# Patient Record
Sex: MALE | Race: WHITE | Employment: OTHER | URBAN - METROPOLITAN AREA
[De-identification: names, ages, dates, MRNs, and addresses within clinical notes are randomized per-mention and may not be internally consistent; named-entity substitution may affect disease eponyms.]

---

## 2017-11-14 ENCOUNTER — OFFICE VISIT (OUTPATIENT)
Dept: NEUROLOGY | Age: 62
End: 2017-11-14

## 2017-11-14 VITALS
BODY MASS INDEX: 28.89 KG/M2 | HEART RATE: 78 BPM | SYSTOLIC BLOOD PRESSURE: 122 MMHG | HEIGHT: 73 IN | RESPIRATION RATE: 16 BRPM | DIASTOLIC BLOOD PRESSURE: 66 MMHG | WEIGHT: 218 LBS | OXYGEN SATURATION: 95 %

## 2017-11-14 DIAGNOSIS — I63.239 CAROTID ARTERY STENOSIS WITH CEREBRAL INFARCTION (HCC): ICD-10-CM

## 2017-11-14 DIAGNOSIS — G40.209 COMPLEX PARTIAL SEIZURE EVOLVING TO GENERALIZED SEIZURE (HCC): ICD-10-CM

## 2017-11-14 DIAGNOSIS — I63.312 CEREBRAL INFARCTION DUE TO THROMBOSIS OF LEFT MIDDLE CEREBRAL ARTERY (HCC): Primary | ICD-10-CM

## 2017-11-14 RX ORDER — TRAMADOL HYDROCHLORIDE 50 MG/1
50 TABLET ORAL 2 TIMES DAILY
COMMUNITY
Start: 2017-10-11

## 2017-11-14 NOTE — MR AVS SNAPSHOT
Visit Information Date & Time Provider Department Dept. Phone Encounter #  
 11/14/2017  3:20 PM Zoila Jurado MD [facility] [phone] [id number] Follow-up Instructions Return in about 1 year (around 11/14/2018). Upcoming Health Maintenance Date Due Pneumococcal 19-64 Medium Risk (1 of 1 - PPSV23) 3/21/1975 DTaP/Tdap/Td series (1 - Tdap) 3/21/1977 FOBT Q 1 YEAR AGE 50-75 3/21/2006 ZOSTER VACCINE AGE 60> 1/21/2016 Influenza Age 5 to Adult 8/1/2017 Allergies as of 11/14/2017  Review Complete On: 11/14/2017 By: Zoila Jurado MD  
  
 Severity Noted Reaction Type Reactions Carbatrol [Carbamazepine]  07/23/2010    Rash Current Immunizations  Never Reviewed No immunizations on file. Not reviewed this visit You Were Diagnosed With   
  
 Codes Comments Cerebral infarction due to thrombosis of left middle cerebral artery (HCC)    -  Primary ICD-10-CM: S33.594 ICD-9-CM: 434.01 Complex partial seizure evolving to generalized seizure ([facility] [address]     ICD-10-CM: W71.589 ICD-9-CM: 345.40 Carotid artery stenosis with cerebral infarction [facility])     ICD-10-CM: K55.715 ICD-9-CM: 433.11 Vitals BP Pulse Resp Height(growth percentile) Weight(growth percentile) SpO2  
 122/66 78 16 6' 1\" (1.854 m) 218 lb (98.9 kg) 95% BMI Smoking Status 28.76 kg/m2 Current Every Day Smoker Vitals History BMI and BSA Data Body Mass Index Body Surface Area 28.76 kg/m 2 2.26 m 2 Preferred Pharmacy Pharmacy Name Phone [facility] [address] [place] [address] [phone] Your Updated Medication List  
  
   
This list is accurate as of: 11/14/17  3:38 PM.  Always use your most recent med list. amLODIPine 10 mg tablet Commonly known as:  [place] TAKE ONE TABLET BY MOUTH EVERY DAY  
  
 aspirin delayed-release 325 mg tablet Take 325 mg by mouth daily. atorvastatin 40 mg tablet Commonly known as:  LIPITOR Take 40 mg by mouth daily. citalopram 20 mg tablet Commonly known as:  CELEXA  
TAKE ONE TABLET BY MOUTH ONCE DAILY doxazosin 4 mg tablet Commonly known as:  CARDURA TAKE ONE TABLET BY MOUTH ONCE DAILY AT BEDTIME  
  
 levothyroxine 50 mcg tablet Commonly known as:  SYNTHROID  
TAKE ONE TABLET BY MOUTH EVERY DAY  
  
 NEURONTIN 600 mg tablet Generic drug:  gabapentin Take  by mouth four (4) times daily. traMADol 50 mg tablet Commonly known as:  ULTRAM  
  
  
  
  
Follow-up Instructions Return in about 1 year (around 11/14/2018). To-Do List   
 11/16/2017 Imaging:  DUPLEX CAROTID BILATERAL AMB NEURO Patient Instructions A Healthy Lifestyle: Care Instructions Your Care Instructions A healthy lifestyle can help you feel good, stay at a healthy weight, and have plenty of energy for both work and play. A healthy lifestyle is something you can share with your whole family. A healthy lifestyle also can lower your risk for serious health problems, such as high blood pressure, heart disease, and diabetes. You can follow a few steps listed below to improve your health and the health of your family. Follow-up care is a key part of your treatment and safety. Be sure to make and go to all appointments, and call your doctor if you are having problems. It's also a good idea to know your test results and keep a list of the medicines you take. How can you care for yourself at home? · Do not eat too much sugar, fat, or fast foods. You can still have dessert and treats now and then. The goal is moderation. · Start small to improve your eating habits. Pay attention to portion sizes, drink less juice and soda pop, and eat more fruits and vegetables. ¨ Eat a healthy amount of food.  A 3-ounce serving of meat, for example, is about the size of a deck of cards. Fill the rest of your plate with vegetables and whole grains. ¨ Limit the amount of soda and sports drinks you have every day. Drink more water when you are thirsty. ¨ Eat at least 5 servings of fruits and vegetables every day. It may seem like a lot, but it is not hard to reach this goal. A serving or helping is 1 piece of fruit, 1 cup of vegetables, or 2 cups of leafy, raw vegetables. Have an apple or some carrot sticks as an afternoon snack instead of a candy bar. Try to have fruits and/or vegetables at every meal. 
· Make exercise part of your daily routine. You may want to start with simple activities, such as walking, bicycling, or slow swimming. Try to be active 30 to 60 minutes every day. You do not need to do all 30 to 60 minutes all at once. For example, you can exercise 3 times a day for 10 or 20 minutes. Moderate exercise is safe for most people, but it is always a good idea to talk to your doctor before starting an exercise program. 
· Keep moving. Sonal Balderas the lawn, work in the garden, or [employer]. Take the stairs instead of the elevator at work. · If you smoke, quit. People who smoke have an increased risk for heart attack, stroke, cancer, and other lung illnesses. Quitting is hard, but there are ways to boost your chance of quitting tobacco for good. ¨ Use nicotine gum, patches, or lozenges. ¨ Ask your doctor about stop-smoking programs and medicines. ¨ Keep trying. In addition to reducing your risk of diseases in the future, you will notice some benefits soon after you stop using tobacco. If you have shortness of breath or asthma symptoms, they will likely get better within a few weeks after you quit. · Limit how much alcohol you drink. Moderate amounts of alcohol (up to 2 drinks a day for men, 1 drink a day for women) are okay. But drinking too much can lead to liver problems, high blood pressure, and other health problems. Family health If you have a family, there are many things you can do together to improve your health. · Eat meals together as a family as often as possible. · Eat healthy foods. This includes fruits, vegetables, lean meats and dairy, and whole grains. · Include your family in your fitness plan. Most people think of activities such as jogging or tennis as the way to fitness, but there are many ways you and your family can be more active. Anything that makes you breathe hard and gets your heart pumping is exercise. Here are some tips: 
¨ Walk to do errands or to take your child to school or the bus. ¨ Go for a family bike ride after dinner instead of watching TV. Where can you learn more? Go to http://austin-paul.info/. Enter Q585 in the search box to learn more about \"A Healthy Lifestyle: Care Instructions. \" Current as of: May 12, 2017 Content Version: 11.4 © 3750-4135 [employer]. Care instructions adapted under license by [employer] (which disclaims liability or warranty for this information). If you have questions about a medical condition or this instruction, always ask your healthcare professional. [place] [address] any warranty or liability for your use of this information. Introducing \[facility]\"" & HEALTH SERVICES! [facility] introduces [employer] patient portal. Now you can access parts of your medical record, email your doctor's office, and request medication refills online. 1. In your internet browser, go to [email or web address] 2. Click on the First Time User? Click Here link in the Sign In box. You will see the New Member Sign Up page. 3. Enter your [employer] Access Code exactly as it appears below. You will not need to use this code after youve completed the sign-up process. If you do not sign up before the expiration date, you must request a new code. · [employer] Access Code: [email or web address] Expires: 2/12/2018  3:37 PM 
 
 4. Enter the last four digits of your Social Security Number (xxxx) and Date of Birth (mm/dd/yyyy) as indicated and click Submit. You will be taken to the next sign-up page. 5. Create a [employer] ID. This will be your [employer] login ID and cannot be changed, so think of one that is secure and easy to remember. 6. Create a [employer] password. You can change your password at any time. 7. Enter your Password Reset Question and Answer. This can be used at a later time if you forget your password. 8. Enter your e-mail address. You will receive e-mail notification when new information is available in [address] 9. Click Sign Up. You can now view and download portions of your medical record. 10. Click the Download Summary menu link to download a portable copy of your medical information. If you have questions, please visit the Frequently Asked Questions section of the [employer] website. Remember, [employer] is NOT to be used for urgent needs. For medical emergencies, dial 911. Now available from your iPhone and Android! Please provide this summary of care documentation to your next provider. If you have any questions after today's visit, please call [phone].

## 2017-11-14 NOTE — PATIENT INSTRUCTIONS

## 2017-11-15 NOTE — PROGRESS NOTES
Consult    Subjective:     Blanca Joshi is a 65 y.o. Right-handed  male seen for evaluation of seizures and stroke on referral from Dr Cody Kumari. Patient has done well without any recurrent seizures in the last year, and no recurrent strokelike symptoms. His last carotid Doppler study done 2 years ago showed less than 15% disease bilaterally in the carotid arteries. We will repeat that next week when he can return for his 2 year follow-up. He has not had any seizures, and the only medication he is taking is Neurontin 600 mg 4 times a day for pain and procedures. He is off his Vimpat now. He denies missing any medication or any side effect on the medication and seems to be doing well without recurring seizures in at least 5 years. His last gabapentin level was in the normal range. He has not had a recent EEG. Patient has developed no new medical problems or complications other than his chronic hip dysfunction for which he is seeing orthopedic surgery. Patient had a stroke in the past that has left him with some speech impairment and difficulty finding his words, and a seizure disorder for which he takes gabapentin 600 mg 4 times a day with good seizure control and no recurrent seizures recently. Imaging of the brain shows a left temporal area of encephalomalacia and previous stroke. He has not had any recurrent seizures or complications on his current medications. He will need another hip surgery in the near future. He takes gabapentin for his pain and seizures. He denies any new focal weakness, sensory loss, seizures, fever, headache, cranial nerve problems, or side effects on his medications or recurrent strokelike symptoms  Patient on aspirin therapy as stroke prophylaxis at dose of 325 mg. Patient advised to try to control his risk factors as far as smoking, high blood pressure, hyperlipidemia, and controlling his blood sugars, exercising regular, remain mentally and physically active.     Past Medical History:   Diagnosis Date    Arthritis     Hips, Knees    CAD (coronary artery disease)     MI around 1990    Hepatitis A     Hepatitis C     High cholesterol     Hypertension     Other ill-defined conditions(799.89)     Light sensitivity, causes seizures    Seizures ([facility] [address]     Last seizure 12/2008/work -up in 2012 -possible seizure    Stroke [facility]) 2005    Thyroid disease     Hypothyroid      Past Surgical History:   Procedure Laterality Date    HX HEART CATHETERIZATION  20 years ago    no stents    HX HERNIA REPAIR  10/8/14    left inguinal hernia- Sarita Mitchell MD    HX ORTHOPAEDIC      Left Knee Scope    HX ORTHOPAEDIC  2/2010    [facility]. Total Hip Replacement/Dr. Warren    HX ORTHOPAEDIC  6/9/14    R hip replacement     [place] OPEN REPAIR CLAVICLE FRACTURE      right      Family History   Problem Relation Age of Onset    Hypertension Mother     Stroke Mother     Diabetes Mother     Heart Disease Father     Cancer Father      lung      Social History   Substance Use Topics    Smoking status: Current Every Day Smoker     Packs/day: 0.25     Years: 25.00     Types: Cigarettes    Smokeless tobacco: Never Used    Alcohol use No      Comment: stopped 10 years ago---beer on the weekends in the past         Current Outpatient Prescriptions:     traMADol (ULTRAM) 50 mg tablet, , Disp: , Rfl:     gabapentin (NEURONTIN) 600 mg tablet, Take  by mouth four (4) times daily. , Disp: , Rfl:     aspirin delayed-release 325 mg tablet, Take 325 mg by mouth daily. , Disp: , Rfl:     doxazosin (CARDURA) 4 mg tablet, TAKE ONE TABLET BY MOUTH ONCE DAILY AT BEDTIME, Disp: 90 Tab, Rfl: 0    citalopram (CELEXA) 20 mg tablet, TAKE ONE TABLET BY MOUTH ONCE DAILY, Disp: 90 Tab, Rfl: 0    atorvastatin (LIPITOR) 40 mg tablet, Take 40 mg by mouth daily. , Disp: , Rfl:     levothyroxine (SYNTHROID) 50 mcg tablet, TAKE ONE TABLET BY MOUTH EVERY DAY, Disp: 90 Tab, Rfl: 0    amLODIPine (NORVASC) 10 mg tablet, TAKE ONE TABLET BY MOUTH EVERY DAY, Disp: 90 Tab, Rfl: 0        Allergies   Allergen Reactions    Carbatrol [Carbamazepine] Rash        Review of Systems:  A comprehensive review of systems was negative except for: Musculoskeletal: positive for myalgias, arthralgias, stiff joints and bone pain   Vitals:    11/14/17 1519   BP: 122/66   Pulse: 78   Resp: 16   SpO2: 95%   Weight: 218 lb (98.9 kg)   Height: 6' 1\" (1.854 m)     Objective:     I      NEUROLOGICAL EXAM:    Appearance: The patient is well developed, well nourished, provides a coherent history and is in no acute distress. Mental Status: Oriented to time, place and person, and the president, cognitive function is normal and speech is fluent and mld aphasia no dysarthria. Mood and affect appropriate. Cranial Nerves:   Intact visual fields. Fundi are benign. HTIEN, EOM's full, no nystagmus, no ptosis. Facial sensation is normal. Corneal reflexes are not tested. Facial movement is symmetric. Hearing is normal bilaterally. Palate is midline with normal sternocleidomastoid and trapezius muscles are normal. Tongue is midline. Neck without meningismus or bruits   Motor:  5/5 strength in upper and lower proximal and distal muscles. Normal bulk and tone. No fasciculations. Reflexes:   Deep tendon reflexes 2+/4 and symmetrical.  No babinski or clonus present   Sensory:   Normal to touch, pinprick and vibration. DSS is intact   Gait:  Abnormal gait because of arthritis in his hips. Tremor:   No tremor noted. Cerebellar:  No cerebellar signs present. Neurovascular:  Normal heart sounds and regular rhythm, peripheral pulses decreased, and no carotid bruits. Assessment:       ICD-10-CM ICD-9-CM    1. Cerebral infarction due to thrombosis of left middle cerebral artery (HCC) I63.312 434.01 traMADol (ULTRAM) 50 mg tablet      DUPLEX CAROTID BILATERAL AMB NEURO   2.  Complex partial seizure evolving to generalized seizure ([facility] [address] G40.209 345.40 traMADol (ULTRAM) 50 mg tablet      DUPLEX CAROTID BILATERAL AMB NEURO   3. Carotid artery stenosis with cerebral infarction (HCC) I63.239 433.11 traMADol (ULTRAM) 50 mg tablet      DUPLEX CAROTID BILATERAL AMB NEURO         Plan:     Patient will obtain a carotid Doppler study next week that has been over 2 years since his last one, and continue gabapentin 600 mg 4 times a day for seizures and for pain which seems to be controlling his seizures  He is to continue his aspirin therapy as stroke prevention and repeat a carotid Doppler at next week  He does not need any refills on his medications at this time  He is advised of the risk factors of stroke including control of blood pressure, cholesterol which his PCP is doing, and do not smoke, and watch his diet and weight  He is encouraged to remain mentally and physically active, take his vitamins and vitamin D on a regular basis, and call us if he has any problem in the interim  Follow-up in one years time or earlier as needed    Signed By: Tin Choudhury MD     November 14, 2017       This note will not be viewable in [address]

## 2017-11-16 ENCOUNTER — OFFICE VISIT (OUTPATIENT)
Dept: NEUROLOGY | Age: 62
End: 2017-11-16

## 2017-11-16 DIAGNOSIS — I63.312 CEREBRAL INFARCTION DUE TO THROMBOSIS OF LEFT MIDDLE CEREBRAL ARTERY (HCC): Primary | ICD-10-CM

## 2017-11-16 DIAGNOSIS — G40.209 COMPLEX PARTIAL SEIZURE EVOLVING TO GENERALIZED SEIZURE (HCC): ICD-10-CM

## 2017-11-16 DIAGNOSIS — I63.239 CAROTID ARTERY STENOSIS WITH CEREBRAL INFARCTION (HCC): ICD-10-CM

## 2017-11-17 NOTE — PROCEDURES
This study consisted of pulsed wave Doppler examination, Color-flow imaging, and Duplex imaging of both the right and left carotid systems, and both vertebral arteries.        Imaging of both right and left carotid systems showed mild mixed plaquing at the bifurcations and proximal and distal internal and external carotid arteries bilaterally, with stenosis in the range of 16-49% only and with no flow abnormalities identified.        Both vertebral arteries showed normal antegrade flow.         Clinical correlation recommended

## 2018-11-14 ENCOUNTER — OFFICE VISIT (OUTPATIENT)
Dept: NEUROLOGY | Age: 63
End: 2018-11-14

## 2018-11-14 VITALS
SYSTOLIC BLOOD PRESSURE: 112 MMHG | HEART RATE: 78 BPM | HEIGHT: 73 IN | DIASTOLIC BLOOD PRESSURE: 58 MMHG | BODY MASS INDEX: 28.63 KG/M2 | WEIGHT: 216 LBS | OXYGEN SATURATION: 94 %

## 2018-11-14 DIAGNOSIS — I69.30 LATE EFFECT OF STROKE: ICD-10-CM

## 2018-11-14 DIAGNOSIS — I63.312 CEREBRAL INFARCTION DUE TO THROMBOSIS OF LEFT MIDDLE CEREBRAL ARTERY (HCC): ICD-10-CM

## 2018-11-14 DIAGNOSIS — I63.239 CAROTID ARTERY STENOSIS WITH CEREBRAL INFARCTION (HCC): Primary | ICD-10-CM

## 2018-11-14 DIAGNOSIS — G40.209 COMPLEX PARTIAL SEIZURE EVOLVING TO GENERALIZED SEIZURE (HCC): ICD-10-CM

## 2018-11-14 DIAGNOSIS — G40.209 LOCALIZATION-RELATED PARTIAL EPILEPSY WITH COMPLEX PARTIAL SEIZURES (HCC): ICD-10-CM

## 2018-11-14 NOTE — PROGRESS NOTES
Consult Subjective:  
 
Angi Luciano is a 59 y.o. Right-handed  male seen for evaluation of seizures and stroke on referral from Dr Amelie Chen. Patient has done well without any recurrent seizures in the last year, and no recurrent strokelike symptoms. His last carotid Doppler study done 1 years ago showed less than 15% disease bilaterally in the carotid arteries. We will repeat that next year when he can return for his 1 year follow-up. He has not had any seizures, and the only medication he is taking is Neurontin 600 mg 4 times a day for pain and procedures. He is off his Vimpat now. He denies missing any medication or any side effect on the medication and seems to be doing well without recurring seizures in at least 6 years. His last gabapentin level was in the normal range. He has not had a recent EEG. Patient has developed no new medical problems or complications other than his chronic hip dysfunction for which he is seeing orthopedic surgery. Patient had a stroke in the past that has left him with some speech impairment and difficulty finding his words, and a seizure disorder for which he takes gabapentin 600 mg 4 times a day with good seizure control and no recurrent seizures recently. Imaging of the brain shows a left temporal area of encephalomalacia and previous stroke. He has not had any recurrent seizures or complications on his current medications. He will need another hip surgery in the near future. He takes gabapentin for his pain and seizures. He denies any new focal weakness, sensory loss, seizures, fever, headache, cranial nerve problems, or side effects on his medications or recurrent strokelike symptoms A DMV form was filled out in detail for the patient today Patient on aspirin therapy as stroke prophylaxis at dose of 325 mg.   Patient advised to try to control his risk factors as far as smoking, high blood pressure, hyperlipidemia, and controlling his blood sugars, exercising regular, remain mentally and physically active. Past Medical History:  
Diagnosis Date  Arthritis Hips, Knees  CAD (coronary artery disease) MI around 1990  
 Hepatitis A   
 Hepatitis C   
 High cholesterol  Hypertension  Other ill-defined conditions(799.89) Light sensitivity, causes seizures  Seizures ([facility] [address] Last seizure 12/2008/work -up in 2012 -possible seizure  Stroke [facility]) 2005  Thyroid disease Hypothyroid Past Surgical History:  
Procedure Laterality Date  HX HEART CATHETERIZATION  20 years ago  
 no stents  HX HERNIA REPAIR  10/8/14  
 left inguinal hernia- Jeffery Mitchell MD  
 HX ORTHOPAEDIC Left Knee Scope  HX ORTHOPAEDIC  2/2010 [facility]. Total Hip Replacement/Dr. Aristides Novak  HX ORTHOPAEDIC  6/9/14 R hip replacement  OR OPEN REPAIR CLAVICLE FRACTURE    
 right Family History Problem Relation Age of Onset  Hypertension Mother  Stroke Mother  Diabetes Mother  Heart Disease Father  Cancer Father   
     lung Social History Tobacco Use  Smoking status: Current Every Day Smoker Packs/day: 0.25 Years: 25.00 Pack years: 6.25 Types: Cigarettes  Smokeless tobacco: Never Used Substance Use Topics  Alcohol use: No  
  Comment: stopped 10 years ago---beer on the weekends in the past  
   
 
Current Outpatient Medications:  
  traMADol (ULTRAM) 50 mg tablet, , Disp: , Rfl:  
  gabapentin (NEURONTIN) 600 mg tablet, Take  by mouth four (4) times daily. , Disp: , Rfl:  
  aspirin delayed-release 325 mg tablet, Take 325 mg by mouth daily. , Disp: , Rfl:  
  doxazosin (CARDURA) 4 mg tablet, TAKE ONE TABLET BY MOUTH ONCE DAILY AT BEDTIME, Disp: 90 Tab, Rfl: 0 
  citalopram (CELEXA) 20 mg tablet, TAKE ONE TABLET BY MOUTH ONCE DAILY, Disp: 90 Tab, Rfl: 0 
  atorvastatin (LIPITOR) 40 mg tablet, Take 40 mg by mouth daily. , Disp: , Rfl:  
  levothyroxine (SYNTHROID) 50 mcg tablet, TAKE ONE TABLET BY MOUTH EVERY DAY, Disp: 90 Tab, Rfl: 0 
  amLODIPine (NORVASC) 10 mg tablet, TAKE ONE TABLET BY MOUTH EVERY DAY, Disp: 90 Tab, Rfl: 0 Allergies Allergen Reactions  Carbatrol [Carbamazepine] Rash Review of Systems: A comprehensive review of systems was negative except for: Musculoskeletal: positive for myalgias, arthralgias, stiff joints and bone pain Vitals:  
 11/14/18 1516 BP: 112/58 Pulse: 78 SpO2: 94% Weight: 216 lb (98 kg) Height: 6' 1\" (1.854 m) Objective: I 
 
 
NEUROLOGICAL EXAM: 
 
Appearance: The patient is well developed, well nourished, provides a coherent history and is in no acute distress. Mental Status: Oriented to time, place and person, and the president, cognitive function is normal and speech is fluent and mld aphasia no dysarthria. Mood and affect appropriate. Cranial Nerves:   Intact visual fields. Fundi are benign, discs are flat and normal size and color, no vascular changes seen. THIEN, EOM's full, no nystagmus, no ptosis. Facial sensation is normal. Corneal reflexes are not tested. Facial movement is symmetric. Hearing is normal bilaterally. Palate is midline with normal sternocleidomastoid and trapezius muscles are normal. Tongue is midline. Neck without meningismus or bruits Temporal arteries are not tender or enlarged Motor:  5/5 strength in upper and lower proximal and distal muscles. Normal bulk and tone. No fasciculations. Rapid alternating movement in all extremities is normal and coordination is normal  
Reflexes:   Deep tendon reflexes 2+/4 and symmetrical. 
No babinski or clonus present Sensory:   Normal to touch, pinprick and vibration. DSS is intact Gait:  Abnormal gait because of arthritis in his hips. Tremor:    No tremor noted.   
Cerebellar:  No cerebellar signs present on Romberg or tandem or finger-nose-finger exam.  
Neurovascular:  Normal heart sounds and regular rhythm, peripheral pulses decreased, and no carotid bruits. Assessment: ICD-10-CM ICD-9-CM 1. Carotid artery stenosis with cerebral infarction (HCC) I63.239 433.11   
2. Complex partial seizure evolving to generalized seizure ([facility] [address] G40.209 345.40 GABAPENTIN 3. Cerebral infarction due to thrombosis of left middle cerebral artery (HCC) I63.312 434.01   
4. 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 Late effect of stroke I69.30 438.9 Plan:  
 
Patient will obtain a carotid Doppler study next year when it has been over 2 years since his last one, and continue gabapentin 600 mg 4 times a day for seizures and for pain which seems to be controlling his seizures He is to continue his aspirin therapy as stroke prevention and repeat a carotid Doppler at next year He does not need any refills on his medications at this time He is advised of the risk factors of stroke including control of blood pressure, cholesterol which his PCP is doing, and do not smoke, and watch his diet and weight He is encouraged to remain mentally and physically active, take his vitamins and vitamin D on a regular basis, and call us if he has any problem in the interim Follow-up in one years time or earlier as needed Signed By: Erika Dorsey MD   
 November 14, 2018 This note will not be viewable in [address]

## 2018-11-14 NOTE — PATIENT INSTRUCTIONS
Office Policieso Phone calls/patient messages: Please allow up to 24 hours for someone in the office to contact you about your call or message. Be mindful your provider may be out of the office or your message may require further review. We encourage you to use [employer] for your messages as this is a faster, more efficient way to communicate with our office 
o Medication Refills: 
Prescription medications require up to 48 business hours to process. We encourage you to use [employer] for your refills. For controlled medications: Please allow up to 72 business hours to process. Certain medications may require you to  a written prescription at our office. NO narcotic/controlled medications will be prescribed after 4pm Monday through Friday or on weekends 
o Form/Paperwork Completion: 
Please note there is a $25 fee for all paperwork completed by our providers. We ask that you allow 7-14 business days. Pre-payment is due prior to picking up/faxing the completed form. You may also download your forms to [employer] to have your doctor print off. A Healthy Lifestyle: Care Instructions Your Care Instructions A healthy lifestyle can help you feel good, stay at a healthy weight, and have plenty of energy for both work and play. A healthy lifestyle is something you can share with your whole family. A healthy lifestyle also can lower your risk for serious health problems, such as high blood pressure, heart disease, and diabetes. You can follow a few steps listed below to improve your health and the health of your family. Follow-up care is a key part of your treatment and safety. Be sure to make and go to all appointments, and call your doctor if you are having problems. It's also a good idea to know your test results and keep a list of the medicines you take. How can you care for yourself at home? · Do not eat too much sugar, fat, or fast foods.  You can still have dessert and treats now and then. The goal is moderation. · Start small to improve your eating habits. Pay attention to portion sizes, drink less juice and soda pop, and eat more fruits and vegetables. ? Eat a healthy amount of food. A 3-ounce serving of meat, for example, is about the size of a deck of cards. Fill the rest of your plate with vegetables and whole grains. ? Limit the amount of soda and sports drinks you have every day. Drink more water when you are thirsty. ? Eat at least 5 servings of fruits and vegetables every day. It may seem like a lot, but it is not hard to reach this goal. A serving or helping is 1 piece of fruit, 1 cup of vegetables, or 2 cups of leafy, raw vegetables. Have an apple or some carrot sticks as an afternoon snack instead of a candy bar. Try to have fruits and/or vegetables at every meal. 
· Make exercise part of your daily routine. You may want to start with simple activities, such as walking, bicycling, or slow swimming. Try to be active 30 to 60 minutes every day. You do not need to do all 30 to 60 minutes all at once. For example, you can exercise 3 times a day for 10 or 20 minutes. Moderate exercise is safe for most people, but it is always a good idea to talk to your doctor before starting an exercise program. 
· Keep moving. Yuniel Memos the lawn, work in the garden, or [employer]. Take the stairs instead of the elevator at work. · If you smoke, quit. People who smoke have an increased risk for heart attack, stroke, cancer, and other lung illnesses. Quitting is hard, but there are ways to boost your chance of quitting tobacco for good. ? Use nicotine gum, patches, or lozenges. ? Ask your doctor about stop-smoking programs and medicines. ? Keep trying.  
In addition to reducing your risk of diseases in the future, you will notice some benefits soon after you stop using tobacco. If you have shortness of breath or asthma symptoms, they will likely get better within a few weeks after you quit. · Limit how much alcohol you drink. Moderate amounts of alcohol (up to 2 drinks a day for men, 1 drink a day for women) are okay. But drinking too much can lead to liver problems, high blood pressure, and other health problems. Family health If you have a family, there are many things you can do together to improve your health. · Eat meals together as a family as often as possible. · Eat healthy foods. This includes fruits, vegetables, lean meats and dairy, and whole grains. · Include your family in your fitness plan. Most people think of activities such as jogging or tennis as the way to fitness, but there are many ways you and your family can be more active. Anything that makes you breathe hard and gets your heart pumping is exercise. Here are some tips: 
? Walk to do errands or to take your child to school or the bus. 
? Go for a family bike ride after dinner instead of watching TV. Where can you learn more? Go to http://austin-paul.info/. Enter L476 in the search box to learn more about \"A Healthy Lifestyle: Care Instructions. \" Current as of: December 7, 2017 Content Version: 11.8 © 1698-8440 [employer]. Care instructions adapted under license by [employer] (which disclaims liability or warranty for this information). If you have questions about a medical condition or this instruction, always ask your healthcare professional. [place] [address] any warranty or liability for your use of this information.

## 2018-11-19 ENCOUNTER — DOCUMENTATION ONLY (OUTPATIENT)
Dept: NEUROLOGY | Age: 63
End: 2018-11-19

## 2018-11-20 LAB — GABAPENTIN SERPLBLD-MCNC: 7.7 UG/ML (ref 4–16)

## 2020-01-27 ENCOUNTER — OFFICE VISIT (OUTPATIENT)
Dept: NEUROLOGY | Age: 65
End: 2020-01-27

## 2020-01-27 VITALS
HEART RATE: 70 BPM | OXYGEN SATURATION: 94 % | BODY MASS INDEX: 26.37 KG/M2 | DIASTOLIC BLOOD PRESSURE: 66 MMHG | HEIGHT: 73 IN | WEIGHT: 199 LBS | SYSTOLIC BLOOD PRESSURE: 116 MMHG

## 2020-01-27 DIAGNOSIS — I63.312 CEREBRAL INFARCTION DUE TO THROMBOSIS OF LEFT MIDDLE CEREBRAL ARTERY (HCC): ICD-10-CM

## 2020-01-27 DIAGNOSIS — G40.209 COMPLEX PARTIAL SEIZURE EVOLVING TO GENERALIZED SEIZURE (HCC): Primary | ICD-10-CM

## 2020-01-27 RX ORDER — ERGOCALCIFEROL 1.25 MG/1
50000 CAPSULE ORAL
COMMUNITY
Start: 2019-12-30

## 2020-01-27 RX ORDER — GABAPENTIN 300 MG/1
CAPSULE ORAL
Qty: 720 CAP | Refills: 1 | Status: SHIPPED | OUTPATIENT
Start: 2020-01-27 | End: 2020-10-28 | Stop reason: SDUPTHER

## 2020-01-27 RX ORDER — GABAPENTIN 300 MG/1
CAPSULE ORAL
COMMUNITY
Start: 2019-10-25 | End: 2020-01-27 | Stop reason: SDUPTHER

## 2020-01-27 RX ORDER — HYDROXYZINE 25 MG/1
25 TABLET, FILM COATED ORAL DAILY
COMMUNITY
Start: 2019-12-30

## 2020-01-27 RX ORDER — MONTELUKAST SODIUM 10 MG/1
10 TABLET ORAL DAILY
COMMUNITY
Start: 2019-11-27

## 2020-01-27 RX ORDER — KETOCONAZOLE 20 MG/ML
SHAMPOO TOPICAL
COMMUNITY
Start: 2020-01-02

## 2020-01-27 NOTE — PATIENT INSTRUCTIONS
Office Policies 
 
o Phone calls/patient messages: Please allow up to 24 hours for someone in the office to contact you about your call or message. Be mindful your provider may be out of the office or your message may require further review. We encourage you to use [employer] for your messages as this is a faster, more efficient way to communicate with our office 
 
o Medication Refills: 
Prescription medications require up to 48 business hours to process. We encourage you to use [employer] for your refills. For controlled medications: Please allow up to 72 business hours to process. Certain medications may require you to  a written prescription at our office. NO narcotic/controlled medications will be prescribed after 4pm Monday through Friday or on weekends 
 
o Form/Paperwork Completion: We ask that you allow 7-14 business days. You may also download your forms to [employer] to have your doctor print off.

## 2020-01-28 NOTE — PROGRESS NOTES
Scott Aguiar is a 62 y.o. male presents today for   Chief Complaint   Patient presents with    Follow-up    Stroke    Seizure       HPI  Historical Data  Pt is know to the practice and is followed for complex partial seizures and stroke  He has residual expressive aphasia but can make needs know and communicate effectively more complex thoughts      Interim Data:   Seizures: last one >10 years ago  Remains adherent to meds  Denies SE  Will need DMV paperwork in Nov 2020    Stroke: no new sx or worsening old sx  Remains adherent to meds and continues to manage comorbid states and states these are stable and well controlled      Flowsheets    3 most recent PHQ Screens 1/27/2020   Little interest or pleasure in doing things Not at all   Feeling down, depressed, irritable, or hopeless Not at all   Total Score PHQ 2 0       No flowsheet data found. No flowsheet data found.        Past Medical History:   Diagnosis Date    Arthritis     Hips, Knees    CAD (coronary artery disease)     MI around 1990    Hepatitis A     Hepatitis C     High cholesterol     Hypertension     Other ill-defined conditions(799.89)     Light sensitivity, causes seizures    Seizures ([facility] [address]     Last seizure 12/2008/work -up in 2012 -possible seizure    Stroke [facility]) 2005    Thyroid disease     Hypothyroid       Past Surgical History:   Procedure Laterality Date    HX HEART CATHETERIZATION  20 years ago    no stents    HX HERNIA REPAIR  10/8/14    left inguinal herniaDelroy Jose Alejandro Mitchell MD     ORTHOPAEDIC      Left Knee Scope    HX ORTHOPAEDIC  2/2010    [facility]. Total Hip Replacement/Dr. Lizzy Licea    HX ORTHOPAEDIC  6/9/14    R hip replacement     [place] OPEN REPAIR CLAVICLE FRACTURE      right        Social History     Socioeconomic History    Marital status:      Spouse name: Not on file    Number of children: Not on file    Years of education: Not on file    Highest education level: Not on file   Tobacco Use    Smoking status: Current Every Day Smoker     Packs/day: 0.25     Years: 25.00     Pack years: 6.25     Types: Cigarettes    Smokeless tobacco: Never Used   Substance and Sexual Activity    Alcohol use: No     Comment: stopped 10 years ago---beer on the weekends in the past    Drug use: No       Patient does not qualify to have social determinant information on file (likely too young). Family History   Problem Relation Age of Onset    Hypertension Mother     Stroke Mother     Diabetes Mother     Heart Disease Father     Cancer Father         lung       Current Outpatient Medications   Medication Sig    montelukast (SINGULAIR) 10 mg tablet     ketoconazole (NIZORAL) 2 % shampoo     hydroxyzine HCL (ATARAX) 25 mg tablet     ergocalciferol (ERGOCALCIFEROL) 1,250 mcg (50,000 unit) capsule     gabapentin (NEURONTIN) 300 mg capsule 2 caps 4x/day    traMADol (ULTRAM) 50 mg tablet     aspirin delayed-release 325 mg tablet Take 325 mg by mouth daily.  doxazosin (CARDURA) 4 mg tablet TAKE ONE TABLET BY MOUTH ONCE DAILY AT BEDTIME    citalopram (CELEXA) 20 mg tablet TAKE ONE TABLET BY MOUTH ONCE DAILY    atorvastatin (LIPITOR) 40 mg tablet Take 40 mg by mouth daily.  levothyroxine (SYNTHROID) 50 mcg tablet TAKE ONE TABLET BY MOUTH EVERY DAY    amLODIPine (NORVASC) 10 mg tablet TAKE ONE TABLET BY MOUTH EVERY DAY     No current facility-administered medications for this visit.         No visits with results within 3 Month(s) from this visit. Latest known visit with results is:   Office Visit on 11/14/2018   Component Date Value    Gabapentin 11/16/2018 7.7        XR Results (maximum last 3): Results from [place] encounter on 11/09/15   XR CHEST PA LAT   Results from [place] encounter on 10/27/15   XR INJ ASP LARGE JOINT / BURSA   Results from Hospital Encounter encounter on 10/08/15   XR HIP RT AP/LAT MIN 2 V       CT Results (maximum last 3): Results from [place] encounter on 06/28/13   CT HEAD WO CONT   CT SPINE CERV WO CONT   Results from [place] encounter on 04/21/12   CT HEAD WO CONT       MRI Results (maximum last 3): Results from [place] encounter on 12/08/16   MRI LUMB SPINE WO CONT    Impression Impression:  1. Mild to moderate left and mild right foraminal narrowing L4-L5 unchanged  2. Mild bilateral foraminal narrowing L5-S1 unchanged  3. Mild bilateral foraminal narrowing L3-L4 unchanged        Results from [place] encounter on 10/01/14   MRI LUMB SPINE WO CONT   Results from [place] encounter on 01/19/09   MRI BRAIN W AND W/O CONT       Nuclear Medicine Results (maximum last 3): No results found for this or any previous visit. IR Results (maximum last 3): No results found for this or any previous visit. Review of Systems   Constitutional: Negative. HENT: Negative. Eyes: Negative. Respiratory: Negative. Cardiovascular: Negative. Gastrointestinal: Negative. Genitourinary: Negative. Musculoskeletal: Negative. Skin: Negative. Negative for itching and rash. Neurological: Negative. Psychiatric/Behavioral: Negative.            EXAMINATION  Visit Vitals  /66 (BP 1 Location: Left arm, BP Patient Position: Sitting)   Pulse 70   Ht 6' 1\" (1.854 m)   Wt 199 lb (90.3 kg)   SpO2 94%   BMI 26.25 kg/m²        General:   General appearance: Patient is well-developed and well-nourished in no apparent distress. Well groomed    Affect: Appropriate and in good spirits    Neurological Examination:   Mental Status:    Formal testing was not completed however there is word finding difficulties but can make needs known and convey complex thought processes wo difficulty otherwise there was nothing concerning on general observation and discussion. Could follow normal conversation with normal speed and processing. Was able to answer questions, voiced concerns, discuss current events without difficulty. Cranial Nerves:  PERRLA, Extraocular movements are full. Gaze is conjugate. Facial sensation intact V1- V3. Facial movement intact, symmetric. Hearing intact to conversation. Voice is strong without evidence of secretion pooling, palate elevates symmetrically. Shoulder shrug symmetric. Tongue midline. Motor: Strength is 5/5 X4. Hand grasp 5/5 bilaterally. Good finger opposition. Plantar and dorsiflexion 5/5 bilaterally  Normal tone and bulk  No tremor appreciated      Sensation: Light touch - Normal    Coordination/Cerebellar: Intact to finger-nose-finger bilaterally; Romberg negative    Gait: Ambulates independently. Casual gait unremarkable    Reflexes: unremarkable        Assessment and Plan  Seizures: stable on current meds and tolerating well  Will need DMV paperwork in Nov 2020.   Not sure why this is needed since it has been over 10 years since last know seizure event    Stroke: stable on current treatment w residual aphasia but does not interfer w communication or functioning  Reviewed w pt need to continue to keep other comorbid conditions under good control, s/sx of stroke [pt declined written info]       Problem List Items Addressed This Visit        Nervous    Complex partial seizure evolving to generalized seizure ([facility] [address] - Primary    Relevant Medications    gabapentin (NEURONTIN) 300 mg capsule    Cerebral infarction due to thrombosis of left middle cerebral artery ([address] Relevant Medications    325 ASA and statin          1. Complex partial seizure evolving to generalized seizure ([address]    - gabapentin (NEURONTIN) 300 mg capsule; 2 caps 4x/day  Dispense: 720 Cap; Refill: 1    2. Cerebral infarction due to thrombosis of left middle cerebral artery (HCC)   and statin      Follow-up and Dispositions    · Return in Nov 2020: needs DMV paperwork filled out.            Jyothi Cochran MS, ANP-BC, MSCN, CNRN

## 2020-05-04 ENCOUNTER — HOSPITAL ENCOUNTER (OUTPATIENT)
Dept: GENERAL RADIOLOGY | Age: 65
Discharge: HOME | End: 2020-05-04
Payer: MEDICARE

## 2020-05-04 DIAGNOSIS — J44.9 OBSTRUCTIVE CHRONIC BRONCHITIS WITHOUT EXACERBATION (HCC): ICD-10-CM

## 2020-05-04 PROCEDURE — 71046 X-RAY EXAM CHEST 2 VIEWS: CPT

## 2020-10-28 ENCOUNTER — OFFICE VISIT (OUTPATIENT)
Dept: NEUROLOGY | Age: 65
End: 2020-10-28
Payer: MEDICARE

## 2020-10-28 VITALS
TEMPERATURE: 98.1 F | OXYGEN SATURATION: 96 % | WEIGHT: 201 LBS | SYSTOLIC BLOOD PRESSURE: 120 MMHG | HEIGHT: 73 IN | BODY MASS INDEX: 26.64 KG/M2 | HEART RATE: 63 BPM | DIASTOLIC BLOOD PRESSURE: 62 MMHG

## 2020-10-28 DIAGNOSIS — G40.209 COMPLEX PARTIAL SEIZURE EVOLVING TO GENERALIZED SEIZURE (HCC): Primary | ICD-10-CM

## 2020-10-28 DIAGNOSIS — I69.30 LATE EFFECT OF STROKE: ICD-10-CM

## 2020-10-28 DIAGNOSIS — G40.209 COMPLEX PARTIAL SEIZURE EVOLVING TO GENERALIZED SEIZURE (HCC): ICD-10-CM

## 2020-10-28 DIAGNOSIS — G40.209 LOCALIZATION-RELATED PARTIAL EPILEPSY WITH COMPLEX PARTIAL SEIZURES (HCC): ICD-10-CM

## 2020-10-28 DIAGNOSIS — I63.239 CAROTID ARTERY STENOSIS WITH CEREBRAL INFARCTION (HCC): ICD-10-CM

## 2020-10-28 DIAGNOSIS — I63.312 CEREBRAL INFARCTION DUE TO THROMBOSIS OF LEFT MIDDLE CEREBRAL ARTERY (HCC): ICD-10-CM

## 2020-10-28 PROCEDURE — G8427 DOCREV CUR MEDS BY ELIG CLIN: HCPCS | Performed by: PSYCHIATRY & NEUROLOGY

## 2020-10-28 PROCEDURE — G8419 CALC BMI OUT NRM PARAM NOF/U: HCPCS | Performed by: PSYCHIATRY & NEUROLOGY

## 2020-10-28 PROCEDURE — G8432 DEP SCR NOT DOC, RNG: HCPCS | Performed by: PSYCHIATRY & NEUROLOGY

## 2020-10-28 PROCEDURE — 3017F COLORECTAL CA SCREEN DOC REV: CPT | Performed by: PSYCHIATRY & NEUROLOGY

## 2020-10-28 PROCEDURE — 99214 OFFICE O/P EST MOD 30 MIN: CPT | Performed by: PSYCHIATRY & NEUROLOGY

## 2020-10-28 PROCEDURE — G8752 SYS BP LESS 140: HCPCS | Performed by: PSYCHIATRY & NEUROLOGY

## 2020-10-28 PROCEDURE — G8754 DIAS BP LESS 90: HCPCS | Performed by: PSYCHIATRY & NEUROLOGY

## 2020-10-28 RX ORDER — GABAPENTIN 300 MG/1
CAPSULE ORAL
Qty: 720 CAP | Refills: 1 | Status: SHIPPED | OUTPATIENT
Start: 2020-10-28 | End: 2021-06-22 | Stop reason: SDUPTHER

## 2020-10-28 NOTE — PROGRESS NOTES
Sonya Rosario is a 60 y.o. male who presents today for the following:  Chief Complaint   Patient presents with    Seizure     dmv form to be filled out       This is an in office visit    HPI  Historical Data    Patient is known to the practice    Neurologic diagnosis  Partial complex seizures  Stroke  Residual aphasia secondary to stroke      Interim Data:     Seizure disorder  Last known seizure greater than 10 years ago    Patient is here for follow-up for epilepsy. He remains well controlled on current medications. 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  Patient currently drives: yes   There is been no reported MVAs since last office visit  DMV paperwork filled out: yes      Status post stroke  Remains 325mg ASA every day  No SE  No new or worsening S/Sx of CVI Anterior/Posterior circulation  Greater than 10 years since stroke        Allergies   Allergen Reactions    Carbatrol [Carbamazepine] Rash       Current Outpatient Medications   Medication Sig    gabapentin (NEURONTIN) 300 mg capsule 2 caps 4x/day    montelukast (SINGULAIR) 10 mg tablet     ketoconazole (NIZORAL) 2 % shampoo     hydroxyzine HCL (ATARAX) 25 mg tablet     ergocalciferol (ERGOCALCIFEROL) 1,250 mcg (50,000 unit) capsule     traMADol (ULTRAM) 50 mg tablet     aspirin delayed-release 325 mg tablet Take 325 mg by mouth daily.  doxazosin (CARDURA) 4 mg tablet TAKE ONE TABLET BY MOUTH ONCE DAILY AT BEDTIME    citalopram (CELEXA) 20 mg tablet TAKE ONE TABLET BY MOUTH ONCE DAILY    atorvastatin (LIPITOR) 40 mg tablet Take 40 mg by mouth daily.     levothyroxine (SYNTHROID) 50 mcg tablet TAKE ONE TABLET BY MOUTH EVERY DAY    amLODIPine (NORVASC) 10 mg tablet TAKE ONE TABLET BY MOUTH EVERY DAY     No current facility-administered medications for this visit. Past Medical History:   Diagnosis Date    Arthritis     Hips, Knees    CAD (coronary artery disease)     MI around 1990    Hepatitis A     Hepatitis C     High cholesterol     Hypertension     Other ill-defined conditions(799.89)     Light sensitivity, causes seizures    Seizures ([address]     Last seizure 12/2008/work -up in 2012 -possible seizure    Stroke [facility]) 2005    Thyroid disease     Hypothyroid       Past Surgical History:   Procedure Laterality Date    HX HEART CATHETERIZATION  20 years ago    no stents    HX HERNIA REPAIR  10/8/14    left inguinal hernia- Namon Klinefelter Dougherty,MD    HX ORTHOPAEDIC      Left Knee Scope    HX ORTHOPAEDIC  2/2010    [facility].  Total Hip Replacement/Dr. Miki Jeffrey    HX ORTHOPAEDIC  6/9/14    R hip replacement     [place] OPEN REPAIR CLAVICLE FRACTURE      right        Family History   Problem Relation Age of Onset    Hypertension Mother     Stroke Mother     Diabetes Mother     Heart Disease Father     Cancer Father         lung       Social History     Socioeconomic History    Marital status:      Spouse name: Not on file    Number of children: Not on file    Years of education: Not on file    Highest education level: Not on file   Tobacco Use    Smoking status: Current Every Day Smoker     Packs/day: 0.25     Years: 25.00     Pack years: 6.25     Types: Cigarettes    Smokeless tobacco: Never Used   Substance and Sexual Activity    Alcohol use: No     Comment: stopped 10 years ago---beer on the weekends in the past    Drug use: No         ROS    A ten system review of constitutional, cardiovascular, respiratory, musculoskeletal, endocrine, skin, SHEENT, genitourinary, psychiatric and neurologic systems was obtained and is unremarkable except as mentioned under HPI      EXAMINATION  Visit Vitals  /62   Pulse 63   Temp 98.1 °F (36.7 °C)   Ht 6' 1\" (1.854 m)   Wt 201 lb (91.2 kg)   SpO2 96%   BMI 26.52 kg/m²          General appearance: Patient is well-developed and well-nourished in no apparent distress and well groomed. Psych/mental health:  Affect: Appropriate    PHQ  3 most recent PHQ Screens 10/28/2020   Little interest or pleasure in doing things Not at all   Feeling down, depressed, irritable, or hopeless Not at all   Total Score PHQ 2 0       HEENT: Normocephalic, without evidence of trauma  Full range of motion, no tenderness to palpation in the head or neck region     Cardiovascular: Extremities warm to touch, no swelling appreciated    Respiratory: No dyspnea on exertion noted, normal effort on casual observation    Musculoskeletal: No evidence of significant bony deformities or spinal curvature    Integumentary:  No obvious bruising, lacerations or discoloaration on casual observation. Neurological Examination:   Mental Status:        MMSE  No flowsheet data found. Formal testing was not completed    there was nothing concerning on general observation and discussion.    Alert oriented and appropriate to general conversation  Normal processing on general observation  Followed conversation and responded seemingly appropriate throughout the office visit  No word finding difficulties noted on casual observation  Able to follow directions without difficulty     Cranial Nerves:    PERRLA,   EOMs intact gaze is conjugate  No nystagmus is appreciated  No KELLY  Facial motor and sensory intact bilaterally  Hearing intact to conversation  Voice with normal projection, no evidence of secretion pooling  Palate elevates symmetrically  Shoulder shrug intact bilaterally  No tongue deviation appreciated     Motor:   Normal tone and bulk  Fine dexterity intact bilaterally  No tremor appreciated on today's exam  No abnormal movements appreciated on today's exam  5/5x4  Hand grasp intact bilaterally    Sensation: Intact to light touch bilaterally    Coordination/Cerebellar:   Finger-to-nose: Intact bilaterally; Romberg negative    Gait: Ambulates independently    Fall risk assessment  No flowsheet data found. Reflexes: Brisk but symmetrical    ASSESSMENT AND PLAN  Status post CVA with mild residual deficit of expressive aphasia  Able to communicate his needs without any difficulty whatsoever  Only some mild word finding difficulty  Remains on 325 aspirin daily      Seizures  Remains on gabapentin 600 mg 4 times a day  Is been greater than 10 years since breakthrough seizure  DMV per her work was filled out at today's office visit   I recommend to [facility] that we do not need to do this paperwork since it is been greater than 10 years since his neurologic events and he has been stable ever since however what they do will be up to [facility]        ICD-10-CM ICD-9-CM    1. Complex partial seizure evolving to generalized seizure ([facility] [address]  G40.209 345.40    2. Cerebral infarction due to thrombosis of left middle cerebral artery (HCC)  I63.312 434.01    3. Carotid artery stenosis with cerebral infarction ([facility])  I63.239 433.11    4. Late effect of stroke  I69.30 438.9    5. Localization-related partial epilepsy with complex partial seizures ([facility] [address]  G40.209 345.40            Additional pertinent medical data reviewed at today's office visit:      Labs    No visits with results within 3 Month(s) from this visit. Latest known visit with results is:   Office Visit on 11/14/2018   Component Date Value    Gabapentin 11/16/2018 7.7          XR Results (maximum last 1): Results from [place] encounter on 05/04/20   XR CHEST PA LAT    Impression IMPRESSION:  No acute process. Emphysema with no interval change. CT Results (maximum last 1): Results from [place] encounter on 06/28/13   CT HEAD WO CONT       MRI Results (maximum last 1):   Results from [place] encounter on 12/08/16   MRI LUMB SPINE WO CONT    Impression Impression:  1. Mild to moderate left and mild right foraminal narrowing L4-L5 unchanged  2. Mild bilateral foraminal narrowing L5-S1 unchanged  3. Mild bilateral foraminal narrowing L3-L4 unchanged              Follow-up and Dispositions    · Return in about 1 year (around 10/28/2021) for In office appointment.            Rachel Araya MS, ANP-BC, [facility]

## 2020-10-28 NOTE — PATIENT INSTRUCTIONS
Overall continue on all your medications unchanged you are doing well We did fill out your DMV papers I did recommend that we no longer need to fill these out since you have been stable for greater than 10 years but the [facility] will do with the DMV able to help We will see you back once a year for neurologic check and And of course were available to you sooner if needed so he is back in

## 2021-03-16 ENCOUNTER — TRANSCRIBE ORDER (OUTPATIENT)
Dept: SCHEDULING | Age: 66
End: 2021-03-16

## 2021-03-16 DIAGNOSIS — J44.9 CHRONIC OBSTRUCTIVE PULMONARY DISEASE, UNSPECIFIED COPD TYPE (HCC): Primary | ICD-10-CM

## 2021-04-19 ENCOUNTER — HOSPITAL ENCOUNTER (OUTPATIENT)
Dept: CT IMAGING | Age: 66
Discharge: HOME | End: 2021-04-19
Attending: FAMILY MEDICINE
Payer: MEDICARE

## 2021-04-19 DIAGNOSIS — J44.9 CHRONIC OBSTRUCTIVE PULMONARY DISEASE, UNSPECIFIED COPD TYPE (HCC): ICD-10-CM

## 2021-04-19 PROCEDURE — 71271 CT THORAX LUNG CANCER SCR C-: CPT

## 2021-06-22 DIAGNOSIS — G40.209 COMPLEX PARTIAL SEIZURE EVOLVING TO GENERALIZED SEIZURE (HCC): ICD-10-CM

## 2021-06-22 RX ORDER — GABAPENTIN 300 MG/1
CAPSULE ORAL
Qty: 720 CAPSULE | Refills: 1 | Status: SHIPPED | OUTPATIENT
Start: 2021-06-22 | End: 2021-11-03 | Stop reason: SDUPTHER

## 2021-11-03 ENCOUNTER — OFFICE VISIT (OUTPATIENT)
Dept: NEUROLOGY | Age: 66
End: 2021-11-03
Payer: MEDICARE

## 2021-11-03 VITALS
TEMPERATURE: 97.9 F | BODY MASS INDEX: 25.18 KG/M2 | RESPIRATION RATE: 12 BRPM | SYSTOLIC BLOOD PRESSURE: 127 MMHG | OXYGEN SATURATION: 95 % | WEIGHT: 190 LBS | HEART RATE: 72 BPM | DIASTOLIC BLOOD PRESSURE: 72 MMHG | HEIGHT: 73 IN

## 2021-11-03 DIAGNOSIS — G40.209 COMPLEX PARTIAL SEIZURE EVOLVING TO GENERALIZED SEIZURE (HCC): ICD-10-CM

## 2021-11-03 DIAGNOSIS — I63.239 CAROTID ARTERY STENOSIS WITH CEREBRAL INFARCTION (HCC): ICD-10-CM

## 2021-11-03 DIAGNOSIS — I63.312 CEREBRAL INFARCTION DUE TO THROMBOSIS OF LEFT MIDDLE CEREBRAL ARTERY (HCC): Primary | ICD-10-CM

## 2021-11-03 DIAGNOSIS — G40.209 LOCALIZATION-RELATED PARTIAL EPILEPSY WITH COMPLEX PARTIAL SEIZURES (HCC): ICD-10-CM

## 2021-11-03 DIAGNOSIS — G93.40 ENCEPHALOPATHY, UNSPECIFIED: ICD-10-CM

## 2021-11-03 PROCEDURE — 3017F COLORECTAL CA SCREEN DOC REV: CPT | Performed by: PSYCHIATRY & NEUROLOGY

## 2021-11-03 PROCEDURE — G8432 DEP SCR NOT DOC, RNG: HCPCS | Performed by: PSYCHIATRY & NEUROLOGY

## 2021-11-03 PROCEDURE — G8752 SYS BP LESS 140: HCPCS | Performed by: PSYCHIATRY & NEUROLOGY

## 2021-11-03 PROCEDURE — 99214 OFFICE O/P EST MOD 30 MIN: CPT | Performed by: PSYCHIATRY & NEUROLOGY

## 2021-11-03 PROCEDURE — G8754 DIAS BP LESS 90: HCPCS | Performed by: PSYCHIATRY & NEUROLOGY

## 2021-11-03 PROCEDURE — G8427 DOCREV CUR MEDS BY ELIG CLIN: HCPCS | Performed by: PSYCHIATRY & NEUROLOGY

## 2021-11-03 PROCEDURE — 1101F PT FALLS ASSESS-DOCD LE1/YR: CPT | Performed by: PSYCHIATRY & NEUROLOGY

## 2021-11-03 PROCEDURE — G8536 NO DOC ELDER MAL SCRN: HCPCS | Performed by: PSYCHIATRY & NEUROLOGY

## 2021-11-03 PROCEDURE — G8419 CALC BMI OUT NRM PARAM NOF/U: HCPCS | Performed by: PSYCHIATRY & NEUROLOGY

## 2021-11-03 RX ORDER — GABAPENTIN 300 MG/1
CAPSULE ORAL
Qty: 720 CAPSULE | Refills: 1 | Status: SHIPPED | OUTPATIENT
Start: 2021-11-03 | End: 2022-06-14

## 2021-11-03 RX ORDER — AZELASTINE 1 MG/ML
SPRAY, METERED NASAL
COMMUNITY
Start: 2021-10-14

## 2021-11-03 NOTE — LETTER
11/3/2021 Patient: Azeem Mcleod YOB: 1955 Date of Visit: 11/3/2021 Deanna Wang NP 
[address] Via Fax: [phone] Dear Deanna Wang NP, Thank you for referring Mr. Lei Solis to [address] [place] [address] for evaluation. My notes for this consultation are attached. Consult Subjective:  
 
Azeem Mcleod is a 73 y.o. Right-handed  male seen for evaluation of seizures and stroke on referral from Dr Davidson Lacey, and I have not seen the patient in almost 3 years, but he continues to do well on gabapentin taking 300 mg pills 2 pills 4 times a day and has not had any recent seizures. He apparently does not even need a DMV form filled out anymore that has been so long since his last seizure. We will do a carotid Doppler study today, that has been many years since his last study may be even 6. He has had no side effect on the medication and is tolerating it well. We offered to give him 600 mg pills but he prefers just to keep taking what he is doing now. He has not had any recurrent strokelike symptoms. He is on aspirin therapy and high-dose statin of Lipitor 40 mg a day. He is off his Vimpat now. He denies missing any medication or any side effect on the medication and seems to be doing well without recurring seizures in at least 6 years. His last gabapentin level was in the normal range. He has not had a recent EEG. Patient has developed no new medical problems or complications other than his chronic hip dysfunction for which he is seeing orthopedic surgery. Patient had a stroke in the past that has left him with some speech impairment and difficulty finding his words, and a seizure disorder for which he takes gabapentin 600 mg 4 times a day with good seizure control and no recurrent seizures recently. Imaging of the brain shows a left temporal area of encephalomalacia and previous stroke.  He has not had any recurrent seizures or complications on his current medications. He will need another hip surgery in the near future. He takes gabapentin for his pain and seizures. He denies any new focal weakness, sensory loss, seizures, fever, headache, cranial nerve problems, or side effects on his medications or recurrent strokelike symptoms We advised the patient that the main risk factors for stroke are smoking, and he quit smoking a year ago we congratulated him on that, he needs to continue to control his blood pressure, his cholesterol, and his diet make sure his blood sugars under good control. He was advised of the warning signs of stroke as far as be fast, as, balance, eyes, face, arms, sensory and time. Patient advised to try to control his risk factors as far as smoking, high blood pressure, hyperlipidemia, and controlling his blood sugars, exercising regular, remain mentally and physically active. Past Medical History:  
Diagnosis Date  Arthritis Hips, Knees  CAD (coronary artery disease) MI around 1990  
 Hepatitis A   
 Hepatitis C   
 High cholesterol  Hypertension  Other ill-defined conditions(799.89) Light sensitivity, causes seizures  Seizures ([facility] [address] Last seizure 12/2008/work -up in 2012 -possible seizure  Stroke [facility]) 2005  Thyroid disease Hypothyroid Past Surgical History:  
Procedure Laterality Date  HX HEART CATHETERIZATION  20 years ago  
 no stents  HX HERNIA REPAIR  10/8/14  
 left inguinal hernia- Ghislaine Mitchell MD  
 HX ORTHOPAEDIC Left Knee Scope  HX ORTHOPAEDIC  2/2010 [facility] Total Hip Replacement/Dr. Nadeen Ceja  HX ORTHOPAEDIC  6/9/14 R hip replacement  [place] OPEN REPAIR CLAVICLE FRACTURE    
 right Family History Problem Relation Age of Onset  Hypertension Mother  Stroke Mother  Diabetes Mother  Heart Disease Father  Cancer Father   
     lung Social History Tobacco Use  Smoking status: Former Smoker Packs/day: 0.25 Years: 25.00 Pack years: 6.25 Types: Cigarettes  Smokeless tobacco: Never Used Substance Use Topics  Alcohol use: No  
  Comment: stopped 10 years ago---beer on the weekends in the past  
   
 
Current Outpatient Medications:  
  azelastine (ASTELIN) 137 mcg (0.1 %) nasal spray, USE 2 SPRAYS IN EACH NOSTRIL TWICE DAILY, Disp: , Rfl:  
  gabapentin (NEURONTIN) 300 mg capsule, 2 caps 4x/day, Disp: 720 Capsule, Rfl: 1 
  montelukast (SINGULAIR) 10 mg tablet, Take 10 mg by mouth daily. , Disp: , Rfl:  
  ketoconazole (NIZORAL) 2 % shampoo, , Disp: , Rfl:  
  hydroxyzine HCL (ATARAX) 25 mg tablet, Take 25 mg by mouth daily. , Disp: , Rfl:  
  ergocalciferol (ERGOCALCIFEROL) 1,250 mcg (50,000 unit) capsule, Take 50,000 Units by mouth every seven (7) days. , Disp: , Rfl:  
  traMADol (ULTRAM) 50 mg tablet, Take 50 mg by mouth two (2) times a day., Disp: , Rfl:  
  aspirin delayed-release 325 mg tablet, Take 325 mg by mouth daily. , Disp: , Rfl:  
  doxazosin (CARDURA) 4 mg tablet, TAKE ONE TABLET BY MOUTH ONCE DAILY AT BEDTIME, Disp: 90 Tab, Rfl: 0 
  citalopram (CELEXA) 20 mg tablet, TAKE ONE TABLET BY MOUTH ONCE DAILY, Disp: 90 Tab, Rfl: 0 
  atorvastatin (LIPITOR) 40 mg tablet, Take 40 mg by mouth daily. , Disp: , Rfl:  
  levothyroxine (SYNTHROID) 50 mcg tablet, TAKE ONE TABLET BY MOUTH EVERY DAY, Disp: 90 Tab, Rfl: 0 
  amLODIPine (NORVASC) 10 mg tablet, TAKE ONE TABLET BY MOUTH EVERY DAY, Disp: 90 Tab, Rfl: 0 Allergies Allergen Reactions  Carbatrol [Carbamazepine] Rash Review of Systems: A comprehensive review of systems was negative except for: Musculoskeletal: positive for myalgias, arthralgias, stiff joints and bone pain Vitals:  
 11/03/21 5438 BP: 127/72 Pulse: 72 Resp: 12 Temp: 97.9 °F (36.6 °C) TempSrc: Temporal  
SpO2: 95% Weight: 190 lb (86.2 kg) Height: 6' 1\" (1.854 m) Objective: I NEUROLOGICAL EXAM: 
 
Appearance: The patient is well developed, well nourished, provides a coherent history and is in no acute distress. Mental Status: Oriented to time, place and person, and the president, cognitive function is normal and speech is fluent and mld aphasia no dysarthria. Mood and affect appropriate. Cranial Nerves:   Intact visual fields. Fundi are benign, discs are flat and normal size and color, no vascular changes seen. THIEN, EOM's full, no nystagmus, no ptosis. Facial sensation is normal. Corneal reflexes are not tested. Facial movement is symmetric. Hearing is normal bilaterally. Palate is midline with normal sternocleidomastoid and trapezius muscles are normal. Tongue is midline. Neck without meningismus or bruits Temporal arteries are not tender or enlarged Motor:  5/5 strength in upper and lower proximal and distal muscles. Normal bulk and tone. No fasciculations. Rapid alternating movement in all extremities is normal and coordination is normal  
Reflexes:   Deep tendon reflexes 2+/4 and symmetrical. 
No babinski or clonus present Sensory:   Normal to touch, pinprick and vibration. DSS is intact Gait:  Abnormal gait because of arthritis in his hips. Tremor:    No tremor noted. Cerebellar:  No cerebellar signs present on Romberg or tandem or finger-nose-finger exam.  
Neurovascular:  Normal heart sounds and regular rhythm, peripheral pulses decreased, and no carotid bruits. Assessment: ICD-10-CM ICD-9-CM 1. Cerebral infarction due to thrombosis of left middle cerebral artery (HCC)  I63.312 434.01 gabapentin (NEURONTIN) 300 mg capsule DUPLEX CAROTID BILATERAL 2. Encephalopathy, unspecified  G93.40 348.30 gabapentin (NEURONTIN) 300 mg capsule DUPLEX CAROTID BILATERAL 3. Carotid artery stenosis with cerebral infarction [facility])  I63.239 433.11 gabapentin (NEURONTIN) 300 mg capsule DUPLEX CAROTID BILATERAL 4.  Complex partial seizure evolving to generalized seizure ([facility] [address]  G40.209 345.40 gabapentin (NEURONTIN) 300 mg capsule DUPLEX CAROTID BILATERAL 5. Localization-related partial epilepsy with complex partial seizures (HCC)  G40.209 345.40 gabapentin (NEURONTIN) 300 mg capsule DUPLEX CAROTID BILATERAL Plan:  
 
Patient will obtain a carotid Doppler study today because been about 6 years since his last Doppler, that came out good, he has mild disease bilaterally only. He is to continue his aspirin therapy and high-dose statin as stroke prevention Warning signs of the stroke orders all explained to the patient as above, and risk factors also, given to the patient and he is to try to keep good control of his blood pressure, stroke, and his blood sugars, and he quit smoking which is important. He has had no seizures, and wants to continue his current medication for seizures, so we renewed his gabapentin for him today we will continue taking that as he is tolerating it well, not had any seizures and does not need a DMV form filled out today, but if he does he will bring it in later. He is advised of the risk factors of stroke including control of blood pressure, cholesterol which his PCP is doing, and do not smoke, and watch his diet and weight He is encouraged to remain mentally and physically active, take his vitamins and vitamin D on a regular basis, and call us if he has any problem in the interim Follow-up in one years time or earlier as needed Signed By: Tin Collins MD   
 November 3, 2021 This note will not be viewable in [address] If you have questions, please do not hesitate to call me. I look forward to following your patient along with you. Sincerely, Tin Collins MD

## 2021-11-03 NOTE — PROGRESS NOTES
Consult    Subjective:     Carolynn Solano is a 73 y.o. Right-handed  male seen for evaluation of seizures and stroke on referral from Dr Ashly Johnson, and I have not seen the patient in almost 3 years, but he continues to do well on gabapentin taking 300 mg pills 2 pills 4 times a day and has not had any recent seizures. He apparently does not even need a DMV form filled out anymore that has been so long since his last seizure. We will do a carotid Doppler study today, that has been many years since his last study may be even 6. He has had no side effect on the medication and is tolerating it well. We offered to give him 600 mg pills but he prefers just to keep taking what he is doing now. He has not had any recurrent strokelike symptoms. He is on aspirin therapy and high-dose statin of Lipitor 40 mg a day. He is off his Vimpat now. He denies missing any medication or any side effect on the medication and seems to be doing well without recurring seizures in at least 6 years. His last gabapentin level was in the normal range. He has not had a recent EEG. Patient has developed no new medical problems or complications other than his chronic hip dysfunction for which he is seeing orthopedic surgery. Patient had a stroke in the past that has left him with some speech impairment and difficulty finding his words, and a seizure disorder for which he takes gabapentin 600 mg 4 times a day with good seizure control and no recurrent seizures recently. Imaging of the brain shows a left temporal area of encephalomalacia and previous stroke. He has not had any recurrent seizures or complications on his current medications. He will need another hip surgery in the near future. He takes gabapentin for his pain and seizures.  He denies any new focal weakness, sensory loss, seizures, fever, headache, cranial nerve problems, or side effects on his medications or recurrent strokelike symptoms  We advised the patient that the main risk factors for stroke are smoking, and he quit smoking a year ago we congratulated him on that, he needs to continue to control his blood pressure, his cholesterol, and his diet make sure his blood sugars under good control. He was advised of the warning signs of stroke as far as be fast, as, balance, eyes, face, arms, sensory and time. Patient advised to try to control his risk factors as far as smoking, high blood pressure, hyperlipidemia, and controlling his blood sugars, exercising regular, remain mentally and physically active. Past Medical History:   Diagnosis Date    Arthritis     Hips, Knees    CAD (coronary artery disease)     MI around 1990    Hepatitis A     Hepatitis C     High cholesterol     Hypertension     Other ill-defined conditions(799.89)     Light sensitivity, causes seizures    Seizures ([facility] [address]     Last seizure 12/2008/work -up in 2012 -possible seizure    Stroke [facility]) 2005    Thyroid disease     Hypothyroid      Past Surgical History:   Procedure Laterality Date    HX HEART CATHETERIZATION  20 years ago    no stents    HX HERNIA REPAIR  10/8/14    left inguinal hernia- Fitz Mitchell MD    HX ORTHOPAEDIC      Left Knee Scope    HX ORTHOPAEDIC  2/2010    [facility]  Total Hip Replacement/Dr. Warren    HX ORTHOPAEDIC  6/9/14    R hip replacement     [place] OPEN REPAIR CLAVICLE FRACTURE      right      Family History   Problem Relation Age of Onset    Hypertension Mother     Stroke Mother     Diabetes Mother     Heart Disease Father     Cancer Father         lung      Social History     Tobacco Use    Smoking status: Former Smoker     Packs/day: 0.25     Years: 25.00     Pack years: 6.25     Types: Cigarettes    Smokeless tobacco: Never Used   Substance Use Topics    Alcohol use: No     Comment: stopped 10 years ago---beer on the weekends in the past         Current Outpatient Medications:     azelastine (ASTELIN) 137 mcg (0.1 %) nasal spray, USE 2 SPRAYS IN EACH NOSTRIL TWICE DAILY, Disp: , Rfl:     gabapentin (NEURONTIN) 300 mg capsule, 2 caps 4x/day, Disp: 720 Capsule, Rfl: 1    montelukast (SINGULAIR) 10 mg tablet, Take 10 mg by mouth daily. , Disp: , Rfl:     ketoconazole (NIZORAL) 2 % shampoo, , Disp: , Rfl:     hydroxyzine HCL (ATARAX) 25 mg tablet, Take 25 mg by mouth daily. , Disp: , Rfl:     ergocalciferol (ERGOCALCIFEROL) 1,250 mcg (50,000 unit) capsule, Take 50,000 Units by mouth every seven (7) days. , Disp: , Rfl:     traMADol (ULTRAM) 50 mg tablet, Take 50 mg by mouth two (2) times a day., Disp: , Rfl:     aspirin delayed-release 325 mg tablet, Take 325 mg by mouth daily. , Disp: , Rfl:     doxazosin (CARDURA) 4 mg tablet, TAKE ONE TABLET BY MOUTH ONCE DAILY AT BEDTIME, Disp: 90 Tab, Rfl: 0    citalopram (CELEXA) 20 mg tablet, TAKE ONE TABLET BY MOUTH ONCE DAILY, Disp: 90 Tab, Rfl: 0    atorvastatin (LIPITOR) 40 mg tablet, Take 40 mg by mouth daily. , Disp: , Rfl:     levothyroxine (SYNTHROID) 50 mcg tablet, TAKE ONE TABLET BY MOUTH EVERY DAY, Disp: 90 Tab, Rfl: 0    amLODIPine (NORVASC) 10 mg tablet, TAKE ONE TABLET BY MOUTH EVERY DAY, Disp: 90 Tab, Rfl: 0        Allergies   Allergen Reactions    Carbatrol [Carbamazepine] Rash        Review of Systems:  A comprehensive review of systems was negative except for: Musculoskeletal: positive for myalgias, arthralgias, stiff joints and bone pain   Vitals:    11/03/21 0949   BP: 127/72   Pulse: 72   Resp: 12   Temp: 97.9 °F (36.6 °C)   TempSrc: Temporal   SpO2: 95%   Weight: 190 lb (86.2 kg)   Height: 6' 1\" (1.854 m)     Objective:     I      NEUROLOGICAL EXAM:    Appearance: The patient is well developed, well nourished, provides a coherent history and is in no acute distress. Mental Status: Oriented to time, place and person, and the president, cognitive function is normal and speech is fluent and mld aphasia no dysarthria. Mood and affect appropriate. Cranial Nerves:   Intact visual fields.  Fundi are benign, discs are flat and normal size and color, no vascular changes seen. THIEN, EOM's full, no nystagmus, no ptosis. Facial sensation is normal. Corneal reflexes are not tested. Facial movement is symmetric. Hearing is normal bilaterally. Palate is midline with normal sternocleidomastoid and trapezius muscles are normal. Tongue is midline. Neck without meningismus or bruits  Temporal arteries are not tender or enlarged   Motor:  5/5 strength in upper and lower proximal and distal muscles. Normal bulk and tone. No fasciculations. Rapid alternating movement in all extremities is normal and coordination is normal   Reflexes:   Deep tendon reflexes 2+/4 and symmetrical.  No babinski or clonus present   Sensory:   Normal to touch, pinprick and vibration. DSS is intact   Gait:  Abnormal gait because of arthritis in his hips. Tremor:    No tremor noted. Cerebellar:  No cerebellar signs present on Romberg or tandem or finger-nose-finger exam.   Neurovascular:  Normal heart sounds and regular rhythm, peripheral pulses decreased, and no carotid bruits. Assessment:       ICD-10-CM ICD-9-CM    1. Cerebral infarction due to thrombosis of left middle cerebral artery (HCC)  I63.312 434.01 gabapentin (NEURONTIN) 300 mg capsule      DUPLEX CAROTID BILATERAL   2. Encephalopathy, unspecified  G93.40 348.30 gabapentin (NEURONTIN) 300 mg capsule      DUPLEX CAROTID BILATERAL   3. Carotid artery stenosis with cerebral infarction ([facility])  I63.239 433.11 gabapentin (NEURONTIN) 300 mg capsule      DUPLEX CAROTID BILATERAL   4. Complex partial seizure evolving to generalized seizure ([address]  G40.209 345.40 gabapentin (NEURONTIN) 300 mg capsule      DUPLEX CAROTID BILATERAL   5.  Localization-related partial epilepsy with complex partial seizures (HCC)  G40.209 345.40 gabapentin (NEURONTIN) 300 mg capsule      DUPLEX CAROTID BILATERAL         Plan:     Patient will obtain a carotid Doppler study today because been about 6 years since his last Doppler, that came out good, he has mild disease bilaterally only. He is to continue his aspirin therapy and high-dose statin as stroke prevention    Warning signs of the stroke orders all explained to the patient as above, and risk factors also, given to the patient and he is to try to keep good control of his blood pressure, stroke, and his blood sugars, and he quit smoking which is important. He has had no seizures, and wants to continue his current medication for seizures, so we renewed his gabapentin for him today we will continue taking that as he is tolerating it well, not had any seizures and does not need a DMV form filled out today, but if he does he will bring it in later. He is advised of the risk factors of stroke including control of blood pressure, cholesterol which his PCP is doing, and do not smoke, and watch his diet and weight  He is encouraged to remain mentally and physically active, take his vitamins and vitamin D on a regular basis, and call us if he has any problem in the interim  Follow-up in one years time or earlier as needed    Signed By: Silvia Chacon MD     November 3, 2021       This note will not be viewable in [address]

## 2022-04-28 ENCOUNTER — TRANSCRIBE ORDER (OUTPATIENT)
Dept: SCHEDULING | Age: 67
End: 2022-04-28

## 2022-04-28 DIAGNOSIS — J44.9 COPD (CHRONIC OBSTRUCTIVE PULMONARY DISEASE) (HCC): Primary | ICD-10-CM

## 2022-04-29 ENCOUNTER — TRANSCRIBE ORDER (OUTPATIENT)
Dept: SCHEDULING | Age: 67
End: 2022-04-29

## 2022-04-29 DIAGNOSIS — J44.9 OBSTRUCTIVE CHRONIC BRONCHITIS WITHOUT EXACERBATION (HCC): Primary | ICD-10-CM

## 2022-05-05 ENCOUNTER — HOSPITAL ENCOUNTER (OUTPATIENT)
Dept: CT IMAGING | Age: 67
Discharge: HOME | End: 2022-05-05
Attending: FAMILY MEDICINE
Payer: MEDICARE

## 2022-05-05 DIAGNOSIS — J44.9 OBSTRUCTIVE CHRONIC BRONCHITIS WITHOUT EXACERBATION (HCC): ICD-10-CM

## 2022-05-05 PROCEDURE — 71271 CT THORAX LUNG CANCER SCR C-: CPT

## 2022-06-14 DIAGNOSIS — G40.209 COMPLEX PARTIAL SEIZURE EVOLVING TO GENERALIZED SEIZURE (HCC): ICD-10-CM

## 2022-06-14 DIAGNOSIS — I63.239 CAROTID ARTERY STENOSIS WITH CEREBRAL INFARCTION (HCC): ICD-10-CM

## 2022-06-14 DIAGNOSIS — G40.209 LOCALIZATION-RELATED PARTIAL EPILEPSY WITH COMPLEX PARTIAL SEIZURES (HCC): ICD-10-CM

## 2022-06-14 DIAGNOSIS — G93.40 ENCEPHALOPATHY, UNSPECIFIED: ICD-10-CM

## 2022-06-14 DIAGNOSIS — I63.312 CEREBRAL INFARCTION DUE TO THROMBOSIS OF LEFT MIDDLE CEREBRAL ARTERY (HCC): ICD-10-CM

## 2022-06-14 RX ORDER — GABAPENTIN 300 MG/1
CAPSULE ORAL
Qty: 720 CAPSULE | Refills: 1 | Status: SHIPPED | OUTPATIENT
Start: 2022-06-14

## 2023-02-01 ENCOUNTER — TELEPHONE (OUTPATIENT)
Dept: NEUROLOGY | Age: 68
End: 2023-02-01

## 2023-02-01 ENCOUNTER — OFFICE VISIT (OUTPATIENT)
Dept: NEUROLOGY | Age: 68
End: 2023-02-01
Payer: MEDICARE

## 2023-02-01 VITALS
WEIGHT: 200.8 LBS | SYSTOLIC BLOOD PRESSURE: 118 MMHG | HEART RATE: 76 BPM | HEIGHT: 73 IN | TEMPERATURE: 97.5 F | DIASTOLIC BLOOD PRESSURE: 58 MMHG | RESPIRATION RATE: 18 BRPM | OXYGEN SATURATION: 95 % | BODY MASS INDEX: 26.61 KG/M2

## 2023-02-01 DIAGNOSIS — I63.312 CEREBRAL INFARCTION DUE TO THROMBOSIS OF LEFT MIDDLE CEREBRAL ARTERY (HCC): ICD-10-CM

## 2023-02-01 DIAGNOSIS — G40.209 LOCALIZATION-RELATED PARTIAL EPILEPSY WITH COMPLEX PARTIAL SEIZURES (HCC): ICD-10-CM

## 2023-02-01 DIAGNOSIS — G40.209 COMPLEX PARTIAL SEIZURE EVOLVING TO GENERALIZED SEIZURE (HCC): ICD-10-CM

## 2023-02-01 DIAGNOSIS — I63.239 CAROTID ARTERY STENOSIS WITH CEREBRAL INFARCTION (HCC): Primary | ICD-10-CM

## 2023-02-01 DIAGNOSIS — G93.40 ENCEPHALOPATHY, UNSPECIFIED: ICD-10-CM

## 2023-02-01 PROCEDURE — G8417 CALC BMI ABV UP PARAM F/U: HCPCS | Performed by: PSYCHIATRY & NEUROLOGY

## 2023-02-01 PROCEDURE — 1101F PT FALLS ASSESS-DOCD LE1/YR: CPT | Performed by: PSYCHIATRY & NEUROLOGY

## 2023-02-01 PROCEDURE — 99213 OFFICE O/P EST LOW 20 MIN: CPT | Performed by: PSYCHIATRY & NEUROLOGY

## 2023-02-01 PROCEDURE — G8427 DOCREV CUR MEDS BY ELIG CLIN: HCPCS | Performed by: PSYCHIATRY & NEUROLOGY

## 2023-02-01 PROCEDURE — 3078F DIAST BP <80 MM HG: CPT | Performed by: PSYCHIATRY & NEUROLOGY

## 2023-02-01 PROCEDURE — 3017F COLORECTAL CA SCREEN DOC REV: CPT | Performed by: PSYCHIATRY & NEUROLOGY

## 2023-02-01 PROCEDURE — 3074F SYST BP LT 130 MM HG: CPT | Performed by: PSYCHIATRY & NEUROLOGY

## 2023-02-01 PROCEDURE — G8536 NO DOC ELDER MAL SCRN: HCPCS | Performed by: PSYCHIATRY & NEUROLOGY

## 2023-02-01 PROCEDURE — G8510 SCR DEP NEG, NO PLAN REQD: HCPCS | Performed by: PSYCHIATRY & NEUROLOGY

## 2023-02-01 PROCEDURE — 1123F ACP DISCUSS/DSCN MKR DOCD: CPT | Performed by: PSYCHIATRY & NEUROLOGY

## 2023-02-01 RX ORDER — GABAPENTIN 300 MG/1
CAPSULE ORAL
Qty: 720 CAPSULE | Refills: 1 | Status: SHIPPED | OUTPATIENT
Start: 2023-02-01

## 2023-02-01 RX ORDER — IPRATROPIUM BROMIDE AND ALBUTEROL SULFATE 2.5; .5 MG/3ML; MG/3ML
SOLUTION RESPIRATORY (INHALATION)
COMMUNITY
Start: 2022-04-27

## 2023-02-01 RX ORDER — ALBUTEROL SULFATE 0.83 MG/ML
SOLUTION RESPIRATORY (INHALATION)
COMMUNITY

## 2023-02-01 NOTE — PROGRESS NOTES
Accompanied by parent    Jim is a 9 year old male who presents to walk in care with complains of upper respiratory symptoms.  Symptoms have been present for for 4 days.  Main symptoms include cough, and some congestion and drainage.  Denies high fever, significant headache, no visual complaints, no hemoptysis or wheezing.  Patient denies any chest pains or shortness of breath.  Not responding to OTC cough and cold medications.  Normal development  Vaccinations UTD    Review of systems otherwise negative    PMH: There is no problem list on file for this patient.    Current Outpatient Medications   Medication Sig Dispense Refill   • albuterol 108 (90 Base) MCG/ACT inhaler Inhale 2 puffs into the lungs.     • azithromycin (ZITHROMAX) 200 MG/5ML suspension Take 7.5 ml on day 1 and 3.75 ml on days 2-5 1 Bottle 0     No current facility-administered medications for this visit.          Objective  Visit Vitals  Pulse 70   Temp 98.4 °F (36.9 °C)   Resp 20   SpO2 100%     No acute distress, nontoxic in appearance   CN 2-12 intact  TM's clear Conjunctiva-clear  Nasal mucosa- erythematous, turbinates swollen, mostly clear rhinorrhea  Oropharynx- red, MMM, airways patent, mild tonsillar swelling  Small anterior cervical lymphadenopathy, neck supple.  No meningismus.  No nasal flaring or grunting no use of accessory muscles of respiration  Lungs coarse rhonchi, no wheeze  CVS- S1,S2 present RRR, no rubs gallops or murmurs    Skin - good tissue turgor and capillary refill, no rash      Assesement and plan  bronchitis    [unfilled]  Additional probiotic supplementation as discussed  Fever control and OTC symptomatic treatment    Fever:................................ Tylenol for fever (10-15mg/kg q4-6 hours), Ibuprofen for fever (5-10mg/kg) q 6-8hrs, Call if develops stiff neck, difficulty breathing, lethargy, etc. and Call if fever lasts longer then 3 days.    Visit Vitals  Pulse 70   Temp 98.4 °F (36.9 °C)   Resp 20   SpO2  Consult    Subjective:     Luis Armando Vickers is a 78 y.o. Right-handed  male seen for evaluation of seizures and stroke on referral from Dr Mendel Homer, and patient last seen about 15 months ago, and has not had any seizures since that time, and his last seizure appears to be about 8 years ago. He is tolerating his medication of gabapentin 600 mg 4 times a day, taking 2 of the 300 mg pills each time. He does not want to increase to the 600 mg. Again he has had no side effects on the medication and is doing well. He also has had a previous stroke many years ago, probably about 22 but is on aspirin therapy and high-dose statin with Lipitor 40 mg a day and had no recurrent strokes, his last Doppler done 2 years ago showed less than 50% disease bilaterally. We will repeat that again this time. He will get that next week. He is to continue to stay mentally active physically active and eat a Mediterranean type diet and try to exercise some every day. He has had no other new neurologic problems since last seen and no other new medical problems either he says. We will check his levels. He has not had any recent EEGs. Patient has developed no new medical problems or complications other than his chronic hip dysfunction for which he is seeing orthopedic surgery. Patient had a stroke in the past that has left him with some speech impairment and difficulty finding his words, and a seizure disorder for which he takes gabapentin 600 mg 4 times a day with good seizure control and no recurrent seizures recently. Imaging of the brain shows a left temporal area of encephalomalacia and previous stroke. He has not had any recurrent seizures or complications on his current medications. He will need another hip surgery in the near future. He takes gabapentin for his pain and seizures.  He denies any new focal weakness, sensory loss, seizures, fever, headache, cranial nerve problems, or side effects on his medications or recurrent 100%        strokelike symptoms  We advised the patient that the main risk factors for stroke are smoking, and he quit smoking a year ago we congratulated him on that, he needs to continue to control his blood pressure, his cholesterol, and his diet make sure his blood sugars under good control. He was advised of the warning signs of stroke as far as be fast, as, balance, eyes, face, arms, sensory and time. Patient advised to try to control his risk factors as far as smoking, high blood pressure, hyperlipidemia, and controlling his blood sugars, exercising regular, remain mentally and physically active. Past Medical History:   Diagnosis Date    Arthritis     Hips, Knees    CAD (coronary artery disease)     MI around     Hepatitis A     Hepatitis C     High cholesterol     Hypertension     Other ill-defined conditions(799.89)     Light sensitivity, causes seizures    Seizures ([facility] [address]     Last seizure 2008/work -up in  -possible seizure    Stroke [facility])     Thyroid disease     Hypothyroid      Past Surgical History:   Procedure Laterality Date    HX HEART CATHETERIZATION  20 years ago    no stents    HX HERNIA REPAIR  10/8/14    left inguinal hernia- Isma Mitchell MD    HX ORTHOPAEDIC      Left Knee Scope    HX ORTHOPAEDIC  2010    [facility].  Total Hip Replacement/Dr. Rita Herzog ORTHOPAEDIC  14    R hip replacement     CA OPEN REPAIR CLAVICLE FRACTURE      right      Family History   Problem Relation Age of Onset    Hypertension Mother     Stroke Mother     Diabetes Mother     Heart Disease Father     Cancer Father         lung      Social History     Tobacco Use    Smoking status: Former     Packs/day: 0.25     Years: 25.00     Pack years: 6.25     Types: Cigarettes     Quit date: 2015     Years since quittin.0    Smokeless tobacco: Never   Substance Use Topics    Alcohol use: No     Comment: stopped 10 years ago---beer on the weekends in the past         Current Outpatient Medications:     albuterol (PROVENTIL VENTOLIN) 2.5 mg /3 mL (0.083 %) nebu, INHALE THE CONTENTS OF 1 VIAL (3ML) VIA NEBULIZER EVERY 4 HOURS AS NEEDED FOR WHEEZING (MAX FOUR TIMES A DAY), Disp: , Rfl:     albuterol-ipratropium (DUO-NEB) 2.5 mg-0.5 mg/3 ml nebu, 3 ml as needed, Disp: , Rfl:     gabapentin (NEURONTIN) 300 mg capsule, TAKE 2 CAPSULES FOUR TIMES DAILY, Disp: 720 Capsule, Rfl: 1    azelastine (ASTELIN) 137 mcg (0.1 %) nasal spray, USE 2 SPRAYS IN EACH NOSTRIL TWICE DAILY, Disp: , Rfl:     montelukast (SINGULAIR) 10 mg tablet, Take 10 mg by mouth daily. , Disp: , Rfl:     ketoconazole (NIZORAL) 2 % shampoo, , Disp: , Rfl:     hydroxyzine HCL (ATARAX) 25 mg tablet, Take 25 mg by mouth daily. , Disp: , Rfl:     ergocalciferol (ERGOCALCIFEROL) 1,250 mcg (50,000 unit) capsule, Take 50,000 Units by mouth every seven (7) days. , Disp: , Rfl:     traMADol (ULTRAM) 50 mg tablet, Take 50 mg by mouth two (2) times a day., Disp: , Rfl:     aspirin delayed-release 325 mg tablet, Take 325 mg by mouth daily. , Disp: , Rfl:     doxazosin (CARDURA) 4 mg tablet, TAKE ONE TABLET BY MOUTH ONCE DAILY AT BEDTIME, Disp: 90 Tab, Rfl: 0    citalopram (CELEXA) 20 mg tablet, TAKE ONE TABLET BY MOUTH ONCE DAILY, Disp: 90 Tab, Rfl: 0    atorvastatin (LIPITOR) 40 mg tablet, Take 40 mg by mouth daily. , Disp: , Rfl:     levothyroxine (SYNTHROID) 50 mcg tablet, TAKE ONE TABLET BY MOUTH EVERY DAY, Disp: 90 Tab, Rfl: 0    amLODIPine (NORVASC) 10 mg tablet, TAKE ONE TABLET BY MOUTH EVERY DAY, Disp: 90 Tab, Rfl: 0        Allergies   Allergen Reactions    Carbatrol [Carbamazepine] Rash        Review of Systems:  A comprehensive review of systems was negative except for: Musculoskeletal: positive for myalgias, arthralgias, stiff joints and bone pain   Vitals:    02/01/23 0737   BP: (!) 118/58   Pulse: 76   Resp: 18   Temp: 97.5 °F (36.4 °C)   SpO2: 95%   Weight: 200 lb 12.8 oz (91.1 kg)   Height: 6' 1\" (1.854 m)     Objective:     I      NEUROLOGICAL EXAM:    Appearance: The patient is well developed, well nourished, provides a coherent history and is in no acute distress. Mental Status: Oriented to time, place and person, and the president, cognitive function is normal and speech is fluent and mld aphasia no dysarthria. Mood and affect appropriate. Cranial Nerves:   Intact visual fields. Fundi are benign, discs are flat and normal size and color, no vascular changes seen. THIEN, EOM's full, no nystagmus, no ptosis. Facial sensation is normal. Corneal reflexes are not tested. Facial movement is symmetric. Hearing is normal bilaterally. Palate is midline with normal sternocleidomastoid and trapezius muscles are normal. Tongue is midline. Neck without meningismus or bruits  Temporal arteries are not tender or enlarged   Motor:  5/5 strength in upper and lower proximal and distal muscles. Normal bulk and tone. No fasciculations. Rapid alternating movement in all extremities is normal and coordination is normal   Reflexes:   Deep tendon reflexes 2+/4 and symmetrical.  No babinski or clonus present   Sensory:   Normal to touch, pinprick and vibration. DSS is intact   Gait:  Abnormal gait because of arthritis in his hips. Tremor:    No tremor noted. Cerebellar:  No cerebellar signs present on Romberg or tandem or finger-nose-finger exam.   Neurovascular:  Normal heart sounds and regular rhythm, peripheral pulses decreased, and no carotid bruits. Assessment:       ICD-10-CM ICD-9-CM    1. Carotid artery stenosis with cerebral infarction ([facility])  I63.239 433.11 DUPLEX CAROTID BILATERAL      GABAPENTIN      gabapentin (NEURONTIN) 300 mg capsule      2. Cerebral infarction due to thrombosis of left middle cerebral artery ([facility])  I63.312 434.01 DUPLEX CAROTID BILATERAL      GABAPENTIN      gabapentin (NEURONTIN) 300 mg capsule      3.  Localization-related partial epilepsy with complex partial seizures ([facility])  G40.209 345.40 DUPLEX CAROTID BILATERAL      GABAPENTIN gabapentin (NEURONTIN) 300 mg capsule      4. Complex partial seizure evolving to generalized seizure (HCC)  G40.209 345.40 DUPLEX CAROTID BILATERAL      GABAPENTIN      gabapentin (NEURONTIN) 300 mg capsule      5. Encephalopathy, unspecified  G93.40 348.30 gabapentin (NEURONTIN) 300 mg capsule            Plan:     Patient will obtain a carotid Doppler study next week because it has been about 2 years since his last Doppler, that came out good, he has mild disease bilaterally only. He is to continue his aspirin therapy and high-dose statin as stroke prevention    Warning signs of the stroke orders all explained to the patient as above, and risk factors also, given to the patient and he is to try to keep good control of his blood pressure, stroke, and his blood sugars, and he quit smoking which is important. He has had no seizures, and wants to continue his current medication for seizures, so we renewed his gabapentin for him today we will continue taking that as he is tolerating it well, not had any seizures and does not need a DMV form filled out today, but if he does he will bring it in later.   He is advised of the risk factors of stroke including control of blood pressure, cholesterol which his PCP is doing, and do not smoke, and watch his diet and weight  He is encouraged to remain mentally and physically active, take his vitamins and vitamin D on a regular basis, and call us if he has any problem in the interim  Follow-up in one years time or earlier as needed    Signed By: Sb Suazo MD     February 1, 2023

## 2023-02-01 NOTE — TELEPHONE ENCOUNTER
Tried to reach phone, cellular service not available. Pt needs to have his labs done Mon 2-6-2023 when he is here for his doppler. Will try again later.

## 2023-02-01 NOTE — LETTER
2/1/2023    Patient: Raleigh Warner   YOB: 1955   Date of Visit: 2/1/2023     Teri Rosario NP  [address] [address]  Via Fax: [phone]    Dear Teri Rosario NP,      Thank you for referring Mr. Silvia Daniel to [address] [place] [address] for evaluation. My notes for this consultation are attached. Consult    Subjective:     Raleigh Warner is a 78 y.o. Right-handed  male seen for evaluation of seizures and stroke on referral from Dr Fabiola Lacey, and patient last seen about 15 months ago, and has not had any seizures since that time, and his last seizure appears to be about 8 years ago. He is tolerating his medication of gabapentin 600 mg 4 times a day, taking 2 of the 300 mg pills each time. He does not want to increase to the 600 mg. Again he has had no side effects on the medication and is doing well. He also has had a previous stroke many years ago, probably about 22 but is on aspirin therapy and high-dose statin with Lipitor 40 mg a day and had no recurrent strokes, his last Doppler done 2 years ago showed less than 50% disease bilaterally. We will repeat that again this time. He will get that next week. He is to continue to stay mentally active physically active and eat a Mediterranean type diet and try to exercise some every day. He has had no other new neurologic problems since last seen and no other new medical problems either he says. We will check his levels. He has not had any recent EEGs. Patient has developed no new medical problems or complications other than his chronic hip dysfunction for which he is seeing orthopedic surgery. Patient had a stroke in the past that has left him with some speech impairment and difficulty finding his words, and a seizure disorder for which he takes gabapentin 600 mg 4 times a day with good seizure control and no recurrent seizures recently.  Imaging of the brain shows a left temporal area of encephalomalacia and previous stroke. He has not had any recurrent seizures or complications on his current medications. He will need another hip surgery in the near future. He takes gabapentin for his pain and seizures. He denies any new focal weakness, sensory loss, seizures, fever, headache, cranial nerve problems, or side effects on his medications or recurrent strokelike symptoms  We advised the patient that the main risk factors for stroke are smoking, and he quit smoking a year ago we congratulated him on that, he needs to continue to control his blood pressure, his cholesterol, and his diet make sure his blood sugars under good control. He was advised of the warning signs of stroke as far as be fast, as, balance, eyes, face, arms, sensory and time. Patient advised to try to control his risk factors as far as smoking, high blood pressure, hyperlipidemia, and controlling his blood sugars, exercising regular, remain mentally and physically active. Past Medical History:   Diagnosis Date    Arthritis     Hips, Knees    CAD (coronary artery disease)     MI around 1990    Hepatitis A     Hepatitis C     High cholesterol     Hypertension     Other ill-defined conditions(799.89)     Light sensitivity, causes seizures    Seizures ([facility] [address]     Last seizure 12/2008/work -up in 2012 -possible seizure    Stroke [facility]) 2005    Thyroid disease     Hypothyroid      Past Surgical History:   Procedure Laterality Date    HX HEART CATHETERIZATION  20 years ago    no stents    HX HERNIA REPAIR  10/8/14    left inguinal hernia- Nishant Mitchell MD    HX ORTHOPAEDIC      Left Knee Scope    HX ORTHOPAEDIC  2/2010    [facility].  Total Hip Replacement/Dr. Marylen Plough    HX ORTHOPAEDIC  6/9/14    R hip replacement     CA OPEN REPAIR CLAVICLE FRACTURE      right      Family History   Problem Relation Age of Onset    Hypertension Mother     Stroke Mother     Diabetes Mother     Heart Disease Father     Cancer Father lung      Social History     Tobacco Use    Smoking status: Former     Packs/day: 0.25     Years: 25.00     Pack years: 6.25     Types: Cigarettes     Quit date: 2015     Years since quittin.0    Smokeless tobacco: Never   Substance Use Topics    Alcohol use: No     Comment: stopped 10 years ago---beer on the weekends in the past         Current Outpatient Medications:     albuterol (PROVENTIL VENTOLIN) 2.5 mg /3 mL (0.083 %) nebu, INHALE THE CONTENTS OF 1 VIAL (3ML) VIA NEBULIZER EVERY 4 HOURS AS NEEDED FOR WHEEZING (MAX FOUR TIMES A DAY), Disp: , Rfl:     albuterol-ipratropium (DUO-NEB) 2.5 mg-0.5 mg/3 ml nebu, 3 ml as needed, Disp: , Rfl:     gabapentin (NEURONTIN) 300 mg capsule, TAKE 2 CAPSULES FOUR TIMES DAILY, Disp: 720 Capsule, Rfl: 1    azelastine (ASTELIN) 137 mcg (0.1 %) nasal spray, USE 2 SPRAYS IN EACH NOSTRIL TWICE DAILY, Disp: , Rfl:     montelukast (SINGULAIR) 10 mg tablet, Take 10 mg by mouth daily. , Disp: , Rfl:     ketoconazole (NIZORAL) 2 % shampoo, , Disp: , Rfl:     hydroxyzine HCL (ATARAX) 25 mg tablet, Take 25 mg by mouth daily. , Disp: , Rfl:     ergocalciferol (ERGOCALCIFEROL) 1,250 mcg (50,000 unit) capsule, Take 50,000 Units by mouth every seven (7) days. , Disp: , Rfl:     traMADol (ULTRAM) 50 mg tablet, Take 50 mg by mouth two (2) times a day., Disp: , Rfl:     aspirin delayed-release 325 mg tablet, Take 325 mg by mouth daily. , Disp: , Rfl:     doxazosin (CARDURA) 4 mg tablet, TAKE ONE TABLET BY MOUTH ONCE DAILY AT BEDTIME, Disp: 90 Tab, Rfl: 0    citalopram (CELEXA) 20 mg tablet, TAKE ONE TABLET BY MOUTH ONCE DAILY, Disp: 90 Tab, Rfl: 0    atorvastatin (LIPITOR) 40 mg tablet, Take 40 mg by mouth daily. , Disp: , Rfl:     levothyroxine (SYNTHROID) 50 mcg tablet, TAKE ONE TABLET BY MOUTH EVERY DAY, Disp: 90 Tab, Rfl: 0    amLODIPine (NORVASC) 10 mg tablet, TAKE ONE TABLET BY MOUTH EVERY DAY, Disp: 90 Tab, Rfl: 0        Allergies   Allergen Reactions    Carbatrol [Carbamazepine] Rash        Review of Systems:  A comprehensive review of systems was negative except for: Musculoskeletal: positive for myalgias, arthralgias, stiff joints and bone pain   Vitals:    02/01/23 0737   BP: (!) 118/58   Pulse: 76   Resp: 18   Temp: 97.5 °F (36.4 °C)   SpO2: 95%   Weight: 200 lb 12.8 oz (91.1 kg)   Height: 6' 1\" (1.854 m)     Objective:     I      NEUROLOGICAL EXAM:    Appearance: The patient is well developed, well nourished, provides a coherent history and is in no acute distress. Mental Status: Oriented to time, place and person, and the president, cognitive function is normal and speech is fluent and mld aphasia no dysarthria. Mood and affect appropriate. Cranial Nerves:   Intact visual fields. Fundi are benign, discs are flat and normal size and color, no vascular changes seen. THIEN, EOM's full, no nystagmus, no ptosis. Facial sensation is normal. Corneal reflexes are not tested. Facial movement is symmetric. Hearing is normal bilaterally. Palate is midline with normal sternocleidomastoid and trapezius muscles are normal. Tongue is midline. Neck without meningismus or bruits  Temporal arteries are not tender or enlarged   Motor:  5/5 strength in upper and lower proximal and distal muscles. Normal bulk and tone. No fasciculations. Rapid alternating movement in all extremities is normal and coordination is normal   Reflexes:   Deep tendon reflexes 2+/4 and symmetrical.  No babinski or clonus present   Sensory:   Normal to touch, pinprick and vibration. DSS is intact   Gait:  Abnormal gait because of arthritis in his hips. Tremor:    No tremor noted. Cerebellar:  No cerebellar signs present on Romberg or tandem or finger-nose-finger exam.   Neurovascular:  Normal heart sounds and regular rhythm, peripheral pulses decreased, and no carotid bruits. Assessment:       ICD-10-CM ICD-9-CM    1.  Carotid artery stenosis with cerebral infarction [facility])  I63.239 433.11 DUPLEX CAROTID BILATERAL      GABAPENTIN      gabapentin (NEURONTIN) 300 mg capsule      2. Cerebral infarction due to thrombosis of left middle cerebral artery (HCC)  I63.312 434.01 DUPLEX CAROTID BILATERAL      GABAPENTIN      gabapentin (NEURONTIN) 300 mg capsule      3. Localization-related partial epilepsy with complex partial seizures (HCC)  G40.209 345.40 DUPLEX CAROTID BILATERAL      GABAPENTIN      gabapentin (NEURONTIN) 300 mg capsule      4. Complex partial seizure evolving to generalized seizure (HCC)  G40.209 345.40 DUPLEX CAROTID BILATERAL      GABAPENTIN      gabapentin (NEURONTIN) 300 mg capsule      5. Encephalopathy, unspecified  G93.40 348.30 gabapentin (NEURONTIN) 300 mg capsule            Plan:     Patient will obtain a carotid Doppler study next week because it has been about 2 years since his last Doppler, that came out good, he has mild disease bilaterally only. He is to continue his aspirin therapy and high-dose statin as stroke prevention    Warning signs of the stroke orders all explained to the patient as above, and risk factors also, given to the patient and he is to try to keep good control of his blood pressure, stroke, and his blood sugars, and he quit smoking which is important. He has had no seizures, and wants to continue his current medication for seizures, so we renewed his gabapentin for him today we will continue taking that as he is tolerating it well, not had any seizures and does not need a DMV form filled out today, but if he does he will bring it in later.   He is advised of the risk factors of stroke including control of blood pressure, cholesterol which his PCP is doing, and do not smoke, and watch his diet and weight  He is encouraged to remain mentally and physically active, take his vitamins and vitamin D on a regular basis, and call us if he has any problem in the interim  Follow-up in one years time or earlier as needed    Signed By: Kristin Maddox MD     February 1, 2023             If you have questions, please do not hesitate to call me. I look forward to following your patient along with you.       Sincerely,    Major William MD

## 2023-02-02 NOTE — TELEPHONE ENCOUNTER
Spoke with patient. Verified patient with two patient identifiers. Advised lab slip is at . Can have 2-6-2023 once he is done with his carotid doppler test.  Patient verbalized understanding.

## 2023-03-07 ENCOUNTER — HOSPITAL ENCOUNTER (INPATIENT)
Age: 68
LOS: 5 days | Discharge: HOME HEALTH | DRG: 101 | End: 2023-03-13
Attending: STUDENT IN AN ORGANIZED HEALTH CARE EDUCATION/TRAINING PROGRAM | Admitting: STUDENT IN AN ORGANIZED HEALTH CARE EDUCATION/TRAINING PROGRAM
Payer: MEDICARE

## 2023-03-07 ENCOUNTER — APPOINTMENT (OUTPATIENT)
Dept: CT IMAGING | Age: 68
DRG: 101 | End: 2023-03-07
Attending: STUDENT IN AN ORGANIZED HEALTH CARE EDUCATION/TRAINING PROGRAM
Payer: MEDICARE

## 2023-03-07 ENCOUNTER — APPOINTMENT (OUTPATIENT)
Dept: GENERAL RADIOLOGY | Age: 68
DRG: 101 | End: 2023-03-07
Attending: STUDENT IN AN ORGANIZED HEALTH CARE EDUCATION/TRAINING PROGRAM
Payer: MEDICARE

## 2023-03-07 DIAGNOSIS — I63.9 CEREBROVASCULAR ACCIDENT (CVA), UNSPECIFIED MECHANISM (HCC): ICD-10-CM

## 2023-03-07 DIAGNOSIS — R56.9 SEIZURE (HCC): Primary | ICD-10-CM

## 2023-03-07 LAB
ALBUMIN SERPL-MCNC: 3.9 G/DL (ref 3.5–5)
ALBUMIN/GLOB SERPL: 1.1 (ref 1.1–2.2)
ALP SERPL-CCNC: 125 U/L (ref 45–117)
ALT SERPL-CCNC: 20 U/L (ref 12–78)
AMPHET UR QL SCN: NEGATIVE
ANION GAP SERPL CALC-SCNC: 6 MMOL/L (ref 5–15)
AST SERPL-CCNC: 22 U/L (ref 15–37)
BARBITURATES UR QL SCN: NEGATIVE
BASOPHILS # BLD: 0.1 K/UL (ref 0–0.1)
BASOPHILS NFR BLD: 1 % (ref 0–1)
BENZODIAZ UR QL: NEGATIVE
BILIRUB SERPL-MCNC: 0.5 MG/DL (ref 0.2–1)
BUN SERPL-MCNC: 22 MG/DL (ref 6–20)
BUN/CREAT SERPL: 18 (ref 12–20)
CALCIUM SERPL-MCNC: 9 MG/DL (ref 8.5–10.1)
CANNABINOIDS UR QL SCN: NEGATIVE
CHLORIDE SERPL-SCNC: 104 MMOL/L (ref 97–108)
CO2 SERPL-SCNC: 25 MMOL/L (ref 21–32)
COCAINE UR QL SCN: NEGATIVE
CREAT SERPL-MCNC: 1.24 MG/DL (ref 0.7–1.3)
DIFFERENTIAL METHOD BLD: ABNORMAL
DRUG SCRN COMMENT,DRGCM: NORMAL
EOSINOPHIL # BLD: 0 K/UL (ref 0–0.4)
EOSINOPHIL NFR BLD: 0 % (ref 0–7)
ERYTHROCYTE [DISTWIDTH] IN BLOOD BY AUTOMATED COUNT: 14.2 % (ref 11.5–14.5)
ETHANOL SERPL-MCNC: <10 MG/DL
GLOBULIN SER CALC-MCNC: 3.4 G/DL (ref 2–4)
GLUCOSE BLD STRIP.AUTO-MCNC: 98 MG/DL (ref 65–117)
GLUCOSE SERPL-MCNC: 101 MG/DL (ref 65–100)
HCT VFR BLD AUTO: 41.9 % (ref 36.6–50.3)
HGB BLD-MCNC: 13.7 G/DL (ref 12.1–17)
IMM GRANULOCYTES # BLD AUTO: 0 K/UL (ref 0–0.04)
IMM GRANULOCYTES NFR BLD AUTO: 0 % (ref 0–0.5)
LACTATE BLD-SCNC: 0.86 MMOL/L (ref 0.4–2)
LYMPHOCYTES # BLD: 0.7 K/UL (ref 0.8–3.5)
LYMPHOCYTES NFR BLD: 6 % (ref 12–49)
MAGNESIUM SERPL-MCNC: 2.4 MG/DL (ref 1.6–2.4)
MCH RBC QN AUTO: 29.3 PG (ref 26–34)
MCHC RBC AUTO-ENTMCNC: 32.7 G/DL (ref 30–36.5)
MCV RBC AUTO: 89.5 FL (ref 80–99)
METHADONE UR QL: NEGATIVE
MONOCYTES # BLD: 0.7 K/UL (ref 0–1)
MONOCYTES NFR BLD: 6 % (ref 5–13)
NEUTS SEG # BLD: 9.4 K/UL (ref 1.8–8)
NEUTS SEG NFR BLD: 87 % (ref 32–75)
NRBC # BLD: 0 K/UL (ref 0–0.01)
NRBC BLD-RTO: 0 PER 100 WBC
OPIATES UR QL: NEGATIVE
PCP UR QL: NEGATIVE
PLATELET # BLD AUTO: 292 K/UL (ref 150–400)
POTASSIUM SERPL-SCNC: 4.7 MMOL/L (ref 3.5–5.1)
PROT SERPL-MCNC: 7.3 G/DL (ref 6.4–8.2)
RBC # BLD AUTO: 4.68 M/UL (ref 4.1–5.7)
RBC MORPH BLD: ABNORMAL
SERVICE CMNT-IMP: NORMAL
SODIUM SERPL-SCNC: 135 MMOL/L (ref 136–145)
TROPONIN I SERPL HS-MCNC: 10 NG/L (ref 0–76)
WBC # BLD AUTO: 10.9 K/UL (ref 4.1–11.1)

## 2023-03-07 PROCEDURE — 70450 CT HEAD/BRAIN W/O DYE: CPT

## 2023-03-07 PROCEDURE — 83605 ASSAY OF LACTIC ACID: CPT

## 2023-03-07 PROCEDURE — 80053 COMPREHEN METABOLIC PANEL: CPT

## 2023-03-07 PROCEDURE — 74011000636 HC RX REV CODE- 636: Performed by: STUDENT IN AN ORGANIZED HEALTH CARE EDUCATION/TRAINING PROGRAM

## 2023-03-07 PROCEDURE — 82077 ASSAY SPEC XCP UR&BREATH IA: CPT

## 2023-03-07 PROCEDURE — 93005 ELECTROCARDIOGRAM TRACING: CPT

## 2023-03-07 PROCEDURE — 80307 DRUG TEST PRSMV CHEM ANLYZR: CPT

## 2023-03-07 PROCEDURE — 83735 ASSAY OF MAGNESIUM: CPT

## 2023-03-07 PROCEDURE — 74011250636 HC RX REV CODE- 250/636: Performed by: STUDENT IN AN ORGANIZED HEALTH CARE EDUCATION/TRAINING PROGRAM

## 2023-03-07 PROCEDURE — 82962 GLUCOSE BLOOD TEST: CPT

## 2023-03-07 PROCEDURE — 36415 COLL VENOUS BLD VENIPUNCTURE: CPT

## 2023-03-07 PROCEDURE — 71045 X-RAY EXAM CHEST 1 VIEW: CPT

## 2023-03-07 PROCEDURE — 84484 ASSAY OF TROPONIN QUANT: CPT

## 2023-03-07 PROCEDURE — 96374 THER/PROPH/DIAG INJ IV PUSH: CPT

## 2023-03-07 PROCEDURE — 0042T CT CODE NEURO PERF W CBF: CPT

## 2023-03-07 PROCEDURE — 70496 CT ANGIOGRAPHY HEAD: CPT

## 2023-03-07 PROCEDURE — 4A03X5D MEASUREMENT OF ARTERIAL FLOW, INTRACRANIAL, EXTERNAL APPROACH: ICD-10-PCS | Performed by: STUDENT IN AN ORGANIZED HEALTH CARE EDUCATION/TRAINING PROGRAM

## 2023-03-07 PROCEDURE — 85025 COMPLETE CBC W/AUTO DIFF WBC: CPT

## 2023-03-07 PROCEDURE — 99285 EMERGENCY DEPT VISIT HI MDM: CPT

## 2023-03-07 RX ORDER — LEVETIRACETAM 500 MG/5ML
1000 INJECTION, SOLUTION, CONCENTRATE INTRAVENOUS
Status: COMPLETED | OUTPATIENT
Start: 2023-03-07 | End: 2023-03-07

## 2023-03-07 RX ADMIN — LEVETIRACETAM 1000 MG: 100 INJECTION INTRAVENOUS at 21:51

## 2023-03-07 RX ADMIN — SODIUM CHLORIDE 1000 ML: 9 INJECTION, SOLUTION INTRAVENOUS at 21:55

## 2023-03-07 RX ADMIN — IOPAMIDOL 100 ML: 755 INJECTION, SOLUTION INTRAVENOUS at 18:52

## 2023-03-07 NOTE — Clinical Note
Status[de-identified] INPATIENT [101]   Type of Bed: Neuro/Stroke [9]   Cardiac Monitoring Required?: Yes   Inpatient Hospitalization Certified Necessary for the Following Reasons: 9.  Other (further clarification in H&P documentation)   Admitting Diagnosis: CVA (cerebral vascular accident) [facility]) [[id number]]   Admitting Physician: Yesenia Trimble [[id number]]   Attending Physician: Dilip Jha   Estimated Length of Stay: 2 Midnights   Discharge Plan[de-identified] Home with Office Follow-up

## 2023-03-07 NOTE — ED PROVIDER NOTES
[facility] [address] [facility]       Pt Name: Tiki Boogie  MRN: [id number]  Max 1955  Date of evaluation: 3/7/2023  Provider: Billie Lara MD   PCP: Jose Chatman NP  Note Started: 6:58 PM 3/7/23     CHIEF COMPLAINT       Chief Complaint   Patient presents with    Altered mental status     Pt arrives via EMS [facility]) level 2 stroke pre-alert. Pt last known well at 11am per sister who saw pt at that time, pt's sister returned to see patient at 5pm and he had facial droop with R sided weakness, limited verbal response. Per EMS, pt independent at baseline per sister. Pt  per EMS, /77, upon EMS arrival to house, pt initally hypoxic at 72% on RA, placed on non rebreather, O2 improved to 98% on transport, pt currently on RA        HISTORY OF PRESENT ILLNESS: 1 or more elements      History From: ems, History limited by: Janet Huff is a 78 y.o. male presenting with right weakness and ams, speech difficulties. Per his daughter he was last known well around 11:00 when she was talking on the phone with him and he was acting normally. She arrived home today at approximately 5:00 and notes patient with right-sided weakness and minimally responsive with little verbal.  EMS arrived and he was hypoxic to 70% and placed on nonrebreather. History notably limited as patient mostly nonverbal at this time. Please See MDM for Additional Details of the HPI/PMH  Nursing Notes were all reviewed and agreed with or any disagreements were addressed in the HPI. REVIEW OF SYSTEMS        Positives and Pertinent negatives as per HPI.     PAST HISTORY     Past Medical History:  Past Medical History:   Diagnosis Date    Arthritis     Hips, Knees    CAD (coronary artery disease)     MI around 1990    Hepatitis A     Hepatitis C     High cholesterol     Hypertension     Other ill-defined conditions(059.89)     Light sensitivity, causes seizures Seizures ([facility] [address]     Last seizure 2008/work -up in 2012 -possible seizure    Stroke [facility]) 2005    Thyroid disease     Hypothyroid       Past Surgical History:  Past Surgical History:   Procedure Laterality Date    HX HEART CATHETERIZATION  20 years ago    no stents    HX HERNIA REPAIR  10/8/14    left inguinal hernia- Isma Mitchell MD    HX ORTHOPAEDIC      Left Knee Scope    HX ORTHOPAEDIC  2010    Gavin. Total Hip Replacement/Dr. Senior Dus ORTHOPAEDIC  14    R hip replacement     NC OPEN REPAIR CLAVICLE FRACTURE      right        Family History:  Family History   Problem Relation Age of Onset    Hypertension Mother     Stroke Mother     Diabetes Mother     Heart Disease Father     Cancer Father         lung       Social History:  Social History     Tobacco Use    Smoking status: Former     Packs/day: 0.25     Years: 25.00     Pack years: 6.25     Types: Cigarettes     Quit date: 2015     Years since quittin.1    Smokeless tobacco: Never   Vaping Use    Vaping Use: Never used   Substance Use Topics    Alcohol use: No     Comment: stopped 10 years ago---beer on the weekends in the past    Drug use: No       Allergies: Allergies   Allergen Reactions    Carbatrol [Carbamazepine] Rash       CURRENT MEDICATIONS      Current Discharge Medication List        CONTINUE these medications which have NOT CHANGED    Details   albuterol (PROVENTIL VENTOLIN) 2.5 mg /3 mL (0.083 %) nebu INHALE THE CONTENTS OF 1 VIAL (3ML) VIA NEBULIZER EVERY 4 HOURS AS NEEDED FOR WHEEZING (MAX FOUR TIMES A DAY)      albuterol-ipratropium (DUO-NEB) 2.5 mg-0.5 mg/3 ml nebu 3 ml as needed      gabapentin (NEURONTIN) 300 mg capsule TAKE 2 CAPSULES FOUR TIMES DAILY  Qty: 720 Capsule, Refills: 1    Associated Diagnoses: Carotid artery stenosis with cerebral infarction ([facility] [address] Cerebral infarction due to thrombosis of left middle cerebral artery ([address] Localization-related partial epilepsy with complex partial seizures ([facility] [address]  Complex partial seizure evolving to generalized seizure ([facility] [address] Encephalopathy, unspecified      azelastine (ASTELIN) 137 mcg (0.1 %) nasal spray USE 2 SPRAYS IN EACH NOSTRIL TWICE DAILY      montelukast (SINGULAIR) 10 mg tablet Take 10 mg by mouth daily. ketoconazole (NIZORAL) 2 % shampoo       hydroxyzine HCL (ATARAX) 25 mg tablet Take 25 mg by mouth daily. ergocalciferol (ERGOCALCIFEROL) 1,250 mcg (50,000 unit) capsule Take 50,000 Units by mouth every seven (7) days. traMADol (ULTRAM) 50 mg tablet Take 50 mg by mouth two (2) times a day. Associated Diagnoses: Cerebral infarction due to thrombosis of left middle cerebral artery ([facility] [address] Complex partial seizure evolving to generalized seizure ([facility] [address] Carotid artery stenosis with cerebral infarction ([facility])      aspirin delayed-release 325 mg tablet Take 325 mg by mouth daily. doxazosin (CARDURA) 4 mg tablet TAKE ONE TABLET BY MOUTH ONCE DAILY AT BEDTIME  Qty: 90 Tab, Refills: 0    Comments: Last RF call       citalopram (CELEXA) 20 mg tablet TAKE ONE TABLET BY MOUTH ONCE DAILY  Qty: 90 Tab, Refills: 0      atorvastatin (LIPITOR) 40 mg tablet Take 40 mg by mouth daily. levothyroxine (SYNTHROID) 50 mcg tablet TAKE ONE TABLET BY MOUTH EVERY DAY  Qty: 90 Tab, Refills: 0    Comments: Have Pt call  for appt with me      amLODIPine (NORVASC) 10 mg tablet TAKE ONE TABLET BY MOUTH EVERY DAY  Qty: 90 Tab, Refills: 0             SCREENINGS               No data recorded         PHYSICAL EXAM      ED Triage Vitals [03/07/23 5434]   ED Encounter Vitals Group      BP (!) 144/72      Pulse (Heart Rate) 88      Resp Rate 18      Temp 97.8 °F (36.6 °C)      Temp src       O2 Sat (%) (!) 82 %      Weight 204 lb 5.9 oz      Height         Physical Exam  Vitals and nursing note reviewed. Constitutional:       Appearance: He is ill-appearing and diaphoretic. HENT:      Head: Normocephalic and atraumatic.    Eyes:      Extraocular Movements: Right eye: No nystagmus. Left eye: No nystagmus. Cardiovascular:      Rate and Rhythm: Normal rate and regular rhythm. Pulmonary:      Effort: Pulmonary effort is normal.      Breath sounds: Normal breath sounds. Abdominal:      Palpations: Abdomen is soft. Musculoskeletal:         General: Normal range of motion. Cervical back: Normal range of motion and neck supple. Skin:     General: Skin is warm. Neurological:      Comments: None the responding \"yes\" to any questioning. To 5 strength in the right upper and lower extremity. Right facial droop. Unable to follow most commands limiting neuro exam.   Psychiatric:      Comments: Altered        DIAGNOSTIC RESULTS   LABS:     No results found for this or any previous visit (from the past 12 hour(s)). EKG: If performed, independent interpretation documented below in the MDM section     RADIOLOGY:  Non-plain film images such as CT, Ultrasound and MRI are read by the radiologist. Plain radiographic images are visualized and preliminarily interpreted by the ED Provider with the findings documented in the MDM section. Interpretation per the Radiologist below, if available at the time of this note:     XR CHEST PORT    Result Date: 3/7/2023  EXAM:  XR CHEST PORT INDICATION: CVA COMPARISON: 5/4/2020 TECHNIQUE: portable chest AP view obtained portably at 1930 hours FINDINGS: The cardiac silhouette is within normal limits. The pulmonary vasculature is within normal limits. The lungs and pleural spaces are clear. Lytic lesions involving the left anterior second and seventh ribs. No acute cardiopulmonary process Interval development of lytic osseous lesions.  Recommend whole body bone survey     CTA CODE NEURO HEAD AND NECK W CONT    Result Date: 3/7/2023  CLINICAL HISTORY: Code stroke EXAMINATION:  CT ANGIOGRAPHY HEAD AND NECK, CT PERFUSION COMPARISON: None TECHNIQUE:  Following the uneventful administration of iodinated contrast material, axial CT angiography of the head and neck was performed. Delayed axial images through the head were also obtained. Coronal and sagittal reconstructions were obtained. Manual postprocessing of images was performed. 3-D  Sagittal maximal intensity projection images were obtained. 3-D Coronal maximal intensity projections were obtained. CT brain perfusion was performed with generation of hemodynamic maps of multiple parameters, including cerebral blood flow, cerebral blood volume, mean transit time, and TMAX. CT dose reduction was achieved through use of a standardized protocol tailored for this examination and automatic exposure control for dose modulation. This study was analyzed by the [address] ai algorithm. FINDINGS: DELAYED ENHANCEMENT HEAD CT Left occipital/posterior temporal lobe encephalomalacia. No intra or extra-axial mass or collection. Ventricles are normal in size and configuration. The basal cisterns are patent. Dural venous sinuses are patent. No abnormal parenchymal or meningeal enhancement. Mastoid air cells and paranasal sinuses are clear. CTA NECK: Great vessels: Normal arch anatomy with the origins patent. Right subclavian artery: Patent Left subclavian artery: Patent Right common carotid artery: Patent Left common carotid artery: Patent Cervical right internal carotid artery: Patent with no significant stenosis by NASCET criteria. Cervical left internal carotid artery: Patent with no significant stenosis by NASCET criteria. Right vertebral artery: Ostial stenosis. Otherwise patent. Left vertebral artery: Patent The lung apices are clear. The thyroid is homogeneous. No cervical lymphadenopathy. Measurements utilize NASCET criteria. CTA HEAD: Right cavernous internal carotid artery: Mild atherosclerotic disease without hemodynamically significant stenosis. Left cavernous internal carotid artery: Mild atherosclerotic disease without hemodynamically significant stenosis.  Anterior cerebral arteries: Patent Anterior communicating artery: Patent Right middle cerebral artery: Patent Left middle cerebral artery: Patent Posterior communicating arteries: Diminutive bilaterally. Posterior cerebral arteries: Patent Basilar artery: Patent Distal vertebral arteries: Patent No evidence for intracranial aneurysm or hemodynamically significant stenosis. CT Perfusion: Small focus of perfusion mismatch correlates with the left posterior temporal lobe encephalomalacia. No acute perfusion abnormalities. Tmax > 6s: 10 cc rCBF < 30%: 3 cc Mismatch volume: 7 cc Mismatch ratio: 3.3     1. No acute vascular abnormality, no large vessel occlusion. 2. 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 3. Small focus of perfusion mismatch correlates with left posterior temporal lobe chronic infarct. No acute perfusion abnormality. CT CODE NEURO HEAD WO CONTRAST    Result Date: 3/7/2023  EXAM: CT CODE NEURO HEAD WO CONTRAST INDICATION: Code Stroke COMPARISON: 2013. CONTRAST: None. TECHNIQUE: Unenhanced CT of the head was performed using 5 mm images. Brain and bone windows were generated. Coronal and sagittal reformats. CT dose reduction was achieved through use of a standardized protocol tailored for this examination and automatic exposure control for dose modulation. FINDINGS: The ventricles and sulci are stable in size, shape and configuration. There is a stable asymmetric prominence of the left lateral ventricle. There is also a stable asymmetric encephalomalacia involving the medial aspect of the left occipital lobe. There is no intracranial hemorrhage, extra-axial collection, or mass effect. The basilar cisterns are open. No CT evidence of acute infarct. The bone windows demonstrate no abnormalities. The visualized portions of the paranasal sinuses and mastoid air cells are clear with the exception of a small amount of fluid in the left mastoid air cells. .     No acute infarct Old left occipital infarct    CT CODE NEURO PERF W CBF    Result Date: 3/7/2023  CLINICAL HISTORY: Code stroke EXAMINATION:  CT ANGIOGRAPHY HEAD AND NECK, CT PERFUSION COMPARISON: None TECHNIQUE:  Following the uneventful administration of iodinated contrast material, axial CT angiography of the head and neck was performed. Delayed axial images through the head were also obtained. Coronal and sagittal reconstructions were obtained. Manual postprocessing of images was performed. 3-D  Sagittal maximal intensity projection images were obtained. 3-D Coronal maximal intensity projections were obtained. CT brain perfusion was performed with generation of hemodynamic maps of multiple parameters, including cerebral blood flow, cerebral blood volume, mean transit time, and TMAX. CT dose reduction was achieved through use of a standardized protocol tailored for this examination and automatic exposure control for dose modulation. This study was analyzed by the [address] ai algorithm. FINDINGS: DELAYED ENHANCEMENT HEAD CT Left occipital/posterior temporal lobe encephalomalacia. No intra or extra-axial mass or collection. Ventricles are normal in size and configuration. The basal cisterns are patent. Dural venous sinuses are patent. No abnormal parenchymal or meningeal enhancement. Mastoid air cells and paranasal sinuses are clear. CTA NECK: Great vessels: Normal arch anatomy with the origins patent. Right subclavian artery: Patent Left subclavian artery: Patent Right common carotid artery: Patent Left common carotid artery: Patent Cervical right internal carotid artery: Patent with no significant stenosis by NASCET criteria. Cervical left internal carotid artery: Patent with no significant stenosis by NASCET criteria. Right vertebral artery: Ostial stenosis. Otherwise patent. Left vertebral artery: Patent The lung apices are clear. The thyroid is homogeneous. No cervical lymphadenopathy. Measurements utilize NASCET criteria.  CTA HEAD: Right cavernous internal carotid artery: Mild atherosclerotic disease without hemodynamically significant stenosis. Left cavernous internal carotid artery: Mild atherosclerotic disease without hemodynamically significant stenosis. Anterior cerebral arteries: Patent Anterior communicating artery: Patent Right middle cerebral artery: Patent Left middle cerebral artery: Patent Posterior communicating arteries: Diminutive bilaterally. Posterior cerebral arteries: Patent Basilar artery: Patent Distal vertebral arteries: Patent No evidence for intracranial aneurysm or hemodynamically significant stenosis. CT Perfusion: Small focus of perfusion mismatch correlates with the left posterior temporal lobe encephalomalacia. No acute perfusion abnormalities. Tmax > 6s: 10 cc rCBF < 30%: 3 cc Mismatch volume: 7 cc Mismatch ratio: 3.3     1. No acute vascular abnormality, no large vessel occlusion. 2. 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 3. Small focus of perfusion mismatch correlates with left posterior temporal lobe chronic infarct. No acute perfusion abnormality. PROCEDURES   Unless otherwise noted below, none  Procedures     CRITICAL CARE TIME   I have spent 35 minutes of critical care time in evaluating and treating this patient. This includes time spent at bedside, time with family and decision makers, documentation, review of labs and imaging, and/or consultation with specialists. It does not include time spent on separately billed procedures. This patient presents with a critical illness or injury that acutely impairs one or more vital organ systems such that there is a high probability of imminent or life threatening deterioration in the patient's condition. This case involved decision making of high complexity to assess, manipulate, and support vital organ system failure and/or to prevent further life threatening deterioration of the patient's condition.  Failure to initiate these interventions on an urgent basis would likely result in sudden, clinically significant or life threatening deterioration in the patient's condition.     Abnormal findings supporting critical care: cva, possible seizure, ams, weakness  Interventions to support critical care: stroke alert, consultation, admission, supervision of care, frequent reassessment  Failure to intervene may result in: worsening mentatl status, recurrent seizures, coma, ich, worsening cva      EMERGENCY DEPARTMENT COURSE and DIFFERENTIAL DIAGNOSIS/MDM   Vitals:    Vitals:    03/08/23 1142 03/08/23 1326 03/08/23 1333 03/08/23 1454   BP: 139/74 135/86  (!) 148/65   Pulse: 71 69 70 75   Resp: 18 16     Temp: 97.4 °F (36.3 °C) 97.9 °F (36.6 °C)  97.3 °F (36.3 °C)   SpO2: 91% 94%  93%   Weight:  88.7 kg (195 lb 9.6 oz)          Patient was given the following medications:  Medications   acetaminophen (TYLENOL) tablet 650 mg (has no administration in time range)     Or   acetaminophen (TYLENOL) solution 650 mg (has no administration in time range)     Or   acetaminophen (TYLENOL) suppository 650 mg (has no administration in time range)   albuterol-ipratropium (DUO-NEB) 2.5 MG-0.5 MG/3 ML (has no administration in time range)   amLODIPine (NORVASC) tablet 10 mg ( Oral Automatically Held 3/23/23 0900)   atorvastatin (LIPITOR) tablet 40 mg ( Oral Automatically Held 3/23/23 0900)   citalopram (CELEXA) tablet 20 mg ( Oral Automatically Held 3/23/23 0900)   doxazosin (CARDURA) tablet 4 mg ( Oral Automatically Held 3/22/23 2200)   gabapentin (NEURONTIN) capsule 300 mg ( Oral Automatically Held 3/22/23 2200)   levothyroxine (SYNTHROID) tablet 50 mcg ( Oral Automatically Held 3/23/23 0900)   aspirin (ASA) suppository 300 mg (has no administration in time range)   enoxaparin (LOVENOX) injection 40 mg (has no administration in time range)   pantoprazole (PROTONIX) 40 mg in 0.9% sodium chloride 10 mL injection (has no administration in time range)   labetaloL (NORMODYNE;TRANDATE) injection 10 mg (has no administration in time range)   iopamidoL (ISOVUE-370) 370 mg iodine /mL (76 %) injection 100 mL (100 mL IntraVENous Given 3/7/23 1852)   sodium chloride 0.9 % bolus infusion 1,000 mL (0 mL IntraVENous Transfusion Completed 3/7/23 2300)   levETIRAcetam (KEPPRA) injection 1,000 mg (1,000 mg IntraVENous Given 3/7/23 2151)       Medical Decision Making  79-year-old male with history of hyperlipidemia, CAD, pretension, stroke, seizures presenting with altered mental status and right-sided weakness. Vital signs are stable on arrival.  He has normal blood glucose upon arrival my interpretation. Exam notable for profound right-sided weakness in the upper and lower extremity as well as right facial droop. Exam somewhat limited secondary to patient altered mental status and not completely following commands. He is protecting his airway. Limited verbal responses and only responding \"yes\" to any questioning. Level 2 stroke alert was called as his last 1 was 6 and thus not a TNKase candidate. Given patient's limited history unsure about his medical history. However upon appropriate registration medical history was reviewed. He does have a history of a large left-sided stroke consistent with these deficits he is displaying now. Also has a history of seizures. DDx includes new stroke, intracranial hemorrhage, seizures, infection, EtOH abuse, drug abuse, electrode abnormalities, anemia. I was able to examine him in 2 distinct periods of time upon arrival and after he got back from CT scan and he does appear to be having some more coherent words and is a bit more strong on the right side. As such more likely to be a seizure episode given his hypoxia on scene. We will get a chest x-ray value any signs of hypoxia or aspiration. We will get a lactate as well. Teleneurology has been consulted. Amount and/or Complexity of Data Reviewed  Labs: ordered. Radiology: ordered and independent interpretation performed.   ECG/medicine tests: ordered and independent interpretation performed. Risk  Prescription drug management. Decision regarding hospitalization. ED Course as of 03/08/23 1537   Tue Mar 07, 2023   2055 Patient reassessed. Moving all extremities. Still not completely verbally able. Keeps saying over dollar. Not able to follow commands. Protecting airway. As such we will need for seizure/stroke work-up. Giving IV Keppra for possible seizure load. [JS]      ED Course User Index  [JS] iPneda Kenyon MD         FINAL IMPRESSION     1. Seizure ([facility] [address]    2. Cerebrovascular accident (CVA), unspecified mechanism ([facility] [address]          DISPOSITION/PLAN   Tien Freedman's  results have been reviewed with him. He has been counseled regarding his diagnosis, treatment, and plan. He verbally conveys understanding and agreement of the signs, symptoms, diagnosis, treatment and prognosis and additionally agrees to follow up as discussed. He also agrees with the care-plan and conveys that all of his questions have been answered. CLINICAL IMPRESSION    Admit Note: Pt is being admitted by Dr. Casandra Pulliam. The results of their tests and reason(s) for their admission have been discussed with pt and/or available family. They convey agreement and understanding for the need to be admitted and for the admission diagnosis. PATIENT REFERRED TO:  Follow-up Information    None           DISCHARGE MEDICATIONS:  Current Discharge Medication List            DISCONTINUED MEDICATIONS:  Current Discharge Medication List          I am the Primary Clinician of Record. Omayra Wood MD (electronically signed)    (Please note that parts of this dictation were completed with voice recognition software. Quite often unanticipated grammatical, syntax, homophones, and other interpretive errors are inadvertently transcribed by the computer software. Please disregards these errors.  Please excuse any errors that have escaped final proofreading.) final proofreading.)

## 2023-03-07 NOTE — Clinical Note
Status[de-identified] INPATIENT [101]   Type of Bed: Neuro/Stroke [9]   Cardiac Monitoring Required?: Yes   Inpatient Hospitalization Certified Necessary for the Following Reasons: 9.  Other (further clarification in H&P documentation)   Admitting Diagnosis: CVA (cerebral vascular accident) [facility]) [[id number]]   Admitting Physician: Dg Devine [[id number]]   Attending Physician: Anne-Marie Villalobos   Estimated Length of Stay: 2 Midnights   Discharge Plan[de-identified] Home with Office Follow-up

## 2023-03-07 NOTE — ED PROVIDER NOTES
[facility] [address] [facility]       Pt Name: Danni Sims  MRN: [id number]  Myrongfmikaela 1955  Date of evaluation: 3/7/2023  Provider: Fidelia Singh MD   PCP: Ansley Luong NP  Note Started: 6:58 PM 3/7/23     CHIEF COMPLAINT       Chief Complaint   Patient presents with    Altered mental status     Pt arrives via EMS [facility]) level 2 stroke pre-alert. Pt last known well at 11am per sister who saw pt at that time, pt's sister returned to see patient at 5pm and he had facial droop with R sided weakness, limited verbal response. Per EMS, pt independent at baseline per sister. Pt  per EMS, /77, upon EMS arrival to house, pt initally hypoxic at 72% on RA, placed on non rebreather, O2 improved to 98% on transport, pt currently on RA        HISTORY OF PRESENT ILLNESS: 1 or more elements      History From: ems, History limited by: Gus Harley is a 78 y.o. male presenting with right weakness and ams, speech difficulties. Per his daughter he was last known well around 11:00 when she was talking on the phone with him and he was acting normally. She arrived home today at approximately 5:00 and notes patient with right-sided weakness and minimally responsive with little verbal.  EMS arrived and he was hypoxic to 70% and placed on nonrebreather. History notably limited as patient mostly nonverbal at this time. Please See MDM for Additional Details of the HPI/PMH  Nursing Notes were all reviewed and agreed with or any disagreements were addressed in the HPI. REVIEW OF SYSTEMS        Positives and Pertinent negatives as per HPI.     PAST HISTORY     Past Medical History:  Past Medical History:   Diagnosis Date    Arthritis     Hips, Knees    CAD (coronary artery disease)     MI around 1990    Hepatitis A     Hepatitis C     High cholesterol     Hypertension     Other ill-defined conditions(819.07)     Light sensitivity, causes seizures    Seizures ([facility]     Last seizure 2008/work -up in 2012 -possible seizure    Stroke [facility]) 2005    Thyroid disease     Hypothyroid       Past Surgical History:  Past Surgical History:   Procedure Laterality Date    HX HEART CATHETERIZATION  20 years ago    no stents    HX HERNIA REPAIR  10/8/14    left inguinal hernia- Miguel Sudarshan Mitchell MD    HX ORTHOPAEDIC      Left Knee Scope    HX ORTHOPAEDIC  2010    Gavin. Total Hip Replacement/Dr. Shila Verdugo    HX ORTHOPAEDIC  14    R hip replacement     NV OPEN REPAIR CLAVICLE FRACTURE      right        Family History:  Family History   Problem Relation Age of Onset    Hypertension Mother     Stroke Mother     Diabetes Mother     Heart Disease Father     Cancer Father         lung       Social History:  Social History     Tobacco Use    Smoking status: Former     Packs/day: 0.25     Years: 25.00     Pack years: 6.25     Types: Cigarettes     Quit date: 2015     Years since quittin.1    Smokeless tobacco: Never   Vaping Use    Vaping Use: Never used   Substance Use Topics    Alcohol use: No     Comment: stopped 10 years ago---beer on the weekends in the past    Drug use: No       Allergies: Allergies   Allergen Reactions    Carbatrol [Carbamazepine] Rash       CURRENT MEDICATIONS      Current Discharge Medication List        CONTINUE these medications which have NOT CHANGED    Details   albuterol (PROVENTIL VENTOLIN) 2.5 mg /3 mL (0.083 %) nebu INHALE THE CONTENTS OF 1 VIAL (3ML) VIA NEBULIZER EVERY 4 HOURS AS NEEDED FOR WHEEZING (MAX FOUR TIMES A DAY)      albuterol-ipratropium (DUO-NEB) 2.5 mg-0.5 mg/3 ml nebu 3 ml as needed      gabapentin (NEURONTIN) 300 mg capsule TAKE 2 CAPSULES FOUR TIMES DAILY  Qty: 720 Capsule, Refills: 1    Associated Diagnoses: Carotid artery stenosis with cerebral infarction ([address] Cerebral infarction due to thrombosis of left middle cerebral artery ([facility] [address]  Localization-related partial epilepsy with complex partial seizures ([facility] [address] Complex partial seizure evolving to generalized seizure ([facility] [address] Encephalopathy, unspecified      azelastine (ASTELIN) 137 mcg (0.1 %) nasal spray USE 2 SPRAYS IN EACH NOSTRIL TWICE DAILY      montelukast (SINGULAIR) 10 mg tablet Take 10 mg by mouth daily. ketoconazole (NIZORAL) 2 % shampoo       hydroxyzine HCL (ATARAX) 25 mg tablet Take 25 mg by mouth daily. ergocalciferol (ERGOCALCIFEROL) 1,250 mcg (50,000 unit) capsule Take 50,000 Units by mouth every seven (7) days. traMADol (ULTRAM) 50 mg tablet Take 50 mg by mouth two (2) times a day. Associated Diagnoses: Cerebral infarction due to thrombosis of left middle cerebral artery ([facility] [address] Complex partial seizure evolving to generalized seizure ([facility] [address] Carotid artery stenosis with cerebral infarction (HCC)      aspirin delayed-release 325 mg tablet Take 325 mg by mouth daily. doxazosin (CARDURA) 4 mg tablet TAKE ONE TABLET BY MOUTH ONCE DAILY AT BEDTIME  Qty: 90 Tab, Refills: 0    Comments: Last RF call       citalopram (CELEXA) 20 mg tablet TAKE ONE TABLET BY MOUTH ONCE DAILY  Qty: 90 Tab, Refills: 0      atorvastatin (LIPITOR) 40 mg tablet Take 40 mg by mouth daily. levothyroxine (SYNTHROID) 50 mcg tablet TAKE ONE TABLET BY MOUTH EVERY DAY  Qty: 90 Tab, Refills: 0    Comments: Have Pt call  for appt with me      amLODIPine (NORVASC) 10 mg tablet TAKE ONE TABLET BY MOUTH EVERY DAY  Qty: 90 Tab, Refills: 0             SCREENINGS               No data recorded         PHYSICAL EXAM      ED Triage Vitals [03/07/23 0474]   ED Encounter Vitals Group      BP (!) 144/72      Pulse (Heart Rate) 88      Resp Rate 18      Temp 97.8 °F (36.6 °C)      Temp src       O2 Sat (%) (!) 82 %      Weight 204 lb 5.9 oz      Height         Physical Exam  Vitals and nursing note reviewed. Constitutional:       Appearance: He is ill-appearing and diaphoretic. HENT:      Head: Normocephalic and atraumatic.    Eyes: Extraocular Movements:      Right eye: No nystagmus. Left eye: No nystagmus. Cardiovascular:      Rate and Rhythm: Normal rate and regular rhythm. Pulmonary:      Effort: Pulmonary effort is normal.      Breath sounds: Normal breath sounds. Abdominal:      Palpations: Abdomen is soft. Musculoskeletal:         General: Normal range of motion. Cervical back: Normal range of motion and neck supple. Skin:     General: Skin is warm. Neurological:      Comments: None the responding \"yes\" to any questioning. To 5 strength in the right upper and lower extremity. Right facial droop. Unable to follow most commands limiting neuro exam.   Psychiatric:      Comments: Altered        DIAGNOSTIC RESULTS   LABS:     No results found for this or any previous visit (from the past 12 hour(s)). EKG: If performed, independent interpretation documented below in the MDM section     RADIOLOGY:  Non-plain film images such as CT, Ultrasound and MRI are read by the radiologist. Plain radiographic images are visualized and preliminarily interpreted by the ED Provider with the findings documented in the MDM section. Interpretation per the Radiologist below, if available at the time of this note:     XR CHEST PORT    Result Date: 3/7/2023  EXAM:  XR CHEST PORT INDICATION: CVA COMPARISON: 5/4/2020 TECHNIQUE: portable chest AP view obtained portably at 1930 hours FINDINGS: The cardiac silhouette is within normal limits. The pulmonary vasculature is within normal limits. The lungs and pleural spaces are clear. Lytic lesions involving the left anterior second and seventh ribs. No acute cardiopulmonary process Interval development of lytic osseous lesions.  Recommend whole body bone survey     CTA CODE NEURO HEAD AND NECK W CONT    Result Date: 3/7/2023  CLINICAL HISTORY: Code stroke EXAMINATION:  CT ANGIOGRAPHY HEAD AND NECK, CT PERFUSION COMPARISON: None TECHNIQUE:  Following the uneventful administration of iodinated contrast material, axial CT angiography of the head and neck was performed. Delayed axial images through the head were also obtained. Coronal and sagittal reconstructions were obtained. Manual postprocessing of images was performed. 3-D  Sagittal maximal intensity projection images were obtained. 3-D Coronal maximal intensity projections were obtained. CT brain perfusion was performed with generation of hemodynamic maps of multiple parameters, including cerebral blood flow, cerebral blood volume, mean transit time, and TMAX. CT dose reduction was achieved through use of a standardized protocol tailored for this examination and automatic exposure control for dose modulation. This study was analyzed by the [address] ai algorithm. FINDINGS: DELAYED ENHANCEMENT HEAD CT Left occipital/posterior temporal lobe encephalomalacia. No intra or extra-axial mass or collection. Ventricles are normal in size and configuration. The basal cisterns are patent. Dural venous sinuses are patent. No abnormal parenchymal or meningeal enhancement. Mastoid air cells and paranasal sinuses are clear. CTA NECK: Great vessels: Normal arch anatomy with the origins patent. Right subclavian artery: Patent Left subclavian artery: Patent Right common carotid artery: Patent Left common carotid artery: Patent Cervical right internal carotid artery: Patent with no significant stenosis by NASCET criteria. Cervical left internal carotid artery: Patent with no significant stenosis by NASCET criteria. Right vertebral artery: Ostial stenosis. Otherwise patent. Left vertebral artery: Patent The lung apices are clear. The thyroid is homogeneous. No cervical lymphadenopathy. Measurements utilize NASCET criteria. CTA HEAD: Right cavernous internal carotid artery: Mild atherosclerotic disease without hemodynamically significant stenosis. Left cavernous internal carotid artery: Mild atherosclerotic disease without hemodynamically significant stenosis. Anterior cerebral arteries: Patent Anterior communicating artery: Patent Right middle cerebral artery: Patent Left middle cerebral artery: Patent Posterior communicating arteries: Diminutive bilaterally. Posterior cerebral arteries: Patent Basilar artery: Patent Distal vertebral arteries: Patent No evidence for intracranial aneurysm or hemodynamically significant stenosis. CT Perfusion: Small focus of perfusion mismatch correlates with the left posterior temporal lobe encephalomalacia. No acute perfusion abnormalities. Tmax > 6s: 10 cc rCBF < 30%: 3 cc Mismatch volume: 7 cc Mismatch ratio: 3.3     1. No acute vascular abnormality, no large vessel occlusion. 2. 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 3. Small focus of perfusion mismatch correlates with left posterior temporal lobe chronic infarct. No acute perfusion abnormality. CT CODE NEURO HEAD WO CONTRAST    Result Date: 3/7/2023  EXAM: CT CODE NEURO HEAD WO CONTRAST INDICATION: Code Stroke COMPARISON: 2013. CONTRAST: None. TECHNIQUE: Unenhanced CT of the head was performed using 5 mm images. Brain and bone windows were generated. Coronal and sagittal reformats. CT dose reduction was achieved through use of a standardized protocol tailored for this examination and automatic exposure control for dose modulation. FINDINGS: The ventricles and sulci are stable in size, shape and configuration. There is a stable asymmetric prominence of the left lateral ventricle. There is also a stable asymmetric encephalomalacia involving the medial aspect of the left occipital lobe. There is no intracranial hemorrhage, extra-axial collection, or mass effect. The basilar cisterns are open. No CT evidence of acute infarct. The bone windows demonstrate no abnormalities. The visualized portions of the paranasal sinuses and mastoid air cells are clear with the exception of a small amount of fluid in the left mastoid air cells. .     No acute infarct Old left occipital infarct    CT CODE NEURO PERF W CBF    Result Date: 3/7/2023  CLINICAL HISTORY: Code stroke EXAMINATION:  CT ANGIOGRAPHY HEAD AND NECK, CT PERFUSION COMPARISON: None TECHNIQUE:  Following the uneventful administration of iodinated contrast material, axial CT angiography of the head and neck was performed. Delayed axial images through the head were also obtained. Coronal and sagittal reconstructions were obtained. Manual postprocessing of images was performed. 3-D  Sagittal maximal intensity projection images were obtained. 3-D Coronal maximal intensity projections were obtained. CT brain perfusion was performed with generation of hemodynamic maps of multiple parameters, including cerebral blood flow, cerebral blood volume, mean transit time, and TMAX. CT dose reduction was achieved through use of a standardized protocol tailored for this examination and automatic exposure control for dose modulation. This study was analyzed by the [address] ai algorithm. FINDINGS: DELAYED ENHANCEMENT HEAD CT Left occipital/posterior temporal lobe encephalomalacia. No intra or extra-axial mass or collection. Ventricles are normal in size and configuration. The basal cisterns are patent. Dural venous sinuses are patent. No abnormal parenchymal or meningeal enhancement. Mastoid air cells and paranasal sinuses are clear. CTA NECK: Great vessels: Normal arch anatomy with the origins patent. Right subclavian artery: Patent Left subclavian artery: Patent Right common carotid artery: Patent Left common carotid artery: Patent Cervical right internal carotid artery: Patent with no significant stenosis by NASCET criteria. Cervical left internal carotid artery: Patent with no significant stenosis by NASCET criteria. Right vertebral artery: Ostial stenosis. Otherwise patent. Left vertebral artery: Patent The lung apices are clear. The thyroid is homogeneous. No cervical lymphadenopathy. Measurements utilize NASCET criteria.  CTA HEAD: Right cavernous internal carotid artery: Mild atherosclerotic disease without hemodynamically significant stenosis. Left cavernous internal carotid artery: Mild atherosclerotic disease without hemodynamically significant stenosis. Anterior cerebral arteries: Patent Anterior communicating artery: Patent Right middle cerebral artery: Patent Left middle cerebral artery: Patent Posterior communicating arteries: Diminutive bilaterally. Posterior cerebral arteries: Patent Basilar artery: Patent Distal vertebral arteries: Patent No evidence for intracranial aneurysm or hemodynamically significant stenosis. CT Perfusion: Small focus of perfusion mismatch correlates with the left posterior temporal lobe encephalomalacia. No acute perfusion abnormalities. Tmax > 6s: 10 cc rCBF < 30%: 3 cc Mismatch volume: 7 cc Mismatch ratio: 3.3     1. No acute vascular abnormality, no large vessel occlusion. 2. 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 3. Small focus of perfusion mismatch correlates with left posterior temporal lobe chronic infarct. No acute perfusion abnormality. PROCEDURES   Unless otherwise noted below, none  Procedures     CRITICAL CARE TIME   I have spent 35 minutes of critical care time in evaluating and treating this patient. This includes time spent at bedside, time with family and decision makers, documentation, review of labs and imaging, and/or consultation with specialists. It does not include time spent on separately billed procedures. This patient presents with a critical illness or injury that acutely impairs one or more vital organ systems such that there is a high probability of imminent or life threatening deterioration in the patient's condition. This case involved decision making of high complexity to assess, manipulate, and support vital organ system failure and/or to prevent further life threatening deterioration of the patient's condition.  Failure to initiate these interventions on an urgent basis would likely result in sudden, clinically significant or life threatening deterioration in the patient's condition.     Abnormal findings supporting critical care: cva, possible seizure, ams, weakness  Interventions to support critical care: stroke alert, consultation, admission, supervision of care, frequent reassessment  Failure to intervene may result in: worsening mentatl status, recurrent seizures, coma, ich, worsening cva      EMERGENCY DEPARTMENT COURSE and DIFFERENTIAL DIAGNOSIS/MDM   Vitals:    Vitals:    03/08/23 1142 03/08/23 1326 03/08/23 1333 03/08/23 1454   BP: 139/74 135/86  (!) 148/65   Pulse: 71 69 70 75   Resp: 18 16     Temp: 97.4 °F (36.3 °C) 97.9 °F (36.6 °C)  97.3 °F (36.3 °C)   SpO2: 91% 94%  93%   Weight:  88.7 kg (195 lb 9.6 oz)          Patient was given the following medications:  Medications   acetaminophen (TYLENOL) tablet 650 mg (has no administration in time range)     Or   acetaminophen (TYLENOL) solution 650 mg (has no administration in time range)     Or   acetaminophen (TYLENOL) suppository 650 mg (has no administration in time range)   albuterol-ipratropium (DUO-NEB) 2.5 MG-0.5 MG/3 ML (has no administration in time range)   amLODIPine (NORVASC) tablet 10 mg ( Oral Automatically Held 3/23/23 0900)   atorvastatin (LIPITOR) tablet 40 mg ( Oral Automatically Held 3/23/23 0900)   citalopram (CELEXA) tablet 20 mg ( Oral Automatically Held 3/23/23 0900)   doxazosin (CARDURA) tablet 4 mg ( Oral Automatically Held 3/22/23 2200)   gabapentin (NEURONTIN) capsule 300 mg ( Oral Automatically Held 3/22/23 2200)   levothyroxine (SYNTHROID) tablet 50 mcg ( Oral Automatically Held 3/23/23 0900)   aspirin (ASA) suppository 300 mg (has no administration in time range)   enoxaparin (LOVENOX) injection 40 mg (has no administration in time range)   pantoprazole (PROTONIX) 40 mg in 0.9% sodium chloride 10 mL injection (has no administration in time range)   labetaloL (NORMODYNE;TRANDATE) injection 10 mg (has no administration in time range)   iopamidoL (ISOVUE-370) 370 mg iodine /mL (76 %) injection 100 mL (100 mL IntraVENous Given 3/7/23 1852)   sodium chloride 0.9 % bolus infusion 1,000 mL (0 mL IntraVENous Transfusion Completed 3/7/23 2300)   levETIRAcetam (KEPPRA) injection 1,000 mg (1,000 mg IntraVENous Given 3/7/23 2151)       Medical Decision Making  62-year-old male with history of hyperlipidemia, CAD, pretension, stroke, seizures presenting with altered mental status and right-sided weakness. Vital signs are stable on arrival.  He has normal blood glucose upon arrival my interpretation. Exam notable for profound right-sided weakness in the upper and lower extremity as well as right facial droop. Exam somewhat limited secondary to patient altered mental status and not completely following commands. He is protecting his airway. Limited verbal responses and only responding \"yes\" to any questioning. Level 2 stroke alert was called as his last 1 was 6 and thus not a TNKase candidate. Given patient's limited history unsure about his medical history. However upon appropriate registration medical history was reviewed. He does have a history of a large left-sided stroke consistent with these deficits he is displaying now. Also has a history of seizures. DDx includes new stroke, intracranial hemorrhage, seizures, infection, EtOH abuse, drug abuse, electrode abnormalities, anemia. I was able to examine him in 2 distinct periods of time upon arrival and after he got back from CT scan and he does appear to be having some more coherent words and is a bit more strong on the right side. As such more likely to be a seizure episode given his hypoxia on scene. We will get a chest x-ray value any signs of hypoxia or aspiration. We will get a lactate as well. Teleneurology has been consulted. Amount and/or Complexity of Data Reviewed  Labs: ordered.   Radiology: ordered and independent interpretation performed. ECG/medicine tests: ordered and independent interpretation performed. Risk  Prescription drug management. Decision regarding hospitalization. ED Course as of 03/08/23 1537   Tue Mar 07, 2023   2055 Patient reassessed. Moving all extremities. Still not completely verbally able. Keeps saying over dollar. Not able to follow commands. Protecting airway. As such we will need for seizure/stroke work-up. Giving IV Keppra for possible seizure load. [JS]      ED Course User Index  [JS] Eloina Christopher MD         FINAL IMPRESSION     1. Seizure ([address]    2. Cerebrovascular accident (CVA), unspecified mechanism ([address]          DISPOSITION/PLAN   Michel Freedman's  results have been reviewed with him. He has been counseled regarding his diagnosis, treatment, and plan. He verbally conveys understanding and agreement of the signs, symptoms, diagnosis, treatment and prognosis and additionally agrees to follow up as discussed. He also agrees with the care-plan and conveys that all of his questions have been answered. CLINICAL IMPRESSION    Admit Note: Pt is being admitted by Dr. Daisy Blakely. The results of their tests and reason(s) for their admission have been discussed with pt and/or available family. They convey agreement and understanding for the need to be admitted and for the admission diagnosis. PATIENT REFERRED TO:  Follow-up Information    None           DISCHARGE MEDICATIONS:  Current Discharge Medication List            DISCONTINUED MEDICATIONS:  Current Discharge Medication List          I am the Primary Clinician of Record. Dora Jimenez MD (electronically signed)    (Please note that parts of this dictation were completed with voice recognition software. Quite often unanticipated grammatical, syntax, homophones, and other interpretive errors are inadvertently transcribed by the computer software. Please disregards these errors.  Please excuse any errors that have escaped final proofreading.)

## 2023-03-08 ENCOUNTER — APPOINTMENT (OUTPATIENT)
Dept: MRI IMAGING | Age: 68
DRG: 101 | End: 2023-03-08
Attending: STUDENT IN AN ORGANIZED HEALTH CARE EDUCATION/TRAINING PROGRAM
Payer: MEDICARE

## 2023-03-08 PROBLEM — I63.9 CVA (CEREBRAL VASCULAR ACCIDENT) (HCC): Status: ACTIVE | Noted: 2023-03-08

## 2023-03-08 LAB
ATRIAL RATE: 89 BPM
CALCULATED P AXIS, ECG09: 92 DEGREES
CALCULATED R AXIS, ECG10: 84 DEGREES
CALCULATED T AXIS, ECG11: 112 DEGREES
DIAGNOSIS, 93000: NORMAL
GLUCOSE BLD STRIP.AUTO-MCNC: 76 MG/DL (ref 65–117)
GLUCOSE BLD STRIP.AUTO-MCNC: 83 MG/DL (ref 65–117)
GLUCOSE BLD STRIP.AUTO-MCNC: 86 MG/DL (ref 65–117)
P-R INTERVAL, ECG05: 170 MS
Q-T INTERVAL, ECG07: 342 MS
QRS DURATION, ECG06: 80 MS
QTC CALCULATION (BEZET), ECG08: 416 MS
SERVICE CMNT-IMP: NORMAL
VENTRICULAR RATE, ECG03: 89 BPM

## 2023-03-08 PROCEDURE — 92523 SPEECH SOUND LANG COMPREHEN: CPT | Performed by: SPEECH-LANGUAGE PATHOLOGIST

## 2023-03-08 PROCEDURE — 99222 1ST HOSP IP/OBS MODERATE 55: CPT | Performed by: INTERNAL MEDICINE

## 2023-03-08 PROCEDURE — 70551 MRI BRAIN STEM W/O DYE: CPT

## 2023-03-08 PROCEDURE — 74011250636 HC RX REV CODE- 250/636: Performed by: NURSE PRACTITIONER

## 2023-03-08 PROCEDURE — 92610 EVALUATE SWALLOWING FUNCTION: CPT | Performed by: SPEECH-LANGUAGE PATHOLOGIST

## 2023-03-08 PROCEDURE — 97167 OT EVAL HIGH COMPLEX 60 MIN: CPT | Performed by: OCCUPATIONAL THERAPIST

## 2023-03-08 PROCEDURE — 97116 GAIT TRAINING THERAPY: CPT

## 2023-03-08 PROCEDURE — 97535 SELF CARE MNGMENT TRAINING: CPT | Performed by: OCCUPATIONAL THERAPIST

## 2023-03-08 PROCEDURE — 74011250637 HC RX REV CODE- 250/637: Performed by: STUDENT IN AN ORGANIZED HEALTH CARE EDUCATION/TRAINING PROGRAM

## 2023-03-08 PROCEDURE — 95816 EEG AWAKE AND DROWSY: CPT | Performed by: INTERNAL MEDICINE

## 2023-03-08 PROCEDURE — 97162 PT EVAL MOD COMPLEX 30 MIN: CPT

## 2023-03-08 PROCEDURE — 65270000046 HC RM TELEMETRY

## 2023-03-08 PROCEDURE — 77010033678 HC OXYGEN DAILY

## 2023-03-08 PROCEDURE — 94760 N-INVAS EAR/PLS OXIMETRY 1: CPT

## 2023-03-08 PROCEDURE — 82962 GLUCOSE BLOOD TEST: CPT

## 2023-03-08 RX ORDER — LABETALOL HYDROCHLORIDE 5 MG/ML
10 INJECTION, SOLUTION INTRAVENOUS
Status: DISCONTINUED | OUTPATIENT
Start: 2023-03-08 | End: 2023-03-13 | Stop reason: HOSPADM

## 2023-03-08 RX ORDER — ACETAMINOPHEN 650 MG/1
650 SUPPOSITORY RECTAL
Status: DISCONTINUED | OUTPATIENT
Start: 2023-03-08 | End: 2023-03-13 | Stop reason: HOSPADM

## 2023-03-08 RX ORDER — ATORVASTATIN CALCIUM 40 MG/1
40 TABLET, FILM COATED ORAL DAILY
Status: DISCONTINUED | OUTPATIENT
Start: 2023-03-09 | End: 2023-03-13 | Stop reason: HOSPADM

## 2023-03-08 RX ORDER — ENOXAPARIN SODIUM 100 MG/ML
40 INJECTION SUBCUTANEOUS EVERY 24 HOURS
Status: DISCONTINUED | OUTPATIENT
Start: 2023-03-09 | End: 2023-03-13 | Stop reason: HOSPADM

## 2023-03-08 RX ORDER — PANTOPRAZOLE SODIUM 40 MG/1
40 TABLET, DELAYED RELEASE ORAL DAILY
Status: DISCONTINUED | OUTPATIENT
Start: 2023-03-08 | End: 2023-03-08

## 2023-03-08 RX ORDER — IPRATROPIUM BROMIDE AND ALBUTEROL SULFATE 2.5; .5 MG/3ML; MG/3ML
3 SOLUTION RESPIRATORY (INHALATION)
Status: DISCONTINUED | OUTPATIENT
Start: 2023-03-08 | End: 2023-03-13 | Stop reason: HOSPADM

## 2023-03-08 RX ORDER — LEVOTHYROXINE SODIUM 50 UG/1
50 TABLET ORAL DAILY
Status: DISCONTINUED | OUTPATIENT
Start: 2023-03-09 | End: 2023-03-13 | Stop reason: HOSPADM

## 2023-03-08 RX ORDER — GABAPENTIN 300 MG/1
300 CAPSULE ORAL 4 TIMES DAILY
Status: DISCONTINUED | OUTPATIENT
Start: 2023-03-08 | End: 2023-03-10

## 2023-03-08 RX ORDER — ASPIRIN 300 MG/1
300 SUPPOSITORY RECTAL DAILY
Status: DISCONTINUED | OUTPATIENT
Start: 2023-03-09 | End: 2023-03-10

## 2023-03-08 RX ORDER — CITALOPRAM 20 MG/1
20 TABLET, FILM COATED ORAL DAILY
Status: DISCONTINUED | OUTPATIENT
Start: 2023-03-09 | End: 2023-03-13 | Stop reason: HOSPADM

## 2023-03-08 RX ORDER — DOXAZOSIN 2 MG/1
4 TABLET ORAL
Status: DISCONTINUED | OUTPATIENT
Start: 2023-03-08 | End: 2023-03-13 | Stop reason: HOSPADM

## 2023-03-08 RX ORDER — AMLODIPINE BESYLATE 5 MG/1
10 TABLET ORAL DAILY
Status: DISCONTINUED | OUTPATIENT
Start: 2023-03-09 | End: 2023-03-13 | Stop reason: HOSPADM

## 2023-03-08 RX ORDER — LORAZEPAM 2 MG/ML
2 INJECTION INTRAMUSCULAR ONCE
Status: COMPLETED | OUTPATIENT
Start: 2023-03-08 | End: 2023-03-08

## 2023-03-08 RX ORDER — ACETAMINOPHEN 325 MG/1
650 TABLET ORAL
Status: DISCONTINUED | OUTPATIENT
Start: 2023-03-08 | End: 2023-03-13 | Stop reason: HOSPADM

## 2023-03-08 RX ADMIN — PANTOPRAZOLE SODIUM 40 MG: 40 TABLET, DELAYED RELEASE ORAL at 09:37

## 2023-03-08 RX ADMIN — LORAZEPAM 2 MG: 2 INJECTION INTRAMUSCULAR; INTRAVENOUS at 21:40

## 2023-03-08 NOTE — H&P
Hospitalist Admission Note    NAME: Armida Ramos   :  1955   MRN:  [id number]     Date/Time:  3/8/2023 11:17 AM    Patient PCP: Belinda Childs NP  ______________________________________________________________________  Given the patient's current clinical presentation, I have a high level of concern for decompensation if discharged from the emergency department. Complex decision making was performed, which includes reviewing the patient's available past medical records, laboratory results, and x-ray films. My assessment of this patient's clinical condition and my plan of care is as follows. Assessment / Plan: Altered mental status  Aphasia  Rule out CVA:    CTH showed no acute pathology/ has chronic left occipital infarct  CTA H and neck:  1. No acute vascular abnormality, no large vessel occlusion. 2. 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 3. Small focus of perfusion mismatch correlates with left posterior temporal  lobe chronic infarct. No acute perfusion abnormality. Stroke work up, MRI brain  Fu LDL, hba1c  PT/OT, SLP evaluation. Recommended strict NPO for now by SLP  Rectal aspirin for now. Hold all oral meds until cleared by SLP  History of seizure, last was 10 years back. Not on any meds currently. EEG ordered. HTN:  Permissive HTN for 24-48 hours  Resume home meds after this, if cleared by SLP    Hypothyroidism:  Resume synthroid once cleared by SLP    COPD:  88% on room air, on 2 liters saturating 92%  On examination no wheezing, doesn't have copd exacerbation  Continue with duonebs PRN  Check for home oxygen need prior to discharge  CXR showed no acute pathology, interval development of lytic osseous lesions. Should be discussed about whole body scan once acute issue resolves.     Other meds:  Resume Celexa and gabapentin, once able to tolerate PO    Code Status: Full  Surrogate Decision Maker: His sister    DVT Prophylaxis: Lovenox  GI Prophylaxis: not indicated    Baseline: Independent frome home      Subjective:   CHIEF COMPLAINT: Mental status, aphasia    HISTORY OF PRESENT ILLNESS:     Suresh Cavazos is a 78 y.o. male with past medical history of CAD, arthritis, hypertension, hypercholesterolemia, seizures, stroke, hypothyroidism who was brought in for change in mental status and difficulty with his speech. He is awake but unable to give any history. History was provided by his sister present at the bedside. As per the sister he was in his usual state of health until 11 AM yesterday. She went back to see him around 5 PM and found him very confused and was unable to speak. No reported focal deficits, sensory loss, change in bladder or bowel habits, lower extremity edema or tenderness, no pain or chest pain. We were asked to admit for work up and evaluation of the above problems. Past Medical History:   Diagnosis Date    Arthritis     Hips, Knees    CAD (coronary artery disease)     MI around     Hepatitis A     Hepatitis C     High cholesterol     Hypertension     Other ill-defined conditions(799.89)     Light sensitivity, causes seizures    Seizures ([facility] [address]     Last seizure 2008/work -up in  -possible seizure    Stroke [facility])     Thyroid disease     Hypothyroid        Past Surgical History:   Procedure Laterality Date    HX HEART CATHETERIZATION  20 years ago    no stents    HX HERNIA REPAIR  10/8/14    left inguinal hernia- Isma Mitchell MD    HX ORTHOPAEDIC      Left Knee Scope    HX ORTHOPAEDIC  2010    Gavin.  Total Hip Replacement/Dr. Ronald Torres ORTHOPAEDIC  14    R hip replacement     [place] OPEN REPAIR CLAVICLE FRACTURE      right        Social History     Tobacco Use    Smoking status: Former     Packs/day: 0.25     Years: 25.00     Pack years: 6.25     Types: Cigarettes     Quit date: 2015     Years since quittin.1    Smokeless tobacco: Never   Substance Use Topics    Alcohol use: No     Comment: stopped 10 years ago---beer on the weekends in the past        Family History   Problem Relation Age of Onset    Hypertension Mother     Stroke Mother     Diabetes Mother     Heart Disease Father     Cancer Father         lung     Allergies   Allergen Reactions    Carbatrol [Carbamazepine] Rash        Prior to Admission medications    Medication Sig Start Date End Date Taking? Authorizing Provider   albuterol (PROVENTIL VENTOLIN) 2.5 mg /3 mL (0.083 %) nebu INHALE THE CONTENTS OF 1 VIAL (3ML) VIA NEBULIZER EVERY 4 HOURS AS NEEDED FOR WHEEZING (MAX FOUR TIMES A DAY)    Provider, Historical   albuterol-ipratropium (DUO-NEB) 2.5 mg-0.5 mg/3 ml nebu 3 ml as needed 4/27/22   Provider, Historical   gabapentin (NEURONTIN) 300 mg capsule TAKE 2 CAPSULES FOUR TIMES DAILY 2/1/23   Shanti Sanchez MD   azelastine (ASTELIN) 137 mcg (0.1 %) nasal spray USE 2 SPRAYS IN EACH NOSTRIL TWICE DAILY 10/14/21   Provider, Historical   montelukast (SINGULAIR) 10 mg tablet Take 10 mg by mouth daily. 11/27/19   Provider, Historical   ketoconazole (NIZORAL) 2 % shampoo  1/2/20   Provider, Historical   hydroxyzine HCL (ATARAX) 25 mg tablet Take 25 mg by mouth daily. 12/30/19   Provider, Historical   ergocalciferol (ERGOCALCIFEROL) 1,250 mcg (50,000 unit) capsule Take 50,000 Units by mouth every seven (7) days. 12/30/19   Provider, Historical   traMADol (ULTRAM) 50 mg tablet Take 50 mg by mouth two (2) times a day. 10/11/17   Provider, Historical   aspirin delayed-release 325 mg tablet Take 325 mg by mouth daily. Provider, Historical   doxazosin (CARDURA) 4 mg tablet TAKE ONE TABLET BY MOUTH ONCE DAILY AT BEDTIME 6/20/14   Catalina Dai MD   citalopram (CELEXA) 20 mg tablet TAKE ONE TABLET BY MOUTH ONCE DAILY 6/20/14   Catalina Dai MD   atorvastatin (LIPITOR) 40 mg tablet Take 40 mg by mouth daily.     Provider, Historical   levothyroxine (SYNTHROID) 50 mcg tablet TAKE ONE TABLET BY MOUTH EVERY DAY 4/14/14   Catalina Dai MD   amLODIPine (NORVASC) 10 mg tablet TAKE ONE TABLET BY MOUTH EVERY DAY 2/28/14   De Escobar MD       REVIEW OF SYSTEMS:     I am not able to complete the review of systems because: The patient is intubated and sedated   x The patient has altered mental status due to his acute medical problems    The patient has baseline aphasia from prior stroke(s)    The patient has baseline dementia and is not reliable historian    The patient is in acute medical distress and unable to provide information           Total of 12 systems reviewed as follows:       POSITIVE= underlined text  Negative = text not underlined  General:  fever, chills, sweats, generalized weakness, weight loss/gain,      loss of appetite   Eyes:    blurred vision, eye pain, loss of vision, double vision  ENT:    rhinorrhea, pharyngitis   Respiratory:   cough, sputum production, SOB, MUÑOZ, wheezing, pleuritic pain   Cardiology:   chest pain, palpitations, orthopnea, PND, edema, syncope   Gastrointestinal:  abdominal pain , N/V, diarrhea, dysphagia, constipation, bleeding   Genitourinary:  frequency, urgency, dysuria, hematuria, incontinence   Muskuloskeletal :  arthralgia, myalgia, back pain  Hematology:  easy bruising, nose or gum bleeding, lymphadenopathy   Dermatological: rash, ulceration, pruritis, color change / jaundice  Endocrine:   hot flashes or polydipsia   Neurological:  headache, dizziness, confusion, focal weakness, paresthesia,     Speech difficulties, memory loss, gait difficulty  Psychological: Feelings of anxiety, depression, agitation    Objective:   VITALS:    Visit Vitals  /72 (BP Patient Position: Standing)   Pulse 70   Temp 98.2 °F (36.8 °C)   Resp 19   Wt 92.7 kg (204 lb 5.9 oz)   SpO2 90%   BMI 26.96 kg/m²       PHYSICAL EXAM:    General:    Alert, cooperative, no distress, appears stated age.      HEENT: Atraumatic, anicteric sclerae, pink conjunctivae     No oral ulcers, mucosa moist, throat clear, dentition fair  Neck:  Supple, symmetrical, thyroid: non tender  Lungs:   Clear to auscultation bilaterally. No Wheezing or Rhonchi. No rales. Chest wall:  No tenderness  No Accessory muscle use. Heart:   Regular  rhythm,  No  murmur   No edema  Abdomen:   Soft, non-tender. Not distended. Bowel sounds normal  Extremities: No cyanosis. No clubbing,      Skin turgor normal, Capillary refill normal, Radial dial pulse 2+  Skin:     Not pale. Not Jaundiced  No rashes   Psych:  Good insight. Not depressed. Not anxious or agitated. Neurologic: Awake, speaks a few words, positive expressive aphasia, power equal on both sides, able to follow simple commands    _______________________________________________________________________  Care Plan discussed with:    Comments   Patient y    Family  y Sister at bedside   RN y    Care Manager                    Consultant:      _______________________________________________________________________  Expected  Disposition:   Home with Family    HH/PT/OT/RN y   SNF/LTC y   [de-identified]    ________________________________________________________________________  TOTAL TIME: 68 minutes    Critical Care Provided     Minutes non procedure based      Comments    x Reviewed previous records   >50% of visit spent in counseling and coordination of care x Discussion with patient and/or family and questions answered       ________________________________________________________________________  Signed: Latisha Mittal MD    Procedures: see electronic medical records for all procedures/Xrays and details which were not copied into this note but were reviewed prior to creation of Plan.     LAB DATA REVIEWED:    Recent Results (from the past 24 hour(s))   EKG, 12 LEAD, INITIAL    Collection Time: 03/07/23  7:19 PM   Result Value Ref Range    Ventricular Rate 89 BPM    Atrial Rate 89 BPM    P-R Interval 170 ms    QRS Duration 80 ms    Q-T Interval 342 ms    QTC Calculation (Bezet) 416 ms    Calculated P Axis 92 degrees    Calculated R Axis 84 degrees    Calculated T Axis 112 degrees    Diagnosis       Normal sinus rhythm  Low voltage QRS  Nonspecific T wave abnormality  When compared with ECG of 09-NOV-2015 15:15,  Nonspecific T wave abnormality now evident in Inferior leads  Nonspecific T wave abnormality now evident in Lateral leads  Confirmed by Saud Broussard ([id number]) on 3/8/2023 8:11:20 AM     GLUCOSE, POC    Collection Time: 03/07/23  7:34 PM   Result Value Ref Range    Glucose (POC) 98 65 - 117 mg/dL    Performed by Cortes Pimentel RN    CBC WITH AUTOMATED DIFF    Collection Time: 03/07/23  7:35 PM   Result Value Ref Range    WBC 10.9 4.1 - 11.1 K/uL    RBC 4.68 4.10 - 5.70 M/uL    HGB 13.7 12.1 - 17.0 g/dL    HCT 41.9 36.6 - 50.3 %    MCV 89.5 80.0 - 99.0 FL    MCH 29.3 26.0 - 34.0 PG    MCHC 32.7 30.0 - 36.5 g/dL    RDW 14.2 11.5 - 14.5 %    PLATELET 829 163 - 167 K/uL    NRBC 0.0 0  WBC    ABSOLUTE NRBC 0.00 0.00 - 0.01 K/uL    NEUTROPHILS 87 (H) 32 - 75 %    LYMPHOCYTES 6 (L) 12 - 49 %    MONOCYTES 6 5 - 13 %    EOSINOPHILS 0 0 - 7 %    BASOPHILS 1 0 - 1 %    IMMATURE GRANULOCYTES 0 0.0 - 0.5 %    ABS. NEUTROPHILS 9.4 (H) 1.8 - 8.0 K/UL    ABS. LYMPHOCYTES 0.7 (L) 0.8 - 3.5 K/UL    ABS. MONOCYTES 0.7 0.0 - 1.0 K/UL    ABS. EOSINOPHILS 0.0 0.0 - 0.4 K/UL    ABS. BASOPHILS 0.1 0.0 - 0.1 K/UL    ABS. IMM.  GRANS. 0.0 0.00 - 0.04 K/UL    DF AUTOMATED      RBC COMMENTS NORMOCYTIC, NORMOCHROMIC     METABOLIC PANEL, COMPREHENSIVE    Collection Time: 03/07/23  7:35 PM   Result Value Ref Range    Sodium 135 (L) 136 - 145 mmol/L    Potassium 4.7 3.5 - 5.1 mmol/L    Chloride 104 97 - 108 mmol/L    CO2 25 21 - 32 mmol/L    Anion gap 6 5 - 15 mmol/L    Glucose 101 (H) 65 - 100 mg/dL    BUN 22 (H) 6 - 20 MG/DL    Creatinine 1.24 0.70 - 1.30 MG/DL    BUN/Creatinine ratio 18 12 - 20      eGFR >60 >60 ml/min/1.73m2    Calcium 9.0 8.5 - 10.1 MG/DL    Bilirubin, total 0.5 0.2 - 1.0 MG/DL    ALT (SGPT) 20 12 - 78 U/L    AST (SGOT) 22 15 - 37 U/L Alk. phosphatase 125 (H) 45 - 117 U/L    Protein, total 7.3 6.4 - 8.2 g/dL    Albumin 3.9 3.5 - 5.0 g/dL    Globulin 3.4 2.0 - 4.0 g/dL    A-G Ratio 1.1 1.1 - 2.2     TROPONIN-HIGH SENSITIVITY    Collection Time: 03/07/23  7:35 PM   Result Value Ref Range    Troponin-High Sensitivity 10 0 - 76 ng/L   ETHYL ALCOHOL    Collection Time: 03/07/23  7:35 PM   Result Value Ref Range    ALCOHOL(ETHYL),SERUM <10 <10 MG/DL   MAGNESIUM    Collection Time: 03/07/23  7:35 PM   Result Value Ref Range    Magnesium 2.4 1.6 - 2.4 mg/dL   DRUG SCREEN, URINE    Collection Time: 03/07/23  7:48 PM   Result Value Ref Range    AMPHETAMINES Negative NEG      BARBITURATES Negative NEG      BENZODIAZEPINES Negative NEG      COCAINE Negative NEG      METHADONE Negative NEG      OPIATES Negative NEG      PCP(PHENCYCLIDINE) Negative NEG      THC (TH-CANNABINOL) Negative NEG      Drug screen comment (NOTE)    POC LACTIC ACID    Collection Time: 03/07/23  8:01 PM   Result Value Ref Range    Lactic Acid (POC) 0.86 0.40 - 2.00 mmol/L

## 2023-03-08 NOTE — PROGRESS NOTES
MRI PENDING      MRI SCREENING SHEET NEEDS TO BE COMPLETED AND SIGNED    CALL [phone] WHEN THIS IS DONE      FAX  [phone]

## 2023-03-08 NOTE — PROGRESS NOTES
Problem: Self Care Deficits Care Plan (Adult)  Goal: *Acute Goals and Plan of Care (Insert Text)  Description: FUNCTIONAL STATUS PRIOR TO ADMISSION: PLOF obtained from pts sisters:  ambulated without assist devices, limited by hip and knee pain with longer distance mobility, performed ADLS on his own, drives and performs light IADLs    HOME SUPPORT PRIOR TO ADMISSION: The patient lived alone with sisters to provide assistance. On sister lives on the same property. Other sister lives down the street and pt also has a brother that lives locally    Occupational Therapy Goals:  Initiated 3/8/2023  1. Patient will perform grooming with supervision within 7 days. 2. Patient will perform toileting with supervision within 7 days. 3. Patient will perform upper body dressing and lower body dressing with supervision within 7 days. 4. Patient will transfer from toilet with supervision using the least restrictive device and appropriate durable medical equipment within 7 days. 5. Assess fugl darling as appropriate and set goal within 7 days. Outcome: Not Met   OCCUPATIONAL THERAPY EVALUATION  Patient: Latricia Vazquez (89 y.o. male)  Date: 3/8/2023  Primary Diagnosis: CVA (cerebral vascular accident) [facility]) [I63.9]       Precautions: fall, bed alarm, swallow       ASSESSMENT  Based on the objective data described below, the patient presents with pleasant confusion and was globally aphasic this session. He was unable to follow any commands and all responses were yes with only one response of no. His yes and no responses aren't reliable. Pt responds better to multimodal visual and tactile cues. Manual cues needed for pt to terminate gross motor ROM due to perseveration on task. Pt was on 2L NC and O2 sat at rest was 90% and 88% with activity. Pt needed 2L NC to maintain O2 sats above 90%. Mild cough noted at random and pt had audible rattlin/congestion with breathing.   BUE appear functional but possible right decreased coordination in [place]. Formal assessment for coordination and sensation is unable to be performed due to pts language deficits. Min assist overall for mobility and ADLS this session. Spoke with SLP in regards to findings. Recommend rehab at discharge. Current Level of Function Impacting Discharge (ADLs/self-care): min assist overall for mobility and ADLS    Functional Outcome Measure: The patient scored 40/100 on the barthel indicating a moderate decline in mobility and ADLS. Other factors to consider for discharge: current work up for CVA, MRI is pending     Patient will benefit from skilled therapy intervention to address the above noted impairments. PLAN :  Recommendations and Planned Interventions: self care training, functional mobility training, therapeutic exercise, balance training, visual/perceptual training, therapeutic activities, cognitive retraining, endurance activities, neuromuscular re-education, patient education, home safety training, and family training/education    Frequency/Duration: Patient will be followed by occupational therapy 4 times a week to address goals. Recommendation for discharge: (in order for the patient to meet his/her long term goals)  Therapy 3 hours per day 5-7 days per week    This discharge recommendation:  Has not yet been discussed the attending provider and/or case management    IF patient discharges home will need the following DME: TBD       SUBJECTIVE:   Patient stated Yes, yes, yes, no.  Pt mostly responds to all questions with yes response. Answers to questions aren't reliable.     OBJECTIVE DATA SUMMARY:   HISTORY:   Past Medical History:   Diagnosis Date    Arthritis     Hips, Knees    CAD (coronary artery disease)     MI around 1990    Hepatitis A     Hepatitis C     High cholesterol     Hypertension     Other ill-defined conditions(799.89)     Light sensitivity, causes seizures    Seizures ([facility] [address]     Last seizure 12/2008/work -up in 2012 -possible seizure    Stroke [facility]) 2005    Thyroid disease     Hypothyroid     Past Surgical History:   Procedure Laterality Date    HX HEART CATHETERIZATION  20 years ago    no stents    HX HERNIA REPAIR  10/8/14    left inguinal hernia- Isma Mitchell MD    HX ORTHOPAEDIC      Left Knee Scope    HX ORTHOPAEDIC  2/2010    Gavin. Total Hip Replacement/Dr. Angelique Zazueta ORTHOPAEDIC  6/9/14    R hip replacement     SC OPEN REPAIR CLAVICLE FRACTURE      right      Expanded or extensive additional review of patient history:     Home Situation  Home Environment: Private residence  # Steps to Enter: 4  Rails to Enter: Yes  Hand Rails : Bilateral (wide)  One/Two Story Residence: One story  Living Alone: Yes (sister lives on the same property and has a brother and sister that live close by)  Support Systems: Other Family Member(s) (brother and sisters)  Current DME Used/Available at Home: Shower chair, Walker, rolling, Grab bars, Cane, straight  Tub or Shower Type: Shower    Hand dominance: Right (per sister report)    EXAMINATION OF PERFORMANCE DEFICITS:  Cognitive/Behavioral Status:  Neurologic State: Alert  Orientation Level: Disoriented X4;Unable to verbalize  Cognition: Decreased attention/concentration;Decreased command following; Impulsive  Perception: Cues to maintain midline in sitting;Cues to maintain midline in standing; Tactile;Visual  Perseveration: Perseverates during mobility; Tactile cues provided;Visual cues provided  Safety/Judgement: Fall prevention    Hearing: Auditory  Auditory Impairment: None    Vision/Perceptual:                           Acuity:  (grossly intact, unable to formally assess due to aphasia)         Range of Motion:    AROM: Generally decreased, functional  PROM: Within functional limits                      Strength:    Strength: Generally decreased, functional                Coordination:  Coordination: Generally decreased, functional  Fine Motor Skills-Upper: Left Intact; Right Intact Gross Motor Skills-Upper: Left Impaired;Right Impaired (BUE, but functional)    Tone & Sensation:    Tone: Normal  Sensation:  (unable to test due to aphasia)                      Balance:  Sitting: Impaired  Sitting - Static: Good (unsupported)  Sitting - Dynamic: Fair (occasional)  Standing: Impaired  Standing - Static: Fair  Standing - Dynamic : Fair    Functional Mobility and Transfers for ADLs:  Bed Mobility:  Rolling: Contact guard assistance  Supine to Sit: Contact guard assistance; Additional time (visual and tactile cues to initate and for safety)  Sit to Supine: Minimum assistance (bilatera feet back onto bed)  Scooting: Contact guard assistance (visual and tactile cues for safety)    Transfers:  Sit to Stand: Minimum assistance  Stand to Sit: Minimum assistance  Bed to Chair: Minimum assistance (stand pivot)  Bathroom Mobility:  (unable)  Toilet Transfer : Minimum assistance (to bedside commode)    ADL Assessment:  Feeding:  (NPO, not forrmally assessed, appears to be at a set up level)    Oral Facial Hygiene/Grooming: Minimum assistance    Bathing: Minimum assistance    Upper Body Dressing: Minimum assistance    Lower Body Dressing: Minimum assistance    Toileting: Minimum assistance                ADL Intervention and task modifications:  Donned socks seated propping foot up on chair without arms seated edge of stretcher with min assist.   Transferred to bedside chair without arms with posterior loss of balance and min assist.    Cognitive Retraining  Safety/Judgement: Fall prevention         Functional Measure:    Barthel Index:  Bathin  Bladder: 5  Bowels: 10  Groomin  Dressin  Feedin  Mobility: 0  Stairs: 0  Toilet Use: 5  Transfer (Bed to Chair and Back): 10  Total: 40/100      The Barthel ADL Index: Guidelines  1. The index should be used as a record of what a patient does, not as a record of what a patient could do.   2. The main aim is to establish degree of independence from any help, physical or verbal, however minor and for whatever reason. 3. The need for supervision renders the patient not independent. 4. A patient's performance should be established using the best available evidence. Asking the patient, friends/relatives and nurses are the usual sources, but direct observation and common sense are also important. However direct testing is not needed. 5. Usually the patient's performance over the preceding 24-48 hours is important, but occasionally longer periods will be relevant. 6. Middle categories imply that the patient supplies over 50 per cent of the effort. 7. Use of aids to be independent is allowed. Score Interpretation (from [address] [address])    Independent   60-79 Minimally independent   40-59 Partially dependent   20-39 Very dependent   <20 Totally dependent     -Marine Maria., Barthel, DFabianW. (1965). Functional evaluation: the Barthel Index. [facility] ([address] (1997). The Barthel activities of daily living index: self-reporting versus actual performance in the old (> or = 75 years). Journal [address] 45(7), 14 [facility], ..M.F, Mina Hand., Dennis Maurice (1999). Measuring the change in disability after inpatient rehabilitation; comparison of the responsiveness of the Barthel Index and Functional [place] Measure. Journal of Neurology, Neurosurgery, and Psychiatry, 66(4), 736-492. Kevin Vergara NISABELLE, PATEL Matos, & Wilver Bermeo, M.A. (2004) Assessment of post-stroke quality of life in cost-effectiveness studies: The usefulness of the Barthel Index and the EuroQoL-5D.  Quality of Life Research, 15, 868-53         Occupational Therapy Evaluation Charge Determination   History Examination Decision-Making   HIGH Complexity : Extensive review of history including physical, cognitive and psychosocial history  HIGH Complexity : 5 or more performance deficits relating to physical, cognitive , or psychosocial skils that result in activity limitations and / or participation restrictions HIGH Complexity : Patient presents with comorbidities that affect occupational performance. Signifigant modification of tasks or assistance (eg, physical or verbal) with assessment (s) is necessary to enable patient to complete evaluation       Based on the above components, the patient evaluation is determined to be of the following complexity level: HIGH   Pain Rating:  Pt unable to indicate or verbalize, didn't appear to be in pain during session    Activity Tolerance:   Fair, desaturates with exertion and requires oxygen, requires rest breaks, and observed SOB with activity    After treatment patient left in no apparent distress:    Supine in bed, Call bell within reach, Bed / chair alarm activated, and bilateral rails up on stretcher    COMMUNICATION/EDUCATION:   The patients plan of care was discussed with: Physical therapist, Speech therapist, Registered nurse, and pts sisters . Patient was not educated regarding his deficit(s) due to expressive and receptive aphasia. Patient was not verbally educated on the BE FAST acronym for signs/symptoms of CVA and TIA due to receptive and expressive aphasia. Patient is unable to participate in goal setting and plan of care. This patients plan of care is appropriate for delegation to [facility].     Thank you for this referral.  Tricia Banks, OTR/L  Time Calculation: 26 mins

## 2023-03-08 NOTE — PROGRESS NOTES
Problem: Mobility Impaired (Adult and Pediatric)  Goal: *Acute Goals and Plan of Care (Insert Text)  Description:   FUNCTIONAL STATUS PRIOR TO ADMISSION: Patient was independent and active without use of DME.    HOME SUPPORT PRIOR TO ADMISSION: The patient lived alone, 1-story home with 4 steps to enter. He has a sister that lives a few hundred feet away on the same property. Physical Therapy Goals  Initiated 3/8/2023  1. Patient will move from supine to sit and sit to supine  in bed with supervision assist/set-up within 7 day(s). 2.  Patient will transfer from bed to chair and chair to bed with supervision/set-up using the least restrictive device within 7 day(s). 3.  Patient will perform sit to stand with supervision/set-up within 7 day(s). 4.  Patient will ambulate with supervision/set-up for 150 feet with the least restrictive device within 7 day(s). 5.  Patient will ascend/descend 4 stairs with 1 handrail(s) with supervision/set-up within 7 day(s). 6.  Patient will improve Kulkarni Balance score by 7 points within 7 days. Outcome: Not Progressing Towards Goal     PHYSICAL THERAPY EVALUATION- NEURO POPULATION  Patient: Zeinab Garza (73 y.o. male)  Date: 3/8/2023  Primary Diagnosis: CVA (cerebral vascular accident) [facility]) [I63.9]       Precautions: Fall, aphasia         ASSESSMENT  Based on the objective data described below, the patient presents with global aphasia, difficulty following commands, impaired standing balance, and difficulty walking due to possible CVA. Patient's sister went to check on him when she discovered he was having R sided facial droop, RUE weakness, and difficulty speaking. Patient cleared by nursing. He was supine in bed, agreeable to get up when therapist gestured to sit up on the edge of the bed. He was able to come to sit with contact guard assist and HOB slightly elevated. Static sitting balance is good.  He was able to follow visual commands for BLE AROM, but unable to assess strength due to patient having difficulty following verbal commands. When asked if he was dizzy he did say, \"No\". BP dropped only slightly from supine to sit. He required minimum assistance for sit<>stand transfer and to ambulate 35' using RW. He did appear winded after ambulation, O2 sats on room air were 92%. Patient was able to follow visual commands for pursed lip breathing technique. Patient was able to perform finger-to-nose with LUE after repetitive visual demonstration but was only able to touch his nose with RUE. Unable to test sensation, Kulkarni Balance test, or vision secondary to aphasia. Patient was left supine in bed, call bell within reach, no complaints. Recommend IPR upon discharge secondary to patient is significantly below his prior level of independence. BP supine 147/67  BP sit 134/70    Current Level of Function Impacting Discharge (mobility/balance): overall Lucien for mobility    Functional Outcome Measure: unable to perform Kulkarni balance test secondary to aphasia. Patient scored 19/24 on AMPAC test.    Other factors to consider for discharge: lives alone, was completely I with ADLs and ambulating prior to hospitalization     Patient will benefit from skilled therapy intervention to address the above noted impairments. PLAN :  Recommendations and Planned Interventions: bed mobility training, transfer training, gait training, therapeutic exercises, neuromuscular re-education, patient and family training/education, and therapeutic activities      Frequency/Duration: Patient will be followed by physical therapy:  5 times a week to address goals.     Recommendation for discharge: (in order for the patient to meet his/her long term goals)  Therapy 3 hours per day 5-7 days per week    This discharge recommendation:  Has not yet been discussed the attending provider and/or case management    IF patient discharges home will need the following DME: to be determined (TBD)         SUBJECTIVE: Patient agreed to participate in therapy. OBJECTIVE DATA SUMMARY:   HISTORY:    Past Medical History:   Diagnosis Date    Arthritis     Hips, Knees    CAD (coronary artery disease)     MI around 1990    Hepatitis A     Hepatitis C     High cholesterol     Hypertension     Other ill-defined conditions(799.89)     Light sensitivity, causes seizures    Seizures ([facility] [address]     Last seizure 12/2008/work -up in 2012 -possible seizure    Stroke [facility]) 2005    Thyroid disease     Hypothyroid     Past Surgical History:   Procedure Laterality Date    HX HEART CATHETERIZATION  20 years ago    no stents    HX HERNIA REPAIR  10/8/14    left inguinal hernia- Tosin Mitchell MD    HX ORTHOPAEDIC      Left Knee Scope    HX ORTHOPAEDIC  2/2010    [place]. Total Hip Replacement/Dr. Kalpesh Herman    HX ORTHOPAEDIC  6/9/14    R hip replacement     CT OPEN REPAIR CLAVICLE FRACTURE      right        Personal factors and/or comorbidities impacting plan of care: global aphasia, difficulty following verbal commands    Home Situation  Home Environment: Private residence  # Steps to Enter: 4  Rails to Enter: Yes  Hand Rails : Bilateral (wide)  One/Two Story Residence: One story  Living Alone: Yes  Support Systems: Other Family Member(s)  Patient Expects to be Discharged to[de-identified] Home with home health  Current DME Used/Available at Home: None  Tub or Shower Type: Shower    EXAMINATION/PRESENTATION/DECISION MAKING:   Critical Behavior:  Neurologic State: Alert  Orientation Level: Disoriented X4, Unable to verbalize  Cognition: Decreased attention/concentration, Decreased command following, Impulsive  Safety/Judgement: Fall prevention  Hearing:   Auditory  Auditory Impairment: None    Range Of Motion:  AROM: Within functional limits  PROM: Within functional limits    Strength:    Strength: Generally decreased, functional    Tone & Sensation:   Tone: Normal  Sensation:  (unable to test due to aphasia)    Coordination:  Coordination: Generally decreased, functional  Vision:   Acuity:  (grossly intact, unable to formally assess due to aphasia)  Functional Mobility:  Bed Mobility:  Rolling: Contact guard assistance  Supine to Sit: Contact guard assistance; Additional time (visual and tactile cues to initate and for safety)  Sit to Supine: Minimum assistance (bilatera feet back onto bed)  Scooting: Contact guard assistance (visual and tactile cues for safety)  Transfers:  Sit to Stand: Minimum assistance  Stand to Sit: Minimum assistance      Bed to Chair: Minimum assistance (stand pivot)    Balance:   Sitting: Impaired  Sitting - Static: Good (unsupported)  Sitting - Dynamic: Fair (occasional)  Standing: Impaired  Standing - Static: Fair  Standing - Dynamic : Fair  Ambulation/Gait Training:  Distance (ft): 35 Feet (ft)  Assistive Device: Gait belt;Walker, rolling  Ambulation - Level of Assistance: Minimal assistance  Base of Support: Widened   Speed/Yumiko: Pace decreased (<100 feet/min)  Step Length: Right shortened;Left shortened      Therapeutic Exercises:   none    Functional Measure  Kulkarni Balance Test:  Unable to perform due to difficulty following verbal commands from aphasia    [address] (6-Clicks) Version 2  How much HELP from another person do you currently need. .. (If the patient hasn't done an activity recently, how much help from another person do you think they would need if they tried?) Total A Lot A Little None   1. Turning from your back to your side while in a flat bed without using bedrails? []  1 []  2 []  3  [x]  4   2. Moving from lying on your back to sitting on the side of a flat bed without using bedrails? []  1 []  2 [x]  3  []  4   3. Moving to and from a bed to a chair (including a wheelchair)? []  1 []  2 [x]  3  []  4   4. Standing up from a chair using your arms (e.g. wheelchair or bedside chair)? []  1 []  2 [x]  3  []  4   5. Walking in hospital room?  []  1 []  2 [x]  3  []  4   6. Climbing 3-5 steps with a railing? []  1 []  2 [x]  3  []  4     Raw Score: 19/24                            Cutoff score =171,2,3 had higher odds of discharging home with home health or need of SNF/IPR. [address], Asia Hartman, Chalino Celeste Husbands Jaemichele Masters. Validity of the AM-PAC 6-Clicks Inpatient Daily Activity and Basic Mobility Short Forms. Physical Therapy Mar 2014, 94 (9) 379-391; DOI: 10.2522/ptj.[id number]  2. Leonie Horn. Association of AM-PAC \"6-Clicks\" Basic Mobility and Daily Activity Scores With Discharge Destination. Phys Ther. 2021 Apr 4;101(4):zcll458. doi: 10.1093/ptj/bpgo876. PMID: 17837459. V Carlos Grande, Marion D, Iban Gruber, Coleen K, Jazzmine S. Activity Measure for Post-Acute Care \"6-Clicks\" Basic Mobility Scores Predict Discharge Destination After Acute Care Hospitalization in Select Patient Groups: A Retrospective, Observational Study. Arch Rehabil Res Clin Transl. 2022 Jul 16;4(3):726704. doi: 10.1016/j.arrct. 7509.605522. PMID: 13581410; PMCID: [id number]. 4. Arlene Stark Ni P. AM-PAC Short Forms Manual 4.0. Revised 2/2020.       Physical Therapy Evaluation Charge Determination   History Examination Presentation Decision-Making   MEDIUM  Complexity : 1-2 comorbidities / personal factors will impact the outcome/ POC  LOW Complexity : 1-2 Standardized tests and measures addressing body structure, function, activity limitation and / or participation in recreation  MEDIUM Complexity : Evolving with changing characteristics  Other outcome measures    MEDIUM      Based on the above components, the patient evaluation is determined to be of the following complexity level: MEDIUM    Pain Rating:  none    Activity Tolerance:   Fair      After treatment patient left in no apparent distress:   Supine in bed, Call bell within reach, and Side rails x 3    COMMUNICATION/EDUCATION: The patients plan of care was discussed with: Registered nurse. Unable to educate patient on BEFAST symptoms due to aphasia. Fall prevention education was provided and the patient/caregiver indicated understanding., Patient/family have participated as able in goal setting and plan of care. , and Patient/family agree to work toward stated goals and plan of care.     Thank you for this referral.  Ed Castellon, PT   Time Calculation: 20 mins

## 2023-03-08 NOTE — ED NOTES
Attempted to call [employer] at [facility] [facility]. No answer.  Asked Brandin Padilla to assist.

## 2023-03-08 NOTE — PROGRESS NOTES
Transition of Care Plan:    RUR:  11% low   Disposition: to be determined   If SNF or IPR: Date FOC offered:  Date FOC received:  Date authorization started with reference number:  Date authorization received and expires:  Accepting facility:  Follow up appointments: to be made   DME needed: to be determined   Transportation at Discharge: tbd   Keys or means to access home:    yes    IM Medicare Letter: 2nd letter   Is patient a [place] and connected with the [facility]? To be determined ( see below )   If yes, was [employer] transfer form completed and [facility] notified? Caregiver Contact:  Discharge Caregiver contacted prior to discharge? Care Conference needed?:           Will need to speak with sister. Pt is aphasic. I met with him in room but he was not able to express his answers. Per nursing he was independent prior to this event. Nurse spoke with sister. He was living alone and able to perform all ADLS. He is currently on [employer]. I will follow.      Cortes Goff RN  403 pm  Care management

## 2023-03-08 NOTE — PROGRESS NOTES
Problem: Pressure Injury - Risk of  Goal: *Prevention of pressure injury  Description: Document Ruel Scale and appropriate interventions in the flowsheet.   3/8/2023 1543 by Anamika Knapp RN  Outcome: Progressing Towards Goal  Note: Pressure Injury Interventions:  Sensory Interventions: Assess changes in LOC, Discuss PT/OT consult with provider    Moisture Interventions: Absorbent underpads, Apply protective barrier, creams and emollients    Activity Interventions: PT/OT evaluation, Assess need for specialty bed    Mobility Interventions: PT/OT evaluation, HOB 30 degrees or less    Nutrition Interventions: Document food/fluid/supplement intake                  3/8/2023 1543 by Anamika Knapp RN  Note: Pressure Injury Interventions:  Sensory Interventions: Assess changes in LOC, Discuss PT/OT consult with provider    Moisture Interventions: Absorbent underpads, Apply protective barrier, creams and emollients    Activity Interventions: PT/OT evaluation, Assess need for specialty bed    Mobility Interventions: PT/OT evaluation, HOB 30 degrees or less    Nutrition Interventions: Document food/fluid/supplement intake                     Problem: Aspiration - Risk of  Goal: *Absence of aspiration  3/8/2023 1543 by Anamika Knapp RN  Outcome: Progressing Towards Goal  3/8/2023 1543 by Anamika Knapp RN  Outcome: Progressing Towards Goal     Problem: TIA/CVA Stroke: 0-24 hours  Goal: Off Pathway (Use only if patient is Off Pathway)  Outcome: Progressing Towards Goal     Problem: TIA/CVA Stroke: Day 2 Until Discharge  Goal: Off Pathway (Use only if patient is Off Pathway)  Outcome: Progressing Towards Goal  Goal: Activity/Safety  Outcome: Progressing Towards Goal

## 2023-03-08 NOTE — PROGRESS NOTES
End of Shift Note    Bedside shift change report given to Erik Urbano RN  (oncoming nurse) by David Diaz RN (offgoing nurse). Report included the following information Kardex, ED Summary, MAR, and Recent Results    Shift worked:  days   Shift summary and any significant changes:    New stroke admission  EEG completed  Echo, MRI, PT/OT/SLP pending     Concerns for physician to address:  none   Zone phone for oncoming shift:   [phone]     Patient Iftikhar Freedman  78 y.o.  3/7/2023  6:21 PM by Dannie England DO.  Jonny Murphy was admitted from Home    Problem List  Patient Active Problem List    Diagnosis Date Noted    CVA (cerebral vascular accident) ([address] 03/08/2023    Complex partial seizure evolving to generalized seizure ([address] 11/22/2016    Cerebral infarction due to thrombosis of left middle cerebral artery ([address] 11/22/2016    Infected prosthetic hip ([address] 11/10/2015    Carotid artery stenosis with cerebral infarction ([address] 11/02/2015    Failed total hip arthroplasty ([address] 06/09/2014    Late effect of stroke 05/23/2013    Encephalopathy, unspecified 04/21/2012    CVA (cerebral infarction) 09/27/2010    Muscle strain 09/27/2010    Localization-related partial epilepsy with complex partial seizures ([address] 09/27/2010    HTN (hypertension) 09/27/2010    Hypercholesterolemia 09/27/2010    Total hip replacements Bilateral 09/27/2010    CAD (coronary artery disease) 09/27/2010    Hepatitis A 09/27/2010    Hepatitis C 09/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 History:   Diagnosis Date    Arthritis     Hips, Knees    CAD (coronary artery disease)     MI around 1990    Hepatitis A     Hepatitis C     High cholesterol     Hypertension     Other ill-defined conditions(799.89)     Light sensitivity, causes seizures    Seizures ([address]     Last seizure 12/2008/work -up in 2012 -possible seizure    Stroke [facility]) 2005    Thyroid disease     Hypothyroid       Core Measures:  CVA: Yes Yes  CHF:No No  PNA:No No    Activity:  Activity Level: Up with Assistance  Number times ambulated in hallways past shift: 0  Number of times OOB to chair past shift: 1    Cardiac:   Cardiac Monitoring: Yes      Cardiac Rhythm: Sinus Rhythm    Access:   Current line(s): PIV   Central Line? No Placement date  Reason Medically Necessary   PICC LINE? No Placement date Reason Medically Necessary     Genitourinary:   Urinary status: voiding  Urinary Catheter? No Placement Date  Reason Medically Necessary     Respiratory:   O2 Device: Nasal cannula  Chronic home O2 use?: NO  Incentive spirometer at bedside: NO       GI:     Current diet:  DIET NPO  Passing flatus: YES  Tolerating current diet: YES       Pain Management:   Patient states pain is manageable on current regimen: YES    Skin:  Ruel Score: 17  Interventions: float heels and PT/OT consult    Patient Safety:  Fall Score:  Total Score: 2  Interventions: bed/chair alarm, gripper socks, pt to call before getting OOB, and tele sitter      @Rollbelt  @dexterity to release roll belt  Yes/No ( must document dexterity  here by stating Yes or No here, otherwise this is a restraint and must follow restraint documentation policy.)    DVT prophylaxis:  DVT prophylaxis Med- Yes  DVT prophylaxis SCD or KEN- Yes     Wounds: (If Applicable)  Wounds- No  Location     Active Consults:  IP CONSULT TO NEUROLOGY    Length of Stay:  Expected LOS: - - -  Actual LOS: 0  Discharge Plan: No awaiting testing      Kishan Eric RN

## 2023-03-08 NOTE — H&P
Hospitalist Admission Note    NAME: Obdulia Bhatia   :  1955   MRN:  [id number]     Date/Time:  3/8/2023 11:17 AM    Patient PCP: Raysa Nevarez, NP  ______________________________________________________________________  Given the patient's current clinical presentation, I have a high level of concern for decompensation if discharged from the emergency department. Complex decision making was performed, which includes reviewing the patient's available past medical records, laboratory results, and x-ray films. My assessment of this patient's clinical condition and my plan of care is as follows. Assessment / Plan: Altered mental status  Aphasia  Rule out CVA:    CTH showed no acute pathology/ has chronic left occipital infarct  CTA H and neck:  1. No acute vascular abnormality, no large vessel occlusion. 2. 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 3. Small focus of perfusion mismatch correlates with left posterior temporal  lobe chronic infarct. No acute perfusion abnormality. Stroke work up, MRI brain  Fu LDL, hba1c  PT/OT, SLP evaluation. Recommended strict NPO for now by SLP  Rectal aspirin for now. Hold all oral meds until cleared by SLP  History of seizure, last was 10 years back. Not on any meds currently. EEG ordered. HTN:  Permissive HTN for 24-48 hours  Resume home meds after this, if cleared by SLP    Hypothyroidism:  Resume synthroid once cleared by SLP    COPD:  88% on room air, on 2 liters saturating 92%  On examination no wheezing, doesn't have copd exacerbation  Continue with duonebs PRN  Check for home oxygen need prior to discharge  CXR showed no acute pathology, interval development of lytic osseous lesions. Should be discussed about whole body scan once acute issue resolves.     Other meds:  Resume Celexa and gabapentin, once able to tolerate PO    Code Status: Full  Surrogate Decision Maker: His sister    DVT Prophylaxis: Lovenox  GI Prophylaxis: not indicated    Baseline: Independent frome home      Subjective:   CHIEF COMPLAINT: Mental status, aphasia    HISTORY OF PRESENT ILLNESS:     Lindsay Stark is a 78 y.o. male with past medical history of CAD, arthritis, hypertension, hypercholesterolemia, seizures, stroke, hypothyroidism who was brought in for change in mental status and difficulty with his speech. He is awake but unable to give any history. History was provided by his sister present at the bedside. As per the sister he was in his usual state of health until 11 AM yesterday. She went back to see him around 5 PM and found him very confused and was unable to speak. No reported focal deficits, sensory loss, change in bladder or bowel habits, lower extremity edema or tenderness, no pain or chest pain. We were asked to admit for work up and evaluation of the above problems. Past Medical History:   Diagnosis Date    Arthritis     Hips, Knees    CAD (coronary artery disease)     MI around     Hepatitis A     Hepatitis C     High cholesterol     Hypertension     Other ill-defined conditions(799.89)     Light sensitivity, causes seizures    [facility] [address]     Last seizure 2008/work -up in  -possible seizure    Stroke [facility])     Thyroid disease     Hypothyroid        Past Surgical History:   Procedure Laterality Date    HX HEART CATHETERIZATION  20 years ago    no stents    HX HERNIA REPAIR  10/8/14    left inguinal hernia- Isma Mitchell MD    HX ORTHOPAEDIC      Left Knee Scope    HX ORTHOPAEDIC  2010    [facility].  Total Hip Replacement/Dr. Anh López ORTHOPAEDIC  14    R hip replacement     [place] OPEN REPAIR CLAVICLE FRACTURE      right        Social History     Tobacco Use    Smoking status: Former     Packs/day: 0.25     Years: 25.00     Pack years: 6.25     Types: Cigarettes     Quit date: 2015     Years since quittin.1    Smokeless tobacco: Never   Substance Use Topics    Alcohol use: No     Comment: stopped 10 years ago---beer on the weekends in the past        Family History   Problem Relation Age of Onset    Hypertension Mother     Stroke Mother     Diabetes Mother     Heart Disease Father     Cancer Father         lung     Allergies   Allergen Reactions    Carbatrol [Carbamazepine] Rash        Prior to Admission medications    Medication Sig Start Date End Date Taking? Authorizing Provider   albuterol (PROVENTIL VENTOLIN) 2.5 mg /3 mL (0.083 %) nebu INHALE THE CONTENTS OF 1 VIAL (3ML) VIA NEBULIZER EVERY 4 HOURS AS NEEDED FOR WHEEZING (MAX FOUR TIMES A DAY)    Provider, Historical   albuterol-ipratropium (DUO-NEB) 2.5 mg-0.5 mg/3 ml nebu 3 ml as needed 4/27/22   Provider, Historical   gabapentin (NEURONTIN) 300 mg capsule TAKE 2 CAPSULES FOUR TIMES DAILY 2/1/23   Chen Grossman MD   azelastine (ASTELIN) 137 mcg (0.1 %) nasal spray USE 2 SPRAYS IN EACH NOSTRIL TWICE DAILY 10/14/21   Provider, Historical   montelukast (SINGULAIR) 10 mg tablet Take 10 mg by mouth daily. 11/27/19   Provider, Historical   ketoconazole (NIZORAL) 2 % shampoo  1/2/20   Provider, Historical   hydroxyzine HCL (ATARAX) 25 mg tablet Take 25 mg by mouth daily. 12/30/19   Provider, Historical   ergocalciferol (ERGOCALCIFEROL) 1,250 mcg (50,000 unit) capsule Take 50,000 Units by mouth every seven (7) days. 12/30/19   Provider, Historical   traMADol (ULTRAM) 50 mg tablet Take 50 mg by mouth two (2) times a day. 10/11/17   Provider, Historical   aspirin delayed-release 325 mg tablet Take 325 mg by mouth daily. Provider, Historical   doxazosin (CARDURA) 4 mg tablet TAKE ONE TABLET BY MOUTH ONCE DAILY AT BEDTIME 6/20/14   Dustin Jiménez MD   citalopram (CELEXA) 20 mg tablet TAKE ONE TABLET BY MOUTH ONCE DAILY 6/20/14   Dustin Jiménez MD   atorvastatin (LIPITOR) 40 mg tablet Take 40 mg by mouth daily.     Provider, Historical   levothyroxine (SYNTHROID) 50 mcg tablet TAKE ONE TABLET BY MOUTH EVERY DAY 4/14/14   Dustin Jiménez MD   amLODIPine (NORVASC) 10 mg tablet TAKE ONE TABLET BY MOUTH EVERY DAY 2/28/14   Ad Flores MD       REVIEW OF SYSTEMS:     I am not able to complete the review of systems because: The patient is intubated and sedated   x The patient has altered mental status due to his acute medical problems    The patient has baseline aphasia from prior stroke(s)    The patient has baseline dementia and is not reliable historian    The patient is in acute medical distress and unable to provide information           Total of 12 systems reviewed as follows:       POSITIVE= underlined text  Negative = text not underlined  General:  fever, chills, sweats, generalized weakness, weight loss/gain,      loss of appetite   Eyes:    blurred vision, eye pain, loss of vision, double vision  ENT:    rhinorrhea, pharyngitis   Respiratory:   cough, sputum production, SOB, MUÑOZ, wheezing, pleuritic pain   Cardiology:   chest pain, palpitations, orthopnea, PND, edema, syncope   Gastrointestinal:  abdominal pain , N/V, diarrhea, dysphagia, constipation, bleeding   Genitourinary:  frequency, urgency, dysuria, hematuria, incontinence   Muskuloskeletal :  arthralgia, myalgia, back pain  Hematology:  easy bruising, nose or gum bleeding, lymphadenopathy   Dermatological: rash, ulceration, pruritis, color change / jaundice  Endocrine:   hot flashes or polydipsia   Neurological:  headache, dizziness, confusion, focal weakness, paresthesia,     Speech difficulties, memory loss, gait difficulty  Psychological: Feelings of anxiety, depression, agitation    Objective:   VITALS:    Visit Vitals  /72 (BP Patient Position: Standing)   Pulse 70   Temp 98.2 °F (36.8 °C)   Resp 19   Wt 92.7 kg (204 lb 5.9 oz)   SpO2 90%   BMI 26.96 kg/m²       PHYSICAL EXAM:    General:    Alert, cooperative, no distress, appears stated age.      HEENT: Atraumatic, anicteric sclerae, pink conjunctivae     No oral ulcers, mucosa moist, throat clear, dentition fair  Neck:  Supple, symmetrical, thyroid: non tender  Lungs:   Clear to auscultation bilaterally. No Wheezing or Rhonchi. No rales. Chest wall:  No tenderness  No Accessory muscle use. Heart:   Regular  rhythm,  No  murmur   No edema  Abdomen:   Soft, non-tender. Not distended. Bowel sounds normal  Extremities: No cyanosis. No clubbing,      Skin turgor normal, Capillary refill normal, Radial dial pulse 2+  Skin:     Not pale. Not Jaundiced  No rashes   Psych:  Good insight. Not depressed. Not anxious or agitated. Neurologic: Awake, speaks a few words, positive expressive aphasia, power equal on both sides, able to follow simple commands    _______________________________________________________________________  Care Plan discussed with:    Comments   Patient y    Family  y Sister at bedside   RN y    Care Manager                    Consultant:      _______________________________________________________________________  Expected  Disposition:   Home with Family    HH/PT/OT/RN y   SNF/LTC y   [de-identified]    ________________________________________________________________________  TOTAL TIME: 68 minutes    Critical Care Provided     Minutes non procedure based      Comments    x Reviewed previous records   >50% of visit spent in counseling and coordination of care x Discussion with patient and/or family and questions answered       ________________________________________________________________________  Signed: David Sharp MD    Procedures: see electronic medical records for all procedures/Xrays and details which were not copied into this note but were reviewed prior to creation of Plan.     LAB DATA REVIEWED:    Recent Results (from the past 24 hour(s))   EKG, 12 LEAD, INITIAL    Collection Time: 03/07/23  7:19 PM   Result Value Ref Range    Ventricular Rate 89 BPM    Atrial Rate 89 BPM    P-R Interval 170 ms    QRS Duration 80 ms    Q-T Interval 342 ms    QTC Calculation (Bezet) 416 ms    Calculated P Axis 92 degrees    Calculated R Axis 84 degrees    Calculated T Axis 112 degrees    Diagnosis       Normal sinus rhythm  Low voltage QRS  Nonspecific T wave abnormality  When compared with ECG of 09-NOV-2015 15:15,  Nonspecific T wave abnormality now evident in Inferior leads  Nonspecific T wave abnormality now evident in Lateral leads  Confirmed by Oli Dorsey ([id number]) on 3/8/2023 8:11:20 AM     GLUCOSE, POC    Collection Time: 03/07/23  7:34 PM   Result Value Ref Range    Glucose (POC) 98 65 - 117 mg/dL    Performed by Divya Rodríguez RN    CBC WITH AUTOMATED DIFF    Collection Time: 03/07/23  7:35 PM   Result Value Ref Range    WBC 10.9 4.1 - 11.1 K/uL    RBC 4.68 4.10 - 5.70 M/uL    HGB 13.7 12.1 - 17.0 g/dL    HCT 41.9 36.6 - 50.3 %    MCV 89.5 80.0 - 99.0 FL    MCH 29.3 26.0 - 34.0 PG    MCHC 32.7 30.0 - 36.5 g/dL    RDW 14.2 11.5 - 14.5 %    PLATELET 736 848 - 084 K/uL    NRBC 0.0 0  WBC    ABSOLUTE NRBC 0.00 0.00 - 0.01 K/uL    NEUTROPHILS 87 (H) 32 - 75 %    LYMPHOCYTES 6 (L) 12 - 49 %    MONOCYTES 6 5 - 13 %    EOSINOPHILS 0 0 - 7 %    BASOPHILS 1 0 - 1 %    IMMATURE GRANULOCYTES 0 0.0 - 0.5 %    ABS. NEUTROPHILS 9.4 (H) 1.8 - 8.0 K/UL    ABS. LYMPHOCYTES 0.7 (L) 0.8 - 3.5 K/UL    ABS. MONOCYTES 0.7 0.0 - 1.0 K/UL    ABS. EOSINOPHILS 0.0 0.0 - 0.4 K/UL    ABS. BASOPHILS 0.1 0.0 - 0.1 K/UL    ABS. IMM.  GRANS. 0.0 0.00 - 0.04 K/UL    DF AUTOMATED      RBC COMMENTS NORMOCYTIC, NORMOCHROMIC     METABOLIC PANEL, COMPREHENSIVE    Collection Time: 03/07/23  7:35 PM   Result Value Ref Range    Sodium 135 (L) 136 - 145 mmol/L    Potassium 4.7 3.5 - 5.1 mmol/L    Chloride 104 97 - 108 mmol/L    CO2 25 21 - 32 mmol/L    Anion gap 6 5 - 15 mmol/L    Glucose 101 (H) 65 - 100 mg/dL    BUN 22 (H) 6 - 20 MG/DL    Creatinine 1.24 0.70 - 1.30 MG/DL    BUN/Creatinine ratio 18 12 - 20      eGFR >60 >60 ml/min/1.73m2    Calcium 9.0 8.5 - 10.1 MG/DL    Bilirubin, total 0.5 0.2 - 1.0 MG/DL    ALT (SGPT) 20 12 - 78 U/L    AST (SGOT) 22 15 - 37 U/L Alk. phosphatase 125 (H) 45 - 117 U/L    Protein, total 7.3 6.4 - 8.2 g/dL    Albumin 3.9 3.5 - 5.0 g/dL    Globulin 3.4 2.0 - 4.0 g/dL    A-G Ratio 1.1 1.1 - 2.2     TROPONIN-HIGH SENSITIVITY    Collection Time: 03/07/23  7:35 PM   Result Value Ref Range    Troponin-High Sensitivity 10 0 - 76 ng/L   ETHYL ALCOHOL    Collection Time: 03/07/23  7:35 PM   Result Value Ref Range    ALCOHOL(ETHYL),SERUM <10 <10 MG/DL   MAGNESIUM    Collection Time: 03/07/23  7:35 PM   Result Value Ref Range    Magnesium 2.4 1.6 - 2.4 mg/dL   DRUG SCREEN, URINE    Collection Time: 03/07/23  7:48 PM   Result Value Ref Range    AMPHETAMINES Negative NEG      BARBITURATES Negative NEG      BENZODIAZEPINES Negative NEG      COCAINE Negative NEG      METHADONE Negative NEG      OPIATES Negative NEG      PCP(PHENCYCLIDINE) Negative NEG      THC (TH-CANNABINOL) Negative NEG      Drug screen comment (NOTE)    POC LACTIC ACID    Collection Time: 03/07/23  8:01 PM   Result Value Ref Range    Lactic Acid (POC) 0.86 0.40 - 2.00 mmol/L

## 2023-03-08 NOTE — PROGRESS NOTES
Problem: Pressure Injury - Risk of  Goal: *Prevention of pressure injury  Description: Document Ruel Scale and appropriate interventions in the flowsheet.   3/8/2023 1543 by Chalo England RN  Outcome: Progressing Towards Goal  Note: Pressure Injury Interventions:  Sensory Interventions: Assess changes in LOC, Discuss PT/OT consult with provider    Moisture Interventions: Absorbent underpads, Apply protective barrier, creams and emollients    Activity Interventions: PT/OT evaluation, Assess need for specialty bed    Mobility Interventions: PT/OT evaluation, HOB 30 degrees or less    Nutrition Interventions: Document food/fluid/supplement intake                  3/8/2023 1543 by Chalo England RN  Note: Pressure Injury Interventions:  Sensory Interventions: Assess changes in LOC, Discuss PT/OT consult with provider    Moisture Interventions: Absorbent underpads, Apply protective barrier, creams and emollients    Activity Interventions: PT/OT evaluation, Assess need for specialty bed    Mobility Interventions: PT/OT evaluation, HOB 30 degrees or less    Nutrition Interventions: Document food/fluid/supplement intake                     Problem: Aspiration - Risk of  Goal: *Absence of aspiration  3/8/2023 1543 by Chalo England RN  Outcome: Progressing Towards Goal  3/8/2023 1543 by Chalo England RN  Outcome: Progressing Towards Goal     Problem: TIA/CVA Stroke: 0-24 hours  Goal: Off Pathway (Use only if patient is Off Pathway)  Outcome: Progressing Towards Goal     Problem: TIA/CVA Stroke: Day 2 Until Discharge  Goal: Off Pathway (Use only if patient is Off Pathway)  Outcome: Progressing Towards Goal  Goal: Activity/Safety  Outcome: Progressing Towards Goal

## 2023-03-08 NOTE — ED NOTES
Patient's family member contacts are as follows;   Walt Davila (sister)- [phone]  Naman Lowry (niece) [phone]  Casie Khan- brother

## 2023-03-08 NOTE — PROGRESS NOTES
Transition of Care Plan:    RUR:  11% low   Disposition: to be determined   If SNF or IPR: Date FOC offered:  Date FOC received:  Date authorization started with reference number:  Date authorization received and expires:  Accepting facility:  Follow up appointments: to be made   DME needed: to be determined   Transportation at Discharge: tbd   Keys or means to access home:    yes    IM Medicare Letter: 2nd letter   Is patient a [place] and connected with the [facility]? To be determined ( see below )   If yes, was [employer] transfer form completed and [facility] notified? Caregiver Contact:  Discharge Caregiver contacted prior to discharge? Care Conference needed?:           Will need to speak with sister. Pt is aphasic. I met with him in room but he was not able to express his answers. Per nursing he was independent prior to this event. Nurse spoke with sister. He was living alone and able to perform all ADLS. He is currently on [employer]. I will follow.      Jamel Martinez RN  403 pm  Care management

## 2023-03-08 NOTE — PROGRESS NOTES
End of Shift Note    Bedside shift change report given to Mira Damian RN  (oncoming nurse) by Aliya Rodrigues RN (offgoing nurse). Report included the following information Kardex, ED Summary, MAR, and Recent Results    Shift worked:  days   Shift summary and any significant changes:    New stroke admission  EEG completed  Echo, MRI, PT/OT/SLP pending     Concerns for physician to address:  none   Zone phone for oncoming shift:   [phone]     Patient Cady Freedman  78 y.o.  3/7/2023  6:21 PM by America Magaña DO.  Justin Olguin was admitted from Home    Problem List  Patient Active Problem List    Diagnosis Date Noted    CVA (cerebral vascular accident) ([address] 03/08/2023    Complex partial seizure evolving to generalized seizure ([address] 11/22/2016    Cerebral infarction due to thrombosis of left middle cerebral artery ([address] 11/22/2016    Infected prosthetic hip ([address] 11/10/2015    Carotid artery stenosis with cerebral infarction ([address] 11/02/2015    Failed total hip arthroplasty ([address] 06/09/2014    Late effect of stroke 05/23/2013    Encephalopathy, unspecified 04/21/2012    CVA (cerebral infarction) 09/27/2010    Muscle strain 09/27/2010    Localization-related partial epilepsy with complex partial seizures ([address] 09/27/2010    HTN (hypertension) 09/27/2010    Hypercholesterolemia 09/27/2010    Total hip replacements Bilateral 09/27/2010    CAD (coronary artery disease) 09/27/2010    Hepatitis A 09/27/2010    Hepatitis C 09/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 History:   Diagnosis Date    Arthritis     Hips, Knees    CAD (coronary artery disease)     MI around 1990    Hepatitis A     Hepatitis C     High cholesterol     Hypertension     Other ill-defined conditions(799.89)     Light sensitivity, causes seizures    Seizures ([address]     Last seizure 12/2008/work -up in 2012 -possible seizure    Stroke [facility]) 2005    Thyroid disease     Hypothyroid       Core Measures:  CVA: Yes Yes  CHF:No No  PNA:No No    Activity:  Activity Level: Up with Assistance  Number times ambulated in hallways past shift: 0  Number of times OOB to chair past shift: 1    Cardiac:   Cardiac Monitoring: Yes      Cardiac Rhythm: Sinus Rhythm    Access:   Current line(s): PIV   Central Line? No Placement date  Reason Medically Necessary   PICC LINE? No Placement date Reason Medically Necessary     Genitourinary:   Urinary status: voiding  Urinary Catheter? No Placement Date  Reason Medically Necessary     Respiratory:   O2 Device: Nasal cannula  Chronic home O2 use?: NO  Incentive spirometer at bedside: NO       GI:     Current diet:  DIET NPO  Passing flatus: YES  Tolerating current diet: YES       Pain Management:   Patient states pain is manageable on current regimen: YES    Skin:  Ruel Score: 17  Interventions: float heels and PT/OT consult    Patient Safety:  Fall Score:  Total Score: 2  Interventions: bed/chair alarm, gripper socks, pt to call before getting OOB, and tele sitter      @Rollbelt  @dexterity to release roll belt  Yes/No ( must document dexterity  here by stating Yes or No here, otherwise this is a restraint and must follow restraint documentation policy.)    DVT prophylaxis:  DVT prophylaxis Med- Yes  DVT prophylaxis SCD or KEN- Yes     Wounds: (If Applicable)  Wounds- No  Location     Active Consults:  IP CONSULT TO NEUROLOGY    Length of Stay:  Expected LOS: - - -  Actual LOS: 0  Discharge Plan: No awaiting testing      Rusty Chaparro RN

## 2023-03-08 NOTE — PROGRESS NOTES
Problem: Self Care Deficits Care Plan (Adult)  Goal: *Acute Goals and Plan of Care (Insert Text)  Description: FUNCTIONAL STATUS PRIOR TO ADMISSION: PLOF obtained from pts sisters:  ambulated without assist devices, limited by hip and knee pain with longer distance mobility, performed ADLS on his own, drives and performs light IADLs    HOME SUPPORT PRIOR TO ADMISSION: The patient lived alone with sisters to provide assistance. On sister lives on the same property. Other sister lives down the street and pt also has a brother that lives locally    Occupational Therapy Goals:  Initiated 3/8/2023  1. Patient will perform grooming with supervision within 7 days. 2. Patient will perform toileting with supervision within 7 days. 3. Patient will perform upper body dressing and lower body dressing with supervision within 7 days. 4. Patient will transfer from toilet with supervision using the least restrictive device and appropriate durable medical equipment within 7 days. 5. Assess fugl darling as appropriate and set goal within 7 days. Outcome: Not Met   OCCUPATIONAL THERAPY EVALUATION  Patient: Kit Mcmahon (17 y.o. male)  Date: 3/8/2023  Primary Diagnosis: CVA (cerebral vascular accident) [facility]) [I63.9]       Precautions: fall, bed alarm, swallow       ASSESSMENT  Based on the objective data described below, the patient presents with pleasant confusion and was globally aphasic this session. He was unable to follow any commands and all responses were yes with only one response of no. His yes and no responses aren't reliable. Pt responds better to multimodal visual and tactile cues. Manual cues needed for pt to terminate gross motor ROM due to perseveration on task. Pt was on 2L NC and O2 sat at rest was 90% and 88% with activity. Pt needed 2L NC to maintain O2 sats above 90%. Mild cough noted at random and pt had audible rattlin/congestion with breathing.   BUE appear functional but possible right decreased coordination in [place] [address] [place]. Formal assessment for coordination and sensation is unable to be performed due to pts language deficits. Min assist overall for mobility and ADLS this session. Spoke with SLP in regards to findings. Recommend rehab at discharge. Current Level of Function Impacting Discharge (ADLs/self-care): min assist overall for mobility and ADLS    Functional Outcome Measure: The patient scored 40/100 on the barthel indicating a moderate decline in mobility and ADLS. Other factors to consider for discharge: current work up for CVA, MRI is pending     Patient will benefit from skilled therapy intervention to address the above noted impairments. PLAN :  Recommendations and Planned Interventions: self care training, functional mobility training, therapeutic exercise, balance training, visual/perceptual training, therapeutic activities, cognitive retraining, endurance activities, neuromuscular re-education, patient education, home safety training, and family training/education    Frequency/Duration: Patient will be followed by occupational therapy 4 times a week to address goals. Recommendation for discharge: (in order for the patient to meet his/her long term goals)  Therapy 3 hours per day 5-7 days per week    This discharge recommendation:  Has not yet been discussed the attending provider and/or case management    IF patient discharges home will need the following DME: TBD       SUBJECTIVE:   Patient stated Yes, yes, yes, no.  Pt mostly responds to all questions with yes response. Answers to questions aren't reliable.     OBJECTIVE DATA SUMMARY:   HISTORY:   Past Medical History:   Diagnosis Date    Arthritis     Hips, Knees    CAD (coronary artery disease)     MI around 1990    Hepatitis A     Hepatitis C     High cholesterol     Hypertension     Other ill-defined conditions(799.89)     Light sensitivity, causes seizures    Seizures ([address]     Last seizure 12/2008/work -up in 2012 -possible seizure    Stroke [facility]) 2005    Thyroid disease     Hypothyroid     Past Surgical History:   Procedure Laterality Date    HX HEART CATHETERIZATION  20 years ago    no stents    HX HERNIA REPAIR  10/8/14    left inguinal hernia- Manus Yung Mitchell MD    HX ORTHOPAEDIC      Left Knee Scope    HX ORTHOPAEDIC  2/2010    [place]. Total Hip Replacement/Dr. Wojciech Quinn    HX ORTHOPAEDIC  6/9/14    R hip replacement     HI OPEN REPAIR CLAVICLE FRACTURE      right      Expanded or extensive additional review of patient history:     Home Situation  Home Environment: Private residence  # Steps to Enter: 4  Rails to Enter: Yes  Hand Rails : Bilateral (wide)  One/Two Story Residence: One story  Living Alone: Yes (sister lives on the same property and has a brother and sister that live close by)  Support Systems: Other Family Member(s) (brother and sisters)  Current DME Used/Available at Home: Shower chair, Walker, rolling, Grab bars, Cane, straight  Tub or Shower Type: Shower    Hand dominance: Right (per sister report)    EXAMINATION OF PERFORMANCE DEFICITS:  Cognitive/Behavioral Status:  Neurologic State: Alert  Orientation Level: Disoriented X4;Unable to verbalize  Cognition: Decreased attention/concentration;Decreased command following; Impulsive  Perception: Cues to maintain midline in sitting;Cues to maintain midline in standing; Tactile;Visual  Perseveration: Perseverates during mobility; Tactile cues provided;Visual cues provided  Safety/Judgement: Fall prevention    Hearing:   Auditory  Auditory Impairment: None    Vision/Perceptual:                           Acuity:  (grossly intact, unable to formally assess due to aphasia)         Range of Motion:    AROM: Generally decreased, functional  PROM: Within functional limits                      Strength:    Strength: Generally decreased, functional                Coordination:  Coordination: Generally decreased, functional  Fine Motor Skills-Upper: Left Intact; Right Intact    Gross Motor Skills-Upper: Left Impaired;Right Impaired (BUE, but functional)    Tone & Sensation:    Tone: Normal  Sensation:  (unable to test due to aphasia)                      Balance:  Sitting: Impaired  Sitting - Static: Good (unsupported)  Sitting - Dynamic: Fair (occasional)  Standing: Impaired  Standing - Static: Fair  Standing - Dynamic : Fair    Functional Mobility and Transfers for ADLs:  Bed Mobility:  Rolling: Contact guard assistance  Supine to Sit: Contact guard assistance; Additional time (visual and tactile cues to initate and for safety)  Sit to Supine: Minimum assistance (bilatera feet back onto bed)  Scooting: Contact guard assistance (visual and tactile cues for safety)    Transfers:  Sit to Stand: Minimum assistance  Stand to Sit: Minimum assistance  Bed to Chair: Minimum assistance (stand pivot)  Bathroom Mobility:  (unable)  Toilet Transfer : Minimum assistance (to bedside commode)    ADL Assessment:  Feeding:  (NPO, not forrmally assessed, appears to be at a set up level)    Oral Facial Hygiene/Grooming: Minimum assistance    Bathing: Minimum assistance    Upper Body Dressing: Minimum assistance    Lower Body Dressing: Minimum assistance    Toileting: Minimum assistance                ADL Intervention and task modifications:  Donned socks seated propping foot up on chair without arms seated edge of stretcher with min assist.   Transferred to bedside chair without arms with posterior loss of balance and min assist.    Cognitive Retraining  Safety/Judgement: Fall prevention         Functional Measure:    Barthel Index:  Bathin  Bladder: 5  Bowels: 10  Groomin  Dressin  Feedin  Mobility: 0  Stairs: 0  Toilet Use: 5  Transfer (Bed to Chair and Back): 10  Total: 40/100      The Barthel ADL Index: Guidelines  1. The index should be used as a record of what a patient does, not as a record of what a patient could do.   2. The main aim is to establish degree of independence from any help, physical or verbal, however minor and for whatever reason. 3. The need for supervision renders the patient not independent. 4. A patient's performance should be established using the best available evidence. Asking the patient, friends/relatives and nurses are the usual sources, but direct observation and common sense are also important. However direct testing is not needed. 5. Usually the patient's performance over the preceding 24-48 hours is important, but occasionally longer periods will be relevant. 6. Middle categories imply that the patient supplies over 50 per cent of the effort. 7. Use of aids to be independent is allowed. Score Interpretation (from [facility])    Independent   60-79 Minimally independent   40-59 Partially dependent   20-39 Very dependent   <20 Totally dependent     -Marine Maria., Barthel, DFabianW. (1965). Functional evaluation: the Barthel Index. [address] [facility] ([address] (1997). The Barthel activities of daily living index: self-reporting versus actual performance in the old (> or = 75 years). Journal [address] 45(7), [facility], J.RAVINDRA.ROSITA, Kenton Zhang., Al Chan. (1999). Measuring the change in disability after inpatient rehabilitation; comparison of the responsiveness of the Barthel Index and Functional Gregory Measure. Journal of Neurology, Neurosurgery, and Psychiatry, 66(4), 302-678. BILL Holley, PATEL Matos, & Lm Pacheco M.A. (2004) Assessment of post-stroke quality of life in cost-effectiveness studies: The usefulness of the Barthel Index and the EuroQoL-5D.  Quality of Life Research, 15, 574-06         Occupational Therapy Evaluation Charge Determination   History Examination Decision-Making   HIGH Complexity : Extensive review of history including physical, cognitive and psychosocial history  HIGH Complexity : 5 or more performance deficits relating to physical, cognitive , or psychosocial skils that result in activity limitations and / or participation restrictions HIGH Complexity : Patient presents with comorbidities that affect occupational performance. Signifigant modification of tasks or assistance (eg, physical or verbal) with assessment (s) is necessary to enable patient to complete evaluation       Based on the above components, the patient evaluation is determined to be of the following complexity level: HIGH   Pain Rating:  Pt unable to indicate or verbalize, didn't appear to be in pain during session    Activity Tolerance:   Fair, desaturates with exertion and requires oxygen, requires rest breaks, and observed SOB with activity    After treatment patient left in no apparent distress:    Supine in bed, Call bell within reach, Bed / chair alarm activated, and bilateral rails up on stretcher    COMMUNICATION/EDUCATION:   The patients plan of care was discussed with: Physical therapist, Speech therapist, Registered nurse, and pts sisters . Patient was not educated regarding his deficit(s) due to expressive and receptive aphasia. Patient was not verbally educated on the BE FAST acronym for signs/symptoms of CVA and TIA due to receptive and expressive aphasia. Patient is unable to participate in goal setting and plan of care. This patients plan of care is appropriate for delegation to [facility].     Thank you for this referral.  Tresa Stark OTR/L  Time Calculation: 26 mins

## 2023-03-08 NOTE — PROGRESS NOTES
eTRANSFER SBAR NOTE    IP UNIT CALLED NOTE IS READY: Yes Spoke to Jacqualin Councilman    IF there are questions Call transferring nurse (your name) Audie Ley at phone # [phone]    SITUATION/BACKGROUND:    Patient is being transferred to [facility] Emergency Dept, Room# 35    Patient's Chief Complaint on arrival to ED was Altered Mental Status and is admitted for Stroke workup. CODE STATUS: Full Code    VITAL SIGNS (MUST BE WITHIN 1 HOUR OF TRANSFER TO IP UNIT:    TEMP: 97.4  PULSE: 70  RESP: 16  BP: 132/66  PAIN SCORE: 0  MEWS: 1     ISOLATION/PRECAUTIONS: No   ISOLATION TYPE: standard    Called outstanding consults: Yes     Are there sign and held orders that need to be released? No   Are all STAT orders completed: No     STAT labs collected: No   REPEAT LACTIC ACID DUE? No  TIME DUE: n/a    Are there any titrating drips? No   If so what? N/a    The following personal items will be sent with the patient during transfer to the floor:   All valuables: None  ITEM:    ITEM: Visual Aid: None  ITEM:           ASSESSMENT:    CIWA Assessment: No  Last Score: N/a    NEURO: A/O x self; expressive aphasia  NIH SCORE:   Total: 11 (03/08/23 0723)    LEONID SCREENING:   Swallow Screening  Is the Patient Unable to Remain Alert for Testing?: No (03/08/23 0805)  Is the Patient on a Modified Diet (Thickened Liquids) Due to Pre-existing Dysphagia?: No (03/08/23 0805)  Is There Presence of Existing Enteral Tube Feeding via the Stomach or Nose?: No (03/08/23 0805)  Is There Presence of Head-of-Bed Restrictions (Less than 30 Degrees)?: No (03/08/23 0805)  Is There Presence of Tracheotomy Tube?: No (03/08/23 0805)  Is the Patient Ordered Nothing-by-Mouth Status?: No (03/08/23 0805)  3 oz Water Swallow Screen: Pass (03/08/23 0805)      NEURO ASSESSMENT:   Neuro  Neurologic State: Alert (03/08/23 1100)  Orientation Level: Disoriented X4, Unable to verbalize (03/08/23 1100)  Cognition: Decreased attention/concentration, Decreased command following (03/08/23 1100)  Speech: Aphasic (comment) (expressive) (03/08/23 0930)  Assessment L Pupil: Brisk, Round (03/08/23 0930)  Size L Pupil (mm): 3 (03/08/23 0930)  Assessment R Pupil: Brisk, Round (03/08/23 0930)  Size R Pupil (mm): 3 (03/08/23 0930)  LUE Motor Response:  unequal L greater than R (03/08/23 0930)  LLE Motor Response: Purposeful, Spontaneous , Weak (03/08/23 0930)  RUE Motor Response:  unequal L greater than R (03/08/23 0930)  RLE Motor Response: Purposeful, Spontaneous , Weak (03/08/23 0930)    Is patient impulsive? Yes  Is patient oriented? Oriented to self, can follow commands, expressive aphasia    Do they follow commands? Yes  Is the patient ambulatory? No  Device need N/a    FALL RISK? Yes   Is the [place] [address] Assessment completed? No   INTERVENTIONS: N/a    INTEGUMENTARY:   IS THE PATIENT UNDRESSED? Yes  WOUNDS PRESENT? No  On admit to ED No   ARE THE WOUNDS DOCUMENTED? No     RESPIRATORY:   Is patient on Oxygen? Yes   OXYGEN: Oxygen Therapy  O2 Device: Nasal cannula (03/08/23 1142)  Skin Assessment: Other (see comment/note) (Patient has redness to sacrum) (03/07/23 2000)  O2 Flow Rate (L/min): 2 l/min (03/08/23 1142)    CARDIAC:   Is cardiac monitoring ordered? Yes Last Rhythm: NSR  Patient to transfer with tele box on? Yes  Is patient using a LIFE VEST?  No     LINE ACCESS:   Peripheral IV 03/07/23 Left Antecubital (Active)   Site Assessment Clean, dry, & intact 03/08/23 0910   Phlebitis Assessment 0 03/08/23 0910   Infiltration Assessment 0 03/08/23 0910   Dressing Status Clean, dry, & intact 03/08/23 0910   Dressing Type Transparent 03/08/23 0910   Hub Color/Line Status Green;Flushed 03/08/23 0910   Alcohol Cap Used Yes 03/08/23 0910       Peripheral IV 03/07/23 Right Antecubital (Active)   Site Assessment Clean, dry, & intact 03/08/23 0910   Phlebitis Assessment 0 03/08/23 0910   Infiltration Assessment 0 03/08/23 0910   Dressing Status Clean, dry, & intact 03/08/23 0910   Dressing Type Transparent 23   Hub Color/Line Status Green;Flushed 23   Alcohol Cap Used Yes 23        /GI:   CONTINENT BOWEL/BLADDER? Yes  URINARY OUTPUT: voiding   Written Order for Sánchez Cath? No   CHRONIC OR ACUTE? N/a   If CHRONIC, is it 1days old, was it changed prior to specimen collection? N/a  WAS UA WITH REFLEX SENT TO LAB? No  IF NO,  COLLECT AND SEND PRIOR TO TRANSPORT TO INPATIENT AREA    RESTRAINTS IN USE: No      IS DOCUMENTATION COMPLETE: Yes  Is there a current Order? N/a  When does it ? N/a    Additional details as Needed:    MRI ordered - screening is complete. Sister Bernabe Portillo is [facility], is updated with plan of care.

## 2023-03-08 NOTE — PROGRESS NOTES
Problem: Dysphagia (Adult)  Goal: *Acute Goals and Plan of Care (Insert Text)  Description: Speech Therapy Goals  Initiated 3/8/2023  1. Patient will participate in re-evaluation of swallow function within 7 days. Outcome: Not Met     Problem: Communication Impaired (Adult)  Goal: *Acute Goals and Plan of Care (Insert Text)  Description: Speech Therapy Goals  Initiated 3/8/2023  1. Patient will answer biographical yes/no questions with 60% accuracy within 7 days. 2.  Patient follow 1 step commands with 60% given minimal cues within 7 days. 3.  Patient will complete automatic speech (counting etc, automatic sentence completion) with 60% accuracy within 7 days. 4.  Patient will state 1 biographical fact using multi modalities within 7 days. Outcome: Not Met     SPEECH LANGUAGE PATHOLOGY BEDSIDE SWALLOW AND SPEECH EVALUATIONS  Patient: Oscar Patel (75 y.o. male)  Date: 3/8/2023  Primary Diagnosis: CVA (cerebral vascular accident) [facility]) [I63.9]       Precautions: Aspiration       ASSESSMENT :  Based on the objective data described below, the patient presents with severe oropharyngeal dysphagia characterized by inconsistent ability to accept PO, mildly delayed bolus formation and oral transit. Patient demonstrated inconsistent throat clearing after ice chip trials, coughing 2 of 3 trials of thin liquids and delayed coughing after all pureed trials. Given admitted for neuro work-up and respiratory status risk of aspiration is high. Feel NPO including medication is safest course. Patient presents with severe receptive and expressive language deficits. Patient presents with inability to follow simple commands however good ability to mimic. Also note poor accuracy when answering biographical yes/no questions. Patient demonstrates inability to complete automatic speech tasks, repeat at word level or name ADL objects.   Communication skills are non-functional and patient is unable to communicate basic wants and needs    Patient will benefit from skilled intervention to address the above impairments. Patients rehabilitation potential is considered to be Fair     PLAN :  Recommendations and Planned Interventions:  NPO including non-oral route for medication  SLP to follow for re-assess swallow function as neuro-work-up continues  Frequency/Duration: Patient will be followed by speech-language pathology 3 times a week to address goals. Discharge Recommendations: To Be Determined     SUBJECTIVE:   Patient stated Ahhh.  2 L O2 via NC. OBJECTIVE:     Past Medical History:   Diagnosis Date    Arthritis     Hips, Knees    CAD (coronary artery disease)     MI around 1990    Hepatitis A     Hepatitis C     High cholesterol     Hypertension     Other ill-defined conditions(799.89)     Light sensitivity, causes seizures    Seizures ([facility] [address]     Last seizure 12/2008/work -up in 2012 -possible seizure    Stroke [facility]) 2005    Thyroid disease     Hypothyroid     Past Surgical History:   Procedure Laterality Date    HX HEART CATHETERIZATION  20 years ago    no stents    HX HERNIA REPAIR  10/8/14    left inguinal hernia- Isma Mitchell MD    HX ORTHOPAEDIC      Left Knee Scope    HX ORTHOPAEDIC  2/2010    [facility].  Total Hip Replacement/Dr. Zulma Pretty ORTHOPAEDIC  6/9/14    R hip replacement     [place] OPEN REPAIR CLAVICLE FRACTURE      right      Prior Level of Function/Home Situation:   Home Situation  Home Environment: Private residence  # Steps to Enter: 4  Rails to Enter: Yes  Hand Rails : Bilateral (wide)  One/Two Story Residence: One story  Living Alone: Yes (sister lives on the same property and has a brother and sister that live close by)  Support Systems: Other Family Member(s) (brother and sisters)  Current DME Used/Available at Home: Shower chair, Walker, rolling, Grab bars, [place]dulia beach, straight  Tub or Shower Type: Shower  Diet prior to admission: Unknown  Current Diet:  Unknown   Cognitive and Communication Status:  Neurologic State: Alert  Orientation Level: Disoriented X4, Unable to verbalize  Cognition: Decreased attention/concentration, Decreased command following           Swallowing Evaluation:   Oral Assessment:  Oral Assessment  Labial: No impairment (Limited ability to assess given poor command following)  Dentition: Intact  Oral Hygiene: Mildly dry oral mucosa  Lingual: No impairment  Velum: Unable to visualize  Mandible: No impairment  P.O. Trials:  Patient Position: Upright in bed  Vocal quality prior to P.O.: No impairment  Consistency Presented: Ice chips; Thin liquid;Puree  How Presented: SLP-fed/presented;Spoon;Straw;Successive swallows     Bolus Acceptance: No impairment  Bolus Formation/Control: No impairment     Propulsion: Delayed (# of seconds)  Oral Residue: None  Initiation of Swallow:  (Suspect delayed)     Aspiration Signs/Symptoms: Clear throat;Delayed cough/throat clear;Strong cough                        NOMS:   The NOMS functional outcome measure was used to quantify this patient's level of swallowing impairment. Based on the NOMS, the patient was determined to be at level 1 for swallow function       NOMS Swallowing Levels:  Level 1 (CN): NPO  Level 2 (CM): NPO but takes consistency in therapy  Level 3 (CL): Takes less than 50% of nutrition p.o. and continues with nonoral feedings; and/or safe with mod cues; and/or max diet restriction  Level 4 (CK): Safe swallow but needs mod cues; and/or mod diet restriction; and/or still requires some nonoral feeding/supplements  Level 5 (CJ): Safe swallow with min diet restriction; and/or needs min cues  Level 6 (CI): Independent with p.o.; rare cues; usually self cues; may need to avoid some foods or needs extra time  Level 7 ([address] [place] [address]): Independent for all p.o.  GENARO. (2003). National Outcomes Measurement System (NOMS): Adult Speech-Language Pathology User's Guide.      Speech/Language Evaluation  Motor Speech:  Oral-Motor Structure/Motor Speech  Labial: No impairment (Limited ability to assess given poor command following)  Dentition: Intact  Oral Hygiene: Mildly dry oral mucosa  Lingual: No impairment  Velum: Unable to visualize  Mandible: No impairment  Language Comprehension and Expression:  Auditory Comprehension  Auditory Impairment: Yes  Response to Basic Yes/No Questions (%): 25 %  One-Step Basic Commands (%): 0 % (Able to follow with model)  Verbal Expression  Primary Mode of Expression: Verbal  Initiation: No impairment  Automatic Speech Task: Impaired (comment)  Repetition: Impaired  Word Repetition (%): 0 %  Naming: Impaired  Confrontation (%): 0 %  Sentence Completion: Impaired  Word level (%): 0 %  Speech Characteristics: Perseveration;Paraphasias  Overall Impairment: Severe          Voice:                 Vocal Quality: No impairment                                 NOMS: The NOMS functional outcome measure was used to quantify this patient's level of expressive language impairment. Based on the NOMS, the patient was determined to be at level 1 for spoken language expression. NOMS Spoken Language Expression:  Level 1 (CN): Verbalizations not meaningful to anyone. Level 2 (CM): Few attempts accurate/appropriate. Max cues to elicit automatic/imitative words/phrases. Level 3 (CL): Communication partner responsible for communication; Mod cues for words/phrases meaningful in context  Level 4 (CK): Initiate during simple, routine activities w/familiar partner. Mod cues to produce simple sentences  Level 5 (Wilfredo Solar): Initiates communication with familiar and unfamiliar partners. Min cues for complex sentences. Level 6 (CI): Communicates for most activities. Some limitations still present for vocational/social activities. Rarely cued for complex info  Level 7 [facility]): Independent communication. SAYRA (2003). National Outcomes Measurement System (NOMS): Adult Speech-Language Pathology User's Guide.           After treatment:   Patient left in no apparent distress in bed, Call bell within reach, and Nursing notified    COMMUNICATION/EDUCATION:   Patient was educated regarding role of SLP and POC. Patient stated 'yeah' however suspect poor comprehension. The patient's plan of care including recommendations, planned interventions, and recommended diet changes were discussed with: Registered nurse. Patient/family have participated as able in goal setting and plan of care. Patient/family agree to work toward stated goals and plan of care.     Thank you for this referral.  SITA Nicholas  Time Calculation: 17 mins

## 2023-03-08 NOTE — ED NOTES
Patient's family member contacts are as follows;   Rick Tan (sister)- [phone]  Odilia Herrera (niece) [phone]  Westonrenée Amador- brother

## 2023-03-08 NOTE — PROGRESS NOTES
Problem: Dysphagia (Adult)  Goal: *Acute Goals and Plan of Care (Insert Text)  Description: Speech Therapy Goals  Initiated 3/8/2023  1. Patient will participate in re-evaluation of swallow function within 7 days. Outcome: Not Met     Problem: Communication Impaired (Adult)  Goal: *Acute Goals and Plan of Care (Insert Text)  Description: Speech Therapy Goals  Initiated 3/8/2023  1. Patient will answer biographical yes/no questions with 60% accuracy within 7 days. 2.  Patient follow 1 step commands with 60% given minimal cues within 7 days. 3.  Patient will complete automatic speech (counting etc, automatic sentence completion) with 60% accuracy within 7 days. 4.  Patient will state 1 biographical fact using multi modalities within 7 days. Outcome: Not Met     SPEECH LANGUAGE PATHOLOGY BEDSIDE SWALLOW AND SPEECH EVALUATIONS  Patient: Dulce Maria Fung (64 y.o. male)  Date: 3/8/2023  Primary Diagnosis: CVA (cerebral vascular accident) [facility]) [I63.9]       Precautions: Aspiration       ASSESSMENT :  Based on the objective data described below, the patient presents with severe oropharyngeal dysphagia characterized by inconsistent ability to accept PO, mildly delayed bolus formation and oral transit. Patient demonstrated inconsistent throat clearing after ice chip trials, coughing 2 of 3 trials of thin liquids and delayed coughing after all pureed trials. Given admitted for neuro work-up and respiratory status risk of aspiration is high. Feel NPO including medication is safest course. Patient presents with severe receptive and expressive language deficits. Patient presents with inability to follow simple commands however good ability to mimic. Also note poor accuracy when answering biographical yes/no questions. Patient demonstrates inability to complete automatic speech tasks, repeat at word level or name ADL objects.   Communication skills are non-functional and patient is unable to communicate basic wants and needs    Patient will benefit from skilled intervention to address the above impairments. Patients rehabilitation potential is considered to be Fair     PLAN :  Recommendations and Planned Interventions:  NPO including non-oral route for medication  SLP to follow for re-assess swallow function as neuro-work-up continues  Frequency/Duration: Patient will be followed by speech-language pathology 3 times a week to address goals. Discharge Recommendations: To Be Determined     SUBJECTIVE:   Patient stated Ahhh.  2 L O2 via NC. OBJECTIVE:     Past Medical History:   Diagnosis Date    Arthritis     Hips, Knees    CAD (coronary artery disease)     MI around 1990    Hepatitis A     Hepatitis C     High cholesterol     Hypertension     Other ill-defined conditions(799.89)     Light sensitivity, causes seizures    Seizures ([facility] [address]     Last seizure 12/2008/work -up in 2012 -possible seizure    Stroke [facility]) 2005    Thyroid disease     Hypothyroid     Past Surgical History:   Procedure Laterality Date    HX HEART CATHETERIZATION  20 years ago    no stents    HX HERNIA REPAIR  10/8/14    left inguinal hernia- Isma Mitchell MD    HX ORTHOPAEDIC      Left Knee Scope    HX ORTHOPAEDIC  2/2010    [place].  Total Hip Replacement/Dr. Yesi Marcelino ORTHOPAEDIC  6/9/14    R hip replacement     [place] OPEN REPAIR CLAVICLE FRACTURE      right      Prior Level of Function/Home Situation:   Home Situation  Home Environment: Private residence  # Steps to Enter: 4  Rails to Enter: Yes  Hand Rails : Bilateral (wide)  One/Two Story Residence: One story  Living Alone: Yes (sister lives on the same property and has a brother and sister that live close by)  Support Systems: Other Family Member(s) (brother and sisters)  Current DME Used/Available at Home: Shower chair, Walker, rolling, Grab bars, [place] beach, straight  Tub or Shower Type: Shower  Diet prior to admission: Unknown  Current Diet:  Unknown   Cognitive and Communication Status:  Neurologic State: Alert  Orientation Level: Disoriented X4, Unable to verbalize  Cognition: Decreased attention/concentration, Decreased command following           Swallowing Evaluation:   Oral Assessment:  Oral Assessment  Labial: No impairment (Limited ability to assess given poor command following)  Dentition: Intact  Oral Hygiene: Mildly dry oral mucosa  Lingual: No impairment  Velum: Unable to visualize  Mandible: No impairment  P.O. Trials:  Patient Position: Upright in bed  Vocal quality prior to P.O.: No impairment  Consistency Presented: Ice chips; Thin liquid;Puree  How Presented: SLP-fed/presented;Spoon;Straw;Successive swallows     Bolus Acceptance: No impairment  Bolus Formation/Control: No impairment     Propulsion: Delayed (# of seconds)  Oral Residue: None  Initiation of Swallow:  (Suspect delayed)     Aspiration Signs/Symptoms: Clear throat;Delayed cough/throat clear;Strong cough                        NOMS:   The NOMS functional outcome measure was used to quantify this patient's level of swallowing impairment. Based on the NOMS, the patient was determined to be at level 1 for swallow function       NOMS Swallowing Levels:  Level 1 (CN): NPO  Level 2 (CM): NPO but takes consistency in therapy  Level 3 (CL): Takes less than 50% of nutrition p.o. and continues with nonoral feedings; and/or safe with mod cues; and/or max diet restriction  Level 4 (CK): Safe swallow but needs mod cues; and/or mod diet restriction; and/or still requires some nonoral feeding/supplements  Level 5 (CJ): Safe swallow with min diet restriction; and/or needs min cues  Level 6 (CI): Independent with p.o.; rare cues; usually self cues; may need to avoid some foods or needs extra time  Level 7 ([address] [place] [address]): Independent for all p.o.  GENARO. (2003). National Outcomes Measurement System (NOMS): Adult Speech-Language Pathology User's Guide.      Speech/Language Evaluation  Motor Speech:  Oral-Motor Structure/Motor Speech  Labial: No impairment (Limited ability to assess given poor command following)  Dentition: Intact  Oral Hygiene: Mildly dry oral mucosa  Lingual: No impairment  Velum: Unable to visualize  Mandible: No impairment  Language Comprehension and Expression:  Auditory Comprehension  Auditory Impairment: Yes  Response to Basic Yes/No Questions (%): 25 %  One-Step Basic Commands (%): 0 % (Able to follow with model)  Verbal Expression  Primary Mode of Expression: Verbal  Initiation: No impairment  Automatic Speech Task: Impaired (comment)  Repetition: Impaired  Word Repetition (%): 0 %  Naming: Impaired  Confrontation (%): 0 %  Sentence Completion: Impaired  Word level (%): 0 %  Speech Characteristics: Perseveration;Paraphasias  Overall Impairment: Severe          Voice:                 Vocal Quality: No impairment                                 NOMS: The NOMS functional outcome measure was used to quantify this patient's level of expressive language impairment. Based on the NOMS, the patient was determined to be at level 1 for spoken language expression. NOMS Spoken Language Expression:  Level 1 (CN): Verbalizations not meaningful to anyone. Level 2 (CM): Few attempts accurate/appropriate. Max cues to elicit automatic/imitative words/phrases. Level 3 (CL): Communication partner responsible for communication; Mod cues for words/phrases meaningful in context  Level 4 (CK): Initiate during simple, routine activities w/familiar partner. Mod cues to produce simple sentences  Level 5 (Tamika Wilkerson): Initiates communication with familiar and unfamiliar partners. Min cues for complex sentences. Level 6 (CI): Communicates for most activities. Some limitations still present for vocational/social activities. Rarely cued for complex info  Level 7 [facility]): Independent communication. SAYRA (2003). National Outcomes Measurement System (NOMS): Adult Speech-Language Pathology User's Guide.           After treatment:   Patient left in no apparent distress in bed, Call bell within reach, and Nursing notified    COMMUNICATION/EDUCATION:   Patient was educated regarding role of SLP and POC. Patient stated 'yeah' however suspect poor comprehension. The patient's plan of care including recommendations, planned interventions, and recommended diet changes were discussed with: Registered nurse. Patient/family have participated as able in goal setting and plan of care. Patient/family agree to work toward stated goals and plan of care.     Thank you for this referral.  SITA Pradhan  Time Calculation: 17 mins

## 2023-03-08 NOTE — CONSULTS
Date of Consultation:  March 8, 2023    Referring Physician: Frank Taylor MD    Reason for Consultation:  acute onset word-finding difficulties. Chief Complaint   Patient presents with    Altered mental status     Pt arrives via EMS [facility]) level 2 stroke pre-alert. Pt last known well at 11am per sister who saw pt at that time, pt's sister returned to see patient at 5pm and he had facial droop with R sided weakness, limited verbal response. Per EMS, pt independent at baseline per sister. Pt  per EMS, /77, upon EMS arrival to house, pt initally hypoxic at 72% on RA, placed on non rebreather, O2 improved to 98% on transport, pt currently on RA       History of Present Illness:   Hali Ontiveros is a 78 y.o. male with history of chronic left posterior temporal lobe encephalomalacia in the setting of stroke on aspirin, history of seizures well-controlled not on any AED therapy, CAD, hyperlipidemia, hypertension and hypothyroidism who presents with acute onset of right-sided weakness, right facial droop and difficulty with finding words and comprehension concerning for possible stroke. The patient is unable to provide any history given his current medical status. The patient's sisters are in the room who helps to provide the history. The patient's sister states that she saw him doing fine yesterday morning. When she came back to see him around 5 PM last night at his home where he lives alone, she saw that he was in his bed and had urinary incontinence with right-sided weakness and right facial droop and was unable to comprehend her or speak with her properly. Patient lives on his own and is capable of all activities of daily living. He is followed by my colleague Dr. Lesli Becerra in the neurology clinic who sees him for his seizures which are very well controlled and he has been seizure-free since 2008. He also is following him for history of stroke for which she is on aspirin.   He has not had any recent EEGs. Last MRI brain was in  which showed his left temporal posterior lobe encephalomalacia. Based on Dr. Silvia Baker notes it does appear that his stroke from the past had left him with some speech impairment and difficulty with finding his words as well as developing a seizure disorder given the location of the stroke. It does appear that he is taking his gabapentin for seizures. He takes 600 mg 4 times daily. His gabapentin level on  was within normal range. Labs are largely unremarkable including ABC, CMP, UA, lactic acid. Past Medical History:   Diagnosis Date    Arthritis     Hips, Knees    CAD (coronary artery disease)     MI around     Hepatitis A     Hepatitis C     High cholesterol     Hypertension     Other ill-defined conditions(799.89)     Light sensitivity, causes seizures    Seizures ([facility] [address]     Last seizure 2008/work -up in  -possible seizure    Stroke [facility])     Thyroid disease     Hypothyroid        Past Surgical History:   Procedure Laterality Date    HX HEART CATHETERIZATION  20 years ago    no stents    HX HERNIA REPAIR  10/8/14    left inguinal hernia- Isma Mitchell MD    HX ORTHOPAEDIC      Left Knee Scope    HX ORTHOPAEDIC  2010    [facility].  Total Hip Replacement/Dr. Ross [facility] ORTHOPAEDIC  14    R hip replacement     [place] [facility] REPAIR [place] FRACTURE      right         Family History   Problem Relation Age of Onset    Hypertension Mother     Stroke Mother     Diabetes Mother     Heart Disease Father     Cancer Father         lung        Social History     Tobacco Use    Smoking status: Former     Packs/day: 0.25     Years: 25.00     Pack years: 6.25     Types: Cigarettes     Quit date: 2015     Years since quittin.1    Smokeless tobacco: Never   Substance Use Topics    Alcohol use: No     Comment: stopped 10 years ago---beer on the weekends in the past        Allergies   Allergen Reactions    Carbatrol [Carbamazepine] Rash        Prior to Admission medications    Medication Sig Start Date End Date Taking? Authorizing Provider   albuterol (PROVENTIL VENTOLIN) 2.5 mg /3 mL (0.083 %) nebu INHALE THE CONTENTS OF 1 VIAL (3ML) VIA NEBULIZER EVERY 4 HOURS AS NEEDED FOR WHEEZING (MAX FOUR TIMES A DAY)    Provider, Historical   albuterol-ipratropium (DUO-NEB) 2.5 mg-0.5 mg/3 ml nebu 3 ml as needed 4/27/22   Provider, Historical   gabapentin (NEURONTIN) 300 mg capsule TAKE 2 CAPSULES FOUR TIMES DAILY 2/1/23   Sy Clayton MD   azelastine (ASTELIN) 137 mcg (0.1 %) nasal spray USE 2 SPRAYS IN EACH NOSTRIL TWICE DAILY 10/14/21   Provider, Historical   montelukast (SINGULAIR) 10 mg tablet Take 10 mg by mouth daily. 11/27/19   Provider, Historical   ketoconazole (NIZORAL) 2 % shampoo  1/2/20   Provider, Historical   hydroxyzine HCL (ATARAX) 25 mg tablet Take 25 mg by mouth daily. 12/30/19   Provider, Historical   ergocalciferol (ERGOCALCIFEROL) 1,250 mcg (50,000 unit) capsule Take 50,000 Units by mouth every seven (7) days. 12/30/19   Provider, Historical   traMADol (ULTRAM) 50 mg tablet Take 50 mg by mouth two (2) times a day. 10/11/17   Provider, Historical   aspirin delayed-release 325 mg tablet Take 325 mg by mouth daily. Provider, Historical   doxazosin (CARDURA) 4 mg tablet TAKE ONE TABLET BY MOUTH ONCE DAILY AT BEDTIME 6/20/14   Cinthya Cadet MD   citalopram (CELEXA) 20 mg tablet TAKE ONE TABLET BY MOUTH ONCE DAILY 6/20/14   Cinthya Cadet MD   atorvastatin (LIPITOR) 40 mg tablet Take 40 mg by mouth daily.     Provider, Historical   levothyroxine (SYNTHROID) 50 mcg tablet TAKE ONE TABLET BY MOUTH EVERY DAY 4/14/14   Cinthya Cadet MD   amLODIPine (NORVASC) 10 mg tablet TAKE ONE TABLET BY MOUTH EVERY DAY 2/28/14   Cinthya Cadet MD       Review of Systems:    General, constitutional: negative  Eyes, vision: negative  Ears, nose, throat: negative  Cardiovascular, heart: negative  Respiratory: negative  Gastrointestinal: negative  Genitourinary: negative  Musculoskeletal: negative  Skin and integumentary: negative  Psychiatric: negative  Endocrine: negative  Neurological: negative, except for HPI  Hematologic/lymphatic: negative  Allergy/immunology: negative    []Unable to obtain  ROS due to  []mental status change  []sedated   []intubated      PHYSICAL EXAMINATION:   Visit Vitals  /74   Pulse 71   Temp 97.4 °F (36.3 °C)   Resp 19   Wt 204 lb 5.9 oz (92.7 kg)   SpO2 91%   BMI 26.96 kg/m²       Physical Exam:  General:  no acute distress  Neck: supple no mass   Lungs: Comfortable on room air  Heart: Well-perfused  Lower extremity: no edema    Neurological exam:  Mental Status: Awake, alert, unable to answer any questions. Globally aphasic. Does not follow any commands. When asked to stick out his tongue he opens his mouth. When asked to show thumbs up he lifts his arms up in the air. Mildly dysarthric. Cranial nerves: II-XII:  Pupils equal and reactive, visual fields intact by confrontation   Extraocular movements intact, no evidence of nystagmus or ptosis   Facial sensation intact to light touch  Right facial droop. Hearing intact to soft rub bilaterally   Shoulder shrug symmetric and strong   Tongue protrusion full and midline without fasciculation or atrophy    Motor:   Normal tone and Bulk     Strength testing:  Patient is unable to perform confrontational strength testing given aphasia. He is antigravity in all 4 extremities possible right arm drift. Right lower extremity appears to also have some mild drift compared to the left side. Sensory: Unable to determine given patient's aphasia. Reflexes:   Biceps Triceps  Brachiorad Patellar Achilles Plantar Hoffmans   Right  2 2 2 2 1 Down NT   Left  2 2 2 2 1 Down NT     Cerebellar testing: Unable to perform given patient's aphasia.     Data:     Lab Results   Component Value Date/Time    Hemoglobin A1c 5.8 11/09/2015 03:25 PM    Sodium 135 (L) 03/07/2023 07:35 PM    Potassium 4.7 03/07/2023 07:35 PM    Chloride 104 03/07/2023 07:35 PM    Glucose 101 (H) 03/07/2023 07:35 PM    BUN 22 (H) 03/07/2023 07:35 PM    Creatinine 1.24 03/07/2023 07:35 PM    Calcium 9.0 03/07/2023 07:35 PM    WBC 10.9 03/07/2023 07:35 PM    HCT 41.9 03/07/2023 07:35 PM    HGB 13.7 03/07/2023 07:35 PM    PLATELET 805 48/25/2543 07:35 PM       Imaging:    Results from Hospital Encounter encounter on 12/08/16    MRI LUMB SPINE WO CONT    Narrative  Indication: Back and right lower extremity pain for greater than one year. No  recent injury    Exam: MRI of the lumbar spine. Sequences include sagittal and axial T1 and  T2-weighted images. Sagittal STIR. Comparisons: October 1, 2014    Contrast: None. Findings: Alignment is normal. There is a chronic superior endplate Schmorl's  node L4. Mild reactive changes are centered at L4-L5 facets with edema extending  into the right L5 pedicle, degenerative in nature. Cord terminus is within  normal limits. Paraspinous soft tissues are within normal limits. T12-L1: No stenosis    L1-L2: No stenosis    L2-L3: There is borderline facet hypertrophy. No stenosis    L3-L4: There is mild facet degenerative change. There is desiccation of this  disc. There is mild narrowing of the foramen. No change    L4-L5: There are bilateral facet degenerative changes without significant disc  bulge. There is mild to moderate left and mild right foraminal narrowing. No  interval change    L5-S1: There are bilateral facet degenerative changes with mild narrowing of the  foramen unchanged    Impression  Impression:  1. Mild to moderate left and mild right foraminal narrowing L4-L5 unchanged  2. Mild bilateral foraminal narrowing L5-S1 unchanged  3.  Mild bilateral foraminal narrowing L3-L4 unchanged      Results from Hospital Encounter encounter on 03/07/23    CT CODE NEURO PERF W CBF    Narrative  CLINICAL HISTORY: Code stroke    EXAMINATION:  CT ANGIOGRAPHY HEAD AND NECK, CT PERFUSION    COMPARISON: None    TECHNIQUE:  Following the uneventful administration of iodinated contrast  material, axial CT angiography of the head and neck was performed. Delayed axial  images through the head were also obtained. Coronal and sagittal reconstructions  were obtained. Manual postprocessing of images was performed. 3-D  Sagittal  maximal intensity projection images were obtained. 3-D Coronal maximal  intensity projections were obtained. CT brain perfusion was performed with  generation of hemodynamic maps of multiple parameters, including cerebral blood  flow, cerebral blood volume, mean transit time, and TMAX. CT dose reduction was  achieved through use of a standardized protocol tailored for this examination  and automatic exposure control for dose modulation. This study was analyzed by the [address] ai algorithm. FINDINGS:    DELAYED ENHANCEMENT HEAD CT  Left occipital/posterior temporal lobe encephalomalacia. No intra or extra-axial  mass or collection. Ventricles are normal in size and configuration. The basal  cisterns are patent. Dural venous sinuses are patent. No abnormal parenchymal or meningeal  enhancement. Mastoid air cells and paranasal sinuses are clear. CTA NECK:    Great vessels: Normal arch anatomy with the origins patent. Right subclavian artery: Patent    Left subclavian artery: Patent    Right common carotid artery: Patent    Left common carotid artery: Patent    Cervical right internal carotid artery: Patent with no significant stenosis by  NASCET criteria. Cervical left internal carotid artery: Patent with no significant stenosis by  NASCET criteria. Right vertebral artery: Ostial stenosis. Otherwise patent. Left vertebral artery: Patent    The lung apices are clear. The thyroid is homogeneous. No cervical  lymphadenopathy. Measurements utilize NASCET criteria.     CTA HEAD:    Right cavernous internal carotid artery: Mild atherosclerotic disease without  hemodynamically significant stenosis. Left cavernous internal carotid artery: Mild atherosclerotic disease without  hemodynamically significant stenosis. Anterior cerebral arteries: Patent    Anterior communicating artery: Patent    Right middle cerebral artery: Patent    Left middle cerebral artery: Patent    Posterior communicating arteries: Diminutive bilaterally. Posterior cerebral arteries: Patent    Basilar artery: Patent    Distal vertebral arteries: Patent    No evidence for intracranial aneurysm or hemodynamically significant stenosis. CT Perfusion:  Small focus of perfusion mismatch correlates with the left posterior temporal  lobe encephalomalacia. No acute perfusion abnormalities. Tmax > 6s: 10 cc  rCBF < 30%: 3 cc  Mismatch volume: 7 cc  Mismatch ratio: 3.3    Impression  1. No acute vascular abnormality, no large vessel occlusion. 2. 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 3. Small focus of perfusion mismatch correlates with left posterior temporal  lobe chronic infarct. No acute perfusion abnormality. IMPRESSION/RECOMMENDATIONS:  Orlando Gibson is a 78 y.o. male who presents with history of epilepsy well-controlled not on any medications other than gabapentin, history of left temporal encephalomalacia from stroke on aspirin presenting with acute onset of word finding difficulty and difficulty with comprehension and language and some mild right-sided weakness with right facial droop. Differential diagnosis includes stroke, seizure, recrudescence of old stroke in the left posterior temporal lobe. Initial head CT, CTP, CTA head and neck remarkable for small focus of perfusion mismatch which correlates with left posterior temporal lobe chronic infarct.     Recommendations:  - Recommend brain MRI  - EEG routine  -Continue gabapentin 600 mg 4 times daily, check gabapentin level  - Recommend maintaining SBP <220/120 for 24 hrs from symptom onset and then BP goal is less than 140/90 (this is the long term goal)   - would recommend to continue aspirin 81mg daily for secondary stroke prevention   - Risk factor modification: would recommend obtaining lipid panel and A1c    - if LDL > 70, would start atorvastatin 40mg daily    - please check hepatic panel prior to initiation of statin  - please obtain TTE to rule out intracardiac thrombus   - would monitor on telemetry to rule out arrhythmias    - please obtain PT/OT and speech consultations     Thank you very much for this consultation. Neurology will continue to follow. I spent 60 minutes providing care to this acutely ill inpatient with > 50% of the time counseling as well as reviewing the patient's chart, notes, labs, medications and preparing documentation along with assisting in the coordination of care of the patient on the patient's hospital floor/unit.        Soraya Barahona, DO

## 2023-03-08 NOTE — PROGRESS NOTES
Problem: Mobility Impaired (Adult and Pediatric)  Goal: *Acute Goals and Plan of Care (Insert Text)  Description:   FUNCTIONAL STATUS PRIOR TO ADMISSION: Patient was independent and active without use of DME.    HOME SUPPORT PRIOR TO ADMISSION: The patient lived alone, 1-story home with 4 steps to enter. He has a sister that lives a few hundred feet away on the same property. Physical Therapy Goals  Initiated 3/8/2023  1. Patient will move from supine to sit and sit to supine  in bed with supervision assist/set-up within 7 day(s). 2.  Patient will transfer from bed to chair and chair to bed with supervision/set-up using the least restrictive device within 7 day(s). 3.  Patient will perform sit to stand with supervision/set-up within 7 day(s). 4.  Patient will ambulate with supervision/set-up for 150 feet with the least restrictive device within 7 day(s). 5.  Patient will ascend/descend 4 stairs with 1 handrail(s) with supervision/set-up within 7 day(s). 6.  Patient will improve Kulkarni Balance score by 7 points within 7 days. Outcome: Not Progressing Towards Goal     PHYSICAL THERAPY EVALUATION- NEURO POPULATION  Patient: Grace Simpson (46 y.o. male)  Date: 3/8/2023  Primary Diagnosis: CVA (cerebral vascular accident) [facility]) [I63.9]       Precautions: Fall, aphasia         ASSESSMENT  Based on the objective data described below, the patient presents with global aphasia, difficulty following commands, impaired standing balance, and difficulty walking due to possible CVA. Patient's sister went to check on him when she discovered he was having R sided facial droop, RUE weakness, and difficulty speaking. Patient cleared by nursing. He was supine in bed, agreeable to get up when therapist gestured to sit up on the edge of the bed. He was able to come to sit with contact guard assist and HOB slightly elevated. Static sitting balance is good.  He was able to follow visual commands for BLE AROM, but unable to assess strength due to patient having difficulty following verbal commands. When asked if he was dizzy he did say, \"No\". BP dropped only slightly from supine to sit. He required minimum assistance for sit<>stand transfer and to ambulate 35' using RW. He did appear winded after ambulation, O2 sats on room air were 92%. Patient was able to follow visual commands for pursed lip breathing technique. Patient was able to perform finger-to-nose with LUE after repetitive visual demonstration but was only able to touch his nose with RUE. Unable to test sensation, Kulkarni Balance test, or vision secondary to aphasia. Patient was left supine in bed, call bell within reach, no complaints. Recommend IPR upon discharge secondary to patient is significantly below his prior level of independence. BP supine 147/67  BP sit 134/70    Current Level of Function Impacting Discharge (mobility/balance): overall Lucien for mobility    Functional Outcome Measure: unable to perform Kulkarni balance test secondary to aphasia. Patient scored 19/24 on AMPAC test.    Other factors to consider for discharge: lives alone, was completely I with ADLs and ambulating prior to hospitalization     Patient will benefit from skilled therapy intervention to address the above noted impairments. PLAN :  Recommendations and Planned Interventions: bed mobility training, transfer training, gait training, therapeutic exercises, neuromuscular re-education, patient and family training/education, and therapeutic activities      Frequency/Duration: Patient will be followed by physical therapy:  5 times a week to address goals.     Recommendation for discharge: (in order for the patient to meet his/her long term goals)  Therapy 3 hours per day 5-7 days per week    This discharge recommendation:  Has not yet been discussed the attending provider and/or case management    IF patient discharges home will need the following DME: to be determined (TBD)         SUBJECTIVE: Patient agreed to participate in therapy. OBJECTIVE DATA SUMMARY:   HISTORY:    Past Medical History:   Diagnosis Date    Arthritis     Hips, Knees    CAD (coronary artery disease)     MI around 1990    Hepatitis A     Hepatitis C     High cholesterol     Hypertension     Other ill-defined conditions(799.89)     Light sensitivity, causes seizures    Seizures ([facility] [address]     Last seizure 12/2008/work -up in 2012 -possible seizure    Stroke [facility]) 2005    Thyroid disease     Hypothyroid     Past Surgical History:   Procedure Laterality Date    HX HEART CATHETERIZATION  20 years ago    no stents    HX HERNIA REPAIR  10/8/14    left inguinal hernia- Isma Mitchell MD    HX ORTHOPAEDIC      Left Knee Scope    HX ORTHOPAEDIC  2/2010    Gavin. Total Hip Replacement/Dr. Norva Angelucci ORTHOPAEDIC  6/9/14    R hip replacement     RI OPEN REPAIR CLAVICLE FRACTURE      right        Personal factors and/or comorbidities impacting plan of care: global aphasia, difficulty following verbal commands    Home Situation  Home Environment: Private residence  # Steps to Enter: 4  Rails to Enter: Yes  Hand Rails : Bilateral (wide)  One/Two Story Residence: One story  Living Alone: Yes  Support Systems: Other Family Member(s)  Patient Expects to be Discharged to[de-identified] Home with home health  Current DME Used/Available at Home: None  Tub or Shower Type: Shower    EXAMINATION/PRESENTATION/DECISION MAKING:   Critical Behavior:  Neurologic State: Alert  Orientation Level: Disoriented X4, Unable to verbalize  Cognition: Decreased attention/concentration, Decreased command following, Impulsive  Safety/Judgement: Fall prevention  Hearing:   Auditory  Auditory Impairment: None    Range Of Motion:  AROM: Within functional limits  PROM: Within functional limits    Strength:    Strength: Generally decreased, functional    Tone & Sensation:   Tone: Normal  Sensation:  (unable to test due to aphasia)    Coordination:  Coordination: Generally decreased, functional  Vision:   Acuity:  (grossly intact, unable to formally assess due to aphasia)  Functional Mobility:  Bed Mobility:  Rolling: Contact guard assistance  Supine to Sit: Contact guard assistance; Additional time (visual and tactile cues to initate and for safety)  Sit to Supine: Minimum assistance (bilatera feet back onto bed)  Scooting: Contact guard assistance (visual and tactile cues for safety)  Transfers:  Sit to Stand: Minimum assistance  Stand to Sit: Minimum assistance      Bed to Chair: Minimum assistance (stand pivot)    Balance:   Sitting: Impaired  Sitting - Static: Good (unsupported)  Sitting - Dynamic: Fair (occasional)  Standing: Impaired  Standing - Static: Fair  Standing - Dynamic : Fair  Ambulation/Gait Training:  Distance (ft): 35 Feet (ft)  Assistive Device: Gait belt;Walker, rolling  Ambulation - Level of Assistance: Minimal assistance  Base of Support: Widened   Speed/Yumiko: Pace decreased (<100 feet/min)  Step Length: Right shortened;Left shortened      Therapeutic Exercises:   none    Functional Measure  Kulkarni Balance Test:  Unable to perform due to difficulty following verbal commands from aphasia    1894 Intrinsic-ID (6-Clicks) Version 2  How much HELP from another person do you currently need. .. (If the patient hasn't done an activity recently, how much help from another person do you think they would need if they tried?) Total A Lot A Little None   1. Turning from your back to your side while in a flat bed without using bedrails? []  1 []  2 []  3  [x]  4   2. Moving from lying on your back to sitting on the side of a flat bed without using bedrails? []  1 []  2 [x]  3  []  4   3. Moving to and from a bed to a chair (including a wheelchair)? []  1 []  2 [x]  3  []  4   4. Standing up from a chair using your arms (e.g. wheelchair or bedside chair)? []  1 []  2 [x]  3  []  4   5. Walking in hospital room?  []  1 []  2 [x]  3  []  4 6.  Climbing 3-5 steps with a railing? []  1 []  2 [x]  3  []  4     Raw Score: 19/24                            Cutoff score ?171,2,3 had higher odds of discharging home with home health or need of SNF/IPR. [address], [place]isha [place]. Validity of the AM-PAC 6-Clicks Inpatient Daily Activity and Basic Mobility Short Forms. Physical Therapy Mar 2014, 94 3 379-391; DOI: 10.2522/ptj.[id number]  2. Angel Abbott. Association of AM-PAC \"6-Clicks\" Basic Mobility and Daily Activity Scores With Discharge Destination. Phys Ther. 2021 Apr 4;101(4):adfp914. doi: 10.1093/ptj/vcaf072. PMID: 59193479. V Carlos Grande, Marion KELLEY, Luis A Masters, Coleen K, Jazzmine S. Activity Measure for Post-Acute Care \"6-Clicks\" Basic Mobility Scores Predict Discharge Destination After Acute Care Hospitalization in Select Patient Groups: A Retrospective, Observational Study. Arch Rehabil Res Clin Transl. 2022 Jul 16;4(3):044392. doi: 10.1016/j.arrct. [id number]. PMID: 14118004; PMCID: [id number]. 4. Arlene Larson Ni P. AM-PAC Short Forms Manual 4.0. Revised 2/2020.       Physical Therapy Evaluation Charge Determination   History Examination Presentation Decision-Making   MEDIUM  Complexity : 1-2 comorbidities / personal factors will impact the outcome/ POC  LOW Complexity : 1-2 Standardized tests and measures addressing body structure, function, activity limitation and / or participation in recreation  MEDIUM Complexity : Evolving with changing characteristics  Other outcome measures    MEDIUM      Based on the above components, the patient evaluation is determined to be of the following complexity level: MEDIUM    Pain Rating:  none    Activity Tolerance:   Fair      After treatment patient left in no apparent distress:   Supine in bed, Call bell within reach, and Side rails x 3    COMMUNICATION/EDUCATION:   The patients plan of care was discussed with: Registered nurse. Unable to educate patient on BEFAST symptoms due to aphasia. Fall prevention education was provided and the patient/caregiver indicated understanding., Patient/family have participated as able in goal setting and plan of care. , and Patient/family agree to work toward stated goals and plan of care.     Thank you for this referral.  Carmela Vazquez, PT   Time Calculation: 20 mins

## 2023-03-08 NOTE — PROGRESS NOTES
eTRANSFER SBAR NOTE    IP UNIT CALLED NOTE IS READY: Yes Spoke to Kim De La Paz    IF there are questions Call transferring nurse (your name) Pooja Brantley at phone # [phone]    SITUATION/BACKGROUND:    Patient is being transferred to [facility] Emergency Dept, Room# 35    Patient's Chief Complaint on arrival to ED was Altered Mental Status and is admitted for Stroke workup. CODE STATUS: Full Code    VITAL SIGNS (MUST BE WITHIN 1 HOUR OF TRANSFER TO IP UNIT:    TEMP: 97.4  PULSE: 70  RESP: 16  BP: 132/66  PAIN SCORE: 0  MEWS: 1     ISOLATION/PRECAUTIONS: No   ISOLATION TYPE: standard    Called outstanding consults: Yes     Are there sign and held orders that need to be released? No   Are all STAT orders completed: No     STAT labs collected: No   REPEAT LACTIC ACID DUE? No  TIME DUE: n/a    Are there any titrating drips? No   If so what? N/a    The following personal items will be sent with the patient during transfer to the floor:   All valuables: None  ITEM:    ITEM: Visual Aid: None  ITEM:           ASSESSMENT:    CIWA Assessment: No  Last Score: N/a    NEURO: A/O x self; expressive aphasia  NIH SCORE:   Total: 11 (03/08/23 0723)    LEONID SCREENING:   Swallow Screening  Is the Patient Unable to Remain Alert for Testing?: No (03/08/23 0805)  Is the Patient on a Modified Diet (Thickened Liquids) Due to Pre-existing Dysphagia?: No (03/08/23 0805)  Is There Presence of Existing Enteral Tube Feeding via the Stomach or Nose?: No (03/08/23 0805)  Is There Presence of Head-of-Bed Restrictions (Less than 30 Degrees)?: No (03/08/23 0805)  Is There Presence of Tracheotomy Tube?: No (03/08/23 0805)  Is the Patient Ordered Nothing-by-Mouth Status?: No (03/08/23 0805)  3 oz Water Swallow Screen: Pass (03/08/23 0805)      NEURO ASSESSMENT:   Neuro  Neurologic State: Alert (03/08/23 1100)  Orientation Level: Disoriented X4, Unable to verbalize (03/08/23 1100)  Cognition: Decreased attention/concentration, Decreased command following (03/08/23 1100)  Speech: Aphasic (comment) (expressive) (03/08/23 0930)  Assessment L Pupil: Brisk, Round (03/08/23 0930)  Size L Pupil (mm): 3 (03/08/23 0930)  Assessment R Pupil: Brisk, Round (03/08/23 0930)  Size R Pupil (mm): 3 (03/08/23 0930)  LUE Motor Response:  unequal L greater than R (03/08/23 0930)  LLE Motor Response: Purposeful, Spontaneous , Weak (03/08/23 0930)  RUE Motor Response:  unequal L greater than R (03/08/23 0930)  RLE Motor Response: Purposeful, Spontaneous , Weak (03/08/23 0930)    Is patient impulsive? Yes  Is patient oriented? Oriented to self, can follow commands, expressive aphasia    Do they follow commands? Yes  Is the patient ambulatory? No  Device need N/a    FALL RISK? Yes   Is the [place] 1 Assessment completed? No   INTERVENTIONS: N/a    INTEGUMENTARY:   IS THE PATIENT UNDRESSED? Yes  WOUNDS PRESENT? No  On admit to ED No   ARE THE WOUNDS DOCUMENTED? No     RESPIRATORY:   Is patient on Oxygen? Yes   OXYGEN: Oxygen Therapy  O2 Device: Nasal cannula (03/08/23 1142)  Skin Assessment: Other (see comment/note) (Patient has redness to sacrum) (03/07/23 2000)  O2 Flow Rate (L/min): 2 l/min (03/08/23 1142)    CARDIAC:   Is cardiac monitoring ordered? Yes Last Rhythm: NSR  Patient to transfer with tele box on? Yes  Is patient using a LIFE VEST?  No     LINE ACCESS:   Peripheral IV 03/07/23 Left Antecubital (Active)   Site Assessment Clean, dry, & intact 03/08/23 0910   Phlebitis Assessment 0 03/08/23 0910   Infiltration Assessment 0 03/08/23 0910   Dressing Status Clean, dry, & intact 03/08/23 0910   Dressing Type Transparent 03/08/23 0910   Hub Color/Line Status Green;Flushed 03/08/23 0910   Alcohol Cap Used Yes 03/08/23 0910       Peripheral IV 03/07/23 Right Antecubital (Active)   Site Assessment Clean, dry, & intact 03/08/23 0910   Phlebitis Assessment 0 03/08/23 0910   Infiltration Assessment 0 03/08/23 0910   Dressing Status Clean, dry, & intact 03/08/23 0910   Dressing Type Transparent 23   Hub Color/Line Status Green;Flushed 23   Alcohol Cap Used Yes 23        /GI:   CONTINENT BOWEL/BLADDER? Yes  URINARY OUTPUT: voiding   Written Order for Sánchez Cath? No   CHRONIC OR ACUTE? N/a   If CHRONIC, is it 1days old, was it changed prior to specimen collection? N/a  WAS UA WITH REFLEX SENT TO LAB? No  IF NO,  COLLECT AND SEND PRIOR TO TRANSPORT TO INPATIENT AREA    RESTRAINTS IN USE: No      IS DOCUMENTATION COMPLETE: Yes  Is there a current Order? N/a  When does it ? N/a    Additional details as Needed:    MRI ordered - screening is complete. Sister Augusta Feldman is [facility], is updated with plan of care.

## 2023-03-08 NOTE — CONSULTS
Date of Consultation:  March 8, 2023    Referring Physician: Kyung Rey MD    Reason for Consultation:  acute onset word-finding difficulties. Chief Complaint   Patient presents with    Altered mental status     Pt arrives via EMS [facility]) level 2 stroke pre-alert. Pt last known well at 11am per sister who saw pt at that time, pt's sister returned to see patient at 5pm and he had facial droop with R sided weakness, limited verbal response. Per EMS, pt independent at baseline per sister. Pt  per EMS, /77, upon EMS arrival to house, pt initally hypoxic at 72% on RA, placed on non rebreather, O2 improved to 98% on transport, pt currently on RA       History of Present Illness:   Toney Galicia is a 78 y.o. male with history of chronic left posterior temporal lobe encephalomalacia in the setting of stroke on aspirin, history of seizures well-controlled not on any AED therapy, CAD, hyperlipidemia, hypertension and hypothyroidism who presents with acute onset of right-sided weakness, right facial droop and difficulty with finding words and comprehension concerning for possible stroke. The patient is unable to provide any history given his current medical status. The patient's sisters are in the room who helps to provide the history. The patient's sister states that she saw him doing fine yesterday morning. When she came back to see him around 5 PM last night at his home where he lives alone, she saw that he was in his bed and had urinary incontinence with right-sided weakness and right facial droop and was unable to comprehend her or speak with her properly. Patient lives on his own and is capable of all activities of daily living. He is followed by my colleague Dr. Augustine Mathew in the neurology clinic who sees him for his seizures which are very well controlled and he has been seizure-free since 2008. He also is following him for history of stroke for which she is on aspirin.   He has not had any recent EEGs. Last MRI brain was in  which showed his left temporal posterior lobe encephalomalacia. Based on Dr. Perry Morales notes it does appear that his stroke from the past had left him with some speech impairment and difficulty with finding his words as well as developing a seizure disorder given the location of the stroke. It does appear that he is taking his gabapentin for seizures. He takes 600 mg 4 times daily. His gabapentin level on  was within normal range. Labs are largely unremarkable including ABC, CMP, UA, lactic acid. Past Medical History:   Diagnosis Date    Arthritis     Hips, Knees    CAD (coronary artery disease)     MI around     Hepatitis A     Hepatitis C     High cholesterol     Hypertension     Other ill-defined conditions(799.89)     Light sensitivity, causes seizures    Seizures ([facility] [address]     Last seizure 2008/work -up in  -possible seizure    Stroke [facility])     Thyroid disease     Hypothyroid        Past Surgical History:   Procedure Laterality Date    HX HEART CATHETERIZATION  20 years ago    no stents    HX HERNIA REPAIR  10/8/14    left inguinal hernia- Isma Mitchell MD    HX ORTHOPAEDIC      Left Knee Scope    HX ORTHOPAEDIC  2010    Gavin.  Total Hip Replacement/Dr. William Ruggiero ORTHOPAEDIC  14    R hip replacement     ME OPEN REPAIR CLAVICLE FRACTURE      right         Family History   Problem Relation Age of Onset    Hypertension Mother     Stroke Mother     Diabetes Mother     Heart Disease Father     Cancer Father         lung        Social History     Tobacco Use    Smoking status: Former     Packs/day: 0.25     Years: 25.00     Pack years: 6.25     Types: Cigarettes     Quit date: 2015     Years since quittin.1    Smokeless tobacco: Never   Substance Use Topics    Alcohol use: No     Comment: stopped 10 years ago---beer on the weekends in the past        Allergies   Allergen Reactions    Carbatrol [Carbamazepine] Rash        Prior to Admission medications    Medication Sig Start Date End Date Taking? Authorizing Provider   albuterol (PROVENTIL VENTOLIN) 2.5 mg /3 mL (0.083 %) nebu INHALE THE CONTENTS OF 1 VIAL (3ML) VIA NEBULIZER EVERY 4 HOURS AS NEEDED FOR WHEEZING (MAX FOUR TIMES A DAY)    Provider, Historical   albuterol-ipratropium (DUO-NEB) 2.5 mg-0.5 mg/3 ml nebu 3 ml as needed 4/27/22   Provider, Historical   gabapentin (NEURONTIN) 300 mg capsule TAKE 2 CAPSULES FOUR TIMES DAILY 2/1/23   Almita Rajan MD   azelastine (ASTELIN) 137 mcg (0.1 %) nasal spray USE 2 SPRAYS IN EACH NOSTRIL TWICE DAILY 10/14/21   Provider, Historical   montelukast (SINGULAIR) 10 mg tablet Take 10 mg by mouth daily. 11/27/19   Provider, Historical   ketoconazole (NIZORAL) 2 % shampoo  1/2/20   Provider, Historical   hydroxyzine HCL (ATARAX) 25 mg tablet Take 25 mg by mouth daily. 12/30/19   Provider, Historical   ergocalciferol (ERGOCALCIFEROL) 1,250 mcg (50,000 unit) capsule Take 50,000 Units by mouth every seven (7) days. 12/30/19   Provider, Historical   traMADol (ULTRAM) 50 mg tablet Take 50 mg by mouth two (2) times a day. 10/11/17   Provider, Historical   aspirin delayed-release 325 mg tablet Take 325 mg by mouth daily. Provider, Historical   doxazosin (CARDURA) 4 mg tablet TAKE ONE TABLET BY MOUTH ONCE DAILY AT BEDTIME 6/20/14   Fransisca Chaudhry MD   citalopram (CELEXA) 20 mg tablet TAKE ONE TABLET BY MOUTH ONCE DAILY 6/20/14   Fransisca Chaudhry MD   atorvastatin (LIPITOR) 40 mg tablet Take 40 mg by mouth daily.     Provider, Historical   levothyroxine (SYNTHROID) 50 mcg tablet TAKE ONE TABLET BY MOUTH EVERY DAY 4/14/14   Fransisca Chaudhry MD   amLODIPine (NORVASC) 10 mg tablet TAKE ONE TABLET BY MOUTH EVERY DAY 2/28/14   Fransisca Chaudhry MD       Review of Systems:    General, constitutional: negative  Eyes, vision: negative  Ears, nose, throat: negative  Cardiovascular, heart: negative  Respiratory: negative  Gastrointestinal: negative  Genitourinary: negative  Musculoskeletal: negative  Skin and integumentary: negative  Psychiatric: negative  Endocrine: negative  Neurological: negative, except for HPI  Hematologic/lymphatic: negative  Allergy/immunology: negative    []Unable to obtain  ROS due to  []mental status change  []sedated   []intubated      PHYSICAL EXAMINATION:   Visit Vitals  /74   Pulse 71   Temp 97.4 °F (36.3 °C)   Resp 19   Wt 204 lb 5.9 oz (92.7 kg)   SpO2 91%   BMI 26.96 kg/m²       Physical Exam:  General:  no acute distress  Neck: supple no mass   Lungs: Comfortable on room air  Heart: Well-perfused  Lower extremity: no edema    Neurological exam:  Mental Status: Awake, alert, unable to answer any questions. Globally aphasic. Does not follow any commands. When asked to stick out his tongue he opens his mouth. When asked to show thumbs up he lifts his arms up in the air. Mildly dysarthric. Cranial nerves: II-XII:  Pupils equal and reactive, visual fields intact by confrontation   Extraocular movements intact, no evidence of nystagmus or ptosis   Facial sensation intact to light touch  Right facial droop. Hearing intact to soft rub bilaterally   Shoulder shrug symmetric and strong   Tongue protrusion full and midline without fasciculation or atrophy    Motor:   Normal tone and Bulk     Strength testing:  Patient is unable to perform confrontational strength testing given aphasia. He is antigravity in all 4 extremities possible right arm drift. Right lower extremity appears to also have some mild drift compared to the left side. Sensory: Unable to determine given patient's aphasia. Reflexes:   Biceps Triceps  Brachiorad Patellar Achilles Plantar Hoffmans   Right  2 2 2 2 1 Down NT   Left  2 2 2 2 1 Down NT     Cerebellar testing: Unable to perform given patient's aphasia.     Data:     Lab Results   Component Value Date/Time    Hemoglobin A1c 5.8 11/09/2015 03:25 PM    Sodium 135 (L) 03/07/2023 07:35 PM    Potassium 4.7 03/07/2023 07:35 PM    Chloride 104 03/07/2023 07:35 PM    Glucose 101 (H) 03/07/2023 07:35 PM    BUN 22 (H) 03/07/2023 07:35 PM    Creatinine 1.24 03/07/2023 07:35 PM    Calcium 9.0 03/07/2023 07:35 PM    WBC 10.9 03/07/2023 07:35 PM    HCT 41.9 03/07/2023 07:35 PM    HGB 13.7 03/07/2023 07:35 PM    PLATELET 136 70/37/0729 07:35 PM       Imaging:    Results from Hospital Encounter encounter on 12/08/16    MRI LUMB SPINE WO CONT    Narrative  Indication: Back and right lower extremity pain for greater than one year. No  recent injury    Exam: MRI of the lumbar spine. Sequences include sagittal and axial T1 and  T2-weighted images. Sagittal STIR. Comparisons: October 1, 2014    Contrast: None. Findings: Alignment is normal. There is a chronic superior endplate Schmorl's  node L4. Mild reactive changes are centered at L4-L5 facets with edema extending  into the right L5 pedicle, degenerative in nature. Cord terminus is within  normal limits. Paraspinous soft tissues are within normal limits. T12-L1: No stenosis    L1-L2: No stenosis    L2-L3: There is borderline facet hypertrophy. No stenosis    L3-L4: There is mild facet degenerative change. There is desiccation of this  disc. There is mild narrowing of the foramen. No change    L4-L5: There are bilateral facet degenerative changes without significant disc  bulge. There is mild to moderate left and mild right foraminal narrowing. No  interval change    L5-S1: There are bilateral facet degenerative changes with mild narrowing of the  foramen unchanged    Impression  Impression:  1. Mild to moderate left and mild right foraminal narrowing L4-L5 unchanged  2. Mild bilateral foraminal narrowing L5-S1 unchanged  3.  Mild bilateral foraminal narrowing L3-L4 unchanged      Results from Hospital Encounter encounter on 03/07/23    CT CODE NEURO PERF W CBF    Narrative  CLINICAL HISTORY: Code stroke    EXAMINATION:  CT ANGIOGRAPHY HEAD AND NECK, CT PERFUSION    COMPARISON: None    TECHNIQUE:  Following the uneventful administration of iodinated contrast  material, axial CT angiography of the head and neck was performed. Delayed axial  images through the head were also obtained. Coronal and sagittal reconstructions  were obtained. Manual postprocessing of images was performed. 3-D  Sagittal  maximal intensity projection images were obtained. 3-D Coronal maximal  intensity projections were obtained. CT brain perfusion was performed with  generation of hemodynamic maps of multiple parameters, including cerebral blood  flow, cerebral blood volume, mean transit time, and TMAX. CT dose reduction was  achieved through use of a standardized protocol tailored for this examination  and automatic exposure control for dose modulation. This study was analyzed by the [address] ai algorithm. FINDINGS:    DELAYED ENHANCEMENT HEAD CT  Left occipital/posterior temporal lobe encephalomalacia. No intra or extra-axial  mass or collection. Ventricles are normal in size and configuration. The basal  cisterns are patent. Dural venous sinuses are patent. No abnormal parenchymal or meningeal  enhancement. Mastoid air cells and paranasal sinuses are clear. CTA NECK:    Great vessels: Normal arch anatomy with the origins patent. Right subclavian artery: Patent    Left subclavian artery: Patent    Right common carotid artery: Patent    Left common carotid artery: Patent    Cervical right internal carotid artery: Patent with no significant stenosis by  NASCET criteria. Cervical left internal carotid artery: Patent with no significant stenosis by  NASCET criteria. Right vertebral artery: Ostial stenosis. Otherwise patent. Left vertebral artery: Patent    The lung apices are clear. The thyroid is homogeneous. No cervical  lymphadenopathy. Measurements utilize NASCET criteria.     CTA HEAD:    Right cavernous internal carotid artery: Mild atherosclerotic disease without  hemodynamically significant stenosis. Left cavernous internal carotid artery: Mild atherosclerotic disease without  hemodynamically significant stenosis. Anterior cerebral arteries: Patent    Anterior communicating artery: Patent    Right middle cerebral artery: Patent    Left middle cerebral artery: Patent    Posterior communicating arteries: Diminutive bilaterally. Posterior cerebral arteries: Patent    Basilar artery: Patent    Distal vertebral arteries: Patent    No evidence for intracranial aneurysm or hemodynamically significant stenosis. CT Perfusion:  Small focus of perfusion mismatch correlates with the left posterior temporal  lobe encephalomalacia. No acute perfusion abnormalities. Tmax > 6s: 10 cc  rCBF < 30%: 3 cc  Mismatch volume: 7 cc  Mismatch ratio: 3.3    Impression  1. No acute vascular abnormality, no large vessel occlusion. 2. 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 3. Small focus of perfusion mismatch correlates with left posterior temporal  lobe chronic infarct. No acute perfusion abnormality. IMPRESSION/RECOMMENDATIONS:  Ernestina Oconnell is a 78 y.o. male who presents with history of epilepsy well-controlled not on any medications other than gabapentin, history of left temporal encephalomalacia from stroke on aspirin presenting with acute onset of word finding difficulty and difficulty with comprehension and language and some mild right-sided weakness with right facial droop. Differential diagnosis includes stroke, seizure, recrudescence of old stroke in the left posterior temporal lobe. Initial head CT, CTP, CTA head and neck remarkable for small focus of perfusion mismatch which correlates with left posterior temporal lobe chronic infarct.     Recommendations:  - Recommend brain MRI  - EEG routine  -Continue gabapentin 600 mg 4 times daily, check gabapentin level  - Recommend maintaining SBP <220/120 for 24 hrs from symptom onset and then BP goal is less than 140/90 (this is the long term goal)   - would recommend to continue aspirin 81mg daily for secondary stroke prevention   - Risk factor modification: would recommend obtaining lipid panel and A1c    - if LDL > 70, would start atorvastatin 40mg daily    - please check hepatic panel prior to initiation of statin  - please obtain TTE to rule out intracardiac thrombus   - would monitor on telemetry to rule out arrhythmias    - please obtain PT/OT and speech consultations     Thank you very much for this consultation. Neurology will continue to follow. I spent 60 minutes providing care to this acutely ill inpatient with > 50% of the time counseling as well as reviewing the patient's chart, notes, labs, medications and preparing documentation along with assisting in the coordination of care of the patient on the patient's hospital floor/unit.        Geovanni Hamlin, DO

## 2023-03-08 NOTE — PROCEDURES
EEG REPORT    Patient Name: Naomi Pearce  : 1955  Age: 78 y.o. Ordering physician: No ref. provider found  Date of EEG start time: 3/8/23 at 4:44 Pm   Date of EEG end time: 3/8/23 at 5:19 PM   EEG procedure number: [id number]  Diagnosis: confusion, word-finding difficulties   Interpreting physician: Shaan Denise. Jacey Loya Procedure: EEG    CLINICAL INDICATION: The patient is a 78 y.o. male who is being evaluated for baseline electro cerebral activities and to rule out seizure focus. Current Facility-Administered Medications   Medication Dose Route Frequency    acetaminophen (TYLENOL) tablet 650 mg  650 mg Oral Q4H PRN    Or    acetaminophen (TYLENOL) solution 650 mg  650 mg Per NG tube Q4H PRN    Or    acetaminophen (TYLENOL) suppository 650 mg  650 mg Rectal Q4H PRN    albuterol-ipratropium (DUO-NEB) 2.5 MG-0.5 MG/3 ML  3 mL Nebulization Q6H PRN    [Held by provider] amLODIPine (NORVASC) tablet 10 mg  10 mg Oral DAILY    [Held by provider] atorvastatin (LIPITOR) tablet 40 mg  40 mg Oral DAILY    [Held by provider] citalopram (CELEXA) tablet 20 mg  20 mg Oral DAILY    [Held by provider] doxazosin (CARDURA) tablet 4 mg  4 mg Oral QHS    [Held by provider] gabapentin (NEURONTIN) capsule 300 mg  300 mg Oral QID    [Held by provider] levothyroxine (SYNTHROID) tablet 50 mcg  50 mcg Oral DAILY    [START ON 3/9/2023] aspirin (ASA) suppository 300 mg  300 mg Rectal DAILY    [START ON 3/9/2023] enoxaparin (LOVENOX) injection 40 mg  40 mg SubCUTAneous Q24H    [START ON 3/9/2023] pantoprazole (PROTONIX) 40 mg in 0.9% sodium chloride 10 mL injection  40 mg IntraVENous Q24H    labetaloL (NORMODYNE;TRANDATE) injection 10 mg  10 mg IntraVENous Q6H PRN           DESCRIPTION OF PROCEDURE:   This is a digitally recorded electroencephalogram.  Electrodes were applied in accordance with the international 10-20 system of electrode placement. 18 channels of scalp EEG are recorded.   A channel was used for EoG.  Another channel was used for ECG. The data is stored digitally and reviewed in reformatted montages for optimal display. EEG was reviewed in both bipolar and referential montages. Description of Activity: The background of this recording contains mixed frequencies in the alpha, theta, delta and beta range. The posterior dominant rhythm in the right hemisphere reaches 8 hz and is not well-formed in the left hemisphere. The left hemisphere shows persistent focal asymmetry with admixed delta and theta frequencies. Amplitudes are asymmetric with normal voltages in the right hemisphere and 50% decreased voltages in the left hemisphere. The anterior to posterior gradient in the right hemisphere is well-organized. There is variability, continuity, and reactivity present. Throughout the recording, there were no clear areas of focal slowing nor spike or spike-and-wave discharges seen. Hyperventilation was not performed. Photic stimulation produced no response in the posterior head regions. During drowsiness, there is attenuation of the posterior dominant rhythm, roving horizontal eye movements and slower frequencies in the theta range. During the recording, the patient did not achieve stage II sleep. There are no epileptiform discharges, electrographic seizures or clinical events of concern. Single channel EKG shows regular rate and rhythm. Clinical Interpretation: This EEG, performed during wakefulness and drowsiness/sleep, is abnormal due to focal asymmetry and slowing with decreased voltages noted in the left hemisphere which is consistent with patient's known structural deficit. There were no epileptiform discharges or electrographic seizures or clinical events of concern. If further suspicion persists, would recommend obtaining a continuous EEG study. Clinical correlation recommended. Isabella Ramirez.

## 2023-03-08 NOTE — PROGRESS NOTES
MRI PENDING      MRI SCREENING SHEET NEEDS TO BE COMPLETED AND SIGNED    CALL [phone] WHEN THIS IS DONE      FAX  [phone]

## 2023-03-09 ENCOUNTER — APPOINTMENT (OUTPATIENT)
Dept: NUCLEAR MEDICINE | Age: 68
DRG: 101 | End: 2023-03-09
Attending: INTERNAL MEDICINE
Payer: MEDICARE

## 2023-03-09 ENCOUNTER — APPOINTMENT (OUTPATIENT)
Dept: GENERAL RADIOLOGY | Age: 68
DRG: 101 | End: 2023-03-09
Attending: INTERNAL MEDICINE
Payer: MEDICARE

## 2023-03-09 PROBLEM — I63.9 CVA (CEREBRAL VASCULAR ACCIDENT) (HCC): Status: RESOLVED | Noted: 2023-03-08 | Resolved: 2023-03-09

## 2023-03-09 LAB
CHOLEST SERPL-MCNC: 128 MG/DL
EST. AVERAGE GLUCOSE BLD GHB EST-MCNC: 111 MG/DL
GLUCOSE BLD STRIP.AUTO-MCNC: 73 MG/DL (ref 65–117)
GLUCOSE BLD STRIP.AUTO-MCNC: 77 MG/DL (ref 65–117)
GLUCOSE BLD STRIP.AUTO-MCNC: 91 MG/DL (ref 65–117)
GLUCOSE BLD STRIP.AUTO-MCNC: 96 MG/DL (ref 65–117)
HBA1C MFR BLD: 5.5 % (ref 4–5.6)
HDLC SERPL-MCNC: 59 MG/DL
HDLC SERPL: 2.2 (ref 0–5)
LDLC SERPL CALC-MCNC: 51 MG/DL (ref 0–100)
PSA SERPL-MCNC: 0.2 NG/ML (ref 0.01–4)
SERVICE CMNT-IMP: NORMAL
TRIGL SERPL-MCNC: 90 MG/DL (ref ?–150)
VLDLC SERPL CALC-MCNC: 18 MG/DL

## 2023-03-09 PROCEDURE — 84153 ASSAY OF PSA TOTAL: CPT

## 2023-03-09 PROCEDURE — 92507 TX SP LANG VOICE COMM INDIV: CPT

## 2023-03-09 PROCEDURE — 80171 DRUG SCREEN QUANT GABAPENTIN: CPT

## 2023-03-09 PROCEDURE — 74011000250 HC RX REV CODE- 250: Performed by: STUDENT IN AN ORGANIZED HEALTH CARE EDUCATION/TRAINING PROGRAM

## 2023-03-09 PROCEDURE — 74011000258 HC RX REV CODE- 258: Performed by: INTERNAL MEDICINE

## 2023-03-09 PROCEDURE — 92526 ORAL FUNCTION THERAPY: CPT

## 2023-03-09 PROCEDURE — 83036 HEMOGLOBIN GLYCOSYLATED A1C: CPT

## 2023-03-09 PROCEDURE — C9113 INJ PANTOPRAZOLE SODIUM, VIA: HCPCS | Performed by: STUDENT IN AN ORGANIZED HEALTH CARE EDUCATION/TRAINING PROGRAM

## 2023-03-09 PROCEDURE — 80061 LIPID PANEL: CPT

## 2023-03-09 PROCEDURE — 36415 COLL VENOUS BLD VENIPUNCTURE: CPT

## 2023-03-09 PROCEDURE — 94760 N-INVAS EAR/PLS OXIMETRY 1: CPT

## 2023-03-09 PROCEDURE — 74011250637 HC RX REV CODE- 250/637: Performed by: STUDENT IN AN ORGANIZED HEALTH CARE EDUCATION/TRAINING PROGRAM

## 2023-03-09 PROCEDURE — 74011250636 HC RX REV CODE- 250/636: Performed by: STUDENT IN AN ORGANIZED HEALTH CARE EDUCATION/TRAINING PROGRAM

## 2023-03-09 PROCEDURE — 65270000046 HC RM TELEMETRY

## 2023-03-09 PROCEDURE — 82784 ASSAY IGA/IGD/IGG/IGM EACH: CPT

## 2023-03-09 PROCEDURE — 99233 SBSQ HOSP IP/OBS HIGH 50: CPT | Performed by: INTERNAL MEDICINE

## 2023-03-09 PROCEDURE — 74011250637 HC RX REV CODE- 250/637: Performed by: INTERNAL MEDICINE

## 2023-03-09 PROCEDURE — 82962 GLUCOSE BLOOD TEST: CPT

## 2023-03-09 PROCEDURE — 77010033678 HC OXYGEN DAILY

## 2023-03-09 PROCEDURE — 74011250636 HC RX REV CODE- 250/636: Performed by: INTERNAL MEDICINE

## 2023-03-09 PROCEDURE — 77075 RADEX OSSEOUS SURVEY COMPL: CPT

## 2023-03-09 PROCEDURE — 74011250637 HC RX REV CODE- 250/637: Performed by: NURSE PRACTITIONER

## 2023-03-09 RX ORDER — DEXTROSE, SODIUM CHLORIDE, AND POTASSIUM CHLORIDE 5; .9; .15 G/100ML; G/100ML; G/100ML
75 INJECTION INTRAVENOUS CONTINUOUS
Status: DISCONTINUED | OUTPATIENT
Start: 2023-03-09 | End: 2023-03-09

## 2023-03-09 RX ORDER — HALOPERIDOL 5 MG/ML
2 INJECTION INTRAMUSCULAR
Status: DISCONTINUED | OUTPATIENT
Start: 2023-03-09 | End: 2023-03-13 | Stop reason: HOSPADM

## 2023-03-09 RX ORDER — HYDROXYZINE 25 MG/1
25 TABLET, FILM COATED ORAL ONCE
Status: COMPLETED | OUTPATIENT
Start: 2023-03-09 | End: 2023-03-09

## 2023-03-09 RX ORDER — BUDESONIDE, GLYCOPYRROLATE, AND FORMOTEROL FUMARATE 160; 9; 4.8 UG/1; UG/1; UG/1
2 AEROSOL, METERED RESPIRATORY (INHALATION) 2 TIMES DAILY
COMMUNITY

## 2023-03-09 RX ORDER — BALSAM PERU/CASTOR OIL
OINTMENT (GRAM) TOPICAL 2 TIMES DAILY
Status: DISCONTINUED | OUTPATIENT
Start: 2023-03-09 | End: 2023-03-13 | Stop reason: HOSPADM

## 2023-03-09 RX ORDER — LEVETIRACETAM 500 MG/5ML
500 INJECTION, SOLUTION, CONCENTRATE INTRAVENOUS EVERY 12 HOURS
Status: DISCONTINUED | OUTPATIENT
Start: 2023-03-09 | End: 2023-03-13 | Stop reason: HOSPADM

## 2023-03-09 RX ORDER — DEXTROSE MONOHYDRATE AND SODIUM CHLORIDE 5; .9 G/100ML; G/100ML
75 INJECTION, SOLUTION INTRAVENOUS CONTINUOUS
Status: DISCONTINUED | OUTPATIENT
Start: 2023-03-09 | End: 2023-03-10

## 2023-03-09 RX ADMIN — LEVETIRACETAM 500 MG: 100 INJECTION, SOLUTION INTRAVENOUS at 23:27

## 2023-03-09 RX ADMIN — SODIUM CHLORIDE 40 MG: 9 INJECTION, SOLUTION INTRAMUSCULAR; INTRAVENOUS; SUBCUTANEOUS at 16:22

## 2023-03-09 RX ADMIN — DEXTROSE AND SODIUM CHLORIDE 75 ML/HR: 5; 900 INJECTION, SOLUTION INTRAVENOUS at 15:01

## 2023-03-09 RX ADMIN — ASPIRIN 300 MG: 300 SUPPOSITORY RECTAL at 09:27

## 2023-03-09 RX ADMIN — CASTOR OIL AND BALSAM, PERU: 788; 87 OINTMENT TOPICAL at 22:09

## 2023-03-09 RX ADMIN — ENOXAPARIN SODIUM 40 MG: 100 INJECTION SUBCUTANEOUS at 09:26

## 2023-03-09 RX ADMIN — LEVETIRACETAM 500 MG: 100 INJECTION, SOLUTION INTRAVENOUS at 16:25

## 2023-03-09 RX ADMIN — HYDROXYZINE HYDROCHLORIDE 25 MG: 25 TABLET, FILM COATED ORAL at 23:26

## 2023-03-09 NOTE — PROGRESS NOTES
End of Shift Note     Bedside shift change report given to Cuevas RN  (oncoming nurse) by Laura Khalil RN (offgoing nurse). Report included the following information Kardex, ED Summary, MAR, and Recent Results     Shift worked: 7a-7p   Shift summary and any significant changes:     Patient to have bone scan in am. Initially refused it and labs but became cooperative after talking with his sister. Echo also pending. Concerns for physician to address:  none   Zone phone for oncoming shift:   [phone]      Patient Shiva Freedman  78 y.o.  3/7/2023  6:21 PM by Grisel Finney DO.  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[address] 06/09/2014    Late effect of stroke 05/23/2013    Encephalopathy, unspecified 04/21/2012    CVA (cerebral infarction) 09/27/2010    Muscle strain 09/27/2010    Localization-related partial epilepsy with complex partial seizures ([address] 09/27/2010    HTN (hypertension) 09/27/2010    Hypercholesterolemia 09/27/2010    Total hip replacements Bilateral 09/27/2010    CAD (coronary artery disease) 09/27/2010    Hepatitis A 09/27/2010    Hepatitis C 09/27/2010    Osteoarthrosis, hip 08/02/2010           Past Medical History:   Diagnosis Date    Arthritis       Hips, Knees    CAD (coronary artery disease)       MI around 1990    Hepatitis A      Hepatitis C      High cholesterol      Hypertension      Other ill-defined conditions(799.89)       Light sensitivity, causes seizures    Seizures ([address]       Last seizure 12/2008/work -up in 2012 -possible seizure Stroke [facility]) 2005    Thyroid disease       Hypothyroid         Core Measures:  CVA: No Yes  CHF:No No  PNA:No No     Activity:  Activity Level: Up with Assistance  Number times ambulated in hallways past shift: 0  Number of times OOB to chair past shift: 0     Cardiac:   Cardiac Monitoring: Yes      Cardiac Rhythm: Sinus Rhythm     Access:   Current line(s): PIV   Central Line? No Placement date  Reason Medically Necessary   PICC LINE? No Placement date Reason Medically Necessary      Genitourinary:   Urinary status: voiding  Urinary Catheter? No Placement Date  Reason Medically Necessary      Respiratory:   O2 Device: Nasal cannula  Chronic home O2 use?: NO  Incentive spirometer at bedside: NO     GI:  Current diet:  Soft and moist, thin liquids  Passing flatus: YES  Tolerating current diet: YES     Pain Management:   Patient states pain is manageable on current regimen: YES     Skin:  Ruel Score: 17  Interventions: float heels and PT/OT consult    Patient Safety:  Fall Score:  Total Score: 2  Interventions: bed/chair alarm, gripper socks, pt to call before getting OOB, and tele sitter      DVT prophylaxis:  DVT prophylaxis Med- Yes  DVT prophylaxis SCD or KEN- Yes      Wounds: (If Applicable)  Wounds- No  Location      Active Consults:  IP CONSULT TO NEUROLOGY   Consult hematology  Length of Stay:  Expected LOS: - - -  Actual LOS: 1  Discharge Plan: Possibly Monday 3/13/

## 2023-03-09 NOTE — PROGRESS NOTES
Upon walking into the room, pt was attempting to get put of bed. He was becoming increasingly agitated. RN attempted to place roll belt on pt. He then began to stand up on the bed, pulled at the oxygen on the wall, and swinging at nurses. Code myra called. MD contacted.  Ativan 2 mg given

## 2023-03-09 NOTE — PROGRESS NOTES
Transition of Care Plan:    RUR:  low 10%  Disposition: acute ( IPR ) VS SNF   If SNF or IPR: Date FOC offered: 3-9-23  Date FOC received: 3-9-23  Date authorization started with reference number:  Date authorization received and expires:  Accepting facility: Referrals sent    Follow up appointments: to be made  DME needed: tbd   Transportation at Discharge: medical   [place] or means to access home:      n/a   IM Medicare Letter: 2 nd letter   Is patient a  and connected with the [facility]?              no  If yes, was [employer] transfer form completed and [facility] notified? Caregiver Contact:  Discharge Caregiver contacted prior to discharge? Care Conference needed?:       NO              Reason for Admission:   CVA                      RUR Score:    10%                  Plan for utilizing home health:      N/A     PCP: First and Last name:  LALITO [address] one month ago     Name of Practice:    Are you a current patient: Yes/No:    Approximate date of last visit:  1 month ago   Can you participate in a virtual visit with your PCP:  not sure                     Current Advanced Directive/Advance Care Plan: Full Code      Healthcare Decision Maker:   Click here to complete [address] including selection of the Healthcare Decision Maker Relationship (ie \"Primary\")                           Transition of Care Plan:           I spoke with sister this am-  patient was living alone. Was independent in all ADLS. He drives. He has had a CVA before per sister. He has a walker from a previous hip incident. He is not a [place]. He saw Dr Leticia Nobles about a month ago per sister. He has no psych history. He is currently on [employer]as[employer]. Recs are for IPR. Sister requesting [facility] as first choice. I did leave both IPR and SNF list in room this am as she will be visiting today.     Greta Warner, Rn  946 am  Care manager

## 2023-03-09 NOTE — PROGRESS NOTES
End of Shift Note    Bedside shift change report given to Tony Perkins RN  (oncoming nurse) by Chacha Montes RN (offgoing nurse). Report included the following information Kardex, ED Summary, MAR, and Recent Results    Shift worked:  Nights    Shift summary and any significant changes:    Pt became agitated with staff in the beginning of the night. Code atlas called, medication given, pt calm and in bed the rest pf the night. Labs drawn     Concerns for physician to address:  none   Zone phone for oncoming shift:   [phone]     Patient Anahy Freedman  78 y.o.  3/7/2023  6:21 PM by Myra Kennedy DO.  Teofilo Sanches was admitted from Home    Problem List  Patient Active Problem List    Diagnosis Date Noted    CVA (cerebral vascular accident) ([address] 03/08/2023    Complex partial seizure evolving to generalized seizure ([address] 11/22/2016    Cerebral infarction due to thrombosis of left middle cerebral artery ([address] 11/22/2016    Infected prosthetic hip ([address] 11/10/2015    Carotid artery stenosis with cerebral infarction ([address] 11/02/2015    Failed total hip arthroplasty ([address] 06/09/2014    Late effect of stroke 05/23/2013    Encephalopathy, unspecified 04/21/2012    CVA (cerebral infarction) 09/27/2010    Muscle strain 09/27/2010    Localization-related partial epilepsy with complex partial seizures ([address] 09/27/2010    HTN (hypertension) 09/27/2010    Hypercholesterolemia 09/27/2010    Total hip replacements Bilateral 09/27/2010    CAD (coronary artery disease) 09/27/2010    Hepatitis A 09/27/2010    Hepatitis C 09/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 History:   Diagnosis Date    Arthritis     Hips, Knees    CAD (coronary artery disease)     MI around 1990    Hepatitis A     Hepatitis C     High cholesterol     Hypertension     Other ill-defined conditions(799.89)     Light sensitivity, causes seizures    Seizures ([address]     Last seizure 12/2008/work -up in 2012 -possible seizure Stroke [facility]) 2005    Thyroid disease     Hypothyroid       Core Measures:  CVA: Yes Yes  CHF:No No  PNA:No No    Activity:  Activity Level: Up with Assistance  Number times ambulated in hallways past shift: 0  Number of times OOB to chair past shift: 1    Cardiac:   Cardiac Monitoring: Yes      Cardiac Rhythm: Sinus Rhythm    Access:   Current line(s): PIV   Central Line? No Placement date  Reason Medically Necessary   PICC LINE? No Placement date Reason Medically Necessary     Genitourinary:   Urinary status: voiding  Urinary Catheter? No Placement Date  Reason Medically Necessary     Respiratory:   O2 Device: Nasal cannula  Chronic home O2 use?: NO  Incentive spirometer at bedside: NO       GI:     Current diet:  DIET NPO  Passing flatus: YES  Tolerating current diet: YES       Pain Management:   Patient states pain is manageable on current regimen: YES    Skin:  Ruel Score: 17  Interventions: float heels and PT/OT consult    Patient Safety:  Fall Score:  Total Score: 2  Interventions: bed/chair alarm, gripper socks, pt to call before getting OOB, and tele sitter      @Rollbelt  @dexterity to release roll belt  Yes/No ( must document dexterity  here by stating Yes or No here, otherwise this is a restraint and must follow restraint documentation policy.)    DVT prophylaxis:  DVT prophylaxis Med- Yes  DVT prophylaxis SCD or KEN- Yes     Wounds: (If Applicable)  Wounds- No  Location     Active Consults:  IP CONSULT TO NEUROLOGY    Length of Stay:  Expected LOS: - - -  Actual LOS: 1  Discharge Plan: No awaiting testing      Vinay Shanks RN

## 2023-03-09 NOTE — PROGRESS NOTES
Problem: Pressure Injury - Risk of  Goal: *Prevention of pressure injury  Description: Document Ruel Scale and appropriate interventions in the flowsheet.   Outcome: Progressing Towards Goal  Note: Pressure Injury Interventions:  Sensory Interventions: Assess changes in LOC, Check visual cues for pain, Keep linens dry and wrinkle-free    Moisture Interventions: Absorbent underpads, Apply protective barrier, creams and emollients    Activity Interventions: Chair cushion, Increase time out of bed    Mobility Interventions: PT/OT evaluation, HOB 30 degrees or less    Nutrition Interventions: Document food/fluid/supplement intake, Discuss nutritional consult with provider                     Problem: Aspiration - Risk of  Goal: *Absence of aspiration  Outcome: Progressing Towards Goal     Problem: TIA/CVA Stroke: Day 2 Until Discharge  Goal: Activity/Safety  Outcome: Progressing Towards Goal  Goal: Diagnostic Test/Procedures  Outcome: Progressing Towards Goal  Goal: Nutrition/Diet  Outcome: Progressing Towards Goal  Goal: Discharge Planning  Outcome: Progressing Towards Goal  Goal: Medications  Outcome: Progressing Towards Goal  Goal: Respiratory  Outcome: Progressing Towards Goal  Goal: Treatments/Interventions/Procedures  Outcome: Progressing Towards Goal  Goal: Psychosocial  Outcome: Progressing Towards Goal  Goal: *Verbalizes anxiety and depression are reduced or absent  Outcome: Progressing Towards Goal  Goal: *Absence of aspiration  Outcome: Progressing Towards Goal  Goal: *Absence of deep venous thrombosis signs and symptoms(Stroke Metric)  Outcome: Progressing Towards Goal  Goal: *Optimal pain control at patient's stated goal  Outcome: Progressing Towards Goal  Goal: *Tolerating diet  Outcome: Progressing Towards Goal  Goal: *Ability to perform ADLs and demonstrates progressive mobility and function  Outcome: Progressing Towards Goal  Goal: *Stroke education continued(Stroke Metric)  Outcome: Progressing Towards Goal     Problem: Falls - Risk of  Goal: *Absence of Falls  Description: Document Alma Pelaez Fall Risk and appropriate interventions in the flowsheet.   Outcome: Progressing Towards Goal  Note: Fall Risk Interventions:       Mentation Interventions: Adequate sleep, hydration, pain control, Door open when patient unattended         Elimination Interventions: Call light in reach

## 2023-03-09 NOTE — PROGRESS NOTES
Hospitalist Progress Note    NAME: Melina Ortiz   :  1955   MRN:  [id number]       Assessment / Plan:  Acute encephalopathy due to possible seizure, CVA is ruled out  Hstory of left temporal encephalomalacia from CVA  CT head CTP, CTA head and neck remarkable for small focus of perfusion mismatch which correlates with left posterior temporal lobe chronic infarct. MRI brain has been unremarkable for any acute infarct. EEG is reviewed, neg for seizure  A1C 5.5,  LDL 51  TTE pending  SLP evlauation is ongoing. Npo for now  Will start D5 NS  Stsart ASA suppository until pt can take po intake. Cont' keppra, neurology is following, discussed with Dr Virgilio Montes, will start keppra 500mg. Cont' gabapentin. Check level    Attempted to call pt's sister however unable to reach her by phone. Seizure precautions  PT/OT    Lytic lesions concerning for MM  Xr bone survey is ordered  Send spep and upep  Consult oncology        Estimated discharge date: 3/11 pending clinical improvement    Code status: full   Prophylaxis:   Recommended Disposition:      Subjective:     Chief Complaint / Reason for Physician Visit  Pt is confused. Discussed with RN events overnight. Review of Systems:  Symptom Y/N Comments  Symptom Y/N Comments   Fever/Chills    Chest Pain     Poor Appetite    Edema     Cough    Abdominal Pain     Sputum    Joint Pain     SOB/MUÑOZ    Pruritis/Rash     Nausea/vomit    Tolerating PT/OT     Diarrhea    Tolerating Diet     Constipation    Other       Could NOT obtain due to:      Objective:     VITALS:   Last 24hrs VS reviewed since prior progress note.  Most recent are:  Patient Vitals for the past 24 hrs:   Temp Pulse Resp BP SpO2   23 0925 97.9 °F (36.6 °C) 70 17 132/72 92 %   23 0217 98.4 °F (36.9 °C) 75 18 (!) 148/86 92 %   23 2107 98.1 °F (36.7 °C) 78 18 139/70 90 %   23 1548 -- 71 -- -- --   23 1454 97.3 °F (36.3 °C) 75 -- (!) 148/65 93 %   23 1333 -- 70 -- -- --   03/08/23 1326 97.9 °F (36.6 °C) 69 16 135/86 94 %   03/08/23 1142 97.4 °F (36.3 °C) 71 18 139/74 91 %     No intake or output data in the 24 hours ending 03/09/23 1016     I had a face to face encounter and independently examined this patient on 3/9/2023, as outlined below:  PHYSICAL EXAM:  General: WD, WN. Alert, cooperative  EENT:  EOMI. Anicteric sclerae. MMM  Resp:  CTA bilaterally, no wheezing or rales. No accessory muscle use  CV:  Regular  rhythm,  No edema  GI:  Soft, Non distended, Non tender. +Bowel sounds  Neurologic:  Confused. Psych:   Not anxious nor agitated  Skin:  No rashes. No jaundice    Reviewed most current lab test results and cultures  YES  Reviewed most current radiology test results   YES  Review and summation of old records today    NO  Reviewed patient's current orders and MAR    YES  PMH/ reviewed - no change compared to H&P  ________________________________________________________________________  Care Plan discussed with:    Comments   Patient x    Family      RN x    Care Manager     Consultant                        Multidiciplinary team rounds were held today with , nursing, pharmacist and clinical coordinator. Patient's plan of care was discussed; medications were reviewed and discharge planning was addressed. ________________________________________________________________________  Total NON critical care TIME:  35   Minutes    Total CRITICAL CARE TIME Spent:   Minutes non procedure based      Comments   >50% of visit spent in counseling and coordination of care     ________________________________________________________________________  Adan Reyes MD     Procedures: see electronic medical records for all procedures/Xrays and details which were not copied into this note but were reviewed prior to creation of Plan. LABS:  I reviewed today's most current labs and imaging studies.   Pertinent labs include:  Recent Labs     03/07/23  1935   WBC 10. 9   HGB 13.7   HCT 41.9        Recent Labs     03/07/23 1935   *   K 4.7      CO2 25   *   BUN 22*   CREA 1.24   CA 9.0   MG 2.4   ALB 3.9   TBILI 0.5   ALT 20       Signed: Danish Shah MD

## 2023-03-09 NOTE — PROGRESS NOTES
Problem: Dysphagia (Adult)  Goal: *Acute Goals and Plan of Care (Insert Text)  Description: Speech Therapy Goals  Initiated 3/8/2023  1. Patient will participate in re-evaluation of swallow function within 7 days. MET 3/9/2023  2. Patient will tolerate least restrictive diet without signs of aspiration or decline in respiratory status within 7 days. Outcome: Progressing Towards Goal     Problem: Communication Impaired (Adult)  Goal: *Acute Goals and Plan of Care (Insert Text)  Description: Speech Therapy Goals  Initiated 3/8/2023  1. Patient will answer biographical yes/no questions with 60% accuracy within 7 days. 2.  Patient follow 1 step commands with 60% given minimal cues within 7 days. 3.  Patient will complete automatic speech (counting etc, automatic sentence completion) with 60% accuracy within 7 days. 4.  Patient will state 1 biographical fact using multi modalities within 7 days. Outcome: Progressing Towards Goal     SPEECH LANGUAGE PATHOLOGY DYSPHAGIA AND SPEECH TREATMENT  Patient: Eugenio Sethi (25 y.o. male)  Date: 3/9/2023  Diagnosis: CVA (cerebral vascular accident) ([facility] [I63.9] <principal problem not specified>      Precautions:       ASSESSMENT:  Patient seen in follow-up on this date. MRI revealed \"No acute intracranial abnormality. Large chronic left PCA territory infarct. Mild chronic microvascular ischemic disease. Small chronic infarcts in the bilateral cerebellum. \" Patient presented with improved bolus acceptance and oral manipulation/mastication on this date. Patient demonstrated grossly functional mastication with solid trials and achieved efficient oral clearance. No signs of aspiration observed following single and sequential sips of thin liquid via straw. Patient observed with significantly delayed cough after PO trials, suspect unrelated to swallow function. Patient continues with expressive and receptive language deficits in the absence of acute infarct per MRI.  No family present at bedside to determine baseline communication function, however Neurology notes that [facility] JULIO CESARIN on Dr. Ludwin Braden notes it does appear that his stroke from the past had left him with some speech impairment and difficulty with finding his words. \" Patient continues to demonstrate difficulty with basic 1-step command following, repetition, auditory comprehension in response to simple yes/no questions. Patient observed with instances of intelligible, functional communication (i.e. \"I don't want anymore\") vs speech characterized by perseveration and paraphasias. Patient with history of left temporal encephalomalacia from history of stroke, however unclear at this time how far patient is from baseline expressive/receptive communication. Given patient's acute enceophalopathy and history of stroke and seizure, recommend Soft&Bite-Sized/Thin Liquid diet with strict aspiration precautions on this date. RN educated re: SLP recommendations. PLAN:  Recommendations and Planned Interventions:  -- Soft&Bite-Sized/Thin Liquid diet  -- Medication as tolerated  -- Strict oral care  -- Upright for all PO  -- Set-up for mealtime, as needed  -- Small bites/sips    Patient continues to benefit from skilled intervention to address the above impairments. Continue treatment per established plan of care. Discharge Recommendations: To Be Determined     SUBJECTIVE:   Patient stated sour when he tasted the applesauce. OBJECTIVE:   Cognitive and Communication Status:  Neurologic State: Eyes open to stimulus, Alert  Orientation Level:  Other (Comment), Disoriented X4 (Assessed via yes/no questions)  Cognition: Decreased attention/concentration, Decreased command following  Perception: Cues to maintain midline in sitting, Cues to maintain midline in standing, Tactile, Visual  Perseveration: Perseverates during conversation  Safety/Judgement: Decreased awareness of environment    Dysphagia Treatment and Interventions:    P.O. Trials:  Patient Position: Upright in bed  Vocal quality prior to P.O.: No impairment  Consistency Presented: Ice chips; Thin liquid;Puree; Solid  How Presented: Self-fed/presented;Straw;Successive swallows;SLP-fed/presented;Spoon     Bolus Acceptance: No impairment  Bolus Formation/Control: No impairment        Oral Residue: None  Initiation of Swallow: No impairment  Laryngeal Elevation: Functional  Aspiration Signs/Symptoms: Delayed cough/throat clear (Delayed cough observed significantly after PO intake, suspect unrelated)  Pharyngeal Phase Characteristics: No impairment, issues, or problems                    Speech Treatment and Interventions: Motor Speech:                       Speech Characteristics: Perseveration;Paraphasias             Language Comprehension and Expression:  Auditory Comprehension   Response to Basic Yes/No Questions (%): 25 %  One-Step Basic Commands (%): 25 %  Verbal Expression  Verbal Expression  Primary Mode of Expression: Verbal  Initiation: No impairment  Automatic Speech Task: Impaired (comment)  Repetition: Impaired  Speech Characteristics: Perseveration;Paraphasias    Voice:   WFL       Response & Tolerance to Activities:         Pain:             After treatment:   Patient left in no apparent distress in bed, Call bell within reach, and Nursing notified    COMMUNICATION/EDUCATION:   The patient's plan of care including recommendations, planned interventions, and recommended diet changes were discussed with: Registered nurse.        SITA Rico  Time Calculation: 20 mins

## 2023-03-09 NOTE — PROGRESS NOTES
End of Shift Note     Bedside shift change report given to Cuevas RN  (oncoming nurse) by Delilah Padilla RN (offgoing nurse). Report included the following information Kardex, ED Summary, MAR, and Recent Results     Shift worked: 7a-7p   Shift summary and any significant changes:     Patient to have bone scan in am. Initially refused it and labs but became cooperative after talking with his sister. Echo also pending. Concerns for physician to address:  none   Zone phone for oncoming shift:   [phone]      Patient Montana Freedman  78 y.o.  3/7/2023  6:21 PM by Latonya Smith DO.  Maryjo Aceves was admitted from Home     Problem List       Patient Active Problem List     Diagnosis Date Noted    CVA (cerebral vascular accident) ([address] 03/08/2023    Complex partial seizure evolving to generalized seizure ([address] 11/22/2016    Cerebral infarction due to thrombosis of left middle cerebral artery ([address] 11/22/2016    Infected prosthetic hip ([address] 11/10/2015    Carotid artery stenosis with cerebral infarction ([address] 11/02/2015    Failed total hip arthroplasty ([address] 06/09/2014    Late effect of stroke 05/23/2013    Encephalopathy, unspecified 04/21/2012    CVA (cerebral infarction) 09/27/2010    Muscle strain 09/27/2010    Localization-related partial epilepsy with complex partial seizures ([address] 09/27/2010    HTN (hypertension) 09/27/2010    Hypercholesterolemia 09/27/2010    Total hip replacements Bilateral 09/27/2010    CAD (coronary artery disease) 09/27/2010    Hepatitis A 09/27/2010    Hepatitis C 09/27/2010    Osteoarthrosis, hip 08/02/2010           Past Medical History:   Diagnosis Date    Arthritis       Hips, Knees    CAD (coronary artery disease)       MI around 1990    Hepatitis A      Hepatitis C      High cholesterol      Hypertension      Other ill-defined conditions(799.89)       Light sensitivity, causes seizures    Seizures ([address]       Last seizure 12/2008/work -up in 2012 -possible seizure Stroke [facility]) 2005    Thyroid disease       Hypothyroid         Core Measures:  CVA: No Yes  CHF:No No  PNA:No No     Activity:  Activity Level: Up with Assistance  Number times ambulated in hallways past shift: 0  Number of times OOB to chair past shift: 0     Cardiac:   Cardiac Monitoring: Yes      Cardiac Rhythm: Sinus Rhythm     Access:   Current line(s): PIV   Central Line? No Placement date  Reason Medically Necessary   PICC LINE? No Placement date Reason Medically Necessary      Genitourinary:   Urinary status: voiding  Urinary Catheter? No Placement Date  Reason Medically Necessary      Respiratory:   O2 Device: Nasal cannula  Chronic home O2 use?: NO  Incentive spirometer at bedside: NO     GI:  Current diet:  Soft and moist, thin liquids  Passing flatus: YES  Tolerating current diet: YES     Pain Management:   Patient states pain is manageable on current regimen: YES     Skin:  Ruel Score: 17  Interventions: float heels and PT/OT consult    Patient Safety:  Fall Score:  Total Score: 2  Interventions: bed/chair alarm, gripper socks, pt to call before getting OOB, and tele sitter      DVT prophylaxis:  DVT prophylaxis Med- Yes  DVT prophylaxis SCD or KEN- Yes      Wounds: (If Applicable)  Wounds- No  Location      Active Consults:  IP CONSULT TO NEUROLOGY   Consult hematology  Length of Stay:  Expected LOS: - - -  Actual LOS: 1  Discharge Plan: Possibly Monday 3/13/

## 2023-03-09 NOTE — PROGRESS NOTES
End of Shift Note    Bedside shift change report given to Audelia Velarde, RN  (oncoming nurse) by Barbie Hall RN (offgoing nurse). Report included the following information Kardex, ED Summary, MAR, and Recent Results    Shift worked:  Nights    Shift summary and any significant changes:    Pt became agitated with staff in the beginning of the night. Code atlas called, medication given, pt calm and in bed the rest pf the night. Labs drawn     Concerns for physician to address:  none   Zone phone for oncoming shift:   [phone]     Patient Lo Freedman  78 y.o.  3/7/2023  6:21 PM by Gisella Hernandez DO.  Grace Simpson was admitted from Home    Problem List  Patient Active Problem List    Diagnosis Date Noted    CVA (cerebral vascular accident) ([address] 03/08/2023    Complex partial seizure evolving to generalized seizure ([address] 11/22/2016    Cerebral infarction due to thrombosis of left middle cerebral artery ([address] 11/22/2016    Infected prosthetic hip ([address] 11/10/2015    Carotid artery stenosis with cerebral infarction ([address] 11/02/2015    Failed total hip arthroplasty ([address] 06/09/2014    Late effect of stroke 05/23/2013    Encephalopathy, unspecified 04/21/2012    CVA (cerebral infarction) 09/27/2010    Muscle strain 09/27/2010    Localization-related partial epilepsy with complex partial seizures ([address] 09/27/2010    HTN (hypertension) 09/27/2010    Hypercholesterolemia 09/27/2010    Total hip replacements Bilateral 09/27/2010    CAD (coronary artery disease) 09/27/2010    Hepatitis A 09/27/2010    Hepatitis C 09/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 History:   Diagnosis Date    Arthritis     Hips, Knees    CAD (coronary artery disease)     MI around 1990    Hepatitis A     Hepatitis C     High cholesterol     Hypertension     Other ill-defined conditions(799.89)     Light sensitivity, causes seizures    Seizures ([address]     Last seizure 12/2008/work -up in 2012 -possible seizure Stroke [facility]) 2005    Thyroid disease     Hypothyroid       Core Measures:  CVA: Yes Yes  CHF:No No  PNA:No No    Activity:  Activity Level: Up with Assistance  Number times ambulated in hallways past shift: 0  Number of times OOB to chair past shift: 1    Cardiac:   Cardiac Monitoring: Yes      Cardiac Rhythm: Sinus Rhythm    Access:   Current line(s): PIV   Central Line? No Placement date  Reason Medically Necessary   PICC LINE? No Placement date Reason Medically Necessary     Genitourinary:   Urinary status: voiding  Urinary Catheter? No Placement Date  Reason Medically Necessary     Respiratory:   O2 Device: Nasal cannula  Chronic home O2 use?: NO  Incentive spirometer at bedside: NO       GI:     Current diet:  DIET NPO  Passing flatus: YES  Tolerating current diet: YES       Pain Management:   Patient states pain is manageable on current regimen: YES    Skin:  Ruel Score: 17  Interventions: float heels and PT/OT consult    Patient Safety:  Fall Score:  Total Score: 2  Interventions: bed/chair alarm, gripper socks, pt to call before getting OOB, and tele sitter      @Rollbelt  @dexterity to release roll belt  Yes/No ( must document dexterity  here by stating Yes or No here, otherwise this is a restraint and must follow restraint documentation policy.)    DVT prophylaxis:  DVT prophylaxis Med- Yes  DVT prophylaxis SCD or KEN- Yes     Wounds: (If Applicable)  Wounds- No  Location     Active Consults:  IP CONSULT TO NEUROLOGY    Length of Stay:  Expected LOS: - - -  Actual LOS: 1  Discharge Plan: No awaiting testing      Michael Birmingham RN

## 2023-03-09 NOTE — PROGRESS NOTES
Problem: Dysphagia (Adult)  Goal: *Acute Goals and Plan of Care (Insert Text)  Description: Speech Therapy Goals  Initiated 3/8/2023  1. Patient will participate in re-evaluation of swallow function within 7 days. MET 3/9/2023  2. Patient will tolerate least restrictive diet without signs of aspiration or decline in respiratory status within 7 days. Outcome: Progressing Towards Goal     Problem: Communication Impaired (Adult)  Goal: *Acute Goals and Plan of Care (Insert Text)  Description: Speech Therapy Goals  Initiated 3/8/2023  1. Patient will answer biographical yes/no questions with 60% accuracy within 7 days. 2.  Patient follow 1 step commands with 60% given minimal cues within 7 days. 3.  Patient will complete automatic speech (counting etc, automatic sentence completion) with 60% accuracy within 7 days. 4.  Patient will state 1 biographical fact using multi modalities within 7 days. Outcome: Progressing Towards Goal     SPEECH LANGUAGE PATHOLOGY DYSPHAGIA AND SPEECH TREATMENT  Patient: Armida Ramos (89 y.o. male)  Date: 3/9/2023  Diagnosis: CVA (cerebral vascular accident) ([facility] [I63.9] <principal problem not specified>      Precautions:       ASSESSMENT:  Patient seen in follow-up on this date. MRI revealed \"No acute intracranial abnormality. Large chronic left PCA territory infarct. Mild chronic microvascular ischemic disease. Small chronic infarcts in the bilateral cerebellum. \" Patient presented with improved bolus acceptance and oral manipulation/mastication on this date. Patient demonstrated grossly functional mastication with solid trials and achieved efficient oral clearance. No signs of aspiration observed following single and sequential sips of thin liquid via straw. Patient observed with significantly delayed cough after PO trials, suspect unrelated to swallow function. Patient continues with expressive and receptive language deficits in the absence of acute infarct per MRI.  No family present at bedside to determine baseline communication function, however Neurology notes that [facility] on Dr. Brynn Moraes notes it does appear that his stroke from the past had left him with some speech impairment and difficulty with finding his words. \" Patient continues to demonstrate difficulty with basic 1-step command following, repetition, auditory comprehension in response to simple yes/no questions. Patient observed with instances of intelligible, functional communication (i.e. \"I don't want anymore\") vs speech characterized by perseveration and paraphasias. Patient with history of left temporal encephalomalacia from history of stroke, however unclear at this time how far patient is from baseline expressive/receptive communication. Given patient's acute enceophalopathy and history of stroke and seizure, recommend Soft&Bite-Sized/Thin Liquid diet with strict aspiration precautions on this date. RN educated re: SLP recommendations. PLAN:  Recommendations and Planned Interventions:  -- Soft&Bite-Sized/Thin Liquid diet  -- Medication as tolerated  -- Strict oral care  -- Upright for all PO  -- Set-up for mealtime, as needed  -- Small bites/sips    Patient continues to benefit from skilled intervention to address the above impairments. Continue treatment per established plan of care. Discharge Recommendations: To Be Determined     SUBJECTIVE:   Patient stated sour when he tasted the applesauce. OBJECTIVE:   Cognitive and Communication Status:  Neurologic State: Eyes open to stimulus, Alert  Orientation Level:  Other (Comment), Disoriented X4 (Assessed via yes/no questions)  Cognition: Decreased attention/concentration, Decreased command following  Perception: Cues to maintain midline in sitting, Cues to maintain midline in standing, Tactile, Visual  Perseveration: Perseverates during conversation  Safety/Judgement: Decreased awareness of environment    Dysphagia Treatment and Interventions:    P.O. Trials:  Patient Position: Upright in bed  Vocal quality prior to P.O.: No impairment  Consistency Presented: Ice chips; Thin liquid;Puree; Solid  How Presented: Self-fed/presented;Straw;Successive swallows;SLP-fed/presented;Spoon     Bolus Acceptance: No impairment  Bolus Formation/Control: No impairment        Oral Residue: None  Initiation of Swallow: No impairment  Laryngeal Elevation: Functional  Aspiration Signs/Symptoms: Delayed cough/throat clear (Delayed cough observed significantly after PO intake, suspect unrelated)  Pharyngeal Phase Characteristics: No impairment, issues, or problems                    Speech Treatment and Interventions: Motor Speech:                       Speech Characteristics: Perseveration;Paraphasias             Language Comprehension and Expression:  Auditory Comprehension   Response to Basic Yes/No Questions (%): 25 %  One-Step Basic Commands (%): 25 %  Verbal Expression  Verbal Expression  Primary Mode of Expression: Verbal  Initiation: No impairment  Automatic Speech Task: Impaired (comment)  Repetition: Impaired  Speech Characteristics: Perseveration;Paraphasias    Voice:   WFL       Response & Tolerance to Activities:         Pain:             After treatment:   Patient left in no apparent distress in bed, Call bell within reach, and Nursing notified    COMMUNICATION/EDUCATION:   The patient's plan of care including recommendations, planned interventions, and recommended diet changes were discussed with: Registered nurse.        SITA Arciniega  Time Calculation: 20 mins

## 2023-03-09 NOTE — PROGRESS NOTES
Date of Consultation:  March 9, 2023    Referring Physician: Song Mattson MD     Reason for Consultation:  aphasia     Chief Complaint   Patient presents with    Altered mental status     Pt arrives via EMS [facility]) level 2 stroke pre-alert. Pt last known well at 11am per sister who saw pt at that time, pt's sister returned to see patient at 5pm and he had facial droop with R sided weakness, limited verbal response. Per EMS, pt independent at baseline per sister. Pt  per EMS, /77, upon EMS arrival to house, pt initally hypoxic at 72% on RA, placed on non rebreather, O2 improved to 98% on transport, pt currently on RA       History of Present Illness:   Brady Gates is a 78 y.o. male with history of chronic left posterior temporal lobe encephalomalacia in the setting of stroke on aspirin, history of seizures well-controlled not on any AED therapy, CAD, hyperlipidemia, hypertension and hypothyroidism who presents with acute onset of right-sided weakness, right facial droop and difficulty with finding words and comprehension concerning for possible stroke. Interval events:  Routine EEG was performed which showed left temporal lobe slowing. MRI brain without contrast was performed which was unremarkable other than large chronic left PCA territory infarct and small chronic infarcts in the bilateral cerebellum. The patient continues to be globally aphasic this morning. He is unable to provide any history.     Past Medical History:   Diagnosis Date    Arthritis     Hips, Knees    CAD (coronary artery disease)     MI around 1990    Hepatitis A     Hepatitis C     High cholesterol     Hypertension     Other ill-defined conditions(799.89)     Light sensitivity, causes seizures    Seizures ([facility]     Last seizure 12/2008/work -up in 2012 -possible seizure    Stroke [facility]) 2005    Thyroid disease     Hypothyroid        Past Surgical History:   Procedure Laterality Date    HX HEART CATHETERIZATION  20 years ago    no stents    HX HERNIA REPAIR  10/8/14    left inguinal hernia- Isma Mitchell MD    HX ORTHOPAEDIC      Left Knee Scope    HX ORTHOPAEDIC  2010    Gavin. Total Hip Replacement/Dr. Hernandez Pimentel ORTHOPAEDIC  14    R hip replacement     MT OPEN REPAIR CLAVICLE FRACTURE      right         Family History   Problem Relation Age of Onset    Hypertension Mother     Stroke Mother     Diabetes Mother     Heart Disease Father     Cancer Father         lung        Social History     Tobacco Use    Smoking status: Former     Packs/day: 0.25     Years: 25.00     Pack years: 6.25     Types: Cigarettes     Quit date: 2015     Years since quittin.1    Smokeless tobacco: Never   Substance Use Topics    Alcohol use: No     Comment: stopped 10 years ago---beer on the weekends in the past        Allergies   Allergen Reactions    Carbatrol [Carbamazepine] Rash        Prior to Admission medications    Medication Sig Start Date End Date Taking? Authorizing Provider   albuterol (PROVENTIL VENTOLIN) 2.5 mg /3 mL (0.083 %) nebu INHALE THE CONTENTS OF 1 VIAL (3ML) VIA NEBULIZER EVERY 4 HOURS AS NEEDED FOR WHEEZING (MAX FOUR TIMES A DAY)    Provider, Historical   albuterol-ipratropium (DUO-NEB) 2.5 mg-0.5 mg/3 ml nebu 3 ml as needed 22   Provider, Historical   gabapentin (NEURONTIN) 300 mg capsule TAKE 2 CAPSULES FOUR TIMES DAILY 23   Marielena Maxwell MD   azelastine (ASTELIN) 137 mcg (0.1 %) nasal spray USE 2 SPRAYS IN EACH NOSTRIL TWICE DAILY 10/14/21   Provider, Historical   montelukast (SINGULAIR) 10 mg tablet Take 10 mg by mouth daily. 19   Provider, Historical   ketoconazole (NIZORAL) 2 % shampoo  20   Provider, Historical   hydroxyzine HCL (ATARAX) 25 mg tablet Take 25 mg by mouth daily. Patient not taking: Reported on 3/9/2023 12/30/19   Provider, Historical   ergocalciferol (ERGOCALCIFEROL) 1,250 mcg (50,000 unit) capsule Take 50,000 Units by mouth every seven (7) days.  19   Provider, Historical   traMADol (ULTRAM) 50 mg tablet Take 50 mg by mouth two (2) times a day. Patient not taking: Reported on 3/9/2023 10/11/17   Provider, Historical   aspirin delayed-release 325 mg tablet Take 325 mg by mouth daily. Provider, Historical   doxazosin (CARDURA) 4 mg tablet TAKE ONE TABLET BY MOUTH ONCE DAILY AT BEDTIME 6/20/14   Angie Vega MD   citalopram (CELEXA) 20 mg tablet TAKE ONE TABLET BY MOUTH ONCE DAILY 6/20/14   Angie Vega MD   atorvastatin (LIPITOR) 40 mg tablet Take 40 mg by mouth daily. Provider, Historical   levothyroxine (SYNTHROID) 50 mcg tablet TAKE ONE TABLET BY MOUTH EVERY DAY 4/14/14   Angie Vega MD   amLODIPine (NORVASC) 10 mg tablet TAKE ONE TABLET BY MOUTH EVERY DAY 2/28/14   Angie Vega MD       Review of Systems:    General, constitutional: negative  Eyes, vision: negative  Ears, nose, throat: negative  Cardiovascular, heart: negative  Respiratory: negative  Gastrointestinal: negative  Genitourinary: negative  Musculoskeletal: negative  Skin and integumentary: negative  Psychiatric: negative  Endocrine: negative  Neurological: negative, except for HPI  Hematologic/lymphatic: negative  Allergy/immunology: negative    []Unable to obtain  ROS due to  []mental status change  []sedated   []intubated      PHYSICAL EXAMINATION:   Visit Vitals  BP (!) 124/59   Pulse 74   Temp 97.9 °F (36.6 °C)   Resp 18   Wt 195 lb 9.6 oz (88.7 kg)   SpO2 93%   BMI 25.81 kg/m²       Physical Exam:  General:  no acute distress  Neck: supple no mass   Lungs: Comfortable on room air  Heart: Well-perfused  Lower extremity: no edema    Neurological exam:  Mental Status: Awake, alert but globally aphasic. Unable to answer any questions appropriately or follow any commands. Mildly dysarthric.       Cranial nerves: II-XII:  Pupils equal and reactive, visual fields intact by confrontation   Extraocular movements intact, no evidence of nystagmus or ptosis   Facial movements symmetric; no facial droop  Shoulder shrug symmetric and strong   Tongue protrusion full and midline without fasciculation or atrophy    Motor:   Normal tone and Bulk     Unable to perform confrontational strength testing due to global aphasia. Antigravity in all 4 limbs. Sensory:  Intact to light touch throughout     Reflexes:   Biceps Triceps  Brachiorad Patellar Achilles Plantar Hoffmans   Right  2 2 2 2 1 Down NT   Left  2 2 2 2 1 Down NT     Cerebellar testing: Unable to participate due to global aphasia however there is no evidence of any tremor      Data:     Lab Results   Component Value Date/Time    Hemoglobin A1c 5.5 03/09/2023 12:49 AM    Sodium 135 (L) 03/07/2023 07:35 PM    Potassium 4.7 03/07/2023 07:35 PM    Chloride 104 03/07/2023 07:35 PM    Glucose 101 (H) 03/07/2023 07:35 PM    BUN 22 (H) 03/07/2023 07:35 PM    Creatinine 1.24 03/07/2023 07:35 PM    Calcium 9.0 03/07/2023 07:35 PM    WBC 10.9 03/07/2023 07:35 PM    HCT 41.9 03/07/2023 07:35 PM    HGB 13.7 03/07/2023 07:35 PM    PLATELET 720 61/65/3687 07:35 PM       Imaging:    Results from Hospital Encounter encounter on 03/07/23    MRI BRAIN WO CONT    Narrative  EXAM: MRI BRAIN WO CONT    INDICATION: aphasia, rule out stroke    COMPARISON: CTA head neck 3/7/2023, MRI brain 1/19/2009. CONTRAST: None. TECHNIQUE:  Multiplanar multisequence acquisition without contrast of the brain. FINDINGS:  The ventricles are normal in size and position. Large chronic left PCA territory  infarct involving the left temporal and occipital lobes. Few scattered  periventricular and deep white matter T2/FLAIR hyperintensities, consistent with  mild chronic microvascular ischemic disease. Small chronic infarcts in the  bilateral cerebellum. There is no acute infarct, hemorrhage, extra-axial fluid  collection, or mass effect. There is no cerebellar tonsillar herniation. Expected arterial flow-voids are present. The paranasal sinuses are clear.  Small left mastoid effusion. The orbital  contents are within normal limits. No significant osseous or scalp lesions are  identified. Impression  1. No acute intracranial abnormality. 2. Large chronic left PCA territory infarct. 3. Mild chronic microvascular ischemic disease. Small chronic infarcts in the  bilateral cerebellum. Results from Hospital Encounter encounter on 03/07/23    CT CODE NEURO PERF W CBF    Narrative  CLINICAL HISTORY: Code stroke    EXAMINATION:  CT ANGIOGRAPHY HEAD AND NECK, CT PERFUSION    COMPARISON: None    TECHNIQUE:  Following the uneventful administration of iodinated contrast  material, axial CT angiography of the head and neck was performed. Delayed axial  images through the head were also obtained. Coronal and sagittal reconstructions  were obtained. Manual postprocessing of images was performed. 3-D  Sagittal  maximal intensity projection images were obtained. 3-D Coronal maximal  intensity projections were obtained. CT brain perfusion was performed with  generation of hemodynamic maps of multiple parameters, including cerebral blood  flow, cerebral blood volume, mean transit time, and TMAX. CT dose reduction was  achieved through use of a standardized protocol tailored for this examination  and automatic exposure control for dose modulation. This study was analyzed by the [address] ai algorithm. FINDINGS:    DELAYED ENHANCEMENT HEAD CT  Left occipital/posterior temporal lobe encephalomalacia. No intra or extra-axial  mass or collection. Ventricles are normal in size and configuration. The basal  cisterns are patent. Dural venous sinuses are patent. No abnormal parenchymal or meningeal  enhancement. Mastoid air cells and paranasal sinuses are clear. CTA NECK:    Great vessels: Normal arch anatomy with the origins patent.     Right subclavian artery: Patent    Left subclavian artery: Patent    Right common carotid artery: Patent    Left common carotid artery: Patent    Cervical right internal carotid artery: Patent with no significant stenosis by  NASCET criteria. Cervical left internal carotid artery: Patent with no significant stenosis by  NASCET criteria. Right vertebral artery: Ostial stenosis. Otherwise patent. Left vertebral artery: Patent    The lung apices are clear. The thyroid is homogeneous. No cervical  lymphadenopathy. Measurements utilize NASCET criteria. CTA HEAD:    Right cavernous internal carotid artery: Mild atherosclerotic disease without  hemodynamically significant stenosis. Left cavernous internal carotid artery: Mild atherosclerotic disease without  hemodynamically significant stenosis. Anterior cerebral arteries: Patent    Anterior communicating artery: Patent    Right middle cerebral artery: Patent    Left middle cerebral artery: Patent    Posterior communicating arteries: Diminutive bilaterally. Posterior cerebral arteries: Patent    Basilar artery: Patent    Distal vertebral arteries: Patent    No evidence for intracranial aneurysm or hemodynamically significant stenosis. CT Perfusion:  Small focus of perfusion mismatch correlates with the left posterior temporal  lobe encephalomalacia. No acute perfusion abnormalities. Tmax > 6s: 10 cc  rCBF < 30%: 3 cc  Mismatch volume: 7 cc  Mismatch ratio: 3.3    Impression  1. No acute vascular abnormality, no large vessel occlusion. 2. 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 3. Small focus of perfusion mismatch correlates with left posterior temporal  lobe chronic infarct. No acute perfusion abnormality.         IMPRESSION/RECOMMENDATIONS:  Justin Olguin is a 78 y.o. male who presents with history of epilepsy well-controlled not on any medications other than gabapentin, history of left temporal encephalomalacia from stroke on aspirin presenting with acute onset of word finding difficulty and difficulty with comprehension and language and some mild right-sided weakness with right facial droop. Differential diagnosis includes stroke, seizure, recrudescence of old stroke in the left posterior temporal lobe. Initial head CT, CTP, CTA head and neck remarkable for small focus of perfusion mismatch which correlates with left posterior temporal lobe chronic infarct. MRI brain has been unremarkable for any acute infarct. Routine EEG does show left temporal lobe slowing. No seizures. He continues to be aphasic globally however I do not detect any notable weakness or facial droop any longer on the right side. It is difficult to examine him given his difficulty with comprehension. I do not think that this is his baseline. Dr. Jessika Champion progress note from 2/1 says mild aphasia however speech is fluent which is very different from his current exam.  I am questioning whether or not he has had a seizure which is showing some postictal aphasia given that his seizures tend to be in the left temporal lobe or if this is recrudescence of his old left temporal stroke. It is a rather prolonged state for recrudescence. He has not had a new stroke however therefore I do expect that his presentation should improve. I would recommend that we start him on an antiepileptic medication in case he did have a seizure and this is postictal aphasia. He is also being evaluated by hematology for possible multiple myeloma.       Recommendations:  -Start Keppra 500 mg twice daily  -Continue gabapentin 600 mg 4 times daily, check gabapentin level  -Normotension goal  - would recommend to continue aspirin 81mg daily for secondary stroke prevention   - Risk factor modification: Hemoglobin A1c 5.5%, LDL 51.              - if LDL > 70, would start atorvastatin 40mg daily               - please check hepatic panel prior to initiation of statin  - please obtain TTE to rule out intracardiac thrombus   - would monitor on telemetry to rule out arrhythmias    - please obtain PT/OT and speech consultations   -Please monitor and treat any underlying infectious or metabolic derangements     Thank you very much for this consultation. Neurology will continue to follow. I spent 60 minutes providing care to this acutely ill inpatient with > 50% of the time counseling as well as reviewing the patient's chart, notes, labs, medications and preparing documentation along with assisting in the coordination of care of the patient on the patient's hospital floor/unit.        Edmund Monge, DO

## 2023-03-09 NOTE — PROGRESS NOTES
Nuclear Medicine -  pt. Refused bone scan today @ 10:00am  Waiting for sister to talk to him.  Study delay until tomorrow 3/09/23 Friday

## 2023-03-09 NOTE — PROGRESS NOTES
Nuclear Medicine - Please re- order Bone scan if needed- Pt.  Refused  test 3/08/23   We will follow up in the am Friday

## 2023-03-09 NOTE — CONSULTS
[facility]    Name:  Pankaj Soler  MR#:  [id number]  :  1955  ACCOUNT #:  [de-identified]  DATE OF SERVICE:  2023    REFERRING PHYSICIAN:  Dr. Demetrius Pacheco. REASON FOR CONSULT:  Lytic lesions seen on chest x-ray. HISTORY OF PRESENT ILLNESS:  The patient is a 78-year-old gentleman who was admitted with some altered mental status. He has some chronic infarcts on MRI. We were asked to see him as he had a chest x-ray done that showed development of some lytic osseous lesions. The patient while talking to me he makes some sense, but is definitely slightly altered and we are asked to see him for further evaluation. PAST MEDICAL HISTORY:  Significant for CVA, coronary artery disease, high cholesterol, hypertension, stroke in the past, hypothyroidism. ALLERGIES:  CARBATROL, CARBAMAZEPINE. SOCIAL HISTORY:  He is a former smoker. He does not drink. FAMILY HISTORY:  Father had lung cancer. REVIEW OF SYSTEMS:  Negative as far as I can tell. PHYSICAL EXAMINATION:  GENERAL:  Lying in bed in no acute stress. VITAL SIGNS:  Temperature 97.9, pulse 70, blood pressure 132/72, respirations 17, saturating 92% on 2 liters nasal cannula. HEENT:  Normocephalic, atraumatic. NECK:  No cervical, supraclavicular or axillary lymphadenopathy appreciated. RESPIRATORY:  No distress. ABDOMEN:  Soft, nontender. LABORATORY VALUES:  White blood cell count 10.9, hemoglobin 13.7, platelets 924. Chemistry, sodium 135, potassium 4.7. BUN 22, creatinine 1.24. Total bili 0.5, ALT 20, AST 22, alk phos 125. IMPRESSION AND PLAN:  The patient is a 78-year-old gentleman who we are asked to see for some lytic osseous lesions seen on his chest x-ray. I would like to get a bone survey. We will send off a gammopathy panel. We will see what that shows. We will continue to follow along while he is here.   Once he is able to be discharged and if his labs are not back, he can follow up with us in the office and we will continue to follow along with you.         Hansa Jiménez MD      [id number]  D:  03/09/2023 10:04  T:  03/09/2023 12:34  JOB #:  [id number]

## 2023-03-09 NOTE — PROGRESS NOTES
Received notification from bedside RN about patient with regards to: code atlas called, patient agitated, physically aggressive and swinging at staff  VS: /70, HR 78, RR 18, O2 sat 90%     Intervention given:   - Ativan 2 mg IV x 1 dose

## 2023-03-09 NOTE — PROGRESS NOTES
Date of Consultation:  March 9, 2023    Referring Physician: Mike Muir MD     Reason for Consultation:  aphasia     Chief Complaint   Patient presents with    Altered mental status     Pt arrives via EMS [facility]) level 2 stroke pre-alert. Pt last known well at 11am per sister who saw pt at that time, pt's sister returned to see patient at 5pm and he had facial droop with R sided weakness, limited verbal response. Per EMS, pt independent at baseline per sister. Pt  per EMS, /77, upon EMS arrival to house, pt initally hypoxic at 72% on RA, placed on non rebreather, O2 improved to 98% on transport, pt currently on RA       History of Present Illness:   Orlando Gibson is a 78 y.o. male with history of chronic left posterior temporal lobe encephalomalacia in the setting of stroke on aspirin, history of seizures well-controlled not on any AED therapy, CAD, hyperlipidemia, hypertension and hypothyroidism who presents with acute onset of right-sided weakness, right facial droop and difficulty with finding words and comprehension concerning for possible stroke. Interval events:  Routine EEG was performed which showed left temporal lobe slowing. MRI brain without contrast was performed which was unremarkable other than large chronic left PCA territory infarct and small chronic infarcts in the bilateral cerebellum. The patient continues to be globally aphasic this morning. He is unable to provide any history.     Past Medical History:   Diagnosis Date    Arthritis     Hips, Knees    CAD (coronary artery disease)     MI around 1990    Hepatitis A     Hepatitis C     High cholesterol     Hypertension     Other ill-defined conditions(799.89)     Light sensitivity, causes seizures    Seizures ([facility], [place] [address]     Last seizure 12/2008/work -up in 2012 -possible seizure    Stroke [facility]) 2005    Thyroid disease     Hypothyroid        Past Surgical History:   Procedure Laterality Date    HX HEART CATHETERIZATION  20 years ago    no stents    HX HERNIA REPAIR  10/8/14    left inguinal hernia- Isma Mitchell MD    HX ORTHOPAEDIC      Left Knee Scope    HX ORTHOPAEDIC  2010    Gavin. Total Hip Replacement/Dr. Lavinia Herrera ORTHOPAEDIC  14    R hip replacement     OK OPEN REPAIR CLAVICLE FRACTURE      right         Family History   Problem Relation Age of Onset    Hypertension Mother     Stroke Mother     Diabetes Mother     Heart Disease Father     Cancer Father         lung        Social History     Tobacco Use    Smoking status: Former     Packs/day: 0.25     Years: 25.00     Pack years: 6.25     Types: Cigarettes     Quit date: 2015     Years since quittin.1    Smokeless tobacco: Never   Substance Use Topics    Alcohol use: No     Comment: stopped 10 years ago---beer on the weekends in the past        Allergies   Allergen Reactions    Carbatrol [Carbamazepine] Rash        Prior to Admission medications    Medication Sig Start Date End Date Taking? Authorizing Provider   albuterol (PROVENTIL VENTOLIN) 2.5 mg /3 mL (0.083 %) nebu INHALE THE CONTENTS OF 1 VIAL (3ML) VIA NEBULIZER EVERY 4 HOURS AS NEEDED FOR WHEEZING (MAX FOUR TIMES A DAY)    Provider, Historical   albuterol-ipratropium (DUO-NEB) 2.5 mg-0.5 mg/3 ml nebu 3 ml as needed 22   Provider, Historical   gabapentin (NEURONTIN) 300 mg capsule TAKE 2 CAPSULES FOUR TIMES DAILY 23   Thomas Kim MD   azelastine (ASTELIN) 137 mcg (0.1 %) nasal spray USE 2 SPRAYS IN EACH NOSTRIL TWICE DAILY 10/14/21   Provider, Historical   montelukast (SINGULAIR) 10 mg tablet Take 10 mg by mouth daily. 19   Provider, Historical   ketoconazole (NIZORAL) 2 % shampoo  20   Provider, Historical   hydroxyzine HCL (ATARAX) 25 mg tablet Take 25 mg by mouth daily. Patient not taking: Reported on 3/9/2023 12/30/19   Provider, Historical   ergocalciferol (ERGOCALCIFEROL) 1,250 mcg (50,000 unit) capsule Take 50,000 Units by mouth every seven (7) days.  19   Provider, Historical   traMADol (ULTRAM) 50 mg tablet Take 50 mg by mouth two (2) times a day. Patient not taking: Reported on 3/9/2023 10/11/17   Provider, Historical   aspirin delayed-release 325 mg tablet Take 325 mg by mouth daily. Provider, Historical   doxazosin (CARDURA) 4 mg tablet TAKE ONE TABLET BY MOUTH ONCE DAILY AT BEDTIME 6/20/14   Raisa Paiz MD   citalopram (CELEXA) 20 mg tablet TAKE ONE TABLET BY MOUTH ONCE DAILY 6/20/14   Raisa Paiz MD   atorvastatin (LIPITOR) 40 mg tablet Take 40 mg by mouth daily. Provider, Historical   levothyroxine (SYNTHROID) 50 mcg tablet TAKE ONE TABLET BY MOUTH EVERY DAY 4/14/14   Raisa Paiz MD   amLODIPine (NORVASC) 10 mg tablet TAKE ONE TABLET BY MOUTH EVERY DAY 2/28/14   Raisa Paiz MD       Review of Systems:    General, constitutional: negative  Eyes, vision: negative  Ears, nose, throat: negative  Cardiovascular, heart: negative  Respiratory: negative  Gastrointestinal: negative  Genitourinary: negative  Musculoskeletal: negative  Skin and integumentary: negative  Psychiatric: negative  Endocrine: negative  Neurological: negative, except for HPI  Hematologic/lymphatic: negative  Allergy/immunology: negative    []Unable to obtain  ROS due to  []mental status change  []sedated   []intubated      PHYSICAL EXAMINATION:   Visit Vitals  BP (!) 124/59   Pulse 74   Temp 97.9 °F (36.6 °C)   Resp 18   Wt 195 lb 9.6 oz (88.7 kg)   SpO2 93%   BMI 25.81 kg/m²       Physical Exam:  General:  no acute distress  Neck: supple no mass   Lungs: Comfortable on room air  Heart: Well-perfused  Lower extremity: no edema    Neurological exam:  Mental Status: Awake, alert but globally aphasic. Unable to answer any questions appropriately or follow any commands. Mildly dysarthric.       Cranial nerves: II-XII:  Pupils equal and reactive, visual fields intact by confrontation   Extraocular movements intact, no evidence of nystagmus or ptosis   Facial movements symmetric; no facial droop  Shoulder shrug symmetric and strong   Tongue protrusion full and midline without fasciculation or atrophy    Motor:   Normal tone and Bulk     Unable to perform confrontational strength testing due to global aphasia. Antigravity in all 4 limbs. Sensory:  Intact to light touch throughout     Reflexes:   Biceps Triceps  Brachiorad Patellar Achilles Plantar Hoffmans   Right  2 2 2 2 1 Down NT   Left  2 2 2 2 1 Down NT     Cerebellar testing: Unable to participate due to global aphasia however there is no evidence of any tremor      Data:     Lab Results   Component Value Date/Time    Hemoglobin A1c 5.5 03/09/2023 12:49 AM    Sodium 135 (L) 03/07/2023 07:35 PM    Potassium 4.7 03/07/2023 07:35 PM    Chloride 104 03/07/2023 07:35 PM    Glucose 101 (H) 03/07/2023 07:35 PM    BUN 22 (H) 03/07/2023 07:35 PM    Creatinine 1.24 03/07/2023 07:35 PM    Calcium 9.0 03/07/2023 07:35 PM    WBC 10.9 03/07/2023 07:35 PM    HCT 41.9 03/07/2023 07:35 PM    HGB 13.7 03/07/2023 07:35 PM    PLATELET 586 04/53/1491 07:35 PM       Imaging:    Results from Hospital Encounter encounter on 03/07/23    MRI BRAIN WO CONT    Narrative  EXAM: MRI BRAIN WO CONT    INDICATION: aphasia, rule out stroke    COMPARISON: CTA head neck 3/7/2023, MRI brain 1/19/2009. CONTRAST: None. TECHNIQUE:  Multiplanar multisequence acquisition without contrast of the brain. FINDINGS:  The ventricles are normal in size and position. Large chronic left PCA territory  infarct involving the left temporal and occipital lobes. Few scattered  periventricular and deep white matter T2/FLAIR hyperintensities, consistent with  mild chronic microvascular ischemic disease. Small chronic infarcts in the  bilateral cerebellum. There is no acute infarct, hemorrhage, extra-axial fluid  collection, or mass effect. There is no cerebellar tonsillar herniation. Expected arterial flow-voids are present. The paranasal sinuses are clear.  Small left mastoid effusion. The orbital  contents are within normal limits. No significant osseous or scalp lesions are  identified. Impression  1. No acute intracranial abnormality. 2. Large chronic left PCA territory infarct. 3. Mild chronic microvascular ischemic disease. Small chronic infarcts in the  bilateral cerebellum. Results from Hospital Encounter encounter on 03/07/23    CT CODE NEURO PERF W CBF    Narrative  CLINICAL HISTORY: Code stroke    EXAMINATION:  CT ANGIOGRAPHY HEAD AND NECK, CT PERFUSION    COMPARISON: None    TECHNIQUE:  Following the uneventful administration of iodinated contrast  material, axial CT angiography of the head and neck was performed. Delayed axial  images through the head were also obtained. Coronal and sagittal reconstructions  were obtained. Manual postprocessing of images was performed. 3-D  Sagittal  maximal intensity projection images were obtained. 3-D Coronal maximal  intensity projections were obtained. CT brain perfusion was performed with  generation of hemodynamic maps of multiple parameters, including cerebral blood  flow, cerebral blood volume, mean transit time, and TMAX. CT dose reduction was  achieved through use of a standardized protocol tailored for this examination  and automatic exposure control for dose modulation. This study was analyzed by the [address] ai algorithm. FINDINGS:    DELAYED ENHANCEMENT HEAD CT  Left occipital/posterior temporal lobe encephalomalacia. No intra or extra-axial  mass or collection. Ventricles are normal in size and configuration. The basal  cisterns are patent. Dural venous sinuses are patent. No abnormal parenchymal or meningeal  enhancement. Mastoid air cells and paranasal sinuses are clear. CTA NECK:    Great vessels: Normal arch anatomy with the origins patent.     Right subclavian artery: Patent    Left subclavian artery: Patent    Right common carotid artery: Patent    Left common carotid artery: Patent    Cervical right internal carotid artery: Patent with no significant stenosis by  NASCET criteria. Cervical left internal carotid artery: Patent with no significant stenosis by  NASCET criteria. Right vertebral artery: Ostial stenosis. Otherwise patent. Left vertebral artery: Patent    The lung apices are clear. The thyroid is homogeneous. No cervical  lymphadenopathy. Measurements utilize NASCET criteria. CTA HEAD:    Right cavernous internal carotid artery: Mild atherosclerotic disease without  hemodynamically significant stenosis. Left cavernous internal carotid artery: Mild atherosclerotic disease without  hemodynamically significant stenosis. Anterior cerebral arteries: Patent    Anterior communicating artery: Patent    Right middle cerebral artery: Patent    Left middle cerebral artery: Patent    Posterior communicating arteries: Diminutive bilaterally. Posterior cerebral arteries: Patent    Basilar artery: Patent    Distal vertebral arteries: Patent    No evidence for intracranial aneurysm or hemodynamically significant stenosis. CT Perfusion:  Small focus of perfusion mismatch correlates with the left posterior temporal  lobe encephalomalacia. No acute perfusion abnormalities. Tmax > 6s: 10 cc  rCBF < 30%: 3 cc  Mismatch volume: 7 cc  Mismatch ratio: 3.3    Impression  1. No acute vascular abnormality, no large vessel occlusion. 2. 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 3. Small focus of perfusion mismatch correlates with left posterior temporal  lobe chronic infarct. No acute perfusion abnormality.         IMPRESSION/RECOMMENDATIONS:  Lesli Pozo is a 78 y.o. male who presents with history of epilepsy well-controlled not on any medications other than gabapentin, history of left temporal encephalomalacia from stroke on aspirin presenting with acute onset of word finding difficulty and difficulty with comprehension and language and some mild right-sided weakness with right facial droop. Differential diagnosis includes stroke, seizure, recrudescence of old stroke in the left posterior temporal lobe. Initial head CT, CTP, CTA head and neck remarkable for small focus of perfusion mismatch which correlates with left posterior temporal lobe chronic infarct. MRI brain has been unremarkable for any acute infarct. Routine EEG does show left temporal lobe slowing. No seizures. He continues to be aphasic globally however I do not detect any notable weakness or facial droop any longer on the right side. It is difficult to examine him given his difficulty with comprehension. I do not think that this is his baseline. Dr. Brennen Doll progress note from 2/1 says mild aphasia however speech is fluent which is very different from his current exam.  I am questioning whether or not he has had a seizure which is showing some postictal aphasia given that his seizures tend to be in the left temporal lobe or if this is recrudescence of his old left temporal stroke. It is a rather prolonged state for recrudescence. He has not had a new stroke however therefore I do expect that his presentation should improve. I would recommend that we start him on an antiepileptic medication in case he did have a seizure and this is postictal aphasia. He is also being evaluated by hematology for possible multiple myeloma.       Recommendations:  -Start Keppra 500 mg twice daily  -Continue gabapentin 600 mg 4 times daily, check gabapentin level  -Normotension goal  - would recommend to continue aspirin 81mg daily for secondary stroke prevention   - Risk factor modification: Hemoglobin A1c 5.5%, LDL 51.              - if LDL > 70, would start atorvastatin 40mg daily               - please check hepatic panel prior to initiation of statin  - please obtain TTE to rule out intracardiac thrombus   - would monitor on telemetry to rule out arrhythmias    - please obtain PT/OT and speech consultations   -Please monitor and treat any underlying infectious or metabolic derangements     Thank you very much for this consultation. Neurology will continue to follow. I spent 60 minutes providing care to this acutely ill inpatient with > 50% of the time counseling as well as reviewing the patient's chart, notes, labs, medications and preparing documentation along with assisting in the coordination of care of the patient on the patient's hospital floor/unit.        Kim Mesa, DO

## 2023-03-09 NOTE — PROGRESS NOTES
Hospitalist Progress Note    NAME: Taylor Pruitt   :  1955   MRN:  [id number]       Assessment / Plan:  Acute encephalopathy due to possible seizure, CVA is ruled out  Hstory of left temporal encephalomalacia from CVA  CT head CTP, CTA head and neck remarkable for small focus of perfusion mismatch which correlates with left posterior temporal lobe chronic infarct. MRI brain has been unremarkable for any acute infarct. EEG is reviewed, neg for seizure  A1C 5.5,  LDL 51  TTE pending  SLP evlauation is ongoing. Npo for now  Will start D5 NS  Stsart ASA suppository until pt can take po intake. Cont' keppra, neurology is following, discussed with Dr Ángel Morin, will start keppra 500mg. Cont' gabapentin. Check level    Attempted to call pt's sister however unable to reach her by phone. Seizure precautions  PT/OT    Lytic lesions concerning for MM  Xr bone survey is ordered  Send spep and upep  Consult oncology        Estimated discharge date: 3/11 pending clinical improvement    Code status: full   Prophylaxis:   Recommended Disposition:      Subjective:     Chief Complaint / Reason for Physician Visit  Pt is confused. Discussed with RN events overnight. Review of Systems:  Symptom Y/N Comments  Symptom Y/N Comments   Fever/Chills    Chest Pain     Poor Appetite    Edema     Cough    Abdominal Pain     Sputum    Joint Pain     SOB/MUÑOZ    Pruritis/Rash     Nausea/vomit    Tolerating PT/OT     Diarrhea    Tolerating Diet     Constipation    Other       Could NOT obtain due to:      Objective:     VITALS:   Last 24hrs VS reviewed since prior progress note.  Most recent are:  Patient Vitals for the past 24 hrs:   Temp Pulse Resp BP SpO2   23 0925 97.9 °F (36.6 °C) 70 17 132/72 92 %   23 0217 98.4 °F (36.9 °C) 75 18 (!) 148/86 92 %   23 2107 98.1 °F (36.7 °C) 78 18 139/70 90 %   23 1548 -- 71 -- -- --   23 1454 97.3 °F (36.3 °C) 75 -- (!) 148/65 93 %   23 1333 -- 70 -- -- --   03/08/23 1326 97.9 °F (36.6 °C) 69 16 135/86 94 %   03/08/23 1142 97.4 °F (36.3 °C) 71 18 139/74 91 %     No intake or output data in the 24 hours ending 03/09/23 1016     I had a face to face encounter and independently examined this patient on 3/9/2023, as outlined below:  PHYSICAL EXAM:  General: WD, WN. Alert, cooperative  EENT:  EOMI. Anicteric sclerae. MMM  Resp:  CTA bilaterally, no wheezing or rales. No accessory muscle use  CV:  Regular  rhythm,  No edema  GI:  Soft, Non distended, Non tender. +Bowel sounds  Neurologic:  Confused. Psych:   Not anxious nor agitated  Skin:  No rashes. No jaundice    Reviewed most current lab test results and cultures  YES  Reviewed most current radiology test results   YES  Review and summation of old records today    NO  Reviewed patient's current orders and MAR    YES  PMH/ reviewed - no change compared to H&P  ________________________________________________________________________  Care Plan discussed with:    Comments   Patient x    Family      RN x    Care Manager     Consultant                        Multidiciplinary team rounds were held today with , nursing, pharmacist and clinical coordinator. Patient's plan of care was discussed; medications were reviewed and discharge planning was addressed. ________________________________________________________________________  Total NON critical care TIME:  35   Minutes    Total CRITICAL CARE TIME Spent:   Minutes non procedure based      Comments   >50% of visit spent in counseling and coordination of care     ________________________________________________________________________  Ida Lemus MD     Procedures: see electronic medical records for all procedures/Xrays and details which were not copied into this note but were reviewed prior to creation of Plan. LABS:  I reviewed today's most current labs and imaging studies.   Pertinent labs include:  Recent Labs     03/07/23  1935   WBC 10. 9   HGB 13.7   HCT 41.9        Recent Labs     03/07/23 1935   *   K 4.7      CO2 25   *   BUN 22*   CREA 1.24   CA 9.0   MG 2.4   ALB 3.9   TBILI 0.5   ALT 20       Signed: Yocasta Zaidi MD

## 2023-03-09 NOTE — CONSULTS
[address] [facility]    Name:  Ravinder Cagle  MR#:  [id number]  :  1955  ACCOUNT #:  [de-identified]  DATE OF SERVICE:  2023    REFERRING PHYSICIAN:  Dr. Ana Esteban. REASON FOR CONSULT:  Lytic lesions seen on chest x-ray. HISTORY OF PRESENT ILLNESS:  The patient is a 62-year-old gentleman who was admitted with some altered mental status. He has some chronic infarcts on MRI. We were asked to see him as he had a chest x-ray done that showed development of some lytic osseous lesions. The patient while talking to me he makes some sense, but is definitely slightly altered and we are asked to see him for further evaluation. PAST MEDICAL HISTORY:  Significant for CVA, coronary artery disease, high cholesterol, hypertension, stroke in the past, hypothyroidism. ALLERGIES:  CARBATROL, CARBAMAZEPINE. SOCIAL HISTORY:  He is a former smoker. He does not drink. FAMILY HISTORY:  Father had lung cancer. REVIEW OF SYSTEMS:  Negative as far as I can tell. PHYSICAL EXAMINATION:  GENERAL:  Lying in bed in no acute stress. VITAL SIGNS:  Temperature 97.9, pulse 70, blood pressure 132/72, respirations 17, saturating 92% on 2 liters nasal cannula. HEENT:  Normocephalic, atraumatic. NECK:  No cervical, supraclavicular or axillary lymphadenopathy appreciated. RESPIRATORY:  No distress. ABDOMEN:  Soft, nontender. LABORATORY VALUES:  White blood cell count 10.9, hemoglobin 13.7, platelets 601. Chemistry, sodium 135, potassium 4.7. BUN 22, creatinine 1.24. Total bili 0.5, ALT 20, AST 22, alk phos 125. IMPRESSION AND PLAN:  The patient is a 62-year-old gentleman who we are asked to see for some lytic osseous lesions seen on his chest x-ray. I would like to get a bone survey. We will send off a gammopathy panel. We will see what that shows. We will continue to follow along while he is here.   Once he is able to be discharged and if his labs are not back, he can follow up with us in the office and we will continue to follow along with you.         Valentín Evangelista MD      [id number]  D:  03/09/2023 10:04  T:  03/09/2023 12:34  JOB #:  [id number]

## 2023-03-09 NOTE — PROGRESS NOTES
Problem: Pressure Injury - Risk of  Goal: *Prevention of pressure injury  Description: Document Ruel Scale and appropriate interventions in the flowsheet.   Outcome: Progressing Towards Goal  Note: Pressure Injury Interventions:  Sensory Interventions: Assess changes in LOC, Check visual cues for pain, Keep linens dry and wrinkle-free    Moisture Interventions: Absorbent underpads, Apply protective barrier, creams and emollients    Activity Interventions: Chair cushion, Increase time out of bed    Mobility Interventions: PT/OT evaluation, HOB 30 degrees or less    Nutrition Interventions: Document food/fluid/supplement intake, Discuss nutritional consult with provider                     Problem: Aspiration - Risk of  Goal: *Absence of aspiration  Outcome: Progressing Towards Goal     Problem: TIA/CVA Stroke: Day 2 Until Discharge  Goal: Activity/Safety  Outcome: Progressing Towards Goal  Goal: Diagnostic Test/Procedures  Outcome: Progressing Towards Goal  Goal: Nutrition/Diet  Outcome: Progressing Towards Goal  Goal: Discharge Planning  Outcome: Progressing Towards Goal  Goal: Medications  Outcome: Progressing Towards Goal  Goal: Respiratory  Outcome: Progressing Towards Goal  Goal: Treatments/Interventions/Procedures  Outcome: Progressing Towards Goal  Goal: Psychosocial  Outcome: Progressing Towards Goal  Goal: *Verbalizes anxiety and depression are reduced or absent  Outcome: Progressing Towards Goal  Goal: *Absence of aspiration  Outcome: Progressing Towards Goal  Goal: *Absence of deep venous thrombosis signs and symptoms(Stroke Metric)  Outcome: Progressing Towards Goal  Goal: *Optimal pain control at patient's stated goal  Outcome: Progressing Towards Goal  Goal: *Tolerating diet  Outcome: Progressing Towards Goal  Goal: *Ability to perform ADLs and demonstrates progressive mobility and function  Outcome: Progressing Towards Goal  Goal: *Stroke education continued(Stroke Metric)  Outcome: Progressing Towards Goal     Problem: Falls - Risk of  Goal: *Absence of Falls  Description: Document Domenico Carreon Fall Risk and appropriate interventions in the flowsheet.   Outcome: Progressing Towards Goal  Note: Fall Risk Interventions:       Mentation Interventions: Adequate sleep, hydration, pain control, Door open when patient unattended         Elimination Interventions: Call light in reach

## 2023-03-09 NOTE — PROGRESS NOTES
Transition of Care Plan:    RUR:  low 10%  Disposition: acute ( IPR ) VS SNF   If SNF or IPR: Date FOC offered: 3-9-23  Date FOC received: 3-9-23  Date authorization started with reference number:  Date authorization received and expires:  Accepting facility: Referrals sent    Follow up appointments: to be made  DME needed: tbd   Transportation at Discharge: medical   [place] or means to access home:      n/a   IM Medicare Letter: 2 nd letter   Is patient a  and connected with the [facility]?              no  If yes, was [employer] transfer form completed and [facility] notified? Caregiver Contact:  Discharge Caregiver contacted prior to discharge? Care Conference needed?:       NO              Reason for Admission:   CVA                      RUR Score:    10%                  Plan for utilizing home health:      N/A     PCP: First and Last name:  Jadyn Hartmann, LALITO Arredondo one month ago     Name of Practice:    Are you a current patient: Yes/No:    Approximate date of last visit:  1 month ago   Can you participate in a virtual visit with your PCP:  not sure                     Current Advanced Directive/Advance Care Plan: Full Code      Healthcare Decision Maker:   Click here to complete [id number] including selection of the Healthcare Decision Maker Relationship (ie \"Primary\")                           Transition of Care Plan:           I spoke with sister this am-  patient was living alone. Was independent in all ADLS. He drives. He has had a CVA before per sister. He has a walker from a previous hip incident. He is not a . He saw Dr Flor Arredondo about a month ago per sister. He has no psych history. He is currently on [employer]. Recs are for IPR. Sister requesting [facility] as first choice. I did leave both IPR and SNF list in room this am as she will be visiting today.     Rony Lua, Rn  946 am  Care manager

## 2023-03-10 LAB
GABAPENTIN SERPLBLD-MCNC: <1 UG/ML (ref 4–16)
GLUCOSE BLD STRIP.AUTO-MCNC: 103 MG/DL (ref 65–117)
GLUCOSE BLD STRIP.AUTO-MCNC: 108 MG/DL (ref 65–117)
GLUCOSE BLD STRIP.AUTO-MCNC: 114 MG/DL (ref 65–117)
GLUCOSE BLD STRIP.AUTO-MCNC: 89 MG/DL (ref 65–117)
SERVICE CMNT-IMP: NORMAL

## 2023-03-10 PROCEDURE — 92507 TX SP LANG VOICE COMM INDIV: CPT

## 2023-03-10 PROCEDURE — 74011000250 HC RX REV CODE- 250: Performed by: STUDENT IN AN ORGANIZED HEALTH CARE EDUCATION/TRAINING PROGRAM

## 2023-03-10 PROCEDURE — 74011250637 HC RX REV CODE- 250/637: Performed by: STUDENT IN AN ORGANIZED HEALTH CARE EDUCATION/TRAINING PROGRAM

## 2023-03-10 PROCEDURE — 74011000258 HC RX REV CODE- 258: Performed by: INTERNAL MEDICINE

## 2023-03-10 PROCEDURE — 74011250636 HC RX REV CODE- 250/636: Performed by: INTERNAL MEDICINE

## 2023-03-10 PROCEDURE — 74011250637 HC RX REV CODE- 250/637: Performed by: NURSE PRACTITIONER

## 2023-03-10 PROCEDURE — 92526 ORAL FUNCTION THERAPY: CPT

## 2023-03-10 PROCEDURE — U0005 INFEC AGEN DETEC AMPLI PROBE: HCPCS

## 2023-03-10 PROCEDURE — 97116 GAIT TRAINING THERAPY: CPT

## 2023-03-10 PROCEDURE — 94640 AIRWAY INHALATION TREATMENT: CPT

## 2023-03-10 PROCEDURE — 99233 SBSQ HOSP IP/OBS HIGH 50: CPT | Performed by: INTERNAL MEDICINE

## 2023-03-10 PROCEDURE — 74011250637 HC RX REV CODE- 250/637: Performed by: INTERNAL MEDICINE

## 2023-03-10 PROCEDURE — 77010033678 HC OXYGEN DAILY

## 2023-03-10 PROCEDURE — 97535 SELF CARE MNGMENT TRAINING: CPT | Performed by: OCCUPATIONAL THERAPIST

## 2023-03-10 PROCEDURE — 65270000046 HC RM TELEMETRY

## 2023-03-10 PROCEDURE — 74011250636 HC RX REV CODE- 250/636: Performed by: STUDENT IN AN ORGANIZED HEALTH CARE EDUCATION/TRAINING PROGRAM

## 2023-03-10 PROCEDURE — 94760 N-INVAS EAR/PLS OXIMETRY 1: CPT

## 2023-03-10 PROCEDURE — 82962 GLUCOSE BLOOD TEST: CPT

## 2023-03-10 RX ORDER — ASPIRIN 81 MG/1
81 TABLET ORAL DAILY
Status: DISCONTINUED | OUTPATIENT
Start: 2023-03-10 | End: 2023-03-13 | Stop reason: HOSPADM

## 2023-03-10 RX ORDER — GUAIFENESIN 100 MG/5ML
200 SOLUTION ORAL
Status: DISCONTINUED | OUTPATIENT
Start: 2023-03-10 | End: 2023-03-13 | Stop reason: HOSPADM

## 2023-03-10 RX ORDER — PANTOPRAZOLE SODIUM 40 MG/1
40 TABLET, DELAYED RELEASE ORAL
Status: DISCONTINUED | OUTPATIENT
Start: 2023-03-10 | End: 2023-03-13 | Stop reason: HOSPADM

## 2023-03-10 RX ORDER — GABAPENTIN 300 MG/1
600 CAPSULE ORAL 4 TIMES DAILY
Status: DISCONTINUED | OUTPATIENT
Start: 2023-03-10 | End: 2023-03-13 | Stop reason: HOSPADM

## 2023-03-10 RX ADMIN — IPRATROPIUM BROMIDE AND ALBUTEROL SULFATE 3 ML: 2.5; .5 SOLUTION RESPIRATORY (INHALATION) at 08:54

## 2023-03-10 RX ADMIN — LEVOTHYROXINE SODIUM 50 MCG: 0.05 TABLET ORAL at 10:50

## 2023-03-10 RX ADMIN — GABAPENTIN 600 MG: 300 CAPSULE ORAL at 17:15

## 2023-03-10 RX ADMIN — PANTOPRAZOLE SODIUM 40 MG: 40 TABLET, DELAYED RELEASE ORAL at 09:10

## 2023-03-10 RX ADMIN — LEVETIRACETAM 500 MG: 100 INJECTION, SOLUTION INTRAVENOUS at 23:21

## 2023-03-10 RX ADMIN — LEVETIRACETAM 500 MG: 100 INJECTION, SOLUTION INTRAVENOUS at 12:35

## 2023-03-10 RX ADMIN — ASPIRIN 81 MG: 81 TABLET, COATED ORAL at 09:09

## 2023-03-10 RX ADMIN — DOXAZOSIN 4 MG: 2 TABLET ORAL at 23:21

## 2023-03-10 RX ADMIN — ENOXAPARIN SODIUM 40 MG: 100 INJECTION SUBCUTANEOUS at 09:10

## 2023-03-10 RX ADMIN — CITALOPRAM HYDROBROMIDE 20 MG: 20 TABLET ORAL at 09:09

## 2023-03-10 RX ADMIN — GABAPENTIN 600 MG: 300 CAPSULE ORAL at 12:35

## 2023-03-10 RX ADMIN — DEXTROSE AND SODIUM CHLORIDE 75 ML/HR: 5; 900 INJECTION, SOLUTION INTRAVENOUS at 03:44

## 2023-03-10 RX ADMIN — GABAPENTIN 600 MG: 300 CAPSULE ORAL at 23:21

## 2023-03-10 RX ADMIN — ATORVASTATIN CALCIUM 40 MG: 40 TABLET, FILM COATED ORAL at 09:10

## 2023-03-10 RX ADMIN — GABAPENTIN 300 MG: 300 CAPSULE ORAL at 09:10

## 2023-03-10 RX ADMIN — GUAIFENESIN 200 MG: 200 SOLUTION ORAL at 03:00

## 2023-03-10 NOTE — PROGRESS NOTES
Problem: Mobility Impaired (Adult and Pediatric)  Goal: *Acute Goals and Plan of Care (Insert Text)  Description:   FUNCTIONAL STATUS PRIOR TO ADMISSION: Patient was independent and active without use of DME.    HOME SUPPORT PRIOR TO ADMISSION: The patient lived alone, 1-story home with 4 steps to enter. He has a sister that lives a few hundred feet away on the same property. Physical Therapy Goals  Initiated 3/8/2023  1. Patient will move from supine to sit and sit to supine  in bed with supervision assist/set-up within 7 day(s). 2.  Patient will transfer from bed to chair and chair to bed with supervision/set-up using the least restrictive device within 7 day(s). 3.  Patient will perform sit to stand with supervision/set-up within 7 day(s). 4.  Patient will ambulate with supervision/set-up for 150 feet with the least restrictive device within 7 day(s). 5.  Patient will ascend/descend 4 stairs with 1 handrail(s) with supervision/set-up within 7 day(s). 6.  Patient will improve Kulkarni Balance score by 7 points within 7 days. Outcome: Progressing Towards Goal     PHYSICAL THERAPY TREATMENT  Patient: Will Gillespie (63 y.o. male)  Date: 3/10/2023  Diagnosis: CVA (cerebral vascular accident) [facility]) [I63.9] <principal problem not specified>      Precautions:  Fall ; Bed/Chair Alarm  Chart, physical therapy assessment, plan of care and goals were reviewed. ASSESSMENT  Patient continues with skilled PT services and is progressing towards goals. He remains with expressive aphasia but able to appropriately identify numerical markings (I.e. room number) today, unable to cite \"exit\" during gait and scanning environment task. He is able to follow commands for functional tasks in room today, picking up objects, performing ADL tasks, and putting away objects all with min. Cuing and RW use. He still has c/o RLE hip/knee pain with mobility. Continue to follow.     Current Level of Function Impacting Discharge (mobility/balance): bed mob. CGA ; transfers CGA ; gait min. X 2 with RW    Other factors to consider for discharge: pt requires 24/7 care at this time ; remains with expressive aphasia         PLAN :  Patient continues to benefit from skilled intervention to address the above impairments. Continue treatment per established plan of care. to address goals. Recommendation for discharge: (in order for the patient to meet his/her long term goals)  Therapy 3 hours per day 5-7 days per week    This discharge recommendation:  Has been made in collaboration with the attending provider and/or case management    IF patient discharges home will need the following DME: to be determined (TBD)       SUBJECTIVE:   Patient stated Hips, knee still sore.     OBJECTIVE DATA SUMMARY:   Critical Behavior:  Neurologic State: Alert  Orientation Level: Other (Comment)  Cognition: Follows commands  Safety/Judgement: Fall prevention  Functional Mobility Training:  Bed Mobility:  Rolling: Supervision  Supine to Sit: Contact guard assistance     Scooting: Contact guard assistance        Transfers:  Sit to Stand: Contact guard assistance  Stand to Sit: Contact guard assistance        Bed to Chair: Contact guard assistance                    Balance:  Sitting - Static: Good (unsupported)  Sitting - Dynamic: Fair (occasional) (seated edge of bed donning underwear)  Standing: Impaired  Standing - Static: Fair  Standing - Dynamic : Fair    Ambulation/Gait Training:  Distance (ft): 50 Feet (ft)  Assistive Device: Gait belt;Walker, rolling  Ambulation - Level of Assistance: Minimal assistance;Assist x2        Gait Abnormalities: Decreased step clearance; Antalgic        Base of Support: Widened     Speed/Yumiko: Pace decreased (<100 feet/min)  Step Length: Left shortened;Right shortened        Interventions: Safety awareness training; Tactile cues; Verbal cues; Visual/Demos       Pain Rating:  C/o hips and R knee pain with mobility ; pt unable to rate     Activity Tolerance:   Good    After treatment patient left in no apparent distress:   Sitting in chair, Call bell within reach, Bed / chair alarm activated, and nurse notified ; telesitter     COMMUNICATION/COLLABORATION:   The patients plan of care was discussed with: Occupational therapist and Registered nurse.      Sara Yu, PT, DPT   Time Calculation: 19 mins

## 2023-03-10 NOTE — PROGRESS NOTES
Problem: Pressure Injury - Risk of  Goal: *Prevention of pressure injury  Description: Document Reul Scale and appropriate interventions in the flowsheet. Outcome: Progressing Towards Goal  Note: Pressure Injury Interventions:  Sensory Interventions: Assess changes in LOC    Moisture Interventions: Absorbent underpads    Activity Interventions: Increase time out of bed    Mobility Interventions: HOB 30 degrees or less    Nutrition Interventions: Document food/fluid/supplement intake    Friction and Shear Interventions: Apply protective barrier, creams and emollients, HOB 30 degrees or less, Lift sheet, Minimize layers                Problem: Aspiration - Risk of  Goal: *Absence of aspiration  Outcome: Progressing Towards Goal     Problem: TIA/CVA Stroke: Day 2 Until Discharge  Goal: Activity/Safety  Outcome: Progressing Towards Goal  Goal: Diagnostic Test/Procedures  Outcome: Progressing Towards Goal  Goal: Nutrition/Diet  Outcome: Progressing Towards Goal  Goal: Discharge Planning  Outcome: Progressing Towards Goal  Goal: Respiratory  Outcome: Progressing Towards Goal  Goal: Psychosocial  Outcome: Progressing Towards Goal  Goal: *Verbalizes anxiety and depression are reduced or absent  Outcome: Progressing Towards Goal  Goal: *Absence of aspiration  Outcome: Progressing Towards Goal  Goal: *Absence of deep venous thrombosis signs and symptoms(Stroke Metric)  Outcome: Progressing Towards Goal  Goal: *Optimal pain control at patient's stated goal  Outcome: Progressing Towards Goal  Goal: *Tolerating diet  Outcome: Progressing Towards Goal  Goal: *Ability to perform ADLs and demonstrates progressive mobility and function  Outcome: Progressing Towards Goal     Problem: Falls - Risk of  Goal: *Absence of Falls  Description: Document Dory Gil Fall Risk and appropriate interventions in the flowsheet.   Outcome: Progressing Towards Goal  Note: Fall Risk Interventions:       Mentation Interventions: Adequate sleep, hydration, pain control, Door open when patient unattended         Elimination Interventions: Call light in reach

## 2023-03-10 NOTE — PROGRESS NOTES
Problem: Communication Impaired (Adult)  Goal: *Acute Goals and Plan of Care (Insert Text)  Description: Speech Therapy Goals  Initiated 3/8/2023  1. Patient will answer biographical yes/no questions with 60% accuracy within 7 days. Goal met for simple and complex questions. 2.  Patient follow 1 step commands with 60% given minimal cues within 7 days. Met for one and two step commands with goal for 3 step at 80% acc. 3.  Patient will complete automatic speech (counting etc, automatic sentence completion) with 60% accuracy within 7 days. Counted to 10 and stated TD with one cue with 100% acc. 4.  Patient will state 1 biographical fact using multi modalities within 7 days. 5. Patient will produce phrase level utterances with 80% acc. With min cues. Outcome: Progressing Towards Goal     Problem: Dysphagia (Adult)  Goal: *Acute Goals and Plan of Care (Insert Text)  Description: Speech Therapy Goals  Initiated 3/8/2023  1. Patient will participate in re-evaluation of swallow function within 7 days. MET 3/9/2023  2. Patient will tolerate least restrictive diet without signs of aspiration or decline in respiratory status within 7 days. Patient tolerating regular diet with no overt s/s of aspiration 3/10/23. Outcome: Resolved/Met   SPEECH LANGUAGE PATHOLOGY DYSPHAGIA AND SPEECH TREATMENT  Patient: Lynn Crowley (17 y.o. male)  Date: 3/10/2023  Diagnosis: [facility] [I63.9] <principal problem not specified>      Precautions:       ASSESSMENT:  The patient's comprehension is improved to mild deficits. Needs repetition occasionally but is following 2 step commands well and comprehending complex questions with mild processing delays. Expression is still largely at single word level to some phrases. Naming is impaired but phonemic cueing and semantic cueing is helpful. Not formulating sentences well. Unable to stated address or phone number.    His swallowing is improved for regular textures. He was tolerating peanut butter and crackers well which is judged to be regular texture given sticky texture. No overt s/s of aspiration. Tolerating thins well. Discussed with patient and his sister that we would advance his diet to regular. PLAN:  Recommendations and Planned Interventions:  Recommend continued speech therapy to focus on language skills. Patient continues to benefit from skilled intervention to address the above impairments. Continue treatment per established plan of care. Discharge Recommendations: To Be Determined     SUBJECTIVE:   Patient was cooperative. OBJECTIVE:   Cognitive and Communication Status:  Neurologic State: Alert  Orientation Level: Other (Comment)  Cognition: Follows commands  Perception: Appears intact  Perseveration: Perseverates during conversation  Safety/Judgement: Fall prevention    Dysphagia Treatment and Interventions:  Oral Assessment:     P.O. Trials:  Patient Position: upright in bed  Vocal quality prior to P.O.: No impairment  Consistency Presented: Thin liquid; Solid  How Presented: Self-fed/presented;Straw     Bolus Acceptance: No impairment  Bolus Formation/Control: No impairment     Propulsion: No impairment  Oral Residue: None  Initiation of Swallow: No impairment  Laryngeal Elevation: Functional  Aspiration Signs/Symptoms: None  Pharyngeal Phase Characteristics: No impairment, issues, or problems   Effective Modifications: None  Cues for Modifications: None       Oral Phase Severity: No impairment  Pharyngeal Phase Severity : No impairment                 Speech Treatment and Interventions: Motor Speech:                       Speech Characteristics: Perseveration; Word retrieval           Language Comprehension and Expression:  Auditory Comprehension   Response to Complex Yes/No Questions (%) : 83 %  One-Step Basic Commands (%): 100 %  Two-Step Basic Commands (%): 100 %  Three-Step Basic Commands (%): 50 %  Verbal Expression  Verbal Expression  Initiation: No impairment  Automatic Speech Task: Impaired (comment) (counted to 10 and stated TD with min cues. months of the year 25% with no cues.)  Naming: Impaired  Confrontation (%): 40 %  Conversation: Non-fluent  Speech Characteristics: Perseveration; Word retrieval  Overall Impairment: Moderate; Moderate-severe     Voice:   Wnl        Response & Tolerance to Activities:     good    Pain:  Pain Scale 1: Numeric (0 - 10)  Pain Intensity 1: 0       After treatment:   Patient left in no apparent distress in bed    COMMUNICATION/EDUCATION:   Patient was educated regarding his deficit(s) of language  . He demonstrated Good understanding   The patient's plan of care including recommendations, planned interventions, and recommended diet changes were discussed with: Registered nurse.        Ed Sanchez, SITA  Time Calculation: 25 mins

## 2023-03-10 NOTE — PROGRESS NOTES
Transition of Care Plan:     RUR:  low 10%  Disposition: acute ( IPR ) rehab  If SNF or IPR: Date FOC offered: 3-9-23  Date FOC received: 3-9-23  Date authorization started with reference number:  Date authorization received and expires:  Accepting facility: Referrals sent    Follow up appointments: to be made  DME needed: tbd   Transportation at Discharge: medical   [place] or means to access home:      n/a   IM Medicare Letter: 2 nd letter   Is patient a  and connected with the [place]?              no  If yes, was [employer] transfer form completed and [facility] notified? Caregiver Contact:  Discharge Caregiver contacted prior to discharge? Care Conference needed?:       NO               Reason for Admission:   CVA     147 pm Sister picks [facility] as her choice-  spoke with Shaun Gonzales. They will seek auth. They want a covid pcr as well. Will perfect serve MD.     ---------------------------------------------------------------------    [facility] can offer a bed,  per sister Janessa Albert would be first choice. I left a message for both IPR. Left lists for IPR and SNF in room again as per sister request.  Tiffanie Gupta just removed. Care management to follow. Will need auth.     Yogi Nicholas, RN  1251 pm   3-10-23

## 2023-03-10 NOTE — PROGRESS NOTES
Date of Consultation:  March 10, 2023    Referring Physician: Jasmine Lin MD     Reason for Consultation:  aphasia, ?seizure     Chief Complaint   Patient presents with    Altered mental status     Pt arrives via EMS [facility]) level 2 stroke pre-alert. Pt last known well at 11am per sister who saw pt at that time, pt's sister returned to see patient at 5pm and he had facial droop with R sided weakness, limited verbal response. Per EMS, pt independent at baseline per sister. Pt  per EMS, /77, upon EMS arrival to house, pt initally hypoxic at 72% on RA, placed on non rebreather, O2 improved to 98% on transport, pt currently on RA       History of Present Illness:   Jonny Murphy is a 78 y.o. male with history of chronic left posterior temporal lobe encephalomalacia in the setting of stroke on aspirin, history of seizures well-controlled not on any AED therapy, CAD, hyperlipidemia, hypertension and hypothyroidism who presents with acute onset of right-sided weakness, right facial droop and difficulty with finding words and comprehension concerning for possible stroke. Routine EEG was performed which showed left temporal lobe slowing. MRI brain without contrast was performed which was unremarkable other than large chronic left PCA territory infarct and small chronic infarcts in the bilateral cerebellum. Started patient on Keppra 500 mg BID. Interval events: The patient has slight improvement in his aphasia this morning. He is able to comprehend more consistently however continues to have an expressive aphasia. He is able to follow commands.       Past Medical History:   Diagnosis Date    Arthritis     Hips, Knees    CAD (coronary artery disease)     MI around 1990    Hepatitis A     Hepatitis C     High cholesterol     Hypertension     Other ill-defined conditions(799.89)     Light sensitivity, causes seizures    Seizures ([facility] [address]     Last seizure 12/2008/work -up in 2012 -possible seizure    Stroke [facility])     Thyroid disease     Hypothyroid        Past Surgical History:   Procedure Laterality Date    HX HEART CATHETERIZATION  20 years ago    no stents    HX HERNIA REPAIR  10/8/14    left inguinal hernia- Isma Mitchell MD    HX ORTHOPAEDIC      Left Knee Scope    HX ORTHOPAEDIC  2010    Gavin. Total Hip Replacement/Dr. Trina Mccallum ORTHOPAEDIC  14    R hip replacement     DC OPEN REPAIR CLAVICLE FRACTURE      right         Family History   Problem Relation Age of Onset    Hypertension Mother     Stroke Mother     Diabetes Mother     Heart Disease Father     Cancer Father         lung        Social History     Tobacco Use    Smoking status: Former     Packs/day: 0.25     Years: 25.00     Pack years: 6.25     Types: Cigarettes     Quit date: 2015     Years since quittin.1    Smokeless tobacco: Never   Substance Use Topics    Alcohol use: No     Comment: stopped 10 years ago---beer on the weekends in the past        Allergies   Allergen Reactions    Carbatrol [Carbamazepine] Rash        Prior to Admission medications    Medication Sig Start Date End Date Taking? Authorizing Provider   budesonide-glycopyr-formoterol (Marni Lety) 160-9-4.8 mcg/actuation HFAA Take 2 Puffs by inhalation two (2) times a day. Provider, Historical   albuterol (PROVENTIL VENTOLIN) 2.5 mg /3 mL (0.083 %) nebu INHALE THE CONTENTS OF 1 VIAL (3ML) VIA NEBULIZER EVERY 4 HOURS AS NEEDED FOR WHEEZING (MAX FOUR TIMES A DAY)    Provider, Historical   albuterol-ipratropium (DUO-NEB) 2.5 mg-0.5 mg/3 ml nebu 3 ml as needed 22   Provider, Historical   gabapentin (NEURONTIN) 300 mg capsule TAKE 2 CAPSULES FOUR TIMES DAILY 23   Salina Habermann, MD   azelastine (ASTELIN) 137 mcg (0.1 %) nasal spray USE 2 SPRAYS IN EACH NOSTRIL TWICE DAILY 10/14/21   Provider, Historical   montelukast (SINGULAIR) 10 mg tablet Take 10 mg by mouth daily.  19   Provider, Historical   ketoconazole (NIZORAL) 2 % shampoo  20 Provider, Historical   hydroxyzine HCL (ATARAX) 25 mg tablet Take 25 mg by mouth daily. Patient not taking: Reported on 3/9/2023 12/30/19   Provider, Historical   ergocalciferol (ERGOCALCIFEROL) 1,250 mcg (50,000 unit) capsule Take 50,000 Units by mouth every seven (7) days. 12/30/19   Provider, Historical   traMADol (ULTRAM) 50 mg tablet Take 50 mg by mouth two (2) times a day. Patient not taking: Reported on 3/9/2023 10/11/17   Provider, Historical   aspirin delayed-release 325 mg tablet Take 325 mg by mouth daily. Provider, Historical   doxazosin (CARDURA) 4 mg tablet TAKE ONE TABLET BY MOUTH ONCE DAILY AT BEDTIME 6/20/14   Marge Gomez MD   citalopram (CELEXA) 20 mg tablet TAKE ONE TABLET BY MOUTH ONCE DAILY 6/20/14   Marge Gomez MD   atorvastatin (LIPITOR) 40 mg tablet Take 40 mg by mouth daily.     Provider, Historical   levothyroxine (SYNTHROID) 50 mcg tablet TAKE ONE TABLET BY MOUTH EVERY DAY 4/14/14   Marge Gomez MD   amLODIPine (NORVASC) 10 mg tablet TAKE ONE TABLET BY MOUTH EVERY DAY 2/28/14   Marge Gomez MD       Review of Systems:    General, constitutional: negative  Eyes, vision: negative  Ears, nose, throat: negative  Cardiovascular, heart: negative  Respiratory: negative  Gastrointestinal: negative  Genitourinary: negative  Musculoskeletal: negative  Skin and integumentary: negative  Psychiatric: negative  Endocrine: negative  Neurological: negative, except for HPI  Hematologic/lymphatic: negative  Allergy/immunology: negative    []Unable to obtain  ROS due to  []mental status change  []sedated   []intubated      PHYSICAL EXAMINATION:   Visit Vitals  /62   Pulse (!) 57   Temp 97.9 °F (36.6 °C)   Resp 17   Wt 195 lb 9.6 oz (88.7 kg)   SpO2 93%   BMI 25.81 kg/m²      Physical Exam:  General:  no acute distress  Neck: supple no mass   Lungs: Comfortable on room air  Heart: Well-perfused  Lower extremity: no edema     Neurological exam:  Mental Status: Awake, alert to examiner, eating breakfast. Able to name most of the objects on his tray. Has some perseveration. Able to state, \"I am doing better. \" Follows commands this morning with more accuracy. No dysarthria. Does have expressive aphasia, improving. Cranial nerves: II-XII:  Pupils equal and reactive, visual fields intact by confrontation   Extraocular movements intact, no evidence of nystagmus or ptosis   Facial movements symmetric; no facial droop  Shoulder shrug symmetric and strong   Tongue protrusion full and midline without fasciculation or atrophy     Motor:   Normal tone and Bulk      Unable to perform confrontational strength testing due to global aphasia. Antigravity in all 4 limbs. Sensory:  Intact to light touch throughout      Reflexes:    Biceps Triceps  Brachiorad Patellar Achilles Plantar Hoffmans   Right  2 2 2 2 1 Down NT   Left  2 2 2 2 1 Down NT      Cerebellar testing: no tremor. Data:     Lab Results   Component Value Date/Time    Hemoglobin A1c 5.5 03/09/2023 12:49 AM    Sodium 135 (L) 03/07/2023 07:35 PM    Potassium 4.7 03/07/2023 07:35 PM    Chloride 104 03/07/2023 07:35 PM    Glucose 101 (H) 03/07/2023 07:35 PM    BUN 22 (H) 03/07/2023 07:35 PM    Creatinine 1.24 03/07/2023 07:35 PM    Calcium 9.0 03/07/2023 07:35 PM    WBC 10.9 03/07/2023 07:35 PM    HCT 41.9 03/07/2023 07:35 PM    HGB 13.7 03/07/2023 07:35 PM    PLATELET 779 90/23/5618 07:35 PM       Imaging:    Results from Hospital Encounter encounter on 03/07/23    MRI BRAIN WO CONT    Narrative  EXAM: MRI BRAIN WO CONT    INDICATION: aphasia, rule out stroke    COMPARISON: CTA head neck 3/7/2023, MRI brain 1/19/2009. CONTRAST: None. TECHNIQUE:  Multiplanar multisequence acquisition without contrast of the brain. FINDINGS:  The ventricles are normal in size and position. Large chronic left PCA territory  infarct involving the left temporal and occipital lobes.  Few scattered  periventricular and deep white matter T2/FLAIR hyperintensities, consistent with  mild chronic microvascular ischemic disease. Small chronic infarcts in the  bilateral cerebellum. There is no acute infarct, hemorrhage, extra-axial fluid  collection, or mass effect. There is no cerebellar tonsillar herniation. Expected arterial flow-voids are present. The paranasal sinuses are clear. Small left mastoid effusion. The orbital  contents are within normal limits. No significant osseous or scalp lesions are  identified. Impression  1. No acute intracranial abnormality. 2. Large chronic left PCA territory infarct. 3. Mild chronic microvascular ischemic disease. Small chronic infarcts in the  bilateral cerebellum. Results from Hospital Encounter encounter on 03/07/23    CT CODE NEURO PERF W CBF    Narrative  CLINICAL HISTORY: Code stroke    EXAMINATION:  CT ANGIOGRAPHY HEAD AND NECK, CT PERFUSION    COMPARISON: None    TECHNIQUE:  Following the uneventful administration of iodinated contrast  material, axial CT angiography of the head and neck was performed. Delayed axial  images through the head were also obtained. Coronal and sagittal reconstructions  were obtained. Manual postprocessing of images was performed. 3-D  Sagittal  maximal intensity projection images were obtained. 3-D Coronal maximal  intensity projections were obtained. CT brain perfusion was performed with  generation of hemodynamic maps of multiple parameters, including cerebral blood  flow, cerebral blood volume, mean transit time, and TMAX. CT dose reduction was  achieved through use of a standardized protocol tailored for this examination  and automatic exposure control for dose modulation. This study was analyzed by the [address] ai algorithm. FINDINGS:    DELAYED ENHANCEMENT HEAD CT  Left occipital/posterior temporal lobe encephalomalacia. No intra or extra-axial  mass or collection. Ventricles are normal in size and configuration. The basal  cisterns are patent.     Dural venous sinuses are patent. No abnormal parenchymal or meningeal  enhancement. Mastoid air cells and paranasal sinuses are clear. CTA NECK:    Great vessels: Normal arch anatomy with the origins patent. Right subclavian artery: Patent    Left subclavian artery: Patent    Right common carotid artery: Patent    Left common carotid artery: Patent    Cervical right internal carotid artery: Patent with no significant stenosis by  NASCET criteria. Cervical left internal carotid artery: Patent with no significant stenosis by  NASCET criteria. Right vertebral artery: Ostial stenosis. Otherwise patent. Left vertebral artery: Patent    The lung apices are clear. The thyroid is homogeneous. No cervical  lymphadenopathy. Measurements utilize NASCET criteria. CTA HEAD:    Right cavernous internal carotid artery: Mild atherosclerotic disease without  hemodynamically significant stenosis. Left cavernous internal carotid artery: Mild atherosclerotic disease without  hemodynamically significant stenosis. Anterior cerebral arteries: Patent    Anterior communicating artery: Patent    Right middle cerebral artery: Patent    Left middle cerebral artery: Patent    Posterior communicating arteries: Diminutive bilaterally. Posterior cerebral arteries: Patent    Basilar artery: Patent    Distal vertebral arteries: Patent    No evidence for intracranial aneurysm or hemodynamically significant stenosis. CT Perfusion:  Small focus of perfusion mismatch correlates with the left posterior temporal  lobe encephalomalacia. No acute perfusion abnormalities. Tmax > 6s: 10 cc  rCBF < 30%: 3 cc  Mismatch volume: 7 cc  Mismatch ratio: 3.3    Impression  1. No acute vascular abnormality, no large vessel occlusion. 2. 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 3. Small focus of perfusion mismatch correlates with left posterior temporal  lobe chronic infarct.  No acute perfusion abnormality. IMPRESSION/RECOMMENDATIONS:  Tiki Boogie is a 78 y.o. male who presents with history of epilepsy well-controlled not on any medications other than gabapentin, history of left temporal encephalomalacia from stroke on aspirin presenting with acute onset of word finding difficulty and difficulty with comprehension and language and some mild right-sided weakness with right facial droop. Differential diagnosis includes stroke, seizure, recrudescence of old stroke in the left posterior temporal lobe. Initial head CT, CTP, CTA head and neck remarkable for small focus of perfusion mismatch which correlates with left posterior temporal lobe chronic infarct. MRI brain has been unremarkable for any acute infarct. Routine EEG does show left temporal lobe slowing. No seizures. He is no longer globally aphasic, but now seems more improved since the first day he was admitted. He does have an expressive aphasia which based on notes from Dr. Huma Branham, he does seem to have mild aphasia, so this may be his baseline. 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 He is on gabapentin, however would like to continue him on Keppra for better seizure ppx. He is also undergoing eval by heme/onc for possible MGUS or MM.      Recommendations:  -continue Keppra 500 mg twice daily  -Continue gabapentin 600 mg 4 times daily, gabapentin level pending  -Normotension goal  - would recommend to continue aspirin 81mg daily for secondary stroke prevention   - Risk factor modification: Hemoglobin A1c 5.5%, LDL 51.              - if LDL > 70, would start atorvastatin 40mg daily               - please check hepatic panel prior to initiation of statin  - does not need TTE, no new stroke on MRI   - would monitor on telemetry to rule out arrhythmias    - please obtain PT/OT and speech consultations   -Please monitor and treat any underlying infectious or metabolic derangements  --follow up with neurologist Dr. Darian Alexandra in 6-8 weeks. Thank you very much for this consultation. Neurology will sign off. I spent 60 minutes providing care to this acutely ill inpatient with > 50% of the time counseling as well as reviewing the patient's chart, notes, labs, medications and preparing documentation along with assisting in the coordination of care of the patient on the patient's hospital floor/unit.        Cornelius Mac, DO

## 2023-03-10 NOTE — PROGRESS NOTES
Date of Consultation:  March 10, 2023    Referring Physician: Calin Ford MD     Reason for Consultation:  aphasia, ?seizure     Chief Complaint   Patient presents with    Altered mental status     Pt arrives via EMS [facility]) level 2 stroke pre-alert. Pt last known well at 11am per sister who saw pt at that time, pt's sister returned to see patient at 5pm and he had facial droop with R sided weakness, limited verbal response. Per EMS, pt independent at baseline per sister. Pt  per EMS, /77, upon EMS arrival to house, pt initally hypoxic at 72% on RA, placed on non rebreather, O2 improved to 98% on transport, pt currently on RA       History of Present Illness:   Kayli Joaquin is a 78 y.o. male with history of chronic left posterior temporal lobe encephalomalacia in the setting of stroke on aspirin, history of seizures well-controlled not on any AED therapy, CAD, hyperlipidemia, hypertension and hypothyroidism who presents with acute onset of right-sided weakness, right facial droop and difficulty with finding words and comprehension concerning for possible stroke. Routine EEG was performed which showed left temporal lobe slowing. MRI brain without contrast was performed which was unremarkable other than large chronic left PCA territory infarct and small chronic infarcts in the bilateral cerebellum. Started patient on Keppra 500 mg BID. Interval events: The patient has slight improvement in his aphasia this morning. He is able to comprehend more consistently however continues to have an expressive aphasia. He is able to follow commands.       Past Medical History:   Diagnosis Date    Arthritis     Hips, Knees    CAD (coronary artery disease)     MI around 1990    Hepatitis A     Hepatitis C     High cholesterol     Hypertension     Other ill-defined conditions(799.89)     Light sensitivity, causes seizures    Seizures ([facility] [address]     Last seizure 12/2008/work -up in 2012 -possible seizure    Stroke [facility])     Thyroid disease     Hypothyroid        Past Surgical History:   Procedure Laterality Date    HX HEART CATHETERIZATION  20 years ago    no stents    HX HERNIA REPAIR  10/8/14    left inguinal hernia- Isma Mitchell MD    HX ORTHOPAEDIC      Left Knee Scope    HX ORTHOPAEDIC  2010    Gavin. Total Hip Replacement/Dr. Bahman Boyd ORTHOPAEDIC  14    R hip replacement     DC OPEN REPAIR CLAVICLE FRACTURE      right         Family History   Problem Relation Age of Onset    Hypertension Mother     Stroke Mother     Diabetes Mother     Heart Disease Father     Cancer Father         lung        Social History     Tobacco Use    Smoking status: Former     Packs/day: 0.25     Years: 25.00     Pack years: 6.25     Types: Cigarettes     Quit date: 2015     Years since quittin.1    Smokeless tobacco: Never   Substance Use Topics    Alcohol use: No     Comment: stopped 10 years ago---beer on the weekends in the past        Allergies   Allergen Reactions    Carbatrol [Carbamazepine] Rash        Prior to Admission medications    Medication Sig Start Date End Date Taking? Authorizing Provider   budesonide-glycopyr-formoterol (Bina Alvarado) 160-9-4.8 mcg/actuation HFAA Take 2 Puffs by inhalation two (2) times a day. Provider, Historical   albuterol (PROVENTIL VENTOLIN) 2.5 mg /3 mL (0.083 %) nebu INHALE THE CONTENTS OF 1 VIAL (3ML) VIA NEBULIZER EVERY 4 HOURS AS NEEDED FOR WHEEZING (MAX FOUR TIMES A DAY)    Provider, Historical   albuterol-ipratropium (DUO-NEB) 2.5 mg-0.5 mg/3 ml nebu 3 ml as needed 22   Provider, Historical   gabapentin (NEURONTIN) 300 mg capsule TAKE 2 CAPSULES FOUR TIMES DAILY 23   Emani Rothman MD   azelastine (ASTELIN) 137 mcg (0.1 %) nasal spray USE 2 SPRAYS IN EACH NOSTRIL TWICE DAILY 10/14/21   Provider, Historical   montelukast (SINGULAIR) 10 mg tablet Take 10 mg by mouth daily.  19   Provider, Historical   ketoconazole (NIZORAL) 2 % shampoo  20 Provider, Historical   hydroxyzine HCL (ATARAX) 25 mg tablet Take 25 mg by mouth daily. Patient not taking: Reported on 3/9/2023 12/30/19   Provider, Historical   ergocalciferol (ERGOCALCIFEROL) 1,250 mcg (50,000 unit) capsule Take 50,000 Units by mouth every seven (7) days. 12/30/19   Provider, Historical   traMADol (ULTRAM) 50 mg tablet Take 50 mg by mouth two (2) times a day. Patient not taking: Reported on 3/9/2023 10/11/17   Provider, Historical   aspirin delayed-release 325 mg tablet Take 325 mg by mouth daily. Provider, Historical   doxazosin (CARDURA) 4 mg tablet TAKE ONE TABLET BY MOUTH ONCE DAILY AT BEDTIME 6/20/14   David Nickerson MD   citalopram (CELEXA) 20 mg tablet TAKE ONE TABLET BY MOUTH ONCE DAILY 6/20/14   David Nickerson MD   atorvastatin (LIPITOR) 40 mg tablet Take 40 mg by mouth daily.     Provider, Historical   levothyroxine (SYNTHROID) 50 mcg tablet TAKE ONE TABLET BY MOUTH EVERY DAY 4/14/14   David Nickerson MD   amLODIPine (NORVASC) 10 mg tablet TAKE ONE TABLET BY MOUTH EVERY DAY 2/28/14   David Nickerson MD       Review of Systems:    General, constitutional: negative  Eyes, vision: negative  Ears, nose, throat: negative  Cardiovascular, heart: negative  Respiratory: negative  Gastrointestinal: negative  Genitourinary: negative  Musculoskeletal: negative  Skin and integumentary: negative  Psychiatric: negative  Endocrine: negative  Neurological: negative, except for HPI  Hematologic/lymphatic: negative  Allergy/immunology: negative    []Unable to obtain  ROS due to  []mental status change  []sedated   []intubated      PHYSICAL EXAMINATION:   Visit Vitals  /62   Pulse (!) 57   Temp 97.9 °F (36.6 °C)   Resp 17   Wt 195 lb 9.6 oz (88.7 kg)   SpO2 93%   BMI 25.81 kg/m²      Physical Exam:  General:  no acute distress  Neck: supple no mass   Lungs: Comfortable on room air  Heart: Well-perfused  Lower extremity: no edema     Neurological exam:  Mental Status: Awake, alert to examiner, eating breakfast. Able to name most of the objects on his tray. Has some perseveration. Able to state, \"I am doing better. \" Follows commands this morning with more accuracy. No dysarthria. Does have expressive aphasia, improving. Cranial nerves: II-XII:  Pupils equal and reactive, visual fields intact by confrontation   Extraocular movements intact, no evidence of nystagmus or ptosis   Facial movements symmetric; no facial droop  Shoulder shrug symmetric and strong   Tongue protrusion full and midline without fasciculation or atrophy     Motor:   Normal tone and Bulk      Unable to perform confrontational strength testing due to global aphasia. Antigravity in all 4 limbs. Sensory:  Intact to light touch throughout      Reflexes:    Biceps Triceps  Brachiorad Patellar Achilles Plantar Hoffmans   Right  2 2 2 2 1 Down NT   Left  2 2 2 2 1 Down NT      Cerebellar testing: no tremor. Data:     Lab Results   Component Value Date/Time    Hemoglobin A1c 5.5 03/09/2023 12:49 AM    Sodium 135 (L) 03/07/2023 07:35 PM    Potassium 4.7 03/07/2023 07:35 PM    Chloride 104 03/07/2023 07:35 PM    Glucose 101 (H) 03/07/2023 07:35 PM    BUN 22 (H) 03/07/2023 07:35 PM    Creatinine 1.24 03/07/2023 07:35 PM    Calcium 9.0 03/07/2023 07:35 PM    WBC 10.9 03/07/2023 07:35 PM    HCT 41.9 03/07/2023 07:35 PM    HGB 13.7 03/07/2023 07:35 PM    PLATELET 198 60/00/2267 07:35 PM       Imaging:    Results from Hospital Encounter encounter on 03/07/23    MRI BRAIN WO CONT    Narrative  EXAM: MRI BRAIN WO CONT    INDICATION: aphasia, rule out stroke    COMPARISON: CTA head neck 3/7/2023, MRI brain 1/19/2009. CONTRAST: None. TECHNIQUE:  Multiplanar multisequence acquisition without contrast of the brain. FINDINGS:  The ventricles are normal in size and position. Large chronic left PCA territory  infarct involving the left temporal and occipital lobes.  Few scattered  periventricular and deep white matter T2/FLAIR hyperintensities, consistent with  mild chronic microvascular ischemic disease. Small chronic infarcts in the  bilateral cerebellum. There is no acute infarct, hemorrhage, extra-axial fluid  collection, or mass effect. There is no cerebellar tonsillar herniation. Expected arterial flow-voids are present. The paranasal sinuses are clear. Small left mastoid effusion. The orbital  contents are within normal limits. No significant osseous or scalp lesions are  identified. Impression  1. No acute intracranial abnormality. 2. Large chronic left PCA territory infarct. 3. Mild chronic microvascular ischemic disease. Small chronic infarcts in the  bilateral cerebellum. Results from Hospital Encounter encounter on 03/07/23    CT CODE NEURO PERF W CBF    Narrative  CLINICAL HISTORY: Code stroke    EXAMINATION:  CT ANGIOGRAPHY HEAD AND NECK, CT PERFUSION    COMPARISON: None    TECHNIQUE:  Following the uneventful administration of iodinated contrast  material, axial CT angiography of the head and neck was performed. Delayed axial  images through the head were also obtained. Coronal and sagittal reconstructions  were obtained. Manual postprocessing of images was performed. 3-D  Sagittal  maximal intensity projection images were obtained. 3-D Coronal maximal  intensity projections were obtained. CT brain perfusion was performed with  generation of hemodynamic maps of multiple parameters, including cerebral blood  flow, cerebral blood volume, mean transit time, and TMAX. CT dose reduction was  achieved through use of a standardized protocol tailored for this examination  and automatic exposure control for dose modulation. This study was analyzed by the [address] ai algorithm. FINDINGS:    DELAYED ENHANCEMENT HEAD CT  Left occipital/posterior temporal lobe encephalomalacia. No intra or extra-axial  mass or collection. Ventricles are normal in size and configuration. The basal  cisterns are patent.     Dural venous sinuses are patent. No abnormal parenchymal or meningeal  enhancement. Mastoid air cells and paranasal sinuses are clear. CTA NECK:    Great vessels: Normal arch anatomy with the origins patent. Right subclavian artery: Patent    Left subclavian artery: Patent    Right common carotid artery: Patent    Left common carotid artery: Patent    Cervical right internal carotid artery: Patent with no significant stenosis by  NASCET criteria. Cervical left internal carotid artery: Patent with no significant stenosis by  NASCET criteria. Right vertebral artery: Ostial stenosis. Otherwise patent. Left vertebral artery: Patent    The lung apices are clear. The thyroid is homogeneous. No cervical  lymphadenopathy. Measurements utilize NASCET criteria. CTA HEAD:    Right cavernous internal carotid artery: Mild atherosclerotic disease without  hemodynamically significant stenosis. Left cavernous internal carotid artery: Mild atherosclerotic disease without  hemodynamically significant stenosis. Anterior cerebral arteries: Patent    Anterior communicating artery: Patent    Right middle cerebral artery: Patent    Left middle cerebral artery: Patent    Posterior communicating arteries: Diminutive bilaterally. Posterior cerebral arteries: Patent    Basilar artery: Patent    Distal vertebral arteries: Patent    No evidence for intracranial aneurysm or hemodynamically significant stenosis. CT Perfusion:  Small focus of perfusion mismatch correlates with the left posterior temporal  lobe encephalomalacia. No acute perfusion abnormalities. Tmax > 6s: 10 cc  rCBF < 30%: 3 cc  Mismatch volume: 7 cc  Mismatch ratio: 3.3    Impression  1. No acute vascular abnormality, no large vessel occlusion. 2. 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 3. Small focus of perfusion mismatch correlates with left posterior temporal  lobe chronic infarct.  No acute perfusion abnormality. IMPRESSION/RECOMMENDATIONS:  Rere Fritz is a 78 y.o. male who presents with history of epilepsy well-controlled not on any medications other than gabapentin, history of left temporal encephalomalacia from stroke on aspirin presenting with acute onset of word finding difficulty and difficulty with comprehension and language and some mild right-sided weakness with right facial droop. Differential diagnosis includes stroke, seizure, recrudescence of old stroke in the left posterior temporal lobe. Initial head CT, CTP, CTA head and neck remarkable for small focus of perfusion mismatch which correlates with left posterior temporal lobe chronic infarct. MRI brain has been unremarkable for any acute infarct. Routine EEG does show left temporal lobe slowing. No seizures. He is no longer globally aphasic, but now seems more improved since the first day he was admitted. He does have an expressive aphasia which based on notes from Dr. Malorie Desir, he does seem to have mild aphasia, so this may be his baseline. 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 He is on gabapentin, however would like to continue him on Keppra for better seizure ppx. He is also undergoing eval by heme/onc for possible MGUS or MM.      Recommendations:  -continue Keppra 500 mg twice daily  -Continue gabapentin 600 mg 4 times daily, gabapentin level pending  -Normotension goal  - would recommend to continue aspirin 81mg daily for secondary stroke prevention   - Risk factor modification: Hemoglobin A1c 5.5%, LDL 51.              - if LDL > 70, would start atorvastatin 40mg daily               - please check hepatic panel prior to initiation of statin  - does not need TTE, no new stroke on MRI   - would monitor on telemetry to rule out arrhythmias    - please obtain PT/OT and speech consultations   -Please monitor and treat any underlying infectious or metabolic derangements  --follow up with neurologist Dr. Melo Ramos in 6-8 weeks. Thank you very much for this consultation. Neurology will sign off. I spent 60 minutes providing care to this acutely ill inpatient with > 50% of the time counseling as well as reviewing the patient's chart, notes, labs, medications and preparing documentation along with assisting in the coordination of care of the patient on the patient's hospital floor/unit.        Di Dan DO

## 2023-03-10 NOTE — ROUTINE PROCESS
End of Shift Note    Bedside shift change report given to Wilber Camarillo RN  (oncoming nurse) by Kennedi Kunz RN (offgoing nurse). Report included the following information Kardex, ED Summary, MAR, and Recent Results    Shift worked:  7p - 7a   Shift summary and any significant changes:    Patient confused but pleasant. Expressive Aphasia continues     Concerns for physician to address:  Noted above   Zone phone for oncoming shift:  [phone]     Patient Ely Freedman  78 y.o.  3/7/2023  6:21 PM by Sheba Martinez DO. Taylor Pruitt was admitted from Home    Problem List  Patient Active Problem List    Diagnosis Date Noted    Complex partial seizure evolving to generalized seizure ([address] 11/22/2016    Cerebral infarction due to thrombosis of left middle cerebral artery ([address] 11/22/2016    Infected prosthetic hip ([address] 11/10/2015    Carotid artery stenosis with cerebral infarction ([address] 11/02/2015    Failed total hip arthroplasty ([address] 06/09/2014    Late effect of stroke 05/23/2013    Encephalopathy, unspecified 04/21/2012    CVA (cerebral infarction) 09/27/2010    Muscle strain 09/27/2010    Localization-related partial epilepsy with complex partial seizures ([address] 09/27/2010    HTN (hypertension) 09/27/2010    Hypercholesterolemia 09/27/2010    Total hip replacements Bilateral 09/27/2010    CAD (coronary artery disease) 09/27/2010    Hepatitis A 09/27/2010    Hepatitis C 09/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 History:   Diagnosis Date    Arthritis     Hips, Knees    CAD (coronary artery disease)     MI around 1990    Hepatitis A     Hepatitis C     High cholesterol     Hypertension     Other ill-defined conditions(799.89)     Light sensitivity, causes seizures    Seizures ([address]     Last seizure 12/2008/work -up in 2012 -possible seizure    Stroke ([address] 2005    Thyroid disease     Hypothyroid       Core Measures:  CVA: Yes Yes  CHF:No No  PNA:No No    Activity:  Activity Level:  Up with Assistance  Number times ambulated in hallways past shift: 0  Number of times OOB to chair past shift: 1    Cardiac:   Cardiac Monitoring: Yes      Cardiac Rhythm: Sinus Rhythm    Access:   Current line(s): PIV   Central Line? No Placement date  Reason Medically Necessary   PICC LINE? No Placement date Reason Medically Necessary     Genitourinary:   Urinary status: voiding  Urinary Catheter? No Placement Date  Reason Medically Necessary     Respiratory:   O2 Device: Nasal cannula  Chronic home O2 use?: NO  Incentive spirometer at bedside: NO       GI:     Current diet:  ADULT DIET Dysphagia - Soft & Bite Sized  DIET ONE TIME MESSAGE  Passing flatus: YES  Tolerating current diet: YES       Pain Management:   Patient states pain is manageable on current regimen: YES    Skin:  Ruel Score: 17  Interventions: float heels and PT/OT consult    Patient Safety:  Fall Score:  Total Score: 2  Interventions: bed/chair alarm, gripper socks, pt to call before getting OOB, and tele sitter      @Rollbelt  @dexterity to release roll belt  Yes/No ( must document dexterity  here by stating Yes or No here, otherwise this is a restraint and must follow restraint documentation policy.)    DVT prophylaxis:  DVT prophylaxis Med- Yes  DVT prophylaxis SCD or KEN- Yes     Wounds: (If Applicable)  Wounds- No  Location     Active Consults:  IP CONSULT TO NEUROLOGY  IP CONSULT TO HEMATOLOGY    Length of Stay:  Expected LOS: 2d 14h  Actual LOS: 2  Discharge Plan: Yes, juan j Kebede RN

## 2023-03-10 NOTE — PROGRESS NOTES
End of Shift Note     Bedside shift change report given to Machelle Collins RN (oncoming nurse) by Raquel Jefferson RN (offgoing nurse). Report included the following information Kardex, ED Summary, MAR, and Recent Results     Shift worked: 7a-7p   Shift summary and any significant changes:     No significant changes. Patient awaiting placement. COVID Pcr sent. Concerns for physician to address:  none   Zone phone for oncoming shift:   [phone]      Patient Adwoa Freedman  78 y.o.  3/7/2023  6:21 PM by Mandeep Ro DO.  Terrell Novak was admitted from Home     Problem List          Patient Active Problem List     Diagnosis Date Noted    CVA (cerebral vascular accident) ([address] 03/08/2023    Complex partial seizure evolving to generalized seizure ([address] 11/22/2016    Cerebral infarction due to thrombosis of left middle cerebral artery ([address] 11/22/2016    Infected prosthetic hip ([address] 11/10/2015    Carotid artery stenosis with cerebral infarction ([address] 11/02/2015    Failed total hip arthroplasty ([address] 06/09/2014    Late effect of stroke 05/23/2013    Encephalopathy, unspecified 04/21/2012    CVA (cerebral infarction) 09/27/2010    Muscle strain 09/27/2010    Localization-related partial epilepsy with complex partial seizures ([address] 09/27/2010    HTN (hypertension) 09/27/2010    Hypercholesterolemia 09/27/2010    Total hip replacements Bilateral 09/27/2010    CAD (coronary artery disease) 09/27/2010    Hepatitis A 09/27/2010    Hepatitis C 09/27/2010    Osteoarthrosis, hip 08/02/2010              Past Medical History:   Diagnosis Date    Arthritis       Hips, Knees    CAD (coronary artery disease)       MI around 1990    Hepatitis A      Hepatitis C      High cholesterol      Hypertension      Other ill-defined conditions(799.89)       Light sensitivity, causes seizures    Seizures ([address]       Last seizure 12/2008/work -up in 2012 -possible seizure    Stroke [facility]) 2005    Thyroid disease       Hypothyroid         Core Measures:  CVA: No Yes  CHF:No No  PNA:No No     Activity:  Activity Level: Up with Assistance  Number times ambulated in hallways past shift: 0  Number of times OOB to chair past shift: 0     Cardiac:   Cardiac Monitoring: Yes      Cardiac Rhythm: Sinus Rhythm     Access:   Current line(s): PIV   Central Line? No Placement date  Reason Medically Necessary   PICC LINE? No Placement date Reason Medically Necessary      Genitourinary:   Urinary status: voiding  Urinary Catheter? No Placement Date  Reason Medically Necessary      Respiratory:   O2 Device: Nasal cannula  Chronic home O2 use?: NO  Incentive spirometer at bedside: NO     GI:  Current diet:  Soft and moist, thin liquids  Passing flatus: YES  Tolerating current diet: YES     Pain Management:   Patient states pain is manageable on current regimen: YES     Skin:  Ruel Score: 17  Interventions: float heels and PT/OT consult    Patient Safety:  Fall Score:  Total Score:   Interventions: bed/chair alarm, gripper socks, pt to call before getting OOB, and tele sitter      DVT prophylaxis:  DVT prophylaxis Med- Yes  DVT prophylaxis SCD or KEN- Yes      Wounds: (If Applicable)  Wounds- No  Location      Active Consults:  IP CONSULT TO NEUROLOGY   Consult hematology  Length of Stay:  Expected LOS: - - -  Actual LOS: 2  Discharge Plan: Possibly Monday 3/13/23

## 2023-03-10 NOTE — PROGRESS NOTES
Transition of Care Plan:     RUR:  low 10%  Disposition: acute ( IPR ) rehab  If SNF or IPR: Date FOC offered: 3-9-23  Date FOC received: 3-9-23  Date authorization started with reference number:  Date authorization received and expires:  Accepting facility: Referrals sent    Follow up appointments: to be made  DME needed: tbd   Transportation at Discharge: medical   [place] or means to access home:      n/a    Medicare Letter: 2 nd letter   Is patient a  and connected with the [facility]?              no  If yes, was [employer] transfer form completed and [facility] notified? Caregiver Contact:  Discharge Caregiver contacted prior to discharge? Care Conference needed?:       NO               Reason for Admission:   CVA     147 pm Sister picks [facility] as her choice-  spoke with [facility]. They will seek auth. They want a covid pcr as well. Will perfect serve MD.     ---------------------------------------------------------------------    [facility] can offer a bed,  per sister [facility] would be first choice. I left a message for both IPR. Left lists for IPR and SNF in room again as per sister request.  Yvette Healy just removed. Care management to follow. Will need auth.     Katia Hensley, JUSTIN  1211 pm   3-10-23

## 2023-03-10 NOTE — PROGRESS NOTES
Problem: Pressure Injury - Risk of  Goal: *Prevention of pressure injury  Description: Document Ruel Scale and appropriate interventions in the flowsheet. Outcome: Progressing Towards Goal  Note: Pressure Injury Interventions:  Sensory Interventions: Assess changes in LOC    Moisture Interventions: Absorbent underpads    Activity Interventions: Increase time out of bed    Mobility Interventions: HOB 30 degrees or less    Nutrition Interventions: Document food/fluid/supplement intake    Friction and Shear Interventions: Apply protective barrier, creams and emollients, HOB 30 degrees or less, Lift sheet, Minimize layers                Problem: Aspiration - Risk of  Goal: *Absence of aspiration  Outcome: Progressing Towards Goal     Problem: TIA/CVA Stroke: Day 2 Until Discharge  Goal: Activity/Safety  Outcome: Progressing Towards Goal  Goal: Diagnostic Test/Procedures  Outcome: Progressing Towards Goal  Goal: Nutrition/Diet  Outcome: Progressing Towards Goal  Goal: Discharge Planning  Outcome: Progressing Towards Goal  Goal: Respiratory  Outcome: Progressing Towards Goal  Goal: Psychosocial  Outcome: Progressing Towards Goal  Goal: *Verbalizes anxiety and depression are reduced or absent  Outcome: Progressing Towards Goal  Goal: *Absence of aspiration  Outcome: Progressing Towards Goal  Goal: *Absence of deep venous thrombosis signs and symptoms(Stroke Metric)  Outcome: Progressing Towards Goal  Goal: *Optimal pain control at patient's stated goal  Outcome: Progressing Towards Goal  Goal: *Tolerating diet  Outcome: Progressing Towards Goal  Goal: *Ability to perform ADLs and demonstrates progressive mobility and function  Outcome: Progressing Towards Goal     Problem: Falls - Risk of  Goal: *Absence of Falls  Description: Document Corrinne Carrie Fall Risk and appropriate interventions in the flowsheet.   Outcome: Progressing Towards Goal  Note: Fall Risk Interventions:       Mentation Interventions: Adequate sleep, hydration, pain control, Door open when patient unattended         Elimination Interventions: Call light in reach

## 2023-03-10 NOTE — PROGRESS NOTES
Hematology Oncology Progress Note    Follow up for: Lytic lesions seen on chest Xray    Chart notes reviewed since last visit. Case discussed with following: . Patient complains of the following: No complaints, pleasantly confused    Additional concerns noted by the staff:     Patient Vitals for the past 24 hrs:   BP Temp Pulse Resp SpO2   03/10/23 0857 -- -- -- -- 93 %   03/10/23 0734 122/62 97.9 °F (36.6 °C) (!) 57 17 93 %   03/10/23 0316 (!) 108/41 98.1 °F (36.7 °C) (!) 55 22 91 %   03/09/23 2315 130/68 98.1 °F (36.7 °C) (!) 59 18 95 %   03/09/23 1931 123/61 98.6 °F (37 °C) 63 20 91 %   03/09/23 1529 128/72 98.4 °F (36.9 °C) 66 18 93 %   03/09/23 1124 (!) 124/59 97.9 °F (36.6 °C) 74 18 93 %   03/09/23 0925 132/72 97.9 °F (36.6 °C) 70 17 92 %         Physical Examination:  Gen NAD      Labs:  Recent Results (from the past 24 hour(s))   GLUCOSE, POC    Collection Time: 03/09/23 11:25 AM   Result Value Ref Range    Glucose (POC) 77 65 - 117 mg/dL    Performed by [employer] PCT    PSA SCREENING (SCREENING)    Collection Time: 03/09/23 11:50 AM   Result Value Ref Range    Prostate Specific Ag 0.2 0.01 - 4.0 ng/mL   GLUCOSE, POC    Collection Time: 03/09/23  4:09 PM   Result Value Ref Range    Glucose (POC) 91 65 - 117 mg/dL    Performed by [employer] PCT    GLUCOSE, POC    Collection Time: 03/09/23 11:55 PM   Result Value Ref Range    Glucose (POC) 96 65 - 117 mg/dL    Performed by [employer] PCT    GLUCOSE, POC    Collection Time: 03/10/23  5:58 AM   Result Value Ref Range    Glucose (POC) 108 65 - 117 mg/dL    Performed by [employer] PCT        Imaging:  Bone survey  IMPRESSION  There are no suspicious lesions. Assessment and Plan:   Lytic lesion seen on chest xray  -bone survey negative  -Monoclonal gammopathy panel sent  -Follow up with me after discharge to go over this if not back before discharge    Please call over the weekend with any questions.   If patient still here on Monday, will have service follow up

## 2023-03-10 NOTE — PROGRESS NOTES
Hospitalist Progress Note    NAME: Guille Valero   :  1955   MRN:  [id number]       Assessment / Plan:  Acute encephalopathy due to possible seizure, CVA is ruled out  Hstory of left temporal encephalomalacia from CVA  CT head CTP, CTA head and neck remarkable for small focus of perfusion mismatch which correlates with left posterior temporal lobe chronic infarct. MRI brain has been unremarkable for any acute infarct. EEG is reviewed, neg for seizure  A1C 5.5,  LDL 51  TTE pending  SLP evaluated, cont' diet  Cont' D5 NS until PO intake improves  Change to PO abx. Cont' keppra, neurology is following, discussed with Dr Keyur Rangel, will start keppra 500mg. Cont' gabapentin. Gabapentin level pending  PT/OT to eval  Seizure precautions  Ok to remove tele   Updated pt's sister via phone    COPD  Resume home inhl, sister to bring from home  Nebs prn    Lytic lesions concerning for MM  Imaging personally reviewed and discussed with the patient and his family. Xr bone survey is negative. Fu on spep and upep  Oncology is following, discussed with Dr Sourav Kraus this AM, can fu outpt if medically stable. Estimated discharge date: 3/11 pending clinical improvement    Code status: full   Prophylaxis:   Recommended Disposition:      Subjective:     Chief Complaint / Reason for Physician Visit  Pt is much more awake, conversant this AM.  Discussed with RN events overnight. Review of Systems:  Symptom Y/N Comments  Symptom Y/N Comments   Fever/Chills    Chest Pain     Poor Appetite    Edema     Cough    Abdominal Pain     Sputum    Joint Pain     SOB/MUÑOZ    Pruritis/Rash     Nausea/vomit    Tolerating PT/OT     Diarrhea    Tolerating Diet     Constipation    Other       Could NOT obtain due to:      Objective:     VITALS:   Last 24hrs VS reviewed since prior progress note.  Most recent are:  Patient Vitals for the past 24 hrs:   Temp Pulse Resp BP SpO2   03/10/23 0857 -- -- -- -- 93 % 03/10/23 0734 97.9 °F (36.6 °C) (!) 57 17 122/62 93 %   03/10/23 0316 98.1 °F (36.7 °C) (!) 55 22 (!) 108/41 91 %   03/09/23 2315 98.1 °F (36.7 °C) (!) 59 18 130/68 95 %   03/09/23 1931 98.6 °F (37 °C) 63 20 123/61 91 %   03/09/23 1529 98.4 °F (36.9 °C) 66 18 128/72 93 %   03/09/23 1124 97.9 °F (36.6 °C) 74 18 (!) 124/59 93 %   03/09/23 0925 97.9 °F (36.6 °C) 70 17 132/72 92 %       No intake or output data in the 24 hours ending 03/10/23 0917     I had a face to face encounter and independently examined this patient on 3/10/2023, as outlined below:  PHYSICAL EXAM:  General: WD, WN. Alert, cooperative  EENT:  EOMI. Anicteric sclerae. MMM  Resp:  CTA bilaterally, no wheezing or rales. No accessory muscle use  CV:  Regular  rhythm,  No edema  GI:  Soft, Non distended, Non tender. +Bowel sounds  Neurologic:  AAOx4, moving all exts. Psych:   Not anxious nor agitated  Skin:  No rashes. No jaundice    Reviewed most current lab test results and cultures  YES  Reviewed most current radiology test results   YES  Review and summation of old records today    NO  Reviewed patient's current orders and MAR    YES  PMH/ reviewed - no change compared to H&P  ________________________________________________________________________  Care Plan discussed with:    Comments   Patient x    Family  x    RN x    Care Manager     Consultant                        Multidiciplinary team rounds were held today with , nursing, pharmacist and clinical coordinator. Patient's plan of care was discussed; medications were reviewed and discharge planning was addressed.      ________________________________________________________________________  Total NON critical care TIME:  35   Minutes    Total CRITICAL CARE TIME Spent:   Minutes non procedure based      Comments   >50% of visit spent in counseling and coordination of care     ________________________________________________________________________  Fransisco Abreu, MD Procedures: see electronic medical records for all procedures/Xrays and details which were not copied into this note but were reviewed prior to creation of Plan. LABS:  I reviewed today's most current labs and imaging studies.   Pertinent labs include:  Recent Labs     03/07/23 1935   WBC 10.9   HGB 13.7   HCT 41.9          Recent Labs     03/07/23 1935   *   K 4.7      CO2 25   *   BUN 22*   CREA 1.24   CA 9.0   MG 2.4   ALB 3.9   TBILI 0.5   ALT 20         Signed: Luz Hardin MD

## 2023-03-10 NOTE — ROUTINE PROCESS
End of Shift Note    Bedside shift change report given to Mireille Barrientos RN  (oncoming nurse) by Branden Cervantes RN (offgoing nurse). Report included the following information Kardex, ED Summary, MAR, and Recent Results    Shift worked:  7p - 7a   Shift summary and any significant changes:    Patient confused but pleasant. Expressive Aphasia continues     Concerns for physician to address:  Noted above   Zone phone for oncoming shift:  [phone]     Patient Andrew Freedman  78 y.o.  3/7/2023  6:21 PM by Aurea Navarrete DO. Sharron Suazo was admitted from Home    Problem List  Patient Active Problem List    Diagnosis Date Noted    Complex partial seizure evolving to generalized seizure ([address] 11/22/2016    Cerebral infarction due to thrombosis of left middle cerebral artery ([address] 11/22/2016    Infected prosthetic hip ([address] 11/10/2015    Carotid artery stenosis with cerebral infarction ([address] 11/02/2015    Failed total hip arthroplasty ([address] 06/09/2014    Late effect of stroke 05/23/2013    Encephalopathy, unspecified 04/21/2012    CVA (cerebral infarction) 09/27/2010    Muscle strain 09/27/2010    Localization-related partial epilepsy with complex partial seizures ([address] 09/27/2010    HTN (hypertension) 09/27/2010    Hypercholesterolemia 09/27/2010    Total hip replacements Bilateral 09/27/2010    CAD (coronary artery disease) 09/27/2010    Hepatitis A 09/27/2010    Hepatitis C 09/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 History:   Diagnosis Date    Arthritis     Hips, Knees    CAD (coronary artery disease)     MI around 1990    Hepatitis A     Hepatitis C     High cholesterol     Hypertension     Other ill-defined conditions(799.89)     Light sensitivity, causes seizures    Seizures ([address]     Last seizure 12/2008/work -up in 2012 -possible seizure    Stroke ([address] 2005    Thyroid disease     Hypothyroid       Core Measures:  CVA: Yes Yes  CHF:No No  PNA:No No    Activity:  Activity Level:  Up with Assistance  Number times ambulated in hallways past shift: 0  Number of times OOB to chair past shift: 1    Cardiac:   Cardiac Monitoring: Yes      Cardiac Rhythm: Sinus Rhythm    Access:   Current line(s): PIV   Central Line? No Placement date  Reason Medically Necessary   PICC LINE? No Placement date Reason Medically Necessary     Genitourinary:   Urinary status: voiding  Urinary Catheter? No Placement Date  Reason Medically Necessary     Respiratory:   O2 Device: Nasal cannula  Chronic home O2 use?: NO  Incentive spirometer at bedside: NO       GI:     Current diet:  ADULT DIET Dysphagia - Soft & Bite Sized  DIET ONE TIME MESSAGE  Passing flatus: YES  Tolerating current diet: YES       Pain Management:   Patient states pain is manageable on current regimen: YES    Skin:  Ruel Score: 17  Interventions: float heels and PT/OT consult    Patient Safety:  Fall Score:  Total Score: 2  Interventions: bed/chair alarm, gripper socks, pt to call before getting OOB, and tele sitter      @Rollbelt  @dexterity to release roll belt  Yes/No ( must document dexterity  here by stating Yes or No here, otherwise this is a restraint and must follow restraint documentation policy.)    DVT prophylaxis:  DVT prophylaxis Med- Yes  DVT prophylaxis SCD or KEN- Yes     Wounds: (If Applicable)  Wounds- No  Location     Active Consults:  IP CONSULT TO NEUROLOGY  IP CONSULT TO HEMATOLOGY    Length of Stay:  Expected LOS: 2d 14h  Actual LOS: 2  Discharge Plan: Yes, juan j Sanchez RN

## 2023-03-10 NOTE — PROGRESS NOTES
Hematology Oncology Progress Note    Follow up for: Lytic lesions seen on chest Xray    Chart notes reviewed since last visit. Case discussed with following: . Patient complains of the following: No complaints, pleasantly confused    Additional concerns noted by the staff:     Patient Vitals for the past 24 hrs:   BP Temp Pulse Resp SpO2   03/10/23 0857 -- -- -- -- 93 %   03/10/23 0734 122/62 97.9 °F (36.6 °C) (!) 57 17 93 %   03/10/23 0316 (!) 108/41 98.1 °F (36.7 °C) (!) 55 22 91 %   03/09/23 2315 130/68 98.1 °F (36.7 °C) (!) 59 18 95 %   03/09/23 1931 123/61 98.6 °F (37 °C) 63 20 91 %   03/09/23 1529 128/72 98.4 °F (36.9 °C) 66 18 93 %   03/09/23 1124 (!) 124/59 97.9 °F (36.6 °C) 74 18 93 %   03/09/23 0925 132/72 97.9 °F (36.6 °C) 70 17 92 %         Physical Examination:  Gen NAD      Labs:  Recent Results (from the past 24 hour(s))   GLUCOSE, POC    Collection Time: 03/09/23 11:25 AM   Result Value Ref Range    Glucose (POC) 77 65 - 117 mg/dL    Performed by [email or web address] PCT    PSA SCREENING (SCREENING)    Collection Time: 03/09/23 11:50 AM   Result Value Ref Range    Prostate Specific Ag 0.2 0.01 - 4.0 ng/mL   GLUCOSE, POC    Collection Time: 03/09/23  4:09 PM   Result Value Ref Range    Glucose (POC) 91 65 - 117 mg/dL    Performed by [email or web address] PCT    GLUCOSE, POC    Collection Time: 03/09/23 11:55 PM   Result Value Ref Range    Glucose (POC) 96 65 - 117 mg/dL    Performed by [employer] PCT    GLUCOSE, POC    Collection Time: 03/10/23  5:58 AM   Result Value Ref Range    Glucose (POC) 108 65 - 117 mg/dL    Performed by [employer] PCT        Imaging:  Bone survey  IMPRESSION  There are no suspicious lesions. Assessment and Plan:   Lytic lesion seen on chest xray  -bone survey negative  -Monoclonal gammopathy panel sent  -Follow up with me after discharge to go over this if not back before discharge    Please call over the weekend with any questions.   If patient still here on Monday, will have service follow up

## 2023-03-10 NOTE — PROGRESS NOTES
End of Shift Note     Bedside shift change report given to Jacky Gillespie RN (oncoming nurse) by Denny Delgado RN (offgoing nurse). Report included the following information Kardex, ED Summary, MAR, and Recent Results     Shift worked: 7a-7p   Shift summary and any significant changes:     No significant changes. Patient awaiting placement. COVID Pcr sent. Concerns for physician to address:  none   Zone phone for oncoming shift:   [phone]      Patient Kyrie Freedman  78 y.o.  3/7/2023  6:21 PM by Armando Davalos DO.  Oscar Patel was admitted from Home     Problem List          Patient Active Problem List     Diagnosis Date Noted    CVA (cerebral vascular accident) ([address] 03/08/2023    Complex partial seizure evolving to generalized seizure ([address] 11/22/2016    Cerebral infarction due to thrombosis of left middle cerebral artery ([address] 11/22/2016    Infected prosthetic hip ([address] 11/10/2015    Carotid artery stenosis with cerebral infarction ([address] 11/02/2015    Failed total hip arthroplasty ([address] 06/09/2014    Late effect of stroke 05/23/2013    Encephalopathy, unspecified 04/21/2012    CVA (cerebral infarction) 09/27/2010    Muscle strain 09/27/2010    Localization-related partial epilepsy with complex partial seizures ([address] 09/27/2010    HTN (hypertension) 09/27/2010    Hypercholesterolemia 09/27/2010    Total hip replacements Bilateral 09/27/2010    CAD (coronary artery disease) 09/27/2010    Hepatitis A 09/27/2010    Hepatitis C 09/27/2010    Osteoarthrosis, hip 08/02/2010              Past Medical History:   Diagnosis Date    Arthritis       Hips, Knees    CAD (coronary artery disease)       MI around 1990    Hepatitis A      Hepatitis C      High cholesterol      Hypertension      Other ill-defined conditions(799.89)       Light sensitivity, causes seizures    Seizures ([address]       Last seizure 12/2008/work -up in 2012 -possible seizure    Stroke [facility]) 2005    Thyroid disease       Hypothyroid         Core Measures:  CVA: No Yes  CHF:No No  PNA:No No     Activity:  Activity Level: Up with Assistance  Number times ambulated in hallways past shift: 0  Number of times OOB to chair past shift: 0     Cardiac:   Cardiac Monitoring: Yes      Cardiac Rhythm: Sinus Rhythm     Access:   Current line(s): PIV   Central Line? No Placement date  Reason Medically Necessary   PICC LINE? No Placement date Reason Medically Necessary      Genitourinary:   Urinary status: voiding  Urinary Catheter? No Placement Date  Reason Medically Necessary      Respiratory:   O2 Device: Nasal cannula  Chronic home O2 use?: NO  Incentive spirometer at bedside: NO     GI:  Current diet:  Soft and moist, thin liquids  Passing flatus: YES  Tolerating current diet: YES     Pain Management:   Patient states pain is manageable on current regimen: YES     Skin:  Ruel Score: 17  Interventions: float heels and PT/OT consult    Patient Safety:  Fall Score:  Total Score:   Interventions: bed/chair alarm, gripper socks, pt to call before getting OOB, and tele sitter      DVT prophylaxis:  DVT prophylaxis Med- Yes  DVT prophylaxis SCD or KEN- Yes      Wounds: (If Applicable)  Wounds- No  Location      Active Consults:  IP CONSULT TO NEUROLOGY   Consult hematology  Length of Stay:  Expected LOS: - - -  Actual LOS: 2  Discharge Plan: Possibly Monday 3/13/23

## 2023-03-10 NOTE — PROGRESS NOTES
Problem: Self Care Deficits Care Plan (Adult)  Goal: *Acute Goals and Plan of Care (Insert Text)  Description: FUNCTIONAL STATUS PRIOR TO ADMISSION: PLOF obtained from pts sisters:  ambulated without assist devices, limited by hip and knee pain with longer distance mobility, performed ADLS on his own, drives and performs light IADLs    HOME SUPPORT PRIOR TO ADMISSION: The patient lived alone with sisters to provide assistance. On sister lives on the same property. Other sister lives down the street and pt also has a brother that lives locally    Occupational Therapy Goals:  Initiated 3/8/2023  1. Patient will perform grooming with supervision within 7 days. 2. Patient will perform toileting with supervision within 7 days. 3. Patient will perform upper body dressing and lower body dressing with supervision within 7 days. 4. Patient will transfer from toilet with supervision using the least restrictive device and appropriate durable medical equipment within 7 days. 5. Assess fugl darling as appropriate and set goal within 7 days. Outcome: Progressing Towards Goal  OCCUPATIONAL THERAPY TREATMENT  Patient: Cristal Kennedy (28 y.o. male)  Date: 3/10/2023  Diagnosis: CVA (cerebral vascular accident) ([facility] [I63.9] <principal problem not specified>      Precautions:    Chart, occupational therapy assessment, plan of care, and goals were reviewed. ASSESSMENT  Patient continues with skilled OT services and is progressing towards goals. Pt was supine upon arrival.  His ability to express himself has improved but he has significant difficulty with verbal expression due to aphasia. He was able to state his name, the word comb and his room number. Other attempts at verbal expression was limited and he slurs words. Initial tactile cues needed to initiate mobility to edge of bed. He more redially follows one step commands than multi-step commands. Multimodal cues needed for more complex command following.   CGA overall for mobility and standing/seated balance. He has made vast improvements in cognition and language as compared to previous session. Continue to recommend rehab at discharge. Current Level of Function Impacting Discharge (ADLs): CGA mobility with and without RW, CGA seated edge of bed donning underwear, CGA standing to comb hair    Other factors to consider for discharge: making gains, improving with therapy intervention         PLAN :  Patient continues to benefit from skilled intervention to address the above impairments. Continue treatment per established plan of care to address goals. Recommend with staff: mobilize    Recommend next OT session: ADLS, cognition, balance    Recommendation for discharge: (in order for the patient to meet his/her long term goals)  Therapy 3 hours per day 5-7 days per week    This discharge recommendation:  Has been made in collaboration with the attending provider and/or case management    IF patient discharges home will need the following DME: none       SUBJECTIVE:   Patient stated Heike Pisano. E---exi.   trying to read the word Exit    OBJECTIVE DATA SUMMARY:   Cognitive/Behavioral Status:  Neurologic State: Alert  Orientation Level:  Other (Comment)  Cognition: Follows commands  Perception: Appears intact  Perseveration: Perseverates during conversation  Safety/Judgement: Fall prevention    Functional Mobility and Transfers for ADLs:  Bed Mobility:  Rolling: Supervision  Supine to Sit: Contact guard assistance  Scooting: Contact guard assistance    Transfers:  Sit to Stand: Contact guard assistance  Functional Transfers  Bathroom Mobility: Contact guard assistance (without assist devices)  Bed to Chair: Contact guard assistance    Balance:  Sitting - Static: Good (unsupported)  Sitting - Dynamic: Fair (occasional) (seated edge of bed donning underwear)  Standing: Impaired  Standing - Static: Fair  Standing - Dynamic : Fair    ADL Intervention:   Mobilized from bed to search for comb in standing with CGA for balance and pt was able to scan environment to locate comb with only one verbal cue. Focused on verbal expression during session and pt was able to state some correct words in context. Grooming  Brushing/Combing Hair: Contact guard assistance (standing at sink)    Lower Body Dressing Assistance  Underpants: Contact guard assistance (seated bending forward edge of bed to thread)         Cognitive Retraining  Orientation Retraining: Place  Executive Functions: Executing cognitive plans  Organizing/Sequencing: Breaking task down; Two step sequence  Following Commands: Follows one step commands/directions (inconsistent ability to follow commands)  Safety/Judgement: Fall prevention    Pain:  Unable to rate but indicated pain and was moaning with activity, pt indicated he was having pain in hips and knee this improve some with use of RW    Activity Tolerance:   Good    After treatment patient left in no apparent distress:   Sitting in chair, Call bell within reach, and Bed / chair alarm activated    COMMUNICATION/COLLABORATION:   The patients plan of care was discussed with: Physical therapist, Registered nurse, and patient . Jagdish Gonzales OTR/L  Time Calculation: 23 mins.

## 2023-03-10 NOTE — PROGRESS NOTES
Problem: Communication Impaired (Adult)  Goal: *Acute Goals and Plan of Care (Insert Text)  Description: Speech Therapy Goals  Initiated 3/8/2023  1. Patient will answer biographical yes/no questions with 60% accuracy within 7 days. Goal met for simple and complex questions. 2.  Patient follow 1 step commands with 60% given minimal cues within 7 days. Met for one and two step commands with goal for 3 step at 80% acc. 3.  Patient will complete automatic speech (counting etc, automatic sentence completion) with 60% accuracy within 7 days. Counted to 10 and stated TD with one cue with 100% acc. 4.  Patient will state 1 biographical fact using multi modalities within 7 days. 5. Patient will produce phrase level utterances with 80% acc. With min cues. Outcome: Progressing Towards Goal     Problem: Dysphagia (Adult)  Goal: *Acute Goals and Plan of Care (Insert Text)  Description: Speech Therapy Goals  Initiated 3/8/2023  1. Patient will participate in re-evaluation of swallow function within 7 days. MET 3/9/2023  2. Patient will tolerate least restrictive diet without signs of aspiration or decline in respiratory status within 7 days. Patient tolerating regular diet with no overt s/s of aspiration 3/10/23. Outcome: Resolved/Met   SPEECH LANGUAGE PATHOLOGY DYSPHAGIA AND SPEECH TREATMENT  Patient: Fabian Mcnamara (27 y.o. male)  Date: 3/10/2023  Diagnosis: CVA (cerebral vascular accident) ([facility] [I63.9] <principal problem not specified>      Precautions:       ASSESSMENT:  The patient's comprehension is improved to mild deficits. Needs repetition occasionally but is following 2 step commands well and comprehending complex questions with mild processing delays. Expression is still largely at single word level to some phrases. Naming is impaired but phonemic cueing and semantic cueing is helpful. Not formulating sentences well. Unable to stated address or phone number.    His swallowing is improved for regular textures. He was tolerating peanut butter and crackers well which is judged to be regular texture given sticky texture. No overt s/s of aspiration. Tolerating thins well. Discussed with patient and his sister that we would advance his diet to regular. PLAN:  Recommendations and Planned Interventions:  Recommend continued speech therapy to focus on language skills. Patient continues to benefit from skilled intervention to address the above impairments. Continue treatment per established plan of care. Discharge Recommendations: To Be Determined     SUBJECTIVE:   Patient was cooperative. OBJECTIVE:   Cognitive and Communication Status:  Neurologic State: Alert  Orientation Level: Other (Comment)  Cognition: Follows commands  Perception: Appears intact  Perseveration: Perseverates during conversation  Safety/Judgement: Fall prevention    Dysphagia Treatment and Interventions:  Oral Assessment:     P.O. Trials:  Patient Position: upright in bed  Vocal quality prior to P.O.: No impairment  Consistency Presented: Thin liquid; Solid  How Presented: Self-fed/presented;Straw     Bolus Acceptance: No impairment  Bolus Formation/Control: No impairment     Propulsion: No impairment  Oral Residue: None  Initiation of Swallow: No impairment  Laryngeal Elevation: Functional  Aspiration Signs/Symptoms: None  Pharyngeal Phase Characteristics: No impairment, issues, or problems   Effective Modifications: None  Cues for Modifications: None       Oral Phase Severity: No impairment  Pharyngeal Phase Severity : No impairment                 Speech Treatment and Interventions: Motor Speech:                       Speech Characteristics: Perseveration; Word retrieval           Language Comprehension and Expression:  Auditory Comprehension   Response to Complex Yes/No Questions (%) : 83 %  One-Step Basic Commands (%): 100 %  Two-Step Basic Commands (%): 100 %  Three-Step Basic Commands (%): 50 %  Verbal Expression  Verbal Expression  Initiation: No impairment  Automatic Speech Task: Impaired (comment) (counted to 10 and stated TD with min cues. months of the year 25% with no cues.)  Naming: Impaired  Confrontation (%): 40 %  Conversation: Non-fluent  Speech Characteristics: Perseveration; Word retrieval  Overall Impairment: Moderate; Moderate-severe     Voice:   Wnl        Response & Tolerance to Activities:     good    Pain:  Pain Scale 1: Numeric (0 - 10)  Pain Intensity 1: 0       After treatment:   Patient left in no apparent distress in bed    COMMUNICATION/EDUCATION:   Patient was educated regarding his deficit(s) of language  . He demonstrated Good understanding   The patient's plan of care including recommendations, planned interventions, and recommended diet changes were discussed with: Registered nurse.        Kylah Berrios, SLP  Time Calculation: 25 mins

## 2023-03-10 NOTE — PROGRESS NOTES
Received notification from bedside RN about patient with regards to: restlessness and difficulty with sleep, requesting medication to help  VS: /61, HR 63, RR 20, o2 sat 91% on NC 2 L    Intervention given:   - Atarax 25 mg PO x 1 dose     0245: Notified of persistent cough, requesting medication for relief    - Robitussin PO PRN

## 2023-03-10 NOTE — PROGRESS NOTES
Problem: Self Care Deficits Care Plan (Adult)  Goal: *Acute Goals and Plan of Care (Insert Text)  Description: FUNCTIONAL STATUS PRIOR TO ADMISSION: PLOF obtained from pts sisters:  ambulated without assist devices, limited by hip and knee pain with longer distance mobility, performed ADLS on his own, drives and performs light IADLs    HOME SUPPORT PRIOR TO ADMISSION: The patient lived alone with sisters to provide assistance. On sister lives on the same property. Other sister lives down the street and pt also has a brother that lives locally    Occupational Therapy Goals:  Initiated 3/8/2023  1. Patient will perform grooming with supervision within 7 days. 2. Patient will perform toileting with supervision within 7 days. 3. Patient will perform upper body dressing and lower body dressing with supervision within 7 days. 4. Patient will transfer from toilet with supervision using the least restrictive device and appropriate durable medical equipment within 7 days. 5. Assess fugl darling as appropriate and set goal within 7 days. Outcome: Progressing Towards Goal  OCCUPATIONAL THERAPY TREATMENT  Patient: Fani Ventura (25 y.o. male)  Date: 3/10/2023  Diagnosis: CVA (cerebral vascular accident) ([facility] [I63.9] <principal problem not specified>      Precautions:    Chart, occupational therapy assessment, plan of care, and goals were reviewed. ASSESSMENT  Patient continues with skilled OT services and is progressing towards goals. Pt was supine upon arrival.  His ability to express himself has improved but he has significant difficulty with verbal expression due to aphasia. He was able to state his name, the word comb and his room number. Other attempts at verbal expression was limited and he slurs words. Initial tactile cues needed to initiate mobility to edge of bed. He more redially follows one step commands than multi-step commands. Multimodal cues needed for more complex command following.   CGA overall for mobility and standing/seated balance. He has made vast improvements in cognition and language as compared to previous session. Continue to recommend rehab at discharge. Current Level of Function Impacting Discharge (ADLs): CGA mobility with and without RW, CGA seated edge of bed donning underwear, CGA standing to comb hair    Other factors to consider for discharge: making gains, improving with therapy intervention         PLAN :  Patient continues to benefit from skilled intervention to address the above impairments. Continue treatment per established plan of care to address goals. Recommend with staff: mobilize    Recommend next OT session: ADLS, cognition, balance    Recommendation for discharge: (in order for the patient to meet his/her long term goals)  Therapy 3 hours per day 5-7 days per week    This discharge recommendation:  Has been made in collaboration with the attending provider and/or case management    IF patient discharges home will need the following DME: none       SUBJECTIVE:   Patient stated Heike Pisano. E---exi.   trying to read the word Exit    OBJECTIVE DATA SUMMARY:   Cognitive/Behavioral Status:  Neurologic State: Alert  Orientation Level:  Other (Comment)  Cognition: Follows commands  Perception: Appears intact  Perseveration: Perseverates during conversation  Safety/Judgement: Fall prevention    Functional Mobility and Transfers for ADLs:  Bed Mobility:  Rolling: Supervision  Supine to Sit: Contact guard assistance  Scooting: Contact guard assistance    Transfers:  Sit to Stand: Contact guard assistance  Functional Transfers  Bathroom Mobility: Contact guard assistance (without assist devices)  Bed to Chair: Contact guard assistance    Balance:  Sitting - Static: Good (unsupported)  Sitting - Dynamic: Fair (occasional) (seated edge of bed donning underwear)  Standing: Impaired  Standing - Static: Fair  Standing - Dynamic : Fair    ADL Intervention:   Mobilized from bed to search for comb in standing with CGA for balance and pt was able to scan environment to locate comb with only one verbal cue. Focused on verbal expression during session and pt was able to state some correct words in context. Grooming  Brushing/Combing Hair: Contact guard assistance (standing at sink)    Lower Body Dressing Assistance  Underpants: Contact guard assistance (seated bending forward edge of bed to thread)         Cognitive Retraining  Orientation Retraining: Place  Executive Functions: Executing cognitive plans  Organizing/Sequencing: Breaking task down; Two step sequence  Following Commands: Follows one step commands/directions (inconsistent ability to follow commands)  Safety/Judgement: Fall prevention    Pain:  Unable to rate but indicated pain and was moaning with activity, pt indicated he was having pain in hips and knee this improve some with use of RW    Activity Tolerance:   Good    After treatment patient left in no apparent distress:   Sitting in chair, Call bell within reach, and Bed / chair alarm activated    COMMUNICATION/COLLABORATION:   The patients plan of care was discussed with: Physical therapist, Registered nurse, and patient . Shayne Michelle, OTR/L  Time Calculation: 23 mins.

## 2023-03-10 NOTE — PROGRESS NOTES
Hospitalist Progress Note    NAME: Kit Mcmahon   :  1955   MRN:  [id number]       Assessment / Plan:  Acute encephalopathy due to possible seizure, CVA is ruled out  Hstory of left temporal encephalomalacia from CVA  CT head CTP, CTA head and neck remarkable for small focus of perfusion mismatch which correlates with left posterior temporal lobe chronic infarct. MRI brain has been unremarkable for any acute infarct. EEG is reviewed, neg for seizure  A1C 5.5,  LDL 51  TTE pending  SLP evaluated, cont' diet  Cont' D5 NS until PO intake improves  Change to PO abx. Cont' keppra, neurology is following, discussed with Dr Gia Young, will start keppra 500mg. Cont' gabapentin. Gabapentin level pending  PT/OT to eval  Seizure precautions  Ok to remove tele   Updated pt's sister via phone    COPD  Resume home inhl, sister to bring from home  Nebs prn    Lytic lesions concerning for MM  Imaging personally reviewed and discussed with the patient and his family. Xr bone survey is negative. Fu on spep and upep  Oncology is following, discussed with Dr Colby Colón this AM, can fu outpt if medically stable. Estimated discharge date: 3/11 pending clinical improvement    Code status: full   Prophylaxis:   Recommended Disposition:      Subjective:     Chief Complaint / Reason for Physician Visit  Pt is much more awake, conversant this AM.  Discussed with RN events overnight. Review of Systems:  Symptom Y/N Comments  Symptom Y/N Comments   Fever/Chills    Chest Pain     Poor Appetite    Edema     Cough    Abdominal Pain     Sputum    Joint Pain     SOB/MUÑOZ    Pruritis/Rash     Nausea/vomit    Tolerating PT/OT     Diarrhea    Tolerating Diet     Constipation    Other       Could NOT obtain due to:      Objective:     VITALS:   Last 24hrs VS reviewed since prior progress note.  Most recent are:  Patient Vitals for the past 24 hrs:   Temp Pulse Resp BP SpO2   03/10/23 0857 -- -- -- -- 93 % 03/10/23 0734 97.9 °F (36.6 °C) (!) 57 17 122/62 93 %   03/10/23 0316 98.1 °F (36.7 °C) (!) 55 22 (!) 108/41 91 %   03/09/23 2315 98.1 °F (36.7 °C) (!) 59 18 130/68 95 %   03/09/23 1931 98.6 °F (37 °C) 63 20 123/61 91 %   03/09/23 1529 98.4 °F (36.9 °C) 66 18 128/72 93 %   03/09/23 1124 97.9 °F (36.6 °C) 74 18 (!) 124/59 93 %   03/09/23 0925 97.9 °F (36.6 °C) 70 17 132/72 92 %       No intake or output data in the 24 hours ending 03/10/23 0917     I had a face to face encounter and independently examined this patient on 3/10/2023, as outlined below:  PHYSICAL EXAM:  General: WD, WN. Alert, cooperative  EENT:  EOMI. Anicteric sclerae. MMM  Resp:  CTA bilaterally, no wheezing or rales. No accessory muscle use  CV:  Regular  rhythm,  No edema  GI:  Soft, Non distended, Non tender. +Bowel sounds  Neurologic:  AAOx4, moving all exts. Psych:   Not anxious nor agitated  Skin:  No rashes. No jaundice    Reviewed most current lab test results and cultures  YES  Reviewed most current radiology test results   YES  Review and summation of old records today    NO  Reviewed patient's current orders and MAR    YES  PMH/ reviewed - no change compared to H&P  ________________________________________________________________________  Care Plan discussed with:    Comments   Patient x    Family  x    RN x    Care Manager     Consultant                        Multidiciplinary team rounds were held today with , nursing, pharmacist and clinical coordinator. Patient's plan of care was discussed; medications were reviewed and discharge planning was addressed.      ________________________________________________________________________  Total NON critical care TIME:  35   Minutes    Total CRITICAL CARE TIME Spent:   Minutes non procedure based      Comments   >50% of visit spent in counseling and coordination of care     ________________________________________________________________________  Nikki Montanez MD Procedures: see electronic medical records for all procedures/Xrays and details which were not copied into this note but were reviewed prior to creation of Plan. LABS:  I reviewed today's most current labs and imaging studies.   Pertinent labs include:  Recent Labs     03/07/23 1935   WBC 10.9   HGB 13.7   HCT 41.9          Recent Labs     03/07/23 1935   *   K 4.7      CO2 25   *   BUN 22*   CREA 1.24   CA 9.0   MG 2.4   ALB 3.9   TBILI 0.5   ALT 20         Signed: Keon Young MD

## 2023-03-10 NOTE — PROGRESS NOTES
Problem: Mobility Impaired (Adult and Pediatric)  Goal: *Acute Goals and Plan of Care (Insert Text)  Description:   FUNCTIONAL STATUS PRIOR TO ADMISSION: Patient was independent and active without use of DME.    HOME SUPPORT PRIOR TO ADMISSION: The patient lived alone, 1-story home with 4 steps to enter. He has a sister that lives a few hundred feet away on the same property. Physical Therapy Goals  Initiated 3/8/2023  1. Patient will move from supine to sit and sit to supine  in bed with supervision assist/set-up within 7 day(s). 2.  Patient will transfer from bed to chair and chair to bed with supervision/set-up using the least restrictive device within 7 day(s). 3.  Patient will perform sit to stand with supervision/set-up within 7 day(s). 4.  Patient will ambulate with supervision/set-up for 150 feet with the least restrictive device within 7 day(s). 5.  Patient will ascend/descend 4 stairs with 1 handrail(s) with supervision/set-up within 7 day(s). 6.  Patient will improve Kulkarni Balance score by 7 points within 7 days. Outcome: Progressing Towards Goal     PHYSICAL THERAPY TREATMENT  Patient: Radha Mitchell (78 y.o. male)  Date: 3/10/2023  Diagnosis: CVA (cerebral vascular accident) [facility]) [I63.9] <principal problem not specified>      Precautions:  Fall ; Bed/Chair Alarm  Chart, physical therapy assessment, plan of care and goals were reviewed. ASSESSMENT  Patient continues with skilled PT services and is progressing towards goals. He remains with expressive aphasia but able to appropriately identify numerical markings (I.e. room number) today, unable to cite \"exit\" during gait and scanning environment task. He is able to follow commands for functional tasks in room today, picking up objects, performing ADL tasks, and putting away objects all with min. Cuing and RW use. He still has c/o RLE hip/knee pain with mobility. Continue to follow.     Current Level of Function Impacting Discharge (mobility/balance): bed mob. CGA ; transfers CGA ; gait min. X 2 with RW    Other factors to consider for discharge: pt requires 24/7 care at this time ; remains with expressive aphasia         PLAN :  Patient continues to benefit from skilled intervention to address the above impairments. Continue treatment per established plan of care. to address goals. Recommendation for discharge: (in order for the patient to meet his/her long term goals)  Therapy 3 hours per day 5-7 days per week    This discharge recommendation:  Has been made in collaboration with the attending provider and/or case management    IF patient discharges home will need the following DME: to be determined (TBD)       SUBJECTIVE:   Patient stated Hips, knee still sore.     OBJECTIVE DATA SUMMARY:   Critical Behavior:  Neurologic State: Alert  Orientation Level: Other (Comment)  Cognition: Follows commands  Safety/Judgement: Fall prevention  Functional Mobility Training:  Bed Mobility:  Rolling: Supervision  Supine to Sit: Contact guard assistance     Scooting: Contact guard assistance        Transfers:  Sit to Stand: Contact guard assistance  Stand to Sit: Contact guard assistance        Bed to Chair: Contact guard assistance                    Balance:  Sitting - Static: Good (unsupported)  Sitting - Dynamic: Fair (occasional) (seated edge of bed donning underwear)  Standing: Impaired  Standing - Static: Fair  Standing - Dynamic : Fair    Ambulation/Gait Training:  Distance (ft): 50 Feet (ft)  Assistive Device: Gait belt;Walker, rolling  Ambulation - Level of Assistance: Minimal assistance;Assist x2        Gait Abnormalities: Decreased step clearance; Antalgic        Base of Support: Widened     Speed/Yumiko: Pace decreased (<100 feet/min)  Step Length: Left shortened;Right shortened        Interventions: Safety awareness training; Tactile cues; Verbal cues; Visual/Demos       Pain Rating:  C/o hips and R knee pain with mobility ; pt unable to rate     Activity Tolerance:   Good    After treatment patient left in no apparent distress:   Sitting in chair, Call bell within reach, Bed / chair alarm activated, and nurse notified ; telesitter     COMMUNICATION/COLLABORATION:   The patients plan of care was discussed with: Occupational therapist and Registered nurse.      Vargas Hoover, PT, DPT   Time Calculation: 19 mins

## 2023-03-11 LAB
ANION GAP SERPL CALC-SCNC: 4 MMOL/L (ref 5–15)
BUN SERPL-MCNC: 21 MG/DL (ref 6–20)
BUN/CREAT SERPL: 33 (ref 12–20)
CALCIUM SERPL-MCNC: 8.6 MG/DL (ref 8.5–10.1)
CHLORIDE SERPL-SCNC: 109 MMOL/L (ref 97–108)
CO2 SERPL-SCNC: 27 MMOL/L (ref 21–32)
CREAT SERPL-MCNC: 0.64 MG/DL (ref 0.7–1.3)
GLUCOSE BLD STRIP.AUTO-MCNC: 83 MG/DL (ref 65–117)
GLUCOSE BLD STRIP.AUTO-MCNC: 93 MG/DL (ref 65–117)
GLUCOSE SERPL-MCNC: 93 MG/DL (ref 65–100)
POTASSIUM SERPL-SCNC: 3.4 MMOL/L (ref 3.5–5.1)
SARS-COV-2 RNA RESP QL NAA+PROBE: NOT DETECTED
SERVICE CMNT-IMP: NORMAL
SERVICE CMNT-IMP: NORMAL
SODIUM SERPL-SCNC: 140 MMOL/L (ref 136–145)
SOURCE, COVRS: NORMAL

## 2023-03-11 PROCEDURE — 82962 GLUCOSE BLOOD TEST: CPT

## 2023-03-11 PROCEDURE — 74011250637 HC RX REV CODE- 250/637: Performed by: STUDENT IN AN ORGANIZED HEALTH CARE EDUCATION/TRAINING PROGRAM

## 2023-03-11 PROCEDURE — 74011250636 HC RX REV CODE- 250/636: Performed by: INTERNAL MEDICINE

## 2023-03-11 PROCEDURE — 74011250637 HC RX REV CODE- 250/637: Performed by: INTERNAL MEDICINE

## 2023-03-11 PROCEDURE — 74011250636 HC RX REV CODE- 250/636: Performed by: STUDENT IN AN ORGANIZED HEALTH CARE EDUCATION/TRAINING PROGRAM

## 2023-03-11 PROCEDURE — 80048 BASIC METABOLIC PNL TOTAL CA: CPT

## 2023-03-11 PROCEDURE — 94760 N-INVAS EAR/PLS OXIMETRY 1: CPT

## 2023-03-11 PROCEDURE — 65270000046 HC RM TELEMETRY

## 2023-03-11 PROCEDURE — 94640 AIRWAY INHALATION TREATMENT: CPT

## 2023-03-11 PROCEDURE — 36415 COLL VENOUS BLD VENIPUNCTURE: CPT

## 2023-03-11 RX ORDER — LEVETIRACETAM 500 MG/1
500 TABLET ORAL 2 TIMES DAILY
Qty: 60 TABLET | Refills: 5 | Status: SHIPPED | OUTPATIENT
Start: 2023-03-11 | End: 2023-03-13 | Stop reason: SDUPTHER

## 2023-03-11 RX ADMIN — PANTOPRAZOLE SODIUM 40 MG: 40 TABLET, DELAYED RELEASE ORAL at 08:45

## 2023-03-11 RX ADMIN — ASPIRIN 81 MG: 81 TABLET, COATED ORAL at 08:45

## 2023-03-11 RX ADMIN — LEVOTHYROXINE SODIUM 50 MCG: 0.05 TABLET ORAL at 05:52

## 2023-03-11 RX ADMIN — GABAPENTIN 600 MG: 300 CAPSULE ORAL at 18:15

## 2023-03-11 RX ADMIN — ENOXAPARIN SODIUM 40 MG: 100 INJECTION SUBCUTANEOUS at 08:45

## 2023-03-11 RX ADMIN — GABAPENTIN 600 MG: 300 CAPSULE ORAL at 12:09

## 2023-03-11 RX ADMIN — LEVETIRACETAM 500 MG: 100 INJECTION, SOLUTION INTRAVENOUS at 12:09

## 2023-03-11 RX ADMIN — GABAPENTIN 600 MG: 300 CAPSULE ORAL at 08:45

## 2023-03-11 RX ADMIN — CASTOR OIL AND BALSAM, PERU: 788; 87 OINTMENT TOPICAL at 21:09

## 2023-03-11 RX ADMIN — ATORVASTATIN CALCIUM 40 MG: 40 TABLET, FILM COATED ORAL at 08:45

## 2023-03-11 RX ADMIN — GABAPENTIN 600 MG: 300 CAPSULE ORAL at 21:04

## 2023-03-11 RX ADMIN — DOXAZOSIN 4 MG: 2 TABLET ORAL at 21:10

## 2023-03-11 RX ADMIN — CITALOPRAM HYDROBROMIDE 20 MG: 20 TABLET ORAL at 08:45

## 2023-03-11 NOTE — PROGRESS NOTES
End of Shift Note    Bedside shift change report given to [facility] (oncoming nurse) by Jarred Leo RN (offgoing nurse). Report included the following information SBAR, Kardex, Intake/Output, MAR, and Recent Results    Shift worked:  Days   Shift summary and any significant changes:     No significant changes. Patient did not complain of pain or appear to be in distress. Patient still awaiting placement. Concerns for physician to address:  None   Zone phone for oncoming shift:   [phone]     Patient Amelia Freedman  78 y.o.  3/7/2023  6:21 PM by Richy Levine DO.  Jessica Gomez was admitted from Home    Problem List  Patient Active Problem List    Diagnosis Date Noted    Complex partial seizure evolving to generalized seizure ([address] 11/22/2016    Cerebral infarction due to thrombosis of left middle cerebral artery ([address] 11/22/2016    Infected prosthetic hip ([address] 11/10/2015    Carotid artery stenosis with cerebral infarction ([address] 11/02/2015    Failed total hip arthroplasty ([address] 06/09/2014    Late effect of stroke 05/23/2013    Encephalopathy, unspecified 04/21/2012    CVA (cerebral infarction) 09/27/2010    Muscle strain 09/27/2010    Localization-related partial epilepsy with complex partial seizures ([address] 09/27/2010    HTN (hypertension) 09/27/2010    Hypercholesterolemia 09/27/2010    Total hip replacements Bilateral 09/27/2010    CAD (coronary artery disease) 09/27/2010    Hepatitis A 09/27/2010    Hepatitis C 09/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 History:   Diagnosis Date    Arthritis     Hips, Knees    CAD (coronary artery disease)     MI around 1990    Hepatitis A     Hepatitis C     High cholesterol     Hypertension     Other ill-defined conditions(799.89)     Light sensitivity, causes seizures    Seizures ([address]     Last seizure 12/2008/work -up in 2012 -possible seizure    Stroke [facility]) 2005    Thyroid disease     Hypothyroid       Core Measures:  CVA: No Yes  CHF:No No  PNA:No No    Activity:  Activity Level: Up with Assistance  Number times ambulated in hallways past shift: 1  Number of times OOB to chair past shift: 1    Cardiac:   Cardiac Monitoring: No      Cardiac Rhythm: Sinus Rhythm    Access:   Current line(s): PIV   Central Line? No   PICC LINE? No     Genitourinary:   Urinary status: voiding  Urinary Catheter? No     Respiratory:   O2 Device: None (Room air)  Chronic home O2 use?: NO  Incentive spirometer at bedside: NO       GI:  Last Bowel Movement Date: 03/07/23  Current diet:  DIET ONE TIME MESSAGE  ADULT DIET Regular  Passing flatus: YES  Tolerating current diet: YES       Pain Management:   Patient states pain is manageable on current regimen: YES    Skin:  Ruel Score: 17  Interventions: float heels and increase time out of bed    Patient Safety:  Fall Score:  Total Score: 2  Interventions: bed/chair alarm, assistive device (walker, cane, etc), gripper socks, pt to call before getting OOB, and stay with me (per policy)       DVT prophylaxis:  DVT prophylaxis Med- Yes  DVT prophylaxis SCD or KEN- Yes     Wounds: (If Applicable)  Wounds- Yes Buttock crack, zinc applied      Active Consults:  IP CONSULT TO NEUROLOGY  IP CONSULT TO HEMATOLOGY    Length of Stay:  Expected LOS: 2d 14h  Actual LOS: 3  Discharge Plan: Yes Possibly Monday 3/13/23      Jc Cool RN

## 2023-03-11 NOTE — DISCHARGE INSTRUCTIONS
Patient Discharge Instructions    Taylor Calvertu / [id number] : 1955    Admitted 3/7/2023 Discharged: 3/11/2023         DISCHARGE DIAGNOSIS:   [unfilled]      What to do at Home    1. Recommended diet: {diet:42519 }    2. Recommended activity: {discharge activity:62704}    3. If you experience any of the following symptoms then please call your primary care physician or return to the emergency room if you cannot get hold of your doctor:   Fevers > 100.5, chills   Nausea or vomiting, persistent diarrhea > 24 hours   Blood in stool or black stools   Chest pain or SOB      [unfilled]        Information obtained by :  I understand that if any problems occur once I am at home I am to contact my physician. I understand and acknowledge receipt of the instructions indicated above.                                                                                                                                            Physician's or R.N.'s Signature                                                                  Date/Time                                                                                                                                              Patient or Representative Signature                                                          Date/Time

## 2023-03-11 NOTE — PROGRESS NOTES
Transition of Care Plan:     RUR:  low 12%  Disposition: [facility] (Auth pending)  If SNF or IPR: Date FOC offered: 3-9-23  Date [address] received: 3-9-23  Date authorization started with reference number: 3/10/2023  Date authorization received and expires:  Accepting facility: Referrals sent    Follow up appointments: to be made  DME needed: tbd   Transportation at Discharge: medical   [place] or means to access home:      n/a   IM Medicare Letter: 2 nd letter   Is patient a [place] and connected with the [facility]?              no  If yes, was [employer] transfer form completed and VA notified? Caregiver Contact:  Discharge Caregiver contacted prior to discharge? Care Conference needed?:       NO              3:20pm- CM called Ely Rain ([phone]) with [facility] via phone to inquire about bed availability. Liane Elmore stated that they are currenty waiting on auth and COVID test is still pending. CM will continue to follow patient for discharge planning needs and arrange for services as deemed necessary.

## 2023-03-11 NOTE — DISCHARGE INSTRUCTIONS
Patient Discharge Instructions    Evan Armas / [id number] : 1955    Admitted 3/7/2023 Discharged: 3/11/2023         DISCHARGE DIAGNOSIS:   [unfilled]      What to do at Home    1. Recommended diet: {diet:96188 }    2. Recommended activity: {discharge activity:45921}    3. If you experience any of the following symptoms then please call your primary care physician or return to the emergency room if you cannot get hold of your doctor:   Fevers > 100.5, chills   Nausea or vomiting, persistent diarrhea > 24 hours   Blood in stool or black stools   Chest pain or SOB      [unfilled]        Information obtained by :  I understand that if any problems occur once I am at home I am to contact my physician. I understand and acknowledge receipt of the instructions indicated above.                                                                                                                                            Physician's or R.N.'s Signature                                                                  Date/Time                                                                                                                                              Patient or Representative Signature                                                          Date/Time

## 2023-03-11 NOTE — PROGRESS NOTES
End of Shift Note     Bedside shift change report given to Vickie RN (oncoming nurse) by Thai Abarca RN (offgoing nurse). Report included the following information Kardex, ED Summary, MAR, and Recent Results     Shift worked: 7p-7a   Shift summary and any significant changes:     No significant changes. Patient awaiting placement. COVID Pcr sent. Concerns for physician to address:  none   Zone phone for oncoming shift:   [phone]      Patient Andrew Freedman  78 y.o.  3/7/2023  6:21 PM by Jessica Soriano DO.  Shawanda Hatfield was admitted from Home     Problem List          Patient Active Problem List     Diagnosis Date Noted    CVA (cerebral vascular accident) ([address] 03/08/2023    Complex partial seizure evolving to generalized seizure ([address] 11/22/2016    Cerebral infarction due to thrombosis of left middle cerebral artery ([address] 11/22/2016    Infected prosthetic hip ([address] 11/10/2015    Carotid artery stenosis with cerebral infarction ([address] 11/02/2015    Failed total hip arthroplasty ([address] 06/09/2014    Late effect of stroke 05/23/2013    Encephalopathy, unspecified 04/21/2012    CVA (cerebral infarction) 09/27/2010    Muscle strain 09/27/2010    Localization-related partial epilepsy with complex partial seizures ([address] 09/27/2010    HTN (hypertension) 09/27/2010    Hypercholesterolemia 09/27/2010    Total hip replacements Bilateral 09/27/2010    CAD (coronary artery disease) 09/27/2010    Hepatitis A 09/27/2010    Hepatitis C 09/27/2010    Osteoarthrosis, hip 08/02/2010              Past Medical History:   Diagnosis Date    Arthritis       Hips, Knees    CAD (coronary artery disease)       MI around 1990    Hepatitis A      Hepatitis C      High cholesterol      Hypertension      Other ill-defined conditions(799.89)       Light sensitivity, causes seizures    Seizures ([address]       Last seizure 12/2008/work -up in 2012 -possible seizure    Stroke [facility]) 2005    Thyroid disease       Hypothyroid         Core Measures:  CVA: No Yes  CHF:No No  PNA:No No     Activity:  Activity Level: Up with Assistance  Number times ambulated in hallways past shift: 0  Number of times OOB to chair past shift: 0     Cardiac:   Cardiac Monitoring: Yes      Cardiac Rhythm: Sinus Rhythm     Access:   Current line(s): PIV   Central Line? No Placement date  Reason Medically Necessary   PICC LINE? No Placement date Reason Medically Necessary      Genitourinary:   Urinary status: voiding  Urinary Catheter? No Placement Date  Reason Medically Necessary      Respiratory:   O2 Device: Nasal cannula  Chronic home O2 use?: NO  Incentive spirometer at bedside: NO     GI:  Current diet:  Soft and moist, thin liquids  Passing flatus: YES  Tolerating current diet: YES     Pain Management:   Patient states pain is manageable on current regimen: YES     Skin:  Ruel Score: 17  Interventions: float heels and PT/OT consult    Patient Safety:  Fall Score:  Total Score:   Interventions: bed/chair alarm, gripper socks, pt to call before getting OOB, and tele sitter      DVT prophylaxis:  DVT prophylaxis Med- Yes  DVT prophylaxis SCD or KEN- Yes      Wounds: (If Applicable)  Wounds- No  Location      Active Consults:  IP CONSULT TO NEUROLOGY   Consult hematology  Length of Stay:  Expected LOS: - - -  Actual LOS: 2  Discharge Plan: Possibly Monday 3/13/23

## 2023-03-11 NOTE — PROGRESS NOTES
End of Shift Note    Bedside shift change report given to [facility] (oncoming nurse) by Bridget Lind RN (offgoing nurse). Report included the following information SBAR, Kardex, Intake/Output, MAR, and Recent Results    Shift worked:  Days   Shift summary and any significant changes:     No significant changes. Patient did not complain of pain or appear to be in distress. Patient still awaiting placement. Concerns for physician to address:  None   Zone phone for oncoming shift:   [phone]     Patient Delores Freedman  78 y.o.  3/7/2023  6:21 PM by Tosin Pruitt DO.  Raul Arenas was admitted from Home    Problem List  Patient Active Problem List    Diagnosis Date Noted    Complex partial seizure evolving to generalized seizure ([address] 11/22/2016    Cerebral infarction due to thrombosis of left middle cerebral artery ([address] 11/22/2016    Infected prosthetic hip ([address] 11/10/2015    Carotid artery stenosis with cerebral infarction ([address] 11/02/2015    Failed total hip arthroplasty ([address] 06/09/2014    Late effect of stroke 05/23/2013    Encephalopathy, unspecified 04/21/2012    CVA (cerebral infarction) 09/27/2010    Muscle strain 09/27/2010    Localization-related partial epilepsy with complex partial seizures ([address] 09/27/2010    HTN (hypertension) 09/27/2010    Hypercholesterolemia 09/27/2010    Total hip replacements Bilateral 09/27/2010    CAD (coronary artery disease) 09/27/2010    Hepatitis A 09/27/2010    Hepatitis C 09/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 History:   Diagnosis Date    Arthritis     Hips, Knees    CAD (coronary artery disease)     MI around 1990    Hepatitis A     Hepatitis C     High cholesterol     Hypertension     Other ill-defined conditions(799.89)     Light sensitivity, causes seizures    Seizures ([address]     Last seizure 12/2008/work -up in 2012 -possible seizure    Stroke [facility]) 2005    Thyroid disease     Hypothyroid       Core Measures:  CVA: No Yes  CHF:No No  PNA:No No    Activity:  Activity Level: Up with Assistance  Number times ambulated in hallways past shift: 1  Number of times OOB to chair past shift: 1    Cardiac:   Cardiac Monitoring: No      Cardiac Rhythm: Sinus Rhythm    Access:   Current line(s): PIV   Central Line? No   PICC LINE? No     Genitourinary:   Urinary status: voiding  Urinary Catheter? No     Respiratory:   O2 Device: None (Room air)  Chronic home O2 use?: NO  Incentive spirometer at bedside: NO       GI:  Last Bowel Movement Date: 03/07/23  Current diet:  DIET ONE TIME MESSAGE  ADULT DIET Regular  Passing flatus: YES  Tolerating current diet: YES       Pain Management:   Patient states pain is manageable on current regimen: YES    Skin:  Ruel Score: 17  Interventions: float heels and increase time out of bed    Patient Safety:  Fall Score:  Total Score: 2  Interventions: bed/chair alarm, assistive device (walker, cane, etc), gripper socks, pt to call before getting OOB, and stay with me (per policy)       DVT prophylaxis:  DVT prophylaxis Med- Yes  DVT prophylaxis SCD or KEN- Yes     Wounds: (If Applicable)  Wounds- Yes Buttock crack, zinc applied      Active Consults:  IP CONSULT TO NEUROLOGY  IP CONSULT TO HEMATOLOGY    Length of Stay:  Expected LOS: 2d 14h  Actual LOS: 3  Discharge Plan: Yes Possibly Monday 3/13/23      Maria Alejandra Oneal RN

## 2023-03-11 NOTE — PROGRESS NOTES
Transition of Care Plan:     RUR:  low 12%  Disposition: [facility] (Auth pending)  If SNF or IPR: Date FOC offered: 3-9-23  Date [address] received: 3-9-23  Date authorization started with reference number: 3/10/2023  Date authorization received and expires:  Accepting facility: Referrals sent    Follow up appointments: to be made  DME needed: tbd   Transportation at Discharge: medical   [place] or means to access home:      n/a    Medicare Letter: 2 nd letter   Is patient a [place] and connected with the [place]?              no  If yes, was [employer] transfer form completed and VA notified? Caregiver Contact:  Discharge Caregiver contacted prior to discharge? Care Conference needed?:       NO              3:20pm- CM called July Jones ([phone]) with [facility] via phone to inquire about bed availability. Lauren Patten stated that they are currenty waiting on auth and COVID test is still pending. CM will continue to follow patient for discharge planning needs and arrange for services as deemed necessary.

## 2023-03-11 NOTE — PROGRESS NOTES
End of Shift Note     Bedside shift change report given to Vickie RN (oncoming nurse) by Perry Chau RN (offgoing nurse). Report included the following information Kardex, ED Summary, MAR, and Recent Results     Shift worked: 7p-7a   Shift summary and any significant changes:     No significant changes. Patient awaiting placement. COVID Pcr sent. Concerns for physician to address:  none   Zone phone for oncoming shift:   [phone]      Patient Darlin Freedman  78 y.o.  3/7/2023  6:21 PM by Renaldo Shaffer DO.  Anthony Messer was admitted from Home     Problem List          Patient Active Problem List     Diagnosis Date Noted    CVA (cerebral vascular accident) ([address] 03/08/2023    Complex partial seizure evolving to generalized seizure ([address] 11/22/2016    Cerebral infarction due to thrombosis of left middle cerebral artery ([address] 11/22/2016    Infected prosthetic hip ([address] 11/10/2015    Carotid artery stenosis with cerebral infarction ([address] 11/02/2015    Failed total hip arthroplasty ([address] 06/09/2014    Late effect of stroke 05/23/2013    Encephalopathy, unspecified 04/21/2012    CVA (cerebral infarction) 09/27/2010    Muscle strain 09/27/2010    Localization-related partial epilepsy with complex partial seizures ([address] 09/27/2010    HTN (hypertension) 09/27/2010    Hypercholesterolemia 09/27/2010    Total hip replacements Bilateral 09/27/2010    CAD (coronary artery disease) 09/27/2010    Hepatitis A 09/27/2010    Hepatitis C 09/27/2010    Osteoarthrosis, hip 08/02/2010              Past Medical History:   Diagnosis Date    Arthritis       Hips, Knees    CAD (coronary artery disease)       MI around 1990    Hepatitis A      Hepatitis C      High cholesterol      Hypertension      Other ill-defined conditions(799.89)       Light sensitivity, causes seizures    Seizures ([address]       Last seizure 12/2008/work -up in 2012 -possible seizure    Stroke [facility]) 2005    Thyroid disease       Hypothyroid         Core Measures:  CVA: No Yes  CHF:No No  PNA:No No     Activity:  Activity Level: Up with Assistance  Number times ambulated in hallways past shift: 0  Number of times OOB to chair past shift: 0     Cardiac:   Cardiac Monitoring: Yes      Cardiac Rhythm: Sinus Rhythm     Access:   Current line(s): PIV   Central Line? No Placement date  Reason Medically Necessary   PICC LINE? No Placement date Reason Medically Necessary      Genitourinary:   Urinary status: voiding  Urinary Catheter? No Placement Date  Reason Medically Necessary      Respiratory:   O2 Device: Nasal cannula  Chronic home O2 use?: NO  Incentive spirometer at bedside: NO     GI:  Current diet:  Soft and moist, thin liquids  Passing flatus: YES  Tolerating current diet: YES     Pain Management:   Patient states pain is manageable on current regimen: YES     Skin:  Ruel Score: 17  Interventions: float heels and PT/OT consult    Patient Safety:  Fall Score:  Total Score:   Interventions: bed/chair alarm, gripper socks, pt to call before getting OOB, and tele sitter      DVT prophylaxis:  DVT prophylaxis Med- Yes  DVT prophylaxis SCD or KEN- Yes      Wounds: (If Applicable)  Wounds- No  Location      Active Consults:  IP CONSULT TO NEUROLOGY   Consult hematology  Length of Stay:  Expected LOS: - - -  Actual LOS: 2  Discharge Plan: Possibly Monday 3/13/23

## 2023-03-12 PROCEDURE — 74011250636 HC RX REV CODE- 250/636: Performed by: STUDENT IN AN ORGANIZED HEALTH CARE EDUCATION/TRAINING PROGRAM

## 2023-03-12 PROCEDURE — 94640 AIRWAY INHALATION TREATMENT: CPT

## 2023-03-12 PROCEDURE — 74011250637 HC RX REV CODE- 250/637: Performed by: INTERNAL MEDICINE

## 2023-03-12 PROCEDURE — 74011250637 HC RX REV CODE- 250/637: Performed by: STUDENT IN AN ORGANIZED HEALTH CARE EDUCATION/TRAINING PROGRAM

## 2023-03-12 PROCEDURE — 65270000046 HC RM TELEMETRY

## 2023-03-12 PROCEDURE — 94760 N-INVAS EAR/PLS OXIMETRY 1: CPT

## 2023-03-12 PROCEDURE — 97116 GAIT TRAINING THERAPY: CPT

## 2023-03-12 PROCEDURE — 74011250636 HC RX REV CODE- 250/636: Performed by: INTERNAL MEDICINE

## 2023-03-12 PROCEDURE — 97535 SELF CARE MNGMENT TRAINING: CPT

## 2023-03-12 RX ORDER — POTASSIUM CHLORIDE 20 MEQ/1
40 TABLET, EXTENDED RELEASE ORAL DAILY
Status: COMPLETED | OUTPATIENT
Start: 2023-03-12 | End: 2023-03-13

## 2023-03-12 RX ADMIN — LEVOTHYROXINE SODIUM 50 MCG: 0.05 TABLET ORAL at 06:54

## 2023-03-12 RX ADMIN — PANTOPRAZOLE SODIUM 40 MG: 40 TABLET, DELAYED RELEASE ORAL at 08:31

## 2023-03-12 RX ADMIN — LEVETIRACETAM 500 MG: 100 INJECTION, SOLUTION INTRAVENOUS at 01:18

## 2023-03-12 RX ADMIN — GABAPENTIN 600 MG: 300 CAPSULE ORAL at 08:31

## 2023-03-12 RX ADMIN — CASTOR OIL AND BALSAM, PERU: 788; 87 OINTMENT TOPICAL at 22:03

## 2023-03-12 RX ADMIN — ATORVASTATIN CALCIUM 40 MG: 40 TABLET, FILM COATED ORAL at 08:31

## 2023-03-12 RX ADMIN — GABAPENTIN 600 MG: 300 CAPSULE ORAL at 17:09

## 2023-03-12 RX ADMIN — ASPIRIN 81 MG: 81 TABLET, COATED ORAL at 08:31

## 2023-03-12 RX ADMIN — GABAPENTIN 600 MG: 300 CAPSULE ORAL at 21:31

## 2023-03-12 RX ADMIN — ENOXAPARIN SODIUM 40 MG: 100 INJECTION SUBCUTANEOUS at 08:31

## 2023-03-12 RX ADMIN — DOXAZOSIN 4 MG: 2 TABLET ORAL at 21:31

## 2023-03-12 RX ADMIN — CITALOPRAM HYDROBROMIDE 20 MG: 20 TABLET ORAL at 08:31

## 2023-03-12 RX ADMIN — LEVETIRACETAM 500 MG: 100 INJECTION, SOLUTION INTRAVENOUS at 12:12

## 2023-03-12 RX ADMIN — POTASSIUM CHLORIDE 40 MEQ: 1500 TABLET, EXTENDED RELEASE ORAL at 08:31

## 2023-03-12 RX ADMIN — GABAPENTIN 600 MG: 300 CAPSULE ORAL at 12:12

## 2023-03-12 NOTE — PROGRESS NOTES
End of Shift Note     Bedside shift change report given to [facility] LPN (oncoming nurse) by Marci Aguilera RN (offgoing nurse). Report included the following information SBAR, Kardex, Intake/Output, MAR, and Recent Results     Shift worked:  Days   Shift summary and any significant changes:      No significant changes. Patient did not complain of pain or appear to be in distress. Patient did well walking in the hallways with walker. Patient still awaiting placement. Patient had bowel movement this morning and tolerating diet well. Concerns for physician to address:  None   Zone phone for oncoming shift:   [phone]      Patient Bree Freedman  78 y.o.  3/7/2023  6:21 PM by Massimo Emerson DO.  Kevon Montes was admitted from Home     Problem List       Patient Active Problem List     Diagnosis Date Noted    Complex partial seizure evolving to generalized seizure ([address] 11/22/2016    Cerebral infarction due to thrombosis of left middle cerebral artery ([address] 11/22/2016    Infected prosthetic hip ([address] 11/10/2015    Carotid artery stenosis with cerebral infarction ([address] 11/02/2015    Failed total hip arthroplasty ([address] 06/09/2014    Late effect of stroke 05/23/2013    Encephalopathy, unspecified 04/21/2012    CVA (cerebral infarction) 09/27/2010    Muscle strain 09/27/2010    Localization-related partial epilepsy with complex partial seizures ([address] 09/27/2010    HTN (hypertension) 09/27/2010    Hypercholesterolemia 09/27/2010    Total hip replacements Bilateral 09/27/2010    CAD (coronary artery disease) 09/27/2010    Hepatitis A 09/27/2010    Hepatitis C 09/27/2010    Osteoarthrosis, hip 08/02/2010           Past Medical History:   Diagnosis Date    Arthritis       Hips, Knees    CAD (coronary artery disease)       MI around 1990    Hepatitis A      Hepatitis C      High cholesterol      Hypertension      Other ill-defined conditions(799.89)       Light sensitivity, causes seizures    Seizures [facility])       Last seizure 12/2008/work -up in 2012 -possible seizure    Stroke [facility]) 2005    Thyroid disease       Hypothyroid         Core Measures:  CVA: No Yes  CHF:No No  PNA:No No     Activity:  Activity Level: Up with Assistance  Number times ambulated in hallways past shift: 1  Number of times OOB to chair past shift: 1     Cardiac:   Cardiac Monitoring: No      Cardiac Rhythm: Sinus Rhythm     Access:   Current line(s): PIV   Central Line? No   PICC LINE? No      Genitourinary:   Urinary status: voiding  Urinary Catheter? No      Respiratory:   O2 Device: None (Room air)  Chronic home O2 use?: NO  Incentive spirometer at bedside: NO     GI:  Last Bowel Movement Date: 03/07/23  Current diet:  DIET ONE TIME MESSAGE  ADULT DIET Regular  Passing flatus: YES  Tolerating current diet: YES     Pain Management:   Patient states pain is manageable on current regimen: YES     Skin:  Ruel Score: 17  Interventions: float heels and increase time out of bed    Patient Safety:  Fall Score:  Total Score: 2  Interventions: bed/chair alarm, assistive device (walker, cane, etc), gripper socks, pt to call before getting OOB, and stay with me (per policy)     DVT prophylaxis:  DVT prophylaxis Med- Yes  DVT prophylaxis SCD or KEN- Yes      Wounds: (If Applicable)  Wounds- Yes Buttock crack, zinc applied        Active Consults:  IP CONSULT TO NEUROLOGY  IP CONSULT TO HEMATOLOGY     Length of Stay:  Expected LOS: 2d 14h  Actual LOS: 4  Discharge Plan: Yes Possibly Monday 3/13/23        Winnie Gordon RN

## 2023-03-12 NOTE — PROGRESS NOTES
End of Shift Note     Bedside shift change report given to [facility] LPN (oncoming nurse) by Akbar Hennessy RN (offgoing nurse). Report included the following information SBAR, Kardex, Intake/Output, MAR, and Recent Results     Shift worked:  Days   Shift summary and any significant changes:      No significant changes. Patient did not complain of pain or appear to be in distress. Patient did well walking in the hallways with walker. Patient still awaiting placement. Patient had bowel movement this morning and tolerating diet well. Concerns for physician to address:  None   Zone phone for oncoming shift:   [phone]      Patient Mauri Freedman  78 y.o.  3/7/2023  6:21 PM by Justin Nelson DO.  Marielena Torres was admitted from Home     Problem List       Patient Active Problem List     Diagnosis Date Noted    Complex partial seizure evolving to generalized seizure ([address] 11/22/2016    Cerebral infarction due to thrombosis of left middle cerebral artery ([address] 11/22/2016    Infected prosthetic hip ([address] 11/10/2015    Carotid artery stenosis with cerebral infarction ([address] 11/02/2015    Failed total hip arthroplasty ([address] 06/09/2014    Late effect of stroke 05/23/2013    Encephalopathy, unspecified 04/21/2012    CVA (cerebral infarction) 09/27/2010    Muscle strain 09/27/2010    Localization-related partial epilepsy with complex partial seizures ([address] 09/27/2010    HTN (hypertension) 09/27/2010    Hypercholesterolemia 09/27/2010    Total hip replacements Bilateral 09/27/2010    CAD (coronary artery disease) 09/27/2010    Hepatitis A 09/27/2010    Hepatitis C 09/27/2010    Osteoarthrosis, hip 08/02/2010           Past Medical History:   Diagnosis Date    Arthritis       Hips, Knees    CAD (coronary artery disease)       MI around 1990    Hepatitis A      Hepatitis C      High cholesterol      Hypertension      Other ill-defined conditions(799.89)       Light sensitivity, causes seizures    Seizures [facility])       Last seizure 12/2008/work -up in 2012 -possible seizure    Stroke [facility]) 2005    Thyroid disease       Hypothyroid         Core Measures:  CVA: No Yes  CHF:No No  PNA:No No     Activity:  Activity Level: Up with Assistance  Number times ambulated in hallways past shift: 1  Number of times OOB to chair past shift: 1     Cardiac:   Cardiac Monitoring: No      Cardiac Rhythm: Sinus Rhythm     Access:   Current line(s): PIV   Central Line? No   PICC LINE? No      Genitourinary:   Urinary status: voiding  Urinary Catheter? No      Respiratory:   O2 Device: None (Room air)  Chronic home O2 use?: NO  Incentive spirometer at bedside: NO     GI:  Last Bowel Movement Date: 03/07/23  Current diet:  DIET ONE TIME MESSAGE  ADULT DIET Regular  Passing flatus: YES  Tolerating current diet: YES     Pain Management:   Patient states pain is manageable on current regimen: YES     Skin:  Ruel Score: 17  Interventions: float heels and increase time out of bed    Patient Safety:  Fall Score:  Total Score: 2  Interventions: bed/chair alarm, assistive device (walker, cane, etc), gripper socks, pt to call before getting OOB, and stay with me (per policy)     DVT prophylaxis:  DVT prophylaxis Med- Yes  DVT prophylaxis SCD or KEN- Yes      Wounds: (If Applicable)  Wounds- Yes Buttock crack, zinc applied        Active Consults:  IP CONSULT TO NEUROLOGY  IP CONSULT TO HEMATOLOGY     Length of Stay:  Expected LOS: 2d 14h  Actual LOS: 4  Discharge Plan: Yes Possibly Monday 3/13/23        Jc Cool RN

## 2023-03-12 NOTE — PROGRESS NOTES
Problem: Self Care Deficits Care Plan (Adult)  Goal: *Acute Goals and Plan of Care (Insert Text)  Description: FUNCTIONAL STATUS PRIOR TO ADMISSION: PLOF obtained from pts sisters:  ambulated without assist devices, limited by hip and knee pain with longer distance mobility, performed ADLS on his own, drives and performs light IADLs    HOME SUPPORT PRIOR TO ADMISSION: The patient lived alone with sisters to provide assistance. On sister lives on the same property. Other sister lives down the street and pt also has a brother that lives locally    Occupational Therapy Goals:  Initiated 3/8/2023  1. Patient will perform grooming with supervision within 7 days. 2. Patient will perform toileting with supervision within 7 days. 3. Patient will perform upper body dressing and lower body dressing with supervision within 7 days. 4. Patient will transfer from toilet with supervision using the least restrictive device and appropriate durable medical equipment within 7 days. 5. Assess fugl darling as appropriate and set goal within 7 days. Outcome: Progressing Towards Goal   OCCUPATIONAL THERAPY TREATMENT  Patient: Naomi Pearce (89 y.o. male)  Date: 3/12/2023  Diagnosis: CVA (cerebral vascular accident) ([facility] [address] [I63.9] <principal problem not specified>      Precautions:    Chart, occupational therapy assessment, plan of care, and goals were reviewed. ASSESSMENT  Patient continues with skilled OT services and is progressing towards goals. Pt now completing dynamic ADL with Supervision to SBA and min cues for placing hand on sink for support PRN. Pt with intact balance during standing grooming tasks and when urinating while standing at commode. Pt donned sweat pants with SBA and no LOB- hands free for standing aspect. Recommend HH OT v None, pending progress and with increased assistance from sister at D/C. Current Level of Function Impacting Discharge (ADLs): Up to SBA.  Recommend CGA with shower transfers otherwise Supervision to SBA. Other factors to consider for discharge:Sister lives very closeby- right behind Pts home, and can stay over for a few days and assist PRN. PLAN :  Patient continues to benefit from skilled intervention to address the above impairments. Continue treatment per established plan of care to address goals. Recommend with staff: SBA with OOB functional tasks. Recommend next OT session: Dynamic ADL tasks including item retrieval w/o RW as able. Recommendation for discharge: (in order for the patient to meet his/her long term goals)  HH OT v None, pending progress as long as his sister can provide increased support. This discharge recommendation:  Has been made in collaboration with the attending provider and/or case management    IF patient discharges home will need the following DME: walker: rolling       SUBJECTIVE:   Patient stated I feel good. \"    OBJECTIVE DATA SUMMARY:   Cognitive/Behavioral Status:  Neurologic State: Alert;Confused (periodic confusion)  Orientation Level: Oriented X4 (periodic confusion)  Cognition: Appropriate decision making; Appropriate safety awareness; Appropriate for age attention/concentration; Follows commands     Functional Mobility and Transfers for ADLs:  Bed Mobility:  Supine to Sit: Stand-by assistance    Transfers:  Sit to Stand: Stand-by assistance  Functional Transfers  Bathroom Mobility: Stand-by assistance       Balance:  Sitting: Impaired  Sitting - Static: Good (unsupported)  Sitting - Dynamic: Fair (occasional)  Standing: Impaired  Standing - Static: Good  Standing - Dynamic : Fair;Good    ADL Intervention:     Grooming  Position Performed: Standing  Washing Hands: Set-up; Supervision  Brushing Teeth: Set-up; Supervision    Toileting  Toileting Assistance: Stand-by assistance (standing at commode)      Pain:  Denied pain.      Activity Tolerance:   Good    After treatment patient left in no apparent distress:   Sitting in chair, Call bell within reach, Bed / chair alarm activated, and Caregiver / family present    COMMUNICATION/COLLABORATION:   The patients plan of care was discussed with: Physical therapist, Registered nurse, and Patient .      Marilyn Smith, OTR/L  Time Calculation: 15 mins

## 2023-03-12 NOTE — PROGRESS NOTES
Pharmacy Medication Reconciliation     The patient was interviewed regarding current PTA medication list, use and drug allergies;  patient present in room and obtained permission from patient to discuss drug regimen with visitor(s) present. The patient was questioned regarding use of any other inhalers, topical products, over the counter medications, herbal medications, vitamin products or ophthalmic/nasal/otic medication use. Allergy Update: Carbatrol [carbamazepine]    Recommendations/Findings: The following amendments were made to the patient's active medication list on file at [facility]:   1) Additions: none    2) Deletions: tramadol, ketoconazole shampoo, hydroxyzine, ergocalciferol    3) Changes: none      Pertinent Findings: n/a    Clarified PTA med list with patient's sister and Rx Query. PTA medication list was corrected to the following:     Prior to Admission Medications   Prescriptions Last Dose Informant Taking? albuterol (PROVENTIL VENTOLIN) 2.5 mg /3 mL (0.083 %) nebu  Family Member Yes   Sig: INHALE THE CONTENTS OF 1 VIAL (3ML) VIA NEBULIZER EVERY 4 HOURS AS NEEDED FOR WHEEZING (MAX FOUR TIMES A DAY)   albuterol-ipratropium (DUO-NEB) 2.5 mg-0.5 mg/3 ml nebu  Family Member Yes   Sig: 3 mL by Nebulization route every six (6) hours as needed for Shortness of Breath. amLODIPine (NORVASC) 10 mg tablet  Family Member Yes   Sig: TAKE ONE TABLET BY MOUTH EVERY DAY   aspirin delayed-release 325 mg tablet  Family Member Yes   Sig: Take 325 mg by mouth daily. atorvastatin (LIPITOR) 40 mg tablet  Family Member Yes   Sig: Take 40 mg by mouth daily. azelastine (ASTELIN) 137 mcg (0.1 %) nasal spray  Family Member Yes   Sig: USE 2 SPRAYS IN EACH NOSTRIL TWICE DAILY   budesonide-glycopyr-formoterol (Breztri Aerosphere) 160-9-4.8 mcg/actuation HFAA  Family Member Yes   Sig: Take 2 Puffs by inhalation two (2) times a day.    citalopram (CELEXA) 20 mg tablet  Family Member Yes   Sig: TAKE ONE TABLET BY MOUTH ONCE DAILY   doxazosin (CARDURA) 4 mg tablet  Family Member Yes   Sig: TAKE ONE TABLET BY MOUTH ONCE DAILY AT BEDTIME   gabapentin (NEURONTIN) 300 mg capsule  Family Member Yes   Sig: TAKE 2 CAPSULES FOUR TIMES DAILY                     levothyroxine (SYNTHROID) 50 mcg tablet  Family Member Yes   Sig: TAKE ONE TABLET BY MOUTH EVERY DAY   montelukast (SINGULAIR) 10 mg tablet  Family Member Yes   Sig: Take 10 mg by mouth daily.                   Facility-Administered Medications: None        Thank you,  Gregg Kanner

## 2023-03-12 NOTE — PROGRESS NOTES
Hospitalist Progress Note    NAME: Dulce Maria Fung   :  1955   MRN:  [id number]       Assessment / Plan:  Acute encephalopathy due to possible seizure, CVA is ruled out  Hstory of left temporal encephalomalacia from CVA  CT head CTP, CTA head and neck remarkable for small focus of perfusion mismatch which correlates with left posterior temporal lobe chronic infarct. MRI brain has been unremarkable for any acute infarct. EEG is reviewed, neg for seizure  A1C 5.5,  LDL 51  TTE pending  SLP evaluated, cont' diet  Cont' D5 NS until PO intake improves  Change to PO abx. Cont' keppra, neurology is following, discussed with Dr Sherryle Hosteller, will start keppra 500mg. Cont' gabapentin. Gabapentin level pending  PT/OT to eval  Seizure precautions  Ok to remove tele   Updated pt's sister via phone    COPD  Resume home inhl, sister to bring from home  Nebs prn    Lytic lesions concerning for MM  Imaging personally reviewed and discussed with the patient and his family. Xr bone survey is negative. Fu on spep and upep  Oncology is following, discussed with Dr Charles Mauricio this AM, can fu outpt if medically stable. Estimated discharge date: 3/11 pending clinical improvement    Code status: full   Prophylaxis:   Recommended Disposition:      Subjective:     Chief Complaint / Reason for Physician Visit  Pt is much more awake, conversant this AM.  Discussed with RN events overnight. Review of Systems:  Symptom Y/N Comments  Symptom Y/N Comments   Fever/Chills    Chest Pain     Poor Appetite    Edema     Cough    Abdominal Pain     Sputum    Joint Pain     SOB/MUÑOZ    Pruritis/Rash     Nausea/vomit    Tolerating PT/OT     Diarrhea    Tolerating Diet     Constipation    Other       Could NOT obtain due to:      Objective:     VITALS:   Last 24hrs VS reviewed since prior progress note.  Most recent are:  Patient Vitals for the past 24 hrs:   Temp Pulse Resp BP SpO2   23 2328 97.9 °F (36.6 °C) (!) 53 18 130/71 90 %   03/11/23 2106 -- (!) 52 -- (!) 119/52 --   03/11/23 2031 97.9 °F (36.6 °C) (!) 50 20 133/74 92 %   03/11/23 1519 -- (!) 53 -- 123/71 --   03/11/23 1517 97.5 °F (36.4 °C) (!) 55 16 116/65 96 %   03/11/23 1245 -- -- -- -- 92 %   03/11/23 0746 98.2 °F (36.8 °C) (!) 51 16 127/71 91 %       No intake or output data in the 24 hours ending 03/11/23 7338     I had a face to face encounter and independently examined this patient on 3/11/2023, as outlined below:  PHYSICAL EXAM:  General: WD, WN. Alert, cooperative  EENT:  EOMI. Anicteric sclerae. MMM  Resp:  CTA bilaterally, no wheezing or rales. No accessory muscle use  CV:  Regular  rhythm,  No edema  GI:  Soft, Non distended, Non tender. +Bowel sounds  Neurologic:  AAOx4, moving all exts. Psych:   Not anxious nor agitated  Skin:  No rashes. No jaundice    Reviewed most current lab test results and cultures  YES  Reviewed most current radiology test results   YES  Review and summation of old records today    NO  Reviewed patient's current orders and MAR    YES  PMH/ reviewed - no change compared to H&P  ________________________________________________________________________  Care Plan discussed with:    Comments   Patient x    Family  x    RN x    Care Manager     Consultant                        Multidiciplinary team rounds were held today with , nursing, pharmacist and clinical coordinator. Patient's plan of care was discussed; medications were reviewed and discharge planning was addressed.      ________________________________________________________________________  Total NON critical care TIME:  35   Minutes    Total CRITICAL CARE TIME Spent:   Minutes non procedure based      Comments   >50% of visit spent in counseling and coordination of care     ________________________________________________________________________  Junior Landon MD     Procedures: see electronic medical records for all procedures/Xrays and details which were not copied into this note but were reviewed prior to creation of Plan. LABS:  I reviewed today's most current labs and imaging studies. Pertinent labs include:  No results for input(s): WBC, HGB, HCT, PLT, HGBEXT, HCTEXT, PLTEXT, HGBEXT, HCTEXT, PLTEXT in the last 72 hours.     Recent Labs     03/11/23  0310      K 3.4*   *   CO2 27   GLU 93   BUN 21*   CREA 0.64*   CA 8.6         Signed: Lucas Joya MD

## 2023-03-12 NOTE — ROUTINE PROCESS
End of Shift Note    Bedside shift change report given to VickieRN (oncoming nurse) by Tri Barrios RN (offgoing nurse). Report included the following information SBAR, Kardex, Intake/Output, MAR, and Recent Results    Shift worked:  7p - 7a   Shift summary and any significant changes:    None     Concerns for physician to address:  None   Zone phone for oncoming shift:   [phone]     Patient Denisha Freedman  78 y.o.  3/7/2023  6:21 PM by Fan Gracia DO. Jason Nicholas was admitted from Home    Problem List  Patient Active Problem List    Diagnosis Date Noted    Complex partial seizure evolving to generalized seizure ([address] 11/22/2016    Cerebral infarction due to thrombosis of left middle cerebral artery ([address] 11/22/2016    Infected prosthetic hip ([address] 11/10/2015    Carotid artery stenosis with cerebral infarction ([address] 11/02/2015    Failed total hip arthroplasty ([address] 06/09/2014    Late effect of stroke 05/23/2013    Encephalopathy, unspecified 04/21/2012    CVA (cerebral infarction) 09/27/2010    Muscle strain 09/27/2010    Localization-related partial epilepsy with complex partial seizures ([address] 09/27/2010    HTN (hypertension) 09/27/2010    Hypercholesterolemia 09/27/2010    Total hip replacements Bilateral 09/27/2010    CAD (coronary artery disease) 09/27/2010    Hepatitis A 09/27/2010    Hepatitis C 09/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 History:   Diagnosis Date    Arthritis     Hips, Knees    CAD (coronary artery disease)     MI around 1990    Hepatitis A     Hepatitis C     High cholesterol     Hypertension     Other ill-defined conditions(799.89)     Light sensitivity, causes seizures    Seizures ([address]     Last seizure 12/2008/work -up in 2012 -possible seizure    Stroke ([address] 2005    Thyroid disease     Hypothyroid       Core Measures:  CVA: No Yes  CHF:No No  PNA:No No    Activity:  Activity Level:  Up with Assistance  Number times ambulated in hallways past shift: 1  Number of times OOB to chair past shift: 1    Cardiac:   Cardiac Monitoring: No      Cardiac Rhythm: Sinus Rhythm    Access:   Current line(s): PIV   Central Line? No   PICC LINE? No     Genitourinary:   Urinary status: voiding  Urinary Catheter? No     Respiratory:   O2 Device: None (Room air)  Chronic home O2 use?: NO  Incentive spirometer at bedside: NO       GI:  Last Bowel Movement Date: 03/07/23  Current diet:  DIET ONE TIME MESSAGE  ADULT DIET Regular  Passing flatus: YES  Tolerating current diet: YES       Pain Management:   Patient states pain is manageable on current regimen: YES    Skin:  Ruel Score: 17  Interventions: float heels and increase time out of bed    Patient Safety:  Fall Score:  Total Score: 2  Interventions: bed/chair alarm, assistive device (walker, cane, etc), gripper socks, pt to call before getting OOB, and stay with me (per policy)       DVT prophylaxis:  DVT prophylaxis Med- Yes  DVT prophylaxis SCD or KEN- Yes     Wounds: (If Applicable)  Wounds- Yes Buttock crack, zinc applied      Active Consults:  IP CONSULT TO NEUROLOGY  IP CONSULT TO HEMATOLOGY    Length of Stay:  Expected LOS: 2d 14h  Actual LOS: 4  Discharge Plan: Yes Possibly Monday 3/13/23      Luis A Anne RN

## 2023-03-12 NOTE — PROGRESS NOTES
Hospitalist Progress Note    NAME: Brady Gates   :  1955   MRN:  [id number]       Assessment / Plan:  Acute encephalopathy due to possible seizure, CVA is ruled out  Hstory of left temporal encephalomalacia from CVA  CT head CTP, CTA head and neck remarkable for small focus of perfusion mismatch which correlates with left posterior temporal lobe chronic infarct. MRI brain has been unremarkable for any acute infarct. EEG is reviewed, neg for seizure  A1C 5.5,  LDL 51  TTE pending  SLP evaluated, cont' diet  Cont' D5 NS until PO intake improves  Change to PO abx. Cont' keppra, neurology is following, discussed with Dr Imelda Del Rosario, will start keppra 500mg. Cont' gabapentin. Gabapentin level pending  PT/OT to eval  Seizure precautions  Ok to remove tele       COPD  Resume home inhl, sister to bring from home  Nebs prn    Lytic lesions concerning for MM  Imaging personally reviewed and discussed with the patient and his family. Xr bone survey is negative. Fu on spep and upep  Oncology is following, discussed with Dr Isabelle Gaming this AM, can fu outpt if medically stable. Hypokalemia, on K supplements, recheck BMP tomorrow    Estimated discharge date: 3/13    Code status: full   Prophylaxis:   Recommended Disposition: SNF, awaiting auth     Subjective:     Chief Complaint / Reason for Physician Visit  Follow-up for AMS/COPD  Review of Systems:  Symptom Y/N Comments  Symptom Y/N Comments   Fever/Chills    Chest Pain     Poor Appetite    Edema     Cough    Abdominal Pain     Sputum    Joint Pain     SOB/MUÑOZ    Pruritis/Rash     Nausea/vomit    Tolerating PT/OT     Diarrhea    Tolerating Diet     Constipation    Other       Could NOT obtain due to:      Objective:     VITALS:   Last 24hrs VS reviewed since prior progress note.  Most recent are:  Patient Vitals for the past 24 hrs:   Temp Pulse Resp BP SpO2   23 0909 -- -- -- -- 95 %   23 0731 98.1 °F (36.7 °C) 62 18 128/62 94 % 03/11/23 2328 97.9 °F (36.6 °C) (!) 53 18 130/71 90 %   03/11/23 2106 -- (!) 52 -- (!) 119/52 --   03/11/23 2031 97.9 °F (36.6 °C) (!) 50 20 133/74 92 %   03/11/23 1519 -- (!) 53 -- 123/71 --   03/11/23 1517 97.5 °F (36.4 °C) (!) 55 16 116/65 96 %       No intake or output data in the 24 hours ending 03/12/23 1314     I had a face to face encounter and independently examined this patient on 3/12/2023, as outlined below:  PHYSICAL EXAM:  General: WD, WN. Alert, cooperative  EENT:  EOMI. Anicteric sclerae. MMM  Resp:  CTA bilaterally, no wheezing or rales. No accessory muscle use  CV:  Regular  rhythm,  No edema  GI:  Soft, Non distended, Non tender. +Bowel sounds  Neurologic:  AAOx4, moving all exts. Psych:   Not anxious nor agitated  Skin:  No rashes. No jaundice    Reviewed most current lab test results and cultures  YES  Reviewed most current radiology test results   YES  Review and summation of old records today    NO  Reviewed patient's current orders and MAR    YES  PMH/SH reviewed - no change compared to H&P  ________________________________________________________________________  Care Plan discussed with:    Comments   Patient x    Family  x    RN x    Care Manager     Consultant                        Multidiciplinary team rounds were held today with , nursing, pharmacist and clinical coordinator. Patient's plan of care was discussed; medications were reviewed and discharge planning was addressed.      ________________________________________________________________________  Total NON critical care TIME:  35   Minutes    Total CRITICAL CARE TIME Spent:   Minutes non procedure based      Comments   >50% of visit spent in counseling and coordination of care     ________________________________________________________________________  Bonita Ray MD     Procedures: see electronic medical records for all procedures/Xrays and details which were not copied into this note but were reviewed prior to creation of Plan. LABS:  I reviewed today's most current labs and imaging studies. Pertinent labs include:  No results for input(s): WBC, HGB, HCT, PLT, HGBEXT, HCTEXT, PLTEXT, HGBEXT, HCTEXT, PLTEXT in the last 72 hours.     Recent Labs     03/11/23  0310      K 3.4*   *   CO2 27   GLU 93   BUN 21*   CREA 0.64*   CA 8.6         Signed: Smooth Kraus MD

## 2023-03-12 NOTE — ROUTINE PROCESS
End of Shift Note    Bedside shift change report given to VickieRN (oncoming nurse) by Augusta Hooper RN (offgoing nurse). Report included the following information SBAR, Kardex, Intake/Output, MAR, and Recent Results    Shift worked:  7p - 7a   Shift summary and any significant changes:    None     Concerns for physician to address:  None   Zone phone for oncoming shift:   [phone]     Patient Maria Elena Freedman  78 y.o.  3/7/2023  6:21 PM by Evie Prado DO. Naomi Pearce was admitted from Home    Problem List  Patient Active Problem List    Diagnosis Date Noted    Complex partial seizure evolving to generalized seizure ([address] 11/22/2016    Cerebral infarction due to thrombosis of left middle cerebral artery ([address] 11/22/2016    Infected prosthetic hip ([address] 11/10/2015    Carotid artery stenosis with cerebral infarction ([address] 11/02/2015    Failed total hip arthroplasty ([address] 06/09/2014    Late effect of stroke 05/23/2013    Encephalopathy, unspecified 04/21/2012    CVA (cerebral infarction) 09/27/2010    Muscle strain 09/27/2010    Localization-related partial epilepsy with complex partial seizures ([address] 09/27/2010    HTN (hypertension) 09/27/2010    Hypercholesterolemia 09/27/2010    Total hip replacements Bilateral 09/27/2010    CAD (coronary artery disease) 09/27/2010    Hepatitis A 09/27/2010    Hepatitis C 09/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 History:   Diagnosis Date    Arthritis     Hips, Knees    CAD (coronary artery disease)     MI around 1990    Hepatitis A     Hepatitis C     High cholesterol     Hypertension     Other ill-defined conditions(799.89)     Light sensitivity, causes seizures    Seizures ([address]     Last seizure 12/2008/work -up in 2012 -possible seizure    Stroke ([address] 2005    Thyroid disease     Hypothyroid       Core Measures:  CVA: No Yes  CHF:No No  PNA:No No    Activity:  Activity Level:  Up with Assistance  Number times ambulated in hallways past shift: 1  Number of times OOB to chair past shift: 1    Cardiac:   Cardiac Monitoring: No      Cardiac Rhythm: Sinus Rhythm    Access:   Current line(s): PIV   Central Line? No   PICC LINE? No     Genitourinary:   Urinary status: voiding  Urinary Catheter? No     Respiratory:   O2 Device: None (Room air)  Chronic home O2 use?: NO  Incentive spirometer at bedside: NO       GI:  Last Bowel Movement Date: 03/07/23  Current diet:  DIET ONE TIME MESSAGE  ADULT DIET Regular  Passing flatus: YES  Tolerating current diet: YES       Pain Management:   Patient states pain is manageable on current regimen: YES    Skin:  Ruel Score: 17  Interventions: float heels and increase time out of bed    Patient Safety:  Fall Score:  Total Score: 2  Interventions: bed/chair alarm, assistive device (walker, cane, etc), gripper socks, pt to call before getting OOB, and stay with me (per policy)       DVT prophylaxis:  DVT prophylaxis Med- Yes  DVT prophylaxis SCD or KEN- Yes     Wounds: (If Applicable)  Wounds- Yes Buttock crack, zinc applied      Active Consults:  IP CONSULT TO NEUROLOGY  IP CONSULT TO HEMATOLOGY    Length of Stay:  Expected LOS: 2d 14h  Actual LOS: 4  Discharge Plan: Yes Possibly Monday 3/13/23      Luc Hsu RN

## 2023-03-12 NOTE — PROGRESS NOTES
Problem: Self Care Deficits Care Plan (Adult)  Goal: *Acute Goals and Plan of Care (Insert Text)  Description: FUNCTIONAL STATUS PRIOR TO ADMISSION: PLOF obtained from pts sisters:  ambulated without assist devices, limited by hip and knee pain with longer distance mobility, performed ADLS on his own, drives and performs light IADLs    HOME SUPPORT PRIOR TO ADMISSION: The patient lived alone with sisters to provide assistance. On sister lives on the same property. Other sister lives down the street and pt also has a brother that lives locally    Occupational Therapy Goals:  Initiated 3/8/2023  1. Patient will perform grooming with supervision within 7 days. 2. Patient will perform toileting with supervision within 7 days. 3. Patient will perform upper body dressing and lower body dressing with supervision within 7 days. 4. Patient will transfer from toilet with supervision using the least restrictive device and appropriate durable medical equipment within 7 days. 5. Assess fugl darling as appropriate and set goal within 7 days. Outcome: Progressing Towards Goal   OCCUPATIONAL THERAPY TREATMENT  Patient: Hali Ontiverso (1 y.o. male)  Date: 3/12/2023  Diagnosis: CVA (cerebral vascular accident) ([facility] [address] [I63.9] <principal problem not specified>      Precautions:    Chart, occupational therapy assessment, plan of care, and goals were reviewed. ASSESSMENT  Patient continues with skilled OT services and is progressing towards goals. Pt now completing dynamic ADL with Supervision to SBA and min cues for placing hand on sink for support PRN. Pt with intact balance during standing grooming tasks and when urinating while standing at commode. Pt donned sweat pants with SBA and no LOB- hands free for standing aspect. Recommend HH OT v None, pending progress and with increased assistance from sister at D/C. Current Level of Function Impacting Discharge (ADLs): Up to SBA.  Recommend CGA with shower transfers otherwise Supervision to SBA. Other factors to consider for discharge:Sister lives very closeby- right behind Pts home, and can stay over for a few days and assist PRN. PLAN :  Patient continues to benefit from skilled intervention to address the above impairments. Continue treatment per established plan of care to address goals. Recommend with staff: SBA with OOB functional tasks. Recommend next OT session: Dynamic ADL tasks including item retrieval w/o RW as able. Recommendation for discharge: (in order for the patient to meet his/her long term goals)  HH OT v None, pending progress as long as his sister can provide increased support. This discharge recommendation:  Has been made in collaboration with the attending provider and/or case management    IF patient discharges home will need the following DME: walker: rolling       SUBJECTIVE:   Patient stated I feel good. \"    OBJECTIVE DATA SUMMARY:   Cognitive/Behavioral Status:  Neurologic State: Alert;Confused (periodic confusion)  Orientation Level: Oriented X4 (periodic confusion)  Cognition: Appropriate decision making; Appropriate safety awareness; Appropriate for age attention/concentration; Follows commands     Functional Mobility and Transfers for ADLs:  Bed Mobility:  Supine to Sit: Stand-by assistance    Transfers:  Sit to Stand: Stand-by assistance  Functional Transfers  Bathroom Mobility: Stand-by assistance       Balance:  Sitting: Impaired  Sitting - Static: Good (unsupported)  Sitting - Dynamic: Fair (occasional)  Standing: Impaired  Standing - Static: Good  Standing - Dynamic : Fair;Good    ADL Intervention:     Grooming  Position Performed: Standing  Washing Hands: Set-up; Supervision  Brushing Teeth: Set-up; Supervision    Toileting  Toileting Assistance: Stand-by assistance (standing at commode)      Pain:  Denied pain.      Activity Tolerance:   Good    After treatment patient left in no apparent distress:   Sitting in chair, Call bell within reach, Bed / chair alarm activated, and Caregiver / family present    COMMUNICATION/COLLABORATION:   The patients plan of care was discussed with: Physical therapist, Registered nurse, and Patient .      Marilyn Smith, OTR/L  Time Calculation: 15 mins

## 2023-03-12 NOTE — PROGRESS NOTES
Problem: Mobility Impaired (Adult and Pediatric)  Goal: *Acute Goals and Plan of Care (Insert Text)  Description:   FUNCTIONAL STATUS PRIOR TO ADMISSION: Patient was independent and active without use of DME.    HOME SUPPORT PRIOR TO ADMISSION: The patient lived alone, 1-story home with 4 steps to enter. He has a sister that lives a few hundred feet away on the same property. Physical Therapy Goals  Initiated 3/8/2023  1. Patient will move from supine to sit and sit to supine  in bed with supervision assist/set-up within 7 day(s). 2.  Patient will transfer from bed to chair and chair to bed with supervision/set-up using the least restrictive device within 7 day(s). 3.  Patient will perform sit to stand with supervision/set-up within 7 day(s). 4.  Patient will ambulate with supervision/set-up for 150 feet with the least restrictive device within 7 day(s). 5.  Patient will ascend/descend 4 stairs with 1 handrail(s) with supervision/set-up within 7 day(s). 6.  Patient will improve Kulkarni Balance score by 7 points within 7 days. Outcome: Progressing Towards Goal    PHYSICAL THERAPY TREATMENT  Patient: Evan Armas (50 y.o. male)  Date: 3/12/2023  Diagnosis: [facility]A [facility]cerebral [facility]) ([facility] [I63.9] <principal problem not specified>      Precautions:    Chart, physical therapy assessment, plan of care and goals were reviewed. ASSESSMENT  Patient continues with skilled PT services and is progressing towards goals. Patient was sitting up in bed, agreeable to work with therapy. Patient has made significant progress in his physical abilities. He was able to follow multi-step verbal commands and answer open-ended questions. He was able to come to sit EOB with standby assist, HOB elevated.  He was able to lean over to franco pants while sitting EOB with contact guard assist. He was able to stand at the sink unsupported in order to use BUE to brush his teeth, only contact guard assist. He ambulated 150' with no AD and contact guard assist in the hallway. Instructed him in head turns while walking to simulate scanning his environment. He did have to stop walking in each before he could start turning his head and once he started walking again his head turns were reduced compared to when he was standing still. He did report he was using SPC occasionally at home due to R hip pain. No dizziness reported with change in positions or during head turns. Patient was left sitting up in chair eating lunch, call bell within reach, no complaints. Patient's sister was present in the room. She states she only lives 300 feet away from him, was already providing all of his meals, but would also be willing to stay the night with him as long as he needs. The patient really wants to go home, based on his significant overall progress, he could go home with 24/7 family assist, and home health therapy. Current Level of Function Impacting Discharge (mobility/balance): overall contact guard assist for mobility    Other factors to consider for discharge: sister is willing/able to stay with him at home         PLAN :  Patient continues to benefit from skilled intervention to address the above impairments. Continue treatment per established plan of care. to address goals. Recommendation for discharge: (in order for the patient to meet his/her long term goals)  Physical therapy at least 2 days/week in the home AND ensure assist and/or supervision for safety with ambulation and ADLs    This discharge recommendation:  Has not yet been discussed the attending provider and/or case management    IF patient discharges home will need the following DME: patient owns DME required for discharge       SUBJECTIVE:   Patient stated I want to go home.     OBJECTIVE DATA SUMMARY:   Critical Behavior:  Neurologic State: Alert, Confused (periodic confusion)  Orientation Level: Oriented X4 (periodic confusion)  Cognition: Appropriate decision making, Appropriate safety awareness, Appropriate for age attention/concentration, Follows commands  Safety/Judgement: Fall prevention  Functional Mobility Training:  Bed Mobility:   Supine to Sit: Stand-by assistance    Transfers:  Sit to Stand: Stand-by assistance  Stand to Sit: Stand-by assistance    Balance:  Sitting: Impaired  Sitting - Static: Good (unsupported)  Sitting - Dynamic: Fair (occasional)  Standing: Impaired  Standing - Static: Good  Standing - Dynamic : Fair;Good  Ambulation/Gait Training:  Distance (ft): 200 Feet (ft)  Assistive Device: Gait belt  Ambulation - Level of Assistance: Contact guard assistance  Gait Abnormalities: Decreased step clearance   Base of Support: Widened   Speed/Yumiko: Pace decreased (<100 feet/min)  Step Length: Right shortened;Left shortened    Therapeutic Exercises:   none  Pain Rating:  none    Activity Tolerance:   Good    After treatment patient left in no apparent distress:   Sitting in chair, Call bell within reach, and Caregiver / family present    COMMUNICATION/COLLABORATION:   The patients plan of care was discussed with: Occupational therapist and Registered nurse.      Diann Hurley PT   Time Calculation: 17 mins

## 2023-03-12 NOTE — PROGRESS NOTES
Problem: Mobility Impaired (Adult and Pediatric)  Goal: *Acute Goals and Plan of Care (Insert Text)  Description:   FUNCTIONAL STATUS PRIOR TO ADMISSION: Patient was independent and active without use of DME.    HOME SUPPORT PRIOR TO ADMISSION: The patient lived alone, 1-story home with 4 steps to enter. He has a sister that lives a few hundred feet away on the same property. Physical Therapy Goals  Initiated 3/8/2023  1. Patient will move from supine to sit and sit to supine  in bed with supervision assist/set-up within 7 day(s). 2.  Patient will transfer from bed to chair and chair to bed with supervision/set-up using the least restrictive device within 7 day(s). 3.  Patient will perform sit to stand with supervision/set-up within 7 day(s). 4.  Patient will ambulate with supervision/set-up for 150 feet with the least restrictive device within 7 day(s). 5.  Patient will ascend/descend 4 stairs with 1 handrail(s) with supervision/set-up within 7 day(s). 6.  Patient will improve Kulkarni Balance score by 7 points within 7 days. Outcome: Progressing Towards Goal    PHYSICAL THERAPY TREATMENT  Patient: Kit Mcmahon (16 y.o. male)  Date: 3/12/2023  Diagnosis: [facility] (cerebral vascular accident) [facility] [I63.9] <principal problem not specified>      Precautions:    Chart, physical therapy assessment, plan of care and goals were reviewed. ASSESSMENT  Patient continues with skilled PT services and is progressing towards goals. Patient was sitting up in bed, agreeable to work with therapy. Patient has made significant progress in his physical abilities. He was able to follow multi-step verbal commands and answer open-ended questions. He was able to come to sit EOB with standby assist, HOB elevated.  He was able to lean over to franco pants while sitting EOB with contact guard assist. He was able to stand at the sink unsupported in order to use BUE to brush his teeth, only contact guard assist. He ambulated 150' with no AD and contact guard assist in the hallway. Instructed him in head turns while walking to simulate scanning his environment. He did have to stop walking in each before he could start turning his head and once he started walking again his head turns were reduced compared to when he was standing still. He did report he was using SPC occasionally at home due to R hip pain. No dizziness reported with change in positions or during head turns. Patient was left sitting up in chair eating lunch, call bell within reach, no complaints. Patient's sister was present in the room. She states she only lives 300 feet away from him, was already providing all of his meals, but would also be willing to stay the night with him as long as he needs. The patient really wants to go home, based on his significant overall progress, he could go home with 24/7 family assist, and home health therapy. Current Level of Function Impacting Discharge (mobility/balance): overall contact guard assist for mobility    Other factors to consider for discharge: sister is willing/able to stay with him at home         PLAN :  Patient continues to benefit from skilled intervention to address the above impairments. Continue treatment per established plan of care. to address goals. Recommendation for discharge: (in order for the patient to meet his/her long term goals)  Physical therapy at least 2 days/week in the home AND ensure assist and/or supervision for safety with ambulation and ADLs    This discharge recommendation:  Has not yet been discussed the attending provider and/or case management    IF patient discharges home will need the following DME: patient owns DME required for discharge       SUBJECTIVE:   Patient stated I want to go home.     OBJECTIVE DATA SUMMARY:   Critical Behavior:  Neurologic State: Alert, Confused (periodic confusion)  Orientation Level: Oriented X4 (periodic confusion)  Cognition: Appropriate decision making, Appropriate safety awareness, Appropriate for age attention/concentration, Follows commands  Safety/Judgement: Fall prevention  Functional Mobility Training:  Bed Mobility:   Supine to Sit: Stand-by assistance    Transfers:  Sit to Stand: Stand-by assistance  Stand to Sit: Stand-by assistance    Balance:  Sitting: Impaired  Sitting - Static: Good (unsupported)  Sitting - Dynamic: Fair (occasional)  Standing: Impaired  Standing - Static: Good  Standing - Dynamic : Fair;Good  Ambulation/Gait Training:  Distance (ft): 200 Feet (ft)  Assistive Device: Gait belt  Ambulation - Level of Assistance: Contact guard assistance  Gait Abnormalities: Decreased step clearance   Base of Support: Widened   Speed/Yumiko: Pace decreased (<100 feet/min)  Step Length: Right shortened;Left shortened    Therapeutic Exercises:   none  Pain Rating:  none    Activity Tolerance:   Good    After treatment patient left in no apparent distress:   Sitting in chair, Call bell within reach, and Caregiver / family present    COMMUNICATION/COLLABORATION:   The patients plan of care was discussed with: Occupational therapist and Registered nurse.      Isac Prado, PT   Time Calculation: 17 mins

## 2023-03-12 NOTE — PROGRESS NOTES
Pharmacy Medication Reconciliation     The patient was interviewed regarding current PTA medication list, use and drug allergies;  patient present in room and obtained permission from patient to discuss drug regimen with visitor(s) present. The patient was questioned regarding use of any other inhalers, topical products, over the counter medications, herbal medications, vitamin products or ophthalmic/nasal/otic medication use. Allergy Update: Carbatrol [carbamazepine]    Recommendations/Findings: The following amendments were made to the patient's active medication list on file at [facility]:   1) Additions: none    2) Deletions: tramadol, ketoconazole shampoo, hydroxyzine, ergocalciferol    3) Changes: none      Pertinent Findings: n/a    Clarified PTA med list with patient's sister and Rx Query. PTA medication list was corrected to the following:     Prior to Admission Medications   Prescriptions Last Dose Informant Taking? albuterol (PROVENTIL VENTOLIN) 2.5 mg /3 mL (0.083 %) nebu  Family Member Yes   Sig: INHALE THE CONTENTS OF 1 VIAL (3ML) VIA NEBULIZER EVERY 4 HOURS AS NEEDED FOR WHEEZING (MAX FOUR TIMES A DAY)   albuterol-ipratropium (DUO-NEB) 2.5 mg-0.5 mg/3 ml nebu  Family Member Yes   Sig: 3 mL by Nebulization route every six (6) hours as needed for Shortness of Breath. amLODIPine (NORVASC) 10 mg tablet  Family Member Yes   Sig: TAKE ONE TABLET BY MOUTH EVERY DAY   aspirin delayed-release 325 mg tablet  Family Member Yes   Sig: Take 325 mg by mouth daily. atorvastatin (LIPITOR) 40 mg tablet  Family Member Yes   Sig: Take 40 mg by mouth daily. azelastine (ASTELIN) 137 mcg (0.1 %) nasal spray  Family Member Yes   Sig: USE 2 SPRAYS IN EACH NOSTRIL TWICE DAILY   budesonide-glycopyr-formoterol (Breztri Aerosphere) 160-9-4.8 mcg/actuation HFAA  Family Member Yes   Sig: Take 2 Puffs by inhalation two (2) times a day.    citalopram (CELEXA) 20 mg tablet  Family Member Yes   Sig: TAKE ONE TABLET BY MOUTH ONCE DAILY   doxazosin (CARDURA) 4 mg tablet  Family Member Yes   Sig: TAKE ONE TABLET BY MOUTH ONCE DAILY AT BEDTIME   gabapentin (NEURONTIN) 300 mg capsule  Family Member Yes   Sig: TAKE 2 CAPSULES FOUR TIMES DAILY                     levothyroxine (SYNTHROID) 50 mcg tablet  Family Member Yes   Sig: TAKE ONE TABLET BY MOUTH EVERY DAY   montelukast (SINGULAIR) 10 mg tablet  Family Member Yes   Sig: Take 10 mg by mouth daily.                   Facility-Administered Medications: None        Thank you,  Veronica Holden

## 2023-03-12 NOTE — PROGRESS NOTES
Hospitalist Progress Note    NAME: Amrida Ramos   :  1955   MRN:  [id number]       Assessment / Plan:  Acute encephalopathy due to possible seizure, CVA is ruled out  Hstory of left temporal encephalomalacia from CVA  CT head CTP, CTA head and neck remarkable for small focus of perfusion mismatch which correlates with left posterior temporal lobe chronic infarct. MRI brain has been unremarkable for any acute infarct. EEG is reviewed, neg for seizure  A1C 5.5,  LDL 51  TTE pending  SLP evaluated, cont' diet  Cont' D5 NS until PO intake improves  Change to PO abx. Cont' keppra, neurology is following, discussed with Dr Michael Vargas, will start keppra 500mg. Cont' gabapentin. Gabapentin level pending  PT/OT to eval  Seizure precautions  Ok to remove tele       COPD  Resume home inhl, sister to bring from home  Nebs prn    Lytic lesions concerning for MM  Imaging personally reviewed and discussed with the patient and his family. Xr bone survey is negative. Fu on spep and upep  Oncology is following, discussed with Dr Lucetta Boeck this AM, can fu outpt if medically stable. Hypokalemia, on K supplements, recheck BMP tomorrow    Estimated discharge date: 3/13    Code status: full   Prophylaxis:   Recommended Disposition: SNF, awaiting auth     Subjective:     Chief Complaint / Reason for Physician Visit  Follow-up for AMS/COPD  Review of Systems:  Symptom Y/N Comments  Symptom Y/N Comments   Fever/Chills    Chest Pain     Poor Appetite    Edema     Cough    Abdominal Pain     Sputum    Joint Pain     SOB/MUÑOZ    Pruritis/Rash     Nausea/vomit    Tolerating PT/OT     Diarrhea    Tolerating Diet     Constipation    Other       Could NOT obtain due to:      Objective:     VITALS:   Last 24hrs VS reviewed since prior progress note.  Most recent are:  Patient Vitals for the past 24 hrs:   Temp Pulse Resp BP SpO2   23 0909 -- -- -- -- 95 %   23 0731 98.1 °F (36.7 °C) 62 18 128/62 94 % 03/11/23 2328 97.9 °F (36.6 °C) (!) 53 18 130/71 90 %   03/11/23 2106 -- (!) 52 -- (!) 119/52 --   03/11/23 2031 97.9 °F (36.6 °C) (!) 50 20 133/74 92 %   03/11/23 1519 -- (!) 53 -- 123/71 --   03/11/23 1517 97.5 °F (36.4 °C) (!) 55 16 116/65 96 %       No intake or output data in the 24 hours ending 03/12/23 1314     I had a face to face encounter and independently examined this patient on 3/12/2023, as outlined below:  PHYSICAL EXAM:  General: WD, WN. Alert, cooperative  EENT:  EOMI. Anicteric sclerae. MMM  Resp:  CTA bilaterally, no wheezing or rales. No accessory muscle use  CV:  Regular  rhythm,  No edema  GI:  Soft, Non distended, Non tender. +Bowel sounds  Neurologic:  AAOx4, moving all exts. Psych:   Not anxious nor agitated  Skin:  No rashes. No jaundice    Reviewed most current lab test results and cultures  YES  Reviewed most current radiology test results   YES  Review and summation of old records today    NO  Reviewed patient's current orders and MAR    YES  PMH/SH reviewed - no change compared to H&P  ________________________________________________________________________  Care Plan discussed with:    Comments   Patient x    Family  x    RN x    Care Manager     Consultant                        Multidiciplinary team rounds were held today with , nursing, pharmacist and clinical coordinator. Patient's plan of care was discussed; medications were reviewed and discharge planning was addressed.      ________________________________________________________________________  Total NON critical care TIME:  35   Minutes    Total CRITICAL CARE TIME Spent:   Minutes non procedure based      Comments   >50% of visit spent in counseling and coordination of care     ________________________________________________________________________  Mary Lin MD     Procedures: see electronic medical records for all procedures/Xrays and details which were not copied into this note but were reviewed prior to creation of Plan. LABS:  I reviewed today's most current labs and imaging studies. Pertinent labs include:  No results for input(s): WBC, HGB, HCT, PLT, HGBEXT, HCTEXT, PLTEXT, HGBEXT, HCTEXT, PLTEXT in the last 72 hours.     Recent Labs     03/11/23  0310      K 3.4*   *   CO2 27   GLU 93   BUN 21*   CREA 0.64*   CA 8.6         Signed: Dora Miramontes MD

## 2023-03-12 NOTE — PROGRESS NOTES
Hospitalist Progress Note    NAME: Obdulia Bhatia   :  1955   MRN:  [id number]       Assessment / Plan:  Acute encephalopathy due to possible seizure, CVA is ruled out  Hstory of left temporal encephalomalacia from CVA  CT head CTP, CTA head and neck remarkable for small focus of perfusion mismatch which correlates with left posterior temporal lobe chronic infarct. MRI brain has been unremarkable for any acute infarct. EEG is reviewed, neg for seizure  A1C 5.5,  LDL 51  TTE pending  SLP evaluated, cont' diet  Cont' D5 NS until PO intake improves  Change to PO abx. Cont' keppra, neurology is following, discussed with Dr Julian Gannon, will start keppra 500mg. Cont' gabapentin. Gabapentin level pending  PT/OT to eval  Seizure precautions  Ok to remove tele   Updated pt's sister via phone    COPD  Resume home inhl, sister to bring from home  Nebs prn    Lytic lesions concerning for MM  Imaging personally reviewed and discussed with the patient and his family. Xr bone survey is negative. Fu on spep and upep  Oncology is following, discussed with Dr Jayna Edward this AM, can fu outpt if medically stable. Estimated discharge date: 3/11 pending clinical improvement    Code status: full   Prophylaxis:   Recommended Disposition:      Subjective:     Chief Complaint / Reason for Physician Visit  Pt is much more awake, conversant this AM.  Discussed with RN events overnight. Review of Systems:  Symptom Y/N Comments  Symptom Y/N Comments   Fever/Chills    Chest Pain     Poor Appetite    Edema     Cough    Abdominal Pain     Sputum    Joint Pain     SOB/MUÑOZ    Pruritis/Rash     Nausea/vomit    Tolerating PT/OT     Diarrhea    Tolerating Diet     Constipation    Other       Could NOT obtain due to:      Objective:     VITALS:   Last 24hrs VS reviewed since prior progress note.  Most recent are:  Patient Vitals for the past 24 hrs:   Temp Pulse Resp BP SpO2   23 2328 97.9 °F (36.6 °C) (!) 53 18 130/71 90 %   03/11/23 2106 -- (!) 52 -- (!) 119/52 --   03/11/23 2031 97.9 °F (36.6 °C) (!) 50 20 133/74 92 %   03/11/23 1519 -- (!) 53 -- 123/71 --   03/11/23 1517 97.5 °F (36.4 °C) (!) 55 16 116/65 96 %   03/11/23 1245 -- -- -- -- 92 %   03/11/23 0746 98.2 °F (36.8 °C) (!) 51 16 127/71 91 %       No intake or output data in the 24 hours ending 03/11/23 7594     I had a face to face encounter and independently examined this patient on 3/11/2023, as outlined below:  PHYSICAL EXAM:  General: WD, WN. Alert, cooperative  EENT:  EOMI. Anicteric sclerae. MMM  Resp:  CTA bilaterally, no wheezing or rales. No accessory muscle use  CV:  Regular  rhythm,  No edema  GI:  Soft, Non distended, Non tender. +Bowel sounds  Neurologic:  AAOx4, moving all exts. Psych:   Not anxious nor agitated  Skin:  No rashes. No jaundice    Reviewed most current lab test results and cultures  YES  Reviewed most current radiology test results   YES  Review and summation of old records today    NO  Reviewed patient's current orders and MAR    YES  PMH/ reviewed - no change compared to H&P  ________________________________________________________________________  Care Plan discussed with:    Comments   Patient x    Family  x    RN x    Care Manager     Consultant                        Multidiciplinary team rounds were held today with , nursing, pharmacist and clinical coordinator. Patient's plan of care was discussed; medications were reviewed and discharge planning was addressed.      ________________________________________________________________________  Total NON critical care TIME:  35   Minutes    Total CRITICAL CARE TIME Spent:   Minutes non procedure based      Comments   >50% of visit spent in counseling and coordination of care     ________________________________________________________________________  Clementine Gant MD     Procedures: see electronic medical records for all procedures/Xrays and details which were not copied into this note but were reviewed prior to creation of Plan. LABS:  I reviewed today's most current labs and imaging studies. Pertinent labs include:  No results for input(s): WBC, HGB, HCT, PLT, HGBEXT, HCTEXT, PLTEXT, HGBEXT, HCTEXT, PLTEXT in the last 72 hours.     Recent Labs     03/11/23  0310      K 3.4*   *   CO2 27   GLU 93   BUN 21*   CREA 0.64*   CA 8.6         Signed: Christel Patel MD

## 2023-03-13 VITALS
OXYGEN SATURATION: 94 % | BODY MASS INDEX: 25.81 KG/M2 | TEMPERATURE: 97.9 F | DIASTOLIC BLOOD PRESSURE: 67 MMHG | RESPIRATION RATE: 18 BRPM | SYSTOLIC BLOOD PRESSURE: 121 MMHG | WEIGHT: 195.6 LBS | HEART RATE: 59 BPM

## 2023-03-13 LAB
ALBUMIN SERPL ELPH-MCNC: 3.7 G/DL (ref 2.9–4.4)
ALBUMIN/GLOB SERPL: 1.2 (ref 0.7–1.7)
ALPHA1 GLOB SERPL ELPH-MCNC: 0.3 G/DL (ref 0–0.4)
ALPHA2 GLOB SERPL ELPH-MCNC: 0.9 G/DL (ref 0.4–1)
ANION GAP SERPL CALC-SCNC: 3 MMOL/L (ref 5–15)
B-GLOBULIN SERPL ELPH-MCNC: 1 G/DL (ref 0.7–1.3)
BUN SERPL-MCNC: 12 MG/DL (ref 6–20)
BUN/CREAT SERPL: 18 (ref 12–20)
CALCIUM SERPL-MCNC: 8.7 MG/DL (ref 8.5–10.1)
CHLORIDE SERPL-SCNC: 109 MMOL/L (ref 97–108)
CO2 SERPL-SCNC: 28 MMOL/L (ref 21–32)
CREAT SERPL-MCNC: 0.66 MG/DL (ref 0.7–1.3)
GAMMA GLOB SERPL ELPH-MCNC: 1.1 G/DL (ref 0.4–1.8)
GLOBULIN SER-MCNC: 3.2 G/DL (ref 2.2–3.9)
GLUCOSE SERPL-MCNC: 97 MG/DL (ref 65–100)
IGA SERPL-MCNC: 302 MG/DL (ref 61–437)
IGG SERPL-MCNC: 1068 MG/DL (ref 603–1613)
IGM SERPL-MCNC: 95 MG/DL (ref 20–172)
INTERPRETATION SERPL IEP-IMP: ABNORMAL
KAPPA LC FREE SER-MCNC: 20.1 MG/L (ref 3.3–19.4)
KAPPA LC FREE/LAMBDA FREE SER: 1.58 (ref 0.26–1.65)
LAMBDA LC FREE SERPL-MCNC: 12.7 MG/L (ref 5.7–26.3)
M PROTEIN SERPL ELPH-MCNC: ABNORMAL G/DL
POTASSIUM SERPL-SCNC: 4.1 MMOL/L (ref 3.5–5.1)
PROT SERPL-MCNC: 6.9 G/DL (ref 6–8.5)
SODIUM SERPL-SCNC: 140 MMOL/L (ref 136–145)

## 2023-03-13 PROCEDURE — 80048 BASIC METABOLIC PNL TOTAL CA: CPT

## 2023-03-13 PROCEDURE — 74011250636 HC RX REV CODE- 250/636: Performed by: STUDENT IN AN ORGANIZED HEALTH CARE EDUCATION/TRAINING PROGRAM

## 2023-03-13 PROCEDURE — 74011250637 HC RX REV CODE- 250/637: Performed by: INTERNAL MEDICINE

## 2023-03-13 PROCEDURE — 74011250636 HC RX REV CODE- 250/636: Performed by: INTERNAL MEDICINE

## 2023-03-13 PROCEDURE — 74011250637 HC RX REV CODE- 250/637: Performed by: STUDENT IN AN ORGANIZED HEALTH CARE EDUCATION/TRAINING PROGRAM

## 2023-03-13 PROCEDURE — 94760 N-INVAS EAR/PLS OXIMETRY 1: CPT

## 2023-03-13 PROCEDURE — 36415 COLL VENOUS BLD VENIPUNCTURE: CPT

## 2023-03-13 RX ORDER — LEVETIRACETAM 500 MG/1
500 TABLET ORAL 2 TIMES DAILY
Qty: 60 TABLET | Refills: 5 | Status: SHIPPED | OUTPATIENT
Start: 2023-03-13

## 2023-03-13 RX ADMIN — PANTOPRAZOLE SODIUM 40 MG: 40 TABLET, DELAYED RELEASE ORAL at 08:32

## 2023-03-13 RX ADMIN — LEVETIRACETAM 500 MG: 100 INJECTION, SOLUTION INTRAVENOUS at 00:24

## 2023-03-13 RX ADMIN — LEVETIRACETAM 500 MG: 100 INJECTION, SOLUTION INTRAVENOUS at 11:38

## 2023-03-13 RX ADMIN — ENOXAPARIN SODIUM 40 MG: 100 INJECTION SUBCUTANEOUS at 10:32

## 2023-03-13 RX ADMIN — POTASSIUM CHLORIDE 40 MEQ: 1500 TABLET, EXTENDED RELEASE ORAL at 10:31

## 2023-03-13 RX ADMIN — GABAPENTIN 600 MG: 300 CAPSULE ORAL at 10:30

## 2023-03-13 RX ADMIN — LEVOTHYROXINE SODIUM 50 MCG: 0.05 TABLET ORAL at 05:44

## 2023-03-13 RX ADMIN — ATORVASTATIN CALCIUM 40 MG: 40 TABLET, FILM COATED ORAL at 10:31

## 2023-03-13 RX ADMIN — CITALOPRAM HYDROBROMIDE 20 MG: 20 TABLET ORAL at 10:31

## 2023-03-13 RX ADMIN — ASPIRIN 81 MG: 81 TABLET, COATED ORAL at 10:31

## 2023-03-13 NOTE — PROGRESS NOTES
Spiritual Care Assessment/Progress Note  [facility]      NAME: Kaycee Laughlin      MRN: [id number]  AGE: 78 y.o. SEX: male  Sikh Affiliation: No preference   Language: English     3/13/2023     Total Time (in minutes): 9     Spiritual Assessment begun in [facility] through conversation with:         [x]Patient        [x] Family    [] Friend(s)        Reason for Consult: Initial/Spiritual assessment, patient floor     Spiritual beliefs: (Please include comment if needed)     [] Identifies with a jenna tradition:         [] Supported by a jenna community:            [] Claims no spiritual orientation:           [] Seeking spiritual identity:                [] Adheres to an individual form of spirituality:           [x] Not able to assess:                           Identified resources for coping:      [] Prayer                               [] Music                  [] Guided Imagery     [x] Family/friends                 [] Pet visits     [] Devotional reading                         [] Unknown     [] Other:                                               Interventions offered during this visit: (See comments for more details)    Patient Interventions: Initial/Spiritual assessment, patient floor, Affirmation of emotions/emotional suffering, Prayer (assurance of), Normalization of emotional/spiritual concerns     Family/Friend(s):  Affirmation of emotions/emotional suffering, Normalization of emotional/spiritual concerns, Prayer (assurance of)     Plan of Care:     [x] Support spiritual and/or cultural needs    [] Support AMD and/or advance care planning process      [] Support grieving process   [] Coordinate Rites and/or Rituals    [] Coordination with community clergy   [] No spiritual needs identified at this time   [] Detailed Plan of Care below (See Comments)  [] Make referral to Music Therapy  [] Make referral to Pet Therapy     [] Make referral to Addiction services  [] Make referral to [facility]  [] Make referral to Spiritual Care Partner  [] No future visits requested        [x] Contact Spiritual Care for further referrals     Comments:  reviewed the patient's chart prior to the visit. His sister Shauna Lozada was visiting him when the  entered the room.  thanked her for supporting her brother. Mr. Editha Mohs shared his concerns.  provided a presence, empathetic listening and encouragement.  services are available 24 hours a day as requested. Rev. ALEX Smalls  [address] [facility]   Paging Service [phone] ([phone])

## 2023-03-13 NOTE — PROGRESS NOTES
Problem: Pressure Injury - Risk of  Goal: *Prevention of pressure injury  Description: Document Ruel Scale and appropriate interventions in the flowsheet.   3/13/2023 1230 by Bhavani Hernández RN  Outcome: Resolved/Met  3/13/2023 1114 by Bhavani Hernández RN  Outcome: Progressing Towards Goal  Note: Pressure Injury Interventions:  Sensory Interventions: Assess changes in LOC    Moisture Interventions: Apply protective barrier, creams and emollients    Activity Interventions: Increase time out of bed    Mobility Interventions: HOB 30 degrees or less, PT/OT evaluation    Nutrition Interventions: Document food/fluid/supplement intake    Friction and Shear Interventions: HOB 30 degrees or less, Apply protective barrier, creams and emollients, Minimize layers                Problem: Patient Education: Go to Patient Education Activity  Goal: Patient/Family Education  Outcome: Resolved/Met     Problem: Patient Education: Go to Patient Education Activity  Goal: Patient/Family Education  Outcome: Resolved/Met     Problem: Patient Education: Go to Patient Education Activity  Goal: Patient/Family Education  Outcome: Resolved/Met     Problem: Patient Education: Go to Patient Education Activity  Goal: Patient/Family Education  Outcome: Resolved/Met     Problem: Aspiration - Risk of  Goal: *Absence of aspiration  3/13/2023 1230 by Bhavani Hernández RN  Outcome: Resolved/Met  3/13/2023 1114 by Bhavani Hernández RN  Outcome: Progressing Towards Goal     Problem: Patient Education: Go to Patient Education Activity  Goal: Patient/Family Education  Outcome: Resolved/Met     Problem: Patient Education: Go to Patient Education Activity  Goal: Patient/Family Education  Outcome: Resolved/Met     Problem: TIA/CVA Stroke: 0-24 hours  Goal: Off Pathway (Use only if patient is Off Pathway)  Outcome: Resolved/Met  Goal: Activity/Safety  Outcome: Resolved/Met  Goal: Consults, if ordered  Outcome: Resolved/Met  Goal: Diagnostic Test/Procedures  Outcome: Resolved/Met  Goal: Nutrition/Diet  Outcome: Resolved/Met  Goal: Discharge Planning  Outcome: Resolved/Met  Goal: Medications  Outcome: Resolved/Met  Goal: Respiratory  Outcome: Resolved/Met  Goal: Treatments/Interventions/Procedures  Outcome: Resolved/Met  Goal: Minimize risk of bleeding post-thrombolytic infusion  Outcome: Resolved/Met  Goal: Monitor for complications post-thrombolytic infusion  Outcome: Resolved/Met  Goal: Psychosocial  Outcome: Resolved/Met  Goal: *Hemodynamically stable  Outcome: Resolved/Met  Goal: *Neurologically stable  Description: Absence of additional neurological deficits    Outcome: Resolved/Met  Goal: *Verbalizes anxiety and depression are reduced or absent  Outcome: Resolved/Met  Goal: *Absence of Signs of Aspiration on Current Diet  Outcome: Resolved/Met  Goal: *Absence of deep venous thrombosis signs and symptoms(Stroke Metric)  Outcome: Resolved/Met  Goal: *Ability to perform ADLs and demonstrates progressive mobility and function  Outcome: Resolved/Met  Goal: *Stroke education started(Stroke Metric)  Outcome: Resolved/Met  Goal: *Dysphagia screen performed(Stroke Metric)  Outcome: Resolved/Met  Goal: *Rehab consulted(Stroke Metric)  Outcome: Resolved/Met     Problem: TIA/CVA Stroke: Day 2 Until Discharge  Goal: Off Pathway (Use only if patient is Off Pathway)  Outcome: Resolved/Met  Goal: Activity/Safety  3/13/2023 1230 by Taina Krishnamurthy RN  Outcome: Resolved/Met  3/13/2023 1114 by Taina Krishnamurthy RN  Outcome: Progressing Towards Goal  Goal: Diagnostic Test/Procedures  3/13/2023 1230 by Taina Krishnamurthy RN  Outcome: Resolved/Met  3/13/2023 1114 by Taina Krishnamurthy RN  Outcome: Progressing Towards Goal  Goal: Nutrition/Diet  3/13/2023 1230 by Taina Krishnamurthy RN  Outcome: Resolved/Met  3/13/2023 1114 by Taina Krishnamurthy RN  Outcome: Progressing Towards Goal  Goal: Discharge Planning  3/13/2023 1230 by Taina Krishnamurthy RN  Outcome: Resolved/Met  3/13/2023 1114 by Taina Krishnamurthy RN  Outcome: Progressing Towards Goal  Goal: Medications  3/13/2023 1230 by Nancy Ortez RN  Outcome: Resolved/Met  3/13/2023 1114 by Nancy Ortez RN  Outcome: Progressing Towards Goal  Goal: Respiratory  3/13/2023 1230 by Nancy Ortez RN  Outcome: Resolved/Met  3/13/2023 1114 by Nancy Ortez RN  Outcome: Progressing Towards Goal  Goal: Treatments/Interventions/Procedures  3/13/2023 1230 by Nancy Ortez RN  Outcome: Resolved/Met  3/13/2023 1114 by Nancy Ortez RN  Outcome: Progressing Towards Goal  Goal: Psychosocial  3/13/2023 1230 by Nancy Ortez RN  Outcome: Resolved/Met  3/13/2023 1114 by Nancy Ortez RN  Outcome: Progressing Towards Goal  Goal: *Verbalizes anxiety and depression are reduced or absent  3/13/2023 1230 by Nancy Ortez RN  Outcome: Resolved/Met  3/13/2023 1114 by Nancy Ortez RN  Outcome: Progressing Towards Goal  Goal: *Absence of aspiration  3/13/2023 1230 by Nancy Ortez RN  Outcome: Resolved/Met  3/13/2023 1114 by Nancy Ortez RN  Outcome: Progressing Towards Goal  Goal: *Absence of deep venous thrombosis signs and symptoms(Stroke Metric)  3/13/2023 1230 by Nancy Ortez RN  Outcome: Resolved/Met  3/13/2023 1114 by Nancy Ortez RN  Outcome: Progressing Towards Goal  Goal: *Optimal pain control at patient's stated goal  3/13/2023 1230 by Nancy Ortez RN  Outcome: Resolved/Met  3/13/2023 1114 by Nancy Ortez RN  Outcome: Progressing Towards Goal  Goal: *Tolerating diet  3/13/2023 1230 by Nancy Ortez RN  Outcome: Resolved/Met  3/13/2023 1114 by Nancy Ortez RN  Outcome: Progressing Towards Goal  Goal: *Ability to perform ADLs and demonstrates progressive mobility and function  3/13/2023 1230 by Nancy Ortez RN  Outcome: Resolved/Met  3/13/2023 1114 by Nancy Ortez RN  Outcome: Progressing Towards Goal  Goal: *Stroke education continued(Stroke Metric)  3/13/2023 1230 by Nancy Oiler, RN  Outcome: Resolved/Met  3/13/2023 1114 by Nancy Ortez RN  Outcome: Progressing Towards Goal     Problem: Ischemic Stroke: Discharge Outcomes  Goal: *Verbalizes anxiety and depression are reduced or absent  Outcome: Resolved/Met  Goal: *Verbalize understanding of risk factor modification(Stroke Metric)  Outcome: Resolved/Met  Goal: *Hemodynamically stable  Outcome: Resolved/Met  Goal: *Absence of aspiration pneumonia  Outcome: Resolved/Met  Goal: *Aware of needed dietary changes  Outcome: Resolved/Met  Goal: *Verbalize understanding of prescribed medications including anti-coagulants, anti-lipid, and/or anti-platelets(Stroke Metric)  Outcome: Resolved/Met  Goal: *Tolerating diet  Outcome: Resolved/Met  Goal: *Aware of follow-up diagnostics related to anticoagulants  Outcome: Resolved/Met  Goal: *Ability to perform ADLs and demonstrates progressive mobility and function  Outcome: Resolved/Met  Goal: *Absence of DVT(Stroke Metric)  Outcome: Resolved/Met  Goal: *Absence of aspiration  Outcome: Resolved/Met  Goal: *Optimal pain control at patient's stated goal  Outcome: Resolved/Met  Goal: *Home safety concerns addressed  Outcome: Resolved/Met  Goal: *Describes available resources and support systems  Outcome: Resolved/Met  Goal: *Verbalizes understanding of activation of EMS(911) for stroke symptoms(Stroke Metric)  Outcome: Resolved/Met  Goal: *Understands and describes signs and symptoms to report to providers(Stroke Metric)  Outcome: Resolved/Met  Goal: *Neurolgocially stable (absence of additional neurological deficits)  Outcome: Resolved/Met  Goal: *Verbalizes importance of follow-up with primary care physician(Stroke Metric)  Outcome: Resolved/Met  Goal: *Smoking cessation discussed,if applicable(Stroke Metric)  Outcome: Resolved/Met  Goal: *Depression screening completed(Stroke Metric)  Outcome: Resolved/Met     Problem: Patient Education: Go to Patient Education Activity  Goal: Patient/Family Education  3/13/2023 1230 by Dory Mendez RN  Outcome: Resolved/Met  3/13/2023 1114 by Colie Severs, Delphine Mcnulty RN  Outcome: Progressing Towards Goal     Problem: Falls - Risk of  Goal: *Absence of Falls  Description: Document Gus De La Paz Fall Risk and appropriate interventions in the flowsheet.   3/13/2023 1230 by Kuldeep Lombardi RN  Outcome: Resolved/Met  3/13/2023 1114 by Kuldeep Lombardi RN  Outcome: Progressing Towards Goal  Note: Fall Risk Interventions:       Mentation Interventions: Adequate sleep, hydration, pain control, Door open when patient unattended         Elimination Interventions: Call light in reach              Problem: Patient Education: Go to Patient Education Activity  Goal: Patient/Family Education  Outcome: Resolved/Met

## 2023-03-13 NOTE — PROGRESS NOTES
[address] Aneesh Kearns per office Tuesday 3- 10 am    I made above appt    Fax  [phone]  dc summary please     Katia Hensley  0906 am      Home health referrals pending ( sent )     local office [facility] [phone] [facility] home care services        Patient signed IM before dc.      Katia Hensley RN   Care manager

## 2023-03-13 NOTE — PROGRESS NOTES
Problem: Pressure Injury - Risk of  Goal: *Prevention of pressure injury  Description: Document Ruel Scale and appropriate interventions in the flowsheet.   3/13/2023 1230 by Sayda Lucio RN  Outcome: Resolved/Met  3/13/2023 1114 by Sayda Lucio RN  Outcome: Progressing Towards Goal  Note: Pressure Injury Interventions:  Sensory Interventions: Assess changes in LOC    Moisture Interventions: Apply protective barrier, creams and emollients    Activity Interventions: Increase time out of bed    Mobility Interventions: HOB 30 degrees or less, PT/OT evaluation    Nutrition Interventions: Document food/fluid/supplement intake    Friction and Shear Interventions: HOB 30 degrees or less, Apply protective barrier, creams and emollients, Minimize layers                Problem: Patient Education: Go to Patient Education Activity  Goal: Patient/Family Education  Outcome: Resolved/Met     Problem: Patient Education: Go to Patient Education Activity  Goal: Patient/Family Education  Outcome: Resolved/Met     Problem: Patient Education: Go to Patient Education Activity  Goal: Patient/Family Education  Outcome: Resolved/Met     Problem: Patient Education: Go to Patient Education Activity  Goal: Patient/Family Education  Outcome: Resolved/Met     Problem: Aspiration - Risk of  Goal: *Absence of aspiration  3/13/2023 1230 by Sayda Lucio RN  Outcome: Resolved/Met  3/13/2023 1114 by Sayda Lucio RN  Outcome: Progressing Towards Goal     Problem: Patient Education: Go to Patient Education Activity  Goal: Patient/Family Education  Outcome: Resolved/Met     Problem: Patient Education: Go to Patient Education Activity  Goal: Patient/Family Education  Outcome: Resolved/Met     Problem: TIA/CVA Stroke: 0-24 hours  Goal: Off Pathway (Use only if patient is Off Pathway)  Outcome: Resolved/Met  Goal: Activity/Safety  Outcome: Resolved/Met  Goal: Consults, if ordered  Outcome: Resolved/Met  Goal: Diagnostic Test/Procedures  Outcome: Resolved/Met  Goal: Nutrition/Diet  Outcome: Resolved/Met  Goal: Discharge Planning  Outcome: Resolved/Met  Goal: Medications  Outcome: Resolved/Met  Goal: Respiratory  Outcome: Resolved/Met  Goal: Treatments/Interventions/Procedures  Outcome: Resolved/Met  Goal: Minimize risk of bleeding post-thrombolytic infusion  Outcome: Resolved/Met  Goal: Monitor for complications post-thrombolytic infusion  Outcome: Resolved/Met  Goal: Psychosocial  Outcome: Resolved/Met  Goal: *Hemodynamically stable  Outcome: Resolved/Met  Goal: *Neurologically stable  Description: Absence of additional neurological deficits    Outcome: Resolved/Met  Goal: *Verbalizes anxiety and depression are reduced or absent  Outcome: Resolved/Met  Goal: *Absence of Signs of Aspiration on Current Diet  Outcome: Resolved/Met  Goal: *Absence of deep venous thrombosis signs and symptoms(Stroke Metric)  Outcome: Resolved/Met  Goal: *Ability to perform ADLs and demonstrates progressive mobility and function  Outcome: Resolved/Met  Goal: *Stroke education started(Stroke Metric)  Outcome: Resolved/Met  Goal: *Dysphagia screen performed(Stroke Metric)  Outcome: Resolved/Met  Goal: *Rehab consulted(Stroke Metric)  Outcome: Resolved/Met     Problem: TIA/CVA Stroke: Day 2 Until Discharge  Goal: Off Pathway (Use only if patient is Off Pathway)  Outcome: Resolved/Met  Goal: Activity/Safety  3/13/2023 1230 by Reggie Armenta RN  Outcome: Resolved/Met  3/13/2023 1114 by Reggie Armenta RN  Outcome: Progressing Towards Goal  Goal: Diagnostic Test/Procedures  3/13/2023 1230 by Reggie Armenta RN  Outcome: Resolved/Met  3/13/2023 1114 by Reggie Armenta RN  Outcome: Progressing Towards Goal  Goal: Nutrition/Diet  3/13/2023 1230 by Reggie Armenta RN  Outcome: Resolved/Met  3/13/2023 1114 by Reggie Armenta RN  Outcome: Progressing Towards Goal  Goal: Discharge Planning  3/13/2023 1230 by Reggie Armenta RN  Outcome: Resolved/Met  3/13/2023 1114 by Reggie Armenta RN  Outcome: Progressing Towards Goal  Goal: Medications  3/13/2023 1230 by Ethan De La Paz RN  Outcome: Resolved/Met  3/13/2023 1114 by Ethan De La Paz RN  Outcome: Progressing Towards Goal  Goal: Respiratory  3/13/2023 1230 by Ethan De La Paz RN  Outcome: Resolved/Met  3/13/2023 1114 by Ethan De La Paz RN  Outcome: Progressing Towards Goal  Goal: Treatments/Interventions/Procedures  3/13/2023 1230 by Ethan De La Paz RN  Outcome: Resolved/Met  3/13/2023 1114 by Ethan De La Paz RN  Outcome: Progressing Towards Goal  Goal: Psychosocial  3/13/2023 1230 by Ethan De La Paz RN  Outcome: Resolved/Met  3/13/2023 1114 by Ethan De La Paz RN  Outcome: Progressing Towards Goal  Goal: *Verbalizes anxiety and depression are reduced or absent  3/13/2023 1230 by Ethan De La Paz RN  Outcome: Resolved/Met  3/13/2023 1114 by Ethan De La Paz RN  Outcome: Progressing Towards Goal  Goal: *Absence of aspiration  3/13/2023 1230 by Ethan De La Paz RN  Outcome: Resolved/Met  3/13/2023 1114 by Ethan De La Paz RN  Outcome: Progressing Towards Goal  Goal: *Absence of deep venous thrombosis signs and symptoms(Stroke Metric)  3/13/2023 1230 by Ethan De La Paz RN  Outcome: Resolved/Met  3/13/2023 1114 by Ethan De La Paz RN  Outcome: Progressing Towards Goal  Goal: *Optimal pain control at patient's stated goal  3/13/2023 1230 by Ethan De La Paz RN  Outcome: Resolved/Met  3/13/2023 1114 by Ethan De La Paz RN  Outcome: Progressing Towards Goal  Goal: *Tolerating diet  3/13/2023 1230 by Ethan De La Paz RN  Outcome: Resolved/Met  3/13/2023 1114 by Ethan De La Paz RN  Outcome: Progressing Towards Goal  Goal: *Ability to perform ADLs and demonstrates progressive mobility and function  3/13/2023 1230 by Ethan De La Paz RN  Outcome: Resolved/Met  3/13/2023 1114 by Ethan De La Paz RN  Outcome: Progressing Towards Goal  Goal: *Stroke education continued(Stroke Metric)  3/13/2023 1230 by Ethan De La Paz RN  Outcome: Resolved/Met  3/13/2023 1114 by Ethan De La Paz RN  Outcome: Progressing Towards Goal     Problem: Ischemic Stroke: Discharge Outcomes  Goal: *Verbalizes anxiety and depression are reduced or absent  Outcome: Resolved/Met  Goal: *Verbalize understanding of risk factor modification(Stroke Metric)  Outcome: Resolved/Met  Goal: *Hemodynamically stable  Outcome: Resolved/Met  Goal: *Absence of aspiration pneumonia  Outcome: Resolved/Met  Goal: *Aware of needed dietary changes  Outcome: Resolved/Met  Goal: *Verbalize understanding of prescribed medications including anti-coagulants, anti-lipid, and/or anti-platelets(Stroke Metric)  Outcome: Resolved/Met  Goal: *Tolerating diet  Outcome: Resolved/Met  Goal: *Aware of follow-up diagnostics related to anticoagulants  Outcome: Resolved/Met  Goal: *Ability to perform ADLs and demonstrates progressive mobility and function  Outcome: Resolved/Met  Goal: *Absence of DVT(Stroke Metric)  Outcome: Resolved/Met  Goal: *Absence of aspiration  Outcome: Resolved/Met  Goal: *Optimal pain control at patient's stated goal  Outcome: Resolved/Met  Goal: *Home safety concerns addressed  Outcome: Resolved/Met  Goal: *Describes available resources and support systems  Outcome: Resolved/Met  Goal: *Verbalizes understanding of activation of EMS(911) for stroke symptoms(Stroke Metric)  Outcome: Resolved/Met  Goal: *Understands and describes signs and symptoms to report to providers(Stroke Metric)  Outcome: Resolved/Met  Goal: *Neurolgocially stable (absence of additional neurological deficits)  Outcome: Resolved/Met  Goal: *Verbalizes importance of follow-up with primary care physician(Stroke Metric)  Outcome: Resolved/Met  Goal: *Smoking cessation discussed,if applicable(Stroke Metric)  Outcome: Resolved/Met  Goal: *Depression screening completed(Stroke Metric)  Outcome: Resolved/Met     Problem: Patient Education: Go to Patient Education Activity  Goal: Patient/Family Education  3/13/2023 1230 by Sang Estrella RN  Outcome: Resolved/Met  3/13/2023 1114 by Jeanette Harding Felisa Randall RN  Outcome: Progressing Towards Goal     Problem: Falls - Risk of  Goal: *Absence of Falls  Description: Document Jessica Salines Fall Risk and appropriate interventions in the flowsheet.   3/13/2023 1230 by Farrah Mccormack RN  Outcome: Resolved/Met  3/13/2023 1114 by Farrah Mccormack RN  Outcome: Progressing Towards Goal  Note: Fall Risk Interventions:       Mentation Interventions: Adequate sleep, hydration, pain control, Door open when patient unattended         Elimination Interventions: Call light in reach              Problem: Patient Education: Go to Patient Education Activity  Goal: Patient/Family Education  Outcome: Resolved/Met

## 2023-03-13 NOTE — PROGRESS NOTES
End of Shift Note     Bedside shift change report given to SilvanaJanna LEARY Bonnerisa Graham (oncoming nurse) by Bubba Rodgers nurse). Report included the following information SBAR, Kardex, Intake/Output, MAR, and Recent Results     Shift worked: 7p-7a   Shift summary and any significant changes:     -Rested well overnight, no complaints of pain  -Ambulated in hallway with staff  -Labs obtained  -Awaiting placement       Concerns for physician to address:  None   Zone phone for oncoming shift:   [phone]      Patient Love Freedman  78 y.o.  3/7/2023  6:21 PM by Nicki Stanton DO.  Melina Ortiz was admitted from Home     Problem List       Patient Active Problem List     Diagnosis Date Noted    Complex partial seizure evolving to generalized seizure ([address] 11/22/2016    Cerebral infarction due to thrombosis of left middle cerebral artery ([address] 11/22/2016    Infected prosthetic hip ([address] 11/10/2015    Carotid artery stenosis with cerebral infarction ([address] 11/02/2015    Failed total hip arthroplasty ([address] 06/09/2014    Late effect of stroke 05/23/2013    Encephalopathy, unspecified 04/21/2012    CVA (cerebral infarction) 09/27/2010    Muscle strain 09/27/2010    Localization-related partial epilepsy with complex partial seizures ([address] 09/27/2010    HTN (hypertension) 09/27/2010    Hypercholesterolemia 09/27/2010    Total hip replacements Bilateral 09/27/2010    CAD (coronary artery disease) 09/27/2010    Hepatitis A 09/27/2010    Hepatitis C 09/27/2010    Osteoarthrosis, hip 08/02/2010           Past Medical History:   Diagnosis Date    Arthritis       Hips, Knees    CAD (coronary artery disease)       MI around 1990    Hepatitis A      Hepatitis C      High cholesterol      Hypertension      Other ill-defined conditions(799.89)       Light sensitivity, causes seizures    Seizures ([address]       Last seizure 12/2008/work -up in 2012 -possible seizure    Stroke [facility]) 2005    Thyroid disease       Hypothyroid         Core Measures:  CVA: No Yes  CHF:No No  PNA:No No     Activity:  Activity Level: Up with Assistance  Number times ambulated in hallways past shift: 1  Number of times OOB to chair past shift: 1     Cardiac:   Cardiac Monitoring: No      Cardiac Rhythm: Sinus Rhythm     Access:   Current line(s): PIV   Central Line? No   PICC LINE? No      Genitourinary:   Urinary status: voiding  Urinary Catheter? No      Respiratory:   O2 Device: None (Room air)  Chronic home O2 use?: NO  Incentive spirometer at bedside: NO     GI:  Last Bowel Movement Date: 03/12/23  Current diet:  DIET ONE TIME MESSAGE  ADULT DIET Regular  Passing flatus: YES  Tolerating current diet: YES     Pain Management:   Patient states pain is manageable on current regimen: YES     Skin:  Ruel Score: 17  Interventions: float heels and increase time out of bed    Patient Safety:  Fall Score:  Total Score: 2  Interventions: bed/chair alarm, assistive device (walker, cane, etc), gripper socks, pt to call before getting OOB, and stay with me (per policy)     DVT prophylaxis:  DVT prophylaxis Med- Yes  DVT prophylaxis SCD or KEN- Yes      Wounds: (If Applicable)  Wounds- Yes Buttock, blanchable      Active Consults:  IP CONSULT TO NEUROLOGY  IP CONSULT TO HEMATOLOGY     Length of Stay:  Expected LOS: 2d 14h  Actual LOS: 3  Discharge Plan: Yes Possibly Monday 3/13/23

## 2023-03-13 NOTE — DISCHARGE SUMMARY
Hospitalist Discharge Summary     Patient ID:  Obdulia Bhatia  [id number]  74 y.o.  1955  3/7/2023    PCP on record: Raysa Nevarez NP    Admit date: 3/7/2023  Discharge date and time: 3/13/2023    DISCHARGE DIAGNOSIS:    Acute encephalopathy due to possible seizure, CVA is ruled out  Hstory of left temporal encephalomalacia from CVA     COPD not in exacerbation     Lytic lesions concerning for MM         CONSULTATIONS:  IP CONSULT TO NEUROLOGY  IP CONSULT TO HEMATOLOGY    Excerpted HPI from H&P of Marlo Hayward MD  Yasmin Dior is a 78 y.o. male with past medical history of CAD, arthritis, hypertension, hypercholesterolemia, seizures, stroke, hypothyroidism who was brought in for change in mental status and difficulty with his speech. He is awake but unable to give any history. History was provided by his sister present at the bedside. As per the sister he was in his usual state of health until 11 AM yesterday. She went back to see him around 5 PM and found him very confused and was unable to speak. No reported focal deficits, sensory loss, change in bladder or bowel habits, lower extremity edema or tenderness, no pain or chest pain.    ______________________________________________________________________  DISCHARGE SUMMARY/HOSPITAL COURSE:  for full details see H&P, daily progress notes, labs, consult notes. Acute encephalopathy due to possible seizure, CVA is ruled out  Hstory of left temporal encephalomalacia from CVA  CT head CTP, CTA head and neck remarkable for small focus of perfusion mismatch which correlates with left posterior temporal lobe chronic infarct. MRI brain has been unremarkable for any acute infarct. EEG is reviewed, neg for seizure  A1C 5.5,  LDL 51  Completed antibiotics  Cont' keppra, neurology is following, discussed with Dr Julian Gannon, started on keppra 500mg. Cont' gabapentin 600 mg QID as per neurology recommendations.   Gabapentin level <1  PT/OT to eval recommended rehab first and then recommended home with home health. Patient is refusing to go to rehab as indicated, is alert orient x4. Seizure precautions     COPD not in exacerbation  Resume home inhl, sister to bring from home  Nebs prn     Lytic lesions concerning for MM  Imaging personally reviewed and discussed with the patient and his family. Xr bone survey is negative. Fu on spep and upep  Oncology is following, discussed with Dr Steven Muñoz, recommended fu outpt           _______________________________________________________________________  Patient seen and examined by me on discharge day. Pertinent Findings:    GEN: awake, alert, non-diaphoretic, no psychomotor agitation, no acute distress  HEENT: Atraumatic, normocephalic, no rashes noted, no facial lesions noted. Eyes: No redness, no discharge, no swelling. No drainage observed from nose. CARDIOPULMONARY: no increased respiratory effort, speaking in clear sentences, I:E ratio WNL  GI: Abdomen does not appear to be distended  MSK: normal ROM in the neck, upper extremities and lower extremities  SKIN: no lesions, wounds, erythema, or cyanosis noted on face or hands  NEURO: EOMs intact. No facial asymmetry. No aphasia or slurred speech. Symmetrical strength, Sensation grossly intact. Alert and oriented X 4. Pupils are reactive bilaterally. PSYCH: appearance, behavior, and attitude - well groomed, pleasant, cooperative    _______________________________________________________________________  DISCHARGE MEDICATIONS:   Current Discharge Medication List        START taking these medications    Details   levETIRAcetam (Keppra) 500 mg tablet Take 1 Tablet by mouth two (2) times a day. Qty: 60 Tablet, Refills: 5  Start date: 3/11/2023           CONTINUE these medications which have NOT CHANGED    Details   budesonide-glycopyr-formoterol (Breztri Aerosphere) 160-9-4.8 mcg/actuation HFAA Take 2 Puffs by inhalation two (2) times a day. albuterol (PROVENTIL VENTOLIN) 2.5 mg /3 mL (0.083 %) nebu INHALE THE CONTENTS OF 1 VIAL (3ML) VIA NEBULIZER EVERY 4 HOURS AS NEEDED FOR WHEEZING (MAX FOUR TIMES A DAY)      albuterol-ipratropium (DUO-NEB) 2.5 mg-0.5 mg/3 ml nebu 3 mL by Nebulization route every six (6) hours as needed for Shortness of Breath.      gabapentin (NEURONTIN) 300 mg capsule TAKE 2 CAPSULES FOUR TIMES DAILY  Qty: 720 Capsule, Refills: 1    Associated Diagnoses: Carotid artery stenosis with cerebral infarction ([facility] Cerebral infarction due to thrombosis of left middle cerebral artery ([facility] Localization-related partial epilepsy with complex partial seizures ([facility] Complex partial seizure evolving to generalized seizure ([facility] Encephalopathy, unspecified      azelastine (ASTELIN) 137 mcg (0.1 %) nasal spray USE 2 SPRAYS IN EACH NOSTRIL TWICE DAILY      montelukast (SINGULAIR) 10 mg tablet Take 10 mg by mouth daily. aspirin delayed-release 325 mg tablet Take 325 mg by mouth daily. doxazosin (CARDURA) 4 mg tablet TAKE ONE TABLET BY MOUTH ONCE DAILY AT BEDTIME  Qty: 90 Tab, Refills: 0    Comments: Last RF call       citalopram (CELEXA) 20 mg tablet TAKE ONE TABLET BY MOUTH ONCE DAILY  Qty: 90 Tab, Refills: 0      atorvastatin (LIPITOR) 40 mg tablet Take 40 mg by mouth daily. levothyroxine (SYNTHROID) 50 mcg tablet TAKE ONE TABLET BY MOUTH EVERY DAY  Qty: 90 Tab, Refills: 0    Comments: Have Pt call  for appt with me      amLODIPine (NORVASC) 10 mg tablet TAKE ONE TABLET BY MOUTH EVERY DAY  Qty: 90 Tab, Refills: 0               Patient Follow Up Instructions:    Activity: Activity as tolerated  Diet: Resume previous diet      Follow-up Information       Follow up With Specialties Details Why Contact Gabriela Elizabeth NP Family Medicine Schedule an appointment as soon as possible for a visit in 1 week(s) [phone] Sees Dr Frances Vanegas per office Tuesday 3- 10 am follow up appt Above address is correct [facility] [address]  [phone]      Lizbeth Allen MD Neurology Schedule an appointment as soon as possible for a visit in 6 week(s)  [address]  [address] [facility] [facility]  [phone]            ________________________________________________________________    Risk of deterioration: Low    Condition at Discharge:  Stable  __________________________________________________________________    Disposition  Home with family and home health services    ____________________________________________________________________    Code Status: Full Code  ___________________________________________________________________      Total time in minutes spent coordinating this discharge (includes going over instructions, follow-up, prescriptions, and preparing report for sign off to her PCP) :  >30 minutes    Signed:  Lorenzo Rizo MD

## 2023-03-13 NOTE — PROGRESS NOTES
Spiritual Care Assessment/Progress Note  [facility]      NAME: Mayra Uriostegui      MRN: [id number]  AGE: 78 y.o. SEX: male  Mandaeism Affiliation: No preference   Language: English     3/13/2023     Total Time (in minutes): 9     Spiritual Assessment begun in [facility] through conversation with:         [x]Patient        [x] Family    [] Friend(s)        Reason for Consult: Initial/Spiritual assessment, patient floor     Spiritual beliefs: (Please include comment if needed)     [] Identifies with a jenna tradition:         [] Supported by a jenna community:            [] Claims no spiritual orientation:           [] Seeking spiritual identity:                [] Adheres to an individual form of spirituality:           [x] Not able to assess:                           Identified resources for coping:      [] Prayer                               [] Music                  [] Guided Imagery     [x] Family/friends                 [] Pet visits     [] Devotional reading                         [] Unknown     [] Other:                                               Interventions offered during this visit: (See comments for more details)    Patient Interventions: Initial/Spiritual assessment, patient floor, Affirmation of emotions/emotional suffering, Prayer (assurance of), Normalization of emotional/spiritual concerns     Family/Friend(s):  Affirmation of emotions/emotional suffering, Normalization of emotional/spiritual concerns, Prayer (assurance of)     Plan of Care:     [x] Support spiritual and/or cultural needs    [] Support AMD and/or advance care planning process      [] Support grieving process   [] Coordinate Rites and/or Rituals    [] Coordination with community clergy   [] No spiritual needs identified at this time   [] Detailed Plan of Care below (See Comments)  [] Make referral to Music Therapy  [] Make referral to Pet Therapy     [] Make referral to Addiction services  [] Make referral to [facility]  [] Make referral to Spiritual Care Partner  [] No future visits requested        [x] Contact Spiritual Care for further referrals     Comments:  reviewed the patient's chart prior to the visit. His sister Meet Navarro was visiting him when the  entered the room.  thanked her for supporting her brother. Mr. Martita Nicolas shared his concerns.  provided a presence, empathetic listening and encouragement.  services are available 24 hours a day as requested. Rev. ALEX Birmingham  [address] [facility]   Paging Service [phone]

## 2023-03-13 NOTE — PROGRESS NOTES
[address] [facility] [address] [place] Denise per office Tuesday 3- 10 am    I made above appt    Fax  [phone]  dc summary please     Davis Councilman,  2119 am      Home health referrals pending ( sent )     local office [facility] [phone] [facility] home care services        Patient signed IM before dc.      Davis Councilman, RN   Care manager

## 2023-03-13 NOTE — PROGRESS NOTES
Hematology Oncology Progress Note    Follow up for: Lytic lesions seen on chest Xray    Chart notes reviewed since last visit. Case discussed with following: . Patient complains of the following: No complaints    Additional concerns noted by the staff:     Patient Vitals for the past 24 hrs:   BP Temp Pulse Resp SpO2   03/13/23 0830 121/67 97.9 °F (36.6 °C) (!) 59 18 94 %   03/13/23 0540 124/73 98.1 °F (36.7 °C) (!) 49 18 91 %   03/12/23 2257 127/70 97.9 °F (36.6 °C) (!) 52 18 94 %   03/12/23 1959 113/72 98.4 °F (36.9 °C) (!) 57 18 95 %   03/12/23 1512 110/70 97.9 °F (36.6 °C) (!) 55 18 94 %       ROS: UTO due to AMS    Physical Examination:  Gen NAD, pleasant but confused  CV: rrr, no m/r/g  Chest: CTAB  Abd: S,NT  Ext no c/c/e      Labs:  Recent Results (from the past 24 hour(s))   METABOLIC PANEL, BASIC    Collection Time: 03/13/23 12:10 AM   Result Value Ref Range    Sodium 140 136 - 145 mmol/L    Potassium 4.1 3.5 - 5.1 mmol/L    Chloride 109 (H) 97 - 108 mmol/L    CO2 28 21 - 32 mmol/L    Anion gap 3 (L) 5 - 15 mmol/L    Glucose 97 65 - 100 mg/dL    BUN 12 6 - 20 MG/DL    Creatinine 0.66 (L) 0.70 - 1.30 MG/DL    BUN/Creatinine ratio 18 12 - 20      eGFR >60 >60 ml/min/1.73m2    Calcium 8.7 8.5 - 10.1 MG/DL       Imaging:  Bone survey  IMPRESSION  There are no suspicious lesions.     Assessment and Plan:   Lytic lesion seen on chest xray, psa normal.  -bone survey negative  -Monoclonal gammopathy panel sent and pending

## 2023-03-13 NOTE — ROUTINE PROCESS
Patient discharged home with his sister. Discharge paperwork reviewed with patient and sister and they voice understanding. All personal belongings including home medication (Breztri inhaler) taken with patient.

## 2023-03-13 NOTE — PROGRESS NOTES
Problem: Pressure Injury - Risk of  Goal: *Prevention of pressure injury  Description: Document Ruel Scale and appropriate interventions in the flowsheet.   Outcome: Progressing Towards Goal  Note: Pressure Injury Interventions:  Sensory Interventions: Assess changes in LOC    Moisture Interventions: Apply protective barrier, creams and emollients    Activity Interventions: Increase time out of bed    Mobility Interventions: HOB 30 degrees or less, PT/OT evaluation    Nutrition Interventions: Document food/fluid/supplement intake    Friction and Shear Interventions: HOB 30 degrees or less, Apply protective barrier, creams and emollients, Minimize layers                Problem: Aspiration - Risk of  Goal: *Absence of aspiration  Outcome: Progressing Towards Goal     Problem: TIA/CVA Stroke: Day 2 Until Discharge  Goal: Activity/Safety  Outcome: Progressing Towards Goal  Goal: Diagnostic Test/Procedures  Outcome: Progressing Towards Goal  Goal: Nutrition/Diet  Outcome: Progressing Towards Goal  Goal: Discharge Planning  Outcome: Progressing Towards Goal  Goal: Medications  Outcome: Progressing Towards Goal  Goal: Respiratory  Outcome: Progressing Towards Goal  Goal: Treatments/Interventions/Procedures  Outcome: Progressing Towards Goal  Goal: Psychosocial  Outcome: Progressing Towards Goal  Goal: *Verbalizes anxiety and depression are reduced or absent  Outcome: Progressing Towards Goal  Goal: *Absence of aspiration  Outcome: Progressing Towards Goal  Goal: *Absence of deep venous thrombosis signs and symptoms(Stroke Metric)  Outcome: Progressing Towards Goal  Goal: *Optimal pain control at patient's stated goal  Outcome: Progressing Towards Goal  Goal: *Tolerating diet  Outcome: Progressing Towards Goal  Goal: *Ability to perform ADLs and demonstrates progressive mobility and function  Outcome: Progressing Towards Goal  Goal: *Stroke education continued(Stroke Metric)  Outcome: Progressing Towards Goal Problem: Patient Education: Go to Patient Education Activity  Goal: Patient/Family Education  Outcome: Progressing Towards Goal     Problem: Falls - Risk of  Goal: *Absence of Falls  Description: Document Teri Dam Fall Risk and appropriate interventions in the flowsheet.   Outcome: Progressing Towards Goal  Note: Fall Risk Interventions:       Mentation Interventions: Adequate sleep, hydration, pain control, Door open when patient unattended         Elimination Interventions: Call light in reach

## 2023-03-13 NOTE — PROGRESS NOTES
End of Shift Note     Bedside shift change report given to Felicia Graham (oncoming nurse) by Jose Arshad nurse). Report included the following information SBAR, Kardex, Intake/Output, MAR, and Recent Results     Shift worked: 7p-7a   Shift summary and any significant changes:     -Rested well overnight, no complaints of pain  -Ambulated in hallway with staff  -Labs obtained  -Awaiting placement       Concerns for physician to address:  None   Zone phone for oncoming shift:   [phone]      Patient Priscila Freedman  78 y.o.  3/7/2023  6:21 PM by Bridget Zamroa DO.  Perla Gupta was admitted from Home     Problem List       Patient Active Problem List     Diagnosis Date Noted    Complex partial seizure evolving to generalized seizure ([address] 11/22/2016    Cerebral infarction due to thrombosis of left middle cerebral artery ([address] 11/22/2016    Infected prosthetic hip ([address] 11/10/2015    Carotid artery stenosis with cerebral infarction ([address] 11/02/2015    Failed total hip arthroplasty ([address] 06/09/2014    Late effect of stroke 05/23/2013    Encephalopathy, unspecified 04/21/2012    CVA (cerebral infarction) 09/27/2010    Muscle strain 09/27/2010    Localization-related partial epilepsy with complex partial seizures ([address] 09/27/2010    HTN (hypertension) 09/27/2010    Hypercholesterolemia 09/27/2010    Total hip replacements Bilateral 09/27/2010    CAD (coronary artery disease) 09/27/2010    Hepatitis A 09/27/2010    Hepatitis C 09/27/2010    Osteoarthrosis, hip 08/02/2010           Past Medical History:   Diagnosis Date    Arthritis       Hips, Knees    CAD (coronary artery disease)       MI around 1990    Hepatitis A      Hepatitis C      High cholesterol      Hypertension      Other ill-defined conditions(799.89)       Light sensitivity, causes seizures    Seizures ([address]       Last seizure 12/2008/work -up in 2012 -possible seizure    Stroke [facility]) 2005    Thyroid disease       Hypothyroid         Core Measures:  CVA: No Yes  CHF:No No  PNA:No No     Activity:  Activity Level: Up with Assistance  Number times ambulated in hallways past shift: 1  Number of times OOB to chair past shift: 1     Cardiac:   Cardiac Monitoring: No      Cardiac Rhythm: Sinus Rhythm     Access:   Current line(s): PIV   Central Line? No   PICC LINE? No      Genitourinary:   Urinary status: voiding  Urinary Catheter? No      Respiratory:   O2 Device: None (Room air)  Chronic home O2 use?: NO  Incentive spirometer at bedside: NO     GI:  Last Bowel Movement Date: 03/12/23  Current diet:  DIET ONE TIME MESSAGE  ADULT DIET Regular  Passing flatus: YES  Tolerating current diet: YES     Pain Management:   Patient states pain is manageable on current regimen: YES     Skin:  Ruel Score: 17  Interventions: float heels and increase time out of bed    Patient Safety:  Fall Score:  Total Score: 2  Interventions: bed/chair alarm, assistive device (walker, cane, etc), gripper socks, pt to call before getting OOB, and stay with me (per policy)     DVT prophylaxis:  DVT prophylaxis Med- Yes  DVT prophylaxis SCD or KEN- Yes      Wounds: (If Applicable)  Wounds- Yes Buttock, blanchable      Active Consults:  IP CONSULT TO NEUROLOGY  IP CONSULT TO HEMATOLOGY     Length of Stay:  Expected LOS: 2d 14h  Actual LOS: 3  Discharge Plan: Yes Possibly Monday 3/13/23

## 2023-03-13 NOTE — PROGRESS NOTES
Problem: Pressure Injury - Risk of  Goal: *Prevention of pressure injury  Description: Document Ruel Scale and appropriate interventions in the flowsheet.   Outcome: Progressing Towards Goal  Note: Pressure Injury Interventions:  Sensory Interventions: Assess changes in LOC    Moisture Interventions: Apply protective barrier, creams and emollients    Activity Interventions: Increase time out of bed    Mobility Interventions: HOB 30 degrees or less, PT/OT evaluation    Nutrition Interventions: Document food/fluid/supplement intake    Friction and Shear Interventions: HOB 30 degrees or less, Apply protective barrier, creams and emollients, Minimize layers                Problem: Aspiration - Risk of  Goal: *Absence of aspiration  Outcome: Progressing Towards Goal     Problem: TIA/CVA Stroke: Day 2 Until Discharge  Goal: Activity/Safety  Outcome: Progressing Towards Goal  Goal: Diagnostic Test/Procedures  Outcome: Progressing Towards Goal  Goal: Nutrition/Diet  Outcome: Progressing Towards Goal  Goal: Discharge Planning  Outcome: Progressing Towards Goal  Goal: Medications  Outcome: Progressing Towards Goal  Goal: Respiratory  Outcome: Progressing Towards Goal  Goal: Treatments/Interventions/Procedures  Outcome: Progressing Towards Goal  Goal: Psychosocial  Outcome: Progressing Towards Goal  Goal: *Verbalizes anxiety and depression are reduced or absent  Outcome: Progressing Towards Goal  Goal: *Absence of aspiration  Outcome: Progressing Towards Goal  Goal: *Absence of deep venous thrombosis signs and symptoms(Stroke Metric)  Outcome: Progressing Towards Goal  Goal: *Optimal pain control at patient's stated goal  Outcome: Progressing Towards Goal  Goal: *Tolerating diet  Outcome: Progressing Towards Goal  Goal: *Ability to perform ADLs and demonstrates progressive mobility and function  Outcome: Progressing Towards Goal  Goal: *Stroke education continued(Stroke Metric)  Outcome: Progressing Towards Goal Problem: Patient Education: Go to Patient Education Activity  Goal: Patient/Family Education  Outcome: Progressing Towards Goal     Problem: Falls - Risk of  Goal: *Absence of Falls  Description: Document Sallie Montelongo Fall Risk and appropriate interventions in the flowsheet.   Outcome: Progressing Towards Goal  Note: Fall Risk Interventions:       Mentation Interventions: Adequate sleep, hydration, pain control, Door open when patient unattended         Elimination Interventions: Call light in reach

## 2023-03-16 ENCOUNTER — TELEPHONE (OUTPATIENT)
Dept: NEUROLOGY | Age: 68
End: 2023-03-16

## 2023-03-16 NOTE — TELEPHONE ENCOUNTER
Irasema Winchester called stating that Pt was seen in hospital by Dr Leela Ramirez and was advised to follow up with Dr Akiko Reynoso in 6-8 weeks.  Please advise when to schedule Pt. [phone]

## 2023-04-23 ENCOUNTER — TRANSCRIBE ORDERS (OUTPATIENT)
Facility: HOSPITAL | Age: 68
End: 2023-04-23

## 2023-04-23 DIAGNOSIS — I63.312 CEREBRAL INFARCTION DUE TO THROMBOSIS OF LEFT MIDDLE CEREBRAL ARTERY (HCC): Primary | ICD-10-CM

## 2023-04-24 ENCOUNTER — TRANSCRIBE ORDERS (OUTPATIENT)
Facility: HOSPITAL | Age: 68
End: 2023-04-24

## 2023-04-24 DIAGNOSIS — I63.312 CEREBRAL INFARCTION DUE TO THROMBOSIS OF LEFT MIDDLE CEREBRAL ARTERY (HCC): Primary | ICD-10-CM

## 2023-04-24 DIAGNOSIS — G40.209 COMPLEX PARTIAL SEIZURE EVOLVING TO GENERALIZED SEIZURE (HCC): ICD-10-CM

## 2023-04-24 DIAGNOSIS — I63.239 CAROTID ARTERY STENOSIS WITH CEREBRAL INFARCTION (HCC): ICD-10-CM

## 2023-04-24 DIAGNOSIS — G40.209 LOCALIZATION-RELATED PARTIAL EPILEPSY WITH COMPLEX PARTIAL SEIZURES (HCC): ICD-10-CM

## 2023-05-05 ENCOUNTER — TRANSCRIBE ORDER (OUTPATIENT)
Dept: SCHEDULING | Age: 68
End: 2023-05-05

## 2023-05-05 DIAGNOSIS — Z87.891 FORMER SMOKER: Primary | ICD-10-CM

## 2023-05-14 ENCOUNTER — TRANSCRIBE ORDERS (OUTPATIENT)
Facility: HOSPITAL | Age: 68
End: 2023-05-14

## 2023-05-14 DIAGNOSIS — Z87.891 FORMER SMOKER: Primary | ICD-10-CM

## 2023-05-16 ENCOUNTER — APPOINTMENT (OUTPATIENT)
Facility: HOSPITAL | Age: 68
DRG: 469 | End: 2023-05-16
Payer: MEDICARE

## 2023-05-16 ENCOUNTER — HOSPITAL ENCOUNTER (INPATIENT)
Facility: HOSPITAL | Age: 68
LOS: 8 days | Discharge: NURSING, REHAB OR LONG-TERM CARE | DRG: 469 | End: 2023-05-24
Attending: EMERGENCY MEDICINE | Admitting: STUDENT IN AN ORGANIZED HEALTH CARE EDUCATION/TRAINING PROGRAM
Payer: MEDICARE

## 2023-05-16 DIAGNOSIS — Z96.649 PERIPROSTHETIC FRACTURE OF SHAFT OF FEMUR: Primary | ICD-10-CM

## 2023-05-16 DIAGNOSIS — M97.8XXA PERIPROSTHETIC FRACTURE OF SHAFT OF FEMUR: Primary | ICD-10-CM

## 2023-05-16 PROBLEM — T14.8XXA FRACTURE: Status: ACTIVE | Noted: 2023-05-16

## 2023-05-16 LAB
ABO + RH BLD: NORMAL
ALBUMIN SERPL-MCNC: 3.4 G/DL (ref 3.5–5)
ALBUMIN/GLOB SERPL: 0.9 (ref 1.1–2.2)
ALP SERPL-CCNC: 105 U/L (ref 45–117)
ALT SERPL-CCNC: 24 U/L (ref 12–78)
ANION GAP SERPL CALC-SCNC: 3 MMOL/L (ref 5–15)
AST SERPL-CCNC: 22 U/L (ref 15–37)
BASOPHILS # BLD: 0.1 K/UL (ref 0–0.1)
BASOPHILS NFR BLD: 1 % (ref 0–1)
BILIRUB SERPL-MCNC: 0.3 MG/DL (ref 0.2–1)
BLOOD GROUP ANTIBODIES SERPL: NORMAL
BUN SERPL-MCNC: 9 MG/DL (ref 6–20)
BUN/CREAT SERPL: 8 (ref 12–20)
CALCIUM SERPL-MCNC: 8 MG/DL (ref 8.5–10.1)
CHLORIDE SERPL-SCNC: 108 MMOL/L (ref 97–108)
CO2 SERPL-SCNC: 29 MMOL/L (ref 21–32)
CREAT SERPL-MCNC: 1.08 MG/DL (ref 0.7–1.3)
DIFFERENTIAL METHOD BLD: ABNORMAL
EOSINOPHIL # BLD: 0.1 K/UL (ref 0–0.4)
EOSINOPHIL NFR BLD: 1 % (ref 0–7)
ERYTHROCYTE [DISTWIDTH] IN BLOOD BY AUTOMATED COUNT: 14.2 % (ref 11.5–14.5)
GLOBULIN SER CALC-MCNC: 3.6 G/DL (ref 2–4)
GLUCOSE SERPL-MCNC: 105 MG/DL (ref 65–100)
HCT VFR BLD AUTO: 45.5 % (ref 36.6–50.3)
HGB BLD-MCNC: 14.2 G/DL (ref 12.1–17)
IMM GRANULOCYTES # BLD AUTO: 0 K/UL (ref 0–0.04)
IMM GRANULOCYTES NFR BLD AUTO: 0 % (ref 0–0.5)
INR PPP: 1.1 (ref 0.9–1.1)
LYMPHOCYTES # BLD: 1.1 K/UL (ref 0.8–3.5)
LYMPHOCYTES NFR BLD: 11 % (ref 12–49)
MCH RBC QN AUTO: 29.4 PG (ref 26–34)
MCHC RBC AUTO-ENTMCNC: 31.2 G/DL (ref 30–36.5)
MCV RBC AUTO: 94.2 FL (ref 80–99)
MONOCYTES # BLD: 0.7 K/UL (ref 0–1)
MONOCYTES NFR BLD: 7 % (ref 5–13)
NEUTS SEG # BLD: 7.8 K/UL (ref 1.8–8)
NEUTS SEG NFR BLD: 80 % (ref 32–75)
NRBC # BLD: 0 K/UL (ref 0–0.01)
NRBC BLD-RTO: 0 PER 100 WBC
PLATELET # BLD AUTO: 295 K/UL (ref 150–400)
PMV BLD AUTO: 8.7 FL (ref 8.9–12.9)
POTASSIUM SERPL-SCNC: 3.6 MMOL/L (ref 3.5–5.1)
PROT SERPL-MCNC: 7 G/DL (ref 6.4–8.2)
PROTHROMBIN TIME: 11.1 SEC (ref 9–11.1)
RBC # BLD AUTO: 4.83 M/UL (ref 4.1–5.7)
SODIUM SERPL-SCNC: 140 MMOL/L (ref 136–145)
SPECIMEN EXP DATE BLD: NORMAL
TROPONIN I SERPL HS-MCNC: 5 NG/L (ref 0–76)
WBC # BLD AUTO: 9.8 K/UL (ref 4.1–11.1)

## 2023-05-16 PROCEDURE — 99285 EMERGENCY DEPT VISIT HI MDM: CPT

## 2023-05-16 PROCEDURE — 73700 CT LOWER EXTREMITY W/O DYE: CPT

## 2023-05-16 PROCEDURE — 6360000002 HC RX W HCPCS: Performed by: STUDENT IN AN ORGANIZED HEALTH CARE EDUCATION/TRAINING PROGRAM

## 2023-05-16 PROCEDURE — 36415 COLL VENOUS BLD VENIPUNCTURE: CPT

## 2023-05-16 PROCEDURE — 84484 ASSAY OF TROPONIN QUANT: CPT

## 2023-05-16 PROCEDURE — 85610 PROTHROMBIN TIME: CPT

## 2023-05-16 PROCEDURE — 86850 RBC ANTIBODY SCREEN: CPT

## 2023-05-16 PROCEDURE — 80053 COMPREHEN METABOLIC PANEL: CPT

## 2023-05-16 PROCEDURE — 70450 CT HEAD/BRAIN W/O DYE: CPT

## 2023-05-16 PROCEDURE — C9113 INJ PANTOPRAZOLE SODIUM, VIA: HCPCS | Performed by: STUDENT IN AN ORGANIZED HEALTH CARE EDUCATION/TRAINING PROGRAM

## 2023-05-16 PROCEDURE — 1100000000 HC RM PRIVATE

## 2023-05-16 PROCEDURE — 86900 BLOOD TYPING SEROLOGIC ABO: CPT

## 2023-05-16 PROCEDURE — 73502 X-RAY EXAM HIP UNI 2-3 VIEWS: CPT

## 2023-05-16 PROCEDURE — 86901 BLOOD TYPING SEROLOGIC RH(D): CPT

## 2023-05-16 PROCEDURE — 85025 COMPLETE CBC W/AUTO DIFF WBC: CPT

## 2023-05-16 PROCEDURE — 2580000003 HC RX 258: Performed by: STUDENT IN AN ORGANIZED HEALTH CARE EDUCATION/TRAINING PROGRAM

## 2023-05-16 PROCEDURE — A4216 STERILE WATER/SALINE, 10 ML: HCPCS | Performed by: STUDENT IN AN ORGANIZED HEALTH CARE EDUCATION/TRAINING PROGRAM

## 2023-05-16 PROCEDURE — 93005 ELECTROCARDIOGRAM TRACING: CPT | Performed by: EMERGENCY MEDICINE

## 2023-05-16 PROCEDURE — 71045 X-RAY EXAM CHEST 1 VIEW: CPT

## 2023-05-16 PROCEDURE — 6370000000 HC RX 637 (ALT 250 FOR IP): Performed by: STUDENT IN AN ORGANIZED HEALTH CARE EDUCATION/TRAINING PROGRAM

## 2023-05-16 PROCEDURE — 6360000002 HC RX W HCPCS: Performed by: EMERGENCY MEDICINE

## 2023-05-16 RX ORDER — PROMETHAZINE HYDROCHLORIDE 25 MG/ML
6.25 INJECTION, SOLUTION INTRAMUSCULAR; INTRAVENOUS EVERY 6 HOURS PRN
Status: DISCONTINUED | OUTPATIENT
Start: 2023-05-16 | End: 2023-05-16 | Stop reason: CLARIF

## 2023-05-16 RX ORDER — PROCHLORPERAZINE EDISYLATE 5 MG/ML
2.5 INJECTION INTRAMUSCULAR; INTRAVENOUS EVERY 6 HOURS PRN
Status: DISCONTINUED | OUTPATIENT
Start: 2023-05-16 | End: 2023-05-24 | Stop reason: HOSPADM

## 2023-05-16 RX ORDER — ONDANSETRON 2 MG/ML
4 INJECTION INTRAMUSCULAR; INTRAVENOUS EVERY 4 HOURS PRN
Status: DISCONTINUED | OUTPATIENT
Start: 2023-05-16 | End: 2023-05-24 | Stop reason: HOSPADM

## 2023-05-16 RX ORDER — POLYETHYLENE GLYCOL 3350 17 G/17G
17 POWDER, FOR SOLUTION ORAL 2 TIMES DAILY
Status: DISCONTINUED | OUTPATIENT
Start: 2023-05-16 | End: 2023-05-24 | Stop reason: HOSPADM

## 2023-05-16 RX ORDER — HYDROMORPHONE HYDROCHLORIDE 1 MG/ML
0.5 INJECTION, SOLUTION INTRAMUSCULAR; INTRAVENOUS; SUBCUTANEOUS
Status: COMPLETED | OUTPATIENT
Start: 2023-05-16 | End: 2023-05-17

## 2023-05-16 RX ORDER — ACETAMINOPHEN 325 MG/1
650 TABLET ORAL EVERY 4 HOURS PRN
Status: DISCONTINUED | OUTPATIENT
Start: 2023-05-16 | End: 2023-05-24 | Stop reason: HOSPADM

## 2023-05-16 RX ORDER — FENTANYL CITRATE 50 UG/ML
50 INJECTION, SOLUTION INTRAMUSCULAR; INTRAVENOUS
Status: COMPLETED | OUTPATIENT
Start: 2023-05-16 | End: 2023-05-16

## 2023-05-16 RX ADMIN — FENTANYL CITRATE 50 MCG: 50 INJECTION, SOLUTION INTRAMUSCULAR; INTRAVENOUS at 21:22

## 2023-05-16 RX ADMIN — PANTOPRAZOLE SODIUM 40 MG: 40 INJECTION, POWDER, FOR SOLUTION INTRAVENOUS at 21:23

## 2023-05-16 RX ADMIN — POLYETHYLENE GLYCOL 3350 17 G: 17 POWDER, FOR SOLUTION ORAL at 21:23

## 2023-05-16 ASSESSMENT — PAIN - FUNCTIONAL ASSESSMENT: PAIN_FUNCTIONAL_ASSESSMENT: 0-10

## 2023-05-16 ASSESSMENT — PAIN DESCRIPTION - LOCATION
LOCATION: HIP

## 2023-05-16 ASSESSMENT — PAIN SCALES - GENERAL
PAINLEVEL_OUTOF10: 9
PAINLEVEL_OUTOF10: 8
PAINLEVEL_OUTOF10: 4

## 2023-05-16 ASSESSMENT — PAIN DESCRIPTION - DESCRIPTORS: DESCRIPTORS: ACHING

## 2023-05-16 ASSESSMENT — PAIN DESCRIPTION - ORIENTATION
ORIENTATION: LEFT
ORIENTATION: LEFT

## 2023-05-16 ASSESSMENT — LIFESTYLE VARIABLES
HOW MANY STANDARD DRINKS CONTAINING ALCOHOL DO YOU HAVE ON A TYPICAL DAY: PATIENT DOES NOT DRINK
HOW OFTEN DO YOU HAVE A DRINK CONTAINING ALCOHOL: NEVER

## 2023-05-17 ENCOUNTER — ANESTHESIA (OUTPATIENT)
Facility: HOSPITAL | Age: 68
End: 2023-05-17
Payer: MEDICARE

## 2023-05-17 ENCOUNTER — APPOINTMENT (OUTPATIENT)
Facility: HOSPITAL | Age: 68
DRG: 469 | End: 2023-05-17
Payer: MEDICARE

## 2023-05-17 ENCOUNTER — ANESTHESIA EVENT (OUTPATIENT)
Facility: HOSPITAL | Age: 68
End: 2023-05-17
Payer: MEDICARE

## 2023-05-17 LAB
ALBUMIN SERPL-MCNC: 3.3 G/DL (ref 3.5–5)
ALBUMIN/GLOB SERPL: 0.9 (ref 1.1–2.2)
ALP SERPL-CCNC: 102 U/L (ref 45–117)
ALT SERPL-CCNC: 26 U/L (ref 12–78)
ANION GAP SERPL CALC-SCNC: 0 MMOL/L (ref 5–15)
APPEARANCE UR: CLEAR
AST SERPL-CCNC: 25 U/L (ref 15–37)
BACTERIA URNS QL MICRO: NEGATIVE /HPF
BILIRUB SERPL-MCNC: 0.7 MG/DL (ref 0.2–1)
BILIRUB UR QL: NEGATIVE
BUN SERPL-MCNC: 13 MG/DL (ref 6–20)
BUN/CREAT SERPL: 14 (ref 12–20)
CALCIUM SERPL-MCNC: 8.7 MG/DL (ref 8.5–10.1)
CHLORIDE SERPL-SCNC: 103 MMOL/L (ref 97–108)
CO2 SERPL-SCNC: 32 MMOL/L (ref 21–32)
COLOR UR: ABNORMAL
CREAT SERPL-MCNC: 0.94 MG/DL (ref 0.7–1.3)
EKG ATRIAL RATE: 74 BPM
EKG DIAGNOSIS: NORMAL
EKG P AXIS: 70 DEGREES
EKG P-R INTERVAL: 174 MS
EKG Q-T INTERVAL: 348 MS
EKG QRS DURATION: 92 MS
EKG QTC CALCULATION (BAZETT): 386 MS
EKG R AXIS: 90 DEGREES
EKG T AXIS: 70 DEGREES
EKG VENTRICULAR RATE: 74 BPM
EPITH CASTS URNS QL MICRO: ABNORMAL /LPF
ERYTHROCYTE [DISTWIDTH] IN BLOOD BY AUTOMATED COUNT: 14.1 % (ref 11.5–14.5)
GLOBULIN SER CALC-MCNC: 3.5 G/DL (ref 2–4)
GLUCOSE SERPL-MCNC: 119 MG/DL (ref 65–100)
GLUCOSE UR STRIP.AUTO-MCNC: NEGATIVE MG/DL
HCT VFR BLD AUTO: 41 % (ref 36.6–50.3)
HGB BLD-MCNC: 12.7 G/DL (ref 12.1–17)
HGB UR QL STRIP: NEGATIVE
HYALINE CASTS URNS QL MICRO: ABNORMAL /LPF (ref 0–2)
KETONES UR QL STRIP.AUTO: ABNORMAL MG/DL
LEUKOCYTE ESTERASE UR QL STRIP.AUTO: ABNORMAL
MCH RBC QN AUTO: 28.9 PG (ref 26–34)
MCHC RBC AUTO-ENTMCNC: 31 G/DL (ref 30–36.5)
MCV RBC AUTO: 93.4 FL (ref 80–99)
NITRITE UR QL STRIP.AUTO: NEGATIVE
NRBC # BLD: 0 K/UL (ref 0–0.01)
NRBC BLD-RTO: 0 PER 100 WBC
PH UR STRIP: 5.5 (ref 5–8)
PLATELET # BLD AUTO: 309 K/UL (ref 150–400)
PMV BLD AUTO: 8.9 FL (ref 8.9–12.9)
POTASSIUM SERPL-SCNC: 4.1 MMOL/L (ref 3.5–5.1)
PROT SERPL-MCNC: 6.8 G/DL (ref 6.4–8.2)
PROT UR STRIP-MCNC: ABNORMAL MG/DL
RBC # BLD AUTO: 4.39 M/UL (ref 4.1–5.7)
RBC #/AREA URNS HPF: ABNORMAL /HPF (ref 0–5)
SODIUM SERPL-SCNC: 135 MMOL/L (ref 136–145)
SP GR UR REFRACTOMETRY: 1.02
URINE CULTURE IF INDICATED: ABNORMAL
UROBILINOGEN UR QL STRIP.AUTO: 1 EU/DL (ref 0.2–1)
WBC # BLD AUTO: 6.4 K/UL (ref 4.1–11.1)
WBC URNS QL MICRO: ABNORMAL /HPF (ref 0–4)

## 2023-05-17 PROCEDURE — 6370000000 HC RX 637 (ALT 250 FOR IP): Performed by: ORTHOPAEDIC SURGERY

## 2023-05-17 PROCEDURE — 6360000002 HC RX W HCPCS: Performed by: ANESTHESIOLOGY

## 2023-05-17 PROCEDURE — 2580000003 HC RX 258

## 2023-05-17 PROCEDURE — 2500000003 HC RX 250 WO HCPCS: Performed by: EMERGENCY MEDICINE

## 2023-05-17 PROCEDURE — 2700000000 HC OXYGEN THERAPY PER DAY

## 2023-05-17 PROCEDURE — 73502 X-RAY EXAM HIP UNI 2-3 VIEWS: CPT

## 2023-05-17 PROCEDURE — 51798 US URINE CAPACITY MEASURE: CPT

## 2023-05-17 PROCEDURE — 3700000000 HC ANESTHESIA ATTENDED CARE: Performed by: ORTHOPAEDIC SURGERY

## 2023-05-17 PROCEDURE — 85027 COMPLETE CBC AUTOMATED: CPT

## 2023-05-17 PROCEDURE — 94640 AIRWAY INHALATION TREATMENT: CPT

## 2023-05-17 PROCEDURE — 2580000003 HC RX 258: Performed by: STUDENT IN AN ORGANIZED HEALTH CARE EDUCATION/TRAINING PROGRAM

## 2023-05-17 PROCEDURE — 2709999900 HC NON-CHARGEABLE SUPPLY: Performed by: ORTHOPAEDIC SURGERY

## 2023-05-17 PROCEDURE — 71045 X-RAY EXAM CHEST 1 VIEW: CPT

## 2023-05-17 PROCEDURE — 81001 URINALYSIS AUTO W/SCOPE: CPT

## 2023-05-17 PROCEDURE — 6360000002 HC RX W HCPCS: Performed by: ORTHOPAEDIC SURGERY

## 2023-05-17 PROCEDURE — 6370000000 HC RX 637 (ALT 250 FOR IP)

## 2023-05-17 PROCEDURE — 3600000005 HC SURGERY LEVEL 5 BASE: Performed by: ORTHOPAEDIC SURGERY

## 2023-05-17 PROCEDURE — 64447 NJX AA&/STRD FEMORAL NRV IMG: CPT | Performed by: STUDENT IN AN ORGANIZED HEALTH CARE EDUCATION/TRAINING PROGRAM

## 2023-05-17 PROCEDURE — 2580000003 HC RX 258: Performed by: ORTHOPAEDIC SURGERY

## 2023-05-17 PROCEDURE — 2500000003 HC RX 250 WO HCPCS

## 2023-05-17 PROCEDURE — 94760 N-INVAS EAR/PLS OXIMETRY 1: CPT

## 2023-05-17 PROCEDURE — 6360000002 HC RX W HCPCS: Performed by: PHYSICIAN ASSISTANT

## 2023-05-17 PROCEDURE — 6370000000 HC RX 637 (ALT 250 FOR IP): Performed by: STUDENT IN AN ORGANIZED HEALTH CARE EDUCATION/TRAINING PROGRAM

## 2023-05-17 PROCEDURE — 2580000003 HC RX 258: Performed by: PHYSICIAN ASSISTANT

## 2023-05-17 PROCEDURE — 6360000002 HC RX W HCPCS

## 2023-05-17 PROCEDURE — 3600000015 HC SURGERY LEVEL 5 ADDTL 15MIN: Performed by: ORTHOPAEDIC SURGERY

## 2023-05-17 PROCEDURE — C1776 JOINT DEVICE (IMPLANTABLE): HCPCS | Performed by: ORTHOPAEDIC SURGERY

## 2023-05-17 PROCEDURE — 80053 COMPREHEN METABOLIC PANEL: CPT

## 2023-05-17 PROCEDURE — 2500000003 HC RX 250 WO HCPCS: Performed by: ANESTHESIOLOGY

## 2023-05-17 PROCEDURE — 6360000002 HC RX W HCPCS: Performed by: NURSE ANESTHETIST, CERTIFIED REGISTERED

## 2023-05-17 PROCEDURE — 94664 DEMO&/EVAL PT USE INHALER: CPT

## 2023-05-17 PROCEDURE — 7100000000 HC PACU RECOVERY - FIRST 15 MIN: Performed by: ORTHOPAEDIC SURGERY

## 2023-05-17 PROCEDURE — 36415 COLL VENOUS BLD VENIPUNCTURE: CPT

## 2023-05-17 PROCEDURE — 2060000000 HC ICU INTERMEDIATE R&B

## 2023-05-17 PROCEDURE — 2500000003 HC RX 250 WO HCPCS: Performed by: ORTHOPAEDIC SURGERY

## 2023-05-17 PROCEDURE — 3700000001 HC ADD 15 MINUTES (ANESTHESIA): Performed by: ORTHOPAEDIC SURGERY

## 2023-05-17 PROCEDURE — 0SRB03Z REPLACEMENT OF LEFT HIP JOINT WITH CERAMIC SYNTHETIC SUBSTITUTE, OPEN APPROACH: ICD-10-PCS | Performed by: ORTHOPAEDIC SURGERY

## 2023-05-17 PROCEDURE — 7100000001 HC PACU RECOVERY - ADDTL 15 MIN: Performed by: ORTHOPAEDIC SURGERY

## 2023-05-17 DEVICE — CERAMIC V40 FEMORAL HEAD
Type: IMPLANTABLE DEVICE | Site: HIP | Status: FUNCTIONAL
Brand: BIOLOX

## 2023-05-17 DEVICE — LINER- CEMENTLESS
Type: IMPLANTABLE DEVICE | Site: HIP | Status: FUNCTIONAL
Brand: MDM

## 2023-05-17 DEVICE — BEADED CABLE AND SLEEVE SET
Type: IMPLANTABLE DEVICE | Site: HIP | Status: FUNCTIONAL
Brand: DALL-MILES

## 2023-05-17 DEVICE — MODULAR HIP SYSTEM
Type: IMPLANTABLE DEVICE | Site: HIP | Status: FUNCTIONAL
Brand: RESTORATION

## 2023-05-17 DEVICE — X3 INSERT FOR ADM/ MDM
Type: IMPLANTABLE DEVICE | Site: HIP | Status: FUNCTIONAL
Brand: X3

## 2023-05-17 RX ORDER — AMLODIPINE BESYLATE 5 MG/1
10 TABLET ORAL DAILY
Status: DISCONTINUED | OUTPATIENT
Start: 2023-05-17 | End: 2023-05-24 | Stop reason: HOSPADM

## 2023-05-17 RX ORDER — ROPIVACAINE HYDROCHLORIDE 5 MG/ML
INJECTION, SOLUTION EPIDURAL; INFILTRATION; PERINEURAL PRN
Status: DISCONTINUED | OUTPATIENT
Start: 2023-05-17 | End: 2023-05-17 | Stop reason: ALTCHOICE

## 2023-05-17 RX ORDER — SODIUM CHLORIDE 0.9 % (FLUSH) 0.9 %
5-40 SYRINGE (ML) INJECTION PRN
Status: DISCONTINUED | OUTPATIENT
Start: 2023-05-17 | End: 2023-05-17 | Stop reason: HOSPADM

## 2023-05-17 RX ORDER — LEVETIRACETAM 500 MG/1
500 TABLET ORAL 2 TIMES DAILY
Status: DISCONTINUED | OUTPATIENT
Start: 2023-05-17 | End: 2023-05-24 | Stop reason: HOSPADM

## 2023-05-17 RX ORDER — FENTANYL CITRATE 50 UG/ML
25 INJECTION, SOLUTION INTRAMUSCULAR; INTRAVENOUS EVERY 5 MIN PRN
Status: COMPLETED | OUTPATIENT
Start: 2023-05-17 | End: 2023-05-17

## 2023-05-17 RX ORDER — OXYCODONE HYDROCHLORIDE 5 MG/1
5 TABLET ORAL PRN
Status: DISCONTINUED | OUTPATIENT
Start: 2023-05-17 | End: 2023-05-17 | Stop reason: HOSPADM

## 2023-05-17 RX ORDER — TRANEXAMIC ACID 100 MG/ML
INJECTION, SOLUTION INTRAVENOUS PRN
Status: DISCONTINUED | OUTPATIENT
Start: 2023-05-17 | End: 2023-05-17 | Stop reason: ALTCHOICE

## 2023-05-17 RX ORDER — LANOLIN ALCOHOL/MO/W.PET/CERES
3 CREAM (GRAM) TOPICAL NIGHTLY PRN
Status: DISCONTINUED | OUTPATIENT
Start: 2023-05-17 | End: 2023-05-24 | Stop reason: HOSPADM

## 2023-05-17 RX ORDER — HYDROMORPHONE HYDROCHLORIDE 1 MG/ML
0.5 INJECTION, SOLUTION INTRAMUSCULAR; INTRAVENOUS; SUBCUTANEOUS EVERY 5 MIN PRN
Status: DISCONTINUED | OUTPATIENT
Start: 2023-05-17 | End: 2023-05-17 | Stop reason: HOSPADM

## 2023-05-17 RX ORDER — GABAPENTIN 300 MG/1
300 CAPSULE ORAL 4 TIMES DAILY
Status: DISCONTINUED | OUTPATIENT
Start: 2023-05-17 | End: 2023-05-24 | Stop reason: HOSPADM

## 2023-05-17 RX ORDER — CASTOR OIL AND BALSAM, PERU 788; 87 MG/G; MG/G
OINTMENT TOPICAL 2 TIMES DAILY
Status: DISCONTINUED | OUTPATIENT
Start: 2023-05-17 | End: 2023-05-24 | Stop reason: HOSPADM

## 2023-05-17 RX ORDER — SODIUM CHLORIDE 9 MG/ML
INJECTION, SOLUTION INTRAVENOUS PRN
Status: DISCONTINUED | OUTPATIENT
Start: 2023-05-17 | End: 2023-05-17 | Stop reason: HOSPADM

## 2023-05-17 RX ORDER — SODIUM CHLORIDE 9 MG/ML
INJECTION, SOLUTION INTRAVENOUS CONTINUOUS
Status: DISCONTINUED | OUTPATIENT
Start: 2023-05-17 | End: 2023-05-24

## 2023-05-17 RX ORDER — DOXAZOSIN 2 MG/1
4 TABLET ORAL DAILY
Status: DISCONTINUED | OUTPATIENT
Start: 2023-05-17 | End: 2023-05-24 | Stop reason: HOSPADM

## 2023-05-17 RX ORDER — SUCCINYLCHOLINE/SOD CL,ISO/PF 200MG/10ML
SYRINGE (ML) INTRAVENOUS PRN
Status: DISCONTINUED | OUTPATIENT
Start: 2023-05-17 | End: 2023-05-17 | Stop reason: SDUPTHER

## 2023-05-17 RX ORDER — GLYCOPYRROLATE 0.2 MG/ML
INJECTION INTRAMUSCULAR; INTRAVENOUS PRN
Status: DISCONTINUED | OUTPATIENT
Start: 2023-05-17 | End: 2023-05-17 | Stop reason: SDUPTHER

## 2023-05-17 RX ORDER — DEXAMETHASONE SODIUM PHOSPHATE 4 MG/ML
INJECTION, SOLUTION INTRA-ARTICULAR; INTRALESIONAL; INTRAMUSCULAR; INTRAVENOUS; SOFT TISSUE PRN
Status: DISCONTINUED | OUTPATIENT
Start: 2023-05-17 | End: 2023-05-17 | Stop reason: SDUPTHER

## 2023-05-17 RX ORDER — FENTANYL CITRATE 50 UG/ML
INJECTION, SOLUTION INTRAMUSCULAR; INTRAVENOUS PRN
Status: DISCONTINUED | OUTPATIENT
Start: 2023-05-17 | End: 2023-05-17 | Stop reason: SDUPTHER

## 2023-05-17 RX ORDER — DEXMEDETOMIDINE HYDROCHLORIDE 100 UG/ML
INJECTION, SOLUTION INTRAVENOUS PRN
Status: DISCONTINUED | OUTPATIENT
Start: 2023-05-17 | End: 2023-05-17 | Stop reason: SDUPTHER

## 2023-05-17 RX ORDER — LANOLIN ALCOHOL/MO/W.PET/CERES
3 CREAM (GRAM) TOPICAL NIGHTLY PRN
Status: DISCONTINUED | OUTPATIENT
Start: 2023-05-18 | End: 2023-05-17

## 2023-05-17 RX ORDER — SODIUM CHLORIDE 0.9 % (FLUSH) 0.9 %
5-40 SYRINGE (ML) INJECTION EVERY 12 HOURS SCHEDULED
Status: DISCONTINUED | OUTPATIENT
Start: 2023-05-17 | End: 2023-05-17 | Stop reason: HOSPADM

## 2023-05-17 RX ORDER — DEXAMETHASONE SODIUM PHOSPHATE 4 MG/ML
4 INJECTION, SOLUTION INTRA-ARTICULAR; INTRALESIONAL; INTRAMUSCULAR; INTRAVENOUS; SOFT TISSUE
Status: DISCONTINUED | OUTPATIENT
Start: 2023-05-17 | End: 2023-05-17 | Stop reason: HOSPADM

## 2023-05-17 RX ORDER — FENTANYL CITRATE 50 UG/ML
INJECTION, SOLUTION INTRAMUSCULAR; INTRAVENOUS
Status: COMPLETED
Start: 2023-05-17 | End: 2023-05-17

## 2023-05-17 RX ORDER — ASPIRIN 81 MG/1
81 TABLET ORAL 2 TIMES DAILY
Status: DISCONTINUED | OUTPATIENT
Start: 2023-05-17 | End: 2023-05-24 | Stop reason: HOSPADM

## 2023-05-17 RX ORDER — TRANEXAMIC ACID 100 MG/ML
INJECTION, SOLUTION INTRAVENOUS PRN
Status: DISCONTINUED | OUTPATIENT
Start: 2023-05-17 | End: 2023-05-17 | Stop reason: SDUPTHER

## 2023-05-17 RX ORDER — MEPERIDINE HYDROCHLORIDE 25 MG/ML
12.5 INJECTION INTRAMUSCULAR; INTRAVENOUS; SUBCUTANEOUS EVERY 5 MIN PRN
Status: DISCONTINUED | OUTPATIENT
Start: 2023-05-17 | End: 2023-05-17 | Stop reason: HOSPADM

## 2023-05-17 RX ORDER — ONDANSETRON 2 MG/ML
INJECTION INTRAMUSCULAR; INTRAVENOUS PRN
Status: DISCONTINUED | OUTPATIENT
Start: 2023-05-17 | End: 2023-05-17 | Stop reason: SDUPTHER

## 2023-05-17 RX ORDER — LEVOTHYROXINE SODIUM 0.05 MG/1
50 TABLET ORAL DAILY
Status: DISCONTINUED | OUTPATIENT
Start: 2023-05-17 | End: 2023-05-24 | Stop reason: HOSPADM

## 2023-05-17 RX ORDER — IPRATROPIUM BROMIDE AND ALBUTEROL SULFATE 2.5; .5 MG/3ML; MG/3ML
SOLUTION RESPIRATORY (INHALATION)
Status: COMPLETED
Start: 2023-05-17 | End: 2023-05-17

## 2023-05-17 RX ORDER — HYDRALAZINE HYDROCHLORIDE 20 MG/ML
10 INJECTION INTRAMUSCULAR; INTRAVENOUS
Status: DISCONTINUED | OUTPATIENT
Start: 2023-05-17 | End: 2023-05-17 | Stop reason: HOSPADM

## 2023-05-17 RX ORDER — BUPIVACAINE HYDROCHLORIDE 2.5 MG/ML
INJECTION, SOLUTION EPIDURAL; INFILTRATION; INTRACAUDAL PRN
Status: DISCONTINUED | OUTPATIENT
Start: 2023-05-17 | End: 2023-05-17 | Stop reason: SDUPTHER

## 2023-05-17 RX ORDER — LIDOCAINE HYDROCHLORIDE 20 MG/ML
INJECTION, SOLUTION EPIDURAL; INFILTRATION; INTRACAUDAL; PERINEURAL PRN
Status: DISCONTINUED | OUTPATIENT
Start: 2023-05-17 | End: 2023-05-17 | Stop reason: SDUPTHER

## 2023-05-17 RX ORDER — OXYCODONE HYDROCHLORIDE 5 MG/1
10 TABLET ORAL PRN
Status: DISCONTINUED | OUTPATIENT
Start: 2023-05-17 | End: 2023-05-17 | Stop reason: HOSPADM

## 2023-05-17 RX ORDER — IPRATROPIUM BROMIDE AND ALBUTEROL SULFATE 2.5; .5 MG/3ML; MG/3ML
1 SOLUTION RESPIRATORY (INHALATION) EVERY 6 HOURS PRN
Status: DISCONTINUED | OUTPATIENT
Start: 2023-05-17 | End: 2023-05-18

## 2023-05-17 RX ORDER — ALBUTEROL SULFATE 2.5 MG/3ML
2.5 SOLUTION RESPIRATORY (INHALATION) EVERY 4 HOURS PRN
Status: DISCONTINUED | OUTPATIENT
Start: 2023-05-17 | End: 2023-05-18

## 2023-05-17 RX ORDER — CITALOPRAM 20 MG/1
20 TABLET ORAL DAILY
Status: DISCONTINUED | OUTPATIENT
Start: 2023-05-17 | End: 2023-05-24 | Stop reason: HOSPADM

## 2023-05-17 RX ORDER — ATORVASTATIN CALCIUM 40 MG/1
40 TABLET, FILM COATED ORAL DAILY
Status: DISCONTINUED | OUTPATIENT
Start: 2023-05-17 | End: 2023-05-24 | Stop reason: HOSPADM

## 2023-05-17 RX ORDER — DIPHENHYDRAMINE HYDROCHLORIDE 50 MG/ML
12.5 INJECTION INTRAMUSCULAR; INTRAVENOUS
Status: DISCONTINUED | OUTPATIENT
Start: 2023-05-17 | End: 2023-05-17 | Stop reason: HOSPADM

## 2023-05-17 RX ORDER — ROCURONIUM BROMIDE 10 MG/ML
INJECTION, SOLUTION INTRAVENOUS PRN
Status: DISCONTINUED | OUTPATIENT
Start: 2023-05-17 | End: 2023-05-17 | Stop reason: SDUPTHER

## 2023-05-17 RX ORDER — SODIUM CHLORIDE, SODIUM LACTATE, POTASSIUM CHLORIDE, CALCIUM CHLORIDE 600; 310; 30; 20 MG/100ML; MG/100ML; MG/100ML; MG/100ML
INJECTION, SOLUTION INTRAVENOUS CONTINUOUS PRN
Status: DISCONTINUED | OUTPATIENT
Start: 2023-05-17 | End: 2023-05-17 | Stop reason: SDUPTHER

## 2023-05-17 RX ORDER — MONTELUKAST SODIUM 10 MG/1
10 TABLET ORAL DAILY
Status: DISCONTINUED | OUTPATIENT
Start: 2023-05-17 | End: 2023-05-24 | Stop reason: HOSPADM

## 2023-05-17 RX ORDER — PROPOFOL 10 MG/ML
INJECTION, EMULSION INTRAVENOUS PRN
Status: DISCONTINUED | OUTPATIENT
Start: 2023-05-17 | End: 2023-05-17 | Stop reason: SDUPTHER

## 2023-05-17 RX ORDER — MIDAZOLAM HYDROCHLORIDE 1 MG/ML
2 INJECTION, SOLUTION INTRAMUSCULAR; INTRAVENOUS
Status: DISCONTINUED | OUTPATIENT
Start: 2023-05-17 | End: 2023-05-17 | Stop reason: HOSPADM

## 2023-05-17 RX ORDER — PROCHLORPERAZINE EDISYLATE 5 MG/ML
5 INJECTION INTRAMUSCULAR; INTRAVENOUS
Status: DISCONTINUED | OUTPATIENT
Start: 2023-05-17 | End: 2023-05-17 | Stop reason: HOSPADM

## 2023-05-17 RX ADMIN — PROPOFOL 100 MCG/KG/MIN: 10 INJECTION, EMULSION INTRAVENOUS at 12:38

## 2023-05-17 RX ADMIN — Medication 180 MG: at 12:33

## 2023-05-17 RX ADMIN — HYDROMORPHONE HYDROCHLORIDE 0.5 MG: 1 INJECTION, SOLUTION INTRAMUSCULAR; INTRAVENOUS; SUBCUTANEOUS at 03:17

## 2023-05-17 RX ADMIN — DEXMEDETOMIDINE HYDROCHLORIDE 6 MCG: 100 INJECTION, SOLUTION, CONCENTRATE INTRAVENOUS at 13:18

## 2023-05-17 RX ADMIN — ONDANSETRON HYDROCHLORIDE 4 MG: 2 INJECTION, SOLUTION INTRAMUSCULAR; INTRAVENOUS at 13:39

## 2023-05-17 RX ADMIN — FENTANYL CITRATE 25 MCG: 50 INJECTION, SOLUTION INTRAMUSCULAR; INTRAVENOUS at 12:57

## 2023-05-17 RX ADMIN — FENTANYL CITRATE 25 MCG: 50 INJECTION, SOLUTION INTRAMUSCULAR; INTRAVENOUS at 16:11

## 2023-05-17 RX ADMIN — PROPOFOL 200 MG: 10 INJECTION, EMULSION INTRAVENOUS at 12:33

## 2023-05-17 RX ADMIN — GABAPENTIN 300 MG: 300 CAPSULE ORAL at 21:02

## 2023-05-17 RX ADMIN — IPRATROPIUM BROMIDE 0.5 MG: 0.5 SOLUTION RESPIRATORY (INHALATION) at 19:31

## 2023-05-17 RX ADMIN — IPRATROPIUM BROMIDE AND ALBUTEROL SULFATE 1 AMPULE: .5; 3 SOLUTION RESPIRATORY (INHALATION) at 15:20

## 2023-05-17 RX ADMIN — WATER 2000 MG: 1 INJECTION INTRAMUSCULAR; INTRAVENOUS; SUBCUTANEOUS at 12:40

## 2023-05-17 RX ADMIN — CASTOR OIL AND BALSAM, PERU: 788; 87 OINTMENT TOPICAL at 21:57

## 2023-05-17 RX ADMIN — Medication 3 AMPULE: at 10:15

## 2023-05-17 RX ADMIN — ASPIRIN 81 MG: 81 TABLET, COATED ORAL at 21:02

## 2023-05-17 RX ADMIN — TRANEXAMIC ACID 1000 MG: 100 INJECTION, SOLUTION INTRAVENOUS at 14:08

## 2023-05-17 RX ADMIN — HYDROMORPHONE HYDROCHLORIDE 0.5 MG: 1 INJECTION, SOLUTION INTRAMUSCULAR; INTRAVENOUS; SUBCUTANEOUS at 18:51

## 2023-05-17 RX ADMIN — ROCURONIUM BROMIDE 10 MG: 10 INJECTION INTRAVENOUS at 12:33

## 2023-05-17 RX ADMIN — ARFORMOTEROL TARTRATE: 15 SOLUTION RESPIRATORY (INHALATION) at 19:31

## 2023-05-17 RX ADMIN — FENTANYL CITRATE 25 MCG: 50 INJECTION, SOLUTION INTRAMUSCULAR; INTRAVENOUS at 16:21

## 2023-05-17 RX ADMIN — IPRATROPIUM BROMIDE AND ALBUTEROL SULFATE 1 AMPULE: .5; 3 SOLUTION RESPIRATORY (INHALATION) at 10:15

## 2023-05-17 RX ADMIN — SODIUM CHLORIDE, POTASSIUM CHLORIDE, SODIUM LACTATE AND CALCIUM CHLORIDE: 600; 310; 30; 20 INJECTION, SOLUTION INTRAVENOUS at 12:26

## 2023-05-17 RX ADMIN — TRANEXAMIC ACID 1000 MG: 100 INJECTION, SOLUTION INTRAVENOUS at 12:50

## 2023-05-17 RX ADMIN — FENTANYL CITRATE 50 MCG: 50 INJECTION, SOLUTION INTRAMUSCULAR; INTRAVENOUS at 12:33

## 2023-05-17 RX ADMIN — DEXAMETHASONE SODIUM PHOSPHATE 8 MG: 4 INJECTION, SOLUTION INTRAMUSCULAR; INTRAVENOUS at 12:38

## 2023-05-17 RX ADMIN — FENTANYL CITRATE 25 MCG: 50 INJECTION, SOLUTION INTRAMUSCULAR; INTRAVENOUS at 17:29

## 2023-05-17 RX ADMIN — CEFAZOLIN 2000 MG: 1 INJECTION, POWDER, FOR SOLUTION INTRAMUSCULAR; INTRAVENOUS at 21:02

## 2023-05-17 RX ADMIN — IPRATROPIUM BROMIDE AND ALBUTEROL SULFATE 1 AMPULE: .5; 3 SOLUTION RESPIRATORY (INHALATION) at 15:48

## 2023-05-17 RX ADMIN — SODIUM CHLORIDE, POTASSIUM CHLORIDE, SODIUM LACTATE AND CALCIUM CHLORIDE: 600; 310; 30; 20 INJECTION, SOLUTION INTRAVENOUS at 15:11

## 2023-05-17 RX ADMIN — IPRATROPIUM BROMIDE AND ALBUTEROL SULFATE 1 AMPULE: 2.5; .5 SOLUTION RESPIRATORY (INHALATION) at 15:48

## 2023-05-17 RX ADMIN — LEVETIRACETAM 500 MG: 500 TABLET, FILM COATED ORAL at 21:02

## 2023-05-17 RX ADMIN — SODIUM CHLORIDE: 9 INJECTION, SOLUTION INTRAVENOUS at 19:04

## 2023-05-17 RX ADMIN — IPRATROPIUM BROMIDE AND ALBUTEROL SULFATE 1 AMPULE: 2.5; .5 SOLUTION RESPIRATORY (INHALATION) at 15:20

## 2023-05-17 RX ADMIN — FENTANYL CITRATE 25 MCG: 50 INJECTION, SOLUTION INTRAMUSCULAR; INTRAVENOUS at 12:26

## 2023-05-17 RX ADMIN — MELATONIN 3 MG: at 23:03

## 2023-05-17 RX ADMIN — LIDOCAINE HYDROCHLORIDE 100 MG: 20 INJECTION, SOLUTION EPIDURAL; INFILTRATION; INTRACAUDAL; PERINEURAL at 12:33

## 2023-05-17 RX ADMIN — SODIUM CHLORIDE: 9 INJECTION, SOLUTION INTRAVENOUS at 03:20

## 2023-05-17 RX ADMIN — DEXMEDETOMIDINE HYDROCHLORIDE 4 MCG: 100 INJECTION, SOLUTION, CONCENTRATE INTRAVENOUS at 12:50

## 2023-05-17 RX ADMIN — BUPIVACAINE HYDROCHLORIDE 20 ML: 2.5 INJECTION, SOLUTION EPIDURAL; INFILTRATION; INTRACAUDAL; PERINEURAL at 10:51

## 2023-05-17 RX ADMIN — GLYCOPYRROLATE 0.1 MG: 0.2 INJECTION, SOLUTION INTRAMUSCULAR; INTRAVENOUS at 12:50

## 2023-05-17 ASSESSMENT — PAIN SCALES - GENERAL
PAINLEVEL_OUTOF10: 10
PAINLEVEL_OUTOF10: 5
PAINLEVEL_OUTOF10: 0
PAINLEVEL_OUTOF10: 9
PAINLEVEL_OUTOF10: 4

## 2023-05-17 ASSESSMENT — PAIN DESCRIPTION - ORIENTATION
ORIENTATION: LEFT

## 2023-05-17 ASSESSMENT — PAIN DESCRIPTION - DESCRIPTORS
DESCRIPTORS: ACHING

## 2023-05-17 ASSESSMENT — PAIN DESCRIPTION - LOCATION
LOCATION: HIP

## 2023-05-17 ASSESSMENT — PAIN DESCRIPTION - ONSET: ONSET: ON-GOING

## 2023-05-17 ASSESSMENT — PAIN DESCRIPTION - PAIN TYPE
TYPE: SURGICAL PAIN
TYPE: ACUTE PAIN

## 2023-05-17 ASSESSMENT — PAIN DESCRIPTION - FREQUENCY
FREQUENCY: INTERMITTENT
FREQUENCY: CONTINUOUS

## 2023-05-17 NOTE — ANESTHESIA PRE PROCEDURE
Department of Anesthesiology  Preprocedure Note       Name:  Manav Mancia   Age:  80 y.o.  :  1955                                          MRN:  [id number]         Date:  2023      Surgeon: Waqas Gregg):  Dustin Brown MD    Procedure: Procedure(s):  LEFT HIP TOTAL ARTHROPLASTY REVISION POSTERIOR APPROACH    Medications prior to admission:   Prior to Admission medications    Medication Sig Start Date End Date Taking?  Authorizing Provider   albuterol (PROVENTIL) (2.5 MG/3ML) 0.083% nebulizer solution INHALE THE CONTENTS OF 1 VIAL (3ML) VIA NEBULIZER EVERY 4 HOURS AS NEEDED FOR WHEEZING (MAX FOUR TIMES A DAY)    Ar Automatic Reconciliation   amLODIPine (NORVASC) 10 MG tablet TAKE ONE TABLET BY MOUTH EVERY DAY 14   Ar Automatic Reconciliation   aspirin 325 MG EC tablet Take by mouth daily    Ar Automatic Reconciliation   atorvastatin (LIPITOR) 40 MG tablet Take by mouth daily    Ar Automatic Reconciliation   azelastine (ASTELIN) 0.1 % nasal spray USE 2 SPRAYS IN EACH NOSTRIL TWICE DAILY 10/14/21   Ar Automatic Reconciliation   Budeson-Glycopyrrol-Formoterol (BREZTRI AEROSPHERE) 160-9-4.8 MCG/ACT AERO Inhale 2 puffs into the lungs 2 times daily    Ar Automatic Reconciliation   citalopram (CELEXA) 20 MG tablet TAKE ONE TABLET BY MOUTH ONCE DAILY 14   Ar Automatic Reconciliation   doxazosin (CARDURA) 4 MG tablet TAKE ONE TABLET BY MOUTH ONCE DAILY AT BEDTIME 14   Ar Automatic Reconciliation   gabapentin (NEURONTIN) 300 MG capsule TAKE 2 CAPSULES FOUR TIMES DAILY 23   Ar Automatic Reconciliation   ipratropium-albuterol (DUONEB) 0.5-2.5 (3) MG/3ML SOLN nebulizer solution Inhale into the lungs every 6 hours as needed 22   Ar Automatic Reconciliation   levETIRAcetam (KEPPRA) 500 MG tablet Take by mouth 2 times daily 3/11/23   Ar Automatic Reconciliation   levothyroxine (SYNTHROID) 50 MCG tablet TAKE ONE TABLET BY MOUTH EVERY DAY 14   Ar Automatic Reconciliation   montelukast

## 2023-05-17 NOTE — ANESTHESIA PROCEDURE NOTES
Peripheral Block    Patient location during procedure: pre-op  Reason for block: post-op pain management and at surgeon's request  Start time: 5/17/2023 10:50 AM  End time: 5/17/2023 10:53 AM  Staffing  Performed: anesthesiologist   Anesthesiologist: Mag Keating MD  Preanesthetic Checklist  Completed: patient identified, IV checked, site marked, risks and benefits discussed, surgical/procedural consents, equipment checked, pre-op evaluation, timeout performed, anesthesia consent given, oxygen available, monitors applied/VS acknowledged, fire risk safety assessment completed and verbalized and blood product R/B/A discussed and consented  Peripheral Block   Patient position: supine  Prep: DuraPrep  Provider prep: mask, sterile gown and sterile gloves  Patient monitoring: cardiac monitor, continuous pulse ox, continuous capnometry, frequent blood pressure checks, oxygen, IV access and responsive to questions  Block type: PENG  Laterality: left  Injection technique: single-shot  Guidance: ultrasound guided    Needle   Needle type: short-bevel   Needle gauge: 20 G  Needle localization: anatomical landmarks and ultrasound guidance  Needle length: 10 cm  Assessment   Injection assessment: local visualized surrounding nerve on ultrasound, negative aspiration for heme, no paresthesia on injection and no intravascular symptoms  Slow fractionated injection: yes  Hemodynamics: stable  Real-time US image taken/store: yes  Outcomes: uncomplicated and patient tolerated procedure well    Additional Notes  Excellent visualization on US

## 2023-05-17 NOTE — ANESTHESIA POSTPROCEDURE EVALUATION
Department of Anesthesiology  Postprocedure Note    Patient: Tyler Zambrano  MRN: [id number]  YOB: 1955  Date of evaluation: 5/17/2023      Procedure Summary     Date: 05/17/23 Room / Location: [facility]    Anesthesia Start: 1226 Anesthesia Stop: 0576    Procedure: LEFT HIP TOTAL ARTHROPLASTY REVISION POSTERIOR APPROACH (Left: Hip) Diagnosis:       Periprosthetic fracture around internal prosthetic left hip joint, initial encounter ([facility]      (LEFT HIP FRACTURE)    Providers: Dominic Hernandez MD Responsible Provider: Leonard Montes MD    Anesthesia Type: general ASA Status: 3          Anesthesia Type: No value filed. Tong Phase I: Tong Score: 8    Tong Phase II:        Anesthesia Post Evaluation    Patient location during evaluation: PACU  Patient participation: complete - patient participated  Level of consciousness: awake  Pain score: 0  Airway patency: patent  Nausea & Vomiting: no nausea and no vomiting  Complications: no  Cardiovascular status: blood pressure returned to baseline  Respiratory status: acceptable and nasal cannula  Hydration status: stable  Comments: Patient with high oxygen requirement, which was expected based on his low SpO2 at baseline and presumed COPD. Given 2 duonebs and placed on supernova. Improvement in oxygenation and back to 3-4 L NC. Stable for transfer to PCU.    Multimodal analgesia pain management approach

## 2023-05-18 LAB
ANION GAP SERPL CALC-SCNC: 2 MMOL/L (ref 5–15)
BUN SERPL-MCNC: 16 MG/DL (ref 6–20)
BUN/CREAT SERPL: 22 (ref 12–20)
CALCIUM SERPL-MCNC: 8.7 MG/DL (ref 8.5–10.1)
CHLORIDE SERPL-SCNC: 102 MMOL/L (ref 97–108)
CO2 SERPL-SCNC: 28 MMOL/L (ref 21–32)
CREAT SERPL-MCNC: 0.72 MG/DL (ref 0.7–1.3)
ERYTHROCYTE [DISTWIDTH] IN BLOOD BY AUTOMATED COUNT: 13.9 % (ref 11.5–14.5)
GLUCOSE SERPL-MCNC: 140 MG/DL (ref 65–100)
HCT VFR BLD AUTO: 33.8 % (ref 36.6–50.3)
HGB BLD-MCNC: 10.6 G/DL (ref 12.1–17)
MCH RBC QN AUTO: 29.2 PG (ref 26–34)
MCHC RBC AUTO-ENTMCNC: 31.4 G/DL (ref 30–36.5)
MCV RBC AUTO: 93.1 FL (ref 80–99)
NRBC # BLD: 0 K/UL (ref 0–0.01)
NRBC BLD-RTO: 0 PER 100 WBC
PLATELET # BLD AUTO: 251 K/UL (ref 150–400)
PMV BLD AUTO: 9.5 FL (ref 8.9–12.9)
POTASSIUM SERPL-SCNC: 4.9 MMOL/L (ref 3.5–5.1)
RBC # BLD AUTO: 3.63 M/UL (ref 4.1–5.7)
SODIUM SERPL-SCNC: 132 MMOL/L (ref 136–145)
WBC # BLD AUTO: 8.2 K/UL (ref 4.1–11.1)

## 2023-05-18 PROCEDURE — 94640 AIRWAY INHALATION TREATMENT: CPT

## 2023-05-18 PROCEDURE — 94760 N-INVAS EAR/PLS OXIMETRY 1: CPT

## 2023-05-18 PROCEDURE — 6370000000 HC RX 637 (ALT 250 FOR IP): Performed by: PHYSICIAN ASSISTANT

## 2023-05-18 PROCEDURE — 6360000002 HC RX W HCPCS: Performed by: ORTHOPAEDIC SURGERY

## 2023-05-18 PROCEDURE — 6370000000 HC RX 637 (ALT 250 FOR IP): Performed by: STUDENT IN AN ORGANIZED HEALTH CARE EDUCATION/TRAINING PROGRAM

## 2023-05-18 PROCEDURE — C9113 INJ PANTOPRAZOLE SODIUM, VIA: HCPCS | Performed by: ORTHOPAEDIC SURGERY

## 2023-05-18 PROCEDURE — 6370000000 HC RX 637 (ALT 250 FOR IP): Performed by: ORTHOPAEDIC SURGERY

## 2023-05-18 PROCEDURE — 6370000000 HC RX 637 (ALT 250 FOR IP)

## 2023-05-18 PROCEDURE — 97535 SELF CARE MNGMENT TRAINING: CPT

## 2023-05-18 PROCEDURE — 2500000003 HC RX 250 WO HCPCS

## 2023-05-18 PROCEDURE — 2700000000 HC OXYGEN THERAPY PER DAY

## 2023-05-18 PROCEDURE — 2060000000 HC ICU INTERMEDIATE R&B

## 2023-05-18 PROCEDURE — 36415 COLL VENOUS BLD VENIPUNCTURE: CPT

## 2023-05-18 PROCEDURE — A4216 STERILE WATER/SALINE, 10 ML: HCPCS | Performed by: ORTHOPAEDIC SURGERY

## 2023-05-18 PROCEDURE — 97165 OT EVAL LOW COMPLEX 30 MIN: CPT

## 2023-05-18 PROCEDURE — 80048 BASIC METABOLIC PNL TOTAL CA: CPT

## 2023-05-18 PROCEDURE — 97162 PT EVAL MOD COMPLEX 30 MIN: CPT

## 2023-05-18 PROCEDURE — 2580000003 HC RX 258: Performed by: ORTHOPAEDIC SURGERY

## 2023-05-18 PROCEDURE — 85027 COMPLETE CBC AUTOMATED: CPT

## 2023-05-18 PROCEDURE — 97116 GAIT TRAINING THERAPY: CPT

## 2023-05-18 RX ORDER — OXYCODONE HYDROCHLORIDE 5 MG/1
5 TABLET ORAL EVERY 4 HOURS PRN
Status: DISCONTINUED | OUTPATIENT
Start: 2023-05-18 | End: 2023-05-19

## 2023-05-18 RX ORDER — HYDROMORPHONE HYDROCHLORIDE 1 MG/ML
0.5 INJECTION, SOLUTION INTRAMUSCULAR; INTRAVENOUS; SUBCUTANEOUS EVERY 4 HOURS PRN
Status: DISCONTINUED | OUTPATIENT
Start: 2023-05-18 | End: 2023-05-18

## 2023-05-18 RX ORDER — IPRATROPIUM BROMIDE AND ALBUTEROL SULFATE 2.5; .5 MG/3ML; MG/3ML
1 SOLUTION RESPIRATORY (INHALATION)
Status: DISCONTINUED | OUTPATIENT
Start: 2023-05-18 | End: 2023-05-21

## 2023-05-18 RX ORDER — OXYCODONE HYDROCHLORIDE 5 MG/1
10 TABLET ORAL EVERY 4 HOURS PRN
Status: DISCONTINUED | OUTPATIENT
Start: 2023-05-18 | End: 2023-05-19

## 2023-05-18 RX ORDER — ALBUTEROL SULFATE 2.5 MG/3ML
2.5 SOLUTION RESPIRATORY (INHALATION)
Status: DISCONTINUED | OUTPATIENT
Start: 2023-05-18 | End: 2023-05-24 | Stop reason: HOSPADM

## 2023-05-18 RX ADMIN — IPRATROPIUM BROMIDE AND ALBUTEROL SULFATE 1 AMPULE: 2.5; .5 SOLUTION RESPIRATORY (INHALATION) at 17:10

## 2023-05-18 RX ADMIN — SODIUM CHLORIDE: 9 INJECTION, SOLUTION INTRAVENOUS at 14:02

## 2023-05-18 RX ADMIN — CASTOR OIL AND BALSAM, PERU: 788; 87 OINTMENT TOPICAL at 20:18

## 2023-05-18 RX ADMIN — ACETAMINOPHEN 650 MG: 325 TABLET ORAL at 08:54

## 2023-05-18 RX ADMIN — GABAPENTIN 300 MG: 300 CAPSULE ORAL at 20:19

## 2023-05-18 RX ADMIN — IPRATROPIUM BROMIDE 0.5 MG: 0.5 SOLUTION RESPIRATORY (INHALATION) at 07:00

## 2023-05-18 RX ADMIN — ARFORMOTEROL TARTRATE: 15 SOLUTION RESPIRATORY (INHALATION) at 07:00

## 2023-05-18 RX ADMIN — ASPIRIN 81 MG: 81 TABLET, COATED ORAL at 20:19

## 2023-05-18 RX ADMIN — GABAPENTIN 300 MG: 300 CAPSULE ORAL at 08:53

## 2023-05-18 RX ADMIN — SODIUM CHLORIDE: 9 INJECTION, SOLUTION INTRAVENOUS at 04:41

## 2023-05-18 RX ADMIN — MONTELUKAST 10 MG: 10 TABLET, FILM COATED ORAL at 08:53

## 2023-05-18 RX ADMIN — PANTOPRAZOLE SODIUM 40 MG: 40 INJECTION, POWDER, FOR SOLUTION INTRAVENOUS at 08:52

## 2023-05-18 RX ADMIN — Medication 1 SPRAY: at 00:29

## 2023-05-18 RX ADMIN — CASTOR OIL AND BALSAM, PERU: 788; 87 OINTMENT TOPICAL at 08:53

## 2023-05-18 RX ADMIN — CITALOPRAM HYDROBROMIDE 20 MG: 20 TABLET ORAL at 08:53

## 2023-05-18 RX ADMIN — LEVETIRACETAM 500 MG: 500 TABLET, FILM COATED ORAL at 20:19

## 2023-05-18 RX ADMIN — DOXAZOSIN 4 MG: 2 TABLET ORAL at 08:53

## 2023-05-18 RX ADMIN — IPRATROPIUM BROMIDE AND ALBUTEROL SULFATE 1 AMPULE: 2.5; .5 SOLUTION RESPIRATORY (INHALATION) at 19:43

## 2023-05-18 RX ADMIN — LEVOTHYROXINE SODIUM 50 MCG: 0.05 TABLET ORAL at 08:53

## 2023-05-18 RX ADMIN — OXYCODONE 10 MG: 5 TABLET ORAL at 17:34

## 2023-05-18 RX ADMIN — ARFORMOTEROL TARTRATE: 15 SOLUTION RESPIRATORY (INHALATION) at 19:43

## 2023-05-18 RX ADMIN — POLYETHYLENE GLYCOL 3350 17 G: 17 POWDER, FOR SOLUTION ORAL at 20:20

## 2023-05-18 RX ADMIN — POLYETHYLENE GLYCOL 3350 17 G: 17 POWDER, FOR SOLUTION ORAL at 08:53

## 2023-05-18 RX ADMIN — Medication 1 SPRAY: at 04:23

## 2023-05-18 RX ADMIN — AMLODIPINE BESYLATE 10 MG: 5 TABLET ORAL at 08:52

## 2023-05-18 RX ADMIN — LEVETIRACETAM 500 MG: 500 TABLET, FILM COATED ORAL at 08:53

## 2023-05-18 RX ADMIN — ATORVASTATIN CALCIUM 40 MG: 40 TABLET, FILM COATED ORAL at 08:54

## 2023-05-18 RX ADMIN — ASPIRIN 81 MG: 81 TABLET, COATED ORAL at 08:53

## 2023-05-18 RX ADMIN — GABAPENTIN 300 MG: 300 CAPSULE ORAL at 12:17

## 2023-05-18 RX ADMIN — CEFAZOLIN 2000 MG: 1 INJECTION, POWDER, FOR SOLUTION INTRAMUSCULAR; INTRAVENOUS at 04:41

## 2023-05-18 RX ADMIN — GABAPENTIN 300 MG: 300 CAPSULE ORAL at 17:34

## 2023-05-18 RX ADMIN — HYDROMORPHONE HYDROCHLORIDE 0.5 MG: 1 INJECTION, SOLUTION INTRAMUSCULAR; INTRAVENOUS; SUBCUTANEOUS at 04:22

## 2023-05-18 RX ADMIN — HYDROMORPHONE HYDROCHLORIDE 0.5 MG: 1 INJECTION, SOLUTION INTRAMUSCULAR; INTRAVENOUS; SUBCUTANEOUS at 09:54

## 2023-05-18 RX ADMIN — HYDROMORPHONE HYDROCHLORIDE 0.5 MG: 1 INJECTION, SOLUTION INTRAMUSCULAR; INTRAVENOUS; SUBCUTANEOUS at 00:29

## 2023-05-18 ASSESSMENT — PAIN SCALES - GENERAL
PAINLEVEL_OUTOF10: 7
PAINLEVEL_OUTOF10: 0
PAINLEVEL_OUTOF10: 0
PAINLEVEL_OUTOF10: 10
PAINLEVEL_OUTOF10: 9
PAINLEVEL_OUTOF10: 3
PAINLEVEL_OUTOF10: 4
PAINLEVEL_OUTOF10: 5
PAINLEVEL_OUTOF10: 6
PAINLEVEL_OUTOF10: 0

## 2023-05-18 ASSESSMENT — PAIN DESCRIPTION - FREQUENCY
FREQUENCY: CONTINUOUS
FREQUENCY: CONTINUOUS

## 2023-05-18 ASSESSMENT — PAIN DESCRIPTION - PAIN TYPE
TYPE: ACUTE PAIN
TYPE: ACUTE PAIN;SURGICAL PAIN
TYPE: ACUTE PAIN
TYPE: ACUTE PAIN
TYPE: SURGICAL PAIN;ACUTE PAIN

## 2023-05-18 ASSESSMENT — PAIN DESCRIPTION - LOCATION
LOCATION: HIP

## 2023-05-18 ASSESSMENT — PAIN DESCRIPTION - ONSET
ONSET: ON-GOING
ONSET: ON-GOING

## 2023-05-18 ASSESSMENT — PAIN DESCRIPTION - ORIENTATION
ORIENTATION: RIGHT
ORIENTATION: LEFT

## 2023-05-18 ASSESSMENT — PAIN DESCRIPTION - DESCRIPTORS
DESCRIPTORS: ACHING
DESCRIPTORS: ACHING;SORE
DESCRIPTORS: ACHING
DESCRIPTORS: ACHING
DESCRIPTORS: ACHING;SORE
DESCRIPTORS: ACHING
DESCRIPTORS: ACHING

## 2023-05-18 ASSESSMENT — PAIN SCALES - WONG BAKER: WONGBAKER_NUMERICALRESPONSE: 0

## 2023-05-19 LAB
ANION GAP SERPL CALC-SCNC: 2 MMOL/L (ref 5–15)
BASE EXCESS BLDV CALC-SCNC: 2.2 MMOL/L
BASOPHILS # BLD: 0 K/UL (ref 0–0.1)
BASOPHILS NFR BLD: 0 % (ref 0–1)
BDY SITE: ABNORMAL
BUN SERPL-MCNC: 14 MG/DL (ref 6–20)
BUN/CREAT SERPL: 21 (ref 12–20)
CALCIUM SERPL-MCNC: 8.3 MG/DL (ref 8.5–10.1)
CHLORIDE SERPL-SCNC: 104 MMOL/L (ref 97–108)
CO2 SERPL-SCNC: 30 MMOL/L (ref 21–32)
CREAT SERPL-MCNC: 0.67 MG/DL (ref 0.7–1.3)
DIFFERENTIAL METHOD BLD: ABNORMAL
EOSINOPHIL # BLD: 0.1 K/UL (ref 0–0.4)
EOSINOPHIL NFR BLD: 1 % (ref 0–7)
ERYTHROCYTE [DISTWIDTH] IN BLOOD BY AUTOMATED COUNT: 13.8 % (ref 11.5–14.5)
GLUCOSE SERPL-MCNC: 109 MG/DL (ref 65–100)
HCO3 BLDV-SCNC: 27 MMOL/L (ref 23–28)
HCT VFR BLD AUTO: 30.9 % (ref 36.6–50.3)
HGB BLD-MCNC: 10.1 G/DL (ref 12.1–17)
IMM GRANULOCYTES # BLD AUTO: 0 K/UL (ref 0–0.04)
IMM GRANULOCYTES NFR BLD AUTO: 0 % (ref 0–0.5)
LYMPHOCYTES # BLD: 1.2 K/UL (ref 0.8–3.5)
LYMPHOCYTES NFR BLD: 19 % (ref 12–49)
MAGNESIUM SERPL-MCNC: 1.9 MG/DL (ref 1.6–2.4)
MCH RBC QN AUTO: 29.9 PG (ref 26–34)
MCHC RBC AUTO-ENTMCNC: 32.7 G/DL (ref 30–36.5)
MCV RBC AUTO: 91.4 FL (ref 80–99)
MONOCYTES # BLD: 0.9 K/UL (ref 0–1)
MONOCYTES NFR BLD: 14 % (ref 5–13)
NEUTS SEG # BLD: 4.1 K/UL (ref 1.8–8)
NEUTS SEG NFR BLD: 66 % (ref 32–75)
NRBC # BLD: 0 K/UL (ref 0–0.01)
NRBC BLD-RTO: 0 PER 100 WBC
PCO2 BLDV: 43 MMHG (ref 41–51)
PH BLDV: 7.42 (ref 7.32–7.42)
PHOSPHATE SERPL-MCNC: 2.3 MG/DL (ref 2.6–4.7)
PLATELET # BLD AUTO: 269 K/UL (ref 150–400)
PLATELET COMMENT: ABNORMAL
PMV BLD AUTO: 9.2 FL (ref 8.9–12.9)
PO2 BLDV: 98 MMHG (ref 25–40)
POTASSIUM SERPL-SCNC: 4.2 MMOL/L (ref 3.5–5.1)
RBC # BLD AUTO: 3.38 M/UL (ref 4.1–5.7)
RBC MORPH BLD: ABNORMAL
SAO2 % BLDV: 98 % (ref 65–88)
SAO2% DEVICE SAO2% SENSOR NAME: ABNORMAL
SODIUM SERPL-SCNC: 136 MMOL/L (ref 136–145)
SPECIMEN SITE: ABNORMAL
WBC # BLD AUTO: 6.3 K/UL (ref 4.1–11.1)

## 2023-05-19 PROCEDURE — A4216 STERILE WATER/SALINE, 10 ML: HCPCS | Performed by: ORTHOPAEDIC SURGERY

## 2023-05-19 PROCEDURE — 6370000000 HC RX 637 (ALT 250 FOR IP): Performed by: PHYSICIAN ASSISTANT

## 2023-05-19 PROCEDURE — 6360000002 HC RX W HCPCS: Performed by: ORTHOPAEDIC SURGERY

## 2023-05-19 PROCEDURE — 85025 COMPLETE CBC W/AUTO DIFF WBC: CPT

## 2023-05-19 PROCEDURE — 2580000003 HC RX 258: Performed by: ORTHOPAEDIC SURGERY

## 2023-05-19 PROCEDURE — 6370000000 HC RX 637 (ALT 250 FOR IP): Performed by: ORTHOPAEDIC SURGERY

## 2023-05-19 PROCEDURE — C9113 INJ PANTOPRAZOLE SODIUM, VIA: HCPCS | Performed by: ORTHOPAEDIC SURGERY

## 2023-05-19 PROCEDURE — 97530 THERAPEUTIC ACTIVITIES: CPT

## 2023-05-19 PROCEDURE — 94640 AIRWAY INHALATION TREATMENT: CPT

## 2023-05-19 PROCEDURE — 2700000000 HC OXYGEN THERAPY PER DAY

## 2023-05-19 PROCEDURE — 80048 BASIC METABOLIC PNL TOTAL CA: CPT

## 2023-05-19 PROCEDURE — 6370000000 HC RX 637 (ALT 250 FOR IP): Performed by: STUDENT IN AN ORGANIZED HEALTH CARE EDUCATION/TRAINING PROGRAM

## 2023-05-19 PROCEDURE — 82803 BLOOD GASES ANY COMBINATION: CPT

## 2023-05-19 PROCEDURE — 94760 N-INVAS EAR/PLS OXIMETRY 1: CPT

## 2023-05-19 PROCEDURE — 36415 COLL VENOUS BLD VENIPUNCTURE: CPT

## 2023-05-19 PROCEDURE — 84100 ASSAY OF PHOSPHORUS: CPT

## 2023-05-19 PROCEDURE — 83735 ASSAY OF MAGNESIUM: CPT

## 2023-05-19 PROCEDURE — 1100000000 HC RM PRIVATE

## 2023-05-19 RX ORDER — OXYCODONE HYDROCHLORIDE 5 MG/1
5 TABLET ORAL EVERY 4 HOURS PRN
Status: DISCONTINUED | OUTPATIENT
Start: 2023-05-19 | End: 2023-05-24 | Stop reason: HOSPADM

## 2023-05-19 RX ADMIN — OXYCODONE 5 MG: 5 TABLET ORAL at 20:32

## 2023-05-19 RX ADMIN — SODIUM CHLORIDE: 9 INJECTION, SOLUTION INTRAVENOUS at 22:03

## 2023-05-19 RX ADMIN — OXYCODONE 10 MG: 5 TABLET ORAL at 08:50

## 2023-05-19 RX ADMIN — ASPIRIN 81 MG: 81 TABLET, COATED ORAL at 08:49

## 2023-05-19 RX ADMIN — GABAPENTIN 300 MG: 300 CAPSULE ORAL at 21:00

## 2023-05-19 RX ADMIN — OXYCODONE 10 MG: 5 TABLET ORAL at 04:26

## 2023-05-19 RX ADMIN — GABAPENTIN 300 MG: 300 CAPSULE ORAL at 08:50

## 2023-05-19 RX ADMIN — IPRATROPIUM BROMIDE AND ALBUTEROL SULFATE 1 AMPULE: 2.5; .5 SOLUTION RESPIRATORY (INHALATION) at 15:03

## 2023-05-19 RX ADMIN — ATORVASTATIN CALCIUM 40 MG: 40 TABLET, FILM COATED ORAL at 08:49

## 2023-05-19 RX ADMIN — AMLODIPINE BESYLATE 10 MG: 5 TABLET ORAL at 08:49

## 2023-05-19 RX ADMIN — ASPIRIN 81 MG: 81 TABLET, COATED ORAL at 22:03

## 2023-05-19 RX ADMIN — OXYCODONE 10 MG: 5 TABLET ORAL at 00:18

## 2023-05-19 RX ADMIN — GABAPENTIN 300 MG: 300 CAPSULE ORAL at 18:52

## 2023-05-19 RX ADMIN — LEVETIRACETAM 500 MG: 500 TABLET, FILM COATED ORAL at 21:00

## 2023-05-19 RX ADMIN — ARFORMOTEROL TARTRATE: 15 SOLUTION RESPIRATORY (INHALATION) at 07:10

## 2023-05-19 RX ADMIN — IPRATROPIUM BROMIDE AND ALBUTEROL SULFATE 1 AMPULE: 2.5; .5 SOLUTION RESPIRATORY (INHALATION) at 11:01

## 2023-05-19 RX ADMIN — DOXAZOSIN 4 MG: 2 TABLET ORAL at 08:49

## 2023-05-19 RX ADMIN — SODIUM CHLORIDE: 9 INJECTION, SOLUTION INTRAVENOUS at 00:20

## 2023-05-19 RX ADMIN — CITALOPRAM HYDROBROMIDE 20 MG: 20 TABLET ORAL at 08:49

## 2023-05-19 RX ADMIN — POLYETHYLENE GLYCOL 3350 17 G: 17 POWDER, FOR SOLUTION ORAL at 10:00

## 2023-05-19 RX ADMIN — POLYETHYLENE GLYCOL 3350 17 G: 17 POWDER, FOR SOLUTION ORAL at 22:03

## 2023-05-19 RX ADMIN — PANTOPRAZOLE SODIUM 40 MG: 40 INJECTION, POWDER, FOR SOLUTION INTRAVENOUS at 08:49

## 2023-05-19 RX ADMIN — LEVOTHYROXINE SODIUM 50 MCG: 0.05 TABLET ORAL at 08:50

## 2023-05-19 RX ADMIN — IPRATROPIUM BROMIDE AND ALBUTEROL SULFATE 1 AMPULE: 2.5; .5 SOLUTION RESPIRATORY (INHALATION) at 07:10

## 2023-05-19 RX ADMIN — LEVETIRACETAM 500 MG: 500 TABLET, FILM COATED ORAL at 08:49

## 2023-05-19 RX ADMIN — MONTELUKAST 10 MG: 10 TABLET, FILM COATED ORAL at 08:49

## 2023-05-19 ASSESSMENT — PAIN DESCRIPTION - DESCRIPTORS
DESCRIPTORS: ACHING;SORE
DESCRIPTORS: ACHING
DESCRIPTORS: ACHING;SORE

## 2023-05-19 ASSESSMENT — PAIN DESCRIPTION - ORIENTATION
ORIENTATION: LEFT
ORIENTATION: MID
ORIENTATION: LEFT
ORIENTATION: LEFT

## 2023-05-19 ASSESSMENT — PAIN DESCRIPTION - PAIN TYPE: TYPE: SURGICAL PAIN

## 2023-05-19 ASSESSMENT — PAIN SCALES - GENERAL
PAINLEVEL_OUTOF10: 10
PAINLEVEL_OUTOF10: 2
PAINLEVEL_OUTOF10: 8
PAINLEVEL_OUTOF10: 3
PAINLEVEL_OUTOF10: 7
PAINLEVEL_OUTOF10: 2
PAINLEVEL_OUTOF10: 8

## 2023-05-19 ASSESSMENT — PAIN SCALES - WONG BAKER: WONGBAKER_NUMERICALRESPONSE: 2

## 2023-05-19 ASSESSMENT — PAIN DESCRIPTION - LOCATION
LOCATION: BACK
LOCATION: HIP

## 2023-05-20 PROCEDURE — A4216 STERILE WATER/SALINE, 10 ML: HCPCS | Performed by: ORTHOPAEDIC SURGERY

## 2023-05-20 PROCEDURE — 6360000002 HC RX W HCPCS: Performed by: ORTHOPAEDIC SURGERY

## 2023-05-20 PROCEDURE — 1100000000 HC RM PRIVATE

## 2023-05-20 PROCEDURE — 2580000003 HC RX 258: Performed by: ORTHOPAEDIC SURGERY

## 2023-05-20 PROCEDURE — 6370000000 HC RX 637 (ALT 250 FOR IP): Performed by: ORTHOPAEDIC SURGERY

## 2023-05-20 PROCEDURE — 94640 AIRWAY INHALATION TREATMENT: CPT

## 2023-05-20 PROCEDURE — C9113 INJ PANTOPRAZOLE SODIUM, VIA: HCPCS | Performed by: ORTHOPAEDIC SURGERY

## 2023-05-20 PROCEDURE — 6360000002 HC RX W HCPCS: Performed by: STUDENT IN AN ORGANIZED HEALTH CARE EDUCATION/TRAINING PROGRAM

## 2023-05-20 PROCEDURE — 94760 N-INVAS EAR/PLS OXIMETRY 1: CPT

## 2023-05-20 PROCEDURE — 94761 N-INVAS EAR/PLS OXIMETRY MLT: CPT

## 2023-05-20 PROCEDURE — 2700000000 HC OXYGEN THERAPY PER DAY

## 2023-05-20 PROCEDURE — 97530 THERAPEUTIC ACTIVITIES: CPT | Performed by: PHYSICAL THERAPIST

## 2023-05-20 PROCEDURE — 6370000000 HC RX 637 (ALT 250 FOR IP): Performed by: STUDENT IN AN ORGANIZED HEALTH CARE EDUCATION/TRAINING PROGRAM

## 2023-05-20 RX ADMIN — GABAPENTIN 300 MG: 300 CAPSULE ORAL at 17:01

## 2023-05-20 RX ADMIN — POLYETHYLENE GLYCOL 3350 17 G: 17 POWDER, FOR SOLUTION ORAL at 22:01

## 2023-05-20 RX ADMIN — LEVETIRACETAM 500 MG: 500 TABLET, FILM COATED ORAL at 21:00

## 2023-05-20 RX ADMIN — OXYCODONE 5 MG: 5 TABLET ORAL at 22:01

## 2023-05-20 RX ADMIN — PANTOPRAZOLE SODIUM 40 MG: 40 INJECTION, POWDER, FOR SOLUTION INTRAVENOUS at 08:18

## 2023-05-20 RX ADMIN — ATORVASTATIN CALCIUM 40 MG: 40 TABLET, FILM COATED ORAL at 08:21

## 2023-05-20 RX ADMIN — SODIUM CHLORIDE: 9 INJECTION, SOLUTION INTRAVENOUS at 08:26

## 2023-05-20 RX ADMIN — GABAPENTIN 300 MG: 300 CAPSULE ORAL at 14:58

## 2023-05-20 RX ADMIN — LEVETIRACETAM 500 MG: 500 TABLET, FILM COATED ORAL at 08:21

## 2023-05-20 RX ADMIN — CASTOR OIL AND BALSAM, PERU: 788; 87 OINTMENT TOPICAL at 08:27

## 2023-05-20 RX ADMIN — OXYCODONE 5 MG: 5 TABLET ORAL at 08:22

## 2023-05-20 RX ADMIN — AMLODIPINE BESYLATE 10 MG: 5 TABLET ORAL at 08:22

## 2023-05-20 RX ADMIN — CASTOR OIL AND BALSAM, PERU: 788; 87 OINTMENT TOPICAL at 21:00

## 2023-05-20 RX ADMIN — IPRATROPIUM BROMIDE AND ALBUTEROL SULFATE 1 AMPULE: 2.5; .5 SOLUTION RESPIRATORY (INHALATION) at 21:32

## 2023-05-20 RX ADMIN — ARFORMOTEROL TARTRATE: 15 SOLUTION RESPIRATORY (INHALATION) at 21:39

## 2023-05-20 RX ADMIN — IPRATROPIUM BROMIDE AND ALBUTEROL SULFATE 1 AMPULE: 2.5; .5 SOLUTION RESPIRATORY (INHALATION) at 13:22

## 2023-05-20 RX ADMIN — ASPIRIN 81 MG: 81 TABLET, COATED ORAL at 08:22

## 2023-05-20 RX ADMIN — PROCHLORPERAZINE EDISYLATE 2.5 MG: 5 INJECTION INTRAMUSCULAR; INTRAVENOUS at 08:19

## 2023-05-20 RX ADMIN — ARFORMOTEROL TARTRATE: 15 SOLUTION RESPIRATORY (INHALATION) at 09:46

## 2023-05-20 RX ADMIN — OXYCODONE 5 MG: 5 TABLET ORAL at 02:29

## 2023-05-20 RX ADMIN — DOXAZOSIN 4 MG: 2 TABLET ORAL at 08:21

## 2023-05-20 RX ADMIN — GABAPENTIN 300 MG: 300 CAPSULE ORAL at 21:00

## 2023-05-20 RX ADMIN — CITALOPRAM HYDROBROMIDE 20 MG: 20 TABLET ORAL at 08:22

## 2023-05-20 RX ADMIN — POLYETHYLENE GLYCOL 3350 17 G: 17 POWDER, FOR SOLUTION ORAL at 08:18

## 2023-05-20 RX ADMIN — MONTELUKAST 10 MG: 10 TABLET, FILM COATED ORAL at 08:23

## 2023-05-20 RX ADMIN — IPRATROPIUM BROMIDE AND ALBUTEROL SULFATE 1 AMPULE: 2.5; .5 SOLUTION RESPIRATORY (INHALATION) at 17:11

## 2023-05-20 RX ADMIN — ASPIRIN 81 MG: 81 TABLET, COATED ORAL at 22:01

## 2023-05-20 RX ADMIN — IPRATROPIUM BROMIDE AND ALBUTEROL SULFATE 1 AMPULE: 2.5; .5 SOLUTION RESPIRATORY (INHALATION) at 09:46

## 2023-05-20 RX ADMIN — GABAPENTIN 300 MG: 300 CAPSULE ORAL at 08:22

## 2023-05-20 ASSESSMENT — PAIN DESCRIPTION - LOCATION
LOCATION: HIP
LOCATION: LEG

## 2023-05-20 ASSESSMENT — PAIN DESCRIPTION - DESCRIPTORS
DESCRIPTORS: ACHING;CRAMPING;DISCOMFORT
DESCRIPTORS: ACHING
DESCRIPTORS: ACHING

## 2023-05-20 ASSESSMENT — PAIN DESCRIPTION - ORIENTATION
ORIENTATION: LEFT

## 2023-05-20 ASSESSMENT — PAIN SCALES - WONG BAKER
WONGBAKER_NUMERICALRESPONSE: 2
WONGBAKER_NUMERICALRESPONSE: 6

## 2023-05-20 ASSESSMENT — PAIN SCALES - GENERAL
PAINLEVEL_OUTOF10: 6
PAINLEVEL_OUTOF10: 0
PAINLEVEL_OUTOF10: 0
PAINLEVEL_OUTOF10: 2
PAINLEVEL_OUTOF10: 6
PAINLEVEL_OUTOF10: 0
PAINLEVEL_OUTOF10: 4

## 2023-05-20 ASSESSMENT — PAIN DESCRIPTION - PAIN TYPE: TYPE: SURGICAL PAIN

## 2023-05-21 ENCOUNTER — APPOINTMENT (OUTPATIENT)
Facility: HOSPITAL | Age: 68
DRG: 469 | End: 2023-05-21
Payer: MEDICARE

## 2023-05-21 LAB
ANION GAP SERPL CALC-SCNC: 2 MMOL/L (ref 5–15)
BASOPHILS # BLD: 0 K/UL (ref 0–0.1)
BASOPHILS NFR BLD: 1 % (ref 0–1)
BUN SERPL-MCNC: 10 MG/DL (ref 6–20)
BUN/CREAT SERPL: 21 (ref 12–20)
CALCIUM SERPL-MCNC: 8.5 MG/DL (ref 8.5–10.1)
CHLORIDE SERPL-SCNC: 102 MMOL/L (ref 97–108)
CO2 SERPL-SCNC: 30 MMOL/L (ref 21–32)
CREAT SERPL-MCNC: 0.47 MG/DL (ref 0.7–1.3)
DIFFERENTIAL METHOD BLD: ABNORMAL
EOSINOPHIL # BLD: 0.1 K/UL (ref 0–0.4)
EOSINOPHIL NFR BLD: 2 % (ref 0–7)
ERYTHROCYTE [DISTWIDTH] IN BLOOD BY AUTOMATED COUNT: 13.6 % (ref 11.5–14.5)
GLUCOSE SERPL-MCNC: 103 MG/DL (ref 65–100)
HCT VFR BLD AUTO: 27.4 % (ref 36.6–50.3)
HGB BLD-MCNC: 9.1 G/DL (ref 12.1–17)
IMM GRANULOCYTES # BLD AUTO: 0 K/UL (ref 0–0.04)
IMM GRANULOCYTES NFR BLD AUTO: 0 % (ref 0–0.5)
LYMPHOCYTES # BLD: 1.2 K/UL (ref 0.8–3.5)
LYMPHOCYTES NFR BLD: 23 % (ref 12–49)
MAGNESIUM SERPL-MCNC: 1.8 MG/DL (ref 1.6–2.4)
MCH RBC QN AUTO: 29.5 PG (ref 26–34)
MCHC RBC AUTO-ENTMCNC: 33.2 G/DL (ref 30–36.5)
MCV RBC AUTO: 89 FL (ref 80–99)
MONOCYTES # BLD: 0.7 K/UL (ref 0–1)
MONOCYTES NFR BLD: 14 % (ref 5–13)
NEUTS SEG # BLD: 3 K/UL (ref 1.8–8)
NEUTS SEG NFR BLD: 60 % (ref 32–75)
NRBC # BLD: 0 K/UL (ref 0–0.01)
NRBC BLD-RTO: 0 PER 100 WBC
PHOSPHATE SERPL-MCNC: 2.6 MG/DL (ref 2.6–4.7)
PLATELET # BLD AUTO: 307 K/UL (ref 150–400)
PMV BLD AUTO: 8.7 FL (ref 8.9–12.9)
POTASSIUM SERPL-SCNC: 3.4 MMOL/L (ref 3.5–5.1)
RBC # BLD AUTO: 3.08 M/UL (ref 4.1–5.7)
SODIUM SERPL-SCNC: 134 MMOL/L (ref 136–145)
WBC # BLD AUTO: 5 K/UL (ref 4.1–11.1)

## 2023-05-21 PROCEDURE — 6370000000 HC RX 637 (ALT 250 FOR IP)

## 2023-05-21 PROCEDURE — 85025 COMPLETE CBC W/AUTO DIFF WBC: CPT

## 2023-05-21 PROCEDURE — 6370000000 HC RX 637 (ALT 250 FOR IP): Performed by: ORTHOPAEDIC SURGERY

## 2023-05-21 PROCEDURE — 6360000002 HC RX W HCPCS: Performed by: ORTHOPAEDIC SURGERY

## 2023-05-21 PROCEDURE — 1100000000 HC RM PRIVATE

## 2023-05-21 PROCEDURE — 83735 ASSAY OF MAGNESIUM: CPT

## 2023-05-21 PROCEDURE — 97116 GAIT TRAINING THERAPY: CPT

## 2023-05-21 PROCEDURE — 2580000003 HC RX 258: Performed by: ORTHOPAEDIC SURGERY

## 2023-05-21 PROCEDURE — 2700000000 HC OXYGEN THERAPY PER DAY

## 2023-05-21 PROCEDURE — 36415 COLL VENOUS BLD VENIPUNCTURE: CPT

## 2023-05-21 PROCEDURE — 97110 THERAPEUTIC EXERCISES: CPT

## 2023-05-21 PROCEDURE — 6370000000 HC RX 637 (ALT 250 FOR IP): Performed by: PHYSICIAN ASSISTANT

## 2023-05-21 PROCEDURE — A4216 STERILE WATER/SALINE, 10 ML: HCPCS | Performed by: ORTHOPAEDIC SURGERY

## 2023-05-21 PROCEDURE — 6370000000 HC RX 637 (ALT 250 FOR IP): Performed by: INTERNAL MEDICINE

## 2023-05-21 PROCEDURE — 94761 N-INVAS EAR/PLS OXIMETRY MLT: CPT

## 2023-05-21 PROCEDURE — 80048 BASIC METABOLIC PNL TOTAL CA: CPT

## 2023-05-21 PROCEDURE — 6370000000 HC RX 637 (ALT 250 FOR IP): Performed by: STUDENT IN AN ORGANIZED HEALTH CARE EDUCATION/TRAINING PROGRAM

## 2023-05-21 PROCEDURE — 71045 X-RAY EXAM CHEST 1 VIEW: CPT

## 2023-05-21 PROCEDURE — 94640 AIRWAY INHALATION TREATMENT: CPT

## 2023-05-21 PROCEDURE — 84100 ASSAY OF PHOSPHORUS: CPT

## 2023-05-21 PROCEDURE — C9113 INJ PANTOPRAZOLE SODIUM, VIA: HCPCS | Performed by: ORTHOPAEDIC SURGERY

## 2023-05-21 RX ORDER — IPRATROPIUM BROMIDE AND ALBUTEROL SULFATE 2.5; .5 MG/3ML; MG/3ML
1 SOLUTION RESPIRATORY (INHALATION) 2 TIMES DAILY
Status: DISCONTINUED | OUTPATIENT
Start: 2023-05-21 | End: 2023-05-24 | Stop reason: HOSPADM

## 2023-05-21 RX ORDER — POTASSIUM CHLORIDE 1.5 G/1.77G
40 POWDER, FOR SOLUTION ORAL DAILY
Status: COMPLETED | OUTPATIENT
Start: 2023-05-21 | End: 2023-05-21

## 2023-05-21 RX ADMIN — OXYCODONE 5 MG: 5 TABLET ORAL at 12:02

## 2023-05-21 RX ADMIN — MONTELUKAST 10 MG: 10 TABLET, FILM COATED ORAL at 09:29

## 2023-05-21 RX ADMIN — CASTOR OIL AND BALSAM, PERU: 788; 87 OINTMENT TOPICAL at 12:05

## 2023-05-21 RX ADMIN — OXYCODONE 5 MG: 5 TABLET ORAL at 04:45

## 2023-05-21 RX ADMIN — POLYETHYLENE GLYCOL 3350 17 G: 17 POWDER, FOR SOLUTION ORAL at 21:14

## 2023-05-21 RX ADMIN — GABAPENTIN 300 MG: 300 CAPSULE ORAL at 09:29

## 2023-05-21 RX ADMIN — ARFORMOTEROL TARTRATE: 15 SOLUTION RESPIRATORY (INHALATION) at 19:54

## 2023-05-21 RX ADMIN — LEVETIRACETAM 500 MG: 500 TABLET, FILM COATED ORAL at 21:14

## 2023-05-21 RX ADMIN — CITALOPRAM HYDROBROMIDE 20 MG: 20 TABLET ORAL at 09:29

## 2023-05-21 RX ADMIN — OXYCODONE 5 MG: 5 TABLET ORAL at 21:14

## 2023-05-21 RX ADMIN — ARFORMOTEROL TARTRATE: 15 SOLUTION RESPIRATORY (INHALATION) at 10:04

## 2023-05-21 RX ADMIN — POTASSIUM CHLORIDE 40 MEQ: 1.5 FOR SOLUTION ORAL at 18:06

## 2023-05-21 RX ADMIN — IPRATROPIUM BROMIDE AND ALBUTEROL SULFATE 1 AMPULE: 2.5; .5 SOLUTION RESPIRATORY (INHALATION) at 19:56

## 2023-05-21 RX ADMIN — GABAPENTIN 300 MG: 300 CAPSULE ORAL at 18:06

## 2023-05-21 RX ADMIN — DOXAZOSIN 4 MG: 2 TABLET ORAL at 09:29

## 2023-05-21 RX ADMIN — GABAPENTIN 300 MG: 300 CAPSULE ORAL at 12:02

## 2023-05-21 RX ADMIN — IPRATROPIUM BROMIDE AND ALBUTEROL SULFATE 1 AMPULE: 2.5; .5 SOLUTION RESPIRATORY (INHALATION) at 12:28

## 2023-05-21 RX ADMIN — POLYETHYLENE GLYCOL 3350 17 G: 17 POWDER, FOR SOLUTION ORAL at 09:41

## 2023-05-21 RX ADMIN — ASPIRIN 81 MG: 81 TABLET, COATED ORAL at 09:29

## 2023-05-21 RX ADMIN — GABAPENTIN 300 MG: 300 CAPSULE ORAL at 21:14

## 2023-05-21 RX ADMIN — LEVETIRACETAM 500 MG: 500 TABLET, FILM COATED ORAL at 09:29

## 2023-05-21 RX ADMIN — MELATONIN 3 MG: at 21:14

## 2023-05-21 RX ADMIN — ASPIRIN 81 MG: 81 TABLET, COATED ORAL at 21:14

## 2023-05-21 RX ADMIN — ATORVASTATIN CALCIUM 40 MG: 40 TABLET, FILM COATED ORAL at 09:29

## 2023-05-21 RX ADMIN — AMLODIPINE BESYLATE 10 MG: 5 TABLET ORAL at 09:29

## 2023-05-21 RX ADMIN — IPRATROPIUM BROMIDE AND ALBUTEROL SULFATE 1 AMPULE: 2.5; .5 SOLUTION RESPIRATORY (INHALATION) at 10:04

## 2023-05-21 RX ADMIN — LEVOTHYROXINE SODIUM 50 MCG: 0.05 TABLET ORAL at 04:45

## 2023-05-21 ASSESSMENT — PAIN DESCRIPTION - ORIENTATION
ORIENTATION: LEFT
ORIENTATION: LEFT
ORIENTATION: POSTERIOR

## 2023-05-21 ASSESSMENT — PAIN DESCRIPTION - DESCRIPTORS: DESCRIPTORS: THROBBING

## 2023-05-21 ASSESSMENT — PAIN DESCRIPTION - LOCATION
LOCATION: HIP
LOCATION: HIP
LOCATION: BACK

## 2023-05-21 ASSESSMENT — PAIN SCALES - GENERAL
PAINLEVEL_OUTOF10: 2
PAINLEVEL_OUTOF10: 0
PAINLEVEL_OUTOF10: 3
PAINLEVEL_OUTOF10: 6
PAINLEVEL_OUTOF10: 10

## 2023-05-21 ASSESSMENT — PAIN - FUNCTIONAL ASSESSMENT: PAIN_FUNCTIONAL_ASSESSMENT: PREVENTS OR INTERFERES SOME ACTIVE ACTIVITIES AND ADLS

## 2023-05-22 ENCOUNTER — APPOINTMENT (OUTPATIENT)
Facility: HOSPITAL | Age: 68
DRG: 469 | End: 2023-05-22
Payer: MEDICARE

## 2023-05-22 LAB
ALBUMIN SERPL-MCNC: 2.6 G/DL (ref 3.5–5)
ALBUMIN/GLOB SERPL: 0.7 (ref 1.1–2.2)
ALP SERPL-CCNC: 103 U/L (ref 45–117)
ALT SERPL-CCNC: 47 U/L (ref 12–78)
ANION GAP SERPL CALC-SCNC: 2 MMOL/L (ref 5–15)
AST SERPL-CCNC: 63 U/L (ref 15–37)
BASOPHILS # BLD: 0 K/UL (ref 0–0.1)
BASOPHILS NFR BLD: 1 % (ref 0–1)
BILIRUB SERPL-MCNC: 1.2 MG/DL (ref 0.2–1)
BUN SERPL-MCNC: 9 MG/DL (ref 6–20)
BUN/CREAT SERPL: 13 (ref 12–20)
CALCIUM SERPL-MCNC: 8.5 MG/DL (ref 8.5–10.1)
CHLORIDE SERPL-SCNC: 104 MMOL/L (ref 97–108)
CO2 SERPL-SCNC: 30 MMOL/L (ref 21–32)
CREAT SERPL-MCNC: 0.67 MG/DL (ref 0.7–1.3)
DIFFERENTIAL METHOD BLD: ABNORMAL
EOSINOPHIL # BLD: 0.2 K/UL (ref 0–0.4)
EOSINOPHIL NFR BLD: 3 % (ref 0–7)
ERYTHROCYTE [DISTWIDTH] IN BLOOD BY AUTOMATED COUNT: 13.9 % (ref 11.5–14.5)
GLOBULIN SER CALC-MCNC: 3.5 G/DL (ref 2–4)
GLUCOSE SERPL-MCNC: 126 MG/DL (ref 65–100)
HCT VFR BLD AUTO: 29.7 % (ref 36.6–50.3)
HGB BLD-MCNC: 9.6 G/DL (ref 12.1–17)
IMM GRANULOCYTES # BLD AUTO: 0 K/UL (ref 0–0.04)
IMM GRANULOCYTES NFR BLD AUTO: 0 % (ref 0–0.5)
LYMPHOCYTES # BLD: 1.3 K/UL (ref 0.8–3.5)
LYMPHOCYTES NFR BLD: 25 % (ref 12–49)
MAGNESIUM SERPL-MCNC: 2 MG/DL (ref 1.6–2.4)
MCH RBC QN AUTO: 29.3 PG (ref 26–34)
MCHC RBC AUTO-ENTMCNC: 32.3 G/DL (ref 30–36.5)
MCV RBC AUTO: 90.5 FL (ref 80–99)
MONOCYTES # BLD: 0.7 K/UL (ref 0–1)
MONOCYTES NFR BLD: 13 % (ref 5–13)
NEUTS SEG # BLD: 3.1 K/UL (ref 1.8–8)
NEUTS SEG NFR BLD: 58 % (ref 32–75)
NRBC # BLD: 0 K/UL (ref 0–0.01)
NRBC BLD-RTO: 0 PER 100 WBC
PHOSPHATE SERPL-MCNC: 3.7 MG/DL (ref 2.6–4.7)
PLATELET # BLD AUTO: 343 K/UL (ref 150–400)
PMV BLD AUTO: 8.6 FL (ref 8.9–12.9)
POTASSIUM SERPL-SCNC: 3.6 MMOL/L (ref 3.5–5.1)
PROT SERPL-MCNC: 6.1 G/DL (ref 6.4–8.2)
RBC # BLD AUTO: 3.28 M/UL (ref 4.1–5.7)
SODIUM SERPL-SCNC: 136 MMOL/L (ref 136–145)
WBC # BLD AUTO: 5.3 K/UL (ref 4.1–11.1)

## 2023-05-22 PROCEDURE — 6370000000 HC RX 637 (ALT 250 FOR IP): Performed by: ORTHOPAEDIC SURGERY

## 2023-05-22 PROCEDURE — 2700000000 HC OXYGEN THERAPY PER DAY

## 2023-05-22 PROCEDURE — 94640 AIRWAY INHALATION TREATMENT: CPT

## 2023-05-22 PROCEDURE — 80053 COMPREHEN METABOLIC PANEL: CPT

## 2023-05-22 PROCEDURE — 6370000000 HC RX 637 (ALT 250 FOR IP): Performed by: INTERNAL MEDICINE

## 2023-05-22 PROCEDURE — 36415 COLL VENOUS BLD VENIPUNCTURE: CPT

## 2023-05-22 PROCEDURE — 1100000000 HC RM PRIVATE

## 2023-05-22 PROCEDURE — 6360000004 HC RX CONTRAST MEDICATION: Performed by: STUDENT IN AN ORGANIZED HEALTH CARE EDUCATION/TRAINING PROGRAM

## 2023-05-22 PROCEDURE — 94761 N-INVAS EAR/PLS OXIMETRY MLT: CPT

## 2023-05-22 PROCEDURE — 84100 ASSAY OF PHOSPHORUS: CPT

## 2023-05-22 PROCEDURE — 71275 CT ANGIOGRAPHY CHEST: CPT

## 2023-05-22 PROCEDURE — 85025 COMPLETE CBC W/AUTO DIFF WBC: CPT

## 2023-05-22 PROCEDURE — 6360000002 HC RX W HCPCS: Performed by: ORTHOPAEDIC SURGERY

## 2023-05-22 PROCEDURE — 83735 ASSAY OF MAGNESIUM: CPT

## 2023-05-22 PROCEDURE — 6370000000 HC RX 637 (ALT 250 FOR IP): Performed by: STUDENT IN AN ORGANIZED HEALTH CARE EDUCATION/TRAINING PROGRAM

## 2023-05-22 RX ADMIN — POLYETHYLENE GLYCOL 3350 17 G: 17 POWDER, FOR SOLUTION ORAL at 21:38

## 2023-05-22 RX ADMIN — LEVETIRACETAM 500 MG: 500 TABLET, FILM COATED ORAL at 09:32

## 2023-05-22 RX ADMIN — LEVOTHYROXINE SODIUM 50 MCG: 0.05 TABLET ORAL at 07:09

## 2023-05-22 RX ADMIN — ASPIRIN 81 MG: 81 TABLET, COATED ORAL at 21:37

## 2023-05-22 RX ADMIN — CITALOPRAM HYDROBROMIDE 20 MG: 20 TABLET ORAL at 09:31

## 2023-05-22 RX ADMIN — LEVETIRACETAM 500 MG: 500 TABLET, FILM COATED ORAL at 21:37

## 2023-05-22 RX ADMIN — ATORVASTATIN CALCIUM 40 MG: 40 TABLET, FILM COATED ORAL at 09:31

## 2023-05-22 RX ADMIN — CASTOR OIL AND BALSAM, PERU: 788; 87 OINTMENT TOPICAL at 09:39

## 2023-05-22 RX ADMIN — MONTELUKAST 10 MG: 10 TABLET, FILM COATED ORAL at 09:30

## 2023-05-22 RX ADMIN — ARFORMOTEROL TARTRATE: 15 SOLUTION RESPIRATORY (INHALATION) at 19:42

## 2023-05-22 RX ADMIN — GABAPENTIN 300 MG: 300 CAPSULE ORAL at 09:31

## 2023-05-22 RX ADMIN — ASPIRIN 81 MG: 81 TABLET, COATED ORAL at 09:30

## 2023-05-22 RX ADMIN — IPRATROPIUM BROMIDE AND ALBUTEROL SULFATE 1 AMPULE: 2.5; .5 SOLUTION RESPIRATORY (INHALATION) at 19:42

## 2023-05-22 RX ADMIN — CASTOR OIL AND BALSAM, PERU: 788; 87 OINTMENT TOPICAL at 21:37

## 2023-05-22 RX ADMIN — GABAPENTIN 300 MG: 300 CAPSULE ORAL at 16:55

## 2023-05-22 RX ADMIN — ARFORMOTEROL TARTRATE: 15 SOLUTION RESPIRATORY (INHALATION) at 08:11

## 2023-05-22 RX ADMIN — IPRATROPIUM BROMIDE AND ALBUTEROL SULFATE 1 AMPULE: 2.5; .5 SOLUTION RESPIRATORY (INHALATION) at 08:06

## 2023-05-22 RX ADMIN — GABAPENTIN 300 MG: 300 CAPSULE ORAL at 21:37

## 2023-05-22 RX ADMIN — IOPAMIDOL 62 ML: 755 INJECTION, SOLUTION INTRAVENOUS at 18:41

## 2023-05-22 RX ADMIN — OXYCODONE 5 MG: 5 TABLET ORAL at 09:31

## 2023-05-22 RX ADMIN — POLYETHYLENE GLYCOL 3350 17 G: 17 POWDER, FOR SOLUTION ORAL at 09:30

## 2023-05-22 ASSESSMENT — PAIN SCALES - GENERAL
PAINLEVEL_OUTOF10: 0
PAINLEVEL_OUTOF10: 10
PAINLEVEL_OUTOF10: 6

## 2023-05-22 ASSESSMENT — PAIN DESCRIPTION - DESCRIPTORS: DESCRIPTORS: ACHING;DISCOMFORT

## 2023-05-22 ASSESSMENT — PAIN DESCRIPTION - ORIENTATION: ORIENTATION: MID

## 2023-05-22 ASSESSMENT — PAIN DESCRIPTION - LOCATION: LOCATION: BACK

## 2023-05-22 NOTE — PROGRESS NOTES
Physical Therapy    PT treatment deferred due to pt pending CTA to r/o PE. Will con't to follow.     David Bianchi, PT, MPT

## 2023-05-22 NOTE — PROGRESS NOTES
ORTHO - Progress Note  Post Op day: 5 Days [address] [facility]     [id number]  male    80 y.o.    1955    Admit date:2023  Procedures:Procedure(s):  LEFT HIP TOTAL ARTHROPLASTY REVISION POSTERIOR APPROACH  Surgeon:Surgeon(s) and Role:     Juan Manuel Laura MD - Primary        SUBJECTIVE:     Bonilla Schwartz is a 80 y.o. male resting in the bed  Patient has complaints of appropriate post-op pain, tolerating PO pain medications oxy 5 mg  Denies F/C, nausea, vomiting, dizziness, lightheadedness, chest pain, or shortness of breath. OBJECTIVE:       Physical Exam:  General: alert, cooperative, no distress. Gastrointestinal:  non-distended . Cardiovascular: equal pulses in the lower extremities,  Brisk cap refill in all distal extremities   Genitourinary: Voiding independently   Respiratory: No respiratory distress   Neurological:Neurovascular exam within normal limits. Senstion intact: LE bilat. Motor: + DF/PF/EHL. Musculoskeletal: Gallo's sign negative in bilateral lower extremities. Calves soft, supple, non-tender upon palpation or with passive stretch. No sign of DVT  Dressing/Wound:  Prevena intact with no drainage. Clean, dry and intact. No significant erythema or swelling. Vital Signs:       Patient Vitals for the past 8 hrs:   BP Temp Pulse Resp SpO2   23 1106 121/70 97.9 °F (36.6 °C) 66 18 93 %   23 0928 121/62 97.5 °F (36.4 °C) 70 16 --   23 0806 -- -- 69 20 96 %                                          Temp (24hrs), Av.1 °F (36.7 °C), Min:97.5 °F (36.4 °C), Max:98.6 °F (37 °C)      Labs:        Recent Labs     23  0311   HCT 29.7*   HGB 9.6*     PT/OT:              ASSESSMENT / PLAN:   Principal Problem:    Fracture  Resolved Problems:    * No resolved hospital problems.  *     - CTA chest ordered by primary team and pending due to hypoxia  -  Continue PT/OT WBAT  -  DVT prophylaxis- SCD w/ ASA 81 mg BID  -  DC planning - pending    Signed By: Efrain Barriga

## 2023-05-22 NOTE — PROGRESS NOTES
Occupational Therapy    Pt waiting for CT scan to determine PE. Will defer and continue to follow.     Alvarado Barbosa, OT

## 2023-05-22 NOTE — PROGRESS NOTES
Spiritual Care Assessment/Progress Note  [address]    Name: Mounika Parisi MRN: [id number]    Age: 80 y.o. Sex: male   Language: English     Date: 5/22/2023            Total Time Calculated: 10 min              Spiritual Assessment begun in [facility]  Service Provided For[de-identified] Patient  Referral/Consult From[de-identified] Rounding  Encounter Overview/Reason : Initial Encounter    Spiritual beliefs:      [x] Involved in a fredy tradition/spiritual practice:      [] Supported by a fredy community:      [] Claims no spiritual orientation:      [] Seeking spiritual identity:           [] Adheres to an individual form of spirituality:      [] Not able to assess:                Identified resources for coping and support system:           [x] Prayer                  [] Devotional reading               [] Music                  [] Guided Imagery     [] Pet visits                                        [] Other: (COMMENT)     Specific area/focus of visit   Encounter: Type: Initial Screen/Assessment  Crisis:    Spiritual/Emotional needs: Type: Spiritual Support  Ritual, Rites and Sacraments:    Grief, Loss, and Adjustments:    Ethics/Mediation:    Behavioral Health:    Palliative Care: Advance Care Planning:           Narrative: Patient was laying in bed with the lights out and television on. He is calming expressing has discomfort. We prayed together. [facility] [place], [place]. [address] ([address])

## 2023-05-22 NOTE — PROGRESS NOTES
Hospitalist Progress Note    NAME:   Farida Renee   : 1955   MRN: [id number]     Date/Time: 2023 11:47 AM  Patient PCP: Mehran Hill DO    Estimated discharge date:   Barriers: respiratory improvement  Dispo: IPR vs SNF    Assessment / Plan:     Acute hypoxic respiratory failure  Hx of COPD POA- ? Does not wear O2 at home. No history ROQUE /CPAP per sisters at bedside  - CXR postop with no acute findings  - increased duonebs to q4h scheduled with albuterol q2h prn  - wean O2 as tolerated, goal sat 88 and above ok with history of COPD  - persistently requiring 4L. Discussed with nursing, desats to low 80s when weaned down. - not wheezing on exam  - ordered CTA to rule out PE. If neg, may consult Pulm.  Patient likely to require O2 on discharge    In-hospital delirium /intermittent encephalopathy -mildly lethargic, but oriented  History of sundowning in the hospital is as per sisters at bedside  Might have exacerbated due to opioids postop  Alert  VBG normal PCO2  - decreased pain medications to oxycodone 5mg     Left hip Fracture status post ORIF   Previous Risk stratification: Based on CRI,   10.1% risk for perioperative cardiac event, can proceed with moderate risk for low risk surgery.  - PT/OT consulted-SNF recommended, inpatient [facility] being wished by family  - Ortho consulted-status post ORIF 23  - Pain control discussed above  - ASA 81 bid for 30 days  - outpatient Ortho followup  - trend HgB-hemoglobin stable      LISA  POA  Resolved  - Fluid resuscitation  - Avoid nephrotoxics     HTN, Hypothyroidism,   Norvasc   Cardura  Continue synthroid     Medical Decision Making:   I personally reviewed labs: bmp cbc  I personally reviewed diagnostics: flowsheets  Toxic drug monitoring: None  Discussed case with: Patient, sisters at bedside, RN     Code Status: Full  DVT Prophylaxis: ASA 81 bid  GI Prophylaxis:  Not indicated         Subjective:     Chief Complaint /

## 2023-05-23 LAB
ALBUMIN SERPL-MCNC: 2.5 G/DL (ref 3.5–5)
ALBUMIN/GLOB SERPL: 0.7 (ref 1.1–2.2)
ALP SERPL-CCNC: 105 U/L (ref 45–117)
ALT SERPL-CCNC: 49 U/L (ref 12–78)
ANION GAP SERPL CALC-SCNC: 4 MMOL/L (ref 5–15)
AST SERPL-CCNC: 53 U/L (ref 15–37)
BASOPHILS # BLD: 0.1 K/UL (ref 0–0.1)
BASOPHILS NFR BLD: 1 % (ref 0–1)
BILIRUB SERPL-MCNC: 1.1 MG/DL (ref 0.2–1)
BUN SERPL-MCNC: 7 MG/DL (ref 6–20)
BUN/CREAT SERPL: 13 (ref 12–20)
CALCIUM SERPL-MCNC: 8.2 MG/DL (ref 8.5–10.1)
CHLORIDE SERPL-SCNC: 104 MMOL/L (ref 97–108)
CO2 SERPL-SCNC: 29 MMOL/L (ref 21–32)
CREAT SERPL-MCNC: 0.54 MG/DL (ref 0.7–1.3)
DIFFERENTIAL METHOD BLD: ABNORMAL
EOSINOPHIL # BLD: 0.2 K/UL (ref 0–0.4)
EOSINOPHIL NFR BLD: 3 % (ref 0–7)
ERYTHROCYTE [DISTWIDTH] IN BLOOD BY AUTOMATED COUNT: 13.9 % (ref 11.5–14.5)
GLOBULIN SER CALC-MCNC: 3.4 G/DL (ref 2–4)
GLUCOSE SERPL-MCNC: 97 MG/DL (ref 65–100)
HCT VFR BLD AUTO: 29.1 % (ref 36.6–50.3)
HGB BLD-MCNC: 9.4 G/DL (ref 12.1–17)
IMM GRANULOCYTES # BLD AUTO: 0 K/UL (ref 0–0.04)
IMM GRANULOCYTES NFR BLD AUTO: 1 % (ref 0–0.5)
LYMPHOCYTES # BLD: 1.1 K/UL (ref 0.8–3.5)
LYMPHOCYTES NFR BLD: 17 % (ref 12–49)
MAGNESIUM SERPL-MCNC: 1.9 MG/DL (ref 1.6–2.4)
MCH RBC QN AUTO: 29.7 PG (ref 26–34)
MCHC RBC AUTO-ENTMCNC: 32.3 G/DL (ref 30–36.5)
MCV RBC AUTO: 92.1 FL (ref 80–99)
MONOCYTES # BLD: 0.8 K/UL (ref 0–1)
MONOCYTES NFR BLD: 14 % (ref 5–13)
NEUTS SEG # BLD: 3.9 K/UL (ref 1.8–8)
NEUTS SEG NFR BLD: 64 % (ref 32–75)
NRBC # BLD: 0 K/UL (ref 0–0.01)
NRBC BLD-RTO: 0 PER 100 WBC
PHOSPHATE SERPL-MCNC: 3.6 MG/DL (ref 2.6–4.7)
PLATELET # BLD AUTO: 392 K/UL (ref 150–400)
PMV BLD AUTO: 8.6 FL (ref 8.9–12.9)
POTASSIUM SERPL-SCNC: 3.9 MMOL/L (ref 3.5–5.1)
PROT SERPL-MCNC: 5.9 G/DL (ref 6.4–8.2)
RBC # BLD AUTO: 3.16 M/UL (ref 4.1–5.7)
SODIUM SERPL-SCNC: 137 MMOL/L (ref 136–145)
WBC # BLD AUTO: 6.1 K/UL (ref 4.1–11.1)

## 2023-05-23 PROCEDURE — 94761 N-INVAS EAR/PLS OXIMETRY MLT: CPT

## 2023-05-23 PROCEDURE — 97116 GAIT TRAINING THERAPY: CPT

## 2023-05-23 PROCEDURE — 85025 COMPLETE CBC W/AUTO DIFF WBC: CPT

## 2023-05-23 PROCEDURE — 2700000000 HC OXYGEN THERAPY PER DAY

## 2023-05-23 PROCEDURE — 80053 COMPREHEN METABOLIC PANEL: CPT

## 2023-05-23 PROCEDURE — 6370000000 HC RX 637 (ALT 250 FOR IP): Performed by: ORTHOPAEDIC SURGERY

## 2023-05-23 PROCEDURE — 83735 ASSAY OF MAGNESIUM: CPT

## 2023-05-23 PROCEDURE — 97535 SELF CARE MNGMENT TRAINING: CPT

## 2023-05-23 PROCEDURE — 6370000000 HC RX 637 (ALT 250 FOR IP): Performed by: STUDENT IN AN ORGANIZED HEALTH CARE EDUCATION/TRAINING PROGRAM

## 2023-05-23 PROCEDURE — 84100 ASSAY OF PHOSPHORUS: CPT

## 2023-05-23 PROCEDURE — 1100000000 HC RM PRIVATE

## 2023-05-23 PROCEDURE — 6370000000 HC RX 637 (ALT 250 FOR IP): Performed by: INTERNAL MEDICINE

## 2023-05-23 PROCEDURE — 94640 AIRWAY INHALATION TREATMENT: CPT

## 2023-05-23 PROCEDURE — 97530 THERAPEUTIC ACTIVITIES: CPT

## 2023-05-23 PROCEDURE — 6370000000 HC RX 637 (ALT 250 FOR IP)

## 2023-05-23 PROCEDURE — 6360000002 HC RX W HCPCS: Performed by: ORTHOPAEDIC SURGERY

## 2023-05-23 PROCEDURE — 36415 COLL VENOUS BLD VENIPUNCTURE: CPT

## 2023-05-23 RX ORDER — DOXYCYCLINE HYCLATE 100 MG
100 TABLET ORAL EVERY 12 HOURS SCHEDULED
Qty: 9 TABLET | Refills: 0 | Status: SHIPPED | OUTPATIENT
Start: 2023-05-23 | End: 2023-05-28

## 2023-05-23 RX ORDER — AMOXICILLIN AND CLAVULANATE POTASSIUM 875; 125 MG/1; MG/1
1 TABLET, FILM COATED ORAL EVERY 12 HOURS SCHEDULED
Qty: 9 TABLET | Refills: 0 | Status: SHIPPED | OUTPATIENT
Start: 2023-05-23 | End: 2023-05-28

## 2023-05-23 RX ORDER — AMOXICILLIN AND CLAVULANATE POTASSIUM 875; 125 MG/1; MG/1
1 TABLET, FILM COATED ORAL EVERY 12 HOURS SCHEDULED
Status: DISCONTINUED | OUTPATIENT
Start: 2023-05-23 | End: 2023-05-24 | Stop reason: HOSPADM

## 2023-05-23 RX ORDER — ASPIRIN 81 MG/1
81 TABLET ORAL 2 TIMES DAILY
Qty: 50 TABLET | Refills: 0 | Status: SHIPPED | OUTPATIENT
Start: 2023-05-23 | End: 2023-06-17

## 2023-05-23 RX ORDER — DOXYCYCLINE HYCLATE 100 MG
100 TABLET ORAL EVERY 12 HOURS SCHEDULED
Status: DISCONTINUED | OUTPATIENT
Start: 2023-05-23 | End: 2023-05-24 | Stop reason: HOSPADM

## 2023-05-23 RX ADMIN — POLYETHYLENE GLYCOL 3350 17 G: 17 POWDER, FOR SOLUTION ORAL at 21:37

## 2023-05-23 RX ADMIN — GABAPENTIN 300 MG: 300 CAPSULE ORAL at 12:10

## 2023-05-23 RX ADMIN — ASPIRIN 81 MG: 81 TABLET, COATED ORAL at 09:32

## 2023-05-23 RX ADMIN — GABAPENTIN 300 MG: 300 CAPSULE ORAL at 09:31

## 2023-05-23 RX ADMIN — IPRATROPIUM BROMIDE AND ALBUTEROL SULFATE 1 AMPULE: 2.5; .5 SOLUTION RESPIRATORY (INHALATION) at 19:34

## 2023-05-23 RX ADMIN — AMOXICILLIN AND CLAVULANATE POTASSIUM 1 TABLET: 875; 125 TABLET, FILM COATED ORAL at 09:39

## 2023-05-23 RX ADMIN — OXYCODONE 5 MG: 5 TABLET ORAL at 12:10

## 2023-05-23 RX ADMIN — LEVETIRACETAM 500 MG: 500 TABLET, FILM COATED ORAL at 21:37

## 2023-05-23 RX ADMIN — CITALOPRAM HYDROBROMIDE 20 MG: 20 TABLET ORAL at 09:31

## 2023-05-23 RX ADMIN — AMOXICILLIN AND CLAVULANATE POTASSIUM 1 TABLET: 875; 125 TABLET, FILM COATED ORAL at 21:35

## 2023-05-23 RX ADMIN — CASTOR OIL AND BALSAM, PERU: 788; 87 OINTMENT TOPICAL at 09:39

## 2023-05-23 RX ADMIN — LEVETIRACETAM 500 MG: 500 TABLET, FILM COATED ORAL at 09:31

## 2023-05-23 RX ADMIN — ATORVASTATIN CALCIUM 40 MG: 40 TABLET, FILM COATED ORAL at 09:31

## 2023-05-23 RX ADMIN — DOXAZOSIN 4 MG: 2 TABLET ORAL at 09:31

## 2023-05-23 RX ADMIN — CASTOR OIL AND BALSAM, PERU: 788; 87 OINTMENT TOPICAL at 21:36

## 2023-05-23 RX ADMIN — GABAPENTIN 300 MG: 300 CAPSULE ORAL at 17:59

## 2023-05-23 RX ADMIN — ARFORMOTEROL TARTRATE: 15 SOLUTION RESPIRATORY (INHALATION) at 08:55

## 2023-05-23 RX ADMIN — AMLODIPINE BESYLATE 10 MG: 5 TABLET ORAL at 09:32

## 2023-05-23 RX ADMIN — DOXYCYCLINE HYCLATE 100 MG: 100 TABLET, COATED ORAL at 21:36

## 2023-05-23 RX ADMIN — IPRATROPIUM BROMIDE AND ALBUTEROL SULFATE 1 AMPULE: 2.5; .5 SOLUTION RESPIRATORY (INHALATION) at 09:00

## 2023-05-23 RX ADMIN — MELATONIN 3 MG: at 23:21

## 2023-05-23 RX ADMIN — ARFORMOTEROL TARTRATE: 15 SOLUTION RESPIRATORY (INHALATION) at 19:29

## 2023-05-23 RX ADMIN — ASPIRIN 81 MG: 81 TABLET, COATED ORAL at 21:35

## 2023-05-23 RX ADMIN — LEVOTHYROXINE SODIUM 50 MCG: 0.05 TABLET ORAL at 06:21

## 2023-05-23 RX ADMIN — DOXYCYCLINE HYCLATE 100 MG: 100 TABLET, COATED ORAL at 09:32

## 2023-05-23 RX ADMIN — MONTELUKAST 10 MG: 10 TABLET, FILM COATED ORAL at 09:31

## 2023-05-23 RX ADMIN — OXYCODONE 5 MG: 5 TABLET ORAL at 02:22

## 2023-05-23 RX ADMIN — GABAPENTIN 300 MG: 300 CAPSULE ORAL at 21:36

## 2023-05-23 ASSESSMENT — PAIN SCALES - GENERAL
PAINLEVEL_OUTOF10: 0
PAINLEVEL_OUTOF10: 0
PAINLEVEL_OUTOF10: 10
PAINLEVEL_OUTOF10: 2
PAINLEVEL_OUTOF10: 9
PAINLEVEL_OUTOF10: 0

## 2023-05-23 ASSESSMENT — PAIN DESCRIPTION - LOCATION
LOCATION: HIP
LOCATION: HIP

## 2023-05-23 ASSESSMENT — PAIN DESCRIPTION - DESCRIPTORS
DESCRIPTORS: ACHING
DESCRIPTORS: ACHING

## 2023-05-23 ASSESSMENT — PAIN DESCRIPTION - ORIENTATION
ORIENTATION: LEFT
ORIENTATION: LEFT

## 2023-05-23 ASSESSMENT — PAIN SCALES - WONG BAKER: WONGBAKER_NUMERICALRESPONSE: 2

## 2023-05-23 NOTE — PROGRESS NOTES
Patient tried to get out of the bed and go home; privena dressing was pulled out a bit and reinforced it with tegaderm to maintain suction but is still beeping; explained to the patient that he is in the hospital and needs to recover first; he wants to talk to his sister; tried to call many times his sister but the line is busy

## 2023-05-23 NOTE — CARE COORDINATION
Transition of Care Plan:     RUR: 19%  Disposition: SNF- [address]  If SNF or IPR: Date FOC offered: 05/18/23  Date FOC received: 05/18/23  Accepting facility: [facility]  Date authorization started with reference number: 5/23  Date authorization received and expires:  Transportation at discharge: BLS transport  IM/IMM Medicare/ letter given: 2nd IM letter needed at d/c  Caregiver Contact: Pt's sister Lilli Erickson [phone]    Pt's insurance declined BERNIE. Pt family now requesting [address] as 1st choice for SNF. Referral sent and accepted. Insurance auth initiated earlier this morning by KATLYN. CM has made room visit with patient and sister Nola Carrillo at bedside. CM provided update on above information. Pt/sister confirmed again [address] is preferred. Pt requested to add Nola Carrillo to emergency contact list as his sister Soraya Knutson is under the weather and may not be able to answer the phone. Pt and sister aware of potential d/c today vs tomorrow pending insurance auth. CM has contacted [facility] who reported insurance Yang Constantino is still pending. CM contacted [facility] who reported they would not have a bed available today if Yang Constantino does come through today.      [address], [place], [address]

## 2023-05-23 NOTE — PROGRESS NOTES
End of Shift Note    Bedside shift change report given to [facility], [address] [place] [address] (oncoming nurse) by Alois Opitz, RN (offgoing nurse). Report included the following information SBAR and Kardex    Shift worked:  night     Shift summary and any significant changes:     Alert and oriented; voiding; up with 2 assist to bedside commode; plan for rehab; informed the morning shift nurse to inform physician of the privena dressing     Concerns for physician to address:  Serena Esquivel dressing leaking after the patient tried to pull it out     Zone phone for oncoming shift:          Activity:     Number times ambulated in hallways past shift: 0  Number of times OOB to chair past shift: 1    Cardiac:   Cardiac Monitoring: Yes           Access:  Current line(s): PIV     Genitourinary:   Urinary status: voiding    Respiratory:      Chronic home O2 use?: YES  Incentive spirometer at bedside: YES       GI:     Current diet:  ADULT DIET; Regular  DIET ONE TIME MESSAGE;  Passing flatus: YES  Tolerating current diet: YES       Pain Management:   Patient states pain is manageable on current regimen: YES    Skin:     Interventions: float heels, increase time out of bed, and PT/OT consult    Patient Safety:  Fall Score:    Interventions: bed/chair alarm, assistive device (walker, cane.  etc), gripper socks, and pt to call before getting OOB       Length of Stay:  Expected LOS: 2  Actual LOS: Endy Contreras RN

## 2023-05-23 NOTE — PROGRESS NOTES
End of Shift Note    Bedside shift change report given to Clemencia Simons RN (oncoming nurse) by Jasmyn Johnson RN (offgoing nurse). Report included the following information SBAR and Kardex    Shift worked:  day     Shift summary and any significant changes:     Alert and oriented; voiding; up with 2 assist to bedside commode; plan for rehab; Ashish Rodriguez changed to an optifoam       Concerns for physician to address:     Zone phone for oncoming shift:          Activity:     Number times ambulated in hallways past shift: 0  Number of times OOB to chair past shift: 1    Cardiac:   Cardiac Monitoring: Yes           Access:  Current line(s): PIV     Genitourinary:   Urinary status: voiding    Respiratory:      Chronic home O2 use?: YES  Incentive spirometer at bedside: YES       GI:     Current diet:  ADULT DIET; Regular  DIET ONE TIME MESSAGE;  Passing flatus: YES  Tolerating current diet: YES       Pain Management:   Patient states pain is manageable on current regimen: YES    Skin:     Interventions: float heels, increase time out of bed, and PT/OT consult    Patient Safety:  Fall Score:    Interventions: bed/chair alarm, assistive device (walker, cane.  etc), gripper socks, and pt to call before getting OOB       Length of Stay:  Expected LOS: 8  Actual LOS: Segundo Samuel RN

## 2023-05-24 VITALS
TEMPERATURE: 98.8 F | DIASTOLIC BLOOD PRESSURE: 58 MMHG | BODY MASS INDEX: 25.8 KG/M2 | HEIGHT: 74 IN | HEART RATE: 70 BPM | WEIGHT: 201 LBS | RESPIRATION RATE: 16 BRPM | OXYGEN SATURATION: 97 % | SYSTOLIC BLOOD PRESSURE: 113 MMHG

## 2023-05-24 LAB
ALBUMIN SERPL-MCNC: 2.5 G/DL (ref 3.5–5)
ALBUMIN/GLOB SERPL: 0.7 (ref 1.1–2.2)
ALP SERPL-CCNC: 116 U/L (ref 45–117)
ALT SERPL-CCNC: 46 U/L (ref 12–78)
ANION GAP SERPL CALC-SCNC: 1 MMOL/L (ref 5–15)
AST SERPL-CCNC: 43 U/L (ref 15–37)
BASOPHILS # BLD: 0.1 K/UL (ref 0–0.1)
BASOPHILS NFR BLD: 1 % (ref 0–1)
BILIRUB SERPL-MCNC: 1.2 MG/DL (ref 0.2–1)
BUN SERPL-MCNC: 7 MG/DL (ref 6–20)
BUN/CREAT SERPL: 14 (ref 12–20)
CALCIUM SERPL-MCNC: 8.7 MG/DL (ref 8.5–10.1)
CHLORIDE SERPL-SCNC: 103 MMOL/L (ref 97–108)
CO2 SERPL-SCNC: 30 MMOL/L (ref 21–32)
CREAT SERPL-MCNC: 0.49 MG/DL (ref 0.7–1.3)
DIFFERENTIAL METHOD BLD: ABNORMAL
EOSINOPHIL # BLD: 0.2 K/UL (ref 0–0.4)
EOSINOPHIL NFR BLD: 3 % (ref 0–7)
ERYTHROCYTE [DISTWIDTH] IN BLOOD BY AUTOMATED COUNT: 14.1 % (ref 11.5–14.5)
GLOBULIN SER CALC-MCNC: 3.4 G/DL (ref 2–4)
GLUCOSE SERPL-MCNC: 100 MG/DL (ref 65–100)
HCT VFR BLD AUTO: 30.5 % (ref 36.6–50.3)
HGB BLD-MCNC: 9.5 G/DL (ref 12.1–17)
IMM GRANULOCYTES # BLD AUTO: 0 K/UL (ref 0–0.04)
IMM GRANULOCYTES NFR BLD AUTO: 0 % (ref 0–0.5)
LYMPHOCYTES # BLD: 1.1 K/UL (ref 0.8–3.5)
LYMPHOCYTES NFR BLD: 16 % (ref 12–49)
MAGNESIUM SERPL-MCNC: 2 MG/DL (ref 1.6–2.4)
MCH RBC QN AUTO: 28.4 PG (ref 26–34)
MCHC RBC AUTO-ENTMCNC: 31.1 G/DL (ref 30–36.5)
MCV RBC AUTO: 91.3 FL (ref 80–99)
MONOCYTES # BLD: 0.9 K/UL (ref 0–1)
MONOCYTES NFR BLD: 14 % (ref 5–13)
NEUTS SEG # BLD: 4.3 K/UL (ref 1.8–8)
NEUTS SEG NFR BLD: 66 % (ref 32–75)
NRBC # BLD: 0 K/UL (ref 0–0.01)
NRBC BLD-RTO: 0 PER 100 WBC
PHOSPHATE SERPL-MCNC: 3.5 MG/DL (ref 2.6–4.7)
PLATELET # BLD AUTO: 421 K/UL (ref 150–400)
PMV BLD AUTO: 8.2 FL (ref 8.9–12.9)
POTASSIUM SERPL-SCNC: 3.9 MMOL/L (ref 3.5–5.1)
PROT SERPL-MCNC: 5.9 G/DL (ref 6.4–8.2)
RBC # BLD AUTO: 3.34 M/UL (ref 4.1–5.7)
SODIUM SERPL-SCNC: 134 MMOL/L (ref 136–145)
WBC # BLD AUTO: 6.5 K/UL (ref 4.1–11.1)

## 2023-05-24 PROCEDURE — 80053 COMPREHEN METABOLIC PANEL: CPT

## 2023-05-24 PROCEDURE — 6370000000 HC RX 637 (ALT 250 FOR IP): Performed by: ORTHOPAEDIC SURGERY

## 2023-05-24 PROCEDURE — 2700000000 HC OXYGEN THERAPY PER DAY

## 2023-05-24 PROCEDURE — 36415 COLL VENOUS BLD VENIPUNCTURE: CPT

## 2023-05-24 PROCEDURE — 94761 N-INVAS EAR/PLS OXIMETRY MLT: CPT

## 2023-05-24 PROCEDURE — 83735 ASSAY OF MAGNESIUM: CPT

## 2023-05-24 PROCEDURE — 6370000000 HC RX 637 (ALT 250 FOR IP): Performed by: STUDENT IN AN ORGANIZED HEALTH CARE EDUCATION/TRAINING PROGRAM

## 2023-05-24 PROCEDURE — 6370000000 HC RX 637 (ALT 250 FOR IP): Performed by: INTERNAL MEDICINE

## 2023-05-24 PROCEDURE — 84100 ASSAY OF PHOSPHORUS: CPT

## 2023-05-24 PROCEDURE — 6360000002 HC RX W HCPCS: Performed by: ORTHOPAEDIC SURGERY

## 2023-05-24 PROCEDURE — 94640 AIRWAY INHALATION TREATMENT: CPT

## 2023-05-24 PROCEDURE — 85025 COMPLETE CBC W/AUTO DIFF WBC: CPT

## 2023-05-24 RX ORDER — GABAPENTIN 300 MG/1
300 CAPSULE ORAL 4 TIMES DAILY
Qty: 90 CAPSULE | Refills: 0 | Status: SHIPPED | OUTPATIENT
Start: 2023-05-24 | End: 2023-06-23

## 2023-05-24 RX ORDER — OXYCODONE HYDROCHLORIDE 5 MG/1
5 TABLET ORAL EVERY 6 HOURS PRN
Qty: 20 TABLET | Refills: 0 | Status: SHIPPED | OUTPATIENT
Start: 2023-05-24 | End: 2023-05-31

## 2023-05-24 RX ADMIN — DOXAZOSIN 4 MG: 2 TABLET ORAL at 09:29

## 2023-05-24 RX ADMIN — AMLODIPINE BESYLATE 10 MG: 5 TABLET ORAL at 09:29

## 2023-05-24 RX ADMIN — ATORVASTATIN CALCIUM 40 MG: 40 TABLET, FILM COATED ORAL at 09:29

## 2023-05-24 RX ADMIN — GABAPENTIN 300 MG: 300 CAPSULE ORAL at 13:06

## 2023-05-24 RX ADMIN — IPRATROPIUM BROMIDE AND ALBUTEROL SULFATE 1 AMPULE: 2.5; .5 SOLUTION RESPIRATORY (INHALATION) at 09:38

## 2023-05-24 RX ADMIN — ASPIRIN 81 MG: 81 TABLET, COATED ORAL at 09:29

## 2023-05-24 RX ADMIN — MONTELUKAST 10 MG: 10 TABLET, FILM COATED ORAL at 09:29

## 2023-05-24 RX ADMIN — OXYCODONE 5 MG: 5 TABLET ORAL at 14:18

## 2023-05-24 RX ADMIN — AMOXICILLIN AND CLAVULANATE POTASSIUM 1 TABLET: 875; 125 TABLET, FILM COATED ORAL at 09:29

## 2023-05-24 RX ADMIN — ARFORMOTEROL TARTRATE: 15 SOLUTION RESPIRATORY (INHALATION) at 09:44

## 2023-05-24 RX ADMIN — DOXYCYCLINE HYCLATE 100 MG: 100 TABLET, COATED ORAL at 09:29

## 2023-05-24 RX ADMIN — LEVOTHYROXINE SODIUM 50 MCG: 0.05 TABLET ORAL at 06:49

## 2023-05-24 RX ADMIN — GABAPENTIN 300 MG: 300 CAPSULE ORAL at 09:28

## 2023-05-24 RX ADMIN — LEVETIRACETAM 500 MG: 500 TABLET, FILM COATED ORAL at 09:29

## 2023-05-24 RX ADMIN — CITALOPRAM HYDROBROMIDE 20 MG: 20 TABLET ORAL at 09:29

## 2023-05-24 RX ADMIN — OXYCODONE 5 MG: 5 TABLET ORAL at 09:29

## 2023-05-24 ASSESSMENT — PAIN DESCRIPTION - ORIENTATION
ORIENTATION: LEFT
ORIENTATION: LEFT

## 2023-05-24 ASSESSMENT — PAIN DESCRIPTION - DESCRIPTORS
DESCRIPTORS: ACHING
DESCRIPTORS: ACHING

## 2023-05-24 ASSESSMENT — PAIN DESCRIPTION - LOCATION
LOCATION: HIP
LOCATION: HIP

## 2023-05-24 ASSESSMENT — PAIN SCALES - GENERAL
PAINLEVEL_OUTOF10: 2
PAINLEVEL_OUTOF10: 10
PAINLEVEL_OUTOF10: 6
PAINLEVEL_OUTOF10: 2

## 2023-05-24 NOTE — PLAN OF CARE
Problem: Physical Therapy - Adult  Goal: By Discharge: Performs mobility at highest level of function for planned discharge setting. See evaluation for individualized goals. Description: FUNCTIONAL STATUS PRIOR TO ADMISSION: Pt reports independence w/ ambulation however states he has been relying on rolling walker more frequently as of late. History of 2 falls. Independent w/ ADLs. Denies home O2 use. HOME SUPPORT PRIOR TO ADMISSION: The patient lived alone, states sister and brother live next door and are able to assist.     Physical Therapy Goals  Initiated 5/18/2023  1. Patient will move from supine to sit and sit to supine, scoot up and down, and roll side to side in bed with supervision/set-up within 4 day(s). 2.  Patient will perform sit to stand with supervision/set-up within 4 day(s). 3.  Patient will transfer from bed to chair and chair to bed with supervision/set-up using the least restrictive device within 4 day(s). 4.  Patient will ambulate with supervision/set-up for 75 feet with the least restrictive device within 4 day(s). 5.  Patient will perform LE strengthenging home exercise program per protocol with independence within 4 days. 6. Patient will verbalize and demonstrate understanding of posterior hip precautions per protocol within 4 days. Outcome: Progressing   PHYSICAL THERAPY TREATMENT: WEEKLY REASSESSMENT    Patient: Cortney Murphy (84 y.o. male)  Date: 5/23/2023  Primary Diagnosis: Fracture [T14. 8XXA]  Periprosthetic fracture of shaft of femur T0461920. 8XXA, Z96.649]  Procedure(s) (LRB):  LEFT HIP TOTAL ARTHROPLASTY REVISION POSTERIOR APPROACH (Left) 6 Days Post-Op   Precautions: Fall Risk (WBAT; posterior hip precautions)   Left Lower Extremity Weight Bearing: Weight Bearing As Tolerated         Hip Precautions: Posterior hip precautions      ASSESSMENT :  Patient continues to benefit from skilled PT services and is progressing towards goals.  He demonstrates the ability to
Problem: Safety - Adult  Goal: Free from fall injury  5/23/2023 1111 by Oc Shea RN  Outcome: Progressing  5/23/2023 0213 by Anthony Phelps RN  Outcome: Progressing     Problem: Pain  Goal: Verbalizes/displays adequate comfort level or baseline comfort level  5/23/2023 1111 by Oc Shea RN  Outcome: Progressing  5/23/2023 0213 by Anthony Phelps RN  Outcome: Progressing     Problem: Skin/Tissue Integrity  Goal: Absence of new skin breakdown  Description: 1. Monitor for areas of redness and/or skin breakdown  2. Assess vascular access sites hourly  3. Every 4-6 hours minimum:  Change oxygen saturation probe site  4. Every 4-6 hours:  If on nasal continuous positive airway pressure, respiratory therapy assess nares and determine need for appliance change or resting period.   5/23/2023 1111 by Oc Shea RN  Outcome: Progressing  5/23/2023 0213 by Anthony Phelps RN  Outcome: Progressing
Problem: Safety - Adult  Goal: Free from fall injury  5/24/2023 1054 by Amie Ramires RN  Outcome: Progressing  5/24/2023 0041 by Ivette Ray RN  Outcome: Progressing  5/24/2023 0041 by Ivette Ray RN  Outcome: Progressing     Problem: Pain  Goal: Verbalizes/displays adequate comfort level or baseline comfort level  5/24/2023 1054 by Amie Ramires RN  Outcome: Progressing  5/24/2023 0041 by Ivette Ray RN  Outcome: Progressing  5/24/2023 0041 by Ivette Ray RN  Outcome: Progressing
Problem: Safety - Adult  Goal: Free from fall injury  Outcome: Progressing     Problem: Pain  Goal: Verbalizes/displays adequate comfort level or baseline comfort level  Outcome: Progressing     Problem: Chronic Conditions and Co-morbidities  Goal: Patient's chronic conditions and co-morbidity symptoms are monitored and maintained or improved  Outcome: Progressing     Problem: Respiratory - Adult  Goal: Achieves optimal ventilation and oxygenation  Outcome: Progressing     Problem: Skin/Tissue Integrity  Goal: Absence of new skin breakdown  Description: 1. Monitor for areas of redness and/or skin breakdown  2. Assess vascular access sites hourly  3. Every 4-6 hours minimum:  Change oxygen saturation probe site  4. Every 4-6 hours:  If on nasal continuous positive airway pressure, respiratory therapy assess nares and determine need for appliance change or resting period.   Outcome: Progressing
Problem: Skin/Tissue Integrity  Goal: Absence of new skin breakdown  Description: 1. Monitor for areas of redness and/or skin breakdown  2. Assess vascular access sites hourly  3. Every 4-6 hours minimum:  Change oxygen saturation probe site  4. Every 4-6 hours:  If on nasal continuous positive airway pressure, respiratory therapy assess nares and determine need for appliance change or resting period.   5/24/2023 0041 by Ricco Marley RN  Outcome: Progressing  5/24/2023 0041 by Ricco Marley RN  Outcome: Progressing  5/23/2023 1111 by Randy Gonzalez RN  Outcome: Progressing     Problem: Respiratory - Adult  Goal: Achieves optimal ventilation and oxygenation  5/24/2023 0041 by Ricco Marley RN  Outcome: Progressing  5/24/2023 0041 by Ricco Marley RN  Outcome: Progressing     Problem: Chronic Conditions and Co-morbidities  Goal: Patient's chronic conditions and co-morbidity symptoms are monitored and maintained or improved  5/24/2023 0041 by Ricco Marley RN  Outcome: Progressing  5/24/2023 0041 by Ricco Marley RN  Outcome: Progressing     Problem: Pain  Goal: Verbalizes/displays adequate comfort level or baseline comfort level  5/24/2023 0041 by Ricco Marley RN  Outcome: Progressing  5/24/2023 0041 by Ricco Marley RN  Outcome: Progressing  5/23/2023 1111 by Randy Gonzalez RN  Outcome: Progressing     Problem: Safety - Adult  Goal: Free from fall injury  5/24/2023 0041 by Ricco Marley RN  Outcome: Progressing  5/24/2023 0041 by Ricco Marley RN  Outcome: Progressing  5/23/2023 1111 by Randy Gonzalez RN  Outcome: Progressing
with min cueing. Will require additional training for safe carry over. Pt was successful with transferring from sit<>stand with RW and CG assist. Completed toileting with raised toilet seat and frequent cueing for maintaining precautions/safety. Pt would benefit from continued OT to increase independence with ADLs and ensure safety. PLAN :  Patient continues to benefit from skilled intervention to address the above impairments. Continue treatment per established plan of care to address goals. Recommendation for discharge: (in order for the patient to meet his/her long term goals): Therapy 3 hours/day 5-7 days/week    Other factors to consider for discharge: poor safety awareness    IF patient discharges home will need the following DME:  TBD       SUBJECTIVE:   Patient stated I'm doing ok.     OBJECTIVE DATA SUMMARY:   Cognitive/Behavioral Status:  Orientation  Overall Orientation Status: Impaired  Orientation Level: Oriented to place;Oriented to person;Oriented to situation  Cognition  Overall Cognitive Status: Exceptions  Arousal/Alertness: Delayed responses to stimuli  Following Commands: Follows one step commands with increased time; Follows one step commands with repetition  Attention Span: Difficulty attending to directions  Memory: Decreased short term memory  Safety Judgement: Decreased awareness of need for assistance;Decreased awareness of need for safety  Problem Solving: Assistance required to generate solutions  Insights: Decreased awareness of deficits  Initiation: Requires cues for some  Sequencing: Requires cues for some    Functional Mobility and Transfers for ADLs:  Bed Mobility:  Bed Mobility Training  Bed Mobility Training: Yes  Overall Level of Assistance: Contact-guard assistance  Rolling: Contact-guard assistance  Supine to Sit: Contact-guard assistance  Scooting: Contact-guard assistance     Transfers:   Transfer Training  Transfer Training: Yes  Overall Level of Assistance:

## 2023-05-24 NOTE — CARE COORDINATION
Transition of Care Plan:  RUR: 19%  Disposition: SNF- [facility]- call report  going to room 201A  If SNF or IPR: Date FOC offered: 05/18/23  Date [address] received: 05/18/23  Accepting facility: [facility]  Date authorization started with reference number: 5/23  Date authorization received and expires:  Transportation at discharge: [facility] @ 2:30pm  IM/IMM Medicare/ letter given: provided   Caregiver Contact: Pt's sister Taylor López [phone]    Auth information:  Approved for 3 days  Soc 5/23  Next review 5/25  Auth ID #[id number]  Salty reference #[id number]  Care coordinator: Constanza Colby  [facility] pushed pt's transport back to 1900. CM informed CCL who obtained permission from RADHA CARTER to use [facility]. CM contacted [facility] and obtained ETA of 2:30pm.       Initial note  CM spoke with Sen Shen at [facility] who confirmed they have a bed for patient today. [facility] arranged for 2:30pm (earliest ETA). CM contacted pt's sister, Taylor López [phone] and informed her of d/c plan and time. CM made room visit with patient and informed him of d/c plan and time. Transition of Care Plan to SNF/Rehab    Communication to Patient/Family:  Met with patient and family and they are agreeable to the transition plan. The Plan for Transition of Care is related to the following treatment goals: SNF    The Patient and/or patient representative was provided with a choice of provider and agrees  with the discharge plan. Yes [x] No []    A Freedom of choice list was provided with basic dialogue that supports the patient's individualized plan of care/goals and shares the quality data associated with the providers. Yes [x] No []    SNF/Rehab Transition:  Patient has been accepted to [facility] SNF/Rehab and meets criteria for admission.    Patient will transported by [facility] and expected to leave at 2:30pm.    Communication to SNF/Rehab:  Bedside [facility], has been notified to update the transition plan

## 2023-05-24 NOTE — DISCHARGE SUMMARY
Discharge Summary    Name: Ankush Arguello  [id number]  YOB: 1955 (Age: 80 y.o.)   Date of Admission: 5/16/2023  Date of Discharge: 5/24/2023  Attending Physician: Delford Dandy, MD    Discharge Diagnosis:   Acute hypoxic respiratory failure  Hx of COPD   Pneumonia  In-hospital delirium /intermittent encephalopathy  Left hip Fracture status   LISA   HTN  Hypothyroidism    Consultations:  IP CONSULT TO HOSPITALIST  IP CONSULT TO ORTHOPEDIC SURGERY  IP CONSULT TO CASE MANAGEMENT  IP WOUND CARE NURSE CONSULT TO EVAL  IP CONSULT TO CASE MANAGEMENT      Brief Admission History/Reason for Admission Per Kriss Palacios, DO:     80 y.o. male presents with a few hours' duration of abrpt onset, unchanging, intermittent, severe, sharp L hip pain that is not associated with any other sx, remitted by nothing, exacerbated by nothing. Further history: Pt has a great recollection of the event, he states that he has coughing spells often now. This time, when he was moving from his carpet to his tile in the kitchen, he ended up falling and getting hurt. No chest pain, no dizzinesss, no sob. No loss of consciousness    Pertinent chart review: Nothing else pertinent. Case discussed with ED specialist; Documentation from the ED reviewed, as well as outpatient charts reviewed. Brief Hospital Course by Main Problems:     Acute hypoxic respiratory failure - stable  Hx of COPD POA- ? Does not wear O2 at home. Possible pneumonia  No history ROQUE /CPAP per sisters at bedside  - CXR postop with no acute findings  - wean O2 as tolerated, goal sat 88 and above ok with history of COPD  - persistently requiring 4L. Discussed with nursing, desats to low 80s when weaned down. - not wheezing on exam.  Nebs prn  - CTA neg for PE  - continue to wean O2 at rehab facility  - outpatient followup with Dr. Nallely Wright, included contact info in discharge paperwork.   - empiric augmentin doxy for 5 days
paperwork. - empiric augmentin doxy for 5 days     In-hospital delirium /intermittent encephalopathy - improved  History of sundowning in the hospital is as per sisters at bedside  Might have exacerbated due to opioids postop  Alert  VBG normal PCO2  - decreased pain medications to oxycodone 5mg      Left hip Fracture status post ORIF 5/17  Previous Risk stratification: Based on CRI,   10.1% risk for perioperative cardiac event, can proceed with moderate risk for low risk surgery.  - PT/OT consulted-SNF recommended, [facility] by family  - Ortho consulted-status post ORIF 5/17/23  - Pain control discussed above  - ASA 81 bid for 30 days  - outpatient Ortho followup  - trend HgB-hemoglobin stable      LISA  POA  Resolved  - Fluid resuscitation  - Avoid nephrotoxics     HTN, Hypothyroidism,   Norvasc   Cardura  Continue synthroid    Discharge Exam:  Patient seen and examined by me on discharge day. Pertinent Findings:  Patient Vitals for the past 24 hrs:   BP Temp Temp src Pulse Resp SpO2   05/23/23 1210 -- -- -- -- 16 --   05/23/23 1204 112/65 98.2 °F (36.8 °C) Oral 81 16 99 %   05/23/23 1044 110/61 -- -- 91 -- 95 %   05/23/23 1026 110/61 -- -- 83 -- 97 %   05/23/23 1020 122/63 -- -- 70 -- --   05/23/23 0810 (!) 128/58 98.2 °F (36.8 °C) Oral 77 18 95 %   05/23/23 0405 122/68 98.2 °F (36.8 °C) -- 68 16 --   05/22/23 2313 119/63 -- -- 64 17 99 %   05/22/23 1952 115/67 98.4 °F (36.9 °C) -- 79 17 --   05/22/23 1935 -- -- -- 76 18 97 %   05/22/23 1731 109/63 98.1 °F (36.7 °C) Oral 76 (!) 187 --   05/22/23 1541 109/63 98.1 °F (36.7 °C) -- 76 18 98 %       General:          WD, WN. no acute distress    EENT:              EOMI. Anicteric sclerae. MMM  Resp:               CTA, no rales. No accessory muscle use  CV:                  Regular  rhythm,  No edema  GI:                   Soft, Non distended, Non tender. +Bowel sounds  Neurologic:       AAOx3  Psych:   Good insight.  Not anxious

## 2023-05-24 NOTE — PROGRESS NOTES
End of Shift Note    Bedside shift change report given to [id number] (oncoming nurse) by Didi Thapa RN (offgoing nurse). Report included the following information SBAR and Kardex    Shift worked:  night     Shift summary and any significant changes:     Alert and oriented; voiding; up with 2 assist to bedside commode; plan for SNF- [facility] care       Concerns for physician to address:     Zone phone for oncoming shift:          Activity:     Number times ambulated in hallways past shift: 0  Number of times OOB to chair past shift: 1    Cardiac:   Cardiac Monitoring: Yes           Access:  Current line(s): PIV     Genitourinary:   Urinary status: voiding    Respiratory:      Chronic home O2 use?: YES  Incentive spirometer at bedside: YES       GI:     Current diet:  ADULT DIET; Regular  DIET ONE TIME MESSAGE;  Passing flatus: YES  Tolerating current diet: YES       Pain Management:   Patient states pain is manageable on current regimen: YES    Skin:     Interventions: float heels, increase time out of bed, and PT/OT consult    Patient Safety:  Fall Score:    Interventions: bed/chair alarm, assistive device (walker, cane.  etc), gripper socks, and pt to call before getting OOB       Length of Stay:  Expected LOS: 8  Actual LOS: Gogo Lyons, SARINA

## 2023-07-18 ENCOUNTER — HOSPITAL ENCOUNTER (OUTPATIENT)
Facility: HOSPITAL | Age: 68
Discharge: HOME | End: 2023-07-21
Attending: PSYCHIATRY & NEUROLOGY
Payer: MEDICARE

## 2023-07-18 DIAGNOSIS — I63.312 CEREBRAL INFARCTION DUE TO THROMBOSIS OF LEFT MIDDLE CEREBRAL ARTERY (HCC): ICD-10-CM

## 2023-07-18 DIAGNOSIS — G40.209 COMPLEX PARTIAL SEIZURE EVOLVING TO GENERALIZED SEIZURE (HCC): ICD-10-CM

## 2023-07-18 DIAGNOSIS — I63.239 CAROTID ARTERY STENOSIS WITH CEREBRAL INFARCTION (HCC): ICD-10-CM

## 2023-07-18 DIAGNOSIS — G40.209 LOCALIZATION-RELATED PARTIAL EPILEPSY WITH COMPLEX PARTIAL SEIZURES (HCC): ICD-10-CM

## 2023-07-18 PROCEDURE — 95708 EEG WO VID EA 12-26HR UNMNTR: CPT

## 2023-07-18 PROCEDURE — 95719 EEG PHYS/QHP EA INCR W/O VID: CPT | Performed by: PSYCHIATRY & NEUROLOGY

## 2023-07-20 NOTE — PROCEDURES
correlation recommended. MD DEEPA Winkler/[email or web address]  D:  07/19/2023 21:38  T:  07/20/2023 2:17  JOB #:  [id number]  CC:   Sania Gifford MD

## 2023-11-01 ENCOUNTER — HOSPITAL ENCOUNTER (EMERGENCY)
Facility: HOSPITAL | Age: 68
Discharge: HOME | End: 2023-11-01
Payer: MEDICARE

## 2023-11-01 ENCOUNTER — APPOINTMENT (OUTPATIENT)
Facility: HOSPITAL | Age: 68
End: 2023-11-01
Payer: MEDICARE

## 2023-11-01 VITALS
HEART RATE: 70 BPM | DIASTOLIC BLOOD PRESSURE: 73 MMHG | OXYGEN SATURATION: 100 % | RESPIRATION RATE: 16 BRPM | TEMPERATURE: 97 F | SYSTOLIC BLOOD PRESSURE: 128 MMHG

## 2023-11-01 DIAGNOSIS — J44.1 COPD EXACERBATION (HCC): Primary | ICD-10-CM

## 2023-11-01 DIAGNOSIS — R06.02 SHORTNESS OF BREATH: ICD-10-CM

## 2023-11-01 LAB
ALBUMIN SERPL-MCNC: 3.6 G/DL (ref 3.5–5)
ALBUMIN/GLOB SERPL: 0.8 (ref 1.1–2.2)
ALP SERPL-CCNC: 213 U/L (ref 45–117)
ALT SERPL-CCNC: 15 U/L (ref 12–78)
ANION GAP SERPL CALC-SCNC: 3 MMOL/L (ref 5–15)
AST SERPL-CCNC: 20 U/L (ref 15–37)
BASOPHILS # BLD: 0.1 K/UL (ref 0–0.1)
BASOPHILS NFR BLD: 1 % (ref 0–1)
BILIRUB SERPL-MCNC: 0.7 MG/DL (ref 0.2–1)
BUN SERPL-MCNC: 6 MG/DL (ref 6–20)
BUN/CREAT SERPL: 8 (ref 12–20)
CALCIUM SERPL-MCNC: 9.2 MG/DL (ref 8.5–10.1)
CHLORIDE SERPL-SCNC: 102 MMOL/L (ref 97–108)
CO2 SERPL-SCNC: 33 MMOL/L (ref 21–32)
CREAT SERPL-MCNC: 0.78 MG/DL (ref 0.7–1.3)
DIFFERENTIAL METHOD BLD: ABNORMAL
EOSINOPHIL # BLD: 0.1 K/UL (ref 0–0.4)
EOSINOPHIL NFR BLD: 2 % (ref 0–7)
ERYTHROCYTE [DISTWIDTH] IN BLOOD BY AUTOMATED COUNT: 19.6 % (ref 11.5–14.5)
FLUAV AG NPH QL IA: NEGATIVE
FLUBV AG NOSE QL IA: NEGATIVE
GLOBULIN SER CALC-MCNC: 4.3 G/DL (ref 2–4)
GLUCOSE SERPL-MCNC: 107 MG/DL (ref 65–100)
HCT VFR BLD AUTO: 43.2 % (ref 36.6–50.3)
HGB BLD-MCNC: 13.1 G/DL (ref 12.1–17)
IMM GRANULOCYTES # BLD AUTO: 0 K/UL (ref 0–0.04)
IMM GRANULOCYTES NFR BLD AUTO: 0 % (ref 0–0.5)
LYMPHOCYTES # BLD: 1.2 K/UL (ref 0.8–3.5)
LYMPHOCYTES NFR BLD: 22 % (ref 12–49)
MAGNESIUM SERPL-MCNC: 2.1 MG/DL (ref 1.6–2.4)
MCH RBC QN AUTO: 26.3 PG (ref 26–34)
MCHC RBC AUTO-ENTMCNC: 30.3 G/DL (ref 30–36.5)
MCV RBC AUTO: 86.6 FL (ref 80–99)
MONOCYTES # BLD: 0.4 K/UL (ref 0–1)
MONOCYTES NFR BLD: 7 % (ref 5–13)
NEUTS SEG # BLD: 3.8 K/UL (ref 1.8–8)
NEUTS SEG NFR BLD: 68 % (ref 32–75)
NRBC # BLD: 0 K/UL (ref 0–0.01)
NRBC BLD-RTO: 0 PER 100 WBC
NT PRO BNP: 285 PG/ML
PLATELET # BLD AUTO: 419 K/UL (ref 150–400)
PMV BLD AUTO: 9 FL (ref 8.9–12.9)
POTASSIUM SERPL-SCNC: 4.6 MMOL/L (ref 3.5–5.1)
PROT SERPL-MCNC: 7.9 G/DL (ref 6.4–8.2)
RBC # BLD AUTO: 4.99 M/UL (ref 4.1–5.7)
SARS-COV-2 RDRP RESP QL NAA+PROBE: NOT DETECTED
SODIUM SERPL-SCNC: 138 MMOL/L (ref 136–145)
SOURCE: NORMAL
TROPONIN I SERPL HS-MCNC: 7 NG/L (ref 0–76)
TROPONIN I SERPL HS-MCNC: 7 NG/L (ref 0–76)
WBC # BLD AUTO: 5.4 K/UL (ref 4.1–11.1)

## 2023-11-01 PROCEDURE — 6370000000 HC RX 637 (ALT 250 FOR IP)

## 2023-11-01 PROCEDURE — 94640 AIRWAY INHALATION TREATMENT: CPT

## 2023-11-01 PROCEDURE — 71045 X-RAY EXAM CHEST 1 VIEW: CPT

## 2023-11-01 PROCEDURE — 85025 COMPLETE CBC W/AUTO DIFF WBC: CPT

## 2023-11-01 PROCEDURE — 36415 COLL VENOUS BLD VENIPUNCTURE: CPT

## 2023-11-01 PROCEDURE — 84484 ASSAY OF TROPONIN QUANT: CPT

## 2023-11-01 PROCEDURE — 93005 ELECTROCARDIOGRAM TRACING: CPT | Performed by: EMERGENCY MEDICINE

## 2023-11-01 PROCEDURE — 71275 CT ANGIOGRAPHY CHEST: CPT

## 2023-11-01 PROCEDURE — 87635 SARS-COV-2 COVID-19 AMP PRB: CPT

## 2023-11-01 PROCEDURE — 94762 N-INVAS EAR/PLS OXIMTRY CONT: CPT

## 2023-11-01 PROCEDURE — 6360000002 HC RX W HCPCS

## 2023-11-01 PROCEDURE — 83735 ASSAY OF MAGNESIUM: CPT

## 2023-11-01 PROCEDURE — 83880 ASSAY OF NATRIURETIC PEPTIDE: CPT

## 2023-11-01 PROCEDURE — 99285 EMERGENCY DEPT VISIT HI MDM: CPT

## 2023-11-01 PROCEDURE — 87804 INFLUENZA ASSAY W/OPTIC: CPT

## 2023-11-01 PROCEDURE — 6360000004 HC RX CONTRAST MEDICATION

## 2023-11-01 PROCEDURE — 80053 COMPREHEN METABOLIC PANEL: CPT

## 2023-11-01 RX ORDER — IPRATROPIUM BROMIDE AND ALBUTEROL SULFATE 2.5; .5 MG/3ML; MG/3ML
1 SOLUTION RESPIRATORY (INHALATION)
Status: COMPLETED | OUTPATIENT
Start: 2023-11-01 | End: 2023-11-01

## 2023-11-01 RX ORDER — METHYLPREDNISOLONE 4 MG/1
TABLET ORAL
Qty: 21 TABLET | Refills: 0 | Status: SHIPPED | OUTPATIENT
Start: 2023-11-01 | End: 2023-11-07

## 2023-11-01 RX ORDER — DEXAMETHASONE 4 MG/1
6 TABLET ORAL ONCE
Status: COMPLETED | OUTPATIENT
Start: 2023-11-01 | End: 2023-11-01

## 2023-11-01 RX ADMIN — IOPAMIDOL 80 ML: 755 INJECTION, SOLUTION INTRAVENOUS at 21:10

## 2023-11-01 RX ADMIN — IPRATROPIUM BROMIDE AND ALBUTEROL SULFATE 1 DOSE: .5; 3 SOLUTION RESPIRATORY (INHALATION) at 20:01

## 2023-11-01 RX ADMIN — DEXAMETHASONE 6 MG: 4 TABLET ORAL at 22:14

## 2023-11-01 NOTE — ED PROVIDER NOTES
[facility] EMERGENCY DEPT  EMERGENCY DEPARTMENT ENCOUNTER       Pt Name: Erickson Muro  MRN: [id number]  [address] [facility] [place] 1955  Date of evaluation: 11/1/2023  Provider: Jonna Falcon PA-C   PCP: Paula Marcelo DO  Note Started: 6:26 PM EDT 11/1/23     CHIEF COMPLAINT       Chief Complaint   Patient presents with    Shortness of Breath     Patient BIBEMS complaining of shortness of breath. Patient does not wear oxygen at baseline. Patient does have a history of COPD. HISTORY OF PRESENT ILLNESS: 1 or more elements      History From: Patient  HPI Limitations: None     Erickson Muro is a 80 y.o. male with past medical history COPD, hypothyroid, CAD, hypertension, hypercholesterolemia, arthritis who presents complaining of shortness of breath x1 day. Patient reports he has had increasing shortness of breath over the last 3 to 4 months, however worse in the last day. He reports he also has cough with yellowish phlegm. He feels like he is struggling to get phlegm out of urine. He reports he uses home oxygen, baseline is 3 L. He states he has not seen his neurologist last 4 months since he has had worsening shortness of breath. He denies any chest pain, hemoptysis, fever, chills, nausea, vomiting, arm pain, lightheadedness, dizziness, syncope. Nursing Notes were all reviewed and agreed with or any disagreements were addressed in the HPI. REVIEW OF SYSTEMS      Review of Systems     Positives and Pertinent negatives as per HPI.     PAST HISTORY     Past Medical History:  Past Medical History:   Diagnosis Date    Arthritis     Hips, Knees    CAD (coronary artery disease)     MI around 1990    Hepatitis A     High cholesterol     Hypertension     Other ill-defined conditions(799.89)     Light sensitivity, causes seizures    Seizures ([address])     Last seizure 12/2008/work -up in 2012 -possible seizure    Stroke [facility]) 2005    Thyroid disease     Hypothyroid       Past Surgical History:  Past Surgical History:

## 2023-11-03 LAB
EKG ATRIAL RATE: 60 BPM
EKG DIAGNOSIS: NORMAL
EKG P AXIS: 88 DEGREES
EKG P-R INTERVAL: 186 MS
EKG Q-T INTERVAL: 400 MS
EKG QRS DURATION: 86 MS
EKG QTC CALCULATION (BAZETT): 400 MS
EKG R AXIS: 82 DEGREES
EKG T AXIS: 80 DEGREES
EKG VENTRICULAR RATE: 60 BPM

## 2023-11-06 DIAGNOSIS — Z96.649 PERIPROSTHETIC FRACTURE OF SHAFT OF FEMUR: ICD-10-CM

## 2023-11-06 DIAGNOSIS — M97.8XXA PERIPROSTHETIC FRACTURE OF SHAFT OF FEMUR: ICD-10-CM

## 2023-11-07 RX ORDER — GABAPENTIN 300 MG/1
CAPSULE ORAL
Qty: 720 CAPSULE | Refills: 1 | Status: SHIPPED | OUTPATIENT
Start: 2023-11-07 | End: 2024-05-07

## 2023-12-01 ENCOUNTER — APPOINTMENT (OUTPATIENT)
Facility: HOSPITAL | Age: 68
DRG: 444 | End: 2023-12-01
Payer: MEDICARE

## 2023-12-01 ENCOUNTER — HOSPITAL ENCOUNTER (INPATIENT)
Facility: HOSPITAL | Age: 68
LOS: 1 days | Discharge: LEFT BEFORE DISCHARGE | DRG: 444 | End: 2023-12-02
Attending: STUDENT IN AN ORGANIZED HEALTH CARE EDUCATION/TRAINING PROGRAM | Admitting: INTERNAL MEDICINE
Payer: MEDICARE

## 2023-12-01 DIAGNOSIS — E86.0 DEHYDRATION: ICD-10-CM

## 2023-12-01 DIAGNOSIS — K82.8 GALLBLADDER SLUDGE: ICD-10-CM

## 2023-12-01 DIAGNOSIS — R11.0 NAUSEA: ICD-10-CM

## 2023-12-01 DIAGNOSIS — R10.11 ABDOMINAL PAIN, RIGHT UPPER QUADRANT: Primary | ICD-10-CM

## 2023-12-01 PROBLEM — K81.0 ACUTE CHOLECYSTITIS: Status: ACTIVE | Noted: 2023-12-01

## 2023-12-01 LAB
ALBUMIN SERPL-MCNC: 2.7 G/DL (ref 3.5–5)
ALBUMIN/GLOB SERPL: 0.8 (ref 1.1–2.2)
ALP SERPL-CCNC: 136 U/L (ref 45–117)
ALT SERPL-CCNC: 35 U/L (ref 12–78)
ANION GAP SERPL CALC-SCNC: 4 MMOL/L (ref 5–15)
APPEARANCE UR: CLEAR
AST SERPL-CCNC: 16 U/L (ref 15–37)
BACTERIA URNS QL MICRO: NEGATIVE /HPF
BASOPHILS # BLD: 0 K/UL (ref 0–0.1)
BASOPHILS NFR BLD: 0 % (ref 0–1)
BILIRUB SERPL-MCNC: 0.6 MG/DL (ref 0.2–1)
BILIRUB UR QL: NEGATIVE
BUN SERPL-MCNC: 11 MG/DL (ref 6–20)
BUN/CREAT SERPL: 19 (ref 12–20)
CALCIUM SERPL-MCNC: 8.3 MG/DL (ref 8.5–10.1)
CHLORIDE SERPL-SCNC: 101 MMOL/L (ref 97–108)
CO2 SERPL-SCNC: 30 MMOL/L (ref 21–32)
COLOR UR: NORMAL
CREAT SERPL-MCNC: 0.57 MG/DL (ref 0.7–1.3)
DIFFERENTIAL METHOD BLD: ABNORMAL
EOSINOPHIL # BLD: 0 K/UL (ref 0–0.4)
EOSINOPHIL NFR BLD: 0 % (ref 0–7)
EPITH CASTS URNS QL MICRO: NORMAL /LPF
ERYTHROCYTE [DISTWIDTH] IN BLOOD BY AUTOMATED COUNT: 16.4 % (ref 11.5–14.5)
GLOBULIN SER CALC-MCNC: 3.5 G/DL (ref 2–4)
GLUCOSE SERPL-MCNC: 131 MG/DL (ref 65–100)
GLUCOSE UR STRIP.AUTO-MCNC: NEGATIVE MG/DL
HCT VFR BLD AUTO: 35.2 % (ref 36.6–50.3)
HGB BLD-MCNC: 11.5 G/DL (ref 12.1–17)
HGB UR QL STRIP: NEGATIVE
HYALINE CASTS URNS QL MICRO: NORMAL /LPF (ref 0–2)
IMM GRANULOCYTES # BLD AUTO: 0 K/UL (ref 0–0.04)
IMM GRANULOCYTES NFR BLD AUTO: 0 % (ref 0–0.5)
KETONES UR QL STRIP.AUTO: NEGATIVE MG/DL
LACTATE SERPL-SCNC: 0.9 MMOL/L (ref 0.4–2)
LEUKOCYTE ESTERASE UR QL STRIP.AUTO: NEGATIVE
LIPASE SERPL-CCNC: 33 U/L (ref 13–75)
LYMPHOCYTES # BLD: 0.5 K/UL (ref 0.8–3.5)
LYMPHOCYTES NFR BLD: 6 % (ref 12–49)
MCH RBC QN AUTO: 27.9 PG (ref 26–34)
MCHC RBC AUTO-ENTMCNC: 32.7 G/DL (ref 30–36.5)
MCV RBC AUTO: 85.4 FL (ref 80–99)
MONOCYTES # BLD: 0.6 K/UL (ref 0–1)
MONOCYTES NFR BLD: 7 % (ref 5–13)
NEUTS SEG # BLD: 8 K/UL (ref 1.8–8)
NEUTS SEG NFR BLD: 87 % (ref 32–75)
NITRITE UR QL STRIP.AUTO: NEGATIVE
NRBC # BLD: 0 K/UL (ref 0–0.01)
NRBC BLD-RTO: 0 PER 100 WBC
PH UR STRIP: 5.5 (ref 5–8)
PLATELET # BLD AUTO: 231 K/UL (ref 150–400)
PMV BLD AUTO: 9 FL (ref 8.9–12.9)
POTASSIUM SERPL-SCNC: 3.3 MMOL/L (ref 3.5–5.1)
PROT SERPL-MCNC: 6.2 G/DL (ref 6.4–8.2)
PROT UR STRIP-MCNC: NEGATIVE MG/DL
RBC # BLD AUTO: 4.12 M/UL (ref 4.1–5.7)
RBC #/AREA URNS HPF: NORMAL /HPF (ref 0–5)
RBC MORPH BLD: ABNORMAL
SODIUM SERPL-SCNC: 135 MMOL/L (ref 136–145)
SP GR UR REFRACTOMETRY: 1.01 (ref 1–1.03)
URINE CULTURE IF INDICATED: NORMAL
UROBILINOGEN UR QL STRIP.AUTO: 1 EU/DL (ref 0.2–1)
WBC # BLD AUTO: 9.1 K/UL (ref 4.1–11.1)
WBC URNS QL MICRO: NORMAL /HPF (ref 0–4)

## 2023-12-01 PROCEDURE — 2700000000 HC OXYGEN THERAPY PER DAY

## 2023-12-01 PROCEDURE — 80053 COMPREHEN METABOLIC PANEL: CPT

## 2023-12-01 PROCEDURE — 85025 COMPLETE CBC W/AUTO DIFF WBC: CPT

## 2023-12-01 PROCEDURE — 94762 N-INVAS EAR/PLS OXIMTRY CONT: CPT

## 2023-12-01 PROCEDURE — 1100000000 HC RM PRIVATE

## 2023-12-01 PROCEDURE — 2580000003 HC RX 258: Performed by: STUDENT IN AN ORGANIZED HEALTH CARE EDUCATION/TRAINING PROGRAM

## 2023-12-01 PROCEDURE — 96361 HYDRATE IV INFUSION ADD-ON: CPT

## 2023-12-01 PROCEDURE — 99285 EMERGENCY DEPT VISIT HI MDM: CPT

## 2023-12-01 PROCEDURE — 76705 ECHO EXAM OF ABDOMEN: CPT

## 2023-12-01 PROCEDURE — 83605 ASSAY OF LACTIC ACID: CPT

## 2023-12-01 PROCEDURE — 6360000004 HC RX CONTRAST MEDICATION: Performed by: RADIOLOGY

## 2023-12-01 PROCEDURE — 96374 THER/PROPH/DIAG INJ IV PUSH: CPT

## 2023-12-01 PROCEDURE — 36415 COLL VENOUS BLD VENIPUNCTURE: CPT

## 2023-12-01 PROCEDURE — 83690 ASSAY OF LIPASE: CPT

## 2023-12-01 PROCEDURE — 93005 ELECTROCARDIOGRAM TRACING: CPT | Performed by: STUDENT IN AN ORGANIZED HEALTH CARE EDUCATION/TRAINING PROGRAM

## 2023-12-01 PROCEDURE — 96375 TX/PRO/DX INJ NEW DRUG ADDON: CPT

## 2023-12-01 PROCEDURE — 81001 URINALYSIS AUTO W/SCOPE: CPT

## 2023-12-01 PROCEDURE — 6360000002 HC RX W HCPCS: Performed by: STUDENT IN AN ORGANIZED HEALTH CARE EDUCATION/TRAINING PROGRAM

## 2023-12-01 PROCEDURE — 71045 X-RAY EXAM CHEST 1 VIEW: CPT

## 2023-12-01 PROCEDURE — 74177 CT ABD & PELVIS W/CONTRAST: CPT

## 2023-12-01 RX ORDER — MORPHINE SULFATE 4 MG/ML
4 INJECTION, SOLUTION INTRAMUSCULAR; INTRAVENOUS
Status: COMPLETED | OUTPATIENT
Start: 2023-12-01 | End: 2023-12-01

## 2023-12-01 RX ORDER — 0.9 % SODIUM CHLORIDE 0.9 %
1000 INTRAVENOUS SOLUTION INTRAVENOUS ONCE
Status: COMPLETED | OUTPATIENT
Start: 2023-12-01 | End: 2023-12-01

## 2023-12-01 RX ORDER — SODIUM CHLORIDE 9 MG/ML
INJECTION, SOLUTION INTRAVENOUS CONTINUOUS
Status: DISCONTINUED | OUTPATIENT
Start: 2023-12-01 | End: 2023-12-02 | Stop reason: HOSPADM

## 2023-12-01 RX ORDER — ONDANSETRON 2 MG/ML
4 INJECTION INTRAMUSCULAR; INTRAVENOUS ONCE
Status: COMPLETED | OUTPATIENT
Start: 2023-12-01 | End: 2023-12-01

## 2023-12-01 RX ORDER — MORPHINE SULFATE 2 MG/ML
1 INJECTION, SOLUTION INTRAMUSCULAR; INTRAVENOUS
Status: COMPLETED | OUTPATIENT
Start: 2023-12-01 | End: 2023-12-02

## 2023-12-01 RX ADMIN — SODIUM CHLORIDE 1000 MG: 900 INJECTION INTRAVENOUS at 23:16

## 2023-12-01 RX ADMIN — IOPAMIDOL 100 ML: 755 INJECTION, SOLUTION INTRAVENOUS at 21:02

## 2023-12-01 RX ADMIN — SODIUM CHLORIDE 1000 ML: 9 INJECTION, SOLUTION INTRAVENOUS at 19:43

## 2023-12-01 RX ADMIN — ONDANSETRON 4 MG: 2 INJECTION INTRAMUSCULAR; INTRAVENOUS at 19:43

## 2023-12-01 RX ADMIN — MORPHINE SULFATE 4 MG: 4 INJECTION, SOLUTION INTRAMUSCULAR; INTRAVENOUS at 19:43

## 2023-12-01 RX ADMIN — SODIUM CHLORIDE: 9 INJECTION, SOLUTION INTRAVENOUS at 23:17

## 2023-12-01 ASSESSMENT — PAIN SCALES - GENERAL: PAINLEVEL_OUTOF10: 9

## 2023-12-01 ASSESSMENT — PAIN - FUNCTIONAL ASSESSMENT: PAIN_FUNCTIONAL_ASSESSMENT: 0-10

## 2023-12-01 NOTE — ED NOTES
Pt cannot tolerate standing for orthostatics at this time.  He reports extreme dizziness \"like he's going to pass out\" upon sitting      Radha Sellers  12/01/23 183

## 2023-12-02 VITALS
HEART RATE: 67 BPM | BODY MASS INDEX: 25.41 KG/M2 | HEIGHT: 74 IN | SYSTOLIC BLOOD PRESSURE: 115 MMHG | TEMPERATURE: 98.1 F | WEIGHT: 198 LBS | DIASTOLIC BLOOD PRESSURE: 69 MMHG | OXYGEN SATURATION: 93 % | RESPIRATION RATE: 24 BRPM

## 2023-12-02 LAB — LACTATE SERPL-SCNC: 1 MMOL/L (ref 0.4–2)

## 2023-12-02 PROCEDURE — 2580000003 HC RX 258: Performed by: INTERNAL MEDICINE

## 2023-12-02 PROCEDURE — 2500000003 HC RX 250 WO HCPCS: Performed by: INTERNAL MEDICINE

## 2023-12-02 PROCEDURE — 36415 COLL VENOUS BLD VENIPUNCTURE: CPT

## 2023-12-02 PROCEDURE — 99231 SBSQ HOSP IP/OBS SF/LOW 25: CPT | Performed by: STUDENT IN AN ORGANIZED HEALTH CARE EDUCATION/TRAINING PROGRAM

## 2023-12-02 PROCEDURE — 6370000000 HC RX 637 (ALT 250 FOR IP): Performed by: STUDENT IN AN ORGANIZED HEALTH CARE EDUCATION/TRAINING PROGRAM

## 2023-12-02 PROCEDURE — 2700000000 HC OXYGEN THERAPY PER DAY

## 2023-12-02 PROCEDURE — 6360000002 HC RX W HCPCS: Performed by: INTERNAL MEDICINE

## 2023-12-02 PROCEDURE — 83605 ASSAY OF LACTIC ACID: CPT

## 2023-12-02 RX ORDER — ONDANSETRON 4 MG/1
4 TABLET, ORALLY DISINTEGRATING ORAL EVERY 8 HOURS PRN
Status: DISCONTINUED | OUTPATIENT
Start: 2023-12-02 | End: 2023-12-02 | Stop reason: HOSPADM

## 2023-12-02 RX ORDER — SODIUM CHLORIDE 9 MG/ML
INJECTION, SOLUTION INTRAVENOUS PRN
Status: DISCONTINUED | OUTPATIENT
Start: 2023-12-02 | End: 2023-12-02 | Stop reason: HOSPADM

## 2023-12-02 RX ORDER — DOXAZOSIN 2 MG/1
4 TABLET ORAL DAILY
Status: DISCONTINUED | OUTPATIENT
Start: 2023-12-02 | End: 2023-12-02 | Stop reason: HOSPADM

## 2023-12-02 RX ORDER — AMLODIPINE BESYLATE 5 MG/1
10 TABLET ORAL DAILY
Status: DISCONTINUED | OUTPATIENT
Start: 2023-12-02 | End: 2023-12-02 | Stop reason: HOSPADM

## 2023-12-02 RX ORDER — POTASSIUM CHLORIDE 7.45 MG/ML
10 INJECTION INTRAVENOUS PRN
Status: DISCONTINUED | OUTPATIENT
Start: 2023-12-02 | End: 2023-12-02 | Stop reason: HOSPADM

## 2023-12-02 RX ORDER — CASTOR OIL AND BALSAM, PERU 788; 87 MG/G; MG/G
OINTMENT TOPICAL 2 TIMES DAILY
Status: DISCONTINUED | OUTPATIENT
Start: 2023-12-02 | End: 2023-12-02 | Stop reason: HOSPADM

## 2023-12-02 RX ORDER — GABAPENTIN 300 MG/1
300 CAPSULE ORAL DAILY
Status: DISCONTINUED | OUTPATIENT
Start: 2023-12-02 | End: 2023-12-02 | Stop reason: HOSPADM

## 2023-12-02 RX ORDER — ACETAMINOPHEN 325 MG/1
650 TABLET ORAL EVERY 4 HOURS PRN
Status: DISCONTINUED | OUTPATIENT
Start: 2023-12-02 | End: 2023-12-02 | Stop reason: HOSPADM

## 2023-12-02 RX ORDER — POLYETHYLENE GLYCOL 3350 17 G/17G
17 POWDER, FOR SOLUTION ORAL DAILY PRN
Status: DISCONTINUED | OUTPATIENT
Start: 2023-12-02 | End: 2023-12-02 | Stop reason: HOSPADM

## 2023-12-02 RX ORDER — OXYCODONE HYDROCHLORIDE 5 MG/1
5 TABLET ORAL EVERY 4 HOURS PRN
Status: DISCONTINUED | OUTPATIENT
Start: 2023-12-02 | End: 2023-12-02 | Stop reason: HOSPADM

## 2023-12-02 RX ORDER — SODIUM CHLORIDE 0.9 % (FLUSH) 0.9 %
5-40 SYRINGE (ML) INJECTION PRN
Status: DISCONTINUED | OUTPATIENT
Start: 2023-12-02 | End: 2023-12-02 | Stop reason: HOSPADM

## 2023-12-02 RX ORDER — POTASSIUM CHLORIDE 20 MEQ/1
40 TABLET, EXTENDED RELEASE ORAL PRN
Status: DISCONTINUED | OUTPATIENT
Start: 2023-12-02 | End: 2023-12-02 | Stop reason: HOSPADM

## 2023-12-02 RX ORDER — ATORVASTATIN CALCIUM 40 MG/1
40 TABLET, FILM COATED ORAL DAILY
Status: DISCONTINUED | OUTPATIENT
Start: 2023-12-02 | End: 2023-12-02 | Stop reason: HOSPADM

## 2023-12-02 RX ORDER — ONDANSETRON 2 MG/ML
4 INJECTION INTRAMUSCULAR; INTRAVENOUS EVERY 4 HOURS PRN
Status: DISCONTINUED | OUTPATIENT
Start: 2023-12-02 | End: 2023-12-02 | Stop reason: HOSPADM

## 2023-12-02 RX ORDER — ACETAMINOPHEN 650 MG/1
650 SUPPOSITORY RECTAL EVERY 6 HOURS PRN
Status: DISCONTINUED | OUTPATIENT
Start: 2023-12-02 | End: 2023-12-02 | Stop reason: HOSPADM

## 2023-12-02 RX ORDER — ONDANSETRON 2 MG/ML
4 INJECTION INTRAMUSCULAR; INTRAVENOUS EVERY 6 HOURS PRN
Status: DISCONTINUED | OUTPATIENT
Start: 2023-12-02 | End: 2023-12-02 | Stop reason: HOSPADM

## 2023-12-02 RX ORDER — LEVETIRACETAM 500 MG/1
500 TABLET ORAL 2 TIMES DAILY
Status: DISCONTINUED | OUTPATIENT
Start: 2023-12-02 | End: 2023-12-02 | Stop reason: HOSPADM

## 2023-12-02 RX ORDER — ACETAMINOPHEN 325 MG/1
650 TABLET ORAL EVERY 6 HOURS PRN
Status: DISCONTINUED | OUTPATIENT
Start: 2023-12-02 | End: 2023-12-02 | Stop reason: HOSPADM

## 2023-12-02 RX ORDER — SODIUM CHLORIDE 0.9 % (FLUSH) 0.9 %
5-40 SYRINGE (ML) INJECTION EVERY 12 HOURS SCHEDULED
Status: DISCONTINUED | OUTPATIENT
Start: 2023-12-02 | End: 2023-12-02 | Stop reason: HOSPADM

## 2023-12-02 RX ORDER — LEVOTHYROXINE SODIUM 0.05 MG/1
50 TABLET ORAL DAILY
Status: DISCONTINUED | OUTPATIENT
Start: 2023-12-02 | End: 2023-12-02 | Stop reason: HOSPADM

## 2023-12-02 RX ORDER — MAGNESIUM SULFATE IN WATER 40 MG/ML
2000 INJECTION, SOLUTION INTRAVENOUS PRN
Status: DISCONTINUED | OUTPATIENT
Start: 2023-12-02 | End: 2023-12-02 | Stop reason: HOSPADM

## 2023-12-02 RX ORDER — DEXTROSE MONOHYDRATE, SODIUM CHLORIDE, AND POTASSIUM CHLORIDE 50; 1.49; 4.5 G/1000ML; G/1000ML; G/1000ML
INJECTION, SOLUTION INTRAVENOUS CONTINUOUS
Status: DISCONTINUED | OUTPATIENT
Start: 2023-12-02 | End: 2023-12-02 | Stop reason: HOSPADM

## 2023-12-02 RX ORDER — MORPHINE SULFATE 2 MG/ML
2 INJECTION, SOLUTION INTRAMUSCULAR; INTRAVENOUS EVERY 4 HOURS PRN
Status: DISCONTINUED | OUTPATIENT
Start: 2023-12-02 | End: 2023-12-02 | Stop reason: HOSPADM

## 2023-12-02 RX ADMIN — POTASSIUM CHLORIDE, DEXTROSE MONOHYDRATE AND SODIUM CHLORIDE: 150; 5; 450 INJECTION, SOLUTION INTRAVENOUS at 03:18

## 2023-12-02 RX ADMIN — ACETAMINOPHEN 650 MG: 325 TABLET ORAL at 03:14

## 2023-12-02 RX ADMIN — PIPERACILLIN AND TAZOBACTAM 4500 MG: 4; .5 INJECTION, POWDER, FOR SOLUTION INTRAVENOUS at 02:26

## 2023-12-02 RX ADMIN — MORPHINE SULFATE 1 MG: 2 INJECTION, SOLUTION INTRAMUSCULAR; INTRAVENOUS at 00:42

## 2023-12-02 RX ADMIN — ACETAMINOPHEN 650 MG: 325 TABLET ORAL at 07:34

## 2023-12-02 ASSESSMENT — PAIN DESCRIPTION - DESCRIPTORS
DESCRIPTORS: ACHING
DESCRIPTORS: ACHING;SHOOTING;SHARP;PRESSURE

## 2023-12-02 ASSESSMENT — PAIN SCALES - GENERAL
PAINLEVEL_OUTOF10: 10
PAINLEVEL_OUTOF10: 10

## 2023-12-02 ASSESSMENT — PAIN DESCRIPTION - LOCATION
LOCATION: ABDOMEN
LOCATION: ABDOMEN

## 2023-12-02 ASSESSMENT — PAIN DESCRIPTION - PAIN TYPE: TYPE: ACUTE PAIN

## 2023-12-02 ASSESSMENT — PAIN DESCRIPTION - ORIENTATION: ORIENTATION: RIGHT

## 2023-12-02 NOTE — PLAN OF CARE
Problem: Safety - Adult  Goal: Free from fall injury  Outcome: Progressing     Problem: Discharge Planning  Goal: Discharge to home or other facility with appropriate resources  Outcome: Not Progressing     Problem: Pain  Goal: Verbalizes/displays adequate comfort level or baseline comfort level  Outcome: Not Progressing

## 2023-12-02 NOTE — PROGRESS NOTES
Called to room by patient @ 1801. Pt sitting on side of bed when this nurse enters room. Pt states \"I want to leave! I want to go home! You all are lying to me, no one knows what's going on. \" This nurse told the patient that I had just assumed his care and I would reach out to a provider to see him. Pt refused to see provider and insists that he wants to leave. Charge nurse notified. [employer] sent to attending. Pt continued to be irritable and insists on leaving. While obtaining AMA forms this nurse observed walking down irvin towards elevators. Iesha QUINN called security and supervisor responded. Family called to transport pt home.

## 2023-12-02 NOTE — PROGRESS NOTES
Niece and brother arrived to unit and instructed where to bring car to  patient. Pt was escorted to exit of the hospital by this nurse and PCT. Pt was visualized getting into vehicle with family.

## 2023-12-02 NOTE — ED PROVIDER NOTES
Tigist Bagley       Pt Name: Erickson Muro  MRN: [id number]  [address] [facility] 1955  Date of evaluation: 12/1/2023  Provider: Bette Gillespie MD   PCP: Paula Marcelo DO  Note Started: 7:01 PM EST 12/1/23     CHIEF COMPLAINT       Chief Complaint   Patient presents with    Dizziness     Dizziness worse when standing    Abdominal Pain     EMS reports sever RLQ pain, worse with palpation, started at 1400 today. Pain radiates to RUQ and epigastric area. Denies N/V.  20 in Left hand. 100mcg fentanyl given and 4 zofran. Recent discharge from rehab for covid pneumonia, now on 4 L NC (3.5 at home). HISTORY OF PRESENT ILLNESS: 1 or more elements      History From: Patient  HPI Limitations: None     Erickson Muro is a 80 y.o. male with a history of CAD, high cholesterol, stroke, hypothyroidism who presents from home for right upper quad abdominal pain that started about 4 hours prior to arrival.  No modifying factors. Reports associated nausea but no vomiting. Denies diarrhea, constipation. Denies fever, chills, chest pain, shortness of breath, sore throat. Denies history of kidney stones. Denies dysuria, hematuria. No medications taken prior to arrival.  Transported via EMS from home. Patient states he was recently discharged from rehab after he was admitted there for recovery after moi COVID-19. Has been wearing 4 L of oxygen ever since his diagnosis with COVID-19. Former smoker. No alcohol or drug use. Nursing Notes were all reviewed and agreed with or any disagreements were addressed in the HPI. REVIEW OF SYSTEMS      Review of Systems     Positives and Pertinent negatives as per HPI.     PAST HISTORY     Past Medical History:  Past Medical History:   Diagnosis Date    Arthritis     Hips, Knees    CAD (coronary artery disease)     MI around 1990    Hepatitis A     High cholesterol     Hypertension     Other ill-defined conditions(799.89) Light sensitivity, causes seizures    Seizures ([address])     Last seizure 2008/work -up in 2012 -possible seizure    Stroke [facility]) 2005    Thyroid disease     Hypothyroid         Past Surgical History:  Past Surgical History:   Procedure Laterality Date    CARDIAC CATHETERIZATION  20 years ago    no stents    HERNIA REPAIR  10/8/14    left inguinal hernia- Kasandra Hartman MD    ORTHOPEDIC SURGERY  2010    [facility]ust[facility]. Total Hip Replacement/Dr. Yo Win    ORTHOPEDIC SURGERY      Left Knee Scope    ORTHOPEDIC SURGERY  14    R hip replacement     ORTHOPEDIC SURGERY Right     Shoulder fracture & surgical repair with hardware    REVISION TOTAL HIP ARTHROPLASTY Left 2023    LEFT HIP TOTAL ARTHROPLASTY REVISION POSTERIOR APPROACH performed by Janene Baez MD at [facility] MAIN OR       Family History:  Family History   Problem Relation Age of Onset    Stroke Mother     Diabetes Mother     Cancer Father         lung    Heart Disease Father     Hypertension Mother        Social History:  Social History     Tobacco Use    Smoking status: Former     Packs/day: .25     Types: Cigarettes     Quit date: 3/1/2023     Years since quittin.7    Smokeless tobacco: Never   Vaping Use    Vaping Use: Never used   Substance Use Topics    Alcohol use: No    Drug use: No       Allergies:   Allergies   Allergen Reactions    Carbamazepine Rash       CURRENT MEDICATIONS      Discharge Medication List as of 2023 10:14 AM        CONTINUE these medications which have NOT CHANGED    Details   gabapentin (NEURONTIN) 300 MG capsule TAKE 2 CAPSULES FOUR TIMES DAILY, Disp-720 capsule, R-1Normal      aspirin 81 MG EC tablet Take 1 tablet by mouth 2 times daily for 25 days, Disp-50 tablet, R-0Normal      albuterol (PROVENTIL) (2.5 MG/3ML) 0.083% nebulizer solution INHALE THE CONTENTS OF 1 VIAL (3ML) VIA NEBULIZER EVERY 4 HOURS AS NEEDED FOR WHEEZING (MAX FOUR TIMES A DAY)Historical Med      amLODIPine (NORVASC) 10 MG tablet TAKE ONE TABLET BY MOUTH

## 2023-12-02 NOTE — H&P
hernia. ADDITIONAL COMMENTS: N/A     1. Right lower lobe pneumonia. 2. Extensive diverticulosis of the colon. No superimposed acute diverticulitis. 3. Mild pericholecystic fluid. No biliary ductal dilatation. Recommend clinical correlation, and consider dedicated right upper quadrant ultrasound if indicated. XR CHEST PORTABLE    Result Date: 12/1/2023  INDICATION:  Syncope COMPARISON: November 1 FINDINGS: Single AP portable view of the chest obtained at 1837 demonstrates a stable cardiomediastinal silhouette. The lungs are clear bilaterally. Skin folds overlie the lateral aspects of both lungs. No osseous abnormalities are seen. No evidence of acute cardiopulmonary process. _______________________________________________________________________    TOTAL TIME:  76 Minutes    Critical Care Provided     Minutes non procedure based    Signed: Johana Zamarripa MD    Procedures: see electronic medical records for all procedures/Xrays and details which were not copied into this note but were reviewed prior to creation of Plan.

## 2023-12-02 NOTE — PROGRESS NOTES
Sent a perfect serve message to Dr. Yareli Espana for general surgery regarding consult. 0805 - Made pt aware of calling surgical consult; pt waves hand in my face and is shouting that he wants to leave because he has been here 3 days and is not better. Pt stating that we are lying to him and is unwilling to wait for the physician to come this AM. Primary nurse contacted Dr. Dominique Hernandez to make him aware of pt's behavior and wanting to leave AMA. Pt is alert and oriented x3 and verbalizes understanding of the situation and risks for leaving too soon. Notified Nursing Supervisor Teresa Evans of situation and called Security for assistance. 0815 - Pt walking off the unit and out to the lobby. Code Brandi Vernonia called because pt is shouting at staff unwilling to wait for staff to call family. Called Jonnathan Sanders (sister) to request her to come  pt as pt able to make his own decisions. Sister states that she cannot pick him up, but offered other Brother's phone number to call. Spoke to pt's other brother and requested him to pick the patient pt up. He states that his brother lives alone and is in no condition to go home. Informed him that his brother is oriented and competent to make his own decisions, has left the floor and is sitting in a chair in the lobby waiting for him to pick him up. This Brother starts yelling at me and states that he will not come pick him up; that we needed to strap him down and sedate him. I attempted to inform him that we cannot make someone stay who does not want to, but brother is continually to yell at me. Notified Nursing Supervisor Teresa Evans and she is reaching out to additional family to obtain transportation. 2082 - Pt in room waiting for family to pick him up. Dr. Deja Roberts spoke with pt; MD agreeable that pt is acceptable to go home and they will see him in the clinic. Pt refusing to sign AMA form, witnessed by primary nurse and charge nurse.

## 2023-12-03 LAB
EKG ATRIAL RATE: 67 BPM
EKG DIAGNOSIS: NORMAL
EKG P AXIS: -23 DEGREES
EKG P-R INTERVAL: 152 MS
EKG Q-T INTERVAL: 404 MS
EKG QRS DURATION: 92 MS
EKG QTC CALCULATION (BAZETT): 426 MS
EKG R AXIS: 71 DEGREES
EKG T AXIS: 93 DEGREES
EKG VENTRICULAR RATE: 67 BPM

## 2023-12-04 NOTE — CONSULTS
Evaluated patient on the floor. Patient has no abdominal pain at this time and exam is unremarkable with a negative Crawford sign. No leukocytosis with improving alk phos to 136. US demonstrated GB sludge with no wall thickening, pericholecystic fluid. No ductal dilation and no stones. Concern for cholecystitis extremely low and recommended HIDA to further evaluate. However, patient became belligerent, threatening staff and a code Charolett Miss was called. He was discharged AMA shortly thereafter prior to undergoing further studies. He understands he can follow up as an outpatient if he has any additional symptoms.      Micki Zhang MD  General Surgery

## 2024-01-14 ENCOUNTER — HOSPITAL ENCOUNTER (EMERGENCY)
Facility: HOSPITAL | Age: 69
Discharge: HOME | End: 2024-01-14
Payer: MEDICARE

## 2024-01-14 ENCOUNTER — APPOINTMENT (OUTPATIENT)
Facility: HOSPITAL | Age: 69
End: 2024-01-14
Payer: MEDICARE

## 2024-01-14 VITALS
BODY MASS INDEX: 25.19 KG/M2 | OXYGEN SATURATION: 95 % | SYSTOLIC BLOOD PRESSURE: 139 MMHG | DIASTOLIC BLOOD PRESSURE: 69 MMHG | TEMPERATURE: 97.5 F | RESPIRATION RATE: 20 BRPM | HEART RATE: 92 BPM | WEIGHT: 196.21 LBS

## 2024-01-14 DIAGNOSIS — J44.1 COPD EXACERBATION (HCC): Primary | ICD-10-CM

## 2024-01-14 LAB
ALBUMIN SERPL-MCNC: 3.6 G/DL (ref 3.5–5)
ALBUMIN/GLOB SERPL: 1 (ref 1.1–2.2)
ALP SERPL-CCNC: 146 U/L (ref 45–117)
ALT SERPL-CCNC: 20 U/L (ref 12–78)
ANION GAP SERPL CALC-SCNC: 3 MMOL/L (ref 5–15)
AST SERPL-CCNC: 18 U/L (ref 15–37)
BASOPHILS # BLD: 0.1 K/UL (ref 0–0.1)
BASOPHILS NFR BLD: 1 % (ref 0–1)
BILIRUB SERPL-MCNC: 0.4 MG/DL (ref 0.2–1)
BUN SERPL-MCNC: 15 MG/DL (ref 6–20)
BUN/CREAT SERPL: 14 (ref 12–20)
CALCIUM SERPL-MCNC: 9.3 MG/DL (ref 8.5–10.1)
CHLORIDE SERPL-SCNC: 103 MMOL/L (ref 97–108)
CO2 SERPL-SCNC: 31 MMOL/L (ref 21–32)
CREAT SERPL-MCNC: 1.1 MG/DL (ref 0.7–1.3)
DIFFERENTIAL METHOD BLD: ABNORMAL
EKG ATRIAL RATE: 70 BPM
EKG DIAGNOSIS: NORMAL
EKG P AXIS: 90 DEGREES
EKG P-R INTERVAL: 166 MS
EKG Q-T INTERVAL: 380 MS
EKG QRS DURATION: 90 MS
EKG QTC CALCULATION (BAZETT): 410 MS
EKG R AXIS: 76 DEGREES
EKG T AXIS: 58 DEGREES
EKG VENTRICULAR RATE: 70 BPM
EOSINOPHIL # BLD: 0.1 K/UL (ref 0–0.4)
EOSINOPHIL NFR BLD: 2 % (ref 0–7)
ERYTHROCYTE [DISTWIDTH] IN BLOOD BY AUTOMATED COUNT: 15.5 % (ref 11.5–14.5)
GLOBULIN SER CALC-MCNC: 3.7 G/DL (ref 2–4)
GLUCOSE SERPL-MCNC: 103 MG/DL (ref 65–100)
HCT VFR BLD AUTO: 39.2 % (ref 36.6–50.3)
HGB BLD-MCNC: 12.3 G/DL (ref 12.1–17)
IMM GRANULOCYTES # BLD AUTO: 0 K/UL (ref 0–0.04)
IMM GRANULOCYTES NFR BLD AUTO: 0 % (ref 0–0.5)
LYMPHOCYTES # BLD: 1.6 K/UL (ref 0.8–3.5)
LYMPHOCYTES NFR BLD: 28 % (ref 12–49)
MAGNESIUM SERPL-MCNC: 1.9 MG/DL (ref 1.6–2.4)
MCH RBC QN AUTO: 29.7 PG (ref 26–34)
MCHC RBC AUTO-ENTMCNC: 31.4 G/DL (ref 30–36.5)
MCV RBC AUTO: 94.7 FL (ref 80–99)
MONOCYTES # BLD: 0.5 K/UL (ref 0–1)
MONOCYTES NFR BLD: 9 % (ref 5–13)
NEUTS SEG # BLD: 3.4 K/UL (ref 1.8–8)
NEUTS SEG NFR BLD: 60 % (ref 32–75)
NRBC # BLD: 0 K/UL (ref 0–0.01)
NRBC BLD-RTO: 0 PER 100 WBC
NT PRO BNP: 190 PG/ML
PLATELET # BLD AUTO: 315 K/UL (ref 150–400)
PMV BLD AUTO: 8.7 FL (ref 8.9–12.9)
POTASSIUM SERPL-SCNC: 4.6 MMOL/L (ref 3.5–5.1)
PROT SERPL-MCNC: 7.3 G/DL (ref 6.4–8.2)
RBC # BLD AUTO: 4.14 M/UL (ref 4.1–5.7)
SODIUM SERPL-SCNC: 137 MMOL/L (ref 136–145)
TROPONIN I SERPL HS-MCNC: 6 NG/L (ref 0–76)
WBC # BLD AUTO: 5.7 K/UL (ref 4.1–11.1)

## 2024-01-14 PROCEDURE — 71045 X-RAY EXAM CHEST 1 VIEW: CPT

## 2024-01-14 PROCEDURE — 94760 N-INVAS EAR/PLS OXIMETRY 1: CPT

## 2024-01-14 PROCEDURE — 84484 ASSAY OF TROPONIN QUANT: CPT

## 2024-01-14 PROCEDURE — 2580000003 HC RX 258

## 2024-01-14 PROCEDURE — 85025 COMPLETE CBC W/AUTO DIFF WBC: CPT

## 2024-01-14 PROCEDURE — 83735 ASSAY OF MAGNESIUM: CPT

## 2024-01-14 PROCEDURE — 99285 EMERGENCY DEPT VISIT HI MDM: CPT

## 2024-01-14 PROCEDURE — 83880 ASSAY OF NATRIURETIC PEPTIDE: CPT

## 2024-01-14 PROCEDURE — 96365 THER/PROPH/DIAG IV INF INIT: CPT

## 2024-01-14 PROCEDURE — 80053 COMPREHEN METABOLIC PANEL: CPT

## 2024-01-14 PROCEDURE — 36415 COLL VENOUS BLD VENIPUNCTURE: CPT

## 2024-01-14 PROCEDURE — 6370000000 HC RX 637 (ALT 250 FOR IP)

## 2024-01-14 PROCEDURE — 6360000002 HC RX W HCPCS

## 2024-01-14 PROCEDURE — 96375 TX/PRO/DX INJ NEW DRUG ADDON: CPT

## 2024-01-14 PROCEDURE — 94640 AIRWAY INHALATION TREATMENT: CPT

## 2024-01-14 RX ORDER — IPRATROPIUM BROMIDE AND ALBUTEROL SULFATE 2.5; .5 MG/3ML; MG/3ML
1 SOLUTION RESPIRATORY (INHALATION)
Status: COMPLETED | OUTPATIENT
Start: 2024-01-14 | End: 2024-01-14

## 2024-01-14 RX ORDER — IPRATROPIUM BROMIDE AND ALBUTEROL SULFATE 2.5; .5 MG/3ML; MG/3ML
1 SOLUTION RESPIRATORY (INHALATION)
Status: DISPENSED | OUTPATIENT
Start: 2024-01-14 | End: 2024-01-14

## 2024-01-14 RX ORDER — MAGNESIUM SULFATE IN WATER 40 MG/ML
2000 INJECTION, SOLUTION INTRAVENOUS ONCE
Status: COMPLETED | OUTPATIENT
Start: 2024-01-14 | End: 2024-01-14

## 2024-01-14 RX ORDER — PREDNISONE 20 MG/1
40 TABLET ORAL DAILY
Qty: 10 TABLET | Refills: 0 | Status: SHIPPED | OUTPATIENT
Start: 2024-01-14 | End: 2024-01-19

## 2024-01-14 RX ADMIN — WATER 125 MG: 1 INJECTION INTRAMUSCULAR; INTRAVENOUS; SUBCUTANEOUS at 16:42

## 2024-01-14 RX ADMIN — IPRATROPIUM BROMIDE AND ALBUTEROL SULFATE 1 DOSE: .5; 3 SOLUTION RESPIRATORY (INHALATION) at 17:29

## 2024-01-14 RX ADMIN — IPRATROPIUM BROMIDE AND ALBUTEROL SULFATE 1 DOSE: .5; 3 SOLUTION RESPIRATORY (INHALATION) at 17:40

## 2024-01-14 RX ADMIN — MAGNESIUM SULFATE IN WATER 2000 MG: 40 INJECTION, SOLUTION INTRAVENOUS at 16:40

## 2024-01-14 RX ADMIN — IPRATROPIUM BROMIDE AND ALBUTEROL SULFATE 1 DOSE: .5; 3 SOLUTION RESPIRATORY (INHALATION) at 17:15

## 2024-01-14 ASSESSMENT — PAIN SCALES - GENERAL
PAINLEVEL_OUTOF10: 9
PAINLEVEL_OUTOF10: 2

## 2024-01-14 ASSESSMENT — PULMONARY FUNCTION TESTS
PEFR_L/MIN: 21
PEFR_L/MIN: 27
PEFR_L/MIN: 17

## 2024-01-14 ASSESSMENT — PAIN DESCRIPTION - LOCATION: LOCATION: HEAD;NECK

## 2024-01-14 NOTE — ED PROVIDER NOTES
\[facility]\"" EMERGENCY DEPT  EMERGENCY DEPARTMENT ENCOUNTER       Pt Name: Rodolfo Dlecid  MRN: [id number]  Birthdate 1955  Date of Evaluation: 1/14/2024  Provider: BEAU Pina - NP   PCP: Lukasz Awad DO  Note Started: 12:13 AM 1/14/24     CHIEF COMPLAINT       Chief Complaint   Patient presents with    Shortness of Breath     Worsening shortness of breath since Thursday-pt is pale, pt is on oxygen on 2lpm nasal cannula. Pt has COPD. Says he feels like he is going to pass out.         HISTORY OF PRESENT ILLNESS: 1 or more elements      History From: Patient and Patient's Daughter  HPI Limitations None     Rodolfo Delcid is a 68 y.o. male who presents to the ED today for complaints of worsening shortness of breath since Thursday.  Patient states he normally wears 2 L O2 nasal cannula at home.  Has been feeling like he might pass out.  Denies fevers, chills, dizziness, numbness, tingling, cough, nausea, vomiting, diarrhea.  Denies sick contacts.  States he took 1 albuterol treatment 1 DuoNeb respiratory treatment at home today.  Patient has had pneumonia in the past, states that this does not feel like that.  Denies recent travel.     See MDM Documentation for further HPI and PMHx     Nursing Notes were all reviewed and agreed with or any disagreements were addressed in the HPI.     REVIEW OF SYSTEMS      Review of Systems     Positives and Pertinent negatives as per HPI.    PAST HISTORY     Past Medical History:  Past Medical History:   Diagnosis Date    Arthritis     Hips, Knees    CAD (coronary artery disease)     MI around 1990    COPD (chronic obstructive pulmonary disease) (HCC)     Hepatitis A     High cholesterol     Hypertension     Other ill-defined conditions(799.89)     Light sensitivity, causes seizures    Seizures (HCC)     Last seizure 12/2008/work -up in 2012 -possible seizure    Stroke (HCC) 2005    Thyroid disease     Hypothyroid       Past Surgical History:  Past Surgical History:

## 2024-04-24 ENCOUNTER — APPOINTMENT (OUTPATIENT)
Facility: HOSPITAL | Age: 69
DRG: 190 | End: 2024-04-24
Payer: MEDICARE

## 2024-04-24 ENCOUNTER — HOSPITAL ENCOUNTER (INPATIENT)
Facility: HOSPITAL | Age: 69
LOS: 8 days | Discharge: HOME HEALTH | DRG: 190 | End: 2024-05-02
Attending: EMERGENCY MEDICINE | Admitting: INTERNAL MEDICINE
Payer: MEDICARE

## 2024-04-24 DIAGNOSIS — R06.02 SHORTNESS OF BREATH: Primary | ICD-10-CM

## 2024-04-24 DIAGNOSIS — R41.82 ALTERED MENTAL STATUS, UNSPECIFIED ALTERED MENTAL STATUS TYPE: ICD-10-CM

## 2024-04-24 DIAGNOSIS — J96.01 ACUTE RESPIRATORY FAILURE WITH HYPOXIA AND HYPERCAPNIA (HCC): ICD-10-CM

## 2024-04-24 DIAGNOSIS — J69.0 ASPIRATION PNEUMONIA, UNSPECIFIED ASPIRATION PNEUMONIA TYPE, UNSPECIFIED LATERALITY, UNSPECIFIED PART OF LUNG (HCC): ICD-10-CM

## 2024-04-24 DIAGNOSIS — J96.02 ACUTE RESPIRATORY FAILURE WITH HYPOXIA AND HYPERCAPNIA (HCC): ICD-10-CM

## 2024-04-24 DIAGNOSIS — R79.89 ELEVATED TROPONIN: ICD-10-CM

## 2024-04-24 DIAGNOSIS — J44.1 COPD EXACERBATION (HCC): ICD-10-CM

## 2024-04-24 PROBLEM — R47.01 APHASIA: Status: ACTIVE | Noted: 2024-04-24

## 2024-04-24 PROBLEM — R56.9 SEIZURE (HCC): Status: ACTIVE | Noted: 2024-04-24

## 2024-04-24 PROBLEM — M62.81 ACUTE RIGHT-SIDED MUSCLE WEAKNESS: Status: ACTIVE | Noted: 2024-04-24

## 2024-04-24 PROBLEM — G93.41 METABOLIC ENCEPHALOPATHY: Status: ACTIVE | Noted: 2024-04-24

## 2024-04-24 LAB
ALBUMIN SERPL-MCNC: 3.8 G/DL (ref 3.5–5)
ALBUMIN/GLOB SERPL: 1.1 (ref 1.1–2.2)
ALP SERPL-CCNC: 122 U/L (ref 45–117)
ALT SERPL-CCNC: 40 U/L (ref 12–78)
ANION GAP SERPL CALC-SCNC: 4 MMOL/L (ref 5–15)
APPEARANCE UR: CLEAR
ARTERIAL PATENCY WRIST A: YES
AST SERPL-CCNC: 40 U/L (ref 15–37)
B PERT DNA SPEC QL NAA+PROBE: NOT DETECTED
BACTERIA URNS QL MICRO: NEGATIVE /HPF
BASE DEFICIT BLDA-SCNC: 0.9 MMOL/L
BASE DEFICIT BLDA-SCNC: 2.1 MMOL/L
BASE EXCESS BLDA CALC-SCNC: 1 MMOL/L
BASOPHILS # BLD: 0 K/UL (ref 0–0.1)
BASOPHILS NFR BLD: 0 % (ref 0–1)
BDY SITE: ABNORMAL
BILIRUB SERPL-MCNC: 0.7 MG/DL (ref 0.2–1)
BILIRUB UR QL: NEGATIVE
BORDETELLA PARAPERTUSSIS BY PCR: NOT DETECTED
BUN SERPL-MCNC: 11 MG/DL (ref 6–20)
BUN/CREAT SERPL: 12 (ref 12–20)
C PNEUM DNA SPEC QL NAA+PROBE: NOT DETECTED
CALCIUM SERPL-MCNC: 9.2 MG/DL (ref 8.5–10.1)
CHLORIDE SERPL-SCNC: 103 MMOL/L (ref 97–108)
CO2 SERPL-SCNC: 31 MMOL/L (ref 21–32)
COLOR UR: ABNORMAL
CREAT SERPL-MCNC: 0.91 MG/DL (ref 0.7–1.3)
DIFFERENTIAL METHOD BLD: ABNORMAL
EOSINOPHIL # BLD: 0 K/UL (ref 0–0.4)
EOSINOPHIL NFR BLD: 0 % (ref 0–7)
EPITH CASTS URNS QL MICRO: ABNORMAL /LPF
ERYTHROCYTE [DISTWIDTH] IN BLOOD BY AUTOMATED COUNT: 15.1 % (ref 11.5–14.5)
FIO2 ON VENT: 60 %
FIO2 ON VENT: 60 %
FLUAV SUBTYP SPEC NAA+PROBE: NOT DETECTED
FLUBV RNA SPEC QL NAA+PROBE: NOT DETECTED
GAS FLOW.O2 O2 DELIVERY SYS: 4 L/MIN
GAS FLOW.O2 SETTING OXYMISER: 12
GLOBULIN SER CALC-MCNC: 3.4 G/DL (ref 2–4)
GLUCOSE BLD STRIP.AUTO-MCNC: 115 MG/DL (ref 65–117)
GLUCOSE BLD STRIP.AUTO-MCNC: 119 MG/DL (ref 65–117)
GLUCOSE SERPL-MCNC: 107 MG/DL (ref 65–100)
GLUCOSE UR STRIP.AUTO-MCNC: NEGATIVE MG/DL
HADV DNA SPEC QL NAA+PROBE: NOT DETECTED
HCO3 BLDA-SCNC: 26 MMOL/L (ref 22–26)
HCO3 BLDA-SCNC: 26 MMOL/L (ref 22–26)
HCO3 BLDA-SCNC: 29 MMOL/L (ref 22–26)
HCOV 229E RNA SPEC QL NAA+PROBE: NOT DETECTED
HCOV HKU1 RNA SPEC QL NAA+PROBE: NOT DETECTED
HCOV NL63 RNA SPEC QL NAA+PROBE: NOT DETECTED
HCOV OC43 RNA SPEC QL NAA+PROBE: NOT DETECTED
HCT VFR BLD AUTO: 39.9 % (ref 36.6–50.3)
HGB BLD-MCNC: 12.3 G/DL (ref 12.1–17)
HGB UR QL STRIP: ABNORMAL
HMPV RNA SPEC QL NAA+PROBE: NOT DETECTED
HPIV1 RNA SPEC QL NAA+PROBE: NOT DETECTED
HPIV2 RNA SPEC QL NAA+PROBE: NOT DETECTED
HPIV3 RNA SPEC QL NAA+PROBE: NOT DETECTED
HPIV4 RNA SPEC QL NAA+PROBE: NOT DETECTED
IMM GRANULOCYTES # BLD AUTO: 0.1 K/UL (ref 0–0.04)
IMM GRANULOCYTES NFR BLD AUTO: 1 % (ref 0–0.5)
INR PPP: 1 (ref 0.9–1.1)
IPAP/PIP: 18
KETONES UR QL STRIP.AUTO: NEGATIVE MG/DL
LEUKOCYTE ESTERASE UR QL STRIP.AUTO: NEGATIVE
LYMPHOCYTES # BLD: 0.8 K/UL (ref 0.8–3.5)
LYMPHOCYTES NFR BLD: 6 % (ref 12–49)
M PNEUMO DNA SPEC QL NAA+PROBE: NOT DETECTED
MCH RBC QN AUTO: 28.5 PG (ref 26–34)
MCHC RBC AUTO-ENTMCNC: 30.8 G/DL (ref 30–36.5)
MCV RBC AUTO: 92.4 FL (ref 80–99)
MONOCYTES # BLD: 1 K/UL (ref 0–1)
MONOCYTES NFR BLD: 8 % (ref 5–13)
NEUTS SEG # BLD: 11 K/UL (ref 1.8–8)
NEUTS SEG NFR BLD: 85 % (ref 32–75)
NITRITE UR QL STRIP.AUTO: NEGATIVE
NRBC # BLD: 0 K/UL (ref 0–0.01)
NRBC BLD-RTO: 0 PER 100 WBC
PCO2 BLDA: 51 MMHG (ref 35–45)
PCO2 BLDA: 55 MMHG (ref 35–45)
PCO2 BLDA: 58 MMHG (ref 35–45)
PEEP RESPIRATORY: 8
PEEP RESPIRATORY: 8
PH BLDA: 7.28 (ref 7.35–7.45)
PH BLDA: 7.31 (ref 7.35–7.45)
PH BLDA: 7.32 (ref 7.35–7.45)
PH UR STRIP: 6 (ref 5–8)
PLATELET # BLD AUTO: 322 K/UL (ref 150–400)
PMV BLD AUTO: 9.3 FL (ref 8.9–12.9)
PO2 BLDA: 284 MMHG (ref 80–100)
PO2 BLDA: 300 MMHG (ref 80–100)
PO2 BLDA: 72 MMHG (ref 80–100)
POTASSIUM SERPL-SCNC: 4.2 MMOL/L (ref 3.5–5.1)
PROCALCITONIN SERPL-MCNC: <0.05 NG/ML
PROT SERPL-MCNC: 7.2 G/DL (ref 6.4–8.2)
PROT UR STRIP-MCNC: ABNORMAL MG/DL
PROTHROMBIN TIME: 10.8 SEC (ref 9–11.1)
RBC # BLD AUTO: 4.32 M/UL (ref 4.1–5.7)
RBC #/AREA URNS HPF: ABNORMAL /HPF (ref 0–5)
RBC MORPH BLD: ABNORMAL
RSV RNA SPEC QL NAA+PROBE: NOT DETECTED
RV+EV RNA SPEC QL NAA+PROBE: NOT DETECTED
SAO2 % BLD: 100 % (ref 92–97)
SAO2 % BLD: 100 % (ref 92–97)
SAO2 % BLD: 93 % (ref 92–97)
SAO2% DEVICE SAO2% SENSOR NAME: ABNORMAL
SARS-COV-2 RDRP RESP QL NAA+PROBE: NOT DETECTED
SARS-COV-2 RNA RESP QL NAA+PROBE: NOT DETECTED
SERVICE CMNT-IMP: ABNORMAL
SERVICE CMNT-IMP: NORMAL
SODIUM SERPL-SCNC: 138 MMOL/L (ref 136–145)
SOURCE: NORMAL
SP GR UR REFRACTOMETRY: 1.02 (ref 1–1.03)
SPECIMEN SITE: ABNORMAL
TROPONIN I SERPL HS-MCNC: 215 NG/L (ref 0–76)
TROPONIN I SERPL HS-MCNC: 343 NG/L (ref 0–76)
TSH SERPL DL<=0.05 MIU/L-ACNC: 0.5 UIU/ML (ref 0.36–3.74)
URINE CULTURE IF INDICATED: ABNORMAL
UROBILINOGEN UR QL STRIP.AUTO: 1 EU/DL (ref 0.2–1)
VENTILATION MODE VENT: ABNORMAL
WBC # BLD AUTO: 12.9 K/UL (ref 4.1–11.1)
WBC URNS QL MICRO: ABNORMAL /HPF (ref 0–4)

## 2024-04-24 PROCEDURE — 70496 CT ANGIOGRAPHY HEAD: CPT

## 2024-04-24 PROCEDURE — 96375 TX/PRO/DX INJ NEW DRUG ADDON: CPT

## 2024-04-24 PROCEDURE — 87040 BLOOD CULTURE FOR BACTERIA: CPT

## 2024-04-24 PROCEDURE — 4A03X5D MEASUREMENT OF ARTERIAL FLOW, INTRACRANIAL, EXTERNAL APPROACH: ICD-10-PCS | Performed by: EMERGENCY MEDICINE

## 2024-04-24 PROCEDURE — 95816 EEG AWAKE AND DROWSY: CPT

## 2024-04-24 PROCEDURE — 2580000003 HC RX 258: Performed by: EMERGENCY MEDICINE

## 2024-04-24 PROCEDURE — 6370000000 HC RX 637 (ALT 250 FOR IP): Performed by: INTERNAL MEDICINE

## 2024-04-24 PROCEDURE — 85610 PROTHROMBIN TIME: CPT

## 2024-04-24 PROCEDURE — 6360000002 HC RX W HCPCS: Performed by: INTERNAL MEDICINE

## 2024-04-24 PROCEDURE — 2580000003 HC RX 258: Performed by: INTERNAL MEDICINE

## 2024-04-24 PROCEDURE — 36600 WITHDRAWAL OF ARTERIAL BLOOD: CPT

## 2024-04-24 PROCEDURE — 6360000002 HC RX W HCPCS: Performed by: EMERGENCY MEDICINE

## 2024-04-24 PROCEDURE — 2500000003 HC RX 250 WO HCPCS: Performed by: INTERNAL MEDICINE

## 2024-04-24 PROCEDURE — 99223 1ST HOSP IP/OBS HIGH 75: CPT | Performed by: PSYCHIATRY & NEUROLOGY

## 2024-04-24 PROCEDURE — 84145 PROCALCITONIN (PCT): CPT

## 2024-04-24 PROCEDURE — 85025 COMPLETE CBC W/AUTO DIFF WBC: CPT

## 2024-04-24 PROCEDURE — 96365 THER/PROPH/DIAG IV INF INIT: CPT

## 2024-04-24 PROCEDURE — 36415 COLL VENOUS BLD VENIPUNCTURE: CPT

## 2024-04-24 PROCEDURE — 5A09357 ASSISTANCE WITH RESPIRATORY VENTILATION, LESS THAN 24 CONSECUTIVE HOURS, CONTINUOUS POSITIVE AIRWAY PRESSURE: ICD-10-PCS | Performed by: STUDENT IN AN ORGANIZED HEALTH CARE EDUCATION/TRAINING PROGRAM

## 2024-04-24 PROCEDURE — 2000000000 HC ICU R&B

## 2024-04-24 PROCEDURE — 99285 EMERGENCY DEPT VISIT HI MDM: CPT

## 2024-04-24 PROCEDURE — 72125 CT NECK SPINE W/O DYE: CPT

## 2024-04-24 PROCEDURE — 0202U NFCT DS 22 TRGT SARS-COV-2: CPT

## 2024-04-24 PROCEDURE — 93005 ELECTROCARDIOGRAM TRACING: CPT | Performed by: EMERGENCY MEDICINE

## 2024-04-24 PROCEDURE — 94660 CPAP INITIATION&MGMT: CPT

## 2024-04-24 PROCEDURE — 94640 AIRWAY INHALATION TREATMENT: CPT

## 2024-04-24 PROCEDURE — 87449 NOS EACH ORGANISM AG IA: CPT

## 2024-04-24 PROCEDURE — 0042T CT BRAIN PERFUSION: CPT

## 2024-04-24 PROCEDURE — 82962 GLUCOSE BLOOD TEST: CPT

## 2024-04-24 PROCEDURE — 84484 ASSAY OF TROPONIN QUANT: CPT

## 2024-04-24 PROCEDURE — 95816 EEG AWAKE AND DROWSY: CPT | Performed by: PSYCHIATRY & NEUROLOGY

## 2024-04-24 PROCEDURE — 82803 BLOOD GASES ANY COMBINATION: CPT

## 2024-04-24 PROCEDURE — 71275 CT ANGIOGRAPHY CHEST: CPT

## 2024-04-24 PROCEDURE — 80177 DRUG SCRN QUAN LEVETIRACETAM: CPT

## 2024-04-24 PROCEDURE — 84443 ASSAY THYROID STIM HORMONE: CPT

## 2024-04-24 PROCEDURE — 73502 X-RAY EXAM HIP UNI 2-3 VIEWS: CPT

## 2024-04-24 PROCEDURE — 70450 CT HEAD/BRAIN W/O DYE: CPT

## 2024-04-24 PROCEDURE — 96374 THER/PROPH/DIAG INJ IV PUSH: CPT

## 2024-04-24 PROCEDURE — 6360000004 HC RX CONTRAST MEDICATION

## 2024-04-24 PROCEDURE — 80053 COMPREHEN METABOLIC PANEL: CPT

## 2024-04-24 PROCEDURE — 87635 SARS-COV-2 COVID-19 AMP PRB: CPT

## 2024-04-24 PROCEDURE — 81001 URINALYSIS AUTO W/SCOPE: CPT

## 2024-04-24 PROCEDURE — 6370000000 HC RX 637 (ALT 250 FOR IP): Performed by: EMERGENCY MEDICINE

## 2024-04-24 PROCEDURE — 6360000002 HC RX W HCPCS: Performed by: PSYCHIATRY & NEUROLOGY

## 2024-04-24 RX ORDER — ENOXAPARIN SODIUM 100 MG/ML
40 INJECTION SUBCUTANEOUS DAILY
Status: DISCONTINUED | OUTPATIENT
Start: 2024-04-24 | End: 2024-05-02 | Stop reason: HOSPADM

## 2024-04-24 RX ORDER — IPRATROPIUM BROMIDE AND ALBUTEROL SULFATE 2.5; .5 MG/3ML; MG/3ML
1 SOLUTION RESPIRATORY (INHALATION) EVERY 4 HOURS
Status: DISCONTINUED | OUTPATIENT
Start: 2024-04-25 | End: 2024-04-26

## 2024-04-24 RX ORDER — POLYETHYLENE GLYCOL 3350 17 G/17G
17 POWDER, FOR SOLUTION ORAL DAILY PRN
Status: DISCONTINUED | OUTPATIENT
Start: 2024-04-24 | End: 2024-05-02 | Stop reason: HOSPADM

## 2024-04-24 RX ORDER — ONDANSETRON 2 MG/ML
4 INJECTION INTRAMUSCULAR; INTRAVENOUS EVERY 6 HOURS PRN
Status: DISCONTINUED | OUTPATIENT
Start: 2024-04-24 | End: 2024-05-02 | Stop reason: HOSPADM

## 2024-04-24 RX ORDER — POTASSIUM CHLORIDE 29.8 MG/ML
20 INJECTION INTRAVENOUS PRN
Status: DISCONTINUED | OUTPATIENT
Start: 2024-04-24 | End: 2024-04-24

## 2024-04-24 RX ORDER — ALBUTEROL SULFATE 2.5 MG/3ML
2.5 SOLUTION RESPIRATORY (INHALATION)
Status: COMPLETED | OUTPATIENT
Start: 2024-04-24 | End: 2024-04-24

## 2024-04-24 RX ORDER — ACETAMINOPHEN 325 MG/1
650 TABLET ORAL EVERY 6 HOURS PRN
Status: DISCONTINUED | OUTPATIENT
Start: 2024-04-24 | End: 2024-05-02 | Stop reason: HOSPADM

## 2024-04-24 RX ORDER — IPRATROPIUM BROMIDE AND ALBUTEROL SULFATE 2.5; .5 MG/3ML; MG/3ML
1 SOLUTION RESPIRATORY (INHALATION)
Status: COMPLETED | OUTPATIENT
Start: 2024-04-24 | End: 2024-04-24

## 2024-04-24 RX ORDER — SODIUM CHLORIDE 0.9 % (FLUSH) 0.9 %
5-40 SYRINGE (ML) INJECTION EVERY 12 HOURS SCHEDULED
Status: DISCONTINUED | OUTPATIENT
Start: 2024-04-24 | End: 2024-05-02 | Stop reason: HOSPADM

## 2024-04-24 RX ORDER — IPRATROPIUM BROMIDE AND ALBUTEROL SULFATE 2.5; .5 MG/3ML; MG/3ML
1 SOLUTION RESPIRATORY (INHALATION) EVERY 6 HOURS
Status: DISCONTINUED | OUTPATIENT
Start: 2024-04-24 | End: 2024-04-24

## 2024-04-24 RX ORDER — SODIUM CHLORIDE 0.9 % (FLUSH) 0.9 %
5-40 SYRINGE (ML) INJECTION PRN
Status: DISCONTINUED | OUTPATIENT
Start: 2024-04-24 | End: 2024-05-02 | Stop reason: HOSPADM

## 2024-04-24 RX ORDER — MAGNESIUM SULFATE IN WATER 40 MG/ML
2000 INJECTION, SOLUTION INTRAVENOUS PRN
Status: DISCONTINUED | OUTPATIENT
Start: 2024-04-24 | End: 2024-04-24

## 2024-04-24 RX ORDER — ASPIRIN 300 MG/1
300 SUPPOSITORY RECTAL DAILY
Status: DISCONTINUED | OUTPATIENT
Start: 2024-04-24 | End: 2024-04-26

## 2024-04-24 RX ORDER — SODIUM CHLORIDE 9 MG/ML
INJECTION, SOLUTION INTRAVENOUS PRN
Status: DISCONTINUED | OUTPATIENT
Start: 2024-04-24 | End: 2024-05-02 | Stop reason: HOSPADM

## 2024-04-24 RX ORDER — SODIUM CHLORIDE FOR INHALATION 3 %
4 VIAL, NEBULIZER (ML) INHALATION 2 TIMES DAILY
Status: DISCONTINUED | OUTPATIENT
Start: 2024-04-24 | End: 2024-05-02 | Stop reason: HOSPADM

## 2024-04-24 RX ORDER — POTASSIUM CHLORIDE 7.45 MG/ML
10 INJECTION INTRAVENOUS PRN
Status: DISCONTINUED | OUTPATIENT
Start: 2024-04-24 | End: 2024-04-24

## 2024-04-24 RX ORDER — LEVETIRACETAM 500 MG/5ML
750 INJECTION, SOLUTION, CONCENTRATE INTRAVENOUS EVERY 12 HOURS SCHEDULED
Status: DISCONTINUED | OUTPATIENT
Start: 2024-04-24 | End: 2024-05-01

## 2024-04-24 RX ORDER — 0.9 % SODIUM CHLORIDE 0.9 %
30 INTRAVENOUS SOLUTION INTRAVENOUS ONCE
Status: COMPLETED | OUTPATIENT
Start: 2024-04-24 | End: 2024-04-24

## 2024-04-24 RX ORDER — ACETAMINOPHEN 650 MG/1
650 SUPPOSITORY RECTAL EVERY 6 HOURS PRN
Status: DISCONTINUED | OUTPATIENT
Start: 2024-04-24 | End: 2024-05-02 | Stop reason: HOSPADM

## 2024-04-24 RX ORDER — HYDRALAZINE HYDROCHLORIDE 20 MG/ML
10 INJECTION INTRAMUSCULAR; INTRAVENOUS EVERY 6 HOURS PRN
Status: DISCONTINUED | OUTPATIENT
Start: 2024-04-24 | End: 2024-05-02 | Stop reason: HOSPADM

## 2024-04-24 RX ORDER — ONDANSETRON 4 MG/1
4 TABLET, ORALLY DISINTEGRATING ORAL EVERY 8 HOURS PRN
Status: DISCONTINUED | OUTPATIENT
Start: 2024-04-24 | End: 2024-05-02 | Stop reason: HOSPADM

## 2024-04-24 RX ADMIN — ONDANSETRON 4 MG: 2 INJECTION INTRAMUSCULAR; INTRAVENOUS at 18:10

## 2024-04-24 RX ADMIN — DOXYCYCLINE 100 MG: 100 INJECTION, POWDER, LYOPHILIZED, FOR SOLUTION INTRAVENOUS at 17:03

## 2024-04-24 RX ADMIN — LEVETIRACETAM 750 MG: 500 INJECTION, SOLUTION, CONCENTRATE INTRAVENOUS at 16:53

## 2024-04-24 RX ADMIN — SODIUM CHLORIDE 2667 ML: 9 INJECTION, SOLUTION INTRAVENOUS at 14:22

## 2024-04-24 RX ADMIN — AZITHROMYCIN MONOHYDRATE 500 MG: 500 INJECTION, POWDER, LYOPHILIZED, FOR SOLUTION INTRAVENOUS at 14:20

## 2024-04-24 RX ADMIN — IOPAMIDOL 100 ML: 755 INJECTION, SOLUTION INTRAVENOUS at 13:19

## 2024-04-24 RX ADMIN — IPRATROPIUM BROMIDE AND ALBUTEROL SULFATE 1 DOSE: .5; 3 SOLUTION RESPIRATORY (INHALATION) at 21:14

## 2024-04-24 RX ADMIN — SODIUM CHLORIDE 1000 MG: 900 INJECTION INTRAVENOUS at 14:19

## 2024-04-24 RX ADMIN — Medication 4 ML: at 15:29

## 2024-04-24 RX ADMIN — SODIUM CHLORIDE, PRESERVATIVE FREE 10 ML: 5 INJECTION INTRAVENOUS at 21:00

## 2024-04-24 RX ADMIN — Medication 4 ML: at 21:18

## 2024-04-24 RX ADMIN — ALBUTEROL SULFATE 2.5 MG: 2.5 SOLUTION RESPIRATORY (INHALATION) at 13:21

## 2024-04-24 RX ADMIN — IPRATROPIUM BROMIDE AND ALBUTEROL SULFATE 1 DOSE: .5; 3 SOLUTION RESPIRATORY (INHALATION) at 15:28

## 2024-04-24 RX ADMIN — WATER 125 MG: 1 INJECTION INTRAMUSCULAR; INTRAVENOUS; SUBCUTANEOUS at 14:20

## 2024-04-24 RX ADMIN — ENOXAPARIN SODIUM 40 MG: 100 INJECTION SUBCUTANEOUS at 16:49

## 2024-04-24 RX ADMIN — IPRATROPIUM BROMIDE AND ALBUTEROL SULFATE 1 DOSE: .5; 3 SOLUTION RESPIRATORY (INHALATION) at 13:21

## 2024-04-24 ASSESSMENT — PAIN SCALES - GENERAL
PAINLEVEL_OUTOF10: 1
PAINLEVEL_OUTOF10: 0

## 2024-04-24 NOTE — PROCEDURES
EEG REPORT    Patient Name: Rodolfo Delcid  : 1955  Age: 69 y.o.    Ordering physician: Dr. Dejesus  Date of EE2024  15:22 -   Diagnosis: seizure  Interpreting physician: Yamil Mello D.O. FAAN    Procedure: EEG    CLINICAL INDICATION: The patient is a 69 y.o. male who is being evaluated for baseline electro cerebral activities and to rule out seizure focus.      Current Facility-Administered Medications   Medication Dose Route Frequency    sodium chloride 0.9 % bolus 2,667 mL  30 mL/kg IntraVENous Once    iopamidol (ISOVUE-370) 76 % injection 100 mL  100 mL IntraVENous ONCE PRN    sodium chloride (Inhalant) 3 % nebulizer solution 4 mL  4 mL Nebulization BID    ipratropium 0.5 mg-albuterol 2.5 mg (DUONEB) nebulizer solution 1 Dose  1 Dose Inhalation Q6H    [START ON 2024] methylPREDNISolone sodium succ (SOLU-MEDROL) 40 mg in sterile water 1 mL injection  40 mg IntraVENous Q12H    sodium chloride flush 0.9 % injection 5-40 mL  5-40 mL IntraVENous 2 times per day    sodium chloride flush 0.9 % injection 5-40 mL  5-40 mL IntraVENous PRN    0.9 % sodium chloride infusion   IntraVENous PRN    potassium chloride 20 mEq/50 mL IVPB (Central Line)  20 mEq IntraVENous PRN    Or    potassium chloride 10 mEq/100 mL IVPB (Peripheral Line)  10 mEq IntraVENous PRN    magnesium sulfate 2000 mg in 50 mL IVPB premix  2,000 mg IntraVENous PRN    enoxaparin (LOVENOX) injection 40 mg  40 mg SubCUTAneous Daily    ondansetron (ZOFRAN-ODT) disintegrating tablet 4 mg  4 mg Oral Q8H PRN    Or    ondansetron (ZOFRAN) injection 4 mg  4 mg IntraVENous Q6H PRN    polyethylene glycol (GLYCOLAX) packet 17 g  17 g Oral Daily PRN    acetaminophen (TYLENOL) tablet 650 mg  650 mg Oral Q6H PRN    Or    acetaminophen (TYLENOL) suppository 650 mg  650 mg Rectal Q6H PRN    sodium phosphate 15 mmol in sodium chloride 0.9 % 250 mL IVPB  15 mmol IntraVENous PRN    doxycycline (VIBRAMYCIN) 100 mg in sodium chloride 0.9 % 100 mL IVPB

## 2024-04-24 NOTE — ED TRIAGE NOTES
Pt to er via ems for c/o  unwitnessed fall. Upon arrival pt lethargic, and hypoxic with labored breathing. Pt is gray and clammy. Pt awake but does not respond to verbal stimuli.     Pmh- copd, seizure disorder

## 2024-04-24 NOTE — H&P
ill-appearing.   HENT:      Mouth/Throat:      Mouth: Mucous membranes are dry.   Eyes:      Pupils: Pupils are equal, round, and reactive to light.   Cardiovascular:      Rate and Rhythm: Normal rate and regular rhythm.   Pulmonary:      Breath sounds: Wheezing (expiratory with prolonged expiratory phase) and rhonchi present.   Abdominal:      General: Abdomen is flat.      Palpations: Abdomen is soft.   Musculoskeletal:      Right lower leg: No edema.      Left lower leg: No edema.   Skin:     General: Skin is warm.      Comments: Ecchymosis with skin tears over right side   Neurological:      Mental Status: He is alert. He is disoriented.      Comments: Intermittently follows commands   Answers \"yeah\" to all questions         Past Medical History:        has a past medical history of Arthritis, CAD (coronary artery disease), COPD (chronic obstructive pulmonary disease) (HCC), Hepatitis A, High cholesterol, Hypertension, Other ill-defined conditions(799.89), Seizures (HCC), Stroke ([facility]), and Thyroid disease.    Past Surgical History:      has a past surgical history that includes orthopedic surgery (2/2010); orthopedic surgery; orthopedic surgery (6/9/14); Cardiac catheterization (20 years ago); hernia repair (10/8/14); orthopedic surgery (Right); and Revision total hip arthroplasty (Left, 5/17/2023).      Home Medications:     Prior to Admission medications    Medication Sig Start Date End Date Taking? Authorizing Provider   gabapentin (NEURONTIN) 300 MG capsule TAKE 2 CAPSULES FOUR TIMES DAILY 11/7/23 5/7/24  Trung Aviles MD   aspirin 81 MG EC tablet Take 1 tablet by mouth 2 times daily for 25 days 5/23/23 6/17/23  Mendel, David L, MD   albuterol (PROVENTIL) (2.5 MG/3ML) 0.083% nebulizer solution INHALE THE CONTENTS OF 1 VIAL (3ML) VIA NEBULIZER EVERY 4 HOURS AS NEEDED FOR WHEEZING (MAX FOUR TIMES A DAY)    Automatic Reconciliation, Ar   amLODIPine (NORVASC) 10 MG tablet TAKE ONE TABLET BY MOUTH EVERY DAY

## 2024-04-24 NOTE — ED PROVIDER NOTES
[facility] EMERGENCY DEPT  EMERGENCY DEPARTMENT ENCOUNTER       Pt Name: Rodolfo Delcid  MRN: [id number]  Birthdate 1955  Date of evaluation: 4/24/2024  Provider: Thu Aviles MD   PCP: Lukasz Awad DO  Note Started: 12:08 PM EDT 4/24/24     CHIEF COMPLAINT       Chief Complaint   Patient presents with    hypoxia    Fall        HISTORY OF PRESENT ILLNESS: 1 or more elements      History From: Patient, EMS, History limited by: Altered mental status     Rodolfo Delcid is a 69 y.o. male patient with history of CAD, COPD, seizures, CVA, here for altered mental status, fall and hypoxia.  According to the patient's daughter, she was talking to him on the phone, when she heard him fall.  When EMS arrived, patient saturations were in the 80s, and he was altered.  Here, can state his name in response to questions, he is tachypneic.  Records indicate that he has a history of a stroke with baseline right-sided weakness.       Please See MDM for Additional Details of the HPI/PMH  Nursing Notes were all reviewed and agreed with or any disagreements were addressed in the HPI.     REVIEW OF SYSTEMS        Positives and Pertinent negatives as per HPI.    PAST HISTORY     Past Medical History:  Past Medical History:   Diagnosis Date    Arthritis     Hips, Knees    CAD (coronary artery disease)     MI around 1990    COPD (chronic obstructive pulmonary disease) (HCC)     Hepatitis A     High cholesterol     Hypertension     Other ill-defined conditions(799.89)     Light sensitivity, causes seizures    Seizures (HCC)     Last seizure 12/2008/work -up in 2012 -possible seizure    Stroke (HCC) 2005    Thyroid disease     Hypothyroid       Past Surgical History:  Past Surgical History:   Procedure Laterality Date    CARDIAC CATHETERIZATION  20 years ago    no stents    HERNIA REPAIR  10/8/14    left inguinal hernia- Pawan Hartman MD    ORTHOPEDIC SURGERY  2/2010    [facility]. Total Hip Replacement/Dr. Borrero    ORTHOPEDIC SURGERY

## 2024-04-25 ENCOUNTER — APPOINTMENT (OUTPATIENT)
Facility: HOSPITAL | Age: 69
DRG: 190 | End: 2024-04-25
Payer: MEDICARE

## 2024-04-25 ENCOUNTER — APPOINTMENT (OUTPATIENT)
Facility: HOSPITAL | Age: 69
DRG: 190 | End: 2024-04-25
Attending: INTERNAL MEDICINE
Payer: MEDICARE

## 2024-04-25 PROBLEM — Z51.5 PALLIATIVE CARE ENCOUNTER: Status: ACTIVE | Noted: 2024-04-25

## 2024-04-25 PROBLEM — R41.82 ALTERED MENTAL STATUS: Status: ACTIVE | Noted: 2024-04-25

## 2024-04-25 LAB
ALBUMIN SERPL-MCNC: 3.3 G/DL (ref 3.5–5)
ALBUMIN/GLOB SERPL: 1.1 (ref 1.1–2.2)
ALP SERPL-CCNC: 105 U/L (ref 45–117)
ALT SERPL-CCNC: 39 U/L (ref 12–78)
AMPHET UR QL SCN: NEGATIVE
ANION GAP SERPL CALC-SCNC: 2 MMOL/L (ref 5–15)
ARTERIAL PATENCY WRIST A: YES
AST SERPL-CCNC: 84 U/L (ref 15–37)
BARBITURATES UR QL SCN: NEGATIVE
BASE EXCESS BLDA CALC-SCNC: 0.9 MMOL/L
BDY SITE: ABNORMAL
BENZODIAZ UR QL: NEGATIVE
BILIRUB DIRECT SERPL-MCNC: 0.2 MG/DL (ref 0–0.2)
BILIRUB SERPL-MCNC: 0.7 MG/DL (ref 0.2–1)
BUN SERPL-MCNC: 16 MG/DL (ref 6–20)
BUN/CREAT SERPL: 23 (ref 12–20)
CALCIUM SERPL-MCNC: 8.9 MG/DL (ref 8.5–10.1)
CANNABINOIDS UR QL SCN: NEGATIVE
CHLORIDE SERPL-SCNC: 107 MMOL/L (ref 97–108)
CHOLEST SERPL-MCNC: 129 MG/DL
CO2 SERPL-SCNC: 29 MMOL/L (ref 21–32)
COCAINE UR QL SCN: NEGATIVE
CREAT SERPL-MCNC: 0.71 MG/DL (ref 0.7–1.3)
ECHO AO ROOT DIAM: 3.8 CM
ECHO AO ROOT INDEX: 1.77 CM/M2
ECHO AV AREA PEAK VELOCITY: 3.7 CM2
ECHO AV AREA/BSA PEAK VELOCITY: 1.7 CM2/M2
ECHO AV PEAK GRADIENT: 6 MMHG
ECHO AV PEAK VELOCITY: 1.2 M/S
ECHO AV VELOCITY RATIO: 0.92
ECHO BSA: 2.15 M2
ECHO LA DIAMETER INDEX: 1.49 CM/M2
ECHO LA DIAMETER: 3.2 CM
ECHO LA TO AORTIC ROOT RATIO: 0.84
ECHO LV E' LATERAL VELOCITY: 8 CM/S
ECHO LV E' SEPTAL VELOCITY: 7 CM/S
ECHO LV FRACTIONAL SHORTENING: 33 % (ref 28–44)
ECHO LV INTERNAL DIMENSION DIASTOLE INDEX: 2.28 CM/M2
ECHO LV INTERNAL DIMENSION DIASTOLIC: 4.9 CM (ref 4.2–5.9)
ECHO LV INTERNAL DIMENSION SYSTOLIC INDEX: 1.53 CM/M2
ECHO LV INTERNAL DIMENSION SYSTOLIC: 3.3 CM
ECHO LV IVSD: 1.1 CM (ref 0.6–1)
ECHO LV MASS 2D: 188.1 G (ref 88–224)
ECHO LV MASS INDEX 2D: 87.5 G/M2 (ref 49–115)
ECHO LV POSTERIOR WALL DIASTOLIC: 1 CM (ref 0.6–1)
ECHO LV RELATIVE WALL THICKNESS RATIO: 0.41
ECHO LVOT AREA: 4.2 CM2
ECHO LVOT DIAM: 2.3 CM
ECHO LVOT PEAK GRADIENT: 5 MMHG
ECHO LVOT PEAK VELOCITY: 1.1 M/S
ECHO MV A VELOCITY: 0.68 M/S
ECHO MV E DECELERATION TIME (DT): 200.7 MS
ECHO MV E VELOCITY: 0.6 M/S
ECHO MV E/A RATIO: 0.88
ECHO MV E/E' LATERAL: 7.5
ECHO MV E/E' RATIO (AVERAGED): 8.04
EKG ATRIAL RATE: 111 BPM
EKG DIAGNOSIS: NORMAL
EKG P AXIS: 86 DEGREES
EKG P-R INTERVAL: 166 MS
EKG Q-T INTERVAL: 312 MS
EKG QRS DURATION: 78 MS
EKG QTC CALCULATION (BAZETT): 424 MS
EKG R AXIS: 86 DEGREES
EKG T AXIS: 69 DEGREES
EKG VENTRICULAR RATE: 111 BPM
ERYTHROCYTE [DISTWIDTH] IN BLOOD BY AUTOMATED COUNT: 15.3 % (ref 11.5–14.5)
EST. AVERAGE GLUCOSE BLD GHB EST-MCNC: 114 MG/DL
FIO2 ON VENT: 35 %
GAS FLOW.O2 SETTING OXYMISER: 18
GLOBULIN SER CALC-MCNC: 2.9 G/DL (ref 2–4)
GLUCOSE BLD STRIP.AUTO-MCNC: 107 MG/DL (ref 65–117)
GLUCOSE BLD STRIP.AUTO-MCNC: 108 MG/DL (ref 65–117)
GLUCOSE BLD STRIP.AUTO-MCNC: 95 MG/DL (ref 65–117)
GLUCOSE SERPL-MCNC: 121 MG/DL (ref 65–100)
HBA1C MFR BLD: 5.6 % (ref 4–5.6)
HCO3 BLDA-SCNC: 28 MMOL/L (ref 22–26)
HCT VFR BLD AUTO: 36.1 % (ref 36.6–50.3)
HDLC SERPL-MCNC: 73 MG/DL
HDLC SERPL: 1.8 (ref 0–5)
HGB BLD-MCNC: 11.2 G/DL (ref 12.1–17)
IPAP/PIP: 20
LDLC SERPL CALC-MCNC: 44.8 MG/DL (ref 0–100)
LEVETIRACETAM SERPL-MCNC: <2 UG/ML (ref 10–40)
Lab: NORMAL
MAGNESIUM SERPL-MCNC: 2.1 MG/DL (ref 1.6–2.4)
MCH RBC QN AUTO: 29.1 PG (ref 26–34)
MCHC RBC AUTO-ENTMCNC: 31 G/DL (ref 30–36.5)
MCV RBC AUTO: 93.8 FL (ref 80–99)
METHADONE UR QL: NEGATIVE
NRBC # BLD: 0 K/UL (ref 0–0.01)
NRBC BLD-RTO: 0 PER 100 WBC
OPIATES UR QL: NEGATIVE
PCO2 BLDA: 51 MMHG (ref 35–45)
PCP UR QL: NEGATIVE
PEEP RESPIRATORY: 8
PH BLDA: 7.35 (ref 7.35–7.45)
PHOSPHATE SERPL-MCNC: 3.8 MG/DL (ref 2.6–4.7)
PLATELET # BLD AUTO: 249 K/UL (ref 150–400)
PMV BLD AUTO: 9.2 FL (ref 8.9–12.9)
PO2 BLDA: 178 MMHG (ref 80–100)
POTASSIUM SERPL-SCNC: 4.5 MMOL/L (ref 3.5–5.1)
PROT SERPL-MCNC: 6.2 G/DL (ref 6.4–8.2)
RBC # BLD AUTO: 3.85 M/UL (ref 4.1–5.7)
SAO2 % BLD: 99 % (ref 92–97)
SAO2% DEVICE SAO2% SENSOR NAME: ABNORMAL
SERVICE CMNT-IMP: NORMAL
SODIUM SERPL-SCNC: 138 MMOL/L (ref 136–145)
SPECIMEN SITE: ABNORMAL
TRIGL SERPL-MCNC: 56 MG/DL
VENTILATION MODE VENT: ABNORMAL
VLDLC SERPL CALC-MCNC: 11.2 MG/DL
VT SETTING VENT: 550
WBC # BLD AUTO: 5.8 K/UL (ref 4.1–11.1)

## 2024-04-25 PROCEDURE — 6360000004 HC RX CONTRAST MEDICATION: Performed by: INTERNAL MEDICINE

## 2024-04-25 PROCEDURE — 83036 HEMOGLOBIN GLYCOSYLATED A1C: CPT

## 2024-04-25 PROCEDURE — 71045 X-RAY EXAM CHEST 1 VIEW: CPT

## 2024-04-25 PROCEDURE — 6370000000 HC RX 637 (ALT 250 FOR IP): Performed by: NURSE PRACTITIONER

## 2024-04-25 PROCEDURE — 6370000000 HC RX 637 (ALT 250 FOR IP): Performed by: PSYCHIATRY & NEUROLOGY

## 2024-04-25 PROCEDURE — 6370000000 HC RX 637 (ALT 250 FOR IP): Performed by: INTERNAL MEDICINE

## 2024-04-25 PROCEDURE — 6360000002 HC RX W HCPCS: Performed by: INTERNAL MEDICINE

## 2024-04-25 PROCEDURE — 82962 GLUCOSE BLOOD TEST: CPT

## 2024-04-25 PROCEDURE — 80048 BASIC METABOLIC PNL TOTAL CA: CPT

## 2024-04-25 PROCEDURE — 84100 ASSAY OF PHOSPHORUS: CPT

## 2024-04-25 PROCEDURE — 2000000000 HC ICU R&B

## 2024-04-25 PROCEDURE — C8929 TTE W OR WO FOL WCON,DOPPLER: HCPCS

## 2024-04-25 PROCEDURE — 2580000003 HC RX 258: Performed by: INTERNAL MEDICINE

## 2024-04-25 PROCEDURE — 85027 COMPLETE CBC AUTOMATED: CPT

## 2024-04-25 PROCEDURE — 97530 THERAPEUTIC ACTIVITIES: CPT

## 2024-04-25 PROCEDURE — 80076 HEPATIC FUNCTION PANEL: CPT

## 2024-04-25 PROCEDURE — 70551 MRI BRAIN STEM W/O DYE: CPT

## 2024-04-25 PROCEDURE — 36415 COLL VENOUS BLD VENIPUNCTURE: CPT

## 2024-04-25 PROCEDURE — 97530 THERAPEUTIC ACTIVITIES: CPT | Performed by: OCCUPATIONAL THERAPIST

## 2024-04-25 PROCEDURE — 97162 PT EVAL MOD COMPLEX 30 MIN: CPT

## 2024-04-25 PROCEDURE — 36600 WITHDRAWAL OF ARTERIAL BLOOD: CPT

## 2024-04-25 PROCEDURE — 2500000003 HC RX 250 WO HCPCS: Performed by: INTERNAL MEDICINE

## 2024-04-25 PROCEDURE — 83735 ASSAY OF MAGNESIUM: CPT

## 2024-04-25 PROCEDURE — 99232 SBSQ HOSP IP/OBS MODERATE 35: CPT | Performed by: PSYCHIATRY & NEUROLOGY

## 2024-04-25 PROCEDURE — 94640 AIRWAY INHALATION TREATMENT: CPT

## 2024-04-25 PROCEDURE — 82803 BLOOD GASES ANY COMBINATION: CPT

## 2024-04-25 PROCEDURE — 99223 1ST HOSP IP/OBS HIGH 75: CPT | Performed by: NURSE PRACTITIONER

## 2024-04-25 PROCEDURE — 80307 DRUG TEST PRSMV CHEM ANLYZR: CPT

## 2024-04-25 PROCEDURE — 6360000002 HC RX W HCPCS: Performed by: PSYCHIATRY & NEUROLOGY

## 2024-04-25 PROCEDURE — 97165 OT EVAL LOW COMPLEX 30 MIN: CPT | Performed by: OCCUPATIONAL THERAPIST

## 2024-04-25 PROCEDURE — 92610 EVALUATE SWALLOWING FUNCTION: CPT

## 2024-04-25 PROCEDURE — 2700000000 HC OXYGEN THERAPY PER DAY

## 2024-04-25 PROCEDURE — 80061 LIPID PANEL: CPT

## 2024-04-25 PROCEDURE — 94660 CPAP INITIATION&MGMT: CPT

## 2024-04-25 RX ORDER — BUDESONIDE 1 MG/2ML
1 INHALANT ORAL 2 TIMES DAILY
COMMUNITY

## 2024-04-25 RX ORDER — FAMOTIDINE 20 MG/1
20 TABLET, FILM COATED ORAL 2 TIMES DAILY
COMMUNITY
Start: 2023-03-21

## 2024-04-25 RX ORDER — LORAZEPAM 2 MG/ML
1 INJECTION INTRAMUSCULAR ONCE
Status: DISCONTINUED | OUTPATIENT
Start: 2024-04-25 | End: 2024-05-01

## 2024-04-25 RX ORDER — KETOCONAZOLE 20 MG/ML
SHAMPOO TOPICAL DAILY PRN
COMMUNITY
Start: 2024-03-05

## 2024-04-25 RX ORDER — ERGOCALCIFEROL 1.25 MG/1
50000 CAPSULE ORAL WEEKLY
COMMUNITY
Start: 2023-12-04

## 2024-04-25 RX ORDER — ARFORMOTEROL TARTRATE 15 UG/2ML
1 SOLUTION RESPIRATORY (INHALATION)
COMMUNITY

## 2024-04-25 RX ORDER — ACETYLCYSTEINE 200 MG/ML
3 SOLUTION ORAL; RESPIRATORY (INHALATION) 2 TIMES DAILY
COMMUNITY
Start: 2024-04-01

## 2024-04-25 RX ORDER — OMEPRAZOLE 20 MG/1
20 CAPSULE, DELAYED RELEASE ORAL DAILY
COMMUNITY
Start: 2024-03-05

## 2024-04-25 RX ORDER — CASTOR OIL AND BALSAM, PERU 788; 87 MG/G; MG/G
OINTMENT TOPICAL 2 TIMES DAILY
Status: DISCONTINUED | OUTPATIENT
Start: 2024-04-25 | End: 2024-05-02 | Stop reason: HOSPADM

## 2024-04-25 RX ADMIN — ASPIRIN 300 MG: 300 SUPPOSITORY RECTAL at 08:56

## 2024-04-25 RX ADMIN — SODIUM CHLORIDE, PRESERVATIVE FREE 10 ML: 5 INJECTION INTRAVENOUS at 20:35

## 2024-04-25 RX ADMIN — IPRATROPIUM BROMIDE AND ALBUTEROL SULFATE 1 DOSE: .5; 3 SOLUTION RESPIRATORY (INHALATION) at 20:09

## 2024-04-25 RX ADMIN — Medication: at 20:24

## 2024-04-25 RX ADMIN — WATER 40 MG: 1 INJECTION INTRAMUSCULAR; INTRAVENOUS; SUBCUTANEOUS at 03:52

## 2024-04-25 RX ADMIN — PERFLUTREN 1.5 ML: 6.52 INJECTION, SUSPENSION INTRAVENOUS at 08:42

## 2024-04-25 RX ADMIN — IPRATROPIUM BROMIDE AND ALBUTEROL SULFATE 1 DOSE: .5; 3 SOLUTION RESPIRATORY (INHALATION) at 02:40

## 2024-04-25 RX ADMIN — ENOXAPARIN SODIUM 40 MG: 100 INJECTION SUBCUTANEOUS at 08:50

## 2024-04-25 RX ADMIN — WATER 40 MG: 1 INJECTION INTRAMUSCULAR; INTRAVENOUS; SUBCUTANEOUS at 17:28

## 2024-04-25 RX ADMIN — SODIUM CHLORIDE, PRESERVATIVE FREE 10 ML: 5 INJECTION INTRAVENOUS at 08:50

## 2024-04-25 RX ADMIN — DOXYCYCLINE 100 MG: 100 INJECTION, POWDER, LYOPHILIZED, FOR SOLUTION INTRAVENOUS at 03:51

## 2024-04-25 RX ADMIN — IPRATROPIUM BROMIDE AND ALBUTEROL SULFATE 1 DOSE: .5; 3 SOLUTION RESPIRATORY (INHALATION) at 15:35

## 2024-04-25 RX ADMIN — IPRATROPIUM BROMIDE AND ALBUTEROL SULFATE 1 DOSE: .5; 3 SOLUTION RESPIRATORY (INHALATION) at 05:18

## 2024-04-25 RX ADMIN — DOXYCYCLINE 100 MG: 100 INJECTION, POWDER, LYOPHILIZED, FOR SOLUTION INTRAVENOUS at 17:32

## 2024-04-25 RX ADMIN — LEVETIRACETAM 750 MG: 500 INJECTION, SOLUTION, CONCENTRATE INTRAVENOUS at 20:34

## 2024-04-25 RX ADMIN — IPRATROPIUM BROMIDE AND ALBUTEROL SULFATE 1 DOSE: .5; 3 SOLUTION RESPIRATORY (INHALATION) at 09:00

## 2024-04-25 RX ADMIN — IPRATROPIUM BROMIDE AND ALBUTEROL SULFATE 1 DOSE: .5; 3 SOLUTION RESPIRATORY (INHALATION) at 12:23

## 2024-04-25 RX ADMIN — Medication 1 AMPULE: at 21:00

## 2024-04-25 RX ADMIN — LEVETIRACETAM 750 MG: 500 INJECTION, SOLUTION, CONCENTRATE INTRAVENOUS at 08:50

## 2024-04-25 RX ADMIN — Medication 4 ML: at 09:00

## 2024-04-25 ASSESSMENT — PAIN SCALES - GENERAL
PAINLEVEL_OUTOF10: 0
PAINLEVEL_OUTOF10: 0

## 2024-04-25 NOTE — INTERDISCIPLINARY ROUNDS
Critical care interdisciplinary rounds today.  Following members present: Case Management, , Nursing, Nutrition, Pharmacy, and Physician. Family invited to participate. (Brother)  Plan of care discussed.  See clinical pathway for plan of care and interventions and desired outcomes.

## 2024-04-26 LAB
ANION GAP SERPL CALC-SCNC: 3 MMOL/L (ref 5–15)
BUN SERPL-MCNC: 29 MG/DL (ref 6–20)
BUN/CREAT SERPL: 48 (ref 12–20)
CALCIUM SERPL-MCNC: 8.7 MG/DL (ref 8.5–10.1)
CHLORIDE SERPL-SCNC: 107 MMOL/L (ref 97–108)
CO2 SERPL-SCNC: 30 MMOL/L (ref 21–32)
CREAT SERPL-MCNC: 0.6 MG/DL (ref 0.7–1.3)
ERYTHROCYTE [DISTWIDTH] IN BLOOD BY AUTOMATED COUNT: 15.3 % (ref 11.5–14.5)
GLUCOSE BLD STRIP.AUTO-MCNC: 105 MG/DL (ref 65–117)
GLUCOSE BLD STRIP.AUTO-MCNC: 108 MG/DL (ref 65–117)
GLUCOSE BLD STRIP.AUTO-MCNC: 126 MG/DL (ref 65–117)
GLUCOSE SERPL-MCNC: 118 MG/DL (ref 65–100)
HCT VFR BLD AUTO: 35.3 % (ref 36.6–50.3)
HGB BLD-MCNC: 11 G/DL (ref 12.1–17)
MAGNESIUM SERPL-MCNC: 2.1 MG/DL (ref 1.6–2.4)
MCH RBC QN AUTO: 28.8 PG (ref 26–34)
MCHC RBC AUTO-ENTMCNC: 31.2 G/DL (ref 30–36.5)
MCV RBC AUTO: 92.4 FL (ref 80–99)
NRBC # BLD: 0 K/UL (ref 0–0.01)
NRBC BLD-RTO: 0 PER 100 WBC
PHOSPHATE SERPL-MCNC: 2.8 MG/DL (ref 2.6–4.7)
PLATELET # BLD AUTO: 243 K/UL (ref 150–400)
PMV BLD AUTO: 9.2 FL (ref 8.9–12.9)
POTASSIUM SERPL-SCNC: 4.3 MMOL/L (ref 3.5–5.1)
RBC # BLD AUTO: 3.82 M/UL (ref 4.1–5.7)
SERVICE CMNT-IMP: ABNORMAL
SERVICE CMNT-IMP: NORMAL
SERVICE CMNT-IMP: NORMAL
SODIUM SERPL-SCNC: 140 MMOL/L (ref 136–145)
WBC # BLD AUTO: 7 K/UL (ref 4.1–11.1)

## 2024-04-26 PROCEDURE — C9113 INJ PANTOPRAZOLE SODIUM, VIA: HCPCS | Performed by: INTERNAL MEDICINE

## 2024-04-26 PROCEDURE — 80048 BASIC METABOLIC PNL TOTAL CA: CPT

## 2024-04-26 PROCEDURE — 92526 ORAL FUNCTION THERAPY: CPT

## 2024-04-26 PROCEDURE — 2580000003 HC RX 258: Performed by: INTERNAL MEDICINE

## 2024-04-26 PROCEDURE — 99232 SBSQ HOSP IP/OBS MODERATE 35: CPT | Performed by: PSYCHIATRY & NEUROLOGY

## 2024-04-26 PROCEDURE — 97162 PT EVAL MOD COMPLEX 30 MIN: CPT

## 2024-04-26 PROCEDURE — A4216 STERILE WATER/SALINE, 10 ML: HCPCS | Performed by: INTERNAL MEDICINE

## 2024-04-26 PROCEDURE — 2000000000 HC ICU R&B

## 2024-04-26 PROCEDURE — 85027 COMPLETE CBC AUTOMATED: CPT

## 2024-04-26 PROCEDURE — 97530 THERAPEUTIC ACTIVITIES: CPT

## 2024-04-26 PROCEDURE — 36415 COLL VENOUS BLD VENIPUNCTURE: CPT

## 2024-04-26 PROCEDURE — 2700000000 HC OXYGEN THERAPY PER DAY

## 2024-04-26 PROCEDURE — 6360000002 HC RX W HCPCS: Performed by: INTERNAL MEDICINE

## 2024-04-26 PROCEDURE — 6360000002 HC RX W HCPCS: Performed by: PSYCHIATRY & NEUROLOGY

## 2024-04-26 PROCEDURE — 92523 SPEECH SOUND LANG COMPREHEN: CPT

## 2024-04-26 PROCEDURE — 94640 AIRWAY INHALATION TREATMENT: CPT

## 2024-04-26 PROCEDURE — 83735 ASSAY OF MAGNESIUM: CPT

## 2024-04-26 PROCEDURE — 95816 EEG AWAKE AND DROWSY: CPT

## 2024-04-26 PROCEDURE — 2500000003 HC RX 250 WO HCPCS: Performed by: INTERNAL MEDICINE

## 2024-04-26 PROCEDURE — 99231 SBSQ HOSP IP/OBS SF/LOW 25: CPT | Performed by: NURSE PRACTITIONER

## 2024-04-26 PROCEDURE — 97535 SELF CARE MNGMENT TRAINING: CPT

## 2024-04-26 PROCEDURE — 6370000000 HC RX 637 (ALT 250 FOR IP): Performed by: NURSE PRACTITIONER

## 2024-04-26 PROCEDURE — 6370000000 HC RX 637 (ALT 250 FOR IP): Performed by: INTERNAL MEDICINE

## 2024-04-26 PROCEDURE — 95816 EEG AWAKE AND DROWSY: CPT | Performed by: PSYCHIATRY & NEUROLOGY

## 2024-04-26 PROCEDURE — 82962 GLUCOSE BLOOD TEST: CPT

## 2024-04-26 PROCEDURE — 84100 ASSAY OF PHOSPHORUS: CPT

## 2024-04-26 RX ORDER — PANTOPRAZOLE SODIUM 40 MG/1
40 TABLET, DELAYED RELEASE ORAL
Status: DISCONTINUED | OUTPATIENT
Start: 2024-04-26 | End: 2024-04-26

## 2024-04-26 RX ORDER — IPRATROPIUM BROMIDE AND ALBUTEROL SULFATE 2.5; .5 MG/3ML; MG/3ML
1 SOLUTION RESPIRATORY (INHALATION)
Status: DISCONTINUED | OUTPATIENT
Start: 2024-04-26 | End: 2024-04-29

## 2024-04-26 RX ORDER — ASPIRIN 81 MG/1
81 TABLET, CHEWABLE ORAL DAILY
Status: DISCONTINUED | OUTPATIENT
Start: 2024-04-26 | End: 2024-05-02 | Stop reason: HOSPADM

## 2024-04-26 RX ORDER — GABAPENTIN 300 MG/1
600 CAPSULE ORAL 4 TIMES DAILY
Status: DISCONTINUED | OUTPATIENT
Start: 2024-04-26 | End: 2024-05-02 | Stop reason: HOSPADM

## 2024-04-26 RX ORDER — LEVOTHYROXINE SODIUM 0.05 MG/1
50 TABLET ORAL DAILY
Status: DISCONTINUED | OUTPATIENT
Start: 2024-04-26 | End: 2024-05-02 | Stop reason: HOSPADM

## 2024-04-26 RX ORDER — IPRATROPIUM BROMIDE AND ALBUTEROL SULFATE 2.5; .5 MG/3ML; MG/3ML
1 SOLUTION RESPIRATORY (INHALATION)
Status: DISCONTINUED | OUTPATIENT
Start: 2024-04-26 | End: 2024-04-26

## 2024-04-26 RX ORDER — CITALOPRAM 20 MG/1
20 TABLET ORAL DAILY
Status: DISCONTINUED | OUTPATIENT
Start: 2024-04-26 | End: 2024-05-02 | Stop reason: HOSPADM

## 2024-04-26 RX ADMIN — ENOXAPARIN SODIUM 40 MG: 100 INJECTION SUBCUTANEOUS at 08:17

## 2024-04-26 RX ADMIN — IPRATROPIUM BROMIDE AND ALBUTEROL SULFATE 1 DOSE: .5; 3 SOLUTION RESPIRATORY (INHALATION) at 04:30

## 2024-04-26 RX ADMIN — PANTOPRAZOLE SODIUM 40 MG: 40 INJECTION, POWDER, LYOPHILIZED, FOR SOLUTION INTRAVENOUS at 11:32

## 2024-04-26 RX ADMIN — SODIUM CHLORIDE, PRESERVATIVE FREE 10 ML: 5 INJECTION INTRAVENOUS at 20:54

## 2024-04-26 RX ADMIN — IPRATROPIUM BROMIDE AND ALBUTEROL SULFATE 1 DOSE: .5; 3 SOLUTION RESPIRATORY (INHALATION) at 21:04

## 2024-04-26 RX ADMIN — Medication 4 ML: at 07:39

## 2024-04-26 RX ADMIN — LEVOTHYROXINE SODIUM 50 MCG: 0.05 TABLET ORAL at 11:32

## 2024-04-26 RX ADMIN — Medication 1 AMPULE: at 20:52

## 2024-04-26 RX ADMIN — LEVETIRACETAM 750 MG: 500 INJECTION, SOLUTION, CONCENTRATE INTRAVENOUS at 08:16

## 2024-04-26 RX ADMIN — Medication 4 ML: at 21:04

## 2024-04-26 RX ADMIN — WATER 40 MG: 1 INJECTION INTRAMUSCULAR; INTRAVENOUS; SUBCUTANEOUS at 03:34

## 2024-04-26 RX ADMIN — GABAPENTIN 600 MG: 300 CAPSULE ORAL at 18:06

## 2024-04-26 RX ADMIN — GABAPENTIN 600 MG: 300 CAPSULE ORAL at 13:27

## 2024-04-26 RX ADMIN — ASPIRIN 81 MG: 81 TABLET, CHEWABLE ORAL at 11:32

## 2024-04-26 RX ADMIN — Medication 1 AMPULE: at 08:16

## 2024-04-26 RX ADMIN — DOXYCYCLINE 100 MG: 100 INJECTION, POWDER, LYOPHILIZED, FOR SOLUTION INTRAVENOUS at 03:37

## 2024-04-26 RX ADMIN — CITALOPRAM HYDROBROMIDE 20 MG: 20 TABLET ORAL at 11:32

## 2024-04-26 RX ADMIN — LEVETIRACETAM 750 MG: 500 INJECTION, SOLUTION, CONCENTRATE INTRAVENOUS at 20:52

## 2024-04-26 RX ADMIN — Medication: at 08:16

## 2024-04-26 RX ADMIN — SODIUM CHLORIDE, PRESERVATIVE FREE 10 ML: 5 INJECTION INTRAVENOUS at 08:17

## 2024-04-26 RX ADMIN — GABAPENTIN 600 MG: 300 CAPSULE ORAL at 20:54

## 2024-04-26 RX ADMIN — Medication: at 20:54

## 2024-04-26 RX ADMIN — DOXYCYCLINE 100 MG: 100 INJECTION, POWDER, LYOPHILIZED, FOR SOLUTION INTRAVENOUS at 16:46

## 2024-04-26 RX ADMIN — WATER 40 MG: 1 INJECTION INTRAMUSCULAR; INTRAVENOUS; SUBCUTANEOUS at 16:48

## 2024-04-26 RX ADMIN — IPRATROPIUM BROMIDE AND ALBUTEROL SULFATE 1 DOSE: .5; 3 SOLUTION RESPIRATORY (INHALATION) at 07:39

## 2024-04-26 ASSESSMENT — PAIN SCALES - GENERAL
PAINLEVEL_OUTOF10: 0

## 2024-04-26 NOTE — INTERDISCIPLINARY ROUNDS
Critical care interdisciplinary rounds today.  Following members present: Case Management, ,  Nursing, Nutrition, Pharmacy, and Physician. Family invited to participate. (Brother and sister.) EEG completed earlier this am.  Plan of care discussed.  See clinical pathway for plan of care and interventions and desired outcomes.

## 2024-04-26 NOTE — PROCEDURES
EEG REPORT    Patient Name: Rodolfo Delcid  : 1955  Age: 69 y.o.    Ordering physician: Dr. San  Date of EE2024  8:43 - 9:04  Diagnosis: seizure like activity  Interpreting physician: Yamil Mello D.O. FAAN    Procedure: EEG    CLINICAL INDICATION: The patient is a 69 y.o. male who is being evaluated for baseline electro cerebral activities and to rule out seizure focus.      Current Facility-Administered Medications   Medication Dose Route Frequency    gabapentin (NEURONTIN) capsule 600 mg  600 mg Oral 4x daily    citalopram (CELEXA) tablet 20 mg  20 mg Oral Daily    levothyroxine (SYNTHROID) tablet 50 mcg  50 mcg Oral Daily    pantoprazole (PROTONIX) 40 mg in sodium chloride (PF) 0.9 % 10 mL injection  40 mg IntraVENous Daily    aspirin chewable tablet 81 mg  81 mg Oral Daily    ipratropium 0.5 mg-albuterol 2.5 mg (DUONEB) nebulizer solution 1 Dose  1 Dose Inhalation Q6H RT    balsum peru-castor oil (VENELEX) ointment   Topical BID    LORazepam (ATIVAN) injection 1 mg  1 mg IntraVENous Once    alcohol 62% (NOZIN) nasal  1 ampule  1 ampule Nasal Q12H    iopamidol (ISOVUE-370) 76 % injection 100 mL  100 mL IntraVENous ONCE PRN    sodium chloride (Inhalant) 3 % nebulizer solution 4 mL  4 mL Nebulization BID    methylPREDNISolone sodium succ (SOLU-MEDROL) 40 mg in sterile water 1 mL injection  40 mg IntraVENous Q12H    sodium chloride flush 0.9 % injection 5-40 mL  5-40 mL IntraVENous 2 times per day    sodium chloride flush 0.9 % injection 5-40 mL  5-40 mL IntraVENous PRN    0.9 % sodium chloride infusion   IntraVENous PRN    enoxaparin (LOVENOX) injection 40 mg  40 mg SubCUTAneous Daily    ondansetron (ZOFRAN-ODT) disintegrating tablet 4 mg  4 mg Oral Q8H PRN    Or    ondansetron (ZOFRAN) injection 4 mg  4 mg IntraVENous Q6H PRN    polyethylene glycol (GLYCOLAX) packet 17 g  17 g Oral Daily PRN    acetaminophen (TYLENOL) tablet 650 mg  650 mg Oral Q6H PRN    Or    acetaminophen

## 2024-04-27 LAB
ANION GAP SERPL CALC-SCNC: 3 MMOL/L (ref 5–15)
BUN SERPL-MCNC: 29 MG/DL (ref 6–20)
BUN/CREAT SERPL: 51 (ref 12–20)
CALCIUM SERPL-MCNC: 9.1 MG/DL (ref 8.5–10.1)
CHLORIDE SERPL-SCNC: 107 MMOL/L (ref 97–108)
CO2 SERPL-SCNC: 29 MMOL/L (ref 21–32)
CREAT SERPL-MCNC: 0.57 MG/DL (ref 0.7–1.3)
ERYTHROCYTE [DISTWIDTH] IN BLOOD BY AUTOMATED COUNT: 15.1 % (ref 11.5–14.5)
GLUCOSE SERPL-MCNC: 113 MG/DL (ref 65–100)
HCT VFR BLD AUTO: 35.5 % (ref 36.6–50.3)
HGB BLD-MCNC: 11 G/DL (ref 12.1–17)
MAGNESIUM SERPL-MCNC: 1.9 MG/DL (ref 1.6–2.4)
MCH RBC QN AUTO: 28.4 PG (ref 26–34)
MCHC RBC AUTO-ENTMCNC: 31 G/DL (ref 30–36.5)
MCV RBC AUTO: 91.7 FL (ref 80–99)
NRBC # BLD: 0 K/UL (ref 0–0.01)
NRBC BLD-RTO: 0 PER 100 WBC
PHOSPHATE SERPL-MCNC: 3.1 MG/DL (ref 2.6–4.7)
PLATELET # BLD AUTO: 257 K/UL (ref 150–400)
PMV BLD AUTO: 9.5 FL (ref 8.9–12.9)
POTASSIUM SERPL-SCNC: 4.3 MMOL/L (ref 3.5–5.1)
RBC # BLD AUTO: 3.87 M/UL (ref 4.1–5.7)
SODIUM SERPL-SCNC: 139 MMOL/L (ref 136–145)
WBC # BLD AUTO: 6.5 K/UL (ref 4.1–11.1)

## 2024-04-27 PROCEDURE — 2580000003 HC RX 258: Performed by: INTERNAL MEDICINE

## 2024-04-27 PROCEDURE — 2700000000 HC OXYGEN THERAPY PER DAY

## 2024-04-27 PROCEDURE — 6360000002 HC RX W HCPCS: Performed by: INTERNAL MEDICINE

## 2024-04-27 PROCEDURE — 6370000000 HC RX 637 (ALT 250 FOR IP): Performed by: NURSE PRACTITIONER

## 2024-04-27 PROCEDURE — 6360000002 HC RX W HCPCS: Performed by: PSYCHIATRY & NEUROLOGY

## 2024-04-27 PROCEDURE — 2060000000 HC ICU INTERMEDIATE R&B

## 2024-04-27 PROCEDURE — C9113 INJ PANTOPRAZOLE SODIUM, VIA: HCPCS | Performed by: INTERNAL MEDICINE

## 2024-04-27 PROCEDURE — 2500000003 HC RX 250 WO HCPCS: Performed by: INTERNAL MEDICINE

## 2024-04-27 PROCEDURE — 36415 COLL VENOUS BLD VENIPUNCTURE: CPT

## 2024-04-27 PROCEDURE — 6370000000 HC RX 637 (ALT 250 FOR IP): Performed by: INTERNAL MEDICINE

## 2024-04-27 PROCEDURE — 85027 COMPLETE CBC AUTOMATED: CPT

## 2024-04-27 PROCEDURE — A4216 STERILE WATER/SALINE, 10 ML: HCPCS | Performed by: INTERNAL MEDICINE

## 2024-04-27 PROCEDURE — 84100 ASSAY OF PHOSPHORUS: CPT

## 2024-04-27 PROCEDURE — 80048 BASIC METABOLIC PNL TOTAL CA: CPT

## 2024-04-27 PROCEDURE — 92526 ORAL FUNCTION THERAPY: CPT

## 2024-04-27 PROCEDURE — 94660 CPAP INITIATION&MGMT: CPT

## 2024-04-27 PROCEDURE — 83735 ASSAY OF MAGNESIUM: CPT

## 2024-04-27 PROCEDURE — 94640 AIRWAY INHALATION TREATMENT: CPT

## 2024-04-27 PROCEDURE — 51798 US URINE CAPACITY MEASURE: CPT

## 2024-04-27 RX ORDER — PREDNISONE 20 MG/1
40 TABLET ORAL DAILY
Status: DISCONTINUED | OUTPATIENT
Start: 2024-04-27 | End: 2024-04-30

## 2024-04-27 RX ORDER — ALBUTEROL SULFATE 2.5 MG/3ML
2.5 SOLUTION RESPIRATORY (INHALATION) EVERY 4 HOURS PRN
Status: DISCONTINUED | OUTPATIENT
Start: 2024-04-27 | End: 2024-05-02 | Stop reason: HOSPADM

## 2024-04-27 RX ORDER — BUDESONIDE 0.5 MG/2ML
0.5 INHALANT ORAL
Status: DISCONTINUED | OUTPATIENT
Start: 2024-04-27 | End: 2024-05-02 | Stop reason: HOSPADM

## 2024-04-27 RX ORDER — GUAIFENESIN 600 MG/1
1200 TABLET, EXTENDED RELEASE ORAL 2 TIMES DAILY
Status: DISCONTINUED | OUTPATIENT
Start: 2024-04-27 | End: 2024-05-02 | Stop reason: HOSPADM

## 2024-04-27 RX ADMIN — DOXYCYCLINE 100 MG: 100 INJECTION, POWDER, LYOPHILIZED, FOR SOLUTION INTRAVENOUS at 17:33

## 2024-04-27 RX ADMIN — SODIUM CHLORIDE, PRESERVATIVE FREE 10 ML: 5 INJECTION INTRAVENOUS at 10:33

## 2024-04-27 RX ADMIN — GUAIFENESIN 1200 MG: 600 TABLET, EXTENDED RELEASE ORAL at 21:00

## 2024-04-27 RX ADMIN — Medication: at 21:01

## 2024-04-27 RX ADMIN — ASPIRIN 81 MG: 81 TABLET, CHEWABLE ORAL at 10:15

## 2024-04-27 RX ADMIN — BUDESONIDE 500 MCG: 0.5 INHALANT RESPIRATORY (INHALATION) at 21:34

## 2024-04-27 RX ADMIN — LEVETIRACETAM 750 MG: 500 INJECTION, SOLUTION, CONCENTRATE INTRAVENOUS at 10:24

## 2024-04-27 RX ADMIN — LEVETIRACETAM 750 MG: 500 INJECTION, SOLUTION, CONCENTRATE INTRAVENOUS at 21:00

## 2024-04-27 RX ADMIN — Medication 4 ML: at 21:21

## 2024-04-27 RX ADMIN — Medication 1 AMPULE: at 20:59

## 2024-04-27 RX ADMIN — SODIUM CHLORIDE, PRESERVATIVE FREE 10 ML: 5 INJECTION INTRAVENOUS at 21:00

## 2024-04-27 RX ADMIN — GABAPENTIN 600 MG: 300 CAPSULE ORAL at 10:15

## 2024-04-27 RX ADMIN — PREDNISONE 40 MG: 20 TABLET ORAL at 10:18

## 2024-04-27 RX ADMIN — DOXYCYCLINE 100 MG: 100 INJECTION, POWDER, LYOPHILIZED, FOR SOLUTION INTRAVENOUS at 04:33

## 2024-04-27 RX ADMIN — ACETAMINOPHEN 650 MG: 325 TABLET ORAL at 11:48

## 2024-04-27 RX ADMIN — IPRATROPIUM BROMIDE AND ALBUTEROL SULFATE 1 DOSE: .5; 3 SOLUTION RESPIRATORY (INHALATION) at 21:21

## 2024-04-27 RX ADMIN — PANTOPRAZOLE SODIUM 40 MG: 40 INJECTION, POWDER, LYOPHILIZED, FOR SOLUTION INTRAVENOUS at 10:26

## 2024-04-27 RX ADMIN — Medication 4 ML: at 09:30

## 2024-04-27 RX ADMIN — IPRATROPIUM BROMIDE AND ALBUTEROL SULFATE 1 DOSE: .5; 3 SOLUTION RESPIRATORY (INHALATION) at 09:12

## 2024-04-27 RX ADMIN — IPRATROPIUM BROMIDE AND ALBUTEROL SULFATE 1 DOSE: .5; 3 SOLUTION RESPIRATORY (INHALATION) at 14:32

## 2024-04-27 RX ADMIN — Medication: at 10:15

## 2024-04-27 RX ADMIN — GABAPENTIN 600 MG: 300 CAPSULE ORAL at 12:57

## 2024-04-27 RX ADMIN — LEVOTHYROXINE SODIUM 50 MCG: 0.05 TABLET ORAL at 07:09

## 2024-04-27 RX ADMIN — CITALOPRAM HYDROBROMIDE 20 MG: 20 TABLET ORAL at 10:15

## 2024-04-27 RX ADMIN — GABAPENTIN 600 MG: 300 CAPSULE ORAL at 17:18

## 2024-04-27 RX ADMIN — GUAIFENESIN 1200 MG: 600 TABLET, EXTENDED RELEASE ORAL at 15:24

## 2024-04-27 RX ADMIN — Medication 1 AMPULE: at 10:33

## 2024-04-27 RX ADMIN — GABAPENTIN 600 MG: 300 CAPSULE ORAL at 20:59

## 2024-04-27 RX ADMIN — ENOXAPARIN SODIUM 40 MG: 100 INJECTION SUBCUTANEOUS at 10:33

## 2024-04-27 RX ADMIN — WATER 40 MG: 1 INJECTION INTRAMUSCULAR; INTRAVENOUS; SUBCUTANEOUS at 04:29

## 2024-04-27 RX ADMIN — IPRATROPIUM BROMIDE AND ALBUTEROL SULFATE 1 DOSE: .5; 3 SOLUTION RESPIRATORY (INHALATION) at 02:59

## 2024-04-27 ASSESSMENT — PAIN - FUNCTIONAL ASSESSMENT: PAIN_FUNCTIONAL_ASSESSMENT: ACTIVITIES ARE NOT PREVENTED

## 2024-04-27 ASSESSMENT — PAIN DESCRIPTION - ORIENTATION: ORIENTATION: LOWER

## 2024-04-27 ASSESSMENT — PAIN DESCRIPTION - LOCATION: LOCATION: BACK

## 2024-04-27 ASSESSMENT — PAIN DESCRIPTION - DESCRIPTORS: DESCRIPTORS: ACHING

## 2024-04-27 ASSESSMENT — PAIN SCALES - GENERAL: PAINLEVEL_OUTOF10: 3

## 2024-04-27 NOTE — CONSULTS
Palliative Medicine  Patient Name: Rodolfo Delcid  YOB: 1955  MRN: [id number]  Age: 69 y.o.  Gender: male    Date of Initial Consult: 4/25/2024  Date of Service: 4/25/2024  Time: 2:25 PM  Provider: BEAU De La Cruz NP  Hospital Day: 2  Admit Date: 4/24/2024  Referring Provider: Sofía Dejesus MD       Reasons for Consultation:  End Stage Disease    HISTORY OF PRESENT ILLNESS (HPI):   Rodolfo Delcid is a 69 y.o. male with a past medical history of severe COPD on home 02 2-3L baseline, CAD, high cholesterol, stroke (residual right weakness), seizure disorder, hypothyroidism, who was admitted on 4/24/2024 from home with a diagnosis of SOB.   BIBA for AMS, fall and hypoxia;  Upon arrival EMS found pt's sats in 80s and he was altered.  Upon arrival to ED, sats were 100% on NRB, was placed on 4L at which time pt \"got all grey\" and there was concern for upward gaze deviation.  Placed on bipap.  labs reveal a mild respiratory acidosis 7.31/58/72 on 4L. Chest CT reveals debris in trachea without overt pneumonia (question subtle right apical tree in bud). CT brain / CTA neck without perfusion.   EEG is abnormal. There is focal slowing seen throughout the left hemisphere suggestive of an underlying structural abnormality.  There were no seizures or epileptiform discharges seen on the recording.    4/24: admitted to ICU on bipap for respiratory acidosis, partial code, ok with intubation, no CPR.    4/25: improvement in abg, however, mental status unchanged. Unclear if CVA vs post ictal.     Psychosocial: lives alone, never , no children, independent with ADLs.  Very limited due to breathing issues.    PALLIATIVE DIAGNOSES:    Fall  Hypoxia   Respiratory distress  Acute encephalopathy   Probable aspiration  COPD exacerbation  Goals of care  ASSESSMENT AND PLAN:   Today, I am seeing Rodolfo Delcid for goals of care.  Unclear if AMS is due to acute stroke or post ictal state, head CT with no acute 
admissions/overall decline. Palliative is following and agree that it is important to continue addressing goals of care. He currently is a partial code with intubation, but no shocks/CPR. He is at extremely high risk of being unable to successfully extubate if he is intubated.      Thank you for allowing us to participate in the care of this patient.  We will be happy to follow along in his/her progress with you.    Sandra Waite MD  
04/24/2024 11:56 AM     04/24/2024 11:56 AM    BUN 11 04/24/2024 11:56 AM    WBC 12.9 04/24/2024 11:56 AM    HCT 39.9 04/24/2024 11:56 AM    HGB 12.3 04/24/2024 11:56 AM     04/24/2024 11:56 AM               Complex decision making was necessary due to the patient's current medical condition and other medical co-morbidities.        Principal Problem:    Metabolic encephalopathy  Resolved Problems:    * No resolved hospital problems. *                 Signed By:  aYmil Mello DO FAAN    April 24, 2024

## 2024-04-28 LAB
ANION GAP SERPL CALC-SCNC: 3 MMOL/L (ref 5–15)
BUN SERPL-MCNC: 23 MG/DL (ref 6–20)
BUN/CREAT SERPL: 44 (ref 12–20)
CALCIUM SERPL-MCNC: 8.9 MG/DL (ref 8.5–10.1)
CHLORIDE SERPL-SCNC: 106 MMOL/L (ref 97–108)
CO2 SERPL-SCNC: 30 MMOL/L (ref 21–32)
CREAT SERPL-MCNC: 0.52 MG/DL (ref 0.7–1.3)
GLUCOSE SERPL-MCNC: 107 MG/DL (ref 65–100)
MAGNESIUM SERPL-MCNC: 1.8 MG/DL (ref 1.6–2.4)
PHOSPHATE SERPL-MCNC: 2.9 MG/DL (ref 2.6–4.7)
POTASSIUM SERPL-SCNC: 4 MMOL/L (ref 3.5–5.1)
SODIUM SERPL-SCNC: 139 MMOL/L (ref 136–145)

## 2024-04-28 PROCEDURE — 6370000000 HC RX 637 (ALT 250 FOR IP): Performed by: INTERNAL MEDICINE

## 2024-04-28 PROCEDURE — 6370000000 HC RX 637 (ALT 250 FOR IP): Performed by: NURSE PRACTITIONER

## 2024-04-28 PROCEDURE — 2500000003 HC RX 250 WO HCPCS: Performed by: INTERNAL MEDICINE

## 2024-04-28 PROCEDURE — 2580000003 HC RX 258: Performed by: INTERNAL MEDICINE

## 2024-04-28 PROCEDURE — 83735 ASSAY OF MAGNESIUM: CPT

## 2024-04-28 PROCEDURE — 80048 BASIC METABOLIC PNL TOTAL CA: CPT

## 2024-04-28 PROCEDURE — 84100 ASSAY OF PHOSPHORUS: CPT

## 2024-04-28 PROCEDURE — 6360000002 HC RX W HCPCS: Performed by: INTERNAL MEDICINE

## 2024-04-28 PROCEDURE — A4216 STERILE WATER/SALINE, 10 ML: HCPCS | Performed by: INTERNAL MEDICINE

## 2024-04-28 PROCEDURE — 94660 CPAP INITIATION&MGMT: CPT

## 2024-04-28 PROCEDURE — 94640 AIRWAY INHALATION TREATMENT: CPT

## 2024-04-28 PROCEDURE — 2700000000 HC OXYGEN THERAPY PER DAY

## 2024-04-28 PROCEDURE — 2060000000 HC ICU INTERMEDIATE R&B

## 2024-04-28 PROCEDURE — 36415 COLL VENOUS BLD VENIPUNCTURE: CPT

## 2024-04-28 PROCEDURE — 6360000002 HC RX W HCPCS: Performed by: PSYCHIATRY & NEUROLOGY

## 2024-04-28 PROCEDURE — C9113 INJ PANTOPRAZOLE SODIUM, VIA: HCPCS | Performed by: INTERNAL MEDICINE

## 2024-04-28 PROCEDURE — 6370000000 HC RX 637 (ALT 250 FOR IP): Performed by: STUDENT IN AN ORGANIZED HEALTH CARE EDUCATION/TRAINING PROGRAM

## 2024-04-28 RX ORDER — ATORVASTATIN CALCIUM 20 MG/1
20 TABLET, FILM COATED ORAL NIGHTLY
Status: DISCONTINUED | OUTPATIENT
Start: 2024-04-28 | End: 2024-05-02

## 2024-04-28 RX ADMIN — GABAPENTIN 600 MG: 300 CAPSULE ORAL at 10:20

## 2024-04-28 RX ADMIN — IPRATROPIUM BROMIDE AND ALBUTEROL SULFATE 1 DOSE: .5; 3 SOLUTION RESPIRATORY (INHALATION) at 14:24

## 2024-04-28 RX ADMIN — GABAPENTIN 600 MG: 300 CAPSULE ORAL at 20:17

## 2024-04-28 RX ADMIN — SODIUM CHLORIDE, PRESERVATIVE FREE 10 ML: 5 INJECTION INTRAVENOUS at 10:21

## 2024-04-28 RX ADMIN — LEVETIRACETAM 750 MG: 500 INJECTION, SOLUTION, CONCENTRATE INTRAVENOUS at 20:17

## 2024-04-28 RX ADMIN — GUAIFENESIN 1200 MG: 600 TABLET, EXTENDED RELEASE ORAL at 20:17

## 2024-04-28 RX ADMIN — Medication 4 ML: at 07:39

## 2024-04-28 RX ADMIN — IPRATROPIUM BROMIDE AND ALBUTEROL SULFATE 1 DOSE: .5; 3 SOLUTION RESPIRATORY (INHALATION) at 19:26

## 2024-04-28 RX ADMIN — IPRATROPIUM BROMIDE AND ALBUTEROL SULFATE 1 DOSE: .5; 3 SOLUTION RESPIRATORY (INHALATION) at 02:06

## 2024-04-28 RX ADMIN — BUDESONIDE 500 MCG: 0.5 INHALANT RESPIRATORY (INHALATION) at 19:31

## 2024-04-28 RX ADMIN — GABAPENTIN 600 MG: 300 CAPSULE ORAL at 14:57

## 2024-04-28 RX ADMIN — Medication: at 10:22

## 2024-04-28 RX ADMIN — SODIUM CHLORIDE, PRESERVATIVE FREE 10 ML: 5 INJECTION INTRAVENOUS at 20:17

## 2024-04-28 RX ADMIN — LEVETIRACETAM 750 MG: 500 INJECTION, SOLUTION, CONCENTRATE INTRAVENOUS at 10:20

## 2024-04-28 RX ADMIN — IPRATROPIUM BROMIDE AND ALBUTEROL SULFATE 1 DOSE: .5; 3 SOLUTION RESPIRATORY (INHALATION) at 07:30

## 2024-04-28 RX ADMIN — Medication 4 ML: at 19:26

## 2024-04-28 RX ADMIN — PANTOPRAZOLE SODIUM 40 MG: 40 INJECTION, POWDER, LYOPHILIZED, FOR SOLUTION INTRAVENOUS at 10:20

## 2024-04-28 RX ADMIN — BUDESONIDE 500 MCG: 0.5 INHALANT RESPIRATORY (INHALATION) at 07:35

## 2024-04-28 RX ADMIN — CITALOPRAM HYDROBROMIDE 20 MG: 20 TABLET ORAL at 10:19

## 2024-04-28 RX ADMIN — ENOXAPARIN SODIUM 40 MG: 100 INJECTION SUBCUTANEOUS at 10:21

## 2024-04-28 RX ADMIN — GABAPENTIN 600 MG: 300 CAPSULE ORAL at 17:30

## 2024-04-28 RX ADMIN — ASPIRIN 81 MG: 81 TABLET, CHEWABLE ORAL at 10:19

## 2024-04-28 RX ADMIN — DOXYCYCLINE 100 MG: 100 INJECTION, POWDER, LYOPHILIZED, FOR SOLUTION INTRAVENOUS at 17:31

## 2024-04-28 RX ADMIN — DOXYCYCLINE 100 MG: 100 INJECTION, POWDER, LYOPHILIZED, FOR SOLUTION INTRAVENOUS at 04:36

## 2024-04-28 RX ADMIN — ATORVASTATIN CALCIUM 20 MG: 20 TABLET, FILM COATED ORAL at 20:17

## 2024-04-28 RX ADMIN — GUAIFENESIN 1200 MG: 600 TABLET, EXTENDED RELEASE ORAL at 10:19

## 2024-04-28 RX ADMIN — Medication: at 20:18

## 2024-04-28 RX ADMIN — Medication 1 AMPULE: at 10:21

## 2024-04-28 RX ADMIN — Medication 1 AMPULE: at 20:18

## 2024-04-28 RX ADMIN — PREDNISONE 40 MG: 20 TABLET ORAL at 10:20

## 2024-04-28 RX ADMIN — LEVOTHYROXINE SODIUM 50 MCG: 0.05 TABLET ORAL at 06:59

## 2024-04-28 ASSESSMENT — PAIN SCALES - GENERAL: PAINLEVEL_OUTOF10: 0

## 2024-04-29 LAB
ANION GAP SERPL CALC-SCNC: 3 MMOL/L (ref 5–15)
BUN SERPL-MCNC: 21 MG/DL (ref 6–20)
BUN/CREAT SERPL: 38 (ref 12–20)
CALCIUM SERPL-MCNC: 8.6 MG/DL (ref 8.5–10.1)
CHLORIDE SERPL-SCNC: 104 MMOL/L (ref 97–108)
CO2 SERPL-SCNC: 30 MMOL/L (ref 21–32)
CREAT SERPL-MCNC: 0.55 MG/DL (ref 0.7–1.3)
GLUCOSE SERPL-MCNC: 99 MG/DL (ref 65–100)
L PNEUMO1 AG UR QL IA: NEGATIVE
MAGNESIUM SERPL-MCNC: 1.7 MG/DL (ref 1.6–2.4)
PHOSPHATE SERPL-MCNC: 2.8 MG/DL (ref 2.6–4.7)
POTASSIUM SERPL-SCNC: 3.8 MMOL/L (ref 3.5–5.1)
SODIUM SERPL-SCNC: 137 MMOL/L (ref 136–145)
SPECIMEN SOURCE: NORMAL

## 2024-04-29 PROCEDURE — 2700000000 HC OXYGEN THERAPY PER DAY

## 2024-04-29 PROCEDURE — 2060000000 HC ICU INTERMEDIATE R&B

## 2024-04-29 PROCEDURE — 6360000002 HC RX W HCPCS: Performed by: INTERNAL MEDICINE

## 2024-04-29 PROCEDURE — 83735 ASSAY OF MAGNESIUM: CPT

## 2024-04-29 PROCEDURE — 97535 SELF CARE MNGMENT TRAINING: CPT | Performed by: OCCUPATIONAL THERAPIST

## 2024-04-29 PROCEDURE — 94640 AIRWAY INHALATION TREATMENT: CPT

## 2024-04-29 PROCEDURE — 2500000003 HC RX 250 WO HCPCS: Performed by: INTERNAL MEDICINE

## 2024-04-29 PROCEDURE — 6360000002 HC RX W HCPCS: Performed by: PSYCHIATRY & NEUROLOGY

## 2024-04-29 PROCEDURE — 36415 COLL VENOUS BLD VENIPUNCTURE: CPT

## 2024-04-29 PROCEDURE — 6370000000 HC RX 637 (ALT 250 FOR IP): Performed by: INTERNAL MEDICINE

## 2024-04-29 PROCEDURE — 97530 THERAPEUTIC ACTIVITIES: CPT

## 2024-04-29 PROCEDURE — 97116 GAIT TRAINING THERAPY: CPT

## 2024-04-29 PROCEDURE — 6370000000 HC RX 637 (ALT 250 FOR IP): Performed by: STUDENT IN AN ORGANIZED HEALTH CARE EDUCATION/TRAINING PROGRAM

## 2024-04-29 PROCEDURE — 6370000000 HC RX 637 (ALT 250 FOR IP): Performed by: NURSE PRACTITIONER

## 2024-04-29 PROCEDURE — C9113 INJ PANTOPRAZOLE SODIUM, VIA: HCPCS | Performed by: INTERNAL MEDICINE

## 2024-04-29 PROCEDURE — A4216 STERILE WATER/SALINE, 10 ML: HCPCS | Performed by: INTERNAL MEDICINE

## 2024-04-29 PROCEDURE — 2580000003 HC RX 258: Performed by: INTERNAL MEDICINE

## 2024-04-29 PROCEDURE — 84100 ASSAY OF PHOSPHORUS: CPT

## 2024-04-29 PROCEDURE — 80048 BASIC METABOLIC PNL TOTAL CA: CPT

## 2024-04-29 PROCEDURE — 6360000002 HC RX W HCPCS: Performed by: STUDENT IN AN ORGANIZED HEALTH CARE EDUCATION/TRAINING PROGRAM

## 2024-04-29 RX ORDER — MONTELUKAST SODIUM 10 MG/1
10 TABLET ORAL DAILY
Status: DISCONTINUED | OUTPATIENT
Start: 2024-04-29 | End: 2024-05-02 | Stop reason: HOSPADM

## 2024-04-29 RX ORDER — FAMOTIDINE 20 MG/1
20 TABLET, FILM COATED ORAL 2 TIMES DAILY
Status: DISCONTINUED | OUTPATIENT
Start: 2024-04-29 | End: 2024-05-02 | Stop reason: HOSPADM

## 2024-04-29 RX ORDER — ARFORMOTEROL TARTRATE 15 UG/2ML
15 SOLUTION RESPIRATORY (INHALATION)
Status: DISCONTINUED | OUTPATIENT
Start: 2024-04-29 | End: 2024-05-02 | Stop reason: HOSPADM

## 2024-04-29 RX ORDER — AZELASTINE 1 MG/ML
1 SPRAY, METERED NASAL 2 TIMES DAILY
Status: DISCONTINUED | OUTPATIENT
Start: 2024-04-29 | End: 2024-05-02 | Stop reason: HOSPADM

## 2024-04-29 RX ORDER — IPRATROPIUM BROMIDE AND ALBUTEROL SULFATE 2.5; .5 MG/3ML; MG/3ML
1 SOLUTION RESPIRATORY (INHALATION) EVERY 4 HOURS PRN
Status: DISCONTINUED | OUTPATIENT
Start: 2024-04-29 | End: 2024-05-02 | Stop reason: HOSPADM

## 2024-04-29 RX ORDER — DOXAZOSIN 2 MG/1
4 TABLET ORAL DAILY
Status: DISCONTINUED | OUTPATIENT
Start: 2024-04-29 | End: 2024-05-02 | Stop reason: HOSPADM

## 2024-04-29 RX ORDER — ATORVASTATIN CALCIUM 40 MG/1
40 TABLET, FILM COATED ORAL DAILY
Status: DISCONTINUED | OUTPATIENT
Start: 2024-04-29 | End: 2024-05-02

## 2024-04-29 RX ORDER — CITALOPRAM 20 MG/1
20 TABLET ORAL DAILY
Status: DISCONTINUED | OUTPATIENT
Start: 2024-04-29 | End: 2024-04-30

## 2024-04-29 RX ADMIN — CITALOPRAM HYDROBROMIDE 20 MG: 20 TABLET ORAL at 08:16

## 2024-04-29 RX ADMIN — MONTELUKAST 10 MG: 10 TABLET, FILM COATED ORAL at 16:49

## 2024-04-29 RX ADMIN — SODIUM CHLORIDE, PRESERVATIVE FREE 10 ML: 5 INJECTION INTRAVENOUS at 08:16

## 2024-04-29 RX ADMIN — Medication 4 ML: at 20:14

## 2024-04-29 RX ADMIN — LEVETIRACETAM 750 MG: 500 INJECTION, SOLUTION, CONCENTRATE INTRAVENOUS at 21:04

## 2024-04-29 RX ADMIN — ALBUTEROL SULFATE 2.5 MG: 2.5 SOLUTION RESPIRATORY (INHALATION) at 11:21

## 2024-04-29 RX ADMIN — DOXYCYCLINE 100 MG: 100 INJECTION, POWDER, LYOPHILIZED, FOR SOLUTION INTRAVENOUS at 04:03

## 2024-04-29 RX ADMIN — BUDESONIDE 500 MCG: 0.5 INHALANT RESPIRATORY (INHALATION) at 06:45

## 2024-04-29 RX ADMIN — LEVETIRACETAM 750 MG: 500 INJECTION, SOLUTION, CONCENTRATE INTRAVENOUS at 08:15

## 2024-04-29 RX ADMIN — BUDESONIDE 500 MCG: 0.5 INHALANT RESPIRATORY (INHALATION) at 20:14

## 2024-04-29 RX ADMIN — LEVOTHYROXINE SODIUM 50 MCG: 0.05 TABLET ORAL at 08:16

## 2024-04-29 RX ADMIN — GABAPENTIN 600 MG: 300 CAPSULE ORAL at 21:02

## 2024-04-29 RX ADMIN — Medication 1 AMPULE: at 21:07

## 2024-04-29 RX ADMIN — PREDNISONE 40 MG: 20 TABLET ORAL at 08:16

## 2024-04-29 RX ADMIN — ATORVASTATIN CALCIUM 40 MG: 40 TABLET, FILM COATED ORAL at 16:50

## 2024-04-29 RX ADMIN — DOXAZOSIN 4 MG: 2 TABLET ORAL at 16:49

## 2024-04-29 RX ADMIN — SODIUM CHLORIDE: 9 INJECTION, SOLUTION INTRAVENOUS at 04:03

## 2024-04-29 RX ADMIN — GUAIFENESIN 1200 MG: 600 TABLET, EXTENDED RELEASE ORAL at 21:02

## 2024-04-29 RX ADMIN — GABAPENTIN 600 MG: 300 CAPSULE ORAL at 16:49

## 2024-04-29 RX ADMIN — SODIUM CHLORIDE, PRESERVATIVE FREE 10 ML: 5 INJECTION INTRAVENOUS at 21:06

## 2024-04-29 RX ADMIN — ENOXAPARIN SODIUM 40 MG: 100 INJECTION SUBCUTANEOUS at 08:16

## 2024-04-29 RX ADMIN — GABAPENTIN 600 MG: 300 CAPSULE ORAL at 14:10

## 2024-04-29 RX ADMIN — GUAIFENESIN 1200 MG: 600 TABLET, EXTENDED RELEASE ORAL at 08:16

## 2024-04-29 RX ADMIN — Medication 1 AMPULE: at 08:18

## 2024-04-29 RX ADMIN — CITALOPRAM 20 MG: 20 TABLET, FILM COATED ORAL at 16:49

## 2024-04-29 RX ADMIN — AZELASTINE HYDROCHLORIDE 1 SPRAY: 137 SPRAY, METERED NASAL at 21:01

## 2024-04-29 RX ADMIN — Medication: at 21:06

## 2024-04-29 RX ADMIN — FAMOTIDINE 20 MG: 20 TABLET, FILM COATED ORAL at 21:02

## 2024-04-29 RX ADMIN — ATORVASTATIN CALCIUM 20 MG: 20 TABLET, FILM COATED ORAL at 21:02

## 2024-04-29 RX ADMIN — Medication 4 ML: at 06:44

## 2024-04-29 RX ADMIN — IPRATROPIUM BROMIDE AND ALBUTEROL SULFATE 1 DOSE: .5; 3 SOLUTION RESPIRATORY (INHALATION) at 01:59

## 2024-04-29 RX ADMIN — ARFORMOTEROL TARTRATE 15 MCG: 15 SOLUTION RESPIRATORY (INHALATION) at 20:15

## 2024-04-29 RX ADMIN — ASPIRIN 81 MG: 81 TABLET, CHEWABLE ORAL at 08:16

## 2024-04-29 RX ADMIN — Medication: at 08:14

## 2024-04-29 RX ADMIN — IPRATROPIUM BROMIDE AND ALBUTEROL SULFATE 1 DOSE: .5; 3 SOLUTION RESPIRATORY (INHALATION) at 06:44

## 2024-04-29 RX ADMIN — GABAPENTIN 600 MG: 300 CAPSULE ORAL at 08:15

## 2024-04-29 RX ADMIN — PANTOPRAZOLE SODIUM 40 MG: 40 INJECTION, POWDER, LYOPHILIZED, FOR SOLUTION INTRAVENOUS at 08:15

## 2024-04-30 LAB
ANION GAP SERPL CALC-SCNC: 1 MMOL/L (ref 5–15)
BACTERIA SPEC CULT: NORMAL
BACTERIA SPEC CULT: NORMAL
BASOPHILS # BLD: 0 K/UL (ref 0–0.1)
BASOPHILS NFR BLD: 0 % (ref 0–1)
BUN SERPL-MCNC: 17 MG/DL (ref 6–20)
BUN/CREAT SERPL: 30 (ref 12–20)
CALCIUM SERPL-MCNC: 8.5 MG/DL (ref 8.5–10.1)
CHLORIDE SERPL-SCNC: 104 MMOL/L (ref 97–108)
CO2 SERPL-SCNC: 33 MMOL/L (ref 21–32)
CREAT SERPL-MCNC: 0.56 MG/DL (ref 0.7–1.3)
DIFFERENTIAL METHOD BLD: ABNORMAL
EOSINOPHIL # BLD: 0 K/UL (ref 0–0.4)
EOSINOPHIL NFR BLD: 0 % (ref 0–7)
ERYTHROCYTE [DISTWIDTH] IN BLOOD BY AUTOMATED COUNT: 14.6 % (ref 11.5–14.5)
GLUCOSE SERPL-MCNC: 98 MG/DL (ref 65–100)
HCT VFR BLD AUTO: 36.1 % (ref 36.6–50.3)
HGB BLD-MCNC: 11.5 G/DL (ref 12.1–17)
IMM GRANULOCYTES # BLD AUTO: 0 K/UL (ref 0–0.04)
IMM GRANULOCYTES NFR BLD AUTO: 0 % (ref 0–0.5)
LYMPHOCYTES # BLD: 1 K/UL (ref 0.8–3.5)
LYMPHOCYTES NFR BLD: 17 % (ref 12–49)
MCH RBC QN AUTO: 28.7 PG (ref 26–34)
MCHC RBC AUTO-ENTMCNC: 31.9 G/DL (ref 30–36.5)
MCV RBC AUTO: 90 FL (ref 80–99)
MONOCYTES # BLD: 0.7 K/UL (ref 0–1)
MONOCYTES NFR BLD: 11 % (ref 5–13)
NEUTS SEG # BLD: 4.5 K/UL (ref 1.8–8)
NEUTS SEG NFR BLD: 72 % (ref 32–75)
NRBC # BLD: 0 K/UL (ref 0–0.01)
NRBC BLD-RTO: 0 PER 100 WBC
PLATELET # BLD AUTO: 263 K/UL (ref 150–400)
PMV BLD AUTO: 9.3 FL (ref 8.9–12.9)
POTASSIUM SERPL-SCNC: 3.7 MMOL/L (ref 3.5–5.1)
RBC # BLD AUTO: 4.01 M/UL (ref 4.1–5.7)
SERVICE CMNT-IMP: NORMAL
SERVICE CMNT-IMP: NORMAL
SODIUM SERPL-SCNC: 138 MMOL/L (ref 136–145)
WBC # BLD AUTO: 6.2 K/UL (ref 4.1–11.1)

## 2024-04-30 PROCEDURE — 6370000000 HC RX 637 (ALT 250 FOR IP): Performed by: INTERNAL MEDICINE

## 2024-04-30 PROCEDURE — 97535 SELF CARE MNGMENT TRAINING: CPT | Performed by: OCCUPATIONAL THERAPIST

## 2024-04-30 PROCEDURE — 2580000003 HC RX 258: Performed by: INTERNAL MEDICINE

## 2024-04-30 PROCEDURE — 6370000000 HC RX 637 (ALT 250 FOR IP): Performed by: STUDENT IN AN ORGANIZED HEALTH CARE EDUCATION/TRAINING PROGRAM

## 2024-04-30 PROCEDURE — 36415 COLL VENOUS BLD VENIPUNCTURE: CPT

## 2024-04-30 PROCEDURE — 94640 AIRWAY INHALATION TREATMENT: CPT

## 2024-04-30 PROCEDURE — 2700000000 HC OXYGEN THERAPY PER DAY

## 2024-04-30 PROCEDURE — 6360000002 HC RX W HCPCS: Performed by: INTERNAL MEDICINE

## 2024-04-30 PROCEDURE — 2060000000 HC ICU INTERMEDIATE R&B

## 2024-04-30 PROCEDURE — 6370000000 HC RX 637 (ALT 250 FOR IP): Performed by: NURSE PRACTITIONER

## 2024-04-30 PROCEDURE — 6360000002 HC RX W HCPCS: Performed by: STUDENT IN AN ORGANIZED HEALTH CARE EDUCATION/TRAINING PROGRAM

## 2024-04-30 PROCEDURE — C9113 INJ PANTOPRAZOLE SODIUM, VIA: HCPCS | Performed by: INTERNAL MEDICINE

## 2024-04-30 PROCEDURE — 92507 TX SP LANG VOICE COMM INDIV: CPT

## 2024-04-30 PROCEDURE — A4216 STERILE WATER/SALINE, 10 ML: HCPCS | Performed by: INTERNAL MEDICINE

## 2024-04-30 PROCEDURE — 6360000002 HC RX W HCPCS: Performed by: PSYCHIATRY & NEUROLOGY

## 2024-04-30 PROCEDURE — 92526 ORAL FUNCTION THERAPY: CPT

## 2024-04-30 PROCEDURE — 85025 COMPLETE CBC W/AUTO DIFF WBC: CPT

## 2024-04-30 PROCEDURE — 80048 BASIC METABOLIC PNL TOTAL CA: CPT

## 2024-04-30 RX ADMIN — Medication: at 21:04

## 2024-04-30 RX ADMIN — GABAPENTIN 600 MG: 300 CAPSULE ORAL at 09:45

## 2024-04-30 RX ADMIN — BUDESONIDE 500 MCG: 0.5 INHALANT RESPIRATORY (INHALATION) at 20:05

## 2024-04-30 RX ADMIN — SODIUM CHLORIDE, PRESERVATIVE FREE 10 ML: 5 INJECTION INTRAVENOUS at 21:04

## 2024-04-30 RX ADMIN — AZELASTINE HYDROCHLORIDE 1 SPRAY: 137 SPRAY, METERED NASAL at 21:50

## 2024-04-30 RX ADMIN — ATORVASTATIN CALCIUM 40 MG: 40 TABLET, FILM COATED ORAL at 09:45

## 2024-04-30 RX ADMIN — SODIUM CHLORIDE, PRESERVATIVE FREE 10 ML: 5 INJECTION INTRAVENOUS at 09:37

## 2024-04-30 RX ADMIN — LEVETIRACETAM 750 MG: 500 INJECTION, SOLUTION, CONCENTRATE INTRAVENOUS at 09:44

## 2024-04-30 RX ADMIN — GUAIFENESIN 1200 MG: 600 TABLET, EXTENDED RELEASE ORAL at 21:03

## 2024-04-30 RX ADMIN — GUAIFENESIN 1200 MG: 600 TABLET, EXTENDED RELEASE ORAL at 09:45

## 2024-04-30 RX ADMIN — Medication 4 ML: at 20:05

## 2024-04-30 RX ADMIN — Medication 4 ML: at 07:22

## 2024-04-30 RX ADMIN — ARFORMOTEROL TARTRATE 15 MCG: 15 SOLUTION RESPIRATORY (INHALATION) at 20:05

## 2024-04-30 RX ADMIN — ENOXAPARIN SODIUM 40 MG: 100 INJECTION SUBCUTANEOUS at 09:53

## 2024-04-30 RX ADMIN — ATORVASTATIN CALCIUM 20 MG: 20 TABLET, FILM COATED ORAL at 21:03

## 2024-04-30 RX ADMIN — ARFORMOTEROL TARTRATE 15 MCG: 15 SOLUTION RESPIRATORY (INHALATION) at 07:20

## 2024-04-30 RX ADMIN — ACETAMINOPHEN 650 MG: 325 TABLET ORAL at 22:59

## 2024-04-30 RX ADMIN — GABAPENTIN 600 MG: 300 CAPSULE ORAL at 17:02

## 2024-04-30 RX ADMIN — ALBUTEROL SULFATE 2.5 MG: 2.5 SOLUTION RESPIRATORY (INHALATION) at 07:22

## 2024-04-30 RX ADMIN — LEVOTHYROXINE SODIUM 50 MCG: 0.05 TABLET ORAL at 06:17

## 2024-04-30 RX ADMIN — MONTELUKAST 10 MG: 10 TABLET, FILM COATED ORAL at 09:45

## 2024-04-30 RX ADMIN — BUDESONIDE 500 MCG: 0.5 INHALANT RESPIRATORY (INHALATION) at 07:20

## 2024-04-30 RX ADMIN — ACETAMINOPHEN 650 MG: 325 TABLET ORAL at 03:34

## 2024-04-30 RX ADMIN — GABAPENTIN 600 MG: 300 CAPSULE ORAL at 13:33

## 2024-04-30 RX ADMIN — CITALOPRAM HYDROBROMIDE 20 MG: 20 TABLET ORAL at 09:45

## 2024-04-30 RX ADMIN — Medication: at 09:37

## 2024-04-30 RX ADMIN — AZELASTINE HYDROCHLORIDE 1 SPRAY: 137 SPRAY, METERED NASAL at 09:37

## 2024-04-30 RX ADMIN — LEVETIRACETAM 750 MG: 500 INJECTION, SOLUTION, CONCENTRATE INTRAVENOUS at 21:03

## 2024-04-30 RX ADMIN — FAMOTIDINE 20 MG: 20 TABLET, FILM COATED ORAL at 09:45

## 2024-04-30 RX ADMIN — GABAPENTIN 600 MG: 300 CAPSULE ORAL at 21:03

## 2024-04-30 RX ADMIN — PREDNISONE 40 MG: 20 TABLET ORAL at 09:45

## 2024-04-30 RX ADMIN — Medication 1 AMPULE: at 09:46

## 2024-04-30 RX ADMIN — ACETAMINOPHEN 650 MG: 325 TABLET ORAL at 09:51

## 2024-04-30 RX ADMIN — FAMOTIDINE 20 MG: 20 TABLET, FILM COATED ORAL at 21:03

## 2024-04-30 RX ADMIN — PANTOPRAZOLE SODIUM 40 MG: 40 INJECTION, POWDER, LYOPHILIZED, FOR SOLUTION INTRAVENOUS at 09:45

## 2024-04-30 RX ADMIN — ASPIRIN 81 MG: 81 TABLET, CHEWABLE ORAL at 09:45

## 2024-04-30 ASSESSMENT — PAIN SCALES - GENERAL
PAINLEVEL_OUTOF10: 0
PAINLEVEL_OUTOF10: 0
PAINLEVEL_OUTOF10: 3
PAINLEVEL_OUTOF10: 1
PAINLEVEL_OUTOF10: 8
PAINLEVEL_OUTOF10: 3
PAINLEVEL_OUTOF10: 0

## 2024-04-30 ASSESSMENT — PAIN DESCRIPTION - DESCRIPTORS
DESCRIPTORS: ACHING
DESCRIPTORS: SORE

## 2024-04-30 ASSESSMENT — PAIN DESCRIPTION - ORIENTATION
ORIENTATION: RIGHT
ORIENTATION: RIGHT

## 2024-04-30 ASSESSMENT — PAIN DESCRIPTION - LOCATION
LOCATION: HIP

## 2024-04-30 ASSESSMENT — PAIN SCALES - WONG BAKER: WONGBAKER_NUMERICALRESPONSE: NO HURT

## 2024-04-30 NOTE — ACP (ADVANCE CARE PLANNING)
Advance Care Planning     General Advance Care Planning (ACP) Conversation    Date of Conversation: 4/30/2024  Conducted with: Patient with Decision Making Capacity, Healthcare Decision Maker, and Legal next of kin  Other persons present: Brothdick Mesa ([place])    Healthcare Decision Maker:   Primary Decision Maker: Moshe Delcid - Brother/Sister - [phone]    Secondary Decision Maker: Karla Angel - Other - [phone]  Click here to complete Healthcare Decision Makers including selection of the Healthcare Decision Maker Relationship (ie \"Primary\").       Content/Action Overview:  Has ACP document(s) on file - reflects the patient's care preferences  Reviewed DNR/DNI and patient has limited code status.    This writer introduced patient to the POST form. He stated he does not want CPR and does not want intubation. He is accepting of artificial nutrition for a trial period, but no surgically-placed tubes. He does not want to go to a SNF even for rehab. He wants to be kept comfortable at EOL. POST completed for chart.    Today, Mr. Delcid completed an AMD naming his brother Moshe as primary and sister Karla as secondary HCDM. In section II. 1&2 he indicated that \"I do not want any treatments to prolong my life\" when death is imminent.     AMD and POST form submitted to be scanned to patient's chart.    Length of Voluntary ACP Conversation in minutes:  <16 minutes (Non-Billable)    Radha Chacko LCSW

## 2024-05-01 LAB
ANION GAP SERPL CALC-SCNC: 3 MMOL/L (ref 5–15)
BASOPHILS # BLD: 0 K/UL (ref 0–0.1)
BASOPHILS NFR BLD: 0 % (ref 0–1)
BUN SERPL-MCNC: 17 MG/DL (ref 6–20)
BUN/CREAT SERPL: 34 (ref 12–20)
CALCIUM SERPL-MCNC: 8.8 MG/DL (ref 8.5–10.1)
CHLORIDE SERPL-SCNC: 103 MMOL/L (ref 97–108)
CO2 SERPL-SCNC: 31 MMOL/L (ref 21–32)
CREAT SERPL-MCNC: 0.5 MG/DL (ref 0.7–1.3)
DIFFERENTIAL METHOD BLD: ABNORMAL
EOSINOPHIL # BLD: 0 K/UL (ref 0–0.4)
EOSINOPHIL NFR BLD: 0 % (ref 0–7)
ERYTHROCYTE [DISTWIDTH] IN BLOOD BY AUTOMATED COUNT: 14.6 % (ref 11.5–14.5)
GLUCOSE SERPL-MCNC: 111 MG/DL (ref 65–100)
HCT VFR BLD AUTO: 38.2 % (ref 36.6–50.3)
HGB BLD-MCNC: 11.9 G/DL (ref 12.1–17)
IMM GRANULOCYTES # BLD AUTO: 0 K/UL (ref 0–0.04)
IMM GRANULOCYTES NFR BLD AUTO: 0 % (ref 0–0.5)
LYMPHOCYTES # BLD: 0.7 K/UL (ref 0.8–3.5)
LYMPHOCYTES NFR BLD: 11 % (ref 12–49)
MCH RBC QN AUTO: 28.7 PG (ref 26–34)
MCHC RBC AUTO-ENTMCNC: 31.2 G/DL (ref 30–36.5)
MCV RBC AUTO: 92 FL (ref 80–99)
MONOCYTES # BLD: 0.5 K/UL (ref 0–1)
MONOCYTES NFR BLD: 8 % (ref 5–13)
NEUTS SEG # BLD: 5.5 K/UL (ref 1.8–8)
NEUTS SEG NFR BLD: 81 % (ref 32–75)
NRBC # BLD: 0 K/UL (ref 0–0.01)
NRBC BLD-RTO: 0 PER 100 WBC
PLATELET # BLD AUTO: 263 K/UL (ref 150–400)
PMV BLD AUTO: 9 FL (ref 8.9–12.9)
POTASSIUM SERPL-SCNC: 4 MMOL/L (ref 3.5–5.1)
RBC # BLD AUTO: 4.15 M/UL (ref 4.1–5.7)
RBC MORPH BLD: ABNORMAL
SODIUM SERPL-SCNC: 137 MMOL/L (ref 136–145)
WBC # BLD AUTO: 6.7 K/UL (ref 4.1–11.1)

## 2024-05-01 PROCEDURE — C9113 INJ PANTOPRAZOLE SODIUM, VIA: HCPCS | Performed by: INTERNAL MEDICINE

## 2024-05-01 PROCEDURE — 6360000002 HC RX W HCPCS: Performed by: INTERNAL MEDICINE

## 2024-05-01 PROCEDURE — 6370000000 HC RX 637 (ALT 250 FOR IP): Performed by: INTERNAL MEDICINE

## 2024-05-01 PROCEDURE — 97535 SELF CARE MNGMENT TRAINING: CPT | Performed by: OCCUPATIONAL THERAPIST

## 2024-05-01 PROCEDURE — 2700000000 HC OXYGEN THERAPY PER DAY

## 2024-05-01 PROCEDURE — 36415 COLL VENOUS BLD VENIPUNCTURE: CPT

## 2024-05-01 PROCEDURE — 2580000003 HC RX 258: Performed by: INTERNAL MEDICINE

## 2024-05-01 PROCEDURE — 6360000002 HC RX W HCPCS: Performed by: STUDENT IN AN ORGANIZED HEALTH CARE EDUCATION/TRAINING PROGRAM

## 2024-05-01 PROCEDURE — 85025 COMPLETE CBC W/AUTO DIFF WBC: CPT

## 2024-05-01 PROCEDURE — 2060000000 HC ICU INTERMEDIATE R&B

## 2024-05-01 PROCEDURE — 6370000000 HC RX 637 (ALT 250 FOR IP): Performed by: STUDENT IN AN ORGANIZED HEALTH CARE EDUCATION/TRAINING PROGRAM

## 2024-05-01 PROCEDURE — 80048 BASIC METABOLIC PNL TOTAL CA: CPT

## 2024-05-01 PROCEDURE — 97530 THERAPEUTIC ACTIVITIES: CPT

## 2024-05-01 PROCEDURE — 6370000000 HC RX 637 (ALT 250 FOR IP): Performed by: PHYSICIAN ASSISTANT

## 2024-05-01 PROCEDURE — 94640 AIRWAY INHALATION TREATMENT: CPT

## 2024-05-01 PROCEDURE — 6370000000 HC RX 637 (ALT 250 FOR IP): Performed by: NURSE PRACTITIONER

## 2024-05-01 PROCEDURE — 2580000003 HC RX 258: Performed by: STUDENT IN AN ORGANIZED HEALTH CARE EDUCATION/TRAINING PROGRAM

## 2024-05-01 PROCEDURE — 6360000002 HC RX W HCPCS: Performed by: PSYCHIATRY & NEUROLOGY

## 2024-05-01 PROCEDURE — A4216 STERILE WATER/SALINE, 10 ML: HCPCS | Performed by: INTERNAL MEDICINE

## 2024-05-01 RX ORDER — SODIUM CHLORIDE 9 MG/ML
INJECTION, SOLUTION INTRAVENOUS CONTINUOUS
Status: DISCONTINUED | OUTPATIENT
Start: 2024-05-01 | End: 2024-05-02

## 2024-05-01 RX ORDER — LEVETIRACETAM 500 MG/1
750 TABLET ORAL 2 TIMES DAILY
Status: DISCONTINUED | OUTPATIENT
Start: 2024-05-01 | End: 2024-05-02 | Stop reason: HOSPADM

## 2024-05-01 RX ADMIN — ASPIRIN 81 MG: 81 TABLET, CHEWABLE ORAL at 08:56

## 2024-05-01 RX ADMIN — ALBUTEROL SULFATE 2.5 MG: 2.5 SOLUTION RESPIRATORY (INHALATION) at 07:53

## 2024-05-01 RX ADMIN — MONTELUKAST 10 MG: 10 TABLET, FILM COATED ORAL at 08:55

## 2024-05-01 RX ADMIN — Medication: at 08:59

## 2024-05-01 RX ADMIN — Medication 1 AMPULE: at 20:58

## 2024-05-01 RX ADMIN — BUDESONIDE 500 MCG: 0.5 INHALANT RESPIRATORY (INHALATION) at 07:53

## 2024-05-01 RX ADMIN — AZELASTINE HYDROCHLORIDE 1 SPRAY: 137 SPRAY, METERED NASAL at 20:55

## 2024-05-01 RX ADMIN — ATORVASTATIN CALCIUM 20 MG: 20 TABLET, FILM COATED ORAL at 20:54

## 2024-05-01 RX ADMIN — AZELASTINE HYDROCHLORIDE 1 SPRAY: 137 SPRAY, METERED NASAL at 09:00

## 2024-05-01 RX ADMIN — FAMOTIDINE 20 MG: 20 TABLET, FILM COATED ORAL at 08:56

## 2024-05-01 RX ADMIN — GABAPENTIN 600 MG: 300 CAPSULE ORAL at 14:05

## 2024-05-01 RX ADMIN — PREDNISONE 30 MG: 20 TABLET ORAL at 08:55

## 2024-05-01 RX ADMIN — GUAIFENESIN 1200 MG: 600 TABLET, EXTENDED RELEASE ORAL at 08:55

## 2024-05-01 RX ADMIN — ARFORMOTEROL TARTRATE 15 MCG: 15 SOLUTION RESPIRATORY (INHALATION) at 07:53

## 2024-05-01 RX ADMIN — SODIUM CHLORIDE, PRESERVATIVE FREE 10 ML: 5 INJECTION INTRAVENOUS at 20:58

## 2024-05-01 RX ADMIN — LEVOTHYROXINE SODIUM 50 MCG: 0.05 TABLET ORAL at 06:21

## 2024-05-01 RX ADMIN — FAMOTIDINE 20 MG: 20 TABLET, FILM COATED ORAL at 20:55

## 2024-05-01 RX ADMIN — ATORVASTATIN CALCIUM 40 MG: 40 TABLET, FILM COATED ORAL at 08:55

## 2024-05-01 RX ADMIN — LEVETIRACETAM 750 MG: 500 TABLET, FILM COATED ORAL at 20:54

## 2024-05-01 RX ADMIN — CITALOPRAM HYDROBROMIDE 20 MG: 20 TABLET ORAL at 08:55

## 2024-05-01 RX ADMIN — SODIUM CHLORIDE, PRESERVATIVE FREE 10 ML: 5 INJECTION INTRAVENOUS at 08:59

## 2024-05-01 RX ADMIN — Medication: at 20:55

## 2024-05-01 RX ADMIN — ENOXAPARIN SODIUM 40 MG: 100 INJECTION SUBCUTANEOUS at 08:54

## 2024-05-01 RX ADMIN — Medication 1 AMPULE: at 09:12

## 2024-05-01 RX ADMIN — GABAPENTIN 600 MG: 300 CAPSULE ORAL at 17:21

## 2024-05-01 RX ADMIN — LEVETIRACETAM 750 MG: 500 INJECTION, SOLUTION, CONCENTRATE INTRAVENOUS at 09:11

## 2024-05-01 RX ADMIN — GABAPENTIN 600 MG: 300 CAPSULE ORAL at 08:54

## 2024-05-01 RX ADMIN — ACETAMINOPHEN 650 MG: 325 TABLET ORAL at 20:54

## 2024-05-01 RX ADMIN — PANTOPRAZOLE SODIUM 40 MG: 40 INJECTION, POWDER, LYOPHILIZED, FOR SOLUTION INTRAVENOUS at 09:12

## 2024-05-01 RX ADMIN — Medication 4 ML: at 18:17

## 2024-05-01 RX ADMIN — BUDESONIDE 500 MCG: 0.5 INHALANT RESPIRATORY (INHALATION) at 18:17

## 2024-05-01 RX ADMIN — ARFORMOTEROL TARTRATE 15 MCG: 15 SOLUTION RESPIRATORY (INHALATION) at 18:17

## 2024-05-01 RX ADMIN — Medication 4 ML: at 07:53

## 2024-05-01 RX ADMIN — SODIUM CHLORIDE: 9 INJECTION, SOLUTION INTRAVENOUS at 14:08

## 2024-05-01 RX ADMIN — GUAIFENESIN 1200 MG: 600 TABLET, EXTENDED RELEASE ORAL at 20:54

## 2024-05-01 RX ADMIN — GABAPENTIN 600 MG: 300 CAPSULE ORAL at 20:54

## 2024-05-01 ASSESSMENT — PAIN SCALES - GENERAL
PAINLEVEL_OUTOF10: 4
PAINLEVEL_OUTOF10: 0

## 2024-05-01 ASSESSMENT — PAIN DESCRIPTION - FREQUENCY: FREQUENCY: CONTINUOUS

## 2024-05-01 ASSESSMENT — PAIN DESCRIPTION - PAIN TYPE: TYPE: CHRONIC PAIN

## 2024-05-01 ASSESSMENT — PAIN DESCRIPTION - DESCRIPTORS: DESCRIPTORS: ACHING

## 2024-05-01 ASSESSMENT — PAIN DESCRIPTION - ORIENTATION: ORIENTATION: RIGHT

## 2024-05-01 ASSESSMENT — PAIN SCALES - WONG BAKER
WONGBAKER_NUMERICALRESPONSE: NO HURT
WONGBAKER_NUMERICALRESPONSE: NO HURT

## 2024-05-01 ASSESSMENT — PAIN DESCRIPTION - LOCATION: LOCATION: HIP

## 2024-05-01 ASSESSMENT — PAIN DESCRIPTION - ONSET: ONSET: GRADUAL

## 2024-05-01 NOTE — PLAN OF CARE
Problem: Occupational Therapy - Adult  Goal: By Discharge: Performs self-care activities at highest level of function for planned discharge setting.  See evaluation for individualized goals.  Description: FUNCTIONAL STATUS PRIOR TO ADMISSION: Pt aphasic and unable to provide prior level of function. Per chart review, pt was ambulatory without device. History of multiple falls. Wears 2-3 L O2 at baseline and performing ADLS     HOME SUPPORT PRIOR TO ADMISSION: The patient lived alone. Supportive brother in the area.         Occupational Therapy Goals:  Initiated 4/25/2024  1.  Patient will perform grooming with Moderate Assist within 7 day(s).  2.  Patient will perform upper body dressing with Moderate Assist within 7 day(s).  3.  Patient will perform lower body dressing with Maximal Assist within 7 day(s).  4.  Patient will perform toilet transfers with Moderate Assist  within 7 day(s).  5.  Patient will perform all aspects of toileting with Maximal Assist within 7 day(s).  6.  Assess for fugl donaldson as appropriate within 7 days.    5/1/2024 1734 by Honey Lang, OTR/L  Outcome: Progressing  OCCUPATIONAL THERAPY TREATMENT  Patient: Rodolfo Delcid (69 y.o. male)  Date: 5/1/2024  Primary Diagnosis: Shortness of breath [R06.02]  Metabolic encephalopathy [G93.41]  Elevated troponin [R79.89]  COPD exacerbation (HCC) [J44.1]  Acute respiratory failure with hypoxia and hypercapnia (HCC) [J96.01, J96.02]  Altered mental status, unspecified altered mental status type [R41.82]  Aspiration pneumonia, unspecified aspiration pneumonia type, unspecified laterality, unspecified part of lung (HCC) [J69.0]       Precautions:                fall  Chart, occupational therapy assessment, plan of care, and goals were reviewed.    ASSESSMENT  Patient continues to benefit from skilled OT services and is progressing towards goals. Pt was administered the MOCA cognitive screening test and received 14/30 on the test.  Scores were 
  Problem: Occupational Therapy - Adult  Goal: By Discharge: Performs self-care activities at highest level of function for planned discharge setting.  See evaluation for individualized goals.  Description: FUNCTIONAL STATUS PRIOR TO ADMISSION: Pt aphasic and unable to provide prior level of function. Per chart review, pt was ambulatory without device. History of multiple falls. Wears 2-3 L O2 at baseline and performing ADLS     HOME SUPPORT PRIOR TO ADMISSION: The patient lived alone. Supportive brother in the area.         Occupational Therapy Goals:  Initiated 4/25/2024  1.  Patient will perform grooming with Moderate Assist within 7 day(s).  2.  Patient will perform upper body dressing with Moderate Assist within 7 day(s).  3.  Patient will perform lower body dressing with Maximal Assist within 7 day(s).  4.  Patient will perform toilet transfers with Moderate Assist  within 7 day(s).  5.  Patient will perform all aspects of toileting with Maximal Assist within 7 day(s).  6.  Assess for fugl donaldson as appropriate within 7 days.    Outcome: Progressing   OCCUPATIONAL THERAPY TREATMENT  Patient: Rodolfo Delcid (69 y.o. male)  Date: 4/29/2024  Primary Diagnosis: Shortness of breath [R06.02]  Metabolic encephalopathy [G93.41]  Elevated troponin [R79.89]  COPD exacerbation (HCC) [J44.1]  Acute respiratory failure with hypoxia and hypercapnia (HCC) [J96.01, J96.02]  Altered mental status, unspecified altered mental status type [R41.82]  Aspiration pneumonia, unspecified aspiration pneumonia type, unspecified laterality, unspecified part of lung (HCC) [J69.0]       Precautions:                fall  Chart, occupational therapy assessment, plan of care, and goals were reviewed.  Nurse cleared pt for therapy.  ASSESSMENT  Patient continues to benefit from skilled OT services and is progressing towards goals. Pt was alert and oriented (date not assessed).  In general, pt was provided CGA/Min A during functional mobility 
  Problem: Physical Therapy - Adult  Goal: By Discharge: Performs mobility at highest level of function for planned discharge setting.  See evaluation for individualized goals.  Description: FUNCTIONAL STATUS PRIOR TO ADMISSION: Pt aphasic and unable to provide prior level of function. Per chart review, pt was ambulatory without device. History of multiple falls. Wears 2-3 L O2 at baseline.     HOME SUPPORT PRIOR TO ADMISSION: The patient lived alone. Supportive brother in the area.     Physical Therapy Goals  Initiated 4/25/2024  1.  Patient will move from supine to sit and sit to supine, scoot up and down, and roll side to side in bed with moderate assistance within 7 day(s).    2.  Patient will perform sit to stand with moderate assistance within 7 day(s).  3.  Patient will transfer from bed to chair and chair to bed with moderate assistance using the least restrictive device within 7 day(s).  4.  Patient will ambulate with moderate assistance for 10 feet with the least restrictive device within 7 day(s).   Outcome: Progressing   PHYSICAL THERAPY TREATMENT    Patient: Rodolfo Delcid (69 y.o. male)  Date: 4/29/2024  Diagnosis: Shortness of breath [R06.02]  Metabolic encephalopathy [G93.41]  Elevated troponin [R79.89]  COPD exacerbation (HCC) [J44.1]  Acute respiratory failure with hypoxia and hypercapnia (HCC) [J96.01, J96.02]  Altered mental status, unspecified altered mental status type [R41.82]  Aspiration pneumonia, unspecified aspiration pneumonia type, unspecified laterality, unspecified part of lung (HCC) [J69.0] Metabolic encephalopathy      Precautions:   Falls                     ASSESSMENT:  Patient continues to benefit from skilled PT services and is progressing towards goals. Patient received in bed and agreeable to participate, able to come to sit EOB with min/CGA then able to sit unsupported without LOB.  Patient ambulated x approx 30 feet with RW and CGA/min assist, gait with mild unsteadiness and 
  Problem: Respiratory - Adult  Goal: Achieves optimal ventilation and oxygenation  4/28/2024 1037 by Rachel Renee RN  Outcome: Progressing  Flowsheets (Taken 4/28/2024 0822)  Achieves optimal ventilation and oxygenation: Assess for changes in respiratory status  4/28/2024 0738 by Lashonda Knight RCP  Outcome: Progressing  4/27/2024 2224 by Larry Hansen RCP  Outcome: Progressing     Problem: Discharge Planning  Goal: Discharge to home or other facility with appropriate resources  Outcome: Progressing  Flowsheets (Taken 4/28/2024 0822)  Discharge to home or other facility with appropriate resources:   Identify barriers to discharge with patient and caregiver   Arrange for needed discharge resources and transportation as appropriate     Problem: Safety - Adult  Goal: Free from fall injury  Outcome: Progressing     Problem: Pain  Goal: Verbalizes/displays adequate comfort level or baseline comfort level  Outcome: Progressing  Flowsheets (Taken 4/28/2024 0800)  Verbalizes/displays adequate comfort level or baseline comfort level:   Encourage patient to monitor pain and request assistance   Assess pain using appropriate pain scale     
  Problem: Respiratory - Adult  Goal: Achieves optimal ventilation and oxygenation  4/29/2024 0631 by Ju Andrade, RN  Outcome: Progressing  4/28/2024 1925 by Cheri Sutherland, RT  Outcome: Progressing     Problem: Discharge Planning  Goal: Discharge to home or other facility with appropriate resources  Outcome: Progressing     Problem: Safety - Adult  Goal: Free from fall injury  Outcome: Progressing     Problem: Pain  Goal: Verbalizes/displays adequate comfort level or baseline comfort level  Outcome: Progressing     Problem: Chronic Conditions and Co-morbidities  Goal: Patient's chronic conditions and co-morbidity symptoms are monitored and maintained or improved  Outcome: Progressing     Problem: Skin/Tissue Integrity  Goal: Absence of new skin breakdown  Description: 1.  Monitor for areas of redness and/or skin breakdown  2.  Assess vascular access sites hourly  3.  Every 4-6 hours minimum:  Change oxygen saturation probe site  4.  Every 4-6 hours:  If on nasal continuous positive airway pressure, respiratory therapy assess nares and determine need for appliance change or resting period.  Outcome: Progressing     Problem: ABCDS Injury Assessment  Goal: Absence of physical injury  Outcome: Progressing     
  Problem: Respiratory - Adult  Goal: Achieves optimal ventilation and oxygenation  4/29/2024 2359 by Sherine Martin RN  Outcome: Progressing     Problem: Discharge Planning  Goal: Discharge to home or other facility with appropriate resources  Outcome: Progressing     Problem: Safety - Adult  Goal: Free from fall injury  Outcome: Progressing     Problem: Pain  Goal: Verbalizes/displays adequate comfort level or baseline comfort level  Outcome: Progressing     Problem: Chronic Conditions and Co-morbidities  Goal: Patient's chronic conditions and co-morbidity symptoms are monitored and maintained or improved  Outcome: Progressing     Problem: Skin/Tissue Integrity  Goal: Absence of new skin breakdown  Description: 1.  Monitor for areas of redness and/or skin breakdown  2.  Assess vascular access sites hourly  3.  Every 4-6 hours minimum:  Change oxygen saturation probe site  4.  Every 4-6 hours:  If on nasal continuous positive airway pressure, respiratory therapy assess nares and determine need for appliance change or resting period.  Outcome: Progressing     Problem: ABCDS Injury Assessment  Goal: Absence of physical injury  Outcome: Progressing     
  Problem: Respiratory - Adult  Goal: Achieves optimal ventilation and oxygenation  4/30/2024 1319 by Sarah Monsivais RN  Outcome: Progressing  4/30/2024 1213 by Bethany Armendariz RT  Outcome: Progressing  4/29/2024 2359 by Sherine Martin RN  Outcome: Progressing     Problem: Discharge Planning  Goal: Discharge to home or other facility with appropriate resources  4/30/2024 1319 by Sarah Monsivais RN  Outcome: Progressing  4/29/2024 2359 by Sherine Martin RN  Outcome: Progressing     Problem: Safety - Adult  Goal: Free from fall injury  4/30/2024 1319 by Sarah Monsivais RN  Outcome: Progressing  4/29/2024 2359 by Sherine Martin RN  Outcome: Progressing     Problem: Pain  Goal: Verbalizes/displays adequate comfort level or baseline comfort level  4/30/2024 1319 by Sarah Monsivais RN  Outcome: Progressing  4/29/2024 2359 by Sherine Martin RN  Outcome: Progressing     Problem: Chronic Conditions and Co-morbidities  Goal: Patient's chronic conditions and co-morbidity symptoms are monitored and maintained or improved  4/30/2024 1319 by Sarah Monsivais RN  Outcome: Progressing  4/29/2024 2359 by Sherine Martin RN  Outcome: Progressing     Problem: Skin/Tissue Integrity  Goal: Absence of new skin breakdown  Description: 1.  Monitor for areas of redness and/or skin breakdown  2.  Assess vascular access sites hourly  3.  Every 4-6 hours minimum:  Change oxygen saturation probe site  4.  Every 4-6 hours:  If on nasal continuous positive airway pressure, respiratory therapy assess nares and determine need for appliance change or resting period.  4/30/2024 1319 by Sarah Monsivais RN  Outcome: Progressing  4/29/2024 2359 by Sherine Martin RN  Outcome: Progressing     Problem: ABCDS Injury Assessment  Goal: Absence of physical injury  4/30/2024 1319 by Sarah Monsivais RN  Outcome: Progressing  4/29/2024 2359 by Sherine Martin RN  Outcome: Progressing     
  Problem: Respiratory - Adult  Goal: Achieves optimal ventilation and oxygenation  Outcome: Progressing     Problem: Discharge Planning  Goal: Discharge to home or other facility with appropriate resources  Outcome: Progressing  Flowsheets (Taken 5/1/2024 0727)  Discharge to home or other facility with appropriate resources: Identify barriers to discharge with patient and caregiver     Problem: Safety - Adult  Goal: Free from fall injury  Outcome: Progressing     Problem: Pain  Goal: Verbalizes/displays adequate comfort level or baseline comfort level  Outcome: Progressing     Problem: Chronic Conditions and Co-morbidities  Goal: Patient's chronic conditions and co-morbidity symptoms are monitored and maintained or improved  Outcome: Progressing  Flowsheets (Taken 5/1/2024 0727)  Care Plan - Patient's Chronic Conditions and Co-Morbidity Symptoms are Monitored and Maintained or Improved: Monitor and assess patient's chronic conditions and comorbid symptoms for stability, deterioration, or improvement     Problem: Skin/Tissue Integrity  Goal: Absence of new skin breakdown  Description: 1.  Monitor for areas of redness and/or skin breakdown  2.  Assess vascular access sites hourly  3.  Every 4-6 hours minimum:  Change oxygen saturation probe site  4.  Every 4-6 hours:  If on nasal continuous positive airway pressure, respiratory therapy assess nares and determine need for appliance change or resting period.  Outcome: Progressing     Problem: ABCDS Injury Assessment  Goal: Absence of physical injury  Outcome: Progressing     
  Problem: Safety - Adult  Goal: Free from fall injury  4/27/2024 0748 by Maria Antonia Freeman RN  Outcome: Progressing  Free From Fall Injury:   Instruct family/caregiver on patient safety   Based on caregiver fall risk screen, instruct family/caregiver to ask for assistance with transferring infant if caregiver noted to have fall risk factors  4/27/2024 0747 by Maria Antonia Freeman, RN  Outcome: Progressing     
  Problem: Safety - Adult  Goal: Free from fall injury  4/27/2024 1158 by Suzie Ordoñez RN  Outcome: Progressing  Flowsheets (Taken 4/27/2024 1122)  Free From Fall Injury: Instruct family/caregiver on patient safety  4/27/2024 0748 by Maria Antonia Freeman RN  Outcome: Progressing  Flowsheets (Taken 4/27/2024 0748)  Free From Fall Injury:   Instruct family/caregiver on patient safety   Based on caregiver fall risk screen, instruct family/caregiver to ask for assistance with transferring infant if caregiver noted to have fall risk factors  4/27/2024 0747 by Maria Antonia Freeman RN  Outcome: Progressing     Problem: Pain  Goal: Verbalizes/displays adequate comfort level or baseline comfort level  Outcome: Progressing  Flowsheets  Taken 4/27/2024 1122 by Suzie Ordoñez RN  Verbalizes/displays adequate comfort level or baseline comfort level:   Assess pain using appropriate pain scale   Encourage patient to monitor pain and request assistance  Taken 4/27/2024 0400 by Maria Antonia Freeman RN  Verbalizes/displays adequate comfort level or baseline comfort level: Assess pain using appropriate pain scale     Problem: Chronic Conditions and Co-morbidities  Goal: Patient's chronic conditions and co-morbidity symptoms are monitored and maintained or improved  4/27/2024 1158 by Suzie Ordoñez RN  Outcome: Progressing  Flowsheets (Taken 4/27/2024 1122)  Care Plan - Patient's Chronic Conditions and Co-Morbidity Symptoms are Monitored and Maintained or Improved: Monitor and assess patient's chronic conditions and comorbid symptoms for stability, deterioration, or improvement  4/27/2024 0747 by Maria Antonia Freeman RN  Outcome: Progressing  Flowsheets (Taken 4/26/2024 2000)  Care Plan - Patient's Chronic Conditions and Co-Morbidity Symptoms are Monitored and Maintained or Improved: Monitor and assess patient's chronic conditions and comorbid symptoms for stability, deterioration, or improvement     Problem: Skin/Tissue 
Patient arrived in ICU around 6 PM. Noted to be in respiratory distress. Not communicating but eyes open.   Started on BIPAP and I spoke to patient's brother, POA, about code status.     Brother agreed on making patient partial code. He mentioned that patient has been declining recently and does not want to liver like that if he gets sick.   He is requesting OK with intubation, if needed as last resort to buy some time so family can meet his if this is his time to go. He agreed with no CPR and shocks in case his heart stops.     I will change code status to intubation/ ventilation only as last resort but no CPR/ shocks in case his heart stops.   Brother and family to arrive at bedside to see patient.       
Speech LAnguage Pathology EVALUATION    Patient: Rodolfo Delcid (69 y.o. male)  Date: 4/25/2024  Primary Diagnosis: Shortness of breath [R06.02]  Metabolic encephalopathy [G93.41]  Elevated troponin [R79.89]  COPD exacerbation (HCC) [J44.1]  Acute respiratory failure with hypoxia and hypercapnia (HCC) [J96.01, J96.02]  Altered mental status, unspecified altered mental status type [R41.82]  Aspiration pneumonia, unspecified aspiration pneumonia type, unspecified laterality, unspecified part of lung (HCC) [J69.0]       Precautions:                     ASSESSMENT :  Patient with functional oral/pharyngeal swallow with minimal trials of ice chips and thin liquids. However, patient then turned his head away to refuse additional trials. Given limited evaluation due to patient refusal, unable to recommend safe diet initiation at this time.     Patient will benefit from skilled intervention to address the above impairments.     PLAN :  Recommendations and Planned Interventions:  Diet: NPO and ice chips, sips of thin liquids by straw ok if patient requests (however note aphasic)       Recommend next SLP session: swallowing re-evaluation    Acute SLP Services: Yes, patient will be followed by speech-language pathology 3x/week to address goals. Patient's rehabilitation potential is considered to be Fair.    Discharge Recommendations: Continue to assess pending progress     SUBJECTIVE:   Patient stated, “Hey.” no other verbalizations.    OBJECTIVE:     Past Medical History:   Diagnosis Date    Arthritis     Hips, Knees    CAD (coronary artery disease)     MI around 1990    COPD (chronic obstructive pulmonary disease) (HCC)     Hepatitis A     High cholesterol     Hypertension     Other ill-defined conditions(799.89)     Light sensitivity, causes seizures    Seizures (HCC)     Last seizure 12/2008/work -up in 2012 -possible seizure    Stroke (HCC) 2005    Thyroid disease     Hypothyroid     Past Surgical History:   Procedure 
Speech LAnguage Pathology TREATMENT    Patient: Rodolfo Delcid (69 y.o. male)  Date: 4/27/2024  Primary Diagnosis: Shortness of breath [R06.02]  Metabolic encephalopathy [G93.41]  Elevated troponin [R79.89]  COPD exacerbation (HCC) [J44.1]  Acute respiratory failure with hypoxia and hypercapnia (HCC) [J96.01, J96.02]  Altered mental status, unspecified altered mental status type [R41.82]  Aspiration pneumonia, unspecified aspiration pneumonia type, unspecified laterality, unspecified part of lung (HCC) [J69.0]       Precautions: aspiration                    ASSESSMENT :  Patient with intermittent wet throat clears during session. RN reports this has been present all day. He tended to take large/multiple sips and sustained cessation of respiration for deglutition may be difficult for him. Recommend single sips and needs feeding. He agreed to a small amount of solids.  Chewing was appropriate. He is ready for diet upgrade. Suspect he will take minimal PO due to lack of interest.   He did have debris in trachea on Chest CT 4/24/24, so will need to watch for s/s prandial and post prandial aspiration.     Patient will benefit from skilled intervention to address the above impairments.     PLAN :  Recommendations and Planned Interventions:  Diet: Easy to chew and thin liquids  Needs to be fed  Needs 1 small sip at a time-will need feeder to control this for him.   Upright for all PO  SLP will follow for diet tolerance.          Acute SLP Services: Yes, patient will be followed by speech-language pathology 2x/week to address goals. Patient's rehabilitation potential is considered to be Fair.    Discharge Recommendations: Continue to assess pending progress     SUBJECTIVE:   RN reports patient spitting out purees    OBJECTIVE:     Past Medical History:   Diagnosis Date    Arthritis     Hips, Knees    CAD (coronary artery disease)     MI around 1990    COPD (chronic obstructive pulmonary disease) (HCC)     Hepatitis A     
Speech LAnguage Pathology TREATMENT    Patient: Rodolfo Delcid (69 y.o. male)  Date: 4/30/2024  Primary Diagnosis: Shortness of breath [R06.02]  Metabolic encephalopathy [G93.41]  Elevated troponin [R79.89]  COPD exacerbation (HCC) [J44.1]  Acute respiratory failure with hypoxia and hypercapnia (HCC) [J96.01, J96.02]  Altered mental status, unspecified altered mental status type [R41.82]  Aspiration pneumonia, unspecified aspiration pneumonia type, unspecified laterality, unspecified part of lung (HCC) [J69.0]       Precautions: aspiration precautions, reflux precautions                     ASSESSMENT :  Patient alert, slightly reclined in bed but agreeable to working with SLP. SLP reviewed previous recommendations and safe swallow strategies as discussed last SLP visit. Patient reports no swallowing difficulty. Vocal quality noted to be strong and clear prior to PO trials. Patient now tolerating NC 2.5L (baseline 2L). CXR 4/25- \"No acute process on portable chest.\" SLP positioned patient upright and provided trials of thin via straw. Patient independently fed himself sips via straw. Delayed throat clear X1 (across ~4 ounces). Note patient with h/o hiatal hernia. Patient pleasantly refused solid trial at this time. Recommend continuation of current PO diet while following the safe swallow strategies outlined below. SLP will continue to follow for diet tolerance/education.     Additionally, patient presents with appeared improving cognitive-communication skills. Patient oriented to person and place. Improved sustained attention and following commands. Patient with reduced short-term memory which is suspected to be patient's baseline. Suspect patient at/near cognitive-communication baseline at this time.     Patient will benefit from skilled intervention to address the above impairments.     PLAN :  Recommendations and Planned Interventions:  Diet: Easy to chew and thin liquids  --Medications as tolerated   --Upright 
cues.  He was able to raise right UE, with tactile cues and min assist.  Pt was total for bathing and tried hand over hand for pt washing his face and he turned his head and pulled away.  He was total of 2 for supine <> sit and left in bed on 3 liters of NC and all VSS.  Pt needed to be reminded that he should breath  through his nose in order to get the oxygen.  Pt told OT that he lived with his brother Pelon Farah) but per chart he lives alone, and brother does help.              PLAN :  Patient continues to benefit from skilled intervention to address the above impairments.  Continue treatment per established plan of care to address goals.    Recommend with staff: HOB elevated during the day and lights on to help with days and nights    Recommend next OT session: work on sitting balance on Eob and functional transfers     Recommendation for discharge: (in order for the patient to meet his/her long term goals): Therapy up to 5 days/week in Skilled nursing facility        IF patient discharges home will need the following DME:  tbd       SUBJECTIVE:   Patient stated “Pelon Pandya.”    OBJECTIVE DATA SUMMARY:   Cognitive/Behavioral Status:  Orientation  Overall Orientation Status: Impaired  Orientation Level: Oriented to person  Cognition  Overall Cognitive Status: Exceptions  Following Commands: Follows one step commands with increased time;Follows one step commands with repetition  Attention Span: Difficulty attending to directions  Memory: Decreased recall of recent events  Initiation: Requires cues for all  Sequencing: Requires cues for all    Functional Mobility and Transfers for ADLs:  Bed Mobility:  Bed Mobility Training  Bed Mobility Training: Yes  Rolling: Moderate assistance;Maximum assistance;Assist X2 (pt needed more assistance to roll to the left)  Supine to Sit: Total assistance;Assist X2  Sit to Supine: Total assistance;Assist X2  Scooting: Total assistance;Assist X2     Transfers:   Transfer 
kept perseverating on Pelon (his brother) and answered all questions with \"yea\". He was able to roll to the left side in the bed with moderate assistance x 2 and able to keep his hand on the bed rail after placed. He required maximum assistance x 2 for rolling to the right and was not able to use R hand to assist. He required maximum assistance x 2 for supine<>sit transfer. Once sitting EOB his sitting balance was good. Attempted to stand with pulling up to stand on back of chair and maximum assistance x 2, but was not able to clear his bottom today. At end of session patient was left supine in bed, call bell within reach, no complaints. Patient remains significantly below his prior level of function and would benefit from SNF for rehab prior to safe discharge home alone.          PLAN:  Patient continues to benefit from skilled intervention to address the above impairments.  Continue treatment per established plan of care.    Recommendation for discharge: (in order for the patient to meet his/her long term goals): Therapy 3 hours/day 5-7 days/week    Other factors to consider for discharge: lives alone, poor safety awareness, impaired cognition, high risk for falls, and not safe to be alone    IF patient discharges home will need the following DME: hospital bed, mechanical lift, and wheelchair 18 inch       SUBJECTIVE:   Patient stated, \"Pelon, yedenise.\"    OBJECTIVE DATA SUMMARY:   Critical Behavior:  Orientation  Overall Orientation Status: Impaired  Orientation Level: Oriented to person  Cognition  Overall Cognitive Status: Exceptions  Following Commands: Follows one step commands with increased time;Follows one step commands with repetition  Attention Span: Difficulty attending to directions  Memory: Decreased recall of recent events  Initiation: Requires cues for all  Sequencing: Requires cues for all    Functional Mobility Training:  Bed Mobility:  Bed Mobility Training  Bed Mobility Training: Yes  Rolling: 
weakness. Patient disoriented. SLP completed reorientation and delirium mitigation. SLP will continue to follow.     Patient will benefit from skilled intervention to address the above impairments.     PLAN :  Recommendations and Planned Interventions:  Diet: Puree and thin liquids  --Medications whole or crushed in puree (I.e. applesauce)   --Upright all PO intake   --Single bites/sips   --Slow rate  --Check for oral pocketing   --Oral hygiene 2-3x/day  --1:1 supervision/assistance   --PO only when patient alert/willing to accept     Cognitive-Communication Recommendations:   --Provide simple directions   --Eliminate distractions   --Provide frequent orientation   --Keep windows open/lights on during the day   --Anticipate patient's wants/needs        Recommend next SLP session: diet tolerance and determination for possible diet advancement. Continued patient/family/staff education, reflux precautions (h/o hiatal hernia). Delirium mitigation.      Acute SLP Services: Yes, SLP will continue to follow per plan of care.    Discharge Recommendations: Continue to assess pending progress     SUBJECTIVE:   Patient stated, “I don't know.”    OBJECTIVE:     Past Medical History:   Diagnosis Date    Arthritis     Hips, Knees    CAD (coronary artery disease)     MI around 1990    COPD (chronic obstructive pulmonary disease) (HCC)     Hepatitis A     High cholesterol     Hypertension     Other ill-defined conditions(799.89)     Light sensitivity, causes seizures    Seizures (HCC)     Last seizure 12/2008/work -up in 2012 -possible seizure    Stroke (HCC) 2005    Thyroid disease     Hypothyroid     Past Surgical History:   Procedure Laterality Date    CARDIAC CATHETERIZATION  20 years ago    no stents    HERNIA REPAIR  10/8/14    left inguinal hernia- Pawan Hartman MD    ORTHOPEDIC SURGERY  2/2010    [facility]. Total Hip Replacement/Dr. Borrero    ORTHOPEDIC SURGERY      Left Knee Scope    ORTHOPEDIC SURGERY  6/9/14    R hip 
Transfers for ADLs:  Bed Mobility:  Bed Mobility Training  Supine to Sit: Supervision  Scooting: Stand-by assistance     Transfers:   Transfer Training  Transfer Training: Yes  Overall Level of Assistance: Contact-guard assistance  Sit to Stand: Contact-guard assistance  Stand to Sit: Contact-guard assistance  Bed to Chair: Contact-guard assistance (using RW)  Toilet Transfer:  (declined)           Balance:  Standing: Impaired  Balance  Sitting: Intact  Sitting - Static: Good (unsupported)  Standing: Impaired  Standing - Static: Fair;Good;Constant support  Standing - Dynamic: Fair;Constant support      ADL Intervention:         Feeding: Independent         Grooming: Setup;Supervision        UE Bathing: Supervision;Setup  UE Bathing Skilled Clinical Factors: seated in chair, pt performing hair washing with set up of supplies.                       LE Dressing Skilled Clinical Factors: able to pull up socks this session with good dynamic sitting balance reaching to shins                   Functional Mobility: Contact guard assistance  Functional Mobility Skilled Clinical Factors: using RW                     Pain Rating:  Did not rate pain in R hip  Pain Intervention(s):         Activity Tolerance:   Good  BP stable, O2 sats stable on 2.5 L    After treatment:   Patient left in no apparent distress sitting up in chair, Call bell within reach, Bed/ chair alarm activated, and nurse informed    COMMUNICATION/EDUCATION:   The patient's plan of care was discussed with: registered nurse    Patient Education  Education Given To: Patient  Education Provided: Role of Therapy;ADL Adaptive Strategies;Transfer Training  Education Method: Verbal  Barriers to Learning: Cognition (may benefit from cog screen)  Education Outcome: Continued education needed    Thank you for this referral.  Honey Lang OTR/L  Minutes: 25   
understanding;Continued education needed      Aliya Edmonds, PT  Minutes: 11   
Short Forms Manual 4.0. Revised 2/2020.                                                                                                                                                                                                                              Pain Rating:  Unable to report pain due to aphasia    Activity Tolerance:   Fair, VSS on 3.5 L O2    After treatment:   Patient left in no apparent distress in bed, Call bell within reach, Bed/ chair alarm activated, Podus boots applied for pressure relief, and Patient offloaded in partial R side lying for pressure relief    COMMUNICATION/EDUCATION:   The patient's plan of care was discussed with: occupational therapist and registered nurse    Patient Education  Education Given To: Patient  Education Provided: Role of Therapy  Education Method: Verbal  Barriers to Learning: Cognition  Education Outcome: Continued education needed    Thank you for this referral.  Светлана Leon, PT, DPT  Minutes: 17      Physical Therapy Evaluation Charge Determination   History Examination Presentation Decision-Making   MEDIUM  Complexity : 1-2 comorbidities / personal factors will impact the outcome/ POC  MEDIUM Complexity : 3 Standardized tests and measures addressin body structure, function, activity limitation and / or participation in recreation  MEDIUM Complexity : Evolving with changing characteristics  AM-PAC  MEDIUM   Based on the above components, the patient evaluation is determined to be of the following complexity level: Medium   
activity  Bladder Control: Cannot perform activity  Toilet Transfers: Cannot perform activity  Chair/Bed Trannsfers: Cannot perform activity  Ambulation: Cannot perform activity  Stairs: Cannot perform activity  Total Barthel Index Score: 0            The Barthel ADL Index: Guidelines  1. The index should be used as a record of what a patient does, not as a record of what a patient could do.  2. The main aim is to establish degree of independence from any help, physical or verbal, however minor and for whatever reason.  3. The need for supervision renders the patient not independent.  4. A patient's performance should be established using the best available evidence. Asking the patient, friends/relatives and nurses are the usual sources, but direct observation and common sense are also important. However direct testing is not needed.  5. Usually the patient's performance over the preceding 24-48 hours is important, but occasionally longer periods will be relevant.  6. Middle categories imply that the patient supplies over 50 per cent of the effort.  7. Use of aids to be independent is allowed.    Score Interpretation (from Sinzachary 1997)    Independent   60-79 Minimally independent   40-59 Partially dependent   20-39 Very dependent   <20 Totally dependent     Moriah Lewis., BarthelFATOUMATA. (1965). Functional evaluation: the Barthel Index. Md State Med J (142.  Gallo Claros, [email or web address].[email or web address].F, SHANKAR Turcios., Luis Manuel, J.A., Grey, A.MIKAELA. (1999). Measuring the change indisability after inpatient rehabilitation; comparison of the responsiveness of the Barthel Index and Functional [place] Measure. Journal of Neurology, Neurosurgery, and Psychiatry, 66(4), 480-484.  Gallo Parr, N.J.A, AMANDEEP Reed, & Luis M.A. (2004.) Assessment of post-stroke quality of life in cost-effectiveness studies: The usefulness of the Barthel Index and the EuroQoL-5D. Quality of Life Research, 13, 427-43       Pain

## 2024-05-01 NOTE — PALLIATIVE CARE DISCHARGE
Goals of Care/Treatment Preferences    The Palliative Medicine team was consulted as part of your/your loved one's care in the hospital. Our team is a supportive service; we strive to relieve suffering and improve quality of life.    We reviewed advance care planning information, which includes the following:    Primary Decision Maker: Moshe Delcid - Brother/Sister - [phone]    Secondary Decision Maker: Karla Angel - Brother/Sister - [phone]    Patient/Health Care Proxy Stated Goals: Recovery from acute illness    We reviewed / discussed your code status as:   Code Status: Limited       Other Instructions: Please make sure your medical providers have copies of your Advance Medical Directive and POST forms and put the POST form on the side of your fridge or the back of the door.     Because of the importance of this information, we are providing you with a printed copy to share with other healthcare providers after this hospitalization is complete.    Thank you for including Palliative team in your care, Mr. Delcid.     Nae Chacko, [facility]  Palliative Medicine [phone] ([phone])

## 2024-05-02 VITALS
SYSTOLIC BLOOD PRESSURE: 115 MMHG | WEIGHT: 192.9 LBS | BODY MASS INDEX: 24.76 KG/M2 | HEART RATE: 53 BPM | DIASTOLIC BLOOD PRESSURE: 64 MMHG | TEMPERATURE: 97.8 F | OXYGEN SATURATION: 94 % | HEIGHT: 74 IN | RESPIRATION RATE: 16 BRPM

## 2024-05-02 LAB
ANION GAP SERPL CALC-SCNC: 2 MMOL/L (ref 5–15)
BASOPHILS # BLD: 0 K/UL (ref 0–0.1)
BASOPHILS NFR BLD: 0 % (ref 0–1)
BUN SERPL-MCNC: 18 MG/DL (ref 6–20)
BUN/CREAT SERPL: 35 (ref 12–20)
CALCIUM SERPL-MCNC: 8.6 MG/DL (ref 8.5–10.1)
CHLORIDE SERPL-SCNC: 105 MMOL/L (ref 97–108)
CO2 SERPL-SCNC: 31 MMOL/L (ref 21–32)
CREAT SERPL-MCNC: 0.52 MG/DL (ref 0.7–1.3)
DIFFERENTIAL METHOD BLD: ABNORMAL
EOSINOPHIL # BLD: 0 K/UL (ref 0–0.4)
EOSINOPHIL NFR BLD: 0 % (ref 0–7)
ERYTHROCYTE [DISTWIDTH] IN BLOOD BY AUTOMATED COUNT: 14.6 % (ref 11.5–14.5)
GLUCOSE SERPL-MCNC: 105 MG/DL (ref 65–100)
HCT VFR BLD AUTO: 37 % (ref 36.6–50.3)
HGB BLD-MCNC: 11.7 G/DL (ref 12.1–17)
IMM GRANULOCYTES # BLD AUTO: 0 K/UL (ref 0–0.04)
IMM GRANULOCYTES NFR BLD AUTO: 1 % (ref 0–0.5)
LYMPHOCYTES # BLD: 0.9 K/UL (ref 0.8–3.5)
LYMPHOCYTES NFR BLD: 10 % (ref 12–49)
MCH RBC QN AUTO: 28.5 PG (ref 26–34)
MCHC RBC AUTO-ENTMCNC: 31.6 G/DL (ref 30–36.5)
MCV RBC AUTO: 90.2 FL (ref 80–99)
MONOCYTES # BLD: 0.6 K/UL (ref 0–1)
MONOCYTES NFR BLD: 7 % (ref 5–13)
NEUTS SEG # BLD: 6.9 K/UL (ref 1.8–8)
NEUTS SEG NFR BLD: 82 % (ref 32–75)
NRBC # BLD: 0 K/UL (ref 0–0.01)
NRBC BLD-RTO: 0 PER 100 WBC
PLATELET # BLD AUTO: 312 K/UL (ref 150–400)
PMV BLD AUTO: 9.1 FL (ref 8.9–12.9)
POTASSIUM SERPL-SCNC: 4.1 MMOL/L (ref 3.5–5.1)
RBC # BLD AUTO: 4.1 M/UL (ref 4.1–5.7)
SODIUM SERPL-SCNC: 138 MMOL/L (ref 136–145)
WBC # BLD AUTO: 8.5 K/UL (ref 4.1–11.1)

## 2024-05-02 PROCEDURE — 6370000000 HC RX 637 (ALT 250 FOR IP): Performed by: INTERNAL MEDICINE

## 2024-05-02 PROCEDURE — 85025 COMPLETE CBC W/AUTO DIFF WBC: CPT

## 2024-05-02 PROCEDURE — 6370000000 HC RX 637 (ALT 250 FOR IP): Performed by: STUDENT IN AN ORGANIZED HEALTH CARE EDUCATION/TRAINING PROGRAM

## 2024-05-02 PROCEDURE — 6360000002 HC RX W HCPCS: Performed by: INTERNAL MEDICINE

## 2024-05-02 PROCEDURE — 80048 BASIC METABOLIC PNL TOTAL CA: CPT

## 2024-05-02 PROCEDURE — 6360000002 HC RX W HCPCS: Performed by: STUDENT IN AN ORGANIZED HEALTH CARE EDUCATION/TRAINING PROGRAM

## 2024-05-02 PROCEDURE — 6370000000 HC RX 637 (ALT 250 FOR IP): Performed by: PHYSICIAN ASSISTANT

## 2024-05-02 PROCEDURE — 94640 AIRWAY INHALATION TREATMENT: CPT

## 2024-05-02 PROCEDURE — 36415 COLL VENOUS BLD VENIPUNCTURE: CPT

## 2024-05-02 PROCEDURE — 2580000003 HC RX 258: Performed by: STUDENT IN AN ORGANIZED HEALTH CARE EDUCATION/TRAINING PROGRAM

## 2024-05-02 PROCEDURE — 2700000000 HC OXYGEN THERAPY PER DAY

## 2024-05-02 PROCEDURE — 2580000003 HC RX 258: Performed by: INTERNAL MEDICINE

## 2024-05-02 RX ORDER — PREDNISONE 10 MG/1
10 TABLET ORAL DAILY
Qty: 3 TABLET | Refills: 0 | Status: SHIPPED | OUTPATIENT
Start: 2024-05-02 | End: 2024-05-05

## 2024-05-02 RX ORDER — PREDNISONE 20 MG/1
20 TABLET ORAL DAILY
Qty: 3 TABLET | Refills: 0 | Status: SHIPPED | OUTPATIENT
Start: 2024-05-02 | End: 2024-05-05

## 2024-05-02 RX ORDER — LEVETIRACETAM 500 MG/1
750 TABLET ORAL 2 TIMES DAILY
Qty: 60 TABLET | Refills: 3 | Status: SHIPPED | OUTPATIENT
Start: 2024-05-02

## 2024-05-02 RX ORDER — ATORVASTATIN CALCIUM 40 MG/1
80 TABLET, FILM COATED ORAL NIGHTLY
Status: DISCONTINUED | OUTPATIENT
Start: 2024-05-02 | End: 2024-05-02 | Stop reason: HOSPADM

## 2024-05-02 RX ORDER — PREDNISONE 5 MG/1
5 TABLET ORAL DAILY
Qty: 3 TABLET | Refills: 0 | Status: SHIPPED | OUTPATIENT
Start: 2024-05-02 | End: 2024-05-05

## 2024-05-02 RX ADMIN — SODIUM CHLORIDE, PRESERVATIVE FREE 10 ML: 5 INJECTION INTRAVENOUS at 08:22

## 2024-05-02 RX ADMIN — PREDNISONE 30 MG: 20 TABLET ORAL at 08:21

## 2024-05-02 RX ADMIN — CITALOPRAM HYDROBROMIDE 20 MG: 20 TABLET ORAL at 08:21

## 2024-05-02 RX ADMIN — BUDESONIDE 500 MCG: 0.5 INHALANT RESPIRATORY (INHALATION) at 07:45

## 2024-05-02 RX ADMIN — AZELASTINE HYDROCHLORIDE 1 SPRAY: 137 SPRAY, METERED NASAL at 08:20

## 2024-05-02 RX ADMIN — Medication 4 ML: at 07:45

## 2024-05-02 RX ADMIN — GUAIFENESIN 1200 MG: 600 TABLET, EXTENDED RELEASE ORAL at 08:22

## 2024-05-02 RX ADMIN — ACETAMINOPHEN 650 MG: 325 TABLET ORAL at 06:10

## 2024-05-02 RX ADMIN — GABAPENTIN 600 MG: 300 CAPSULE ORAL at 08:20

## 2024-05-02 RX ADMIN — ASPIRIN 81 MG: 81 TABLET, CHEWABLE ORAL at 08:22

## 2024-05-02 RX ADMIN — GABAPENTIN 600 MG: 300 CAPSULE ORAL at 12:42

## 2024-05-02 RX ADMIN — ATORVASTATIN CALCIUM 40 MG: 40 TABLET, FILM COATED ORAL at 08:22

## 2024-05-02 RX ADMIN — ENOXAPARIN SODIUM 40 MG: 100 INJECTION SUBCUTANEOUS at 08:22

## 2024-05-02 RX ADMIN — SODIUM CHLORIDE: 9 INJECTION, SOLUTION INTRAVENOUS at 00:28

## 2024-05-02 RX ADMIN — MONTELUKAST 10 MG: 10 TABLET, FILM COATED ORAL at 08:22

## 2024-05-02 RX ADMIN — FAMOTIDINE 20 MG: 20 TABLET, FILM COATED ORAL at 08:21

## 2024-05-02 RX ADMIN — Medication: at 08:20

## 2024-05-02 RX ADMIN — LEVOTHYROXINE SODIUM 50 MCG: 0.05 TABLET ORAL at 06:10

## 2024-05-02 RX ADMIN — LEVETIRACETAM 750 MG: 500 TABLET, FILM COATED ORAL at 08:21

## 2024-05-02 RX ADMIN — ARFORMOTEROL TARTRATE 15 MCG: 15 SOLUTION RESPIRATORY (INHALATION) at 07:45

## 2024-05-02 ASSESSMENT — PAIN DESCRIPTION - DESCRIPTORS: DESCRIPTORS: ACHING

## 2024-05-02 ASSESSMENT — PAIN DESCRIPTION - ORIENTATION: ORIENTATION: RIGHT

## 2024-05-02 ASSESSMENT — PAIN SCALES - GENERAL: PAINLEVEL_OUTOF10: 4

## 2024-05-02 ASSESSMENT — PAIN DESCRIPTION - LOCATION: LOCATION: HIP

## 2024-05-02 NOTE — PROGRESS NOTES
Critical Care Progress Note  Sofía Dejesus MD          Date of Service:  2024  NAME:  Rodolfo Delcid  :  1955  MRN:  [id number]      Subjective/Hospital course:      History per chart review and brother who is bedside due to clinical status   Rodolfo Delcid is a 68 y.o.  male with PMHx significant for severe COPD, CAD, high cholesterol, stroke (residual right weakness), seizure disorder, hypothyroidism presented to the ER after a fall.  According to his brother, he was at baseline yesterday. Today, he went to get his mail and fell.  His sister who was with him was afraid he \"was having a stroke\" and called EMS.  Upon arrival to the ER, he was hypoxic and placed on 10L NC.  Accourding to his brother, he wears 2-3L at baseline.  He was recently told by pulmonary that his \"lung function was at 30%\".  His brother states that his breathing has not been good for months and uses his inhaler frequently.   Upon arrival, he is oxygenation 100% on NRB.  According to the nursing staff, they placed him on 4L and \"he got all grey\" and there was concern for upward gaze deviation. His labs reveal a mild respiratory acidosis 7.31/58/72 on 4L.  Chest CT reveals debris in trachea without overt pneumonia (question subtle right apical tree in bud).  CT brain / CTA neck without perfusion defect.    - started on bipap for worsening respiratory acidosis with subsequent improvement in abg.  Mental status unchanged. Evaluated by neurology for possible CVA vs post ictal state        Problem list:   Acute encephalopathy  ? Seizure activity  Possible CVA  Aspiration probable  COPD exacerbation      Assessment/Plan:   PULMONARY:  COPD exacerbation   Respiratory acidosis  ?pneumonia   Subtle tree in bud apical right   Viral vs atypical vs mucus   Respiratory viral panel negative   Urine legionella pending   Aspiration   Debris in trachea     Plan  - IV corticosteroids  - schedule bronchodilators  - aspiration 
      Hospitalist Progress Note    NAME:   Rodolfo Delcid   : 1955   MRN: [id number]     Date/Time: 2024 9:19 PM  Patient PCP: Lukasz Awad DO    Estimated discharge date:   Barriers: IVF for orthostatic vitals.      Assessment / Plan:  Acute on chronic hypoxic and hypercapnic respiratory failure POA  Severe COPD WITH ACUTE EXACERBATION POA  Possible acute bacterial pneumonia POA  Acute metabolic encephalopathy POA  Fall prior to admit POA  Known severe COPD baseline on 2 to 3 L nasal cannula oxygen   Follows with Dr. Pierre Looney at [facility]  Fell while walking to get his mail , found to be hypoxic   Eventually required BiPAP in ED  Admitted and transferred from ICU  Admit ABG pH 7.31, pCO2 58, pO2 72    -Seen by neurology service, workup for stroke negative              Brain MRI 2024 IMPRESSION:  No evidence for acute infarction.   Large chronic left PCA territory infarct   Small bilateral cerebellar chronic infarcts.              Keppra increased to 750 mg p.o. twice daily              EEG with no evidence of seizures              Continued ASA    Completed doxycycline  Will continue with Trilogy at home   Continue with prednisone -taper on discharge   continue nebulizers and inhaled steroids   Evaluated by PT and recommends - SNF vs Home with HHPT    Orthostatic hypotension:  -Patient blood pressure dropped to 80s on ambulation today.  -Ordered IV fluids.  -Will repeat orthostatic vitals tomorrow.    CAD  Essential HTN POA  ECHO with normal EF. Cont. Home medications.   Continue aspirin  Started on statin      History of stroke with residual right sided weakness.  Continue aspirin     Overweight POA Body mass index is 25.15 kg/m².    Medical Decision Making:   I personally reviewed labs:Yes   I personally reviewed imaging:Yes   I personally reviewed EKG:Yes   Toxic drug monitoring:   Discussed case with: Patient, RN,brother       Code Status: DNR  DVT Prophylaxis: 
      Hospitalist Progress Note    NAME:   Rodolfo Delcid   : 1955   MRN: [id number]     Date/Time: 2024 8:16 AM  Patient PCP: Lukasz Awad DO    Estimated discharge date:   Barriers: Improved respiratory status       Assessment / Plan:  Acute on chronic hypoxic and hypercapnic respiratory failure POA  Severe COPD WITH ACUTE EXACERBATION POA  Possible acute bacterial pneumonia POA  Acute metabolic encephalopathy POA  Fall prior to admit POA  Known severe COPD baseline on 2 to 3 L nasal cannula oxygen   Follows with Dr. iPerre Looney at [facility]  Fell while walking to get his mail , found to be hypoxic   Eventually required BiPAP in ED  Admitted to ICU service  Admit ABG pH 7.31, pCO2 58, pO2 72    -Seen by neurology service, workup for stroke negative              Brain MRI 2024 IMPRESSION:  No evidence for acute infarction.   Large chronic left PCA territory infarct   Small bilateral cerebellar chronic infarcts.              Keppra increased to 750 mg p.o. twice daily              EEG with no evidence of seizures              Continued ASA      Now on 2 L nasal cannula  Needs oxygen challenge closer to discharge   BiPAP overnight and prn   Will continue with Trilogy at home   Weaned to p.o. steroids, continue nebulizers and inhaled steroids   Complete doxycycline  Continue Mucinex  Pulmonary recs appreciated   Palliative care following       CAD  Essential HTN POA  ECHO with normal EF. Cont. Home medications.   Continue aspirin  Started on statin      History of stroke with residual right sided weakness.  Continue aspirin     Overweight POA Body mass index is 25.15 kg/m².     Partial code - no CPR/De-fibrillation. Okay with intubation.             Medical Decision Making:   I personally reviewed labs:Yes   I personally reviewed imaging:Yes   I personally reviewed EKG:Yes   Toxic drug monitoring:   Discussed case with: Patient, RN, Brother and sister at bedside 
      Hospitalist Progress Note    NAME:   Rodolfo Delcid   : 1955   MRN: [id number]     Date/Time: 2024 7:50 AM  Patient PCP: Lukasz Awad DO    Estimated discharge date:   Barriers: Improved respiratory status       Assessment / Plan:  Acute on chronic hypoxic and hypercapnic respiratory failure POA  Severe COPD WITH ACUTE EXACERBATION POA  Possible acute bacterial pneumonia POA  Acute metabolic encephalopathy POA  Fall prior to admit POA  Known severe COPD baseline on 2 to 3 L nasal cannula oxygen   Follows with Dr. Pierre Looney at [facility]  Fell while walking to get his mail , found to be hypoxic   Eventually required BiPAP in ED  Admitted and transferred from ICU  Admit ABG pH 7.31, pCO2 58, pO2 72    -Seen by neurology service, workup for stroke negative              Brain MRI 2024 IMPRESSION:  No evidence for acute infarction.   Large chronic left PCA territory infarct   Small bilateral cerebellar chronic infarcts.              Keppra increased to 750 mg p.o. twice daily              EEG with no evidence of seizures              Continued ASA    Completed doxycycline  Now on 2 L nasal cannula  Needs oxygen challenge closer to discharge   BiPAP overnight and prn   Will continue with Trilogy at home   Continue with prednisone -taper on discharge   continue nebulizers and inhaled steroids   Continue Mucinex  Pulmonary recs appreciated   Palliative care following  Evaluated by PT and recommends - SNF vs Home with HHPT       CAD  Essential HTN POA  ECHO with normal EF. Cont. Home medications.   Continue aspirin  Started on statin      History of stroke with residual right sided weakness.  Continue aspirin     Overweight POA Body mass index is 25.15 kg/m².     Partial code - no CPR/De-fibrillation. Okay with intubation.             Medical Decision Making:   I personally reviewed labs:Yes   I personally reviewed imaging:Yes   I personally reviewed EKG:Yes   Toxic 
      Hospitalist Progress Note    NAME:   Rodolfo Delcid   : 1955   MRN: [id number]     Date/Time: 2024 8:17 AM  Patient PCP: Lukasz Awad DO    Estimated discharge date:   Barriers: Improved respiratory status       Assessment / Plan:  Acute on chronic hypoxic and hypercapnic respiratory failure POA  Severe COPD WITH ACUTE EXACERBATION POA  Possible acute bacterial pneumonia POA  Acute metabolic encephalopathy POA  Fall prior to admit POA  Known severe COPD baseline on 2 to 3 L nasal cannula oxygen   Follows with Dr. Pierre Looney at [facility]  Fell while walking to get his mail , found to be hypoxic   Eventually required BiPAP in ED  Admitted and transferred from ICU  Admit ABG pH 7.31, pCO2 58, pO2 72    -Seen by neurology service, workup for stroke negative              Brain MRI 2024 IMPRESSION:  No evidence for acute infarction.   Large chronic left PCA territory infarct   Small bilateral cerebellar chronic infarcts.              Keppra increased to 750 mg p.o. twice daily              EEG with no evidence of seizures              Continued ASA    Completed doxycycline  Now on 1 L nasal cannula  BiPAP overnight and prn   Will continue with Trilogy at home   Continue with prednisone -taper on discharge   continue nebulizers and inhaled steroids   Continue Mucinex  Palliative recs appreciated   Evaluated by PT and recommends - SNF vs Home with HHPT  On : On auscultation mild wheeze.   Continue same treatment   Plan for discharge tomorrow        CAD  Essential HTN POA  ECHO with normal EF. Cont. Home medications.   Continue aspirin  Started on statin      History of stroke with residual right sided weakness.  Continue aspirin     Overweight POA Body mass index is 25.15 kg/m².            Medical Decision Making:   I personally reviewed labs:Yes   I personally reviewed imaging:Yes   I personally reviewed EKG:Yes   Toxic drug monitoring:   Discussed case with: 
     Nutrition Note    Chart reviewed for MST triggered by \"unsure\" response to weight loss PTA, no decrease in appetite. Pt seen with sister at bedside who provided hx for pt d/t AMS. She reports his appetite has been consistently good PTA and his weight has been stable. He was cleared for a diet per SLP this morning and expressed interest in trying supplements. Will add Ensure and follow as needed. Full assessment not indicated at this time.     Electronically signed by Bere Hogan RD on 4/26/24 at 1:02 PM EDT    Contact: [phone]    
    Hospitalist Progress Note    NAME:   Rodolfo Delcid   : 1955   MRN: [id number]     Date/Time: 2024 8:15 PM    Patient PCP: Lukasz Awad,     [facility] Problem list:        Acute on chronic hypoxic and hypercapnic respiratory failure POA  Severe COPD WITH ACUTE EXACERBATION POA  Possible acute bacterial pneumonia POA  Acute metabolic encephalopathy POA  Fall prior to admit POA  Known severe COPD baseline on 2 to 3 L nasal cannula oxygen   ?  Trilogy use at home    Follows with Dr. Pierre Looney at [facility]  Fell while walking to get his mail , found to be hypoxic   Eventually required BiPAP in ED  Admitted to ICU service  Admit ABG pH 7.31, pCO2 58, pO2 72  IV steroids, nebulizers  Inhaled steroids  Doxycycline  Seen by neurology service, workup for stroke negative   Brain MRI 2024 IMPRESSION:  No evidence for acute infarction.   Large chronic left PCA territory infarct   Small bilateral cerebellar chronic infarcts.   Keppra increased to 750 mg p.o. twice daily   EEG with no evidence of seizures   Continue ASA  Weaned off of BiPAP now on 2 L nasal cannula  Denies shortness of breath  Weaned to p.o. steroids, continue nebulizers and inhaled steroids   Complete doxycycline  [facility] consulted to follow  Palliative care following  Awake and talking, oriented x 2, he had trouble getting the current year    CAD  Essential HTN POA  ECHO with normal EF. Cont. Home medications.   Continue aspirin, not clear why he is not on statin    History of stroke with residual right sided weakness.  Continue aspirin    Overweight POA Body mass index is 25.15 kg/m².    Partial code - no CPR/De-fibrillation. Okay with intubation.     DVT Prophylaxis: Lovenox    Baugh: N    Central line: N      History, assessment and plan for 2024:     Estimated discharge date: 2024    Needs to be done before discharge:  Improved respiratory status    Reason for physician visit:    \" I 
    Name: Rodolfo Delcid Hospital: [facility]   : 1955 Admit Date: 2024   Phone: [phone]  Room:    PCP: Lukasz Awad DO  MRN: [id number]   Date: 2024  Code: Limited          Chart and notes reviewed. Data reviewed. I review the patient's current medications in the medical record at each encounter.  I have evaluated and examined the patient.     History of Present Illness:  Mr. Delcid is a 68 yo gentleman with a history of COPD, chronic respiratory failure with hypoxia nd hypercapnia (on Trilogy, 2-3L NC0, tobacco use, CAD, prior CVA with residual R sided weakness, HLD, hypothyroidism and seizure disorder who is admitted for acute on chronic respiratory failure and a recent fall. He was admitted on 3/24 after he fell while attempting to get his mailbox  and the family was concerned about a possible stroke. He was found to be hypoxic and placed on 10L NC. Attempts to wean down O2 were unsuccessful and he was placed on BiPAP. He has been worked up by neurology with no evidence of acute stroke or ongoing seizure activity. He has transitioned off of continuous BiPAP and satting in the low to mid 90's on 2L. He feels somewhat better overall, but he is not clear on the details of his admission. He describes that he feels like he is getting progressively worse overall. Has had multiple admissions over the past several months for his respiratory status.     He is followed by Dr. Looney of [facility] and was last seen on 3/27. He is chronically managed on Brovana/Pulmicort.    PFTs 2021: FEV1/FVC 33%, FVC 4.24L (83%), FEV1 1.41L (37%), %, %, DOC 66%    Labs: Cr 0.57, WBC 6.5, Hgb 11.0, RVP negative, ABG 7.35/51/178 on  (initially 7.31/58/72),     Images reviewed:  CTA chest 2024: negative for PE, no LAD, mild cardiomegaly with dilated RV and flattening of the intraventricular septum, moderate debris in the trachea and R mainstem, centrilobular 
    Name: Rodolfo Delcid Hospital: [facility]   : 1955 Admit Date: 2024   Phone: [phone]  Room:    PCP: Lukasz Awad DO  MRN: [id number]   Date: 2024  Code: Limited          Chart and notes reviewed. Data reviewed. I review the patient's current medications in the medical record at each encounter.  I have evaluated and examined the patient.     History of Present Illness:  Mr. Delcid is a 70 yo gentleman with a history of COPD, chronic respiratory failure with hypoxia nd hypercapnia (on Trilogy, 2-3L NC0, tobacco use, CAD, prior CVA with residual R sided weakness, HLD, hypothyroidism and seizure disorder who is admitted for acute on chronic respiratory failure and a recent fall. He was admitted on 3/24 after he fell while attempting to get his mailbox  and the family was concerned about a possible stroke. He was found to be hypoxic and placed on 10L NC. Attempts to wean down O2 were unsuccessful and he was placed on BiPAP. He has been worked up by neurology with no evidence of acute stroke or ongoing seizure activity. He has transitioned off of continuous BiPAP and satting in the low to mid 90's on 2L. He feels somewhat better overall, but he is not clear on the details of his admission. He describes that he feels like he is getting progressively worse overall. Has had multiple admissions over the past several months for his respiratory status.     He is followed by Dr. Looney of [facility] and was last seen on 3/27. He is chronically managed on Brovana/Pulmicort.    PFTs 2021: FEV1/FVC 33%, FVC 4.24L (83%), FEV1 1.41L (37%), %, %, DOC 66%    Labs: Cr 0.57, WBC 6.5, Hgb 11.0, RVP negative, ABG 7.35/51/178 on  (initially 7.31/58/72),     Images reviewed:  CTA chest 2024: negative for PE, no LAD, mild cardiomegaly with dilated RV and flattening of the intraventricular septum, moderate debris in the trachea and R mainstem, centrilobular 
    Name: Rodolfo Delcid Hospital: [facility]   : 1955 Admit Date: 2024   Phone: [phone]  Room:    PCP: Lukasz Awad DO  MRN: [id number]   Date: 2024  Code: Limited          Chart and notes reviewed. Data reviewed. I review the patient's current medications in the medical record at each encounter.  I have evaluated and examined the patient.     History of Present Illness:  Mr. Delcid is a 68 yo gentleman with a history of COPD, chronic respiratory failure with hypoxia nd hypercapnia (on Trilogy, 2-3L NC0, tobacco use, CAD, prior CVA with residual R sided weakness, HLD, hypothyroidism and seizure disorder who is admitted for acute on chronic respiratory failure and a recent fall. He was admitted on 3/24 after he fell while attempting to get his mailbox  and the family was concerned about a possible stroke. He was found to be hypoxic and placed on 10L NC. Attempts to wean down O2 were unsuccessful and he was placed on BiPAP. He has been worked up by neurology with no evidence of acute stroke or ongoing seizure activity. He has transitioned off of continuous BiPAP and satting in the low to mid 90's on 2L. He feels somewhat better overall, but he is not clear on the details of his admission. He describes that he feels like he is getting progressively worse overall. Has had multiple admissions over the past several months for his respiratory status.     He is followed by Dr. Looney of [facility] and was last seen on 3/27. He is chronically managed on Brovana/Pulmicort.    PFTs 2021: FEV1/FVC 33%, FVC 4.24L (83%), FEV1 1.41L (37%), %, %, DOC 66%    Labs: Cr 0.57, WBC 6.5, Hgb 11.0, RVP negative, ABG 7.35/51/178 on  (initially 7.31/58/72),     Images reviewed:  CTA chest 2024: negative for PE, no LAD, mild cardiomegaly with dilated RV and flattening of the intraventricular septum, moderate debris in the trachea and R mainstem, centrilobular 
  1800: Pt. Arrived to CCU. At this time, Pt. Appears to be in respiratory distress. Oxygen saturation 82% on 10 liters HFNC. Dr. Barth immediatly called to the bedside.     1802: Pt. Placed on BIPAP 18/8 and 60% per Dr. Barth. MD speaking with Brother now regarding patient status. Oxygen saturation 97% while on BIPAP.     Dual skin obtained. Blanchable redness to BL buttock. Abrasions to BL heels and Right arm.     
  ICU Progress Note        Subjective:    - lying on bed. Alert, awake but not communicative   - alert, awake and following commands.       Vital Signs:    BP (!) 140/67   Pulse 58   Temp 97.7 °F (36.5 °C) (Oral)   Resp 19   Ht 1.88 m (6' 2.02\")   Wt 88.9 kg (196 lb)   SpO2 93%   BMI 25.15 kg/m²             Temp (24hrs), Av.1 °F (36.7 °C), Min:97.7 °F (36.5 °C), Max:98.5 °F (36.9 °C)       Intake/Output:   Last shift:      No intake/output data recorded.  Last 3 shifts:  190 -  0700  In: 310 [P.O.:200; I.V.:10]  Out: 600 [Urine:600]    Intake/Output Summary (Last 24 hours) at 2024 0950  Last data filed at 2024 0700  Gross per 24 hour   Intake 210 ml   Output --   Net 210 ml           Physical Exam:    General: Not in acute distress  HEENT:  Anicteric sclerae; pink palpebral conjunctivae; mucosa moist  Resp:  Bilateral air entry +, no crackles or wheeze  CV:  S1, S2 present  GI:  Abdomen soft, non-tender; (+) active bowel sounds  Extremities:  +2 pulses on all extremities; no edema/ cyanosis/ clubbing noted. C/o of RIGHT knee pain but no bruise, no tenderness, no erythema. He has hemiplegia on RIGHT side hence range of movement is difficult to discern.   Skin:  Warm; no rashes/ lesions noted  Neurologic:  Alert, awake and following commands.     DATA:     Current Facility-Administered Medications   Medication Dose Route Frequency    gabapentin (NEURONTIN) capsule 600 mg  600 mg Oral 4x daily    citalopram (CELEXA) tablet 20 mg  20 mg Oral Daily    levothyroxine (SYNTHROID) tablet 50 mcg  50 mcg Oral Daily    pantoprazole (PROTONIX) 40 mg in sodium chloride (PF) 0.9 % 10 mL injection  40 mg IntraVENous Daily    aspirin chewable tablet 81 mg  81 mg Oral Daily    ipratropium 0.5 mg-albuterol 2.5 mg (DUONEB) nebulizer solution 1 Dose  1 Dose Inhalation Q6H RT    balsum peru-castor oil (VENELEX) ointment   Topical BID    LORazepam (ATIVAN) injection 1 mg  1 mg IntraVENous Once    
 attended interdisciplinary rounds in CCU where patient care was discussed.     Visited by: Chaplain Frandy Gallo M.Div., [facility].   Paging Service: [phone]  
0700 Bedside and Verbal shift change report given to Jen RN (oncoming nurse) by Yvonne BRANCH (offgoing nurse). Report included the following information Nurse Handoff Report, Index, ED Encounter Summary, ED SBAR, Intake/Output, MAR, Recent Results, and Cardiac Rhythm Sinus narinder, sinus rhythm .     0947 Patient's /59, HR 71. Has scheduled Cardura. Notified Era CARNES    1109 Patient also has duplicate orders for scheduled 20mg celexa daily. Clarified Med order with Era CARNES - verbal orders received to discontinue the duplicate order    1157 patient has orders for POC glucose checks every 6hrs. Patient doesn't have diabetic hx or SSI orders. Clarified need for order with Era CARNES - verbal orders received to d/c POC glucose checks    1245 Bedside and Verbal report given to Sarah BRANCH (oncoming nurse) by Jen RN (offgoing nurse). Report included the following information Nurse Handoff Report, Index, ED Encounter Summary, ED SBAR, Intake/Output, MAR, Recent Results, and Cardiac Rhythm sinus narinder, sinus rhythm    I have reviewed and am in agreement with the assessment and documentation as performed by my orienteeSarah, RN. Please refer to Sarah's progress note for update on pt's daily events.     .       
0700- Bedside shift change report given to Shanta (oncoming nurse) by Stephanie (offgoing nurse). Report included the following information Nurse Handoff Report, Index, Adult Overview, Intake/Output, MAR, and Recent Results.     1045- IDRs conducted at bedside. Plan to get MRI and see how pt does off BIPAP.     1130- MRI screening form completed and faxed to MRI. Trialed pt lying flat in anticipation for MRI scan, and pt maintained his O2 sats. UDS collected, but pt is unable to follow commands enough to produce a sputum sample.     1400- Speech working with pt and said he's okay to have ice chips if he would like, but keep NPO otherwise.     1500- Plan for MRI at 1630.     1615- Pt off the floor with RN for MRI.     1730- Pt returned from MRI.     1900- Bedside shift change report given to Stephanie (oncoming nurse) by Shanta (offgoing nurse). Report included the following information Nurse Handoff Report, Index, Adult Overview, Intake/Output, MAR, and Recent Results.     
1040 TRANSFER - OUT REPORT:    Verbal report given to Kurtis on Rodolfo Delcid  being transferred to CMSU for routine progression of patient care       Report consisted of patient's Situation, Background, Assessment and   Recommendations(SBAR).     Information from the following report(s) Nurse Handoff Report, MAR, Recent Results, and Cardiac Rhythm sinus arrhythmia  was reviewed with the receiving nurse.         Opportunity for questions and clarification was provided.      Patient transported with:  Monitor and O2 @ 2lpm      11:47 AM called and notified patient brother Moshe Delcid about room change to 2118.        
1245: Bedside and Verbal shift change report given to SARINA Smith (oncoming nurse) by SARINA Li (offgoing nurse). Report included the following information Nurse Handoff Report, Index, Intake/Output, MAR, and Recent Results.     End of Shift Note    Bedside shift change report given to SARINA Warren (oncoming nurse) by Sarah Monsivais RN (offgoing nurse).  Report included the following information SBAR, Kardex, Intake/Output, MAR, and Recent Results    Shift worked:  7a-7p     Shift summary and any significant changes:     See above/Jen RN note     Concerns for physician to address:       Zone phone for oncoming shift:          Activity:     Number times ambulated in hallways past shift: 0  Number of times OOB to chair past shift: 2    Cardiac:   Cardiac Monitoring: Yes           Access:  Current line(s): PIV     Genitourinary:   Urinary status: voiding    Respiratory:      Chronic home O2 use?: YES  Incentive spirometer at bedside: YES       GI:     Current diet:  ADULT ORAL NUTRITION SUPPLEMENT; Breakfast, Dinner; Standard High Calorie/High Protein Oral Supplement  ADULT DIET; Easy to Chew  Passing flatus: YES  Tolerating current diet: YES       Pain Management:   Patient states pain is manageable on current regimen: YES    Skin:     Interventions: specialty bed, float heels, increase time out of bed, foam dressing, PT/OT consult, limit briefs, and nutritional support    Patient Safety:  Fall Score:    Interventions: bed/chair alarm, assistive device (walker, cane. etc), gripper socks, pt to call before getting OOB, and stay with me (per policy)       Length of Stay:  Expected LOS: 7  Actual LOS: 6      Sarah Monsivais RN    See SARINA Li shift note for [id number] notes.  
1349 - PCP hospital follow-up transitional care appointment has been scheduled with DOYLE Porter on 5/7/24 at 1100.    Attempted to schedule PCP hospital follow up appointment. Unable to reach anyone, unable to leave voicemail.  Pending patient discharge. Cleopatra Aguirre, Care Management Assistant  
1900: Bedside shift change report given to Stephanie RN (oncoming nurse) by Shanta RN (offgoing nurse). Report included the following information Nurse Handoff Report, Adult Overview, Intake/Output, MAR, Recent Results, and Cardiac Rhythm NSR .      2000: Assessment completed. See flow sheets.    0000: Reassessment completed. See flow sheets.    0400: Reassessment completed. See flow sheets.    0700: Bedside shift change report given to Fredi RN (oncoming nurse) by Stephanie RN (offgoing nurse). Report included the following information Nurse Handoff Report, Adult Overview, Intake/Output, MAR, Recent Results, and Cardiac Rhythm Sinus arrhythmia .    
1900: Bedside shift change report given to Stephanie RN (oncoming nurse) by Veronica RN (offgoing nurse). Report included the following information Nurse Handoff Report, Adult Overview, Intake/Output, MAR, Recent Results, and Cardiac Rhythm NSR .      2000: Assessment completed. Patient responds to voice. No command following. BLE withdraw to pain. R pupil 6mm non-reactive. L pupil 5 mm brisk. Occasional moans, no comprehensible verbal responses. Pulses palpable. No edema. Laboriously breathing on the BiPAP. Lung sounds diminished, expiratory wheezing. Bowel sounds active. Abdomen obese and soft. Voiding with ext cath. Doc NP notified of assessment findings. In route to bedside.    2015: DOYLE Roach at bedside. ABG ordered.    0000: Reassessment completed. See flow sheets. Patient more alert but not following commands.     0400: Reassessment completed.    0700: Bedside shift change report given to Shanta RN (oncoming nurse) by Stephanie RN (offgoing nurse). Report included the following information Nurse Handoff Report, Adult Overview, Intake/Output, MAR, Recent Results, and Cardiac Rhythm NSR .      
1900h- Bedside and Verbal shift change report given to Ginger RN (oncoming nurse) by Fredi RN (offgoing nurse). Report included the following information Nurse Handoff Report, ED Encounter Summary, Adult Overview, Cardiac Rhythm Sinus arrhythmia, Alarm Parameters, Quality Measures, and Neuro Assessment.   1930h- pt. Lethargic, obeys command, oriented to person and confused. On Nasal cannula at 3lpm; saturation 94%.  2000h- initial assessment done; see flowsheet.  2100h- took 3 spoons of vanilla pudding, all due meds given; see MAR.  2200h- blood sugar checked 126mg/dl.  0000h- reassessment done; see flowsheet.  0330h- blood drawn for routine labs.  0400h- O2 at 2 lpm, reassessment done; no changed.  -meds given; see mar.  0700h- synthroid p.o given; see mar. Bedside and Verbal shift change report given to Fredi RN (oncoming nurse) by Ginger RN (offgoing nurse). Report included the following information Nurse Handoff Report, Intake/Output, MAR, Recent Results, Cardiac Rhythm Sinus arrythmia, Alarm Parameters, and Neuro Assessment.       
Attempted to see pt for OT services.  Pt isn't following commands.  Will defer but continue to follow.   
Bedside and Verbal shift change report given to Berna (oncoming nurse) by Stephanie (offgoing nurse). Report included the following information Nurse Handoff Report, MAR, Recent Results, and Cardiac Rhythm NSR to sinus arrythmia .     Patient is alert.  Delayed responses.  Reports pain \"in head\". Repositioned; exertional dyspnea, but keeping sats low 90s on 3L NC    8:27 AM EEG tech at bedside.    1025 rounds- Patient will stay in ICU today with plans to transfer tomorrow.  Home PO meds to be resumed as patient is now on a pureed diet.  Clinical course will involve evaluating patient for CPAP/BiPaP and setting that up as appropriate.    1:34 PM patient declines to eat pureed diet (\"I don't want that sh*t\"), although he was amenable to taking his medication with applesauce.     2:26 PM called and spoke with intensivist. Patient experienced severe pain and limited movement in the right knee while undergoing physical therapy.  He has not had any recent imaging on the knee (pt had a fall PTA).  No new orders at this time.    Bedside and Verbal shift change report given to Ginger (oncoming nurse) by Gianluca (offgoing nurse). Report included the following information Nurse Handoff Report, MAR, Recent Results, and Cardiac Rhythm NSR to sinus arrythmia .       Shift summary: Patient is more alert and interactive than previous shifts, but he is still very confused and is not at his baseline.  Maintaining saturations on 3L NC, but does get dyspneic with exertion.  Was evaluated by SLP and started on a pureed diet, but his intake is very poor even with encouragement.      PT/OT worked with him today; he was able to sit on the side of the bed, but he was unable to stand.  Appears to have severe pain in the right knee which leads him to avoid bending it or placing his weight upon it.  He does not complain of knee pain at rest.  Patient will likely transfer off the ICU tomorrow.                
Bedside and Verbal shift change report given to Stan RN/Alejandra RN (oncoming nurse) by SARINA Warren (offgoing nurse). Report included the following information Nurse Handoff Report, Index, ED SBAR, Adult Overview, Intake/Output, MAR, Recent Results, Med Rec Status, and Cardiac Rhythm Normal Sinus .    1120: Patient worked with therapy and got up for a walk. Per OT, he stated that he felt dizzy and was orthostatic. Patient back in bed. MD aware.   
Bedside shift change report given to Yvonne RN (oncoming nurse) by SARINA Echeverria (offgoing nurse). Report included the following information Adult Overview.     0330 Pt c/o R. Hip pain. Sat up on eob and found that to be helpful. Also given PRN acetaminophen. Pt A&Ox4 and VSS.  
EEG completed  
EEG completed  
End of Shift Note    Bedside shift change report given to SARINA Echeverria   (oncoming nurse) by Ju Andrade RN (offgoing nurse).  Report included the following information SBAR and Kardex    Shift worked:    7p-7a     Shift summary and any significant changes:     No acute distress. Patient inquire about medication to help with congestion. Informed him that Chest PT is ordered. Asked respiratory therapy about chest PT.      Concerns for physician to address:  N/A     Zone phone for oncoming shift:   [phone]         Activity:     Number times ambulated in hallways past shift: 0  Number of times OOB to chair past shift: 0    Cardiac:   Cardiac Monitoring: Yes           Access:  Current line(s): PIV     Genitourinary:   Urinary status: external catheter    Respiratory:      Chronic home O2 use?: NO  Incentive spirometer at bedside: YES       GI:     Current diet:  ADULT ORAL NUTRITION SUPPLEMENT; Breakfast, Dinner; Standard High Calorie/High Protein Oral Supplement  ADULT DIET; Easy to Chew  Passing flatus: YES  Tolerating current diet: YES       Pain Management:   Patient states pain is manageable on current regimen: YES    Skin:     Interventions: internal/external urinary devices    Patient Safety:  Fall Score:    Interventions: bed/chair alarm       Length of Stay:  Expected LOS: 14  Actual LOS: 5      Ju Andrade RN                            
Mr Delcid  is refusing to wear bipap, said he is not going to wear it.  
Occupational Therapy  Medical record reviewed.  Nurse reports that pt is having trouble breathing at this time and Respiratory Therapy has been called.    
Orders received, chart reviewed. Spoke to RN who reports that pt is not appropriate for participation in PT evaluation and states that pt is not following commands. Will defer however continue to follow.    Светлана Leon, PT , DPT  
PALLIATIVE MEDICINE      Palliative Medicine was consulted for goals of care.  Pt's goals are clear, he wants to continue limited medical interventions and code status is DNR.  He completed a POST form and AMD, both copies on pt's chart for scanning into MAR.   Will sign off.  please re-consult if Palliative Medicine can be of further assistance.     Thank you for including Palliative Medicine in this patient's care.   MAGALI De La Cruz    
Palliative Medicine  Per Becky Alexandra NP: Rodolfo Delcid is a 69 y.o. male with a past medical history of severe COPD on home 02 2-3L baseline, CAD, high cholesterol, stroke (residual right weakness), seizure disorder, hypothyroidism, who was admitted on 2024 from home with a diagnosis of SOB.   BIBA for AMS, fall and hypoxia;  Upon arrival EMS found pt's sats in 80s and he was altered.  Upon arrival to ED, sats were 100% on NRB, was placed on 4L at which time pt \"got all grey\" and there was concern for upward gaze deviation.  Placed on bipap.  labs reveal a mild respiratory acidosis 7.31/58/72 on 4L. Chest CT reveals debris in trachea without overt pneumonia (question subtle right apical tree in bud). CT brain / CTA neck without perfusion.   EEG is abnormal. There is focal slowing seen throughout the left hemisphere suggestive of an underlying structural abnormality.  There were no seizures or epileptiform discharges seen on the recording.    : admitted to ICU on bipap for respiratory acidosis, partial code, ok with intubation, no CPR.  bMRI: No evidence for acute infarction. Large chronic left PCA territory infarct again noted as well as small bilateral cerebellar chronic infarcts.  : improvement in abg, however, mental status unchanged. Unclear if CVA vs post ictal.    : awake, alert, answers simple questions.  Now on 3L NC02.  Palliative consult is for end stage disease.     Code Status: Limited    [facility]/25/2024     3:35 PM   Demographics   Marital Status    Patient said he has 5 brothers and 5 sisters, who are his legal nok, as he has no children. He said that if he was too sick to talk with his doctors he would want his sister Karla first, then his brother Moshe (Pelon) as back-up.     Patient / Family Encounter Documentation    Participants (names): Rodolfo Delcid, (Moshe Bolivars,Karla Angel by phone), Nae Chacko, CRISTOBAL    Narrative: LCSW reviewed 
Palliative Medicine SW Note    [facility]'s [facility] Moshe, who is enroute to the hospital now. Arranged meeting for 10a to complete AMD with patient.      Thank you for including Palliative team in the care of Mr. Rodolfo Delcid.    Nae Chacko, [facility]  Palliative Medicine [phone] ([phone])  
Physical Therapy       05/01/24 1110 05/01/24 1111 05/01/24 1115   Vital Signs   BP (!) 103/59 (!) 80/51 (!) 106/56   MAP (Calculated) 74 61 73   BP Location Right upper arm Right upper arm  --    Patient Position Sitting Standing Supine     Patient complained of feeling dizzy in standing position, found to be orthostatic so returned to supine and RN notified.    REY MartinezT  
Physical Therapy    Chart reviewed and discussed with RN who reports patient is complaining of increased SOB and respiratory is coming for breathing treatment, will wait to see afterwards.    REY MartinezT  
Pt a+ox4, continues on 2.5L NC with congested cough, small amounts of thick, white sputum noted. Sinus narinder, asymptomatic. Tylenol x1 for R hip pain with +effect. Voiding in urinal. Encouraged to turn and repo. Seizure precautions maintained. CIWA 0.  
Pt a+ox4, cooperative. 2L nc at baseline with congested cough, sats >90%. Tele sinus narinder >48, SBP 120s, denies cardiac symptoms. IVF at 100mL/hr. Tylenol given for R hip pain. Safety maintained, calls appropriately.   
Silver Score 15. All of the following interventions have been implemented to prevent pressure injury:    SKIN ASSESSMENT (S)  Dual skin assessment completed at shift change: Yes  Name of second RN who completed Dual Skin Assessment: Yvonne RN  Picture of wound uploaded to EMR: YES  Wound added as LDA in Avatar: YES  Venelex ordered for stage 1 or greater pressure injuries ONLY: NA  Wound care consulted if wounds present: NA - blanchable redness on sacrum    SURFACE (S)  [place] air pump or specialty bed ordered: Yes  Type of bed: Iso Air  Only white chux used with specialty surfaces (no green chux or bed alarm pads): Yes  Waffle cushion used for chair positioning: Yes    KEEP MOVING (K)  Mobility status (Bedrest, Chairbound, UWA x 1 assist , 2 assist, Max assist): x 2 assist  Q2 turn sheet placed on outside of door and initialed appropriately:Yes  Q2 hour turns documented in flowsheets: Yes  Refusals to turn and education provided documented: NA  Device used to float heels: pillows  PT/OT consulted: Yes    INCONTINENCE (I)  Incontinence status assessed Q2 hours: Yes  External catheter in use: NO   Barrier cream in use: NO    NUTRITION (N)  I/O's documented every 8 hours: Yes  Oral supplements ordered if appropriate: NA  Nutrition services consulted: NA    All concerns about new DTI's must be escalated immediately to attending MD, charge nurse, CCL and nurse director.      
Spiritual Care Assessment/Progress Note  [facility]    Name: Rodolfo Delcid MRN: [id number]    Age: 69 y.o.     Sex: male   Language: English     Date: 4/26/2024            Total Time Calculated: 19 min              Spiritual Assessment begun in [facility] 2 CRITICAL CARE  Service Provided For: Patient and family together  Referral/Consult From: Rounding  Encounter Overview/Reason: Initial Encounter    Spiritual beliefs:      [] Involved in a jillian tradition/spiritual practice:      [] Supported by a jillian community:      [] Claims no spiritual orientation:      [] Seeking spiritual identity:           [] Adheres to an individual form of spirituality:      [] Not able to assess:                Identified resources for coping and support system:   Support System: Family members       [] Prayer                  [] Devotional reading               [] Music                  [] Guided Imagery     [] Pet visits                                        [] Other: (COMMENT)     Specific area/focus of visit   Encounter:    Crisis:    Spiritual/Emotional needs: Type: Spiritual Support  Ritual, Rites and Sacraments:    Grief, Loss, and Adjustments: Type: Life Adjustments, Adjustment to illness  Ethics/Mediation:    Behavioral Health:    Palliative Care:    Advance Care Planning:           Narrative:   Patient seemed confused when  visited in the CCU for critical care initial assessment. A sister was present and she was receptive and engaging. She shared about patient's medical condition and also shared about their family, which is large and closely knit. Other siblings have been visiting. Patient lives by himself, per sister, and is a very private person. He is doing better today and they are hoping he gets well so he can go home. Jillian is important for coping. Thoughts and feelings affirmed, assurance of prayer well received.  also advised of spiritual health availability upon request.     Visited 
(improving)  Probable aspiration  COPD exacerbation  Goals of care  ASSESSMENT AND PLAN:   Today, I am seeing Rodolfo Delcid for goals of care. Has end stage COPD.   No family at bedside.  Pt was not able to state why he would want certain interventions and what it would mean not to do an intervention, ie, intubation.    Plan is to move out of ICU over weekend.  Will try to meet with pt and family Monday with hopes of discussing goals of care.  Initial consult note routed to primary continuity provider and/or primary health care team members  Please call with any palliative questions or concerns.  Palliative Care Team is available via perfect serve or via phone.    Referrals to:   [] Outpatient Palliative Care  [] Home Based Palliative Care  [] Home Based Primary Care  [] Hospice       ADVANCE CARE PLANNING:   [] [facility] Interdisciplinary Team has updated the ACP Navigator with Health Care Decision Maker and Patient Capacity      Confirm Advance Directive: None    Current Code Status: Limited     Goals of Care: Goals of Care and Interventions  Patient/Health Care Proxy Stated Goals: Recovery from acute illness  Medical Interventions: Full interventions       Please refer to Palliative Medicine ACP notes for further details.    PALLIATIVE ASSESSMENT:      Palliative Performance Scale (PPS):  PPS: 20    ECOG:   ECOG Status : Completely disabled [4]    Modified ESAS:  Modified-Pinch Symptom Assessment Scale (ESAS)  Pain Score: No pain  Anxiety Score: Not anxious  Dyspnea Score: 3    Clinical Pain Assessment (nonverbal scale for severity on nonverbal patients):   Clinical Pain Assessment  Severity: 0       NVPS:  Adult Nonverbal Pain Scale (NVPS)  Face: No particular expression or smile  Activity (Movement): Laid quietly, normal position  Guarding: Lying quietly, no positioning of hands over areas of bod  Physiology (Vital Signs): Stable vital signs  Respiratory: Baseline RR/SpO2 compliant with ventilator  NVPS 
male with a history of coronary artery disease, COPD, prior stroke and seizures who presented to the hospital with altered mental status.  The patient was talking on the phone with her sister and then became unresponsive and had a fall.  When EMS got to the house, the patient did have a facial droop and perseveration.  It appeared there had been some improvement in right-sided weakness since arrival.      The patient's mental status has improved considerably over the past 24 hours.  Today he is awake and alert and does follow commands.  He denies any pain, numbness or tingling      Pertinent updated labs included sodium 140, potassium 4.3, creatinine 0.60.  LDL 44.  Albumin 3.3.  AST of 84 and ALT of 39.  Hemoglobin A1c 5.6.  White blood cell count 7.0, hemoglobin 11.0.  Platelets of 243    I did independently review the brain MRI from April 25, 2024.  There is no evidence of an acute stroke.  There is a large chronic left PCA territory stroke again noted.  There is also small bilateral cerebellar strokes       head CT from April 24, 2024.  There is no acute intracranial abnormality.  There was evidence of chronic microvascular ischemic disease and encephalomalacia in the left temporal occipital and parietal lobe.    CTA of the head and neck from April 24, 2024 revealed no significant large vessel flow-limiting stenosis        As noted previously, the patient was last seen in the neurology clinic approximately 1 year ago by Dr. Trung Aviles.  At that time he was taking Keppra 500 mg twice a day for seizure prevention.  He also does take gabapentin which does have anticonvulsant properties.  He was on aspirin and statin therapy as well.  Is history of residual right-sided weakness however his last neurologic examination suggests full strength.  Will need to clarify this  Past Medical History:   Diagnosis Date    Arthritis     Hips, Knees    CAD (coronary artery disease)     MI around 1990    COPD (chronic obstructive 
2010    Agustín. Total Hip Replacement/Dr. Borrero    ORTHOPEDIC SURGERY      Left Knee Scope    ORTHOPEDIC SURGERY  14    R hip replacement     ORTHOPEDIC SURGERY Right     Shoulder fracture & surgical repair with hardware    REVISION TOTAL HIP ARTHROPLASTY Left 2023    LEFT HIP TOTAL ARTHROPLASTY REVISION POSTERIOR APPROACH performed by Austyn Medellin MD at [facility] MAIN OR        Family History   Problem Relation Age of Onset    Stroke Mother     Diabetes Mother     Cancer Father         lung    Heart Disease Father     Hypertension Mother         Social History     Tobacco Use    Smoking status: Former     Current packs/day: 0.00     Types: Cigarettes     Quit date: 3/1/2023     Years since quittin.1    Smokeless tobacco: Never   Substance Use Topics    Alcohol use: No        Allergies   Allergen Reactions    Carbamazepine Rash          Current Facility-Administered Medications   Medication Dose Route Frequency    iopamidol (ISOVUE-370) 76 % injection 100 mL  100 mL IntraVENous ONCE PRN    sodium chloride (Inhalant) 3 % nebulizer solution 4 mL  4 mL Nebulization BID    methylPREDNISolone sodium succ (SOLU-MEDROL) 40 mg in sterile water 1 mL injection  40 mg IntraVENous Q12H    sodium chloride flush 0.9 % injection 5-40 mL  5-40 mL IntraVENous 2 times per day    sodium chloride flush 0.9 % injection 5-40 mL  5-40 mL IntraVENous PRN    0.9 % sodium chloride infusion   IntraVENous PRN    enoxaparin (LOVENOX) injection 40 mg  40 mg SubCUTAneous Daily    ondansetron (ZOFRAN-ODT) disintegrating tablet 4 mg  4 mg Oral Q8H PRN    Or    ondansetron (ZOFRAN) injection 4 mg  4 mg IntraVENous Q6H PRN    polyethylene glycol (GLYCOLAX) packet 17 g  17 g Oral Daily PRN    acetaminophen (TYLENOL) tablet 650 mg  650 mg Oral Q6H PRN    Or    acetaminophen (TYLENOL) suppository 650 mg  650 mg Rectal Q6H PRN    doxycycline (VIBRAMYCIN) 100 mg in sodium chloride 0.9 % 100 mL IVPB (mini-bag)  100 mg IntraVENous Q12H    
Ag PENDING. Cont supplemental oxygen to keep saturation 88% and above. BiPAP as needed.     CAD/HTN - ECHO with normal EF. Cont. Home medications.     Altered mental status - unclear etiology. Not very hypercapnic, no new stroke on CT head. On empiric Keppra for possible seizures. MRI negative for new infarct. Neurology following. He has prior history of stroke with residual RIGHT sided hemiplegia.     Partial code - no CPR/De-fibrillation. Okay with intubation.     Palliative following.     Brother and sister attended the multidisciplinary rounds. I answered all the questions.     CCM time - 40 minutes.       Johnathon San MD,CHRISTIANA, FCCM, FCCP, ATSF, FACP, DAABIP  Interventional Pulmonology/Critical Care Medicine  [facility] Critical Care  [facility]

## 2024-05-02 NOTE — DISCHARGE SUMMARY
Hospitalist Discharge Summary     Patient ID:  Rodolfo Delcid  [id number]  69 y.o.  1955  4/24/2024    PCP on record: Lukasz Awad DO    Admit date: 4/24/2024  Discharge date and time: 5/2/2024    DISCHARGE DIAGNOSIS:    Acute on chronic hypoxic and hypercapnic respiratory failure POA  Severe COPD WITH ACUTE EXACERBATION POA  Possible acute bacterial pneumonia POA  Acute metabolic encephalopathy POA  Fall prior to admit POA  Known severe COPD baseline on 2 to 3 L nasal cannula oxygen   Follow[facility] with [facility]  Fell while walking to get his mail 4/24, found to be hypoxic       -Seen by neurology service, workup for stroke negative. Keppra increased to 750 mg p.o. twice daily              EEG with no evidence of seizures              Continued ASA     Completed doxycycline  Will continue with Trilogy at home   Continue with prednisone -taper on discharge   continue nebulizers and inhaled steroids   Evaluated by PT and recommends - SNF vs Home with HHPT     Orthostatic hypotension:  -Patient blood pressure dropped to 80s on ambulation. Improved with IVF  -Hold cardura on discharge     CAD  Essential HTN POA  ECHO with normal EF. Cont. Home medications.   Continue aspirin  Started on statin      History of stroke with residual right sided weakness.  Continue aspirin     Overweight POA Body mass index is 25.15 kg/m².    CONSULTATIONS:  IP CONSULT TO TELE-NEUROLOGY  IP CONSULT TO NEUROLOGY  IP CONSULT TO PULMONOLOGY  IP CONSULT TO CASE MANAGEMENT    Excerpted HPI from H&P of Sofía Dejesus MD:  Rodolfo Delcid is a 68 y.o.  male with PMHx significant for severe COPD, CAD, high cholesterol, stroke (residual right weakness), seizure disorder, hypothyroidism presented to the ER after a fall.  According to his brother, he was at baseline yesterday. Today, he went to get his mail and fell.  His sister who was with him was afraid he \"was having a stroke\" and called

## 2024-05-02 NOTE — CARE COORDINATION
-- Message is from the [facility]--    Caller is requesting an appointment - at a sooner time than what was available.      Caller declined scheduling with a trusted partner or sister site    Reason for Visit: Patient has a runny nose watery eyes and needs school physical please follow up with patient mother to schedule appointment    Is the patient currently scheduled? No    Caller Information       Type Contact Phone    08/03/2019 12:15 PM Phone (Incoming) Mrs Eaton (Mother) [phone]          Alternative phone number: na    Turnaround time given to caller:   \"This message will be sent to [state Provider's name]. The clinical team will fulfill your request as soon as they review your message when the office opens tomorrow.\"  
Patient is clear from a CM standpoint.     Transition of Care Plan: Home with [facility]      RUR: 16% (moderate RUR)  Prior Level of Functioning: Independent  Disposition: Home with [facility] (accepted), SOC date pending (declining SNF)  If SNF or IPR: Date FOC offered: 4/30 HH/SNF  Date FOC received: 4/30 HH - no preference, declining SNF.  Accepting facility: N/A  Date authorization started with reference number: N/A  Date authorization received and expires: N/A  Follow up appointments: PCP/specialists if needed.  DME needed: None  Patient has 2-3L O2, Trilogy, cane and RW at home.  Transportation at discharge: Family anticipated  IM/IMM Medicare/ letter given: 2nd IM done 5/2/2024.  Is patient a [place] and connected with VA? No.              If yes, was [place] transfer form completed and VA notified? N/A  Caregiver Contact: Moshe mackay - [phone]   Discharge Caregiver contacted prior to discharge? Patient to contact.  Care Conference needed? No.  Barriers to discharge: None.    1053 - RN notified that patient is clear from a CM standpoint.  New SMAART tool placed outside door.     05/02/24 0931   Services At/After Discharge   Transition of Care Consult (CM Consult) Home Health  ([facility])   Internal Home Health No   Services At/After Discharge PT;OT   Mode of Transport at Discharge Other (see comment)  (Brother w/ O2)   Confirm Follow Up Transport Family   Condition of Participation: Discharge Planning   The Plan for Transition of Care is related to the following treatment goals: Return home with HH; declining SNF   The Patient and/or Patient Representative was provided with a Choice of Provider? Patient   The Patient and/Or Patient Representative agree with the Discharge Plan? Yes   Freedom of Choice list was provided with basic dialogue that supports the patient's individualized plan of care/goals, treatment preferences, and shares the quality data associated with the 
Patient is clear from a CM standpoint.    Transition of Care Plan: [facility] with [facility]     RUR: 16% (moderate RUR)  Prior Level of Functioning: Independent  Disposition: [facility] with [facility] (accept[facility]), SOC date pending (declining SNF)  If SNF or IPR: Date FOC offered: 4/30 HH/SNF  Date FOC received: 4/30 HH - no preference, declining SNF.  Accepting facility: N/A  Date authorization started with reference number: N/A  Date authorization received and expires: N/A  Follow up appointments: PCP/specialists if needed.  DME needed: None  Patient has 2-3L O2, Trilogy, cane and RW at home.  Transportation at discharge: Family anticipated  IM/IMM Medicare/ letter given: 2nd IM done 4/30/24  Is patient a  and connected with VA? No.   If yes, was [place] transfer form completed and VA notified? N/A  Caregiver Contact: Moshe Delcid   - [phone]   Discharge Caregiver contacted prior to discharge? Patient to contact.  Care Conference needed? No.  Barriers to discharge: pulm/neuro clearance    SMAART tool previously left outside door.  RN notified that patient is clear from a CM standpoint.     05/01/24 0845   Services At/After Discharge   Transition of Care Consult (CM Consult) Home Health  ([facility])   Internal Home Health No   Services At/After Discharge PT;OT   Mode of Transport at Discharge Other (see comment)  (Brother w/ O2)   Confirm Follow Up Transport Family   Condition of Participation: Discharge Planning   The Plan for Transition of Care is related to the following treatment goals: Return home with HH; declining SNF   The Patient and/or Patient Representative was provided with a Choice of Provider? Patient   The Patient and/Or Patient Representative agree with the Discharge Plan? Yes   Freedom of Choice list was provided with basic dialogue that supports the patient's individualized plan of care/goals, treatment preferences, and shares the quality data associated with the 
Patient is clear from a CM standpoint.    Transition of Care Plan: Home with [facility]     RUR: 16% (moderate RUR)  Prior Level of Functioning: Independent  Disposition: Home with [facility] (accepted), SOC date pending (declining SNF)  If SNF or IPR: Date FOC offered: 4/30 HH/SNF  Date FOC received: 4/30 HH - no preference, declining SNF.  Accepting facility: N/A  Date authorization started with reference number: N/A  Date authorization received and expires: N/A  Follow up appointments: PCP/specialists if needed.  DME needed: None  Patient has 2-3L O2, Trilogy, cane and RW at home.  Transportation at discharge: Family anticipated  IM/IMM Medicare/ letter given: 2nd IM done 4/30/24  Is patient a  and connected with VA? No.   If yes, was [place] transfer form completed and VA notified? N/A  Caregiver Contact: Moshe mackay - [phone]   Discharge Caregiver contacted prior to discharge? Patient to contact.  Care Conference needed? No.  Barriers to discharge: pulm/neuro clearance    1056 to 1112 - CM met with patient at [facility] who is declining SNF as he has been to two or three in the past and does not feel their rehab services were helpful.  He is agreeable to HH on discharge and has no preference in HH agency.  HH/SNF list provided to him at bedside.  He has family that can stay with him the first few days after discharge and confirmed that his family will transport home with O2 in the vehicle.  He does not know his O2 agency name but confirmed he has tanks/portable concentrator.  Referral sent to [facility].  RN notified that patient may discharge home today.  MD notified of patient's preference for HH vs. SNF.    1137 - [facility] accepted; CM asked regarding SOC date and contact information placed on AVS.     04/30/24 1112   Services At/After Discharge   Transition of Care Consult (CM Consult) Home Health   Services At/After Discharge PT;OT   Mode of Transport at Discharge 
Pt has appt 8/6  
brother thinks it was [facility]   Patient expects to be discharged to: Other (comment)  (Family wants to see how the patient progresses.)         Advance Care Planning     General Advance Care Planning (ACP) Conversation    Date of Conversation: 4/25/2024  Conducted with: Patient with Decision Making Capacity and Legal next of kin  Other persons present: Brother      Healthcare Decision Maker: No healthcare decision makers have been documented.  Click here to complete HealthCare Decision Makers including selection of the Healthcare Decision Maker Relationship (ie \"Primary\")   Today we documented Decision Maker(s) consistent with Legal Next of Kin hierarchy.    Content/Action Overview:     Reviewed DNR/DNI and patient Limited        Length of Voluntary ACP Conversation in minutes:  <16 minutes (Non-Billable)    GARRY WATSON RN

## 2024-05-23 ENCOUNTER — APPOINTMENT (OUTPATIENT)
Facility: HOSPITAL | Age: 69
End: 2024-05-23
Payer: MEDICARE

## 2024-05-23 ENCOUNTER — HOSPITAL ENCOUNTER (EMERGENCY)
Facility: HOSPITAL | Age: 69
Discharge: HOME | End: 2024-05-23
Attending: EMERGENCY MEDICINE
Payer: MEDICARE

## 2024-05-23 VITALS
RESPIRATION RATE: 26 BRPM | HEART RATE: 76 BPM | WEIGHT: 200.62 LBS | BODY MASS INDEX: 27.17 KG/M2 | SYSTOLIC BLOOD PRESSURE: 148 MMHG | OXYGEN SATURATION: 97 % | HEIGHT: 72 IN | TEMPERATURE: 98.5 F | DIASTOLIC BLOOD PRESSURE: 65 MMHG

## 2024-05-23 DIAGNOSIS — J44.1 COPD EXACERBATION (HCC): Primary | ICD-10-CM

## 2024-05-23 LAB
ALBUMIN SERPL-MCNC: 3.4 G/DL (ref 3.5–5)
ALBUMIN/GLOB SERPL: 1 (ref 1.1–2.2)
ALP SERPL-CCNC: 219 U/L (ref 45–117)
ALT SERPL-CCNC: 29 U/L (ref 12–78)
ANION GAP SERPL CALC-SCNC: 3 MMOL/L (ref 5–15)
AST SERPL-CCNC: 15 U/L (ref 15–37)
BASOPHILS # BLD: 0 K/UL (ref 0–0.1)
BASOPHILS NFR BLD: 1 % (ref 0–1)
BILIRUB SERPL-MCNC: 0.6 MG/DL (ref 0.2–1)
BUN SERPL-MCNC: 16 MG/DL (ref 6–20)
BUN/CREAT SERPL: 21 (ref 12–20)
CALCIUM SERPL-MCNC: 9.4 MG/DL (ref 8.5–10.1)
CHLORIDE SERPL-SCNC: 102 MMOL/L (ref 97–108)
CO2 SERPL-SCNC: 32 MMOL/L (ref 21–32)
CREAT SERPL-MCNC: 0.77 MG/DL (ref 0.7–1.3)
DIFFERENTIAL METHOD BLD: ABNORMAL
EKG ATRIAL RATE: 71 BPM
EKG DIAGNOSIS: NORMAL
EKG P AXIS: 78 DEGREES
EKG P-R INTERVAL: 174 MS
EKG Q-T INTERVAL: 366 MS
EKG QRS DURATION: 88 MS
EKG QTC CALCULATION (BAZETT): 397 MS
EKG R AXIS: 78 DEGREES
EKG T AXIS: 60 DEGREES
EKG VENTRICULAR RATE: 71 BPM
EOSINOPHIL # BLD: 0.1 K/UL (ref 0–0.4)
EOSINOPHIL NFR BLD: 1 % (ref 0–7)
ERYTHROCYTE [DISTWIDTH] IN BLOOD BY AUTOMATED COUNT: 16.1 % (ref 11.5–14.5)
GLOBULIN SER CALC-MCNC: 3.4 G/DL (ref 2–4)
GLUCOSE SERPL-MCNC: 99 MG/DL (ref 65–100)
HCT VFR BLD AUTO: 38 % (ref 36.6–50.3)
HGB BLD-MCNC: 11.8 G/DL (ref 12.1–17)
IMM GRANULOCYTES # BLD AUTO: 0 K/UL (ref 0–0.04)
IMM GRANULOCYTES NFR BLD AUTO: 0 % (ref 0–0.5)
LYMPHOCYTES # BLD: 1.3 K/UL (ref 0.8–3.5)
LYMPHOCYTES NFR BLD: 24 % (ref 12–49)
MCH RBC QN AUTO: 29.4 PG (ref 26–34)
MCHC RBC AUTO-ENTMCNC: 31.1 G/DL (ref 30–36.5)
MCV RBC AUTO: 94.5 FL (ref 80–99)
MONOCYTES # BLD: 0.5 K/UL (ref 0–1)
MONOCYTES NFR BLD: 10 % (ref 5–13)
NEUTS SEG # BLD: 3.6 K/UL (ref 1.8–8)
NEUTS SEG NFR BLD: 64 % (ref 32–75)
NRBC # BLD: 0 K/UL (ref 0–0.01)
NRBC BLD-RTO: 0 PER 100 WBC
NT PRO BNP: 149 PG/ML
PLATELET # BLD AUTO: 302 K/UL (ref 150–400)
PMV BLD AUTO: 8.5 FL (ref 8.9–12.9)
POTASSIUM SERPL-SCNC: 4.1 MMOL/L (ref 3.5–5.1)
PROT SERPL-MCNC: 6.8 G/DL (ref 6.4–8.2)
RBC # BLD AUTO: 4.02 M/UL (ref 4.1–5.7)
SODIUM SERPL-SCNC: 137 MMOL/L (ref 136–145)
TROPONIN I SERPL HS-MCNC: 5 NG/L (ref 0–76)
TROPONIN I SERPL HS-MCNC: 5 NG/L (ref 0–76)
WBC # BLD AUTO: 5.6 K/UL (ref 4.1–11.1)

## 2024-05-23 PROCEDURE — 71045 X-RAY EXAM CHEST 1 VIEW: CPT

## 2024-05-23 PROCEDURE — 85025 COMPLETE CBC W/AUTO DIFF WBC: CPT

## 2024-05-23 PROCEDURE — 83880 ASSAY OF NATRIURETIC PEPTIDE: CPT

## 2024-05-23 PROCEDURE — 6370000000 HC RX 637 (ALT 250 FOR IP): Performed by: EMERGENCY MEDICINE

## 2024-05-23 PROCEDURE — 93005 ELECTROCARDIOGRAM TRACING: CPT | Performed by: EMERGENCY MEDICINE

## 2024-05-23 PROCEDURE — 99285 EMERGENCY DEPT VISIT HI MDM: CPT

## 2024-05-23 PROCEDURE — 96374 THER/PROPH/DIAG INJ IV PUSH: CPT

## 2024-05-23 PROCEDURE — 36415 COLL VENOUS BLD VENIPUNCTURE: CPT

## 2024-05-23 PROCEDURE — 80053 COMPREHEN METABOLIC PANEL: CPT

## 2024-05-23 PROCEDURE — 84484 ASSAY OF TROPONIN QUANT: CPT

## 2024-05-23 PROCEDURE — 6360000002 HC RX W HCPCS: Performed by: EMERGENCY MEDICINE

## 2024-05-23 PROCEDURE — 2580000003 HC RX 258: Performed by: EMERGENCY MEDICINE

## 2024-05-23 PROCEDURE — 94640 AIRWAY INHALATION TREATMENT: CPT

## 2024-05-23 RX ORDER — METHYLPREDNISOLONE 4 MG/1
TABLET ORAL
Qty: 21 TABLET | Refills: 0 | Status: SHIPPED | OUTPATIENT
Start: 2024-05-23 | End: 2024-05-29

## 2024-05-23 RX ORDER — AZITHROMYCIN 250 MG/1
TABLET, FILM COATED ORAL
Qty: 6 TABLET | Refills: 0 | Status: SHIPPED | OUTPATIENT
Start: 2024-05-23 | End: 2024-06-02

## 2024-05-23 RX ORDER — ALBUTEROL SULFATE 2.5 MG/3ML
5 SOLUTION RESPIRATORY (INHALATION)
Status: DISCONTINUED | OUTPATIENT
Start: 2024-05-23 | End: 2024-05-23

## 2024-05-23 RX ORDER — IPRATROPIUM BROMIDE AND ALBUTEROL SULFATE 2.5; .5 MG/3ML; MG/3ML
2 SOLUTION RESPIRATORY (INHALATION)
Status: COMPLETED | OUTPATIENT
Start: 2024-05-23 | End: 2024-05-23

## 2024-05-23 RX ADMIN — METHYLPREDNISOLONE SODIUM SUCCINATE 125 MG: 125 INJECTION INTRAMUSCULAR; INTRAVENOUS at 08:45

## 2024-05-23 RX ADMIN — IPRATROPIUM BROMIDE AND ALBUTEROL SULFATE 2 DOSE: .5; 3 SOLUTION RESPIRATORY (INHALATION) at 08:29

## 2024-05-23 ASSESSMENT — PAIN DESCRIPTION - LOCATION: LOCATION: CHEST

## 2024-05-23 ASSESSMENT — PAIN - FUNCTIONAL ASSESSMENT: PAIN_FUNCTIONAL_ASSESSMENT: 0-10

## 2024-05-23 ASSESSMENT — LIFESTYLE VARIABLES
HOW OFTEN DO YOU HAVE A DRINK CONTAINING ALCOHOL: NEVER
HOW MANY STANDARD DRINKS CONTAINING ALCOHOL DO YOU HAVE ON A TYPICAL DAY: PATIENT DOES NOT DRINK

## 2024-05-23 ASSESSMENT — PAIN SCALES - GENERAL: PAINLEVEL_OUTOF10: 6

## 2024-05-23 NOTE — ED NOTES
I have reviewed discharge instructions with the pt at this time. The Patient and Family member verbalized understanding and denies any further questions. Pt has been made aware of prescription(s) called into pharmacy for . Patient wheeled out to waiting room at this time.

## 2024-05-23 NOTE — ED PROVIDER NOTES
[facility] EMERGENCY DEPT  EMERGENCY DEPARTMENT ENCOUNTER       Pt Name: Rodolfo Delcid  MRN: [id number]  Birthdate 1955  Date of evaluation: 5/23/2024  Provider: Hay Ghosh MD   PCP: Lukasz Awad DO  Note Started: 10:46 AM 5/23/24     CHIEF COMPLAINT       Chief Complaint   Patient presents with    Shortness of Breath     Pt BIBEMS from home after having SOB since 0530. Pt has hx of COPD. EMS gave 1 duo neb.        HISTORY OF PRESENT ILLNESS: 1 or more elements      History From: Patient, History limited by: With history of COPD chronic no limitations     Rodolfo Delcid is a 69 y.o. male hypoxic respiratory failure on 2 L nasal cannula O2, continued tobacco abuse, history of CAD who presents with chief complaint of cough, shortness of breath, chest tightness, wheezing.  Symptoms have been worsening over the past few days.  Denies any new chest pain, abdominal pain, nausea, vomiting, fevers or chills.  Endorses productive cough.  Continues to smoke 6 to 7 cigarettes/day.     Nursing Notes were all reviewed and agreed with or any disagreements were addressed in the HPI.     REVIEW OF SYSTEMS        Positives and Pertinent negatives as per HPI.    PAST HISTORY     Past Medical History:  Past Medical History:   Diagnosis Date    Arthritis     Hips, Knees    CAD (coronary artery disease)     MI around 1990    COPD (chronic obstructive pulmonary disease) (HCC)     Hepatitis A     High cholesterol     Hypertension     Other ill-defined conditions(799.89)     Light sensitivity, causes seizures    Seizures (HCC)     Last seizure 12/2008/work -up in 2012 -possible seizure    Stroke (HCC) 2005    Thyroid disease     Hypothyroid       Past Surgical History:  Past Surgical History:   Procedure Laterality Date    CARDIAC CATHETERIZATION  20 years ago    no stents    HERNIA REPAIR  10/8/14    left inguinal hernia- Pawan Hartman MD    ORTHOPEDIC SURGERY  2/2010    [place]. Total Hip Replacement/Dr. Borrero    ORTHOPEDIC SURGERY

## 2024-05-23 NOTE — DISCHARGE INSTRUCTIONS
You were evaluated in the emergency department for shortness of breath.  Your examination was reassuring as was your work-up including chest x-ray, EKG, and blood work.  It will be important for you to follow-up with your primary care physician and pulmonologist in 2-3 days.  If you develop worsening symptoms such as worsening shortness of breath, chest pain, or fevers, please return to the emergency department immediately.

## 2024-06-26 DIAGNOSIS — M97.8XXA PERIPROSTHETIC FRACTURE OF SHAFT OF FEMUR: ICD-10-CM

## 2024-06-26 DIAGNOSIS — Z96.649 PERIPROSTHETIC FRACTURE OF SHAFT OF FEMUR: ICD-10-CM

## 2024-06-26 RX ORDER — GABAPENTIN 300 MG/1
CAPSULE ORAL
Qty: 720 CAPSULE | Refills: 1 | OUTPATIENT
Start: 2024-06-26 | End: 2024-12-25

## 2024-07-15 ENCOUNTER — OFFICE VISIT (OUTPATIENT)
Age: 69
End: 2024-07-15
Payer: MEDICARE

## 2024-07-15 VITALS
RESPIRATION RATE: 16 BRPM | OXYGEN SATURATION: 97 % | DIASTOLIC BLOOD PRESSURE: 62 MMHG | HEIGHT: 72 IN | HEART RATE: 98 BPM | SYSTOLIC BLOOD PRESSURE: 110 MMHG | BODY MASS INDEX: 23.51 KG/M2 | WEIGHT: 173.6 LBS | TEMPERATURE: 99.2 F

## 2024-07-15 DIAGNOSIS — G40.209 LOCALIZATION-RELATED PARTIAL EPILEPSY WITH COMPLEX PARTIAL SEIZURES (HCC): ICD-10-CM

## 2024-07-15 DIAGNOSIS — M97.8XXA PERIPROSTHETIC FRACTURE OF SHAFT OF FEMUR: ICD-10-CM

## 2024-07-15 DIAGNOSIS — Z72.0 TOBACCO ABUSE: ICD-10-CM

## 2024-07-15 DIAGNOSIS — Z86.73 HISTORY OF STROKE: Primary | ICD-10-CM

## 2024-07-15 DIAGNOSIS — Z96.649 PERIPROSTHETIC FRACTURE OF SHAFT OF FEMUR: ICD-10-CM

## 2024-07-15 PROCEDURE — G8420 CALC BMI NORM PARAMETERS: HCPCS | Performed by: NURSE PRACTITIONER

## 2024-07-15 PROCEDURE — 3074F SYST BP LT 130 MM HG: CPT | Performed by: NURSE PRACTITIONER

## 2024-07-15 PROCEDURE — G8427 DOCREV CUR MEDS BY ELIG CLIN: HCPCS | Performed by: NURSE PRACTITIONER

## 2024-07-15 PROCEDURE — 3017F COLORECTAL CA SCREEN DOC REV: CPT | Performed by: NURSE PRACTITIONER

## 2024-07-15 PROCEDURE — 99214 OFFICE O/P EST MOD 30 MIN: CPT | Performed by: NURSE PRACTITIONER

## 2024-07-15 PROCEDURE — 1123F ACP DISCUSS/DSCN MKR DOCD: CPT | Performed by: NURSE PRACTITIONER

## 2024-07-15 PROCEDURE — 3078F DIAST BP <80 MM HG: CPT | Performed by: NURSE PRACTITIONER

## 2024-07-15 PROCEDURE — 1036F TOBACCO NON-USER: CPT | Performed by: NURSE PRACTITIONER

## 2024-07-15 RX ORDER — GABAPENTIN 300 MG/1
CAPSULE ORAL
Qty: 720 CAPSULE | Refills: 1 | Status: SHIPPED | OUTPATIENT
Start: 2024-07-15 | End: 2025-07-10

## 2024-07-15 ASSESSMENT — ENCOUNTER SYMPTOMS: TROUBLE SWALLOWING: 0

## 2024-07-15 ASSESSMENT — PATIENT HEALTH QUESTIONNAIRE - PHQ9
SUM OF ALL RESPONSES TO PHQ9 QUESTIONS 1 & 2: 0
2. FEELING DOWN, DEPRESSED OR HOPELESS: NOT AT ALL
1. LITTLE INTEREST OR PLEASURE IN DOING THINGS: NOT AT ALL
SUM OF ALL RESPONSES TO PHQ QUESTIONS 1-9: 0

## 2024-07-15 NOTE — PROGRESS NOTES
under 5 minutes, you are not injured, and you return to yourself quickly, no need to call EMS, just call my office at the number above.  If you have  A seizure and the shaking lasts longer than 5 minutes, you are hurt or you have multiple seizures back to back without returning to yourself then call EMS  4.  Tobacco abuse: Smoking cessation encouraged.    Patient and/or family verbalized understand of all instructions and all questions/concerns were addressed.  Safety/side effects of medications discussed.    Patient remains a complex patient secondary to polypharmacy, significant comorbid conditions, and use of high-risk medications which complicate the decision making process related to patient's neurologic diagnosis.    The patient is to follow up in 9 months, sooner if needed.    Alannah Hewitt, FARIDEH-BC

## 2024-09-16 ENCOUNTER — APPOINTMENT (OUTPATIENT)
Facility: HOSPITAL | Age: 69
DRG: 190 | End: 2024-09-16
Payer: MEDICARE

## 2024-09-16 ENCOUNTER — HOSPITAL ENCOUNTER (INPATIENT)
Facility: HOSPITAL | Age: 69
LOS: 6 days | Discharge: HOME HEALTH | DRG: 190 | End: 2024-09-23
Attending: STUDENT IN AN ORGANIZED HEALTH CARE EDUCATION/TRAINING PROGRAM | Admitting: STUDENT IN AN ORGANIZED HEALTH CARE EDUCATION/TRAINING PROGRAM
Payer: MEDICARE

## 2024-09-16 DIAGNOSIS — R41.0 CONFUSION: ICD-10-CM

## 2024-09-16 DIAGNOSIS — R07.9 CHEST PAIN, UNSPECIFIED TYPE: ICD-10-CM

## 2024-09-16 DIAGNOSIS — S40.022A CONTUSION OF LEFT UPPER ARM, INITIAL ENCOUNTER: ICD-10-CM

## 2024-09-16 DIAGNOSIS — J44.1 COPD EXACERBATION (HCC): Primary | ICD-10-CM

## 2024-09-16 LAB
ALBUMIN SERPL-MCNC: 3.7 G/DL (ref 3.5–5)
ALBUMIN/GLOB SERPL: 1.3 (ref 1.1–2.2)
ALP SERPL-CCNC: 127 U/L (ref 45–117)
ALT SERPL-CCNC: 25 U/L (ref 12–78)
ANION GAP SERPL CALC-SCNC: 5 MMOL/L (ref 2–12)
APPEARANCE UR: CLEAR
AST SERPL-CCNC: 14 U/L (ref 15–37)
BACTERIA URNS QL MICRO: NEGATIVE /HPF
BASE EXCESS BLD CALC-SCNC: 3 MMOL/L
BASOPHILS # BLD: 0.1 K/UL (ref 0–0.1)
BASOPHILS NFR BLD: 1 % (ref 0–1)
BILIRUB SERPL-MCNC: 0.9 MG/DL (ref 0.2–1)
BILIRUB UR QL: NEGATIVE
BUN SERPL-MCNC: 12 MG/DL (ref 6–20)
BUN/CREAT SERPL: 17 (ref 12–20)
CALCIUM SERPL-MCNC: 9.3 MG/DL (ref 8.5–10.1)
CHLORIDE SERPL-SCNC: 100 MMOL/L (ref 97–108)
CO2 SERPL-SCNC: 33 MMOL/L (ref 21–32)
COLOR UR: ABNORMAL
CREAT SERPL-MCNC: 0.7 MG/DL (ref 0.7–1.3)
DIFFERENTIAL METHOD BLD: ABNORMAL
EOSINOPHIL # BLD: 0 K/UL (ref 0–0.4)
EOSINOPHIL NFR BLD: 1 % (ref 0–7)
EPITH CASTS URNS QL MICRO: ABNORMAL /LPF
ERYTHROCYTE [DISTWIDTH] IN BLOOD BY AUTOMATED COUNT: 14.3 % (ref 11.5–14.5)
GLOBULIN SER CALC-MCNC: 2.8 G/DL (ref 2–4)
GLUCOSE SERPL-MCNC: 96 MG/DL (ref 65–100)
GLUCOSE UR STRIP.AUTO-MCNC: NEGATIVE MG/DL
HCO3 BLD-SCNC: 28.1 MMOL/L (ref 21–28)
HCT VFR BLD AUTO: 40.4 % (ref 36.6–50.3)
HGB BLD-MCNC: 13 G/DL (ref 12.1–17)
HGB UR QL STRIP: ABNORMAL
HYALINE CASTS URNS QL MICRO: ABNORMAL /LPF (ref 0–2)
IMM GRANULOCYTES # BLD AUTO: 0 K/UL (ref 0–0.04)
IMM GRANULOCYTES NFR BLD AUTO: 0 % (ref 0–0.5)
KETONES UR QL STRIP.AUTO: NEGATIVE MG/DL
LACTATE BLD-SCNC: 0.75 MMOL/L (ref 0.4–2)
LEUKOCYTE ESTERASE UR QL STRIP.AUTO: NEGATIVE
LIPASE SERPL-CCNC: 41 U/L (ref 13–75)
LYMPHOCYTES # BLD: 1.3 K/UL (ref 0.8–3.5)
LYMPHOCYTES NFR BLD: 19 % (ref 12–49)
MCH RBC QN AUTO: 30.2 PG (ref 26–34)
MCHC RBC AUTO-ENTMCNC: 32.2 G/DL (ref 30–36.5)
MCV RBC AUTO: 93.7 FL (ref 80–99)
MONOCYTES # BLD: 0.6 K/UL (ref 0–1)
MONOCYTES NFR BLD: 8 % (ref 5–13)
NEUTS SEG # BLD: 4.9 K/UL (ref 1.8–8)
NEUTS SEG NFR BLD: 71 % (ref 32–75)
NITRITE UR QL STRIP.AUTO: NEGATIVE
NRBC # BLD: 0 K/UL (ref 0–0.01)
NRBC BLD-RTO: 0 PER 100 WBC
NT PRO BNP: 221 PG/ML
PCO2 BLD: 44 MMHG (ref 35–48)
PH BLD: 7.41 (ref 7.35–7.45)
PH UR STRIP: 7.5 (ref 5–8)
PLATELET # BLD AUTO: 368 K/UL (ref 150–400)
PMV BLD AUTO: 8.4 FL (ref 8.9–12.9)
PO2 BLD: 41 MMHG (ref 83–108)
POTASSIUM SERPL-SCNC: 4.6 MMOL/L (ref 3.5–5.1)
PROT SERPL-MCNC: 6.5 G/DL (ref 6.4–8.2)
PROT UR STRIP-MCNC: NEGATIVE MG/DL
RBC # BLD AUTO: 4.31 M/UL (ref 4.1–5.7)
RBC #/AREA URNS HPF: ABNORMAL /HPF (ref 0–5)
SAO2 % BLD: 76.6 % (ref 92–97)
SERVICE CMNT-IMP: ABNORMAL
SODIUM SERPL-SCNC: 138 MMOL/L (ref 136–145)
SP GR UR REFRACTOMETRY: 1.01
SPECIMEN TYPE: ABNORMAL
TROPONIN I SERPL HS-MCNC: 6 NG/L (ref 0–76)
URINE CULTURE IF INDICATED: ABNORMAL
UROBILINOGEN UR QL STRIP.AUTO: 1 EU/DL (ref 0.2–1)
WBC # BLD AUTO: 6.9 K/UL (ref 4.1–11.1)
WBC URNS QL MICRO: ABNORMAL /HPF (ref 0–4)

## 2024-09-16 PROCEDURE — 70450 CT HEAD/BRAIN W/O DYE: CPT

## 2024-09-16 PROCEDURE — 94640 AIRWAY INHALATION TREATMENT: CPT

## 2024-09-16 PROCEDURE — 6370000000 HC RX 637 (ALT 250 FOR IP): Performed by: STUDENT IN AN ORGANIZED HEALTH CARE EDUCATION/TRAINING PROGRAM

## 2024-09-16 PROCEDURE — 96374 THER/PROPH/DIAG INJ IV PUSH: CPT

## 2024-09-16 PROCEDURE — 83690 ASSAY OF LIPASE: CPT

## 2024-09-16 PROCEDURE — 2580000003 HC RX 258: Performed by: STUDENT IN AN ORGANIZED HEALTH CARE EDUCATION/TRAINING PROGRAM

## 2024-09-16 PROCEDURE — 36415 COLL VENOUS BLD VENIPUNCTURE: CPT

## 2024-09-16 PROCEDURE — 82803 BLOOD GASES ANY COMBINATION: CPT

## 2024-09-16 PROCEDURE — 94762 N-INVAS EAR/PLS OXIMTRY CONT: CPT

## 2024-09-16 PROCEDURE — 83605 ASSAY OF LACTIC ACID: CPT

## 2024-09-16 PROCEDURE — 74177 CT ABD & PELVIS W/CONTRAST: CPT

## 2024-09-16 PROCEDURE — 81001 URINALYSIS AUTO W/SCOPE: CPT

## 2024-09-16 PROCEDURE — 6360000004 HC RX CONTRAST MEDICATION

## 2024-09-16 PROCEDURE — 83880 ASSAY OF NATRIURETIC PEPTIDE: CPT

## 2024-09-16 PROCEDURE — 73060 X-RAY EXAM OF HUMERUS: CPT

## 2024-09-16 PROCEDURE — 99285 EMERGENCY DEPT VISIT HI MDM: CPT

## 2024-09-16 PROCEDURE — 71275 CT ANGIOGRAPHY CHEST: CPT

## 2024-09-16 PROCEDURE — 85025 COMPLETE CBC W/AUTO DIFF WBC: CPT

## 2024-09-16 PROCEDURE — 6360000002 HC RX W HCPCS: Performed by: STUDENT IN AN ORGANIZED HEALTH CARE EDUCATION/TRAINING PROGRAM

## 2024-09-16 PROCEDURE — 80053 COMPREHEN METABOLIC PANEL: CPT

## 2024-09-16 PROCEDURE — 93005 ELECTROCARDIOGRAM TRACING: CPT | Performed by: STUDENT IN AN ORGANIZED HEALTH CARE EDUCATION/TRAINING PROGRAM

## 2024-09-16 PROCEDURE — 84484 ASSAY OF TROPONIN QUANT: CPT

## 2024-09-16 RX ORDER — IOPAMIDOL 755 MG/ML
100 INJECTION, SOLUTION INTRAVASCULAR
Status: DISCONTINUED | OUTPATIENT
Start: 2024-09-16 | End: 2024-09-23 | Stop reason: HOSPADM

## 2024-09-16 RX ORDER — ACETAMINOPHEN 325 MG/1
650 TABLET ORAL EVERY 4 HOURS PRN
Status: DISCONTINUED | OUTPATIENT
Start: 2024-09-16 | End: 2024-09-23 | Stop reason: HOSPADM

## 2024-09-16 RX ORDER — IOPAMIDOL 755 MG/ML
INJECTION, SOLUTION INTRAVASCULAR
Status: COMPLETED
Start: 2024-09-16 | End: 2024-09-16

## 2024-09-16 RX ORDER — IPRATROPIUM BROMIDE AND ALBUTEROL SULFATE 2.5; .5 MG/3ML; MG/3ML
1 SOLUTION RESPIRATORY (INHALATION) ONCE
Status: COMPLETED | OUTPATIENT
Start: 2024-09-16 | End: 2024-09-16

## 2024-09-16 RX ORDER — IPRATROPIUM BROMIDE AND ALBUTEROL SULFATE 2.5; .5 MG/3ML; MG/3ML
1 SOLUTION RESPIRATORY (INHALATION)
Status: COMPLETED | OUTPATIENT
Start: 2024-09-16 | End: 2024-09-16

## 2024-09-16 RX ORDER — IPRATROPIUM BROMIDE AND ALBUTEROL SULFATE 2.5; .5 MG/3ML; MG/3ML
2 SOLUTION RESPIRATORY (INHALATION)
Status: COMPLETED | OUTPATIENT
Start: 2024-09-16 | End: 2024-09-16

## 2024-09-16 RX ORDER — IPRATROPIUM BROMIDE AND ALBUTEROL SULFATE 2.5; .5 MG/3ML; MG/3ML
2 SOLUTION RESPIRATORY (INHALATION)
OUTPATIENT
Start: 2024-09-16 | End: 2024-09-17

## 2024-09-16 RX ADMIN — WATER 125 MG: 1 INJECTION INTRAMUSCULAR; INTRAVENOUS; SUBCUTANEOUS at 17:58

## 2024-09-16 RX ADMIN — IPRATROPIUM BROMIDE AND ALBUTEROL SULFATE 2 DOSE: 2.5; .5 SOLUTION RESPIRATORY (INHALATION) at 17:41

## 2024-09-16 RX ADMIN — SALINE NASAL SPRAY 1 SPRAY: 1.5 SOLUTION NASAL at 22:40

## 2024-09-16 RX ADMIN — IPRATROPIUM BROMIDE AND ALBUTEROL SULFATE 1 DOSE: 2.5; .5 SOLUTION RESPIRATORY (INHALATION) at 17:26

## 2024-09-16 RX ADMIN — IPRATROPIUM BROMIDE AND ALBUTEROL SULFATE 1 DOSE: 2.5; .5 SOLUTION RESPIRATORY (INHALATION) at 21:42

## 2024-09-16 RX ADMIN — ACETAMINOPHEN 650 MG: 325 TABLET ORAL at 22:43

## 2024-09-16 RX ADMIN — IOPAMIDOL: 755 INJECTION, SOLUTION INTRAVENOUS at 19:22

## 2024-09-16 ASSESSMENT — PAIN SCALES - GENERAL
PAINLEVEL_OUTOF10: 8
PAINLEVEL_OUTOF10: 10

## 2024-09-16 ASSESSMENT — PAIN DESCRIPTION - LOCATION: LOCATION: HEAD

## 2024-09-17 ENCOUNTER — APPOINTMENT (OUTPATIENT)
Facility: HOSPITAL | Age: 69
DRG: 190 | End: 2024-09-17
Payer: MEDICARE

## 2024-09-17 PROBLEM — G31.84 MCI (MILD COGNITIVE IMPAIRMENT): Status: ACTIVE | Noted: 2024-09-17

## 2024-09-17 PROBLEM — R41.0 CONFUSION: Status: ACTIVE | Noted: 2024-09-17

## 2024-09-17 PROBLEM — J44.1 COPD EXACERBATION (HCC): Status: ACTIVE | Noted: 2024-09-17

## 2024-09-17 LAB
ALBUMIN SERPL-MCNC: 3.9 G/DL (ref 3.5–5)
ALBUMIN/GLOB SERPL: 1.1 (ref 1.1–2.2)
ALP SERPL-CCNC: 148 U/L (ref 45–117)
ALT SERPL-CCNC: 25 U/L (ref 12–78)
ANION GAP SERPL CALC-SCNC: 14 MMOL/L (ref 2–12)
APAP SERPL-MCNC: <2 UG/ML (ref 10–30)
AST SERPL-CCNC: 15 U/L (ref 15–37)
BASOPHILS # BLD: 0 K/UL (ref 0–0.1)
BASOPHILS NFR BLD: 0 % (ref 0–1)
BILIRUB SERPL-MCNC: 1 MG/DL (ref 0.2–1)
BUN SERPL-MCNC: 17 MG/DL (ref 6–20)
BUN/CREAT SERPL: 15 (ref 12–20)
CALCIUM SERPL-MCNC: 9.9 MG/DL (ref 8.5–10.1)
CHLORIDE SERPL-SCNC: 99 MMOL/L (ref 97–108)
CK SERPL-CCNC: 91 U/L (ref 39–308)
CO2 SERPL-SCNC: 23 MMOL/L (ref 21–32)
CREAT SERPL-MCNC: 1.1 MG/DL (ref 0.7–1.3)
DIFFERENTIAL METHOD BLD: ABNORMAL
EKG ATRIAL RATE: 76 BPM
EKG DIAGNOSIS: NORMAL
EKG P AXIS: 91 DEGREES
EKG P-R INTERVAL: 168 MS
EKG Q-T INTERVAL: 356 MS
EKG QRS DURATION: 82 MS
EKG QTC CALCULATION (BAZETT): 400 MS
EKG R AXIS: 92 DEGREES
EKG T AXIS: 83 DEGREES
EKG VENTRICULAR RATE: 76 BPM
EOSINOPHIL # BLD: 0 K/UL (ref 0–0.4)
EOSINOPHIL NFR BLD: 0 % (ref 0–7)
ERYTHROCYTE [DISTWIDTH] IN BLOOD BY AUTOMATED COUNT: 14.3 % (ref 11.5–14.5)
ETHANOL SERPL-MCNC: <10 MG/DL (ref 0–0.08)
GLOBULIN SER CALC-MCNC: 3.6 G/DL (ref 2–4)
GLUCOSE BLD STRIP.AUTO-MCNC: 140 MG/DL (ref 65–117)
GLUCOSE BLD STRIP.AUTO-MCNC: 150 MG/DL (ref 65–117)
GLUCOSE SERPL-MCNC: 150 MG/DL (ref 65–100)
HCT VFR BLD AUTO: 43.3 % (ref 36.6–50.3)
HGB BLD-MCNC: 13.5 G/DL (ref 12.1–17)
IMM GRANULOCYTES # BLD AUTO: 0.1 K/UL (ref 0–0.04)
IMM GRANULOCYTES NFR BLD AUTO: 1 % (ref 0–0.5)
LACTATE SERPL-SCNC: 8.4 MMOL/L (ref 0.4–2)
LYMPHOCYTES # BLD: 0.8 K/UL (ref 0.8–3.5)
LYMPHOCYTES NFR BLD: 13 % (ref 12–49)
MCH RBC QN AUTO: 29.4 PG (ref 26–34)
MCHC RBC AUTO-ENTMCNC: 31.2 G/DL (ref 30–36.5)
MCV RBC AUTO: 94.3 FL (ref 80–99)
MONOCYTES # BLD: 0.1 K/UL (ref 0–1)
MONOCYTES NFR BLD: 1 % (ref 5–13)
NEUTS SEG # BLD: 5.2 K/UL (ref 1.8–8)
NEUTS SEG NFR BLD: 85 % (ref 32–75)
NRBC # BLD: 0 K/UL (ref 0–0.01)
NRBC BLD-RTO: 0 PER 100 WBC
PLATELET # BLD AUTO: 476 K/UL (ref 150–400)
PMV BLD AUTO: 8.7 FL (ref 8.9–12.9)
POTASSIUM SERPL-SCNC: 4.3 MMOL/L (ref 3.5–5.1)
PROT SERPL-MCNC: 7.5 G/DL (ref 6.4–8.2)
RBC # BLD AUTO: 4.59 M/UL (ref 4.1–5.7)
RBC MORPH BLD: ABNORMAL
SALICYLATES SERPL-MCNC: 3.7 MG/DL (ref 2.8–20)
SERVICE CMNT-IMP: ABNORMAL
SERVICE CMNT-IMP: ABNORMAL
SODIUM SERPL-SCNC: 136 MMOL/L (ref 136–145)
TSH SERPL DL<=0.05 MIU/L-ACNC: 0.86 UIU/ML (ref 0.36–3.74)
WBC # BLD AUTO: 6.2 K/UL (ref 4.1–11.1)

## 2024-09-17 PROCEDURE — 6360000002 HC RX W HCPCS: Performed by: STUDENT IN AN ORGANIZED HEALTH CARE EDUCATION/TRAINING PROGRAM

## 2024-09-17 PROCEDURE — 80177 DRUG SCRN QUAN LEVETIRACETAM: CPT

## 2024-09-17 PROCEDURE — 95816 EEG AWAKE AND DROWSY: CPT | Performed by: PSYCHIATRY & NEUROLOGY

## 2024-09-17 PROCEDURE — 2580000003 HC RX 258: Performed by: STUDENT IN AN ORGANIZED HEALTH CARE EDUCATION/TRAINING PROGRAM

## 2024-09-17 PROCEDURE — 2580000003 HC RX 258: Performed by: GENERAL ACUTE CARE HOSPITAL

## 2024-09-17 PROCEDURE — 6370000000 HC RX 637 (ALT 250 FOR IP): Performed by: STUDENT IN AN ORGANIZED HEALTH CARE EDUCATION/TRAINING PROGRAM

## 2024-09-17 PROCEDURE — 95819 EEG AWAKE AND ASLEEP: CPT

## 2024-09-17 PROCEDURE — 2060000000 HC ICU INTERMEDIATE R&B

## 2024-09-17 PROCEDURE — 80053 COMPREHEN METABOLIC PANEL: CPT

## 2024-09-17 PROCEDURE — 2500000003 HC RX 250 WO HCPCS: Performed by: GENERAL ACUTE CARE HOSPITAL

## 2024-09-17 PROCEDURE — 94640 AIRWAY INHALATION TREATMENT: CPT

## 2024-09-17 PROCEDURE — 82962 GLUCOSE BLOOD TEST: CPT

## 2024-09-17 PROCEDURE — 36415 COLL VENOUS BLD VENIPUNCTURE: CPT

## 2024-09-17 PROCEDURE — 80143 DRUG ASSAY ACETAMINOPHEN: CPT

## 2024-09-17 PROCEDURE — 6360000002 HC RX W HCPCS: Performed by: GENERAL ACUTE CARE HOSPITAL

## 2024-09-17 PROCEDURE — 82550 ASSAY OF CK (CPK): CPT

## 2024-09-17 PROCEDURE — 85025 COMPLETE CBC W/AUTO DIFF WBC: CPT

## 2024-09-17 PROCEDURE — 84443 ASSAY THYROID STIM HORMONE: CPT

## 2024-09-17 PROCEDURE — 80179 DRUG ASSAY SALICYLATE: CPT

## 2024-09-17 PROCEDURE — 82077 ASSAY SPEC XCP UR&BREATH IA: CPT

## 2024-09-17 PROCEDURE — 95717 EEG PHYS/QHP 2-12 HR W/O VID: CPT | Performed by: PSYCHIATRY & NEUROLOGY

## 2024-09-17 PROCEDURE — 95706 EEG WO VID 2-12HR INTMT MNTR: CPT

## 2024-09-17 PROCEDURE — 6360000002 HC RX W HCPCS: Performed by: PSYCHIATRY & NEUROLOGY

## 2024-09-17 PROCEDURE — 99223 1ST HOSP IP/OBS HIGH 75: CPT | Performed by: PSYCHIATRY & NEUROLOGY

## 2024-09-17 PROCEDURE — 83605 ASSAY OF LACTIC ACID: CPT

## 2024-09-17 RX ORDER — PANTOPRAZOLE SODIUM 40 MG/1
40 TABLET, DELAYED RELEASE ORAL
Status: DISCONTINUED | OUTPATIENT
Start: 2024-09-17 | End: 2024-09-23 | Stop reason: HOSPADM

## 2024-09-17 RX ORDER — LEVETIRACETAM 500 MG/1
750 TABLET ORAL 2 TIMES DAILY
Status: DISCONTINUED | OUTPATIENT
Start: 2024-09-17 | End: 2024-09-20 | Stop reason: SDUPTHER

## 2024-09-17 RX ORDER — LEVETIRACETAM 500 MG/5ML
750 INJECTION, SOLUTION, CONCENTRATE INTRAVENOUS EVERY 12 HOURS
Status: DISCONTINUED | OUTPATIENT
Start: 2024-09-17 | End: 2024-09-17

## 2024-09-17 RX ORDER — ASPIRIN 81 MG/1
81 TABLET ORAL 2 TIMES DAILY
Status: DISCONTINUED | OUTPATIENT
Start: 2024-09-17 | End: 2024-09-23 | Stop reason: HOSPADM

## 2024-09-17 RX ORDER — ARFORMOTEROL TARTRATE 15 UG/2ML
15 SOLUTION RESPIRATORY (INHALATION)
Status: DISCONTINUED | OUTPATIENT
Start: 2024-09-17 | End: 2024-09-23 | Stop reason: HOSPADM

## 2024-09-17 RX ORDER — BUDESONIDE 0.5 MG/2ML
0.5 INHALANT ORAL
Status: DISCONTINUED | OUTPATIENT
Start: 2024-09-17 | End: 2024-09-23 | Stop reason: HOSPADM

## 2024-09-17 RX ORDER — LEVOTHYROXINE SODIUM 50 UG/1
50 TABLET ORAL DAILY
Status: DISCONTINUED | OUTPATIENT
Start: 2024-09-17 | End: 2024-09-23 | Stop reason: HOSPADM

## 2024-09-17 RX ORDER — FAMOTIDINE 20 MG/1
20 TABLET, FILM COATED ORAL 2 TIMES DAILY
Status: DISCONTINUED | OUTPATIENT
Start: 2024-09-17 | End: 2024-09-17

## 2024-09-17 RX ORDER — PREDNISONE 20 MG/1
40 TABLET ORAL DAILY
Status: DISCONTINUED | OUTPATIENT
Start: 2024-09-17 | End: 2024-09-21

## 2024-09-17 RX ORDER — GUAIFENESIN 600 MG/1
600 TABLET, EXTENDED RELEASE ORAL 2 TIMES DAILY
Status: DISCONTINUED | OUTPATIENT
Start: 2024-09-17 | End: 2024-09-23 | Stop reason: HOSPADM

## 2024-09-17 RX ORDER — DEXTROSE MONOHYDRATE, SODIUM CHLORIDE, AND POTASSIUM CHLORIDE 50; 2.98; 4.5 G/1000ML; G/1000ML; G/1000ML
INJECTION, SOLUTION INTRAVENOUS CONTINUOUS
Status: DISCONTINUED | OUTPATIENT
Start: 2024-09-17 | End: 2024-09-19

## 2024-09-17 RX ORDER — SODIUM CHLORIDE 0.9 % (FLUSH) 0.9 %
5-40 SYRINGE (ML) INJECTION EVERY 12 HOURS SCHEDULED
Status: DISCONTINUED | OUTPATIENT
Start: 2024-09-17 | End: 2024-09-23 | Stop reason: HOSPADM

## 2024-09-17 RX ORDER — SODIUM CHLORIDE 0.9 % (FLUSH) 0.9 %
5-40 SYRINGE (ML) INJECTION PRN
Status: DISCONTINUED | OUTPATIENT
Start: 2024-09-17 | End: 2024-09-23 | Stop reason: HOSPADM

## 2024-09-17 RX ORDER — ARFORMOTEROL TARTRATE 15 UG/2ML
15 SOLUTION RESPIRATORY (INHALATION)
Status: DISCONTINUED | OUTPATIENT
Start: 2024-09-17 | End: 2024-09-17 | Stop reason: SDUPTHER

## 2024-09-17 RX ORDER — SODIUM CHLORIDE 9 MG/ML
INJECTION, SOLUTION INTRAVENOUS PRN
Status: DISCONTINUED | OUTPATIENT
Start: 2024-09-17 | End: 2024-09-23 | Stop reason: HOSPADM

## 2024-09-17 RX ORDER — HYDROXYZINE HYDROCHLORIDE 10 MG/1
10 TABLET, FILM COATED ORAL ONCE
Status: DISCONTINUED | OUTPATIENT
Start: 2024-09-17 | End: 2024-09-23 | Stop reason: HOSPADM

## 2024-09-17 RX ORDER — ERGOCALCIFEROL 1.25 MG/1
50000 CAPSULE, LIQUID FILLED ORAL WEEKLY
Status: DISCONTINUED | OUTPATIENT
Start: 2024-09-17 | End: 2024-09-23 | Stop reason: HOSPADM

## 2024-09-17 RX ORDER — LORAZEPAM 2 MG/ML
4 INJECTION INTRAMUSCULAR ONCE
Status: COMPLETED | OUTPATIENT
Start: 2024-09-17 | End: 2024-09-17

## 2024-09-17 RX ORDER — LEVETIRACETAM 500 MG/5ML
1000 INJECTION, SOLUTION, CONCENTRATE INTRAVENOUS EVERY 12 HOURS
Status: DISCONTINUED | OUTPATIENT
Start: 2024-09-17 | End: 2024-09-20

## 2024-09-17 RX ORDER — ENOXAPARIN SODIUM 100 MG/ML
40 INJECTION SUBCUTANEOUS DAILY
Status: DISCONTINUED | OUTPATIENT
Start: 2024-09-17 | End: 2024-09-23 | Stop reason: HOSPADM

## 2024-09-17 RX ORDER — ACETAMINOPHEN 650 MG/1
650 SUPPOSITORY RECTAL EVERY 6 HOURS PRN
Status: DISCONTINUED | OUTPATIENT
Start: 2024-09-17 | End: 2024-09-17

## 2024-09-17 RX ORDER — AMLODIPINE BESYLATE 5 MG/1
10 TABLET ORAL DAILY
Status: DISCONTINUED | OUTPATIENT
Start: 2024-09-17 | End: 2024-09-23 | Stop reason: HOSPADM

## 2024-09-17 RX ORDER — CITALOPRAM HYDROBROMIDE 20 MG/1
20 TABLET ORAL DAILY
Status: DISCONTINUED | OUTPATIENT
Start: 2024-09-17 | End: 2024-09-23 | Stop reason: HOSPADM

## 2024-09-17 RX ORDER — ONDANSETRON 2 MG/ML
4 INJECTION INTRAMUSCULAR; INTRAVENOUS EVERY 6 HOURS PRN
Status: DISCONTINUED | OUTPATIENT
Start: 2024-09-17 | End: 2024-09-23 | Stop reason: HOSPADM

## 2024-09-17 RX ORDER — LEVETIRACETAM 500 MG/5ML
750 INJECTION, SOLUTION, CONCENTRATE INTRAVENOUS ONCE
Status: COMPLETED | OUTPATIENT
Start: 2024-09-17 | End: 2024-09-17

## 2024-09-17 RX ORDER — MONTELUKAST SODIUM 10 MG/1
10 TABLET ORAL DAILY
Status: DISCONTINUED | OUTPATIENT
Start: 2024-09-17 | End: 2024-09-23 | Stop reason: HOSPADM

## 2024-09-17 RX ORDER — POLYETHYLENE GLYCOL 3350 17 G/17G
17 POWDER, FOR SOLUTION ORAL DAILY PRN
Status: DISCONTINUED | OUTPATIENT
Start: 2024-09-17 | End: 2024-09-23 | Stop reason: HOSPADM

## 2024-09-17 RX ORDER — ACETAMINOPHEN 325 MG/1
650 TABLET ORAL EVERY 6 HOURS PRN
Status: DISCONTINUED | OUTPATIENT
Start: 2024-09-17 | End: 2024-09-17

## 2024-09-17 RX ORDER — GABAPENTIN 300 MG/1
600 CAPSULE ORAL 4 TIMES DAILY
Status: DISCONTINUED | OUTPATIENT
Start: 2024-09-17 | End: 2024-09-23 | Stop reason: HOSPADM

## 2024-09-17 RX ORDER — IPRATROPIUM BROMIDE AND ALBUTEROL SULFATE 2.5; .5 MG/3ML; MG/3ML
1 SOLUTION RESPIRATORY (INHALATION) EVERY 6 HOURS PRN
Status: DISCONTINUED | OUTPATIENT
Start: 2024-09-17 | End: 2024-09-17

## 2024-09-17 RX ORDER — ATORVASTATIN CALCIUM 40 MG/1
40 TABLET, FILM COATED ORAL DAILY
Status: DISCONTINUED | OUTPATIENT
Start: 2024-09-17 | End: 2024-09-23 | Stop reason: HOSPADM

## 2024-09-17 RX ORDER — CASTOR OIL AND BALSAM, PERU 788; 87 MG/G; MG/G
OINTMENT TOPICAL 2 TIMES DAILY
Status: DISCONTINUED | OUTPATIENT
Start: 2024-09-17 | End: 2024-09-23 | Stop reason: HOSPADM

## 2024-09-17 RX ORDER — IPRATROPIUM BROMIDE AND ALBUTEROL SULFATE 2.5; .5 MG/3ML; MG/3ML
1 SOLUTION RESPIRATORY (INHALATION)
Status: DISCONTINUED | OUTPATIENT
Start: 2024-09-17 | End: 2024-09-23 | Stop reason: HOSPADM

## 2024-09-17 RX ORDER — ONDANSETRON 4 MG/1
4 TABLET, ORALLY DISINTEGRATING ORAL EVERY 8 HOURS PRN
Status: DISCONTINUED | OUTPATIENT
Start: 2024-09-17 | End: 2024-09-23 | Stop reason: HOSPADM

## 2024-09-17 RX ORDER — ACETYLCYSTEINE 200 MG/ML
3 SOLUTION ORAL; RESPIRATORY (INHALATION) 2 TIMES DAILY
Status: DISCONTINUED | OUTPATIENT
Start: 2024-09-17 | End: 2024-09-22

## 2024-09-17 RX ADMIN — IPRATROPIUM BROMIDE AND ALBUTEROL SULFATE 1 DOSE: 2.5; .5 SOLUTION RESPIRATORY (INHALATION) at 19:55

## 2024-09-17 RX ADMIN — IPRATROPIUM BROMIDE AND ALBUTEROL SULFATE 1 DOSE: 2.5; .5 SOLUTION RESPIRATORY (INHALATION) at 07:43

## 2024-09-17 RX ADMIN — ARFORMOTEROL TARTRATE 15 MCG: 15 SOLUTION RESPIRATORY (INHALATION) at 19:52

## 2024-09-17 RX ADMIN — BUDESONIDE 500 MCG: 0.5 INHALANT RESPIRATORY (INHALATION) at 19:52

## 2024-09-17 RX ADMIN — ACETYLCYSTEINE 600 MG: 200 SOLUTION ORAL; RESPIRATORY (INHALATION) at 19:55

## 2024-09-17 RX ADMIN — LORAZEPAM 4 MG: 2 INJECTION INTRAMUSCULAR; INTRAVENOUS at 03:50

## 2024-09-17 RX ADMIN — ENOXAPARIN SODIUM 40 MG: 100 INJECTION SUBCUTANEOUS at 08:52

## 2024-09-17 RX ADMIN — Medication: at 08:59

## 2024-09-17 RX ADMIN — WATER 40 MG: 1 INJECTION INTRAMUSCULAR; INTRAVENOUS; SUBCUTANEOUS at 09:26

## 2024-09-17 RX ADMIN — IPRATROPIUM BROMIDE 0.5 MG: 0.5 SOLUTION RESPIRATORY (INHALATION) at 03:56

## 2024-09-17 RX ADMIN — Medication: at 21:36

## 2024-09-17 RX ADMIN — LEVETIRACETAM 750 MG: 100 INJECTION INTRAVENOUS at 03:51

## 2024-09-17 RX ADMIN — DEXTROSE MONOHYDRATE, SODIUM CHLORIDE, AND POTASSIUM CHLORIDE 950 ML: 50; 4.5; 2.98 INJECTION, SOLUTION INTRAVENOUS at 10:13

## 2024-09-17 RX ADMIN — SODIUM CHLORIDE, PRESERVATIVE FREE 10 ML: 5 INJECTION INTRAVENOUS at 08:53

## 2024-09-17 RX ADMIN — AZITHROMYCIN DIHYDRATE 500 MG: 500 INJECTION, POWDER, LYOPHILIZED, FOR SOLUTION INTRAVENOUS at 03:12

## 2024-09-17 RX ADMIN — IPRATROPIUM BROMIDE AND ALBUTEROL SULFATE 1 DOSE: 2.5; .5 SOLUTION RESPIRATORY (INHALATION) at 14:03

## 2024-09-17 RX ADMIN — LEVETIRACETAM 750 MG: 100 INJECTION INTRAVENOUS at 04:10

## 2024-09-17 RX ADMIN — ARFORMOTEROL TARTRATE 15 MCG: 15 SOLUTION RESPIRATORY (INHALATION) at 07:38

## 2024-09-17 RX ADMIN — BUDESONIDE 500 MCG: 0.5 INHALANT RESPIRATORY (INHALATION) at 07:39

## 2024-09-17 RX ADMIN — SODIUM CHLORIDE, PRESERVATIVE FREE 20 MG: 5 INJECTION INTRAVENOUS at 09:26

## 2024-09-17 RX ADMIN — CEFTRIAXONE 1000 MG: 1 INJECTION, POWDER, FOR SOLUTION INTRAMUSCULAR; INTRAVENOUS at 02:13

## 2024-09-17 RX ADMIN — ACETYLCYSTEINE 600 MG: 200 SOLUTION ORAL; RESPIRATORY (INHALATION) at 07:43

## 2024-09-17 RX ADMIN — ASPIRIN 81 MG: 81 TABLET, COATED ORAL at 02:08

## 2024-09-17 RX ADMIN — IPRATROPIUM BROMIDE AND ALBUTEROL SULFATE 1 DOSE: 2.5; .5 SOLUTION RESPIRATORY (INHALATION) at 11:15

## 2024-09-17 RX ADMIN — LEVETIRACETAM 1000 MG: 100 INJECTION INTRAVENOUS at 15:43

## 2024-09-17 ASSESSMENT — PAIN SCALES - GENERAL
PAINLEVEL_OUTOF10: 0

## 2024-09-18 ENCOUNTER — APPOINTMENT (OUTPATIENT)
Facility: HOSPITAL | Age: 69
DRG: 190 | End: 2024-09-18
Payer: MEDICARE

## 2024-09-18 LAB
ALBUMIN SERPL-MCNC: 3 G/DL (ref 3.5–5)
ALBUMIN/GLOB SERPL: 1.2 (ref 1.1–2.2)
ALP SERPL-CCNC: 109 U/L (ref 45–117)
ALT SERPL-CCNC: 20 U/L (ref 12–78)
ANION GAP SERPL CALC-SCNC: 5 MMOL/L (ref 2–12)
ARTERIAL PATENCY WRIST A: ABNORMAL
AST SERPL-CCNC: 14 U/L (ref 15–37)
BASE EXCESS BLDA CALC-SCNC: 4 MMOL/L
BASOPHILS # BLD: 0 K/UL (ref 0–0.1)
BASOPHILS NFR BLD: 0 % (ref 0–1)
BDY SITE: ABNORMAL
BILIRUB SERPL-MCNC: 0.6 MG/DL (ref 0.2–1)
BUN SERPL-MCNC: 19 MG/DL (ref 6–20)
BUN/CREAT SERPL: 28 (ref 12–20)
CALCIUM SERPL-MCNC: 8.7 MG/DL (ref 8.5–10.1)
CHLORIDE SERPL-SCNC: 104 MMOL/L (ref 97–108)
CO2 SERPL-SCNC: 27 MMOL/L (ref 21–32)
CREAT SERPL-MCNC: 0.68 MG/DL (ref 0.7–1.3)
DIFFERENTIAL METHOD BLD: ABNORMAL
EKG ATRIAL RATE: 64 BPM
EKG DIAGNOSIS: NORMAL
EKG P-R INTERVAL: 164 MS
EKG Q-T INTERVAL: 372 MS
EKG QRS DURATION: 84 MS
EKG QTC CALCULATION (BAZETT): 383 MS
EKG R AXIS: 101 DEGREES
EKG T AXIS: 116 DEGREES
EKG VENTRICULAR RATE: 64 BPM
EOSINOPHIL # BLD: 0 K/UL (ref 0–0.4)
EOSINOPHIL NFR BLD: 0 % (ref 0–7)
ERYTHROCYTE [DISTWIDTH] IN BLOOD BY AUTOMATED COUNT: 14.6 % (ref 11.5–14.5)
GAS FLOW.O2 O2 DELIVERY SYS: 2 L/MIN
GLOBULIN SER CALC-MCNC: 2.6 G/DL (ref 2–4)
GLUCOSE BLD STRIP.AUTO-MCNC: 101 MG/DL (ref 65–117)
GLUCOSE SERPL-MCNC: 105 MG/DL (ref 65–100)
HCO3 BLDA-SCNC: 29 MMOL/L (ref 22–26)
HCT VFR BLD AUTO: 35.7 % (ref 36.6–50.3)
HGB BLD-MCNC: 11.6 G/DL (ref 12.1–17)
IMM GRANULOCYTES # BLD AUTO: 0.1 K/UL (ref 0–0.04)
IMM GRANULOCYTES NFR BLD AUTO: 1 % (ref 0–0.5)
LEVETIRACETAM SERPL-MCNC: <2 UG/ML (ref 10–40)
LYMPHOCYTES # BLD: 1 K/UL (ref 0.8–3.5)
LYMPHOCYTES NFR BLD: 11 % (ref 12–49)
MCH RBC QN AUTO: 29.9 PG (ref 26–34)
MCHC RBC AUTO-ENTMCNC: 32.5 G/DL (ref 30–36.5)
MCV RBC AUTO: 92 FL (ref 80–99)
MONOCYTES # BLD: 0.8 K/UL (ref 0–1)
MONOCYTES NFR BLD: 10 % (ref 5–13)
NEUTS SEG # BLD: 6.6 K/UL (ref 1.8–8)
NEUTS SEG NFR BLD: 78 % (ref 32–75)
NRBC # BLD: 0 K/UL (ref 0–0.01)
NRBC BLD-RTO: 0 PER 100 WBC
PCO2 BLDA: 44 MMHG (ref 35–45)
PH BLDA: 7.44 (ref 7.35–7.45)
PLATELET # BLD AUTO: 372 K/UL (ref 150–400)
PMV BLD AUTO: 8.3 FL (ref 8.9–12.9)
PO2 BLDA: 83 MMHG (ref 80–100)
POTASSIUM SERPL-SCNC: 4.5 MMOL/L (ref 3.5–5.1)
PROT SERPL-MCNC: 5.6 G/DL (ref 6.4–8.2)
RBC # BLD AUTO: 3.88 M/UL (ref 4.1–5.7)
SAO2 % BLD: 96 % (ref 92–97)
SAO2% DEVICE SAO2% SENSOR NAME: ABNORMAL
SERVICE CMNT-IMP: NORMAL
SODIUM SERPL-SCNC: 136 MMOL/L (ref 136–145)
SPECIMEN SITE: ABNORMAL
WBC # BLD AUTO: 8.5 K/UL (ref 4.1–11.1)

## 2024-09-18 PROCEDURE — 97162 PT EVAL MOD COMPLEX 30 MIN: CPT

## 2024-09-18 PROCEDURE — 6360000002 HC RX W HCPCS: Performed by: STUDENT IN AN ORGANIZED HEALTH CARE EDUCATION/TRAINING PROGRAM

## 2024-09-18 PROCEDURE — 2580000003 HC RX 258: Performed by: STUDENT IN AN ORGANIZED HEALTH CARE EDUCATION/TRAINING PROGRAM

## 2024-09-18 PROCEDURE — 82962 GLUCOSE BLOOD TEST: CPT

## 2024-09-18 PROCEDURE — 92610 EVALUATE SWALLOWING FUNCTION: CPT

## 2024-09-18 PROCEDURE — 80053 COMPREHEN METABOLIC PANEL: CPT

## 2024-09-18 PROCEDURE — 6370000000 HC RX 637 (ALT 250 FOR IP): Performed by: STUDENT IN AN ORGANIZED HEALTH CARE EDUCATION/TRAINING PROGRAM

## 2024-09-18 PROCEDURE — 2060000000 HC ICU INTERMEDIATE R&B

## 2024-09-18 PROCEDURE — 97535 SELF CARE MNGMENT TRAINING: CPT

## 2024-09-18 PROCEDURE — 6360000002 HC RX W HCPCS: Performed by: PSYCHIATRY & NEUROLOGY

## 2024-09-18 PROCEDURE — 2580000003 HC RX 258: Performed by: GENERAL ACUTE CARE HOSPITAL

## 2024-09-18 PROCEDURE — 85025 COMPLETE CBC W/AUTO DIFF WBC: CPT

## 2024-09-18 PROCEDURE — 2500000003 HC RX 250 WO HCPCS: Performed by: GENERAL ACUTE CARE HOSPITAL

## 2024-09-18 PROCEDURE — 97166 OT EVAL MOD COMPLEX 45 MIN: CPT

## 2024-09-18 PROCEDURE — 82803 BLOOD GASES ANY COMBINATION: CPT

## 2024-09-18 PROCEDURE — 94640 AIRWAY INHALATION TREATMENT: CPT

## 2024-09-18 PROCEDURE — 36600 WITHDRAWAL OF ARTERIAL BLOOD: CPT

## 2024-09-18 PROCEDURE — 36415 COLL VENOUS BLD VENIPUNCTURE: CPT

## 2024-09-18 PROCEDURE — 6360000002 HC RX W HCPCS: Performed by: GENERAL ACUTE CARE HOSPITAL

## 2024-09-18 PROCEDURE — 99233 SBSQ HOSP IP/OBS HIGH 50: CPT

## 2024-09-18 PROCEDURE — 71045 X-RAY EXAM CHEST 1 VIEW: CPT

## 2024-09-18 PROCEDURE — 97116 GAIT TRAINING THERAPY: CPT

## 2024-09-18 RX ADMIN — SODIUM CHLORIDE, PRESERVATIVE FREE 20 MG: 5 INJECTION INTRAVENOUS at 21:30

## 2024-09-18 RX ADMIN — IPRATROPIUM BROMIDE AND ALBUTEROL SULFATE 1 DOSE: 2.5; .5 SOLUTION RESPIRATORY (INHALATION) at 14:56

## 2024-09-18 RX ADMIN — GUAIFENESIN 600 MG: 600 TABLET, EXTENDED RELEASE ORAL at 21:29

## 2024-09-18 RX ADMIN — LEVETIRACETAM 1000 MG: 100 INJECTION INTRAVENOUS at 16:53

## 2024-09-18 RX ADMIN — IPRATROPIUM BROMIDE AND ALBUTEROL SULFATE 1 DOSE: 2.5; .5 SOLUTION RESPIRATORY (INHALATION) at 20:23

## 2024-09-18 RX ADMIN — ARFORMOTEROL TARTRATE 15 MCG: 15 SOLUTION RESPIRATORY (INHALATION) at 20:08

## 2024-09-18 RX ADMIN — DEXTROSE MONOHYDRATE, SODIUM CHLORIDE, AND POTASSIUM CHLORIDE: 50; 4.5; 2.98 INJECTION, SOLUTION INTRAVENOUS at 09:01

## 2024-09-18 RX ADMIN — GABAPENTIN 600 MG: 300 CAPSULE ORAL at 21:29

## 2024-09-18 RX ADMIN — BUDESONIDE 500 MCG: 0.5 INHALANT RESPIRATORY (INHALATION) at 08:06

## 2024-09-18 RX ADMIN — LEVETIRACETAM 1000 MG: 100 INJECTION INTRAVENOUS at 03:49

## 2024-09-18 RX ADMIN — IPRATROPIUM BROMIDE AND ALBUTEROL SULFATE 1 DOSE: 2.5; .5 SOLUTION RESPIRATORY (INHALATION) at 08:09

## 2024-09-18 RX ADMIN — IPRATROPIUM BROMIDE AND ALBUTEROL SULFATE 1 DOSE: 2.5; .5 SOLUTION RESPIRATORY (INHALATION) at 11:39

## 2024-09-18 RX ADMIN — CEFTRIAXONE 1000 MG: 1 INJECTION, POWDER, FOR SOLUTION INTRAMUSCULAR; INTRAVENOUS at 02:07

## 2024-09-18 RX ADMIN — ARFORMOTEROL TARTRATE 15 MCG: 15 SOLUTION RESPIRATORY (INHALATION) at 08:06

## 2024-09-18 RX ADMIN — SODIUM CHLORIDE, PRESERVATIVE FREE 20 MG: 5 INJECTION INTRAVENOUS at 08:50

## 2024-09-18 RX ADMIN — WATER 40 MG: 1 INJECTION INTRAMUSCULAR; INTRAVENOUS; SUBCUTANEOUS at 08:11

## 2024-09-18 RX ADMIN — SODIUM CHLORIDE, PRESERVATIVE FREE 10 ML: 5 INJECTION INTRAVENOUS at 08:21

## 2024-09-18 RX ADMIN — AZITHROMYCIN DIHYDRATE 500 MG: 500 INJECTION, POWDER, LYOPHILIZED, FOR SOLUTION INTRAVENOUS at 02:04

## 2024-09-18 RX ADMIN — Medication: at 09:37

## 2024-09-18 RX ADMIN — ASPIRIN 81 MG: 81 TABLET, COATED ORAL at 21:29

## 2024-09-18 RX ADMIN — GABAPENTIN 600 MG: 300 CAPSULE ORAL at 18:22

## 2024-09-18 RX ADMIN — WATER 5 MG: 1 INJECTION INTRAMUSCULAR; INTRAVENOUS; SUBCUTANEOUS at 03:11

## 2024-09-18 RX ADMIN — GABAPENTIN 600 MG: 300 CAPSULE ORAL at 14:13

## 2024-09-18 RX ADMIN — ACETYLCYSTEINE 600 MG: 200 SOLUTION ORAL; RESPIRATORY (INHALATION) at 08:05

## 2024-09-18 RX ADMIN — ENOXAPARIN SODIUM 40 MG: 100 INJECTION SUBCUTANEOUS at 08:51

## 2024-09-18 RX ADMIN — ACETYLCYSTEINE 600 MG: 200 SOLUTION ORAL; RESPIRATORY (INHALATION) at 20:23

## 2024-09-18 RX ADMIN — SODIUM CHLORIDE, PRESERVATIVE FREE 20 MG: 5 INJECTION INTRAVENOUS at 21:29

## 2024-09-18 RX ADMIN — BUDESONIDE 500 MCG: 0.5 INHALANT RESPIRATORY (INHALATION) at 20:08

## 2024-09-18 ASSESSMENT — PAIN SCALES - GENERAL
PAINLEVEL_OUTOF10: 0
PAINLEVEL_OUTOF10: 0

## 2024-09-19 ENCOUNTER — APPOINTMENT (OUTPATIENT)
Facility: HOSPITAL | Age: 69
DRG: 190 | End: 2024-09-19
Payer: MEDICARE

## 2024-09-19 LAB
25(OH)D3 SERPL-MCNC: 82 NG/ML (ref 30–100)
ALBUMIN SERPL-MCNC: 3.1 G/DL (ref 3.5–5)
ALBUMIN/GLOB SERPL: 1.1 (ref 1.1–2.2)
ALP SERPL-CCNC: 97 U/L (ref 45–117)
ALT SERPL-CCNC: 21 U/L (ref 12–78)
ANION GAP SERPL CALC-SCNC: 5 MMOL/L (ref 2–12)
AST SERPL-CCNC: 12 U/L (ref 15–37)
BASOPHILS # BLD: 0 K/UL (ref 0–0.1)
BASOPHILS NFR BLD: 0 % (ref 0–1)
BILIRUB SERPL-MCNC: 0.6 MG/DL (ref 0.2–1)
BUN SERPL-MCNC: 11 MG/DL (ref 6–20)
BUN/CREAT SERPL: 17 (ref 12–20)
CALCIUM SERPL-MCNC: 8.6 MG/DL (ref 8.5–10.1)
CHLORIDE SERPL-SCNC: 108 MMOL/L (ref 97–108)
CO2 SERPL-SCNC: 28 MMOL/L (ref 21–32)
CREAT SERPL-MCNC: 0.64 MG/DL (ref 0.7–1.3)
DIFFERENTIAL METHOD BLD: ABNORMAL
EOSINOPHIL # BLD: 0 K/UL (ref 0–0.4)
EOSINOPHIL NFR BLD: 0 % (ref 0–7)
ERYTHROCYTE [DISTWIDTH] IN BLOOD BY AUTOMATED COUNT: 14.7 % (ref 11.5–14.5)
FOLATE SERPL-MCNC: 9.6 NG/ML (ref 5–21)
GLOBULIN SER CALC-MCNC: 2.8 G/DL (ref 2–4)
GLUCOSE SERPL-MCNC: 103 MG/DL (ref 65–100)
HCT VFR BLD AUTO: 37.2 % (ref 36.6–50.3)
HGB BLD-MCNC: 12 G/DL (ref 12.1–17)
IMM GRANULOCYTES # BLD AUTO: 0 K/UL (ref 0–0.04)
IMM GRANULOCYTES NFR BLD AUTO: 0 % (ref 0–0.5)
LYMPHOCYTES # BLD: 1.2 K/UL (ref 0.8–3.5)
LYMPHOCYTES NFR BLD: 17 % (ref 12–49)
MCH RBC QN AUTO: 30.2 PG (ref 26–34)
MCHC RBC AUTO-ENTMCNC: 32.3 G/DL (ref 30–36.5)
MCV RBC AUTO: 93.5 FL (ref 80–99)
MONOCYTES # BLD: 0.8 K/UL (ref 0–1)
MONOCYTES NFR BLD: 11 % (ref 5–13)
NEUTS SEG # BLD: 4.8 K/UL (ref 1.8–8)
NEUTS SEG NFR BLD: 72 % (ref 32–75)
NRBC # BLD: 0 K/UL (ref 0–0.01)
NRBC BLD-RTO: 0 PER 100 WBC
PLATELET # BLD AUTO: 363 K/UL (ref 150–400)
PMV BLD AUTO: 8.5 FL (ref 8.9–12.9)
POTASSIUM SERPL-SCNC: 4.1 MMOL/L (ref 3.5–5.1)
PROT SERPL-MCNC: 5.9 G/DL (ref 6.4–8.2)
RBC # BLD AUTO: 3.98 M/UL (ref 4.1–5.7)
RPR SER QL: NONREACTIVE
SODIUM SERPL-SCNC: 141 MMOL/L (ref 136–145)
VIT B12 SERPL-MCNC: 404 PG/ML (ref 193–986)
WBC # BLD AUTO: 6.8 K/UL (ref 4.1–11.1)

## 2024-09-19 PROCEDURE — 97530 THERAPEUTIC ACTIVITIES: CPT

## 2024-09-19 PROCEDURE — 85025 COMPLETE CBC W/AUTO DIFF WBC: CPT

## 2024-09-19 PROCEDURE — 99233 SBSQ HOSP IP/OBS HIGH 50: CPT

## 2024-09-19 PROCEDURE — 86592 SYPHILIS TEST NON-TREP QUAL: CPT

## 2024-09-19 PROCEDURE — 6360000002 HC RX W HCPCS: Performed by: INTERNAL MEDICINE

## 2024-09-19 PROCEDURE — 94640 AIRWAY INHALATION TREATMENT: CPT

## 2024-09-19 PROCEDURE — 97530 THERAPEUTIC ACTIVITIES: CPT | Performed by: PHYSICAL THERAPIST

## 2024-09-19 PROCEDURE — 2580000003 HC RX 258: Performed by: GENERAL ACUTE CARE HOSPITAL

## 2024-09-19 PROCEDURE — 6370000000 HC RX 637 (ALT 250 FOR IP): Performed by: STUDENT IN AN ORGANIZED HEALTH CARE EDUCATION/TRAINING PROGRAM

## 2024-09-19 PROCEDURE — 36415 COLL VENOUS BLD VENIPUNCTURE: CPT

## 2024-09-19 PROCEDURE — 6360000002 HC RX W HCPCS: Performed by: GENERAL ACUTE CARE HOSPITAL

## 2024-09-19 PROCEDURE — 6360000002 HC RX W HCPCS: Performed by: STUDENT IN AN ORGANIZED HEALTH CARE EDUCATION/TRAINING PROGRAM

## 2024-09-19 PROCEDURE — 82746 ASSAY OF FOLIC ACID SERUM: CPT

## 2024-09-19 PROCEDURE — 2500000003 HC RX 250 WO HCPCS: Performed by: GENERAL ACUTE CARE HOSPITAL

## 2024-09-19 PROCEDURE — 82306 VITAMIN D 25 HYDROXY: CPT

## 2024-09-19 PROCEDURE — 70551 MRI BRAIN STEM W/O DYE: CPT

## 2024-09-19 PROCEDURE — 97116 GAIT TRAINING THERAPY: CPT | Performed by: PHYSICAL THERAPIST

## 2024-09-19 PROCEDURE — 2580000003 HC RX 258: Performed by: STUDENT IN AN ORGANIZED HEALTH CARE EDUCATION/TRAINING PROGRAM

## 2024-09-19 PROCEDURE — 6360000002 HC RX W HCPCS: Performed by: PSYCHIATRY & NEUROLOGY

## 2024-09-19 PROCEDURE — 97535 SELF CARE MNGMENT TRAINING: CPT

## 2024-09-19 PROCEDURE — 82607 VITAMIN B-12: CPT

## 2024-09-19 PROCEDURE — 80053 COMPREHEN METABOLIC PANEL: CPT

## 2024-09-19 PROCEDURE — 2060000000 HC ICU INTERMEDIATE R&B

## 2024-09-19 RX ORDER — LORAZEPAM 2 MG/ML
1 INJECTION INTRAMUSCULAR
Status: COMPLETED | OUTPATIENT
Start: 2024-09-19 | End: 2024-09-19

## 2024-09-19 RX ADMIN — CEFTRIAXONE 1000 MG: 1 INJECTION, POWDER, FOR SOLUTION INTRAMUSCULAR; INTRAVENOUS at 01:32

## 2024-09-19 RX ADMIN — ASPIRIN 81 MG: 81 TABLET, COATED ORAL at 20:56

## 2024-09-19 RX ADMIN — IPRATROPIUM BROMIDE AND ALBUTEROL SULFATE 1 DOSE: 2.5; .5 SOLUTION RESPIRATORY (INHALATION) at 11:49

## 2024-09-19 RX ADMIN — SODIUM CHLORIDE, PRESERVATIVE FREE 10 ML: 5 INJECTION INTRAVENOUS at 08:25

## 2024-09-19 RX ADMIN — LEVETIRACETAM 1000 MG: 100 INJECTION INTRAVENOUS at 04:40

## 2024-09-19 RX ADMIN — GABAPENTIN 600 MG: 300 CAPSULE ORAL at 12:45

## 2024-09-19 RX ADMIN — IPRATROPIUM BROMIDE AND ALBUTEROL SULFATE 1 DOSE: 2.5; .5 SOLUTION RESPIRATORY (INHALATION) at 07:59

## 2024-09-19 RX ADMIN — SODIUM CHLORIDE, PRESERVATIVE FREE 20 MG: 5 INJECTION INTRAVENOUS at 08:20

## 2024-09-19 RX ADMIN — MONTELUKAST 10 MG: 10 TABLET, FILM COATED ORAL at 08:14

## 2024-09-19 RX ADMIN — Medication: at 22:32

## 2024-09-19 RX ADMIN — LEVOTHYROXINE SODIUM 50 MCG: 0.03 TABLET ORAL at 08:14

## 2024-09-19 RX ADMIN — CITALOPRAM HYDROBROMIDE 20 MG: 20 TABLET ORAL at 08:14

## 2024-09-19 RX ADMIN — GUAIFENESIN 600 MG: 600 TABLET, EXTENDED RELEASE ORAL at 08:19

## 2024-09-19 RX ADMIN — PANTOPRAZOLE SODIUM 40 MG: 40 TABLET, DELAYED RELEASE ORAL at 06:30

## 2024-09-19 RX ADMIN — LORAZEPAM 1 MG: 2 INJECTION INTRAMUSCULAR; INTRAVENOUS at 20:52

## 2024-09-19 RX ADMIN — GUAIFENESIN 600 MG: 600 TABLET, EXTENDED RELEASE ORAL at 20:56

## 2024-09-19 RX ADMIN — GABAPENTIN 600 MG: 300 CAPSULE ORAL at 17:34

## 2024-09-19 RX ADMIN — AZITHROMYCIN DIHYDRATE 500 MG: 500 INJECTION, POWDER, LYOPHILIZED, FOR SOLUTION INTRAVENOUS at 01:33

## 2024-09-19 RX ADMIN — SODIUM CHLORIDE, PRESERVATIVE FREE 10 ML: 5 INJECTION INTRAVENOUS at 22:30

## 2024-09-19 RX ADMIN — AMLODIPINE BESYLATE 10 MG: 5 TABLET ORAL at 08:14

## 2024-09-19 RX ADMIN — BUDESONIDE 500 MCG: 0.5 INHALANT RESPIRATORY (INHALATION) at 07:59

## 2024-09-19 RX ADMIN — GABAPENTIN 600 MG: 300 CAPSULE ORAL at 20:56

## 2024-09-19 RX ADMIN — WATER 40 MG: 1 INJECTION INTRAMUSCULAR; INTRAVENOUS; SUBCUTANEOUS at 08:23

## 2024-09-19 RX ADMIN — ENOXAPARIN SODIUM 40 MG: 100 INJECTION SUBCUTANEOUS at 08:15

## 2024-09-19 RX ADMIN — IPRATROPIUM BROMIDE AND ALBUTEROL SULFATE 1 DOSE: 2.5; .5 SOLUTION RESPIRATORY (INHALATION) at 15:14

## 2024-09-19 RX ADMIN — GABAPENTIN 600 MG: 300 CAPSULE ORAL at 08:14

## 2024-09-19 RX ADMIN — ARFORMOTEROL TARTRATE 15 MCG: 15 SOLUTION RESPIRATORY (INHALATION) at 07:59

## 2024-09-19 RX ADMIN — Medication: at 08:26

## 2024-09-19 RX ADMIN — ACETYLCYSTEINE 600 MG: 200 SOLUTION ORAL; RESPIRATORY (INHALATION) at 07:59

## 2024-09-19 RX ADMIN — ATORVASTATIN CALCIUM 40 MG: 40 TABLET, FILM COATED ORAL at 08:14

## 2024-09-19 RX ADMIN — SODIUM CHLORIDE, PRESERVATIVE FREE 20 MG: 5 INJECTION INTRAVENOUS at 22:27

## 2024-09-19 RX ADMIN — ASPIRIN 81 MG: 81 TABLET, COATED ORAL at 08:25

## 2024-09-19 RX ADMIN — LEVETIRACETAM 1000 MG: 100 INJECTION INTRAVENOUS at 17:34

## 2024-09-19 ASSESSMENT — PAIN SCALES - GENERAL
PAINLEVEL_OUTOF10: 0
PAINLEVEL_OUTOF10: 0

## 2024-09-20 ENCOUNTER — TELEPHONE (OUTPATIENT)
Age: 69
End: 2024-09-20

## 2024-09-20 PROCEDURE — 6370000000 HC RX 637 (ALT 250 FOR IP): Performed by: STUDENT IN AN ORGANIZED HEALTH CARE EDUCATION/TRAINING PROGRAM

## 2024-09-20 PROCEDURE — 2580000003 HC RX 258: Performed by: STUDENT IN AN ORGANIZED HEALTH CARE EDUCATION/TRAINING PROGRAM

## 2024-09-20 PROCEDURE — 2580000003 HC RX 258: Performed by: GENERAL ACUTE CARE HOSPITAL

## 2024-09-20 PROCEDURE — 2700000000 HC OXYGEN THERAPY PER DAY

## 2024-09-20 PROCEDURE — 6360000002 HC RX W HCPCS: Performed by: STUDENT IN AN ORGANIZED HEALTH CARE EDUCATION/TRAINING PROGRAM

## 2024-09-20 PROCEDURE — 6360000002 HC RX W HCPCS: Performed by: PSYCHIATRY & NEUROLOGY

## 2024-09-20 PROCEDURE — 92526 ORAL FUNCTION THERAPY: CPT

## 2024-09-20 PROCEDURE — 94761 N-INVAS EAR/PLS OXIMETRY MLT: CPT

## 2024-09-20 PROCEDURE — 6370000000 HC RX 637 (ALT 250 FOR IP)

## 2024-09-20 PROCEDURE — 2500000003 HC RX 250 WO HCPCS: Performed by: GENERAL ACUTE CARE HOSPITAL

## 2024-09-20 PROCEDURE — 97530 THERAPEUTIC ACTIVITIES: CPT

## 2024-09-20 PROCEDURE — 99233 SBSQ HOSP IP/OBS HIGH 50: CPT

## 2024-09-20 PROCEDURE — 6360000002 HC RX W HCPCS: Performed by: GENERAL ACUTE CARE HOSPITAL

## 2024-09-20 PROCEDURE — 94640 AIRWAY INHALATION TREATMENT: CPT

## 2024-09-20 PROCEDURE — 97116 GAIT TRAINING THERAPY: CPT

## 2024-09-20 PROCEDURE — 1100000003 HC PRIVATE W/ TELEMETRY

## 2024-09-20 RX ORDER — LEVETIRACETAM 500 MG/1
1000 TABLET ORAL 2 TIMES DAILY
Status: DISCONTINUED | OUTPATIENT
Start: 2024-09-20 | End: 2024-09-23 | Stop reason: HOSPADM

## 2024-09-20 RX ADMIN — BUDESONIDE 500 MCG: 0.5 INHALANT RESPIRATORY (INHALATION) at 08:44

## 2024-09-20 RX ADMIN — CEFTRIAXONE 1000 MG: 1 INJECTION, POWDER, FOR SOLUTION INTRAMUSCULAR; INTRAVENOUS at 01:09

## 2024-09-20 RX ADMIN — PANTOPRAZOLE SODIUM 40 MG: 40 TABLET, DELAYED RELEASE ORAL at 07:40

## 2024-09-20 RX ADMIN — IPRATROPIUM BROMIDE AND ALBUTEROL SULFATE 1 DOSE: 2.5; .5 SOLUTION RESPIRATORY (INHALATION) at 08:43

## 2024-09-20 RX ADMIN — GUAIFENESIN 600 MG: 600 TABLET, EXTENDED RELEASE ORAL at 09:25

## 2024-09-20 RX ADMIN — ARFORMOTEROL TARTRATE 15 MCG: 15 SOLUTION RESPIRATORY (INHALATION) at 21:10

## 2024-09-20 RX ADMIN — ACETYLCYSTEINE 600 MG: 200 SOLUTION ORAL; RESPIRATORY (INHALATION) at 21:05

## 2024-09-20 RX ADMIN — ENOXAPARIN SODIUM 40 MG: 100 INJECTION SUBCUTANEOUS at 09:30

## 2024-09-20 RX ADMIN — IPRATROPIUM BROMIDE AND ALBUTEROL SULFATE 1 DOSE: 2.5; .5 SOLUTION RESPIRATORY (INHALATION) at 12:02

## 2024-09-20 RX ADMIN — LEVETIRACETAM 1000 MG: 500 TABLET, FILM COATED ORAL at 20:48

## 2024-09-20 RX ADMIN — LEVETIRACETAM 1000 MG: 100 INJECTION INTRAVENOUS at 03:50

## 2024-09-20 RX ADMIN — GABAPENTIN 600 MG: 300 CAPSULE ORAL at 18:54

## 2024-09-20 RX ADMIN — SODIUM CHLORIDE, PRESERVATIVE FREE 10 ML: 5 INJECTION INTRAVENOUS at 20:49

## 2024-09-20 RX ADMIN — Medication: at 09:31

## 2024-09-20 RX ADMIN — SODIUM CHLORIDE, PRESERVATIVE FREE 20 MG: 5 INJECTION INTRAVENOUS at 20:48

## 2024-09-20 RX ADMIN — ASPIRIN 81 MG: 81 TABLET, COATED ORAL at 09:30

## 2024-09-20 RX ADMIN — IPRATROPIUM BROMIDE AND ALBUTEROL SULFATE 1 DOSE: 2.5; .5 SOLUTION RESPIRATORY (INHALATION) at 15:59

## 2024-09-20 RX ADMIN — SODIUM CHLORIDE, PRESERVATIVE FREE 10 ML: 5 INJECTION INTRAVENOUS at 09:36

## 2024-09-20 RX ADMIN — GABAPENTIN 600 MG: 300 CAPSULE ORAL at 20:48

## 2024-09-20 RX ADMIN — Medication: at 23:34

## 2024-09-20 RX ADMIN — ACETYLCYSTEINE 600 MG: 200 SOLUTION ORAL; RESPIRATORY (INHALATION) at 08:44

## 2024-09-20 RX ADMIN — GABAPENTIN 600 MG: 300 CAPSULE ORAL at 09:27

## 2024-09-20 RX ADMIN — IPRATROPIUM BROMIDE AND ALBUTEROL SULFATE 1 DOSE: 2.5; .5 SOLUTION RESPIRATORY (INHALATION) at 21:05

## 2024-09-20 RX ADMIN — GUAIFENESIN 600 MG: 600 TABLET, EXTENDED RELEASE ORAL at 20:48

## 2024-09-20 RX ADMIN — SODIUM CHLORIDE, PRESERVATIVE FREE 20 MG: 5 INJECTION INTRAVENOUS at 09:31

## 2024-09-20 RX ADMIN — ASPIRIN 81 MG: 81 TABLET, COATED ORAL at 20:48

## 2024-09-20 RX ADMIN — BUDESONIDE 500 MCG: 0.5 INHALANT RESPIRATORY (INHALATION) at 21:10

## 2024-09-20 RX ADMIN — ATORVASTATIN CALCIUM 40 MG: 40 TABLET, FILM COATED ORAL at 09:27

## 2024-09-20 RX ADMIN — MONTELUKAST 10 MG: 10 TABLET, FILM COATED ORAL at 09:28

## 2024-09-20 RX ADMIN — AMLODIPINE BESYLATE 10 MG: 5 TABLET ORAL at 09:26

## 2024-09-20 RX ADMIN — GABAPENTIN 600 MG: 300 CAPSULE ORAL at 13:03

## 2024-09-20 RX ADMIN — ARFORMOTEROL TARTRATE 15 MCG: 15 SOLUTION RESPIRATORY (INHALATION) at 08:44

## 2024-09-20 RX ADMIN — WATER 40 MG: 1 INJECTION INTRAMUSCULAR; INTRAVENOUS; SUBCUTANEOUS at 09:32

## 2024-09-20 RX ADMIN — CITALOPRAM HYDROBROMIDE 20 MG: 20 TABLET ORAL at 09:28

## 2024-09-20 RX ADMIN — LEVOTHYROXINE SODIUM 50 MCG: 0.03 TABLET ORAL at 09:29

## 2024-09-20 ASSESSMENT — PAIN SCALES - GENERAL
PAINLEVEL_OUTOF10: 0

## 2024-09-20 ASSESSMENT — PAIN - FUNCTIONAL ASSESSMENT: PAIN_FUNCTIONAL_ASSESSMENT: ACTIVITIES ARE NOT PREVENTED

## 2024-09-21 PROCEDURE — 2580000003 HC RX 258: Performed by: GENERAL ACUTE CARE HOSPITAL

## 2024-09-21 PROCEDURE — 2500000003 HC RX 250 WO HCPCS: Performed by: GENERAL ACUTE CARE HOSPITAL

## 2024-09-21 PROCEDURE — 6360000002 HC RX W HCPCS: Performed by: STUDENT IN AN ORGANIZED HEALTH CARE EDUCATION/TRAINING PROGRAM

## 2024-09-21 PROCEDURE — 6370000000 HC RX 637 (ALT 250 FOR IP): Performed by: STUDENT IN AN ORGANIZED HEALTH CARE EDUCATION/TRAINING PROGRAM

## 2024-09-21 PROCEDURE — 1100000003 HC PRIVATE W/ TELEMETRY

## 2024-09-21 PROCEDURE — 94640 AIRWAY INHALATION TREATMENT: CPT

## 2024-09-21 PROCEDURE — 6360000002 HC RX W HCPCS: Performed by: GENERAL ACUTE CARE HOSPITAL

## 2024-09-21 PROCEDURE — 2580000003 HC RX 258: Performed by: STUDENT IN AN ORGANIZED HEALTH CARE EDUCATION/TRAINING PROGRAM

## 2024-09-21 PROCEDURE — 6370000000 HC RX 637 (ALT 250 FOR IP)

## 2024-09-21 PROCEDURE — 2700000000 HC OXYGEN THERAPY PER DAY

## 2024-09-21 PROCEDURE — 94761 N-INVAS EAR/PLS OXIMETRY MLT: CPT

## 2024-09-21 RX ADMIN — ASPIRIN 81 MG: 81 TABLET, COATED ORAL at 21:18

## 2024-09-21 RX ADMIN — ARFORMOTEROL TARTRATE 15 MCG: 15 SOLUTION RESPIRATORY (INHALATION) at 20:43

## 2024-09-21 RX ADMIN — ARFORMOTEROL TARTRATE 15 MCG: 15 SOLUTION RESPIRATORY (INHALATION) at 08:22

## 2024-09-21 RX ADMIN — BUDESONIDE 500 MCG: 0.5 INHALANT RESPIRATORY (INHALATION) at 08:22

## 2024-09-21 RX ADMIN — GABAPENTIN 600 MG: 300 CAPSULE ORAL at 10:29

## 2024-09-21 RX ADMIN — GUAIFENESIN 600 MG: 600 TABLET, EXTENDED RELEASE ORAL at 21:18

## 2024-09-21 RX ADMIN — ENOXAPARIN SODIUM 40 MG: 100 INJECTION SUBCUTANEOUS at 10:37

## 2024-09-21 RX ADMIN — GABAPENTIN 600 MG: 300 CAPSULE ORAL at 17:13

## 2024-09-21 RX ADMIN — CITALOPRAM HYDROBROMIDE 20 MG: 20 TABLET ORAL at 10:29

## 2024-09-21 RX ADMIN — LEVETIRACETAM 1000 MG: 500 TABLET, FILM COATED ORAL at 21:18

## 2024-09-21 RX ADMIN — Medication: at 11:01

## 2024-09-21 RX ADMIN — SODIUM CHLORIDE, PRESERVATIVE FREE 20 MG: 5 INJECTION INTRAVENOUS at 21:18

## 2024-09-21 RX ADMIN — GABAPENTIN 600 MG: 300 CAPSULE ORAL at 21:18

## 2024-09-21 RX ADMIN — SODIUM CHLORIDE, PRESERVATIVE FREE 10 ML: 5 INJECTION INTRAVENOUS at 21:24

## 2024-09-21 RX ADMIN — CEFTRIAXONE 1000 MG: 1 INJECTION, POWDER, FOR SOLUTION INTRAMUSCULAR; INTRAVENOUS at 01:28

## 2024-09-21 RX ADMIN — IPRATROPIUM BROMIDE AND ALBUTEROL SULFATE 1 DOSE: 2.5; .5 SOLUTION RESPIRATORY (INHALATION) at 12:03

## 2024-09-21 RX ADMIN — LEVETIRACETAM 1000 MG: 500 TABLET, FILM COATED ORAL at 10:29

## 2024-09-21 RX ADMIN — Medication: at 21:22

## 2024-09-21 RX ADMIN — ACETYLCYSTEINE 600 MG: 200 SOLUTION ORAL; RESPIRATORY (INHALATION) at 08:22

## 2024-09-21 RX ADMIN — GABAPENTIN 600 MG: 300 CAPSULE ORAL at 13:39

## 2024-09-21 RX ADMIN — SODIUM CHLORIDE, PRESERVATIVE FREE 10 ML: 5 INJECTION INTRAVENOUS at 10:27

## 2024-09-21 RX ADMIN — GUAIFENESIN 600 MG: 600 TABLET, EXTENDED RELEASE ORAL at 10:29

## 2024-09-21 RX ADMIN — WATER 40 MG: 1 INJECTION INTRAMUSCULAR; INTRAVENOUS; SUBCUTANEOUS at 10:46

## 2024-09-21 RX ADMIN — LEVOTHYROXINE SODIUM 50 MCG: 0.03 TABLET ORAL at 10:29

## 2024-09-21 RX ADMIN — IPRATROPIUM BROMIDE AND ALBUTEROL SULFATE 1 DOSE: 2.5; .5 SOLUTION RESPIRATORY (INHALATION) at 08:22

## 2024-09-21 RX ADMIN — IPRATROPIUM BROMIDE AND ALBUTEROL SULFATE 1 DOSE: 2.5; .5 SOLUTION RESPIRATORY (INHALATION) at 15:04

## 2024-09-21 RX ADMIN — SODIUM CHLORIDE, PRESERVATIVE FREE 20 MG: 5 INJECTION INTRAVENOUS at 10:52

## 2024-09-21 RX ADMIN — BUDESONIDE 500 MCG: 0.5 INHALANT RESPIRATORY (INHALATION) at 20:43

## 2024-09-21 RX ADMIN — ASPIRIN 81 MG: 81 TABLET, COATED ORAL at 10:29

## 2024-09-21 RX ADMIN — MONTELUKAST 10 MG: 10 TABLET, FILM COATED ORAL at 10:29

## 2024-09-21 RX ADMIN — ACETYLCYSTEINE 600 MG: 200 SOLUTION ORAL; RESPIRATORY (INHALATION) at 20:38

## 2024-09-21 RX ADMIN — IPRATROPIUM BROMIDE AND ALBUTEROL SULFATE 1 DOSE: 2.5; .5 SOLUTION RESPIRATORY (INHALATION) at 20:38

## 2024-09-21 RX ADMIN — PANTOPRAZOLE SODIUM 40 MG: 40 TABLET, DELAYED RELEASE ORAL at 05:40

## 2024-09-21 RX ADMIN — ATORVASTATIN CALCIUM 40 MG: 40 TABLET, FILM COATED ORAL at 10:29

## 2024-09-22 PROCEDURE — 94761 N-INVAS EAR/PLS OXIMETRY MLT: CPT

## 2024-09-22 PROCEDURE — 6370000000 HC RX 637 (ALT 250 FOR IP)

## 2024-09-22 PROCEDURE — 6360000002 HC RX W HCPCS: Performed by: STUDENT IN AN ORGANIZED HEALTH CARE EDUCATION/TRAINING PROGRAM

## 2024-09-22 PROCEDURE — 5A09357 ASSISTANCE WITH RESPIRATORY VENTILATION, LESS THAN 24 CONSECUTIVE HOURS, CONTINUOUS POSITIVE AIRWAY PRESSURE: ICD-10-PCS | Performed by: INTERNAL MEDICINE

## 2024-09-22 PROCEDURE — 94640 AIRWAY INHALATION TREATMENT: CPT

## 2024-09-22 PROCEDURE — 1100000003 HC PRIVATE W/ TELEMETRY

## 2024-09-22 PROCEDURE — 6370000000 HC RX 637 (ALT 250 FOR IP): Performed by: STUDENT IN AN ORGANIZED HEALTH CARE EDUCATION/TRAINING PROGRAM

## 2024-09-22 PROCEDURE — 2700000000 HC OXYGEN THERAPY PER DAY

## 2024-09-22 PROCEDURE — 2580000003 HC RX 258: Performed by: GENERAL ACUTE CARE HOSPITAL

## 2024-09-22 PROCEDURE — 2580000003 HC RX 258: Performed by: STUDENT IN AN ORGANIZED HEALTH CARE EDUCATION/TRAINING PROGRAM

## 2024-09-22 PROCEDURE — 2500000003 HC RX 250 WO HCPCS: Performed by: GENERAL ACUTE CARE HOSPITAL

## 2024-09-22 RX ADMIN — SODIUM CHLORIDE, PRESERVATIVE FREE 20 MG: 5 INJECTION INTRAVENOUS at 09:00

## 2024-09-22 RX ADMIN — GABAPENTIN 600 MG: 300 CAPSULE ORAL at 16:44

## 2024-09-22 RX ADMIN — GABAPENTIN 600 MG: 300 CAPSULE ORAL at 13:24

## 2024-09-22 RX ADMIN — ATORVASTATIN CALCIUM 40 MG: 40 TABLET, FILM COATED ORAL at 08:54

## 2024-09-22 RX ADMIN — GABAPENTIN 600 MG: 300 CAPSULE ORAL at 20:50

## 2024-09-22 RX ADMIN — IPRATROPIUM BROMIDE AND ALBUTEROL SULFATE 1 DOSE: 2.5; .5 SOLUTION RESPIRATORY (INHALATION) at 11:38

## 2024-09-22 RX ADMIN — GUAIFENESIN 600 MG: 600 TABLET, EXTENDED RELEASE ORAL at 08:52

## 2024-09-22 RX ADMIN — AMLODIPINE BESYLATE 10 MG: 5 TABLET ORAL at 08:57

## 2024-09-22 RX ADMIN — SODIUM CHLORIDE, PRESERVATIVE FREE 20 MG: 5 INJECTION INTRAVENOUS at 20:50

## 2024-09-22 RX ADMIN — ASPIRIN 81 MG: 81 TABLET, COATED ORAL at 08:53

## 2024-09-22 RX ADMIN — BUDESONIDE 500 MCG: 0.5 INHALANT RESPIRATORY (INHALATION) at 21:36

## 2024-09-22 RX ADMIN — IPRATROPIUM BROMIDE AND ALBUTEROL SULFATE 1 DOSE: 2.5; .5 SOLUTION RESPIRATORY (INHALATION) at 21:31

## 2024-09-22 RX ADMIN — ENOXAPARIN SODIUM 40 MG: 100 INJECTION SUBCUTANEOUS at 08:57

## 2024-09-22 RX ADMIN — ACETYLCYSTEINE 600 MG: 200 SOLUTION ORAL; RESPIRATORY (INHALATION) at 08:21

## 2024-09-22 RX ADMIN — LEVOTHYROXINE SODIUM 50 MCG: 0.03 TABLET ORAL at 08:53

## 2024-09-22 RX ADMIN — LEVETIRACETAM 1000 MG: 500 TABLET, FILM COATED ORAL at 08:59

## 2024-09-22 RX ADMIN — LEVETIRACETAM 1000 MG: 500 TABLET, FILM COATED ORAL at 20:50

## 2024-09-22 RX ADMIN — SODIUM CHLORIDE, PRESERVATIVE FREE 10 ML: 5 INJECTION INTRAVENOUS at 20:50

## 2024-09-22 RX ADMIN — BUDESONIDE 500 MCG: 0.5 INHALANT RESPIRATORY (INHALATION) at 08:21

## 2024-09-22 RX ADMIN — MONTELUKAST 10 MG: 10 TABLET, FILM COATED ORAL at 08:54

## 2024-09-22 RX ADMIN — IPRATROPIUM BROMIDE AND ALBUTEROL SULFATE 1 DOSE: 2.5; .5 SOLUTION RESPIRATORY (INHALATION) at 08:21

## 2024-09-22 RX ADMIN — CITALOPRAM HYDROBROMIDE 20 MG: 20 TABLET ORAL at 08:53

## 2024-09-22 RX ADMIN — SODIUM CHLORIDE, PRESERVATIVE FREE 10 ML: 5 INJECTION INTRAVENOUS at 11:55

## 2024-09-22 RX ADMIN — Medication: at 11:55

## 2024-09-22 RX ADMIN — PANTOPRAZOLE SODIUM 40 MG: 40 TABLET, DELAYED RELEASE ORAL at 05:35

## 2024-09-22 RX ADMIN — GABAPENTIN 600 MG: 300 CAPSULE ORAL at 08:53

## 2024-09-22 RX ADMIN — IPRATROPIUM BROMIDE AND ALBUTEROL SULFATE 1 DOSE: 2.5; .5 SOLUTION RESPIRATORY (INHALATION) at 15:07

## 2024-09-22 RX ADMIN — GUAIFENESIN 600 MG: 600 TABLET, EXTENDED RELEASE ORAL at 20:50

## 2024-09-22 RX ADMIN — ARFORMOTEROL TARTRATE 15 MCG: 15 SOLUTION RESPIRATORY (INHALATION) at 08:21

## 2024-09-22 RX ADMIN — ARFORMOTEROL TARTRATE 15 MCG: 15 SOLUTION RESPIRATORY (INHALATION) at 21:36

## 2024-09-22 RX ADMIN — ASPIRIN 81 MG: 81 TABLET, COATED ORAL at 20:50

## 2024-09-22 RX ADMIN — Medication: at 20:51

## 2024-09-23 VITALS
SYSTOLIC BLOOD PRESSURE: 107 MMHG | OXYGEN SATURATION: 97 % | DIASTOLIC BLOOD PRESSURE: 68 MMHG | WEIGHT: 167.77 LBS | HEIGHT: 70 IN | BODY MASS INDEX: 24.02 KG/M2 | HEART RATE: 78 BPM | RESPIRATION RATE: 18 BRPM | TEMPERATURE: 98.7 F

## 2024-09-23 PROCEDURE — 2580000003 HC RX 258: Performed by: GENERAL ACUTE CARE HOSPITAL

## 2024-09-23 PROCEDURE — 97530 THERAPEUTIC ACTIVITIES: CPT

## 2024-09-23 PROCEDURE — 97116 GAIT TRAINING THERAPY: CPT

## 2024-09-23 PROCEDURE — 97535 SELF CARE MNGMENT TRAINING: CPT

## 2024-09-23 PROCEDURE — 6370000000 HC RX 637 (ALT 250 FOR IP)

## 2024-09-23 PROCEDURE — 2700000000 HC OXYGEN THERAPY PER DAY

## 2024-09-23 PROCEDURE — 6370000000 HC RX 637 (ALT 250 FOR IP): Performed by: STUDENT IN AN ORGANIZED HEALTH CARE EDUCATION/TRAINING PROGRAM

## 2024-09-23 PROCEDURE — 2500000003 HC RX 250 WO HCPCS: Performed by: GENERAL ACUTE CARE HOSPITAL

## 2024-09-23 PROCEDURE — 94640 AIRWAY INHALATION TREATMENT: CPT

## 2024-09-23 PROCEDURE — 94761 N-INVAS EAR/PLS OXIMETRY MLT: CPT

## 2024-09-23 PROCEDURE — 2580000003 HC RX 258: Performed by: STUDENT IN AN ORGANIZED HEALTH CARE EDUCATION/TRAINING PROGRAM

## 2024-09-23 PROCEDURE — 6360000002 HC RX W HCPCS: Performed by: STUDENT IN AN ORGANIZED HEALTH CARE EDUCATION/TRAINING PROGRAM

## 2024-09-23 RX ORDER — LEVETIRACETAM 1000 MG/1
1000 TABLET ORAL 2 TIMES DAILY
Qty: 60 TABLET | Refills: 3 | Status: SHIPPED | OUTPATIENT
Start: 2024-09-23

## 2024-09-23 RX ORDER — ASPIRIN 81 MG/1
81 TABLET ORAL 2 TIMES DAILY
Qty: 60 TABLET | Refills: 1 | Status: SHIPPED | OUTPATIENT
Start: 2024-09-23 | End: 2024-11-22

## 2024-09-23 RX ADMIN — LEVETIRACETAM 1000 MG: 500 TABLET, FILM COATED ORAL at 10:51

## 2024-09-23 RX ADMIN — ASPIRIN 81 MG: 81 TABLET, COATED ORAL at 10:51

## 2024-09-23 RX ADMIN — LEVOTHYROXINE SODIUM 50 MCG: 0.03 TABLET ORAL at 10:51

## 2024-09-23 RX ADMIN — GABAPENTIN 600 MG: 300 CAPSULE ORAL at 10:51

## 2024-09-23 RX ADMIN — BUDESONIDE 500 MCG: 0.5 INHALANT RESPIRATORY (INHALATION) at 07:15

## 2024-09-23 RX ADMIN — SODIUM CHLORIDE, PRESERVATIVE FREE 10 ML: 5 INJECTION INTRAVENOUS at 10:52

## 2024-09-23 RX ADMIN — IPRATROPIUM BROMIDE AND ALBUTEROL SULFATE 1 DOSE: 2.5; .5 SOLUTION RESPIRATORY (INHALATION) at 15:33

## 2024-09-23 RX ADMIN — GABAPENTIN 600 MG: 300 CAPSULE ORAL at 13:48

## 2024-09-23 RX ADMIN — ENOXAPARIN SODIUM 40 MG: 100 INJECTION SUBCUTANEOUS at 10:53

## 2024-09-23 RX ADMIN — AMLODIPINE BESYLATE 10 MG: 5 TABLET ORAL at 10:51

## 2024-09-23 RX ADMIN — PANTOPRAZOLE SODIUM 40 MG: 40 TABLET, DELAYED RELEASE ORAL at 05:09

## 2024-09-23 RX ADMIN — GUAIFENESIN 600 MG: 600 TABLET, EXTENDED RELEASE ORAL at 10:51

## 2024-09-23 RX ADMIN — ARFORMOTEROL TARTRATE 15 MCG: 15 SOLUTION RESPIRATORY (INHALATION) at 07:15

## 2024-09-23 RX ADMIN — ATORVASTATIN CALCIUM 40 MG: 40 TABLET, FILM COATED ORAL at 10:51

## 2024-09-23 RX ADMIN — CITALOPRAM HYDROBROMIDE 20 MG: 20 TABLET ORAL at 10:51

## 2024-09-23 RX ADMIN — SODIUM CHLORIDE, PRESERVATIVE FREE 20 MG: 5 INJECTION INTRAVENOUS at 10:52

## 2024-09-23 RX ADMIN — IPRATROPIUM BROMIDE AND ALBUTEROL SULFATE 1 DOSE: 2.5; .5 SOLUTION RESPIRATORY (INHALATION) at 11:38

## 2024-09-23 RX ADMIN — MONTELUKAST 10 MG: 10 TABLET, FILM COATED ORAL at 10:51

## 2024-09-23 RX ADMIN — IPRATROPIUM BROMIDE AND ALBUTEROL SULFATE 1 DOSE: 2.5; .5 SOLUTION RESPIRATORY (INHALATION) at 07:10

## 2024-09-23 ASSESSMENT — PAIN SCALES - GENERAL: PAINLEVEL_OUTOF10: 0

## 2024-10-13 ENCOUNTER — APPOINTMENT (OUTPATIENT)
Facility: HOSPITAL | Age: 69
DRG: 191 | End: 2024-10-13
Payer: MEDICARE

## 2024-10-13 ENCOUNTER — HOSPITAL ENCOUNTER (INPATIENT)
Facility: HOSPITAL | Age: 69
LOS: 3 days | Discharge: HOME | DRG: 191 | End: 2024-10-16
Attending: STUDENT IN AN ORGANIZED HEALTH CARE EDUCATION/TRAINING PROGRAM | Admitting: STUDENT IN AN ORGANIZED HEALTH CARE EDUCATION/TRAINING PROGRAM
Payer: MEDICARE

## 2024-10-13 DIAGNOSIS — R06.02 SHORTNESS OF BREATH: ICD-10-CM

## 2024-10-13 DIAGNOSIS — R07.9 CHEST PAIN, UNSPECIFIED TYPE: Primary | ICD-10-CM

## 2024-10-13 DIAGNOSIS — I25.9 CHRONIC ISCHEMIC HEART DISEASE: ICD-10-CM

## 2024-10-13 DIAGNOSIS — I20.9 ANGINA PECTORIS (HCC): ICD-10-CM

## 2024-10-13 LAB
ALBUMIN SERPL-MCNC: 2.7 G/DL (ref 3.5–5)
ALBUMIN/GLOB SERPL: 1 (ref 1.1–2.2)
ALP SERPL-CCNC: 74 U/L (ref 45–117)
ALT SERPL-CCNC: 17 U/L (ref 12–78)
ANION GAP SERPL CALC-SCNC: 5 MMOL/L (ref 2–12)
AST SERPL-CCNC: 26 U/L (ref 15–37)
BASOPHILS # BLD: 0 K/UL (ref 0–0.1)
BASOPHILS NFR BLD: 1 % (ref 0–1)
BILIRUB SERPL-MCNC: 0.5 MG/DL (ref 0.2–1)
BUN SERPL-MCNC: 11 MG/DL (ref 6–20)
BUN/CREAT SERPL: 15 (ref 12–20)
CALCIUM SERPL-MCNC: 7.3 MG/DL (ref 8.5–10.1)
CHLORIDE SERPL-SCNC: 111 MMOL/L (ref 97–108)
CO2 SERPL-SCNC: 23 MMOL/L (ref 21–32)
CREAT SERPL-MCNC: 0.72 MG/DL (ref 0.7–1.3)
D DIMER PPP FEU-MCNC: 1.25 MG/L FEU (ref 0–0.65)
DIFFERENTIAL METHOD BLD: ABNORMAL
ECHO BSA: 2 M2
EOSINOPHIL # BLD: 0.1 K/UL (ref 0–0.4)
EOSINOPHIL NFR BLD: 2 % (ref 0–7)
ERYTHROCYTE [DISTWIDTH] IN BLOOD BY AUTOMATED COUNT: 15.1 % (ref 11.5–14.5)
FLUAV RNA SPEC QL NAA+PROBE: NOT DETECTED
FLUBV RNA SPEC QL NAA+PROBE: NOT DETECTED
GLOBULIN SER CALC-MCNC: 2.6 G/DL (ref 2–4)
GLUCOSE SERPL-MCNC: 89 MG/DL (ref 65–100)
HCT VFR BLD AUTO: 41.2 % (ref 36.6–50.3)
HGB BLD-MCNC: 13 G/DL (ref 12.1–17)
IMM GRANULOCYTES # BLD AUTO: 0 K/UL (ref 0–0.04)
IMM GRANULOCYTES NFR BLD AUTO: 0 % (ref 0–0.5)
LYMPHOCYTES # BLD: 1.1 K/UL (ref 0.8–3.5)
LYMPHOCYTES NFR BLD: 21 % (ref 12–49)
MCH RBC QN AUTO: 30 PG (ref 26–34)
MCHC RBC AUTO-ENTMCNC: 31.6 G/DL (ref 30–36.5)
MCV RBC AUTO: 95.2 FL (ref 80–99)
MONOCYTES # BLD: 0.6 K/UL (ref 0–1)
MONOCYTES NFR BLD: 10 % (ref 5–13)
NEUTS SEG # BLD: 3.5 K/UL (ref 1.8–8)
NEUTS SEG NFR BLD: 66 % (ref 32–75)
NRBC # BLD: 0 K/UL (ref 0–0.01)
NRBC BLD-RTO: 0 PER 100 WBC
PLATELET # BLD AUTO: 274 K/UL (ref 150–400)
PMV BLD AUTO: 8.8 FL (ref 8.9–12.9)
POTASSIUM SERPL-SCNC: 3.6 MMOL/L (ref 3.5–5.1)
PROCALCITONIN SERPL-MCNC: <0.05 NG/ML
PROT SERPL-MCNC: 5.3 G/DL (ref 6.4–8.2)
RBC # BLD AUTO: 4.33 M/UL (ref 4.1–5.7)
SARS-COV-2 RNA RESP QL NAA+PROBE: NOT DETECTED
SODIUM SERPL-SCNC: 139 MMOL/L (ref 136–145)
SOURCE: NORMAL
TROPONIN I SERPL HS-MCNC: 6 NG/L (ref 0–76)
TROPONIN I SERPL HS-MCNC: 7 NG/L (ref 0–76)
WBC # BLD AUTO: 5.3 K/UL (ref 4.1–11.1)

## 2024-10-13 PROCEDURE — 2580000003 HC RX 258: Performed by: STUDENT IN AN ORGANIZED HEALTH CARE EDUCATION/TRAINING PROGRAM

## 2024-10-13 PROCEDURE — 84484 ASSAY OF TROPONIN QUANT: CPT

## 2024-10-13 PROCEDURE — 71275 CT ANGIOGRAPHY CHEST: CPT

## 2024-10-13 PROCEDURE — 84145 PROCALCITONIN (PCT): CPT

## 2024-10-13 PROCEDURE — 94640 AIRWAY INHALATION TREATMENT: CPT

## 2024-10-13 PROCEDURE — 6360000002 HC RX W HCPCS: Performed by: STUDENT IN AN ORGANIZED HEALTH CARE EDUCATION/TRAINING PROGRAM

## 2024-10-13 PROCEDURE — 99285 EMERGENCY DEPT VISIT HI MDM: CPT

## 2024-10-13 PROCEDURE — 87636 SARSCOV2 & INF A&B AMP PRB: CPT

## 2024-10-13 PROCEDURE — 80053 COMPREHEN METABOLIC PANEL: CPT

## 2024-10-13 PROCEDURE — 85379 FIBRIN DEGRADATION QUANT: CPT

## 2024-10-13 PROCEDURE — 93970 EXTREMITY STUDY: CPT

## 2024-10-13 PROCEDURE — 1100000003 HC PRIVATE W/ TELEMETRY

## 2024-10-13 PROCEDURE — 6370000000 HC RX 637 (ALT 250 FOR IP): Performed by: STUDENT IN AN ORGANIZED HEALTH CARE EDUCATION/TRAINING PROGRAM

## 2024-10-13 PROCEDURE — 71045 X-RAY EXAM CHEST 1 VIEW: CPT

## 2024-10-13 PROCEDURE — 6360000004 HC RX CONTRAST MEDICATION: Performed by: PHYSICIAN ASSISTANT

## 2024-10-13 PROCEDURE — 93005 ELECTROCARDIOGRAM TRACING: CPT | Performed by: STUDENT IN AN ORGANIZED HEALTH CARE EDUCATION/TRAINING PROGRAM

## 2024-10-13 PROCEDURE — 36415 COLL VENOUS BLD VENIPUNCTURE: CPT

## 2024-10-13 PROCEDURE — 85025 COMPLETE CBC W/AUTO DIFF WBC: CPT

## 2024-10-13 RX ORDER — BUDESONIDE 0.5 MG/2ML
0.5 INHALANT ORAL
Status: DISCONTINUED | OUTPATIENT
Start: 2024-10-14 | End: 2024-10-16 | Stop reason: HOSPADM

## 2024-10-13 RX ORDER — ACETAMINOPHEN 650 MG/1
650 SUPPOSITORY RECTAL EVERY 6 HOURS PRN
Status: DISCONTINUED | OUTPATIENT
Start: 2024-10-13 | End: 2024-10-16 | Stop reason: HOSPADM

## 2024-10-13 RX ORDER — BUDESONIDE 0.5 MG/2ML
0.5 INHALANT ORAL
Status: DISCONTINUED | OUTPATIENT
Start: 2024-10-13 | End: 2024-10-13

## 2024-10-13 RX ORDER — FUROSEMIDE 10 MG/ML
20 INJECTION INTRAMUSCULAR; INTRAVENOUS ONCE
Status: COMPLETED | OUTPATIENT
Start: 2024-10-13 | End: 2024-10-13

## 2024-10-13 RX ORDER — SODIUM CHLORIDE 0.9 % (FLUSH) 0.9 %
5-40 SYRINGE (ML) INJECTION PRN
Status: DISCONTINUED | OUTPATIENT
Start: 2024-10-13 | End: 2024-10-16 | Stop reason: HOSPADM

## 2024-10-13 RX ORDER — NICOTINE 21 MG/24HR
1 PATCH, TRANSDERMAL 24 HOURS TRANSDERMAL DAILY
Status: DISCONTINUED | OUTPATIENT
Start: 2024-10-13 | End: 2024-10-16 | Stop reason: HOSPADM

## 2024-10-13 RX ORDER — CITALOPRAM HYDROBROMIDE 20 MG/1
20 TABLET ORAL DAILY
Status: DISCONTINUED | OUTPATIENT
Start: 2024-10-13 | End: 2024-10-16 | Stop reason: HOSPADM

## 2024-10-13 RX ORDER — AZELASTINE 1 MG/ML
2 SPRAY, METERED NASAL 2 TIMES DAILY
Status: DISCONTINUED | OUTPATIENT
Start: 2024-10-13 | End: 2024-10-16 | Stop reason: HOSPADM

## 2024-10-13 RX ORDER — AMLODIPINE BESYLATE 5 MG/1
10 TABLET ORAL DAILY
Status: DISCONTINUED | OUTPATIENT
Start: 2024-10-13 | End: 2024-10-16 | Stop reason: HOSPADM

## 2024-10-13 RX ORDER — MONTELUKAST SODIUM 10 MG/1
10 TABLET ORAL DAILY
Status: DISCONTINUED | OUTPATIENT
Start: 2024-10-13 | End: 2024-10-16 | Stop reason: HOSPADM

## 2024-10-13 RX ORDER — ATORVASTATIN CALCIUM 40 MG/1
40 TABLET, FILM COATED ORAL DAILY
Status: DISCONTINUED | OUTPATIENT
Start: 2024-10-13 | End: 2024-10-16 | Stop reason: HOSPADM

## 2024-10-13 RX ORDER — PREDNISONE 20 MG/1
40 TABLET ORAL DAILY
Status: DISCONTINUED | OUTPATIENT
Start: 2024-10-13 | End: 2024-10-16 | Stop reason: HOSPADM

## 2024-10-13 RX ORDER — ALBUTEROL SULFATE 0.83 MG/ML
2.5 SOLUTION RESPIRATORY (INHALATION) EVERY 4 HOURS PRN
Status: DISCONTINUED | OUTPATIENT
Start: 2024-10-13 | End: 2024-10-16 | Stop reason: HOSPADM

## 2024-10-13 RX ORDER — ACETAMINOPHEN 325 MG/1
650 TABLET ORAL EVERY 6 HOURS PRN
Status: DISCONTINUED | OUTPATIENT
Start: 2024-10-13 | End: 2024-10-16 | Stop reason: HOSPADM

## 2024-10-13 RX ORDER — FAMOTIDINE 20 MG/1
20 TABLET, FILM COATED ORAL 2 TIMES DAILY
Status: DISCONTINUED | OUTPATIENT
Start: 2024-10-13 | End: 2024-10-16 | Stop reason: HOSPADM

## 2024-10-13 RX ORDER — SODIUM CHLORIDE 0.9 % (FLUSH) 0.9 %
5-40 SYRINGE (ML) INJECTION EVERY 12 HOURS SCHEDULED
Status: DISCONTINUED | OUTPATIENT
Start: 2024-10-13 | End: 2024-10-16 | Stop reason: HOSPADM

## 2024-10-13 RX ORDER — ENOXAPARIN SODIUM 100 MG/ML
40 INJECTION SUBCUTANEOUS DAILY
Status: DISCONTINUED | OUTPATIENT
Start: 2024-10-13 | End: 2024-10-16 | Stop reason: HOSPADM

## 2024-10-13 RX ORDER — ARFORMOTEROL TARTRATE 15 UG/2ML
15 SOLUTION RESPIRATORY (INHALATION)
Status: DISCONTINUED | OUTPATIENT
Start: 2024-10-13 | End: 2024-10-13

## 2024-10-13 RX ORDER — AZITHROMYCIN 250 MG/1
500 TABLET, FILM COATED ORAL DAILY
Status: DISCONTINUED | OUTPATIENT
Start: 2024-10-13 | End: 2024-10-14

## 2024-10-13 RX ORDER — ASPIRIN 81 MG/1
81 TABLET ORAL DAILY
Status: DISCONTINUED | OUTPATIENT
Start: 2024-10-14 | End: 2024-10-16 | Stop reason: HOSPADM

## 2024-10-13 RX ORDER — NITROGLYCERIN 0.4 MG/1
0.4 TABLET SUBLINGUAL EVERY 5 MIN PRN
Status: DISCONTINUED | OUTPATIENT
Start: 2024-10-13 | End: 2024-10-16 | Stop reason: HOSPADM

## 2024-10-13 RX ORDER — ARFORMOTEROL TARTRATE 15 UG/2ML
15 SOLUTION RESPIRATORY (INHALATION)
Status: DISCONTINUED | OUTPATIENT
Start: 2024-10-14 | End: 2024-10-16 | Stop reason: HOSPADM

## 2024-10-13 RX ORDER — POLYETHYLENE GLYCOL 3350 17 G/17G
17 POWDER, FOR SOLUTION ORAL DAILY PRN
Status: DISCONTINUED | OUTPATIENT
Start: 2024-10-13 | End: 2024-10-16 | Stop reason: HOSPADM

## 2024-10-13 RX ORDER — SODIUM CHLORIDE 9 MG/ML
INJECTION, SOLUTION INTRAVENOUS PRN
Status: DISCONTINUED | OUTPATIENT
Start: 2024-10-13 | End: 2024-10-16 | Stop reason: HOSPADM

## 2024-10-13 RX ORDER — ERGOCALCIFEROL 1.25 MG/1
50000 CAPSULE, LIQUID FILLED ORAL WEEKLY
Status: DISCONTINUED | OUTPATIENT
Start: 2024-10-14 | End: 2024-10-16 | Stop reason: HOSPADM

## 2024-10-13 RX ORDER — ACETAMINOPHEN 325 MG/1
650 TABLET ORAL EVERY 4 HOURS PRN
Status: DISCONTINUED | OUTPATIENT
Start: 2024-10-13 | End: 2024-10-13 | Stop reason: SDUPTHER

## 2024-10-13 RX ORDER — LEVOTHYROXINE SODIUM 50 UG/1
50 TABLET ORAL DAILY
Status: DISCONTINUED | OUTPATIENT
Start: 2024-10-13 | End: 2024-10-16 | Stop reason: HOSPADM

## 2024-10-13 RX ORDER — GABAPENTIN 300 MG/1
300 CAPSULE ORAL 4 TIMES DAILY
Status: DISCONTINUED | OUTPATIENT
Start: 2024-10-13 | End: 2024-10-16 | Stop reason: HOSPADM

## 2024-10-13 RX ORDER — ONDANSETRON 2 MG/ML
4 INJECTION INTRAMUSCULAR; INTRAVENOUS EVERY 6 HOURS PRN
Status: DISCONTINUED | OUTPATIENT
Start: 2024-10-13 | End: 2024-10-16 | Stop reason: HOSPADM

## 2024-10-13 RX ORDER — ARFORMOTEROL TARTRATE 15 UG/2ML
15 SOLUTION RESPIRATORY (INHALATION)
Status: DISCONTINUED | OUTPATIENT
Start: 2024-10-13 | End: 2024-10-13 | Stop reason: SDUPTHER

## 2024-10-13 RX ORDER — IOPAMIDOL 755 MG/ML
100 INJECTION, SOLUTION INTRAVASCULAR
Status: COMPLETED | OUTPATIENT
Start: 2024-10-13 | End: 2024-10-13

## 2024-10-13 RX ORDER — LEVETIRACETAM 500 MG/1
1000 TABLET ORAL 2 TIMES DAILY
Status: DISCONTINUED | OUTPATIENT
Start: 2024-10-13 | End: 2024-10-16 | Stop reason: HOSPADM

## 2024-10-13 RX ORDER — PANTOPRAZOLE SODIUM 40 MG/1
40 TABLET, DELAYED RELEASE ORAL
Status: DISCONTINUED | OUTPATIENT
Start: 2024-10-14 | End: 2024-10-16 | Stop reason: HOSPADM

## 2024-10-13 RX ORDER — ONDANSETRON 4 MG/1
4 TABLET, ORALLY DISINTEGRATING ORAL EVERY 8 HOURS PRN
Status: DISCONTINUED | OUTPATIENT
Start: 2024-10-13 | End: 2024-10-16 | Stop reason: HOSPADM

## 2024-10-13 RX ADMIN — IPRATROPIUM BROMIDE 0.5 MG: 0.5 SOLUTION RESPIRATORY (INHALATION) at 20:38

## 2024-10-13 RX ADMIN — ATORVASTATIN CALCIUM 40 MG: 40 TABLET, FILM COATED ORAL at 21:05

## 2024-10-13 RX ADMIN — GABAPENTIN 300 MG: 300 CAPSULE ORAL at 20:29

## 2024-10-13 RX ADMIN — AMLODIPINE BESYLATE 10 MG: 5 TABLET ORAL at 21:04

## 2024-10-13 RX ADMIN — CITALOPRAM HYDROBROMIDE 20 MG: 20 TABLET ORAL at 21:05

## 2024-10-13 RX ADMIN — IOPAMIDOL 100 ML: 755 INJECTION, SOLUTION INTRAVENOUS at 17:01

## 2024-10-13 RX ADMIN — ENOXAPARIN SODIUM 40 MG: 100 INJECTION SUBCUTANEOUS at 21:08

## 2024-10-13 RX ADMIN — SODIUM CHLORIDE, PRESERVATIVE FREE 10 ML: 5 INJECTION INTRAVENOUS at 21:09

## 2024-10-13 RX ADMIN — LEVOTHYROXINE SODIUM 50 MCG: 0.05 TABLET ORAL at 21:04

## 2024-10-13 RX ADMIN — BUDESONIDE 500 MCG: 0.5 SUSPENSION RESPIRATORY (INHALATION) at 20:39

## 2024-10-13 RX ADMIN — ARFORMOTEROL TARTRATE 15 MCG: 15 SOLUTION RESPIRATORY (INHALATION) at 20:39

## 2024-10-13 RX ADMIN — LEVETIRACETAM 1000 MG: 500 TABLET, FILM COATED ORAL at 20:29

## 2024-10-13 RX ADMIN — AZITHROMYCIN 500 MG: 250 TABLET, FILM COATED ORAL at 21:03

## 2024-10-13 RX ADMIN — GABAPENTIN 300 MG: 300 CAPSULE ORAL at 21:04

## 2024-10-13 RX ADMIN — PREDNISONE 40 MG: 20 TABLET ORAL at 21:03

## 2024-10-13 RX ADMIN — MONTELUKAST 10 MG: 10 TABLET, FILM COATED ORAL at 21:06

## 2024-10-13 RX ADMIN — FUROSEMIDE 20 MG: 10 INJECTION, SOLUTION INTRAMUSCULAR; INTRAVENOUS at 20:29

## 2024-10-13 RX ADMIN — FAMOTIDINE 20 MG: 20 TABLET, FILM COATED ORAL at 20:30

## 2024-10-13 ASSESSMENT — PAIN SCALES - GENERAL
PAINLEVEL_OUTOF10: 0
PAINLEVEL_OUTOF10: 4
PAINLEVEL_OUTOF10: 10
PAINLEVEL_OUTOF10: 5

## 2024-10-13 ASSESSMENT — PAIN DESCRIPTION - ORIENTATION
ORIENTATION: LEFT;ANTERIOR
ORIENTATION: LEFT;ANTERIOR

## 2024-10-13 ASSESSMENT — PAIN - FUNCTIONAL ASSESSMENT
PAIN_FUNCTIONAL_ASSESSMENT: 0-10
PAIN_FUNCTIONAL_ASSESSMENT: 0-10

## 2024-10-13 ASSESSMENT — PAIN DESCRIPTION - LOCATION
LOCATION: CHEST
LOCATION: CHEST

## 2024-10-13 ASSESSMENT — PAIN DESCRIPTION - FREQUENCY: FREQUENCY: CONTINUOUS

## 2024-10-13 ASSESSMENT — PAIN DESCRIPTION - DESCRIPTORS
DESCRIPTORS: ACHING
DESCRIPTORS: TIGHTNESS

## 2024-10-13 ASSESSMENT — PAIN DESCRIPTION - PAIN TYPE
TYPE: ACUTE PAIN
TYPE: ACUTE PAIN

## 2024-10-13 ASSESSMENT — PAIN DESCRIPTION - ONSET: ONSET: ON-GOING

## 2024-10-13 NOTE — ED PROVIDER NOTES
time range)   aspirin EC tablet 81 mg (has no administration in time range)   atorvastatin (LIPITOR) tablet 40 mg (has no administration in time range)   azelastine (ASTELIN) 0.1 % nasal spray 2 spray (has no administration in time range)   budesonide (PULMICORT) nebulizer suspension 500 mcg (has no administration in time range)     And   arformoterol tartrate (BROVANA) nebulizer solution 15 mcg (has no administration in time range)     And   ipratropium (ATROVENT) 0.02 % nebulizer solution 0.5 mg (has no administration in time range)   citalopram (CELEXA) tablet 20 mg (has no administration in time range)   famotidine (PEPCID) tablet 20 mg (has no administration in time range)   gabapentin (NEURONTIN) capsule 300 mg (has no administration in time range)   levETIRAcetam (KEPPRA) tablet 1,000 mg (has no administration in time range)   levothyroxine (SYNTHROID) tablet 50 mcg (has no administration in time range)   montelukast (SINGULAIR) tablet 10 mg (has no administration in time range)   pantoprazole (PROTONIX) tablet 40 mg (has no administration in time range)   vitamin D (ERGOCALCIFEROL) capsule 50,000 Units (has no administration in time range)   acetaminophen (TYLENOL) tablet 650 mg (has no administration in time range)   iopamidol (ISOVUE-370) 76 % injection 100 mL (100 mLs IntraVENous Given 10/13/24 1701)       CONSULTS: (Who and What was discussed)  None    Chronic Conditions: See above    Social Determinants affecting Dx or Tx: None    Records Reviewed (source and summary of external records): Nursing Notes    MDM: CC/HPI Summary, DDx, ED Course, and Reassessment. Disposition Considerations (Tests not done, Shared Decision Making, Pt Expectation of Test or Tx.):   Patient is a 69-year-old male presenting to the emergency department with concerns of chest pain that was radiating into his left arm.  Fortunately patient's troponins within normal.  Had an elevated D-dimer and therefore CT PE study obtained.

## 2024-10-13 NOTE — ED NOTES
Bedside and Verbal shift change report given to Debra RN (oncoming nurse) by Amanda RN (offgoing nurse). Report included the following information Nurse Handoff Report, ED Encounter Summary, ED SBAR, Adult Overview, Intake/Output, MAR, and Recent Results.

## 2024-10-13 NOTE — ED NOTES
Report received from SARINA Patel. Reviewed reason for patient arrival, vitals, labs, medications, orders, procedures, results, pending orders/results and current plan for disposition. Questions were asked and answered prior to departure.

## 2024-10-13 NOTE — H&P
Mild to moderate emphysema. Right basilar atelectasis versus scarring. LYMPH NODES: No thoracic lymphadenopathy. PLEURAL FLUID: No pleural effusion. PERICARDIAL FLUID: No pericardial effusion. CORONARY ARTERY CALCIFICATIONS: Present OTHER: Aneurysmal dilatation of the ascending aorta measuring 4.0 cm. Abdomen: LIVER: No mass or biliary dilatation. GALLBLADDER: No calcified gallstone SPLEEN: Unremarkable PANCREAS: No mass or ductal dilatation. ADRENALS: Unremarkable. KIDNEYS/URETERS: Bilateral renal cortical atrophy and scarring. No evidence for abnormal enhancing renal mass. No hydronephrosis. PERITONEUM: No abdominal lymphadenopathy or ascites. COLON: Diverticulosis APPENDIX: Unremarkable. SMALL BOWEL: No dilatation or wall thickening. STOMACH: Unremarkable. Pelvis:  PELVIS: No pelvic lymphadenopathy or free fluid. BONES: Bilateral hip arthroplasty. Nonacute right inferior pubic ramus fracture. Age-indeterminate left sacral insufficiency fracture. ADDITIONAL COMMENTS: N/A     1. No evidence for acute pulmonary embolism. 2. Emphysema with right basilar atelectasis versus scarring. 3. No evidence for acute abdominal abnormality. 4. Ascending aortic aneurysm. 5. Age-indeterminate left sacral insufficiency fracture. Electronically signed by Dustin Moctezuma    CT Head W/O Contrast    Result Date: 9/16/2024  EXAM:  CT HEAD WO CONTRAST INDICATION:   falls COMPARISON: MRI brain 4/25/2024, CT head 4/24/2024. TECHNIQUE: Unenhanced CT of the head was performed using 5 mm images. Brain and bone windows were generated.  CT dose reduction was achieved through use of a standardized protocol tailored for this examination and automatic exposure control for dose modulation. FINDINGS: The ventricles are normal in size and position. Basilar cisterns are patent. No midline shift. There is no evidence of acute infarct, hemorrhage, or extraaxial fluid collection. Unchanged chronic left PCA territory infarct involving the left temporal

## 2024-10-14 ENCOUNTER — APPOINTMENT (OUTPATIENT)
Facility: HOSPITAL | Age: 69
DRG: 191 | End: 2024-10-14
Payer: MEDICARE

## 2024-10-14 LAB
ANION GAP SERPL CALC-SCNC: 1 MMOL/L (ref 2–12)
BASOPHILS # BLD: 0 K/UL (ref 0–0.1)
BASOPHILS NFR BLD: 1 % (ref 0–1)
BUN SERPL-MCNC: 12 MG/DL (ref 6–20)
BUN/CREAT SERPL: 17 (ref 12–20)
CALCIUM SERPL-MCNC: 9.4 MG/DL (ref 8.5–10.1)
CHLORIDE SERPL-SCNC: 101 MMOL/L (ref 97–108)
CO2 SERPL-SCNC: 34 MMOL/L (ref 21–32)
CREAT SERPL-MCNC: 0.71 MG/DL (ref 0.7–1.3)
DIFFERENTIAL METHOD BLD: ABNORMAL
EKG ATRIAL RATE: 80 BPM
EKG DIAGNOSIS: NORMAL
EKG P AXIS: 85 DEGREES
EKG P-R INTERVAL: 170 MS
EKG Q-T INTERVAL: 354 MS
EKG QRS DURATION: 92 MS
EKG QTC CALCULATION (BAZETT): 408 MS
EKG R AXIS: 94 DEGREES
EKG T AXIS: 67 DEGREES
EKG VENTRICULAR RATE: 80 BPM
EOSINOPHIL # BLD: 0 K/UL (ref 0–0.4)
EOSINOPHIL NFR BLD: 0 % (ref 0–7)
ERYTHROCYTE [DISTWIDTH] IN BLOOD BY AUTOMATED COUNT: 15 % (ref 11.5–14.5)
GLUCOSE SERPL-MCNC: 134 MG/DL (ref 65–100)
HCT VFR BLD AUTO: 39.6 % (ref 36.6–50.3)
HGB BLD-MCNC: 12.6 G/DL (ref 12.1–17)
IMM GRANULOCYTES # BLD AUTO: 0 K/UL (ref 0–0.04)
IMM GRANULOCYTES NFR BLD AUTO: 1 % (ref 0–0.5)
LYMPHOCYTES # BLD: 0.4 K/UL (ref 0.8–3.5)
LYMPHOCYTES NFR BLD: 10 % (ref 12–49)
MCH RBC QN AUTO: 30.1 PG (ref 26–34)
MCHC RBC AUTO-ENTMCNC: 31.8 G/DL (ref 30–36.5)
MCV RBC AUTO: 94.7 FL (ref 80–99)
MONOCYTES # BLD: 0.1 K/UL (ref 0–1)
MONOCYTES NFR BLD: 2 % (ref 5–13)
NEUTS SEG # BLD: 3.1 K/UL (ref 1.8–8)
NEUTS SEG NFR BLD: 86 % (ref 32–75)
NRBC # BLD: 0 K/UL (ref 0–0.01)
NRBC BLD-RTO: 0 PER 100 WBC
NT PRO BNP: 184 PG/ML
PLATELET # BLD AUTO: 249 K/UL (ref 150–400)
PMV BLD AUTO: 8.7 FL (ref 8.9–12.9)
POTASSIUM SERPL-SCNC: 3.9 MMOL/L (ref 3.5–5.1)
RBC # BLD AUTO: 4.18 M/UL (ref 4.1–5.7)
RBC MORPH BLD: ABNORMAL
SODIUM SERPL-SCNC: 136 MMOL/L (ref 136–145)
WBC # BLD AUTO: 3.6 K/UL (ref 4.1–11.1)

## 2024-10-14 PROCEDURE — 6360000002 HC RX W HCPCS: Performed by: STUDENT IN AN ORGANIZED HEALTH CARE EDUCATION/TRAINING PROGRAM

## 2024-10-14 PROCEDURE — A9500 TC99M SESTAMIBI: HCPCS | Performed by: INTERNAL MEDICINE

## 2024-10-14 PROCEDURE — 94640 AIRWAY INHALATION TREATMENT: CPT

## 2024-10-14 PROCEDURE — 2580000003 HC RX 258: Performed by: STUDENT IN AN ORGANIZED HEALTH CARE EDUCATION/TRAINING PROGRAM

## 2024-10-14 PROCEDURE — 80048 BASIC METABOLIC PNL TOTAL CA: CPT

## 2024-10-14 PROCEDURE — 93017 CV STRESS TEST TRACING ONLY: CPT

## 2024-10-14 PROCEDURE — 6370000000 HC RX 637 (ALT 250 FOR IP): Performed by: STUDENT IN AN ORGANIZED HEALTH CARE EDUCATION/TRAINING PROGRAM

## 2024-10-14 PROCEDURE — 36415 COLL VENOUS BLD VENIPUNCTURE: CPT

## 2024-10-14 PROCEDURE — 83880 ASSAY OF NATRIURETIC PEPTIDE: CPT

## 2024-10-14 PROCEDURE — 78452 HT MUSCLE IMAGE SPECT MULT: CPT

## 2024-10-14 PROCEDURE — 85025 COMPLETE CBC W/AUTO DIFF WBC: CPT

## 2024-10-14 PROCEDURE — 94761 N-INVAS EAR/PLS OXIMETRY MLT: CPT

## 2024-10-14 PROCEDURE — 3430000000 HC RX DIAGNOSTIC RADIOPHARMACEUTICAL: Performed by: INTERNAL MEDICINE

## 2024-10-14 PROCEDURE — 1100000003 HC PRIVATE W/ TELEMETRY

## 2024-10-14 RX ORDER — AZITHROMYCIN 250 MG/1
250 TABLET, FILM COATED ORAL DAILY
Status: DISCONTINUED | OUTPATIENT
Start: 2024-10-14 | End: 2024-10-14

## 2024-10-14 RX ORDER — AZITHROMYCIN 250 MG/1
250 TABLET, FILM COATED ORAL DAILY
Status: DISCONTINUED | OUTPATIENT
Start: 2024-10-15 | End: 2024-10-16 | Stop reason: HOSPADM

## 2024-10-14 RX ORDER — TETRAKIS(2-METHOXYISOBUTYLISOCYANIDE)COPPER(I) TETRAFLUOROBORATE 1 MG/ML
30.6 INJECTION, POWDER, LYOPHILIZED, FOR SOLUTION INTRAVENOUS
Status: COMPLETED | OUTPATIENT
Start: 2024-10-14 | End: 2024-10-14

## 2024-10-14 RX ADMIN — TETRAKIS(2-METHOXYISOBUTYLISOCYANIDE)COPPER(I) TETRAFLUOROBORATE 30.6 MILLICURIE: 1 INJECTION, POWDER, LYOPHILIZED, FOR SOLUTION INTRAVENOUS at 14:10

## 2024-10-14 RX ADMIN — GABAPENTIN 300 MG: 300 CAPSULE ORAL at 21:04

## 2024-10-14 RX ADMIN — ACETAMINOPHEN 650 MG: 325 TABLET ORAL at 23:43

## 2024-10-14 RX ADMIN — SODIUM CHLORIDE, PRESERVATIVE FREE 10 ML: 5 INJECTION INTRAVENOUS at 08:33

## 2024-10-14 RX ADMIN — ENOXAPARIN SODIUM 40 MG: 100 INJECTION SUBCUTANEOUS at 08:32

## 2024-10-14 RX ADMIN — AZELASTINE HYDROCHLORIDE 2 SPRAY: 137 SPRAY, METERED NASAL at 01:13

## 2024-10-14 RX ADMIN — FAMOTIDINE 20 MG: 20 TABLET, FILM COATED ORAL at 08:33

## 2024-10-14 RX ADMIN — LEVOTHYROXINE SODIUM 50 MCG: 0.05 TABLET ORAL at 08:33

## 2024-10-14 RX ADMIN — CITALOPRAM HYDROBROMIDE 20 MG: 20 TABLET ORAL at 08:33

## 2024-10-14 RX ADMIN — ACETAMINOPHEN 650 MG: 325 TABLET ORAL at 12:35

## 2024-10-14 RX ADMIN — SODIUM CHLORIDE, PRESERVATIVE FREE 10 ML: 5 INJECTION INTRAVENOUS at 21:04

## 2024-10-14 RX ADMIN — ATORVASTATIN CALCIUM 40 MG: 40 TABLET, FILM COATED ORAL at 08:33

## 2024-10-14 RX ADMIN — AZELASTINE HYDROCHLORIDE 2 SPRAY: 137 SPRAY, METERED NASAL at 08:34

## 2024-10-14 RX ADMIN — LEVETIRACETAM 1000 MG: 500 TABLET, FILM COATED ORAL at 08:32

## 2024-10-14 RX ADMIN — ASPIRIN 81 MG: 81 TABLET, COATED ORAL at 08:32

## 2024-10-14 RX ADMIN — IPRATROPIUM BROMIDE 0.5 MG: 0.5 SOLUTION RESPIRATORY (INHALATION) at 08:08

## 2024-10-14 RX ADMIN — GABAPENTIN 300 MG: 300 CAPSULE ORAL at 12:35

## 2024-10-14 RX ADMIN — IPRATROPIUM BROMIDE 0.5 MG: 0.5 SOLUTION RESPIRATORY (INHALATION) at 20:49

## 2024-10-14 RX ADMIN — AZELASTINE HYDROCHLORIDE 2 SPRAY: 137 SPRAY, METERED NASAL at 21:04

## 2024-10-14 RX ADMIN — PANTOPRAZOLE SODIUM 40 MG: 40 TABLET, DELAYED RELEASE ORAL at 05:31

## 2024-10-14 RX ADMIN — AZITHROMYCIN 500 MG: 250 TABLET, FILM COATED ORAL at 08:33

## 2024-10-14 RX ADMIN — LEVETIRACETAM 1000 MG: 500 TABLET, FILM COATED ORAL at 21:04

## 2024-10-14 RX ADMIN — BUDESONIDE 500 MCG: 0.5 INHALANT RESPIRATORY (INHALATION) at 08:08

## 2024-10-14 RX ADMIN — FAMOTIDINE 20 MG: 20 TABLET, FILM COATED ORAL at 21:04

## 2024-10-14 RX ADMIN — GABAPENTIN 300 MG: 300 CAPSULE ORAL at 16:23

## 2024-10-14 RX ADMIN — ARFORMOTEROL TARTRATE 15 MCG: 15 SOLUTION RESPIRATORY (INHALATION) at 20:49

## 2024-10-14 RX ADMIN — ARFORMOTEROL TARTRATE 15 MCG: 15 SOLUTION RESPIRATORY (INHALATION) at 08:08

## 2024-10-14 RX ADMIN — GABAPENTIN 300 MG: 300 CAPSULE ORAL at 08:33

## 2024-10-14 RX ADMIN — PREDNISONE 40 MG: 20 TABLET ORAL at 08:32

## 2024-10-14 RX ADMIN — BUDESONIDE 500 MCG: 0.5 INHALANT RESPIRATORY (INHALATION) at 20:49

## 2024-10-14 RX ADMIN — AMLODIPINE BESYLATE 10 MG: 5 TABLET ORAL at 08:33

## 2024-10-14 RX ADMIN — MONTELUKAST 10 MG: 10 TABLET, FILM COATED ORAL at 08:33

## 2024-10-14 RX ADMIN — ERGOCALCIFEROL 50000 UNITS: 1.25 CAPSULE ORAL at 08:41

## 2024-10-14 ASSESSMENT — PAIN DESCRIPTION - ORIENTATION
ORIENTATION: LEFT
ORIENTATION: RIGHT;LEFT
ORIENTATION: RIGHT;LEFT

## 2024-10-14 ASSESSMENT — PAIN - FUNCTIONAL ASSESSMENT
PAIN_FUNCTIONAL_ASSESSMENT: PREVENTS OR INTERFERES SOME ACTIVE ACTIVITIES AND ADLS
PAIN_FUNCTIONAL_ASSESSMENT: PREVENTS OR INTERFERES SOME ACTIVE ACTIVITIES AND ADLS

## 2024-10-14 ASSESSMENT — PAIN DESCRIPTION - LOCATION
LOCATION: FOOT
LOCATION: FOOT
LOCATION: KNEE

## 2024-10-14 ASSESSMENT — PAIN SCALES - GENERAL
PAINLEVEL_OUTOF10: 0
PAINLEVEL_OUTOF10: 0
PAINLEVEL_OUTOF10: 8
PAINLEVEL_OUTOF10: 5
PAINLEVEL_OUTOF10: 7

## 2024-10-14 ASSESSMENT — PAIN DESCRIPTION - ONSET
ONSET: GRADUAL
ONSET: GRADUAL

## 2024-10-14 ASSESSMENT — PAIN DESCRIPTION - DESCRIPTORS
DESCRIPTORS: ACHING;BURNING
DESCRIPTORS: ACHING
DESCRIPTORS: ACHING;BURNING

## 2024-10-14 ASSESSMENT — PAIN DESCRIPTION - FREQUENCY
FREQUENCY: INTERMITTENT
FREQUENCY: INTERMITTENT

## 2024-10-14 ASSESSMENT — PAIN DESCRIPTION - PAIN TYPE
TYPE: NEUROPATHIC PAIN
TYPE: NEUROPATHIC PAIN

## 2024-10-14 NOTE — CARE COORDINATION
documented Decision Maker(s) consistent with ACP documents on file.  Content/Action Overview:  Has ACP document(s) on file - reflects the patient's care preferences  Reviewed DNR/DNI and patient elects Full Code (Attempt Resuscitation)    Length of Voluntary ACP Conversation in minutes:  <16 minutes (Non-Billable)    NATHAN Gonzales   Care Manager, [facility]  [phone]

## 2024-10-14 NOTE — PLAN OF CARE
Problem: Pain  Goal: Verbalizes/displays adequate comfort level or baseline comfort level  Outcome: Not Progressing   Patient report 7/10 burning pain in bilateral foot.  Problem: Safety - Adult  Goal: Free from fall injury  Outcome: Progressing     Problem: Chronic Conditions and Co-morbidities  Goal: Patient's chronic conditions and co-morbidity symptoms are monitored and maintained or improved  Outcome: Progressing     Problem: Discharge Planning  Goal: Discharge to home or other facility with appropriate resources  Outcome: Progressing     Problem: Respiratory - Adult  Goal: Achieves optimal ventilation and oxygenation  Outcome: Progressing     Problem: Skin/Tissue Integrity  Goal: Absence of new skin breakdown  Description: 1.  Monitor for areas of redness and/or skin breakdown  2.  Assess vascular access sites hourly  3.  Every 4-6 hours minimum:  Change oxygen saturation probe site  4.  Every 4-6 hours:  If on nasal continuous positive airway pressure, respiratory therapy assess nares and determine need for appliance change or resting period.  Outcome: Progressing

## 2024-10-14 NOTE — ED NOTES
Called pharmacy regarding pt keppra. Informed pharmacy that pt will like to do the Liquid Keppra.Pharmacy called back stating that pt takes Gabapentin 600mg QID and Keppra 1000 mg BID. This RN went to confirmed pt keppra dose with him again and informed in about the Gabapentin dosage and pt states that yes that is correct.

## 2024-10-14 NOTE — ED NOTES
This RN called and spoke to pharmacy per pharmacy. This RN messaged the pharmacy earlier regarding patient Keppra dose. Keppra is ordered 1000mg BID. Pt states he takes 600mg QID, two 300mg tablets. Pharmacy states they do not carry that dose. Pt can bring in home med or they can order liquid.

## 2024-10-14 NOTE — ED NOTES
Report given to SARINA Goodwin. Nurse was informed of reason for arrival, vitals, labs, medications, orders, procedures, results, anything left pending and current plan of action. Questions were asked and received prior to departure from the patient.

## 2024-10-15 ENCOUNTER — APPOINTMENT (OUTPATIENT)
Facility: HOSPITAL | Age: 69
DRG: 191 | End: 2024-10-15
Attending: INTERNAL MEDICINE
Payer: MEDICARE

## 2024-10-15 LAB
ANION GAP SERPL CALC-SCNC: 3 MMOL/L (ref 2–12)
BUN SERPL-MCNC: 14 MG/DL (ref 6–20)
BUN/CREAT SERPL: 20 (ref 12–20)
CALCIUM SERPL-MCNC: 9.2 MG/DL (ref 8.5–10.1)
CHLORIDE SERPL-SCNC: 100 MMOL/L (ref 97–108)
CO2 SERPL-SCNC: 32 MMOL/L (ref 21–32)
CREAT SERPL-MCNC: 0.71 MG/DL (ref 0.7–1.3)
GLUCOSE SERPL-MCNC: 96 MG/DL (ref 65–100)
POTASSIUM SERPL-SCNC: 4.2 MMOL/L (ref 3.5–5.1)
SODIUM SERPL-SCNC: 135 MMOL/L (ref 136–145)

## 2024-10-15 PROCEDURE — 2700000000 HC OXYGEN THERAPY PER DAY

## 2024-10-15 PROCEDURE — 92610 EVALUATE SWALLOWING FUNCTION: CPT

## 2024-10-15 PROCEDURE — 36415 COLL VENOUS BLD VENIPUNCTURE: CPT

## 2024-10-15 PROCEDURE — 80048 BASIC METABOLIC PNL TOTAL CA: CPT

## 2024-10-15 PROCEDURE — 6360000002 HC RX W HCPCS: Performed by: STUDENT IN AN ORGANIZED HEALTH CARE EDUCATION/TRAINING PROGRAM

## 2024-10-15 PROCEDURE — 6370000000 HC RX 637 (ALT 250 FOR IP): Performed by: STUDENT IN AN ORGANIZED HEALTH CARE EDUCATION/TRAINING PROGRAM

## 2024-10-15 PROCEDURE — 94640 AIRWAY INHALATION TREATMENT: CPT

## 2024-10-15 PROCEDURE — 1100000003 HC PRIVATE W/ TELEMETRY

## 2024-10-15 PROCEDURE — C8929 TTE W OR WO FOL WCON,DOPPLER: HCPCS

## 2024-10-15 PROCEDURE — 2500000003 HC RX 250 WO HCPCS: Performed by: STUDENT IN AN ORGANIZED HEALTH CARE EDUCATION/TRAINING PROGRAM

## 2024-10-15 PROCEDURE — 3430000000 HC RX DIAGNOSTIC RADIOPHARMACEUTICAL: Performed by: INTERNAL MEDICINE

## 2024-10-15 PROCEDURE — 6360000004 HC RX CONTRAST MEDICATION: Performed by: STUDENT IN AN ORGANIZED HEALTH CARE EDUCATION/TRAINING PROGRAM

## 2024-10-15 PROCEDURE — 2580000003 HC RX 258: Performed by: STUDENT IN AN ORGANIZED HEALTH CARE EDUCATION/TRAINING PROGRAM

## 2024-10-15 PROCEDURE — 94761 N-INVAS EAR/PLS OXIMETRY MLT: CPT

## 2024-10-15 PROCEDURE — A9500 TC99M SESTAMIBI: HCPCS | Performed by: INTERNAL MEDICINE

## 2024-10-15 RX ORDER — TETRAKIS(2-METHOXYISOBUTYLISOCYANIDE)COPPER(I) TETRAFLUOROBORATE 1 MG/ML
31.6 INJECTION, POWDER, LYOPHILIZED, FOR SOLUTION INTRAVENOUS
Status: COMPLETED | OUTPATIENT
Start: 2024-10-15 | End: 2024-10-15

## 2024-10-15 RX ORDER — GUAIFENESIN 200 MG/10ML
200 LIQUID ORAL EVERY 4 HOURS PRN
Status: DISCONTINUED | OUTPATIENT
Start: 2024-10-15 | End: 2024-10-16 | Stop reason: HOSPADM

## 2024-10-15 RX ORDER — REGADENOSON 0.08 MG/ML
0.4 INJECTION, SOLUTION INTRAVENOUS
Status: COMPLETED | OUTPATIENT
Start: 2024-10-15 | End: 2024-10-15

## 2024-10-15 RX ADMIN — ARFORMOTEROL TARTRATE 15 MCG: 15 SOLUTION RESPIRATORY (INHALATION) at 14:16

## 2024-10-15 RX ADMIN — LEVETIRACETAM 1000 MG: 500 TABLET, FILM COATED ORAL at 10:27

## 2024-10-15 RX ADMIN — AMLODIPINE BESYLATE 10 MG: 5 TABLET ORAL at 10:27

## 2024-10-15 RX ADMIN — GUAIFENESIN 200 MG: 200 SOLUTION ORAL at 18:48

## 2024-10-15 RX ADMIN — ATORVASTATIN CALCIUM 40 MG: 40 TABLET, FILM COATED ORAL at 10:26

## 2024-10-15 RX ADMIN — ENOXAPARIN SODIUM 40 MG: 100 INJECTION SUBCUTANEOUS at 10:28

## 2024-10-15 RX ADMIN — LEVETIRACETAM 1000 MG: 500 TABLET, FILM COATED ORAL at 20:42

## 2024-10-15 RX ADMIN — AZELASTINE HYDROCHLORIDE 2 SPRAY: 137 SPRAY, METERED NASAL at 10:30

## 2024-10-15 RX ADMIN — GABAPENTIN 300 MG: 300 CAPSULE ORAL at 10:27

## 2024-10-15 RX ADMIN — REGADENOSON 0.4 MG: 0.08 INJECTION, SOLUTION INTRAVENOUS at 08:23

## 2024-10-15 RX ADMIN — BUDESONIDE 500 MCG: 0.5 INHALANT RESPIRATORY (INHALATION) at 21:07

## 2024-10-15 RX ADMIN — MONTELUKAST 10 MG: 10 TABLET, FILM COATED ORAL at 10:27

## 2024-10-15 RX ADMIN — SODIUM CHLORIDE, PRESERVATIVE FREE 10 ML: 5 INJECTION INTRAVENOUS at 20:42

## 2024-10-15 RX ADMIN — ASPIRIN 81 MG: 81 TABLET, COATED ORAL at 10:26

## 2024-10-15 RX ADMIN — ARFORMOTEROL TARTRATE 15 MCG: 15 SOLUTION RESPIRATORY (INHALATION) at 21:07

## 2024-10-15 RX ADMIN — FAMOTIDINE 20 MG: 20 TABLET, FILM COATED ORAL at 10:27

## 2024-10-15 RX ADMIN — PREDNISONE 40 MG: 20 TABLET ORAL at 10:27

## 2024-10-15 RX ADMIN — TETRAKIS(2-METHOXYISOBUTYLISOCYANIDE)COPPER(I) TETRAFLUOROBORATE 31.6 MILLICURIE: 1 INJECTION, POWDER, LYOPHILIZED, FOR SOLUTION INTRAVENOUS at 08:25

## 2024-10-15 RX ADMIN — AZITHROMYCIN 250 MG: 250 TABLET, FILM COATED ORAL at 10:27

## 2024-10-15 RX ADMIN — CITALOPRAM HYDROBROMIDE 20 MG: 20 TABLET ORAL at 10:26

## 2024-10-15 RX ADMIN — BUDESONIDE 500 MCG: 0.5 INHALANT RESPIRATORY (INHALATION) at 14:17

## 2024-10-15 RX ADMIN — GABAPENTIN 300 MG: 300 CAPSULE ORAL at 14:09

## 2024-10-15 RX ADMIN — GABAPENTIN 300 MG: 300 CAPSULE ORAL at 16:13

## 2024-10-15 RX ADMIN — GABAPENTIN 300 MG: 300 CAPSULE ORAL at 20:42

## 2024-10-15 RX ADMIN — FAMOTIDINE 20 MG: 20 TABLET, FILM COATED ORAL at 20:42

## 2024-10-15 RX ADMIN — AZELASTINE HYDROCHLORIDE 2 SPRAY: 137 SPRAY, METERED NASAL at 20:43

## 2024-10-15 RX ADMIN — PERFLUTREN 1.5 ML: 6.52 INJECTION, SUSPENSION INTRAVENOUS at 09:12

## 2024-10-15 RX ADMIN — IPRATROPIUM BROMIDE 0.5 MG: 0.5 SOLUTION RESPIRATORY (INHALATION) at 21:07

## 2024-10-15 RX ADMIN — IPRATROPIUM BROMIDE 0.5 MG: 0.5 SOLUTION RESPIRATORY (INHALATION) at 14:12

## 2024-10-15 RX ADMIN — LEVOTHYROXINE SODIUM 50 MCG: 0.05 TABLET ORAL at 10:27

## 2024-10-15 NOTE — PLAN OF CARE
Problem: Safety - Adult  Goal: Free from fall injury  10/15/2024 0052 by Janak Dial RN  Outcome: Progressing  10/14/2024 1358 by Alphonso Hairston RN  Outcome: Progressing     Problem: Chronic Conditions and Co-morbidities  Goal: Patient's chronic conditions and co-morbidity symptoms are monitored and maintained or improved  10/15/2024 0052 by Janak Dial RN  Outcome: Progressing  10/14/2024 1358 by Alphonso Hairston RN  Outcome: Progressing     Problem: Discharge Planning  Goal: Discharge to home or other facility with appropriate resources  10/15/2024 0052 by Janak Dial RN  Outcome: Progressing  10/14/2024 1358 by Alphonso Hairston RN  Outcome: Progressing     Problem: Pain  Goal: Verbalizes/displays adequate comfort level or baseline comfort level  10/15/2024 0052 by Janak Dial RN  Outcome: Progressing  10/14/2024 1358 by Alphonso Hairston RN  Outcome: Not Progressing     Problem: Respiratory - Adult  Goal: Achieves optimal ventilation and oxygenation  10/15/2024 0052 by Janak Dial RN  Outcome: Progressing  10/14/2024 2053 by Dipti Lerner RCP  Outcome: Progressing  10/14/2024 1358 by Alphonso Hairston RN  Outcome: Progressing     Problem: Skin/Tissue Integrity  Goal: Absence of new skin breakdown  Description: 1.  Monitor for areas of redness and/or skin breakdown  2.  Assess vascular access sites hourly  3.  Every 4-6 hours minimum:  Change oxygen saturation probe site  4.  Every 4-6 hours:  If on nasal continuous positive airway pressure, respiratory therapy assess nares and determine need for appliance change or resting period.  10/15/2024 0052 by Janak Dial RN  Outcome: Progressing  10/14/2024 1358 by Alphonso Hairston RN  Outcome: Progressing     Problem: Pain  Goal: Verbalizes/displays adequate comfort level or baseline comfort level  10/15/2024 0052 by Janak Dial RN  Outcome: Progressing  10/14/2024 1358 by Alphonso Hairston RN  Outcome: Not

## 2024-10-15 NOTE — PLAN OF CARE
Problem: Respiratory - Adult  Goal: Achieves optimal ventilation and oxygenation  10/14/2024 2053 by Dipti Lerner RCP  Outcome: Progressing  10/14/2024 1358 by Alphonso Hairston RN  Outcome: Progressing     Problem: Pain  Goal: Verbalizes/displays adequate comfort level or baseline comfort level  10/14/2024 1358 by Alphonso Hairston RN  Outcome: Not Progressing

## 2024-10-15 NOTE — WOUND CARE
Wound care nurse consult for a buttock wound.     68 y/o male admitted for acute chest pain.   Past Medical History:   Diagnosis Date    Arthritis     Hips, Knees    CAD (coronary artery disease)     MI around 1990    COPD (chronic obstructive pulmonary disease) (HCC)     Hepatitis A     High cholesterol     Hypertension     Other ill-defined conditions(799.89)     Light sensitivity, causes seizures    Seizures (HCC)     Last seizure 12/2008/work -up in 2012 -possible seizure    Stroke (HCC) 2005    Thyroid disease     Hypothyroid     Past Surgical History:   Procedure Laterality Date    CARDIAC CATHETERIZATION  20 years ago    no stents    HERNIA REPAIR  10/8/14    left inguinal hernia- Pawan Hartman MD    ORTHOPEDIC SURGERY  2/2010    [facility]. Total Hip Replacement/Dr. Borrero    ORTHOPEDIC SURGERY      Left Knee Scope    ORTHOPEDIC SURGERY  6/9/14    R hip replacement     ORTHOPEDIC SURGERY Right     Shoulder fracture & surgical repair with hardware    REVISION TOTAL HIP ARTHROPLASTY Left 5/17/2023    LEFT HIP TOTAL ARTHROPLASTY REVISION POSTERIOR APPROACH performed by Austyn Medellin MD at [facility] MAIN OR     Patient has a small partial thickness wound to left buttock ridge, base is blanchable. Most likely a friction/skin tear      Patient is continent of urine and stool and using the urinal. Currently wound covered by a clean sacral foam dressing. No drainage or odor.    Recommend:    Left buttock/sacrum skin tear: daily, cleanse with NS moist 4x4. Apply a smear of zinc cream to wound and cover with a sacral foam dressing.    CATALINA MARRERO RN, CWON

## 2024-10-15 NOTE — PLAN OF CARE
Problem: Safety - Adult  Goal: Free from fall injury  10/15/2024 1426 by Melodie Romero RN  Outcome: Progressing  10/15/2024 0052 by Janak Dial RN  Outcome: Progressing     Problem: Chronic Conditions and Co-morbidities  Goal: Patient's chronic conditions and co-morbidity symptoms are monitored and maintained or improved  10/15/2024 1426 by Melodie Romero RN  Outcome: Progressing  10/15/2024 0052 by Janak Dial RN  Outcome: Progressing     Problem: Discharge Planning  Goal: Discharge to home or other facility with appropriate resources  10/15/2024 1426 by Melodie Romero RN  Outcome: Progressing  10/15/2024 0052 by Janak Dial RN  Outcome: Progressing     Problem: Pain  Goal: Verbalizes/displays adequate comfort level or baseline comfort level  10/15/2024 1426 by Melodie Romeor RN  Outcome: Progressing  10/15/2024 0052 by Janak Dial RN  Outcome: Progressing     Problem: Respiratory - Adult  Goal: Achieves optimal ventilation and oxygenation  10/15/2024 1426 by Melodie Romero RN  Outcome: Progressing  10/15/2024 0052 by Janak Dial RN  Outcome: Progressing     Problem: Skin/Tissue Integrity  Goal: Absence of new skin breakdown  Description: 1.  Monitor for areas of redness and/or skin breakdown  2.  Assess vascular access sites hourly  3.  Every 4-6 hours minimum:  Change oxygen saturation probe site  4.  Every 4-6 hours:  If on nasal continuous positive airway pressure, respiratory therapy assess nares and determine need for appliance change or resting period.  10/15/2024 1426 by Melodie Romero RN  Outcome: Progressing  10/15/2024 0052 by Janak Dial RN  Outcome: Progressing

## 2024-10-16 VITALS
HEART RATE: 55 BPM | RESPIRATION RATE: 17 BRPM | SYSTOLIC BLOOD PRESSURE: 114 MMHG | WEIGHT: 173 LBS | BODY MASS INDEX: 23.43 KG/M2 | OXYGEN SATURATION: 97 % | TEMPERATURE: 98.8 F | HEIGHT: 72 IN | DIASTOLIC BLOOD PRESSURE: 61 MMHG

## 2024-10-16 LAB
ANION GAP SERPL CALC-SCNC: 2 MMOL/L (ref 2–12)
BASOPHILS # BLD: 0 K/UL (ref 0–0.1)
BASOPHILS NFR BLD: 0 % (ref 0–1)
BUN SERPL-MCNC: 15 MG/DL (ref 6–20)
BUN/CREAT SERPL: 25 (ref 12–20)
CALCIUM SERPL-MCNC: 9.2 MG/DL (ref 8.5–10.1)
CHLORIDE SERPL-SCNC: 101 MMOL/L (ref 97–108)
CO2 SERPL-SCNC: 33 MMOL/L (ref 21–32)
CREAT SERPL-MCNC: 0.6 MG/DL (ref 0.7–1.3)
DIFFERENTIAL METHOD BLD: ABNORMAL
EOSINOPHIL # BLD: 0 K/UL (ref 0–0.4)
EOSINOPHIL NFR BLD: 0 % (ref 0–7)
ERYTHROCYTE [DISTWIDTH] IN BLOOD BY AUTOMATED COUNT: 14.6 % (ref 11.5–14.5)
GLUCOSE SERPL-MCNC: 91 MG/DL (ref 65–100)
HCT VFR BLD AUTO: 38.1 % (ref 36.6–50.3)
HGB BLD-MCNC: 12.1 G/DL (ref 12.1–17)
IMM GRANULOCYTES # BLD AUTO: 0 K/UL (ref 0–0.04)
IMM GRANULOCYTES NFR BLD AUTO: 0 % (ref 0–0.5)
LYMPHOCYTES # BLD: 1.1 K/UL (ref 0.8–3.5)
LYMPHOCYTES NFR BLD: 21 % (ref 12–49)
MCH RBC QN AUTO: 30.4 PG (ref 26–34)
MCHC RBC AUTO-ENTMCNC: 31.8 G/DL (ref 30–36.5)
MCV RBC AUTO: 95.7 FL (ref 80–99)
MONOCYTES # BLD: 0.5 K/UL (ref 0–1)
MONOCYTES NFR BLD: 10 % (ref 5–13)
NEUTS SEG # BLD: 3.6 K/UL (ref 1.8–8)
NEUTS SEG NFR BLD: 69 % (ref 32–75)
NRBC # BLD: 0 K/UL (ref 0–0.01)
NRBC BLD-RTO: 0 PER 100 WBC
PLATELET # BLD AUTO: 284 K/UL (ref 150–400)
PMV BLD AUTO: 8.8 FL (ref 8.9–12.9)
POTASSIUM SERPL-SCNC: 3.7 MMOL/L (ref 3.5–5.1)
RBC # BLD AUTO: 3.98 M/UL (ref 4.1–5.7)
SODIUM SERPL-SCNC: 136 MMOL/L (ref 136–145)
WBC # BLD AUTO: 5.3 K/UL (ref 4.1–11.1)

## 2024-10-16 PROCEDURE — 6360000002 HC RX W HCPCS: Performed by: STUDENT IN AN ORGANIZED HEALTH CARE EDUCATION/TRAINING PROGRAM

## 2024-10-16 PROCEDURE — 6370000000 HC RX 637 (ALT 250 FOR IP): Performed by: STUDENT IN AN ORGANIZED HEALTH CARE EDUCATION/TRAINING PROGRAM

## 2024-10-16 PROCEDURE — 94640 AIRWAY INHALATION TREATMENT: CPT

## 2024-10-16 PROCEDURE — 94760 N-INVAS EAR/PLS OXIMETRY 1: CPT

## 2024-10-16 PROCEDURE — 94010 BREATHING CAPACITY TEST: CPT

## 2024-10-16 PROCEDURE — 80048 BASIC METABOLIC PNL TOTAL CA: CPT

## 2024-10-16 PROCEDURE — 2700000000 HC OXYGEN THERAPY PER DAY

## 2024-10-16 PROCEDURE — 36415 COLL VENOUS BLD VENIPUNCTURE: CPT

## 2024-10-16 PROCEDURE — 85025 COMPLETE CBC W/AUTO DIFF WBC: CPT

## 2024-10-16 PROCEDURE — 2580000003 HC RX 258: Performed by: STUDENT IN AN ORGANIZED HEALTH CARE EDUCATION/TRAINING PROGRAM

## 2024-10-16 PROCEDURE — 94761 N-INVAS EAR/PLS OXIMETRY MLT: CPT

## 2024-10-16 PROCEDURE — 92526 ORAL FUNCTION THERAPY: CPT

## 2024-10-16 RX ORDER — AZITHROMYCIN 250 MG/1
250 TABLET, FILM COATED ORAL DAILY
Qty: 3 TABLET | Refills: 0 | Status: SHIPPED | OUTPATIENT
Start: 2024-10-16 | End: 2024-10-19

## 2024-10-16 RX ORDER — PREDNISONE 20 MG/1
40 TABLET ORAL DAILY
Qty: 4 TABLET | Refills: 0 | Status: SHIPPED | OUTPATIENT
Start: 2024-10-16 | End: 2024-10-18

## 2024-10-16 RX ORDER — LEVETIRACETAM 1000 MG/1
1000 TABLET ORAL 2 TIMES DAILY
Qty: 60 TABLET | Refills: 3 | Status: SHIPPED | OUTPATIENT
Start: 2024-10-16

## 2024-10-16 RX ADMIN — GABAPENTIN 300 MG: 300 CAPSULE ORAL at 11:05

## 2024-10-16 RX ADMIN — PREDNISONE 40 MG: 20 TABLET ORAL at 11:05

## 2024-10-16 RX ADMIN — PANTOPRAZOLE SODIUM 40 MG: 40 TABLET, DELAYED RELEASE ORAL at 05:37

## 2024-10-16 RX ADMIN — IPRATROPIUM BROMIDE 0.5 MG: 0.5 SOLUTION RESPIRATORY (INHALATION) at 07:28

## 2024-10-16 RX ADMIN — AZITHROMYCIN 250 MG: 250 TABLET, FILM COATED ORAL at 11:06

## 2024-10-16 RX ADMIN — SODIUM CHLORIDE, PRESERVATIVE FREE 10 ML: 5 INJECTION INTRAVENOUS at 11:07

## 2024-10-16 RX ADMIN — BUDESONIDE 500 MCG: 0.5 INHALANT RESPIRATORY (INHALATION) at 07:37

## 2024-10-16 RX ADMIN — AMLODIPINE BESYLATE 10 MG: 5 TABLET ORAL at 11:06

## 2024-10-16 RX ADMIN — LEVETIRACETAM 1000 MG: 500 TABLET, FILM COATED ORAL at 11:06

## 2024-10-16 RX ADMIN — ARFORMOTEROL TARTRATE 15 MCG: 15 SOLUTION RESPIRATORY (INHALATION) at 07:37

## 2024-10-16 RX ADMIN — ATORVASTATIN CALCIUM 40 MG: 40 TABLET, FILM COATED ORAL at 11:06

## 2024-10-16 RX ADMIN — AZELASTINE HYDROCHLORIDE 2 SPRAY: 137 SPRAY, METERED NASAL at 11:14

## 2024-10-16 RX ADMIN — FAMOTIDINE 20 MG: 20 TABLET, FILM COATED ORAL at 11:06

## 2024-10-16 RX ADMIN — ACETAMINOPHEN 650 MG: 325 TABLET ORAL at 03:19

## 2024-10-16 RX ADMIN — LEVOTHYROXINE SODIUM 50 MCG: 0.05 TABLET ORAL at 11:06

## 2024-10-16 RX ADMIN — MONTELUKAST 10 MG: 10 TABLET, FILM COATED ORAL at 11:05

## 2024-10-16 RX ADMIN — ASPIRIN 81 MG: 81 TABLET, COATED ORAL at 11:06

## 2024-10-16 RX ADMIN — CITALOPRAM HYDROBROMIDE 20 MG: 20 TABLET ORAL at 11:06

## 2024-10-16 ASSESSMENT — PULMONARY FUNCTION TESTS: PIF_VALUE: 85

## 2024-10-16 ASSESSMENT — COPD QUESTIONNAIRES
QUESTION5_HOMEACTIVITIES: 0
TOTAL_EXACERBATIONS_PASTYEAR: 2
QUESTION6_LEAVINGHOUSE: 2
QUESTION8_ENERGYLEVEL: 2
QUESTION4_WALKINCLINE: 0
CAT_TOTALSCORE: 16
QUESTION3_CHESTTIGHTNESS: 0
QUESTION1_COUGHFREQUENCY: 5
QUESTION2_CHESTPHLEGM: 5
QUESTION7_SLEEPQUALITY: 2

## 2024-10-16 ASSESSMENT — PAIN SCALES - GENERAL
PAINLEVEL_OUTOF10: 0
PAINLEVEL_OUTOF10: 3
PAINLEVEL_OUTOF10: 0

## 2024-10-16 ASSESSMENT — PAIN DESCRIPTION - LOCATION: LOCATION: HEAD

## 2024-10-16 NOTE — PLAN OF CARE
Problem: Respiratory - Adult  Goal: Achieves optimal ventilation and oxygenation  10/15/2024 2110 by Dipti Lerner RCP  Outcome: Progressing  10/15/2024 1426 by Melodie Romero RN  Outcome: Progressing

## 2024-10-16 NOTE — PLAN OF CARE
Speech LAnguage Pathology TREATMENT    Patient: Rodolfo Delcid (69 y.o. male)  Date: 10/16/2024  Primary Diagnosis: Shortness of breath [R06.02]  Acute chest pain [R07.9]  Chest pain, unspecified type [R07.9]       Precautions: Aspiration precautions, Reflux precautions                    ASSESSMENT :  Patient seen for dysphagia management on this date. Patient on 2 LPM via nasal cannula (note patient on 2-3 LPM at baseline given severe COPD). Patient reports good tolerance of current diet. Patient tolerated sequential sips of thin liquid via cup without overt clinical signs of aspiration. Patient observed with delayed cough following solid trial of unclear significance, ?esophageal involvement, however this was not reproducible. Education provided to patient re: risk for silent aspiration given history of COPD as well as post-prandial aspiration given history of esophageal dysphagia. Note prior SLP notes indicate family's report of history of acid reflux and hiatal hernia. Offered instrumental imaging for further evaluation, however patient declined on this date, adamant re: discharging home today Discussed option for OP MBS, to which patient is in agreement. Discussed with MD via [employer] as well as via phone.    Patient will benefit from skilled intervention to address the above impairments.     PLAN :  Recommendations and Planned Interventions:  Diet: Soft and bite sized and thin liquids  -- Medication as tolerated  -- Hold PO intake if patient becomes SOB  -- Upright for all PO  -- Small bites/sips  -- Slow rate of intake     General reflux precautions:  -- Upright for all PO intake  -- Eat smaller, more frequent meals throughout the day rather than 3 big meals  -- Remaining upright for at least 45-60 minutes after meals  -- Slow rate of intake  -- Avoiding spicy/acidic foods and carbonated drinks as needed  -- Alternating liquids/solids       Recommend next SLP session: instrumental imaging if patient

## 2024-10-16 NOTE — DISCHARGE INSTRUCTIONS
[facility] [address]  [phone]    Schedule an appointment as soon as possible for a visit in 1 week(s)  for pulmonary appointment.       Please call for your own appointment        Information obtained by :  I understand that if any problems occur once I am at home I am to contact my physician.    I understand and acknowledge receipt of the instructions indicated above.                                                                                                                                           Physician's or R.N.'s Signature                                                                  Date/Time                                                                                                                                              Patient or Representative Signature                                                          Date/Time

## 2024-10-16 NOTE — DISCHARGE SUMMARY
Discharge Summary    Name: Rodolfo Delcid  [id number]  YOB: 1955 (Age: 69 y.o.)   Date of Admission: 10/13/2024  Date of Discharge: 10/16/2024  Attending Physician: Li att. providers found    Discharge Diagnosis:     Substernal chest pain resolved  History of coronary artery disease  History of COPD  Possible chronic pulmonary HTN based on imaging   Chronic hypoxic respiratory failure on 2-3 L NC oxygen at baseline  Asymmetrical lower extremity swelling  Tobacco use   History of seizure disorder  Hypothyroidism  Depression  Anxiety  Hypertension  Hyperlipidemia    Consultations:  IP CONSULT TO PULMONOLOGY  IP CONSULT TO CARDIOLOGY  IP WOUND CARE NURSE CONSULT TO EVAL  IP CONSULT TO CASE MANAGEMENT      Brief Admission History/Reason for Admission Per Octavia Girard MD:   Rodolfo Delcid is a 69 y.o.  male with PMHx as mentioned below presented to ED with substernal left-sided chest pain.  Patient was lying in bed when the chest pain started radiating to left arm, associated with mild dyspnea.  Denies presyncope, syncope, nausea, vomiting, palpitation.  Patient also had noticed increased lower leg swelling compared to prior left more than right.  Had low-grade fever Tmax 99.  Still smoking.  Denies sick contact, noncompliance, any change in diet.   During my encounter the chest pain had resolved.     Recently admitted on 9/2024 for altered mental status due to seizure.  Patient was started on Keppra   MRI brain at that time with no acute intracranial abnormality.  Had RRT for sudden onset shortness of breath on 9/18 that improved with DuoNeb, and steroids.  Chest x-ray was clear.       Brief Hospital Course by Main Problems:     Patient was admitted to hospital and noted to have substernal chest pain.  Cardiology consultation was placed.  Patient was seen by cardiology.  CTA chest was negative for pulmonary embolism.  Patient underwent a stress test which was within

## 2024-10-16 NOTE — PLAN OF CARE
Speech LAnguage Pathology EVALUATION    Patient: Rodolfo Delcid (69 y.o. male)  Date: 10/16/2024  Primary Diagnosis: Shortness of breath [R06.02]  Acute chest pain [R07.9]  Chest pain, unspecified type [R07.9]       Precautions: Aspiration precautions                    ASSESSMENT :  The patient presents with increased risk for aspiration given concern for possible COPD exacerbation acutely. Note CTA Chest showed \"Associated patchy subsegmental/segmental atelectasis versus scarring along posterior right lower  lobe with associated bronchial wall thickening and mild endobronchial debris, also seen on prior study and may suggest sequelae of aspiration versus post inflammatory changes.\" Patient reports sternal discomfort following solid trial (gesturing to sternum), ?esophageal as well as patient with observed increased WOB during mastication of solid trial (note vital signs observed to remain stable). Note Lexiscan nuclear stress test is negative. Delayed cough observed following solid trial, which was not reproducible on subsequent trials. Patient requesting breathing treatment, stating he hasn't received morning breathing treatment on account of being [facility] for procedure earlier. Notified RN and patient left in no apparent distress. Will continue to follow acutely.    Patient will benefit from skilled intervention to address the above impairments.     PLAN :  Recommendations and Planned Interventions:  Diet: Soft and bite sized and thin liquids  -- Medication as tolerated  -- Hold PO intake if patient becomes SOB  -- Upright for all PO  -- Small bites/sips  -- Slow rate of intake    General reflux precautions:  -- Upright for all PO intake  -- Eat smaller, more frequent meals throughout the day rather than 3 big meals  -- Remaining upright for at least 45-60 minutes after meals  -- Slow rate of intake  -- Avoiding spicy/acidic foods and carbonated drinks as needed  -- Alternating liquids/solids       Recommend next SLP

## 2024-10-16 NOTE — CONSULTS
[facility]              [address] [place] [address]                              CONSULTATION      PATIENT NAME: SHANAE GONZALEZ                : 1955  MED REC NO: [id number]                       ROOM: 3241  ACCOUNT NO: [id number]                       ADMIT DATE: 10/13/2024  PROVIDER: Quintin Goodman MD    DATE OF SERVICE:  10/15/2024    ATTENDING PHYSICIAN:  Octavia Girard MD    HISTORY OF PRESENT ILLNESS:  The patient is a 69-year-old gentleman with significant COPD, who is seen in reference to prolonged chest pain.  The chest discomfort was severe.  He states \"9\" on a scale of 1 to 10.  He states it lasted for a long time.  It lasted until he came to the emergency room, it slowly eased off.    He was evaluated in the emergency room and CTA did not reveal any evidence of aortic dissection or pulmonary embolism.  The patient does have significant COPD.    The patient previously had an echo in April of this year.  At that time, there was borderline low ejection fraction 50% to 55%.  He currently is pain free, but he is a very vague historian and not very precise.  He did have shortness of breath with chest pain, but no nausea, no diaphoresis.    PAST MEDICAL HISTORY:  He was admitted in September of this year with a seizure and was started on Keppra.  MRI at that time was negative of the brain.    SOCIAL HISTORY:  He continues to smoke half-a-pack per day.  Alcohol, none.  He states he lives alone.    PAST SURGICAL HISTORY:  Includes hernia repair, hip replacement by Dr. Borrero.    FAMILY HISTORY:  Mother had a stroke.  Father had cancer.    REVIEW OF SYSTEMS:  As above, otherwise noncontributory.    MEDICATIONS:  Are per [facility].    PHYSICAL EXAMINATION:  VITAL SIGNS:  Per [facility].  Blood pressure 122/60.  GENERAL:  He is alert and oriented.  He is not confused, but his history is somewhat vague and not precise.  HEAD, EYES, EARS, NOSE, AND 
  Name: Rodolfo Delcid Hospital: [facility]   : 1955 Admit Date: 10/13/2024   Phone: [phone]  Room: [facility]/   PCP: Lukasz Awad DO  MRN: [id number]   Date: 10/14/2024  Code: Full Code        HPI:      Chart and notes reviewed. Data reviewed. I review the patient's current medications in the medical record at each encounter.  I have evaluated and examined the patient.     1:31 PM       History was obtained from patient.    I was asked by Octavia Girard MD to see Rodolfo Delcid in consultation for a chief complaint of COPD exacerbation.     History of Present Illness:   is a very pleasant, 70 yo gentleman with a history of severe COPD, chronic respiratory failure with hypoxia and hypercapnia (on 3L, Trilogy at night), tobacco abuse, CAD, prior CVA with residual right sided weakness, hyperlipidemia, hypothyroidism, and seizure disorder who was admitted on 10/13 with worsening chest pain.  He is a poor historian.  He tells me that he had not been feeling well 3-4 days prior to admission with increased wheezing and shortness of breath. Deneis sick contacts.  He reports that he has been taking Breztri twice per day as prescribed and typically only uses albuterol twice per day, he has not incresaed it when his symptoms started.  Denies fever/chills.  Denies increased sputum production.  He reports that he has been wearing his Trilogy at home.  He continues to smoke about 10 cigarettes daily.    He is followed by  and was last seen in 2024, at that time he was continued on Breztri and given a prednisone taper.  Trilogy was also ordered.    Images:  Personally reviewed.    Chest CTA:  No PE. Dilated main pulmonary artery.  CAD present.  Moderate emphysema.  Associated patchy subsegmental/segmental atx vs scarring along the right lower lobe with associated bronchial wall thickening and mild endobronchial debris.  Mild left lowe lobe bronchial wall 
Lexiscan is neg. OK for DC.  
Note to be dictated. Unstable angina.  
Oral QAM AC    vitamin D (ERGOCALCIFEROL) capsule 50,000 Units  50,000 Units Oral Weekly    sodium chloride flush 0.9 % injection 5-40 mL  5-40 mL IntraVENous 2 times per day    sodium chloride flush 0.9 % injection 5-40 mL  5-40 mL IntraVENous PRN    0.9 % sodium chloride infusion   IntraVENous PRN    enoxaparin (LOVENOX) injection 40 mg  40 mg SubCUTAneous Daily    ondansetron (ZOFRAN-ODT) disintegrating tablet 4 mg  4 mg Oral Q8H PRN    Or    ondansetron (ZOFRAN) injection 4 mg  4 mg IntraVENous Q6H PRN    polyethylene glycol (GLYCOLAX) packet 17 g  17 g Oral Daily PRN    acetaminophen (TYLENOL) tablet 650 mg  650 mg Oral Q6H PRN    Or    acetaminophen (TYLENOL) suppository 650 mg  650 mg Rectal Q6H PRN    nicotine (NICODERM CQ) 14 MG/24HR 1 patch  1 patch TransDERmal Daily    predniSONE (DELTASONE) tablet 40 mg  40 mg Oral Daily    nitroGLYCERIN (NITROSTAT) SL tablet 0.4 mg  0.4 mg SubLINGual Q5 Min PRN    budesonide (PULMICORT) nebulizer suspension 500 mcg  0.5 mg Nebulization BID RT    And    arformoterol tartrate (BROVANA) nebulizer solution 15 mcg  15 mcg Nebulization BID RT    And    ipratropium (ATROVENT) 0.02 % nebulizer solution 0.5 mg  0.5 mg Nebulization BID RT       Objective:    Physical Exam:  Last VS: /63   Pulse 51   Temp 97.9 °F (36.6 °C) (Oral)   Resp 18   Ht 1.829 m (6')   Wt 78.5 kg (173 lb)   SpO2 99%   BMI 23.46 kg/m²  Temp (24hrs), Av.2 °F (36.8 °C), Min:97.9 °F (36.6 °C), Max:98.6 °F (37 °C)    24 hr VS reviewed.  General Appearance:   [x] well developed, well nourished,  [x] NAD.      [] agitated   [] lethargic but arousable  [] obtunded   ENT, Palate:    [x] WNL   [] dry palate/MM        Respiratory:    [x] CTA bilateral  [] rales  [] rhonchi  [] normal resp effort      [] similar to yesterday   [] worse    [] improved   Cardiovascular:   [x] RRR   [] Irregular rate and rhythm   [x] Normal S1, S2   [x] No gallop or rub.   [] no murmur   [x] no new murmur  [] murmur

## 2024-10-18 LAB
ECHO AV AREA PEAK VELOCITY: 3.5 CM2
ECHO AV AREA PEAK VELOCITY: 3.5 CM2
ECHO AV AREA PEAK VELOCITY: 3.6 CM2
ECHO AV AREA PEAK VELOCITY: 3.6 CM2
ECHO AV AREA VTI: 4.4 CM2
ECHO AV AREA/BSA VTI: 2.2 CM2/M2
ECHO AV CUSP MM: 1 CM
ECHO AV MEAN GRADIENT: 2 MMHG
ECHO AV MEAN VELOCITY: 0.7 M/S
ECHO AV PEAK GRADIENT: 3 MMHG
ECHO AV PEAK GRADIENT: 3 MMHG
ECHO AV PEAK VELOCITY: 0.9 M/S
ECHO AV PEAK VELOCITY: 0.9 M/S
ECHO AV VTI: 13.9 CM
ECHO BSA: 2 M2
ECHO BSA: 2 M2
ECHO LV E' LATERAL VELOCITY: 12.5 CM/S
ECHO LV E' SEPTAL VELOCITY: 7.8 CM/S
ECHO LV EF PHYSICIAN: -5 %
ECHO LV INTERNAL DIMENSION DIASTOLIC MMODE: 5.7 CM (ref 4.2–5.9)
ECHO LV INTERNAL DIMENSION SYSTOLIC MMODE: 4.7 CM
ECHO LV IVSD MMODE: 1 CM (ref 0.6–1)
ECHO LV IVSS MMODE: 1.2 CM
ECHO LV POSTERIOR WALL DIASTOLIC MMODE: 1.1 CM (ref 0.6–1)
ECHO LVOT AREA: 3.8 CM2
ECHO LVOT AV VTI INDEX: 1.2
ECHO LVOT DIAM: 2.2 CM
ECHO LVOT MEAN GRADIENT: 1 MMHG
ECHO LVOT PEAK GRADIENT: 3 MMHG
ECHO LVOT PEAK VELOCITY: 0.9 M/S
ECHO LVOT STROKE VOLUME INDEX: 31.7 ML/M2
ECHO LVOT SV: 63.4 ML
ECHO LVOT VTI: 16.7 CM
ECHO MV A VELOCITY: 0.66 M/S
ECHO MV AREA VTI: 2.7 CM2
ECHO MV E DECELERATION TIME (DT): 187 MS
ECHO MV E VELOCITY: 0.67 M/S
ECHO MV E/A RATIO: 1.02
ECHO MV E/E' LATERAL: 5.36
ECHO MV E/E' RATIO (AVERAGED): 6.97
ECHO MV E/E' SEPTAL: 8.59
ECHO MV LVOT VTI INDEX: 1.41
ECHO MV MAX VELOCITY: 0.7 M/S
ECHO MV MEAN GRADIENT: 1 MMHG
ECHO MV MEAN VELOCITY: 0.4 M/S
ECHO MV PEAK GRADIENT: 2 MMHG
ECHO MV VTI: 23.5 CM
ECHO PV MAX VELOCITY: 0.9 M/S
ECHO PV PEAK GRADIENT: 3 MMHG
ECHO RV INTERNAL DIMENSION: 5.1 CM
ECHO TV REGURGITANT MAX VELOCITY: 0.12 M/S
ECHO TV REGURGITANT PEAK GRADIENT: 0 MMHG
EKG DIAGNOSIS: NORMAL
STRESS BASELINE DIAS BP: 68 MMHG
STRESS BASELINE HR: 54 BPM
STRESS BASELINE SYS BP: 124 MMHG
STRESS ESTIMATED WORKLOAD: 1 METS
STRESS O2 SAT PEAK: 96 %
STRESS O2 SAT REST: 94 %
STRESS PEAK DIAS BP: 60 MMHG
STRESS PEAK SYS BP: 125 MMHG
STRESS PERCENT HR ACHIEVED: 59 %
STRESS POST PEAK HR: 90 BPM
STRESS RATE PRESSURE PRODUCT: NORMAL BPM*MMHG
STRESS TARGET HR: 152 BPM

## 2024-10-30 ENCOUNTER — APPOINTMENT (OUTPATIENT)
Facility: HOSPITAL | Age: 69
DRG: 191 | End: 2024-10-30
Payer: MEDICARE

## 2024-10-30 ENCOUNTER — HOSPITAL ENCOUNTER (EMERGENCY)
Facility: HOSPITAL | Age: 69
Discharge: HOME | DRG: 191 | End: 2024-10-30
Attending: EMERGENCY MEDICINE
Payer: MEDICARE

## 2024-10-30 VITALS
HEIGHT: 76 IN | SYSTOLIC BLOOD PRESSURE: 132 MMHG | BODY MASS INDEX: 20.94 KG/M2 | HEART RATE: 90 BPM | DIASTOLIC BLOOD PRESSURE: 65 MMHG | RESPIRATION RATE: 23 BRPM | WEIGHT: 171.96 LBS | OXYGEN SATURATION: 95 % | TEMPERATURE: 98.5 F

## 2024-10-30 DIAGNOSIS — J44.1 COPD WITH ACUTE EXACERBATION (HCC): Primary | ICD-10-CM

## 2024-10-30 LAB
ALBUMIN SERPL-MCNC: 4 G/DL (ref 3.5–5)
ALBUMIN/GLOB SERPL: 1.3 (ref 1.1–2.2)
ALP SERPL-CCNC: 99 U/L (ref 45–117)
ALT SERPL-CCNC: 22 U/L (ref 12–78)
ANION GAP BLD CALC-SCNC: 6 (ref 10–20)
ANION GAP SERPL CALC-SCNC: 9 MMOL/L (ref 2–12)
AST SERPL-CCNC: 19 U/L (ref 15–37)
BASE EXCESS BLD CALC-SCNC: 4.9 MMOL/L
BASOPHILS # BLD: 0 K/UL (ref 0–0.1)
BASOPHILS NFR BLD: 1 % (ref 0–1)
BILIRUB SERPL-MCNC: 1 MG/DL (ref 0.2–1)
BUN SERPL-MCNC: 9 MG/DL (ref 6–20)
BUN/CREAT SERPL: 14 (ref 12–20)
CA-I BLD-MCNC: 1.14 MMOL/L (ref 1.15–1.33)
CALCIUM SERPL-MCNC: 9.5 MG/DL (ref 8.5–10.1)
CHLORIDE BLD-SCNC: 103 MMOL/L (ref 100–111)
CHLORIDE SERPL-SCNC: 98 MMOL/L (ref 97–108)
CO2 BLD-SCNC: 30 MMOL/L (ref 22–29)
CO2 SERPL-SCNC: 29 MMOL/L (ref 21–32)
COMMENT:: NORMAL
CREAT SERPL-MCNC: 0.64 MG/DL (ref 0.7–1.3)
CREAT UR-MCNC: 0.6 MG/DL (ref 0.6–1.3)
DIFFERENTIAL METHOD BLD: ABNORMAL
EKG ATRIAL RATE: 74 BPM
EKG DIAGNOSIS: NORMAL
EKG P AXIS: 88 DEGREES
EKG P-R INTERVAL: 178 MS
EKG Q-T INTERVAL: 378 MS
EKG QRS DURATION: 92 MS
EKG QTC CALCULATION (BAZETT): 419 MS
EKG R AXIS: 92 DEGREES
EKG T AXIS: 71 DEGREES
EKG VENTRICULAR RATE: 74 BPM
EOSINOPHIL # BLD: 0 K/UL (ref 0–0.4)
EOSINOPHIL NFR BLD: 1 % (ref 0–7)
ERYTHROCYTE [DISTWIDTH] IN BLOOD BY AUTOMATED COUNT: 14.5 % (ref 11.5–14.5)
FLUAV RNA SPEC QL NAA+PROBE: NOT DETECTED
FLUBV RNA SPEC QL NAA+PROBE: NOT DETECTED
GLOBULIN SER CALC-MCNC: 3.2 G/DL (ref 2–4)
GLUCOSE BLD STRIP.AUTO-MCNC: 80 MG/DL (ref 74–99)
GLUCOSE SERPL-MCNC: 86 MG/DL (ref 65–100)
HCO3 BLDA-SCNC: 30 MMOL/L
HCT VFR BLD AUTO: 42.9 % (ref 36.6–50.3)
HGB BLD-MCNC: 13.7 G/DL (ref 12.1–17)
IMM GRANULOCYTES # BLD AUTO: 0 K/UL (ref 0–0.04)
IMM GRANULOCYTES NFR BLD AUTO: 1 % (ref 0–0.5)
LACTATE BLD-SCNC: 0.61 MMOL/L (ref 0.4–2)
LYMPHOCYTES # BLD: 1.6 K/UL (ref 0.8–3.5)
LYMPHOCYTES NFR BLD: 27 % (ref 12–49)
MCH RBC QN AUTO: 30 PG (ref 26–34)
MCHC RBC AUTO-ENTMCNC: 31.9 G/DL (ref 30–36.5)
MCV RBC AUTO: 94.1 FL (ref 80–99)
MONOCYTES # BLD: 0.5 K/UL (ref 0–1)
MONOCYTES NFR BLD: 9 % (ref 5–13)
NEUTS SEG # BLD: 3.6 K/UL (ref 1.8–8)
NEUTS SEG NFR BLD: 61 % (ref 32–75)
NRBC # BLD: 0 K/UL (ref 0–0.01)
NRBC BLD-RTO: 0 PER 100 WBC
NT PRO BNP: 227 PG/ML
PCO2 BLDV: 45 MMHG (ref 41–51)
PH BLDV: 7.43 (ref 7.32–7.42)
PLATELET # BLD AUTO: 345 K/UL (ref 150–400)
PMV BLD AUTO: 8.2 FL (ref 8.9–12.9)
PO2 BLDV: 59 MMHG (ref 25–40)
POTASSIUM BLD-SCNC: 3.5 MMOL/L (ref 3.5–5.5)
POTASSIUM SERPL-SCNC: 3.5 MMOL/L (ref 3.5–5.1)
PROT SERPL-MCNC: 7.2 G/DL (ref 6.4–8.2)
RBC # BLD AUTO: 4.56 M/UL (ref 4.1–5.7)
SAO2 % BLD: 91 % (ref 94–98)
SARS-COV-2 RNA RESP QL NAA+PROBE: NOT DETECTED
SODIUM BLD-SCNC: 139 MMOL/L (ref 136–145)
SODIUM SERPL-SCNC: 136 MMOL/L (ref 136–145)
SOURCE: NORMAL
SPECIMEN HOLD: NORMAL
SPECIMEN SITE: ABNORMAL
TROPONIN I SERPL HS-MCNC: 9 NG/L (ref 0–76)
WBC # BLD AUTO: 5.9 K/UL (ref 4.1–11.1)

## 2024-10-30 PROCEDURE — 82803 BLOOD GASES ANY COMBINATION: CPT

## 2024-10-30 PROCEDURE — 71045 X-RAY EXAM CHEST 1 VIEW: CPT

## 2024-10-30 PROCEDURE — 82947 ASSAY GLUCOSE BLOOD QUANT: CPT

## 2024-10-30 PROCEDURE — 6360000002 HC RX W HCPCS: Performed by: EMERGENCY MEDICINE

## 2024-10-30 PROCEDURE — 84484 ASSAY OF TROPONIN QUANT: CPT

## 2024-10-30 PROCEDURE — 87636 SARSCOV2 & INF A&B AMP PRB: CPT

## 2024-10-30 PROCEDURE — 70450 CT HEAD/BRAIN W/O DYE: CPT

## 2024-10-30 PROCEDURE — 85025 COMPLETE CBC W/AUTO DIFF WBC: CPT

## 2024-10-30 PROCEDURE — 6360000004 HC RX CONTRAST MEDICATION: Performed by: EMERGENCY MEDICINE

## 2024-10-30 PROCEDURE — 83880 ASSAY OF NATRIURETIC PEPTIDE: CPT

## 2024-10-30 PROCEDURE — 93005 ELECTROCARDIOGRAM TRACING: CPT | Performed by: EMERGENCY MEDICINE

## 2024-10-30 PROCEDURE — 94640 AIRWAY INHALATION TREATMENT: CPT

## 2024-10-30 PROCEDURE — 36415 COLL VENOUS BLD VENIPUNCTURE: CPT

## 2024-10-30 PROCEDURE — 84295 ASSAY OF SERUM SODIUM: CPT

## 2024-10-30 PROCEDURE — 71275 CT ANGIOGRAPHY CHEST: CPT

## 2024-10-30 PROCEDURE — 82330 ASSAY OF CALCIUM: CPT

## 2024-10-30 PROCEDURE — 84132 ASSAY OF SERUM POTASSIUM: CPT

## 2024-10-30 PROCEDURE — 80053 COMPREHEN METABOLIC PANEL: CPT

## 2024-10-30 PROCEDURE — 6370000000 HC RX 637 (ALT 250 FOR IP): Performed by: EMERGENCY MEDICINE

## 2024-10-30 RX ORDER — ALBUTEROL SULFATE 5 MG/ML
10 SOLUTION RESPIRATORY (INHALATION)
Status: COMPLETED | OUTPATIENT
Start: 2024-10-30 | End: 2024-10-30

## 2024-10-30 RX ORDER — ALBUTEROL SULFATE 90 UG/1
2 INHALANT RESPIRATORY (INHALATION) 4 TIMES DAILY PRN
Qty: 54 G | Refills: 1 | Status: SHIPPED | OUTPATIENT
Start: 2024-10-30

## 2024-10-30 RX ORDER — METHYLPREDNISOLONE 4 MG/1
TABLET ORAL
Qty: 21 TABLET | Refills: 0 | Status: ON HOLD | OUTPATIENT
Start: 2024-10-30 | End: 2024-11-05 | Stop reason: HOSPADM

## 2024-10-30 RX ORDER — ALBUTEROL SULFATE 0.83 MG/ML
2.5 SOLUTION RESPIRATORY (INHALATION) EVERY 4 HOURS PRN
Qty: 120 EACH | Refills: 0 | Status: SHIPPED | OUTPATIENT
Start: 2024-10-30

## 2024-10-30 RX ORDER — IOPAMIDOL 755 MG/ML
100 INJECTION, SOLUTION INTRAVASCULAR
Status: COMPLETED | OUTPATIENT
Start: 2024-10-30 | End: 2024-10-30

## 2024-10-30 RX ORDER — ALBUTEROL SULFATE 0.83 MG/ML
5 SOLUTION RESPIRATORY (INHALATION)
Status: COMPLETED | OUTPATIENT
Start: 2024-10-30 | End: 2024-10-30

## 2024-10-30 RX ORDER — ACETAMINOPHEN 500 MG
1000 TABLET ORAL
Status: COMPLETED | OUTPATIENT
Start: 2024-10-30 | End: 2024-10-30

## 2024-10-30 RX ORDER — AZITHROMYCIN 250 MG/1
TABLET, FILM COATED ORAL
Qty: 6 TABLET | Refills: 0 | Status: ON HOLD | OUTPATIENT
Start: 2024-10-30 | End: 2024-11-05 | Stop reason: HOSPADM

## 2024-10-30 RX ADMIN — ALBUTEROL SULFATE 10 MG: 2.5 SOLUTION RESPIRATORY (INHALATION) at 08:16

## 2024-10-30 RX ADMIN — ACETAMINOPHEN 1000 MG: 500 TABLET ORAL at 06:32

## 2024-10-30 RX ADMIN — ALBUTEROL SULFATE 5 MG: 2.5 SOLUTION RESPIRATORY (INHALATION) at 06:33

## 2024-10-30 RX ADMIN — IOPAMIDOL 100 ML: 755 INJECTION, SOLUTION INTRAVENOUS at 08:06

## 2024-10-30 RX ADMIN — IPRATROPIUM BROMIDE 0.5 MG: 0.5 SOLUTION RESPIRATORY (INHALATION) at 06:33

## 2024-10-30 ASSESSMENT — PAIN DESCRIPTION - LOCATION: LOCATION: FOOT

## 2024-10-30 ASSESSMENT — PAIN SCALES - GENERAL: PAINLEVEL_OUTOF10: 10

## 2024-10-30 ASSESSMENT — PAIN DESCRIPTION - ORIENTATION: ORIENTATION: RIGHT;LEFT

## 2024-10-30 ASSESSMENT — PAIN DESCRIPTION - DESCRIPTORS: DESCRIPTORS: ACHING

## 2024-10-30 NOTE — ED NOTES
Assumed care of pt. Pt reports ongoing SOB despite increased O2 via NC and medications. Repeatedly asks staff \"Can you help me breathe?\" Appears restless, repeats questions, appears to have trouble finding words. spO2 maintained above 95% on 4 lpm. Breathing appears shallow, rapid. Dr. Ghosh notified.

## 2024-10-30 NOTE — ED NOTES
Report given to SARINA Stubbs. Nurse was informed of reason for arrival, vitals, labs, medications, orders, procedures, results, anything left pending and current plan of action. Questions were asked and received prior to departure from the patient.

## 2024-10-30 NOTE — DISCHARGE INSTRUCTIONS
You were evaluated in the emergency department for shortness of breath.  Your examination was reassuring as was your work-up including lab work, EKG, chest x-ray, and CT scans.  You are offered admission for a COPD exacerbation but you felt better and wanted to go home.  Please take the antibiotics, steroids, and breathing treatments as directed.  It will be important for you to follow-up with your primary care physician in 1-3 days.  If you develop worsening symptoms such as worsening shortness of breath, any chest pain, or fevers, please return to the emergency department immediately.

## 2024-10-30 NOTE — ED PROVIDER NOTES
\[facility]\"" EMERGENCY DEPT  EMERGENCY DEPARTMENT ENCOUNTER       Pt Name: Rodolfo Delcid  MRN: [id number]  Birthdate 1955  Date of evaluation: 10/30/2024  Provider: Hay Ghosh MD   PCP: Lukasz Awad DO  Note Started: 2:07 PM 10/30/24     CHIEF COMPLAINT       Chief Complaint   Patient presents with    Respiratory Distress     Pt came in from home via EMS with cc of difficulty breathing started yesterday afternoon. Tried albuterol inhaler at home, but no relief.  Duoneb , albuterol and solumedrol 125mg TIV given per EMS. Wears 02 at baseline at 2LPM. Hx of COPD        HISTORY OF PRESENT ILLNESS: 1 or more elements      History From: Patient, EMS, History limited by: No limitations     Rodolfo Delcid is a 69 y.o. male with history of CAD, tobacco abuse,, previous MI, COPD on baseline 2 to 3 L nasal cannula O2, hypertension, seizures maintained on Keppra, prior CVA who presents with chief complaint of shortness of breath, cough.  Symptoms have been present over the past 24 hours.  Denies any fevers, chills, exposure to recent contacts.  Denies any new chest pain.  Endorses cough productive with clear sputum.  Endorses chronic leg swelling.  EMS started him on Solu-Medrol and breathing treatments en route.     Nursing Notes were all reviewed and agreed with or any disagreements were addressed in the HPI.     REVIEW OF SYSTEMS        Positives and Pertinent negatives as per HPI.    PAST HISTORY     Past Medical History:  Past Medical History:   Diagnosis Date    Arthritis     Hips, Knees    CAD (coronary artery disease)     MI around 1990    COPD (chronic obstructive pulmonary disease) (HCC)     Hepatitis A     High cholesterol     Hypertension     Other ill-defined conditions(799.89)     Light sensitivity, causes seizures    Seizures (HCC)     Last seizure 12/2008/work -up in 2012 -possible seizure    Stroke (HCC) 2005    Thyroid disease     Hypothyroid       Past Surgical History:  Past Surgical History:

## 2024-10-30 NOTE — ED NOTES
Back from CT, connected to monitor x3, call bell in reach. Pt reports breathing \"feels better. NC reduced to 2 lpm per reported baseline.

## 2024-10-30 NOTE — ED NOTES
Pt repeatedly pressing call bell requesting staff \"Help me breathe\". Pt's spO2 maintained above 94% on baseline 2 lpm during these episodes. Dr. Ghosh notified of

## 2024-10-31 ENCOUNTER — APPOINTMENT (OUTPATIENT)
Facility: HOSPITAL | Age: 69
DRG: 191 | End: 2024-10-31
Payer: MEDICARE

## 2024-10-31 ENCOUNTER — HOSPITAL ENCOUNTER (INPATIENT)
Facility: HOSPITAL | Age: 69
LOS: 5 days | Discharge: HOME | DRG: 191 | End: 2024-11-05
Attending: EMERGENCY MEDICINE | Admitting: INTERNAL MEDICINE
Payer: MEDICARE

## 2024-10-31 DIAGNOSIS — J40 BRONCHITIS: ICD-10-CM

## 2024-10-31 DIAGNOSIS — J44.1 COPD EXACERBATION (HCC): Primary | ICD-10-CM

## 2024-10-31 DIAGNOSIS — G93.40 ACUTE ENCEPHALOPATHY: ICD-10-CM

## 2024-10-31 PROBLEM — G93.41 ACUTE METABOLIC ENCEPHALOPATHY: Status: ACTIVE | Noted: 2024-10-31

## 2024-10-31 LAB
ALBUMIN SERPL-MCNC: 3.5 G/DL (ref 3.5–5)
ALBUMIN/GLOB SERPL: 1.1 (ref 1.1–2.2)
ALP SERPL-CCNC: 89 U/L (ref 45–117)
ALT SERPL-CCNC: 23 U/L (ref 12–78)
ANION GAP BLD CALC-SCNC: 8 (ref 10–20)
ANION GAP SERPL CALC-SCNC: 7 MMOL/L (ref 2–12)
APPEARANCE UR: CLEAR
ARTERIAL PATENCY WRIST A: YES
AST SERPL-CCNC: 15 U/L (ref 15–37)
BACTERIA URNS QL MICRO: NEGATIVE /HPF
BASE EXCESS BLD CALC-SCNC: 6.5 MMOL/L
BASE EXCESS BLDA CALC-SCNC: 0.5 MMOL/L
BASOPHILS # BLD: 0 K/UL (ref 0–0.1)
BASOPHILS NFR BLD: 0 % (ref 0–1)
BDY SITE: ABNORMAL
BILIRUB SERPL-MCNC: 0.6 MG/DL (ref 0.2–1)
BILIRUB UR QL: NEGATIVE
BUN SERPL-MCNC: 17 MG/DL (ref 6–20)
BUN/CREAT SERPL: 27 (ref 12–20)
CA-I BLD-MCNC: 1.21 MMOL/L (ref 1.15–1.33)
CALCIUM SERPL-MCNC: 9.2 MG/DL (ref 8.5–10.1)
CHLORIDE BLD-SCNC: 98 MMOL/L (ref 100–111)
CHLORIDE SERPL-SCNC: 102 MMOL/L (ref 97–108)
CO2 BLD-SCNC: 32 MMOL/L (ref 22–29)
CO2 SERPL-SCNC: 28 MMOL/L (ref 21–32)
COLOR UR: ABNORMAL
COMMENT:: NORMAL
CREAT SERPL-MCNC: 0.62 MG/DL (ref 0.7–1.3)
CREAT UR-MCNC: 0.6 MG/DL (ref 0.6–1.3)
DIFFERENTIAL METHOD BLD: ABNORMAL
EOSINOPHIL # BLD: 0 K/UL (ref 0–0.4)
EOSINOPHIL NFR BLD: 0 % (ref 0–7)
EPITH CASTS URNS QL MICRO: ABNORMAL /LPF
ERYTHROCYTE [DISTWIDTH] IN BLOOD BY AUTOMATED COUNT: 14.6 % (ref 11.5–14.5)
GAS FLOW.O2 O2 DELIVERY SYS: 2 L/MIN
GLOBULIN SER CALC-MCNC: 3.2 G/DL (ref 2–4)
GLUCOSE BLD STRIP.AUTO-MCNC: 93 MG/DL (ref 74–99)
GLUCOSE SERPL-MCNC: 91 MG/DL (ref 65–100)
GLUCOSE UR STRIP.AUTO-MCNC: NEGATIVE MG/DL
HCO3 BLDA-SCNC: 25 MMOL/L (ref 22–26)
HCO3 BLDA-SCNC: 32 MMOL/L
HCT VFR BLD AUTO: 41.6 % (ref 36.6–50.3)
HGB BLD-MCNC: 13.3 G/DL (ref 12.1–17)
HGB UR QL STRIP: NEGATIVE
IMM GRANULOCYTES # BLD AUTO: 0 K/UL (ref 0–0.04)
IMM GRANULOCYTES NFR BLD AUTO: 0 % (ref 0–0.5)
KETONES UR QL STRIP.AUTO: ABNORMAL MG/DL
LACTATE BLD-SCNC: 0.88 MMOL/L (ref 0.4–2)
LEUKOCYTE ESTERASE UR QL STRIP.AUTO: NEGATIVE
LYMPHOCYTES # BLD: 0.6 K/UL (ref 0.8–3.5)
LYMPHOCYTES NFR BLD: 10 % (ref 12–49)
MCH RBC QN AUTO: 30 PG (ref 26–34)
MCHC RBC AUTO-ENTMCNC: 32 G/DL (ref 30–36.5)
MCV RBC AUTO: 93.7 FL (ref 80–99)
MONOCYTES # BLD: 0.5 K/UL (ref 0–1)
MONOCYTES NFR BLD: 9 % (ref 5–13)
NEUTS SEG # BLD: 4.7 K/UL (ref 1.8–8)
NEUTS SEG NFR BLD: 81 % (ref 32–75)
NITRITE UR QL STRIP.AUTO: NEGATIVE
NRBC # BLD: 0 K/UL (ref 0–0.01)
NRBC BLD-RTO: 0 PER 100 WBC
NT PRO BNP: 403 PG/ML
PCO2 BLDA: 40 MMHG (ref 35–45)
PCO2 BLDV: 48.3 MMHG (ref 41–51)
PH BLDA: 7.42 (ref 7.35–7.45)
PH BLDV: 7.43 (ref 7.32–7.42)
PH UR STRIP: 6 (ref 5–8)
PLATELET # BLD AUTO: 357 K/UL (ref 150–400)
PMV BLD AUTO: 8.8 FL (ref 8.9–12.9)
PO2 BLDA: 73 MMHG (ref 80–100)
PO2 BLDV: 55 MMHG (ref 25–40)
POTASSIUM BLD-SCNC: 3.8 MMOL/L (ref 3.5–5.5)
POTASSIUM SERPL-SCNC: 3.8 MMOL/L (ref 3.5–5.1)
PROT SERPL-MCNC: 6.7 G/DL (ref 6.4–8.2)
PROT UR STRIP-MCNC: NEGATIVE MG/DL
RBC # BLD AUTO: 4.44 M/UL (ref 4.1–5.7)
RBC #/AREA URNS HPF: ABNORMAL /HPF (ref 0–5)
RBC MORPH BLD: ABNORMAL
SAO2 % BLD: 89 % (ref 94–98)
SAO2 % BLD: 95 % (ref 92–97)
SAO2% DEVICE SAO2% SENSOR NAME: ABNORMAL
SODIUM BLD-SCNC: 138 MMOL/L (ref 136–145)
SODIUM SERPL-SCNC: 137 MMOL/L (ref 136–145)
SP GR UR REFRACTOMETRY: 1.02
SPECIMEN HOLD: NORMAL
SPECIMEN SITE: ABNORMAL
SPECIMEN SITE: ABNORMAL
TROPONIN I SERPL HS-MCNC: 11 NG/L (ref 0–76)
URINE CULTURE IF INDICATED: ABNORMAL
UROBILINOGEN UR QL STRIP.AUTO: 1 EU/DL (ref 0.2–1)
WBC # BLD AUTO: 5.8 K/UL (ref 4.1–11.1)
WBC URNS QL MICRO: ABNORMAL /HPF (ref 0–4)

## 2024-10-31 PROCEDURE — 6370000000 HC RX 637 (ALT 250 FOR IP): Performed by: INTERNAL MEDICINE

## 2024-10-31 PROCEDURE — 82330 ASSAY OF CALCIUM: CPT

## 2024-10-31 PROCEDURE — 87086 URINE CULTURE/COLONY COUNT: CPT

## 2024-10-31 PROCEDURE — 84295 ASSAY OF SERUM SODIUM: CPT

## 2024-10-31 PROCEDURE — 99285 EMERGENCY DEPT VISIT HI MDM: CPT

## 2024-10-31 PROCEDURE — 81001 URINALYSIS AUTO W/SCOPE: CPT

## 2024-10-31 PROCEDURE — 2700000000 HC OXYGEN THERAPY PER DAY

## 2024-10-31 PROCEDURE — 94640 AIRWAY INHALATION TREATMENT: CPT

## 2024-10-31 PROCEDURE — 6360000002 HC RX W HCPCS: Performed by: INTERNAL MEDICINE

## 2024-10-31 PROCEDURE — 82803 BLOOD GASES ANY COMBINATION: CPT

## 2024-10-31 PROCEDURE — 6360000002 HC RX W HCPCS: Performed by: EMERGENCY MEDICINE

## 2024-10-31 PROCEDURE — 6370000000 HC RX 637 (ALT 250 FOR IP): Performed by: EMERGENCY MEDICINE

## 2024-10-31 PROCEDURE — 82947 ASSAY GLUCOSE BLOOD QUANT: CPT

## 2024-10-31 PROCEDURE — 36415 COLL VENOUS BLD VENIPUNCTURE: CPT

## 2024-10-31 PROCEDURE — 2580000003 HC RX 258: Performed by: INTERNAL MEDICINE

## 2024-10-31 PROCEDURE — 71045 X-RAY EXAM CHEST 1 VIEW: CPT

## 2024-10-31 PROCEDURE — 83880 ASSAY OF NATRIURETIC PEPTIDE: CPT

## 2024-10-31 PROCEDURE — 36600 WITHDRAWAL OF ARTERIAL BLOOD: CPT

## 2024-10-31 PROCEDURE — 85025 COMPLETE CBC W/AUTO DIFF WBC: CPT

## 2024-10-31 PROCEDURE — 80053 COMPREHEN METABOLIC PANEL: CPT

## 2024-10-31 PROCEDURE — 84132 ASSAY OF SERUM POTASSIUM: CPT

## 2024-10-31 PROCEDURE — 96374 THER/PROPH/DIAG INJ IV PUSH: CPT

## 2024-10-31 PROCEDURE — 84484 ASSAY OF TROPONIN QUANT: CPT

## 2024-10-31 PROCEDURE — 2580000003 HC RX 258: Performed by: EMERGENCY MEDICINE

## 2024-10-31 PROCEDURE — 1100000003 HC PRIVATE W/ TELEMETRY

## 2024-10-31 PROCEDURE — 93005 ELECTROCARDIOGRAM TRACING: CPT | Performed by: EMERGENCY MEDICINE

## 2024-10-31 RX ORDER — ARFORMOTEROL TARTRATE 15 UG/2ML
15 SOLUTION RESPIRATORY (INHALATION)
Status: DISCONTINUED | OUTPATIENT
Start: 2024-10-31 | End: 2024-10-31

## 2024-10-31 RX ORDER — ACETAMINOPHEN 325 MG/1
650 TABLET ORAL EVERY 4 HOURS PRN
Status: DISCONTINUED | OUTPATIENT
Start: 2024-10-31 | End: 2024-10-31 | Stop reason: SDUPTHER

## 2024-10-31 RX ORDER — IPRATROPIUM BROMIDE AND ALBUTEROL SULFATE 2.5; .5 MG/3ML; MG/3ML
3 SOLUTION RESPIRATORY (INHALATION)
Status: COMPLETED | OUTPATIENT
Start: 2024-10-31 | End: 2024-10-31

## 2024-10-31 RX ORDER — ASPIRIN 81 MG/1
81 TABLET ORAL 2 TIMES DAILY
Status: DISCONTINUED | OUTPATIENT
Start: 2024-10-31 | End: 2024-11-05 | Stop reason: HOSPADM

## 2024-10-31 RX ORDER — SODIUM CHLORIDE 0.9 % (FLUSH) 0.9 %
5-40 SYRINGE (ML) INJECTION EVERY 12 HOURS SCHEDULED
Status: DISCONTINUED | OUTPATIENT
Start: 2024-10-31 | End: 2024-11-05 | Stop reason: HOSPADM

## 2024-10-31 RX ORDER — ONDANSETRON 2 MG/ML
4 INJECTION INTRAMUSCULAR; INTRAVENOUS EVERY 4 HOURS PRN
Status: DISCONTINUED | OUTPATIENT
Start: 2024-10-31 | End: 2024-10-31 | Stop reason: SDUPTHER

## 2024-10-31 RX ORDER — ONDANSETRON 2 MG/ML
4 INJECTION INTRAMUSCULAR; INTRAVENOUS EVERY 4 HOURS PRN
Status: DISCONTINUED | OUTPATIENT
Start: 2024-10-31 | End: 2024-11-05 | Stop reason: HOSPADM

## 2024-10-31 RX ORDER — CITALOPRAM HYDROBROMIDE 20 MG/1
20 TABLET ORAL DAILY
Status: DISCONTINUED | OUTPATIENT
Start: 2024-10-31 | End: 2024-11-05 | Stop reason: HOSPADM

## 2024-10-31 RX ORDER — BUDESONIDE 0.5 MG/2ML
0.5 INHALANT ORAL
Status: DISCONTINUED | OUTPATIENT
Start: 2024-10-31 | End: 2024-10-31

## 2024-10-31 RX ORDER — ARFORMOTEROL TARTRATE 15 UG/2ML
15 SOLUTION RESPIRATORY (INHALATION)
Status: DISCONTINUED | OUTPATIENT
Start: 2024-10-31 | End: 2024-11-05 | Stop reason: HOSPADM

## 2024-10-31 RX ORDER — NICOTINE 21 MG/24HR
1 PATCH, TRANSDERMAL 24 HOURS TRANSDERMAL DAILY
Status: DISCONTINUED | OUTPATIENT
Start: 2024-10-31 | End: 2024-11-05 | Stop reason: HOSPADM

## 2024-10-31 RX ORDER — ACETAMINOPHEN 325 MG/1
650 TABLET ORAL EVERY 4 HOURS PRN
Status: DISCONTINUED | OUTPATIENT
Start: 2024-10-31 | End: 2024-11-05 | Stop reason: HOSPADM

## 2024-10-31 RX ORDER — POLYETHYLENE GLYCOL 3350 17 G/17G
17 POWDER, FOR SOLUTION ORAL DAILY PRN
Status: DISCONTINUED | OUTPATIENT
Start: 2024-10-31 | End: 2024-11-05 | Stop reason: HOSPADM

## 2024-10-31 RX ORDER — ENOXAPARIN SODIUM 100 MG/ML
40 INJECTION SUBCUTANEOUS DAILY
Status: DISCONTINUED | OUTPATIENT
Start: 2024-10-31 | End: 2024-11-05 | Stop reason: HOSPADM

## 2024-10-31 RX ORDER — DOXYCYCLINE HYCLATE 100 MG
100 TABLET ORAL EVERY 12 HOURS SCHEDULED
Status: DISCONTINUED | OUTPATIENT
Start: 2024-10-31 | End: 2024-11-03

## 2024-10-31 RX ORDER — ATORVASTATIN CALCIUM 40 MG/1
40 TABLET, FILM COATED ORAL DAILY
Status: DISCONTINUED | OUTPATIENT
Start: 2024-10-31 | End: 2024-11-05 | Stop reason: HOSPADM

## 2024-10-31 RX ORDER — ONDANSETRON 4 MG/1
4 TABLET, ORALLY DISINTEGRATING ORAL EVERY 8 HOURS PRN
Status: DISCONTINUED | OUTPATIENT
Start: 2024-10-31 | End: 2024-11-05 | Stop reason: HOSPADM

## 2024-10-31 RX ORDER — SODIUM CHLORIDE 9 MG/ML
INJECTION, SOLUTION INTRAVENOUS PRN
Status: DISCONTINUED | OUTPATIENT
Start: 2024-10-31 | End: 2024-11-05 | Stop reason: HOSPADM

## 2024-10-31 RX ORDER — PREDNISONE 20 MG/1
40 TABLET ORAL DAILY
Status: COMPLETED | OUTPATIENT
Start: 2024-11-02 | End: 2024-11-04

## 2024-10-31 RX ORDER — IPRATROPIUM BROMIDE AND ALBUTEROL SULFATE 2.5; .5 MG/3ML; MG/3ML
1 SOLUTION RESPIRATORY (INHALATION)
Status: DISCONTINUED | OUTPATIENT
Start: 2024-10-31 | End: 2024-11-05 | Stop reason: HOSPADM

## 2024-10-31 RX ORDER — ACETYLCYSTEINE 200 MG/ML
3 SOLUTION ORAL; RESPIRATORY (INHALATION)
Status: DISCONTINUED | OUTPATIENT
Start: 2024-10-31 | End: 2024-11-05 | Stop reason: HOSPADM

## 2024-10-31 RX ORDER — SODIUM CHLORIDE 0.9 % (FLUSH) 0.9 %
5-40 SYRINGE (ML) INJECTION PRN
Status: DISCONTINUED | OUTPATIENT
Start: 2024-10-31 | End: 2024-11-05 | Stop reason: HOSPADM

## 2024-10-31 RX ORDER — GUAIFENESIN 600 MG/1
600 TABLET, EXTENDED RELEASE ORAL 2 TIMES DAILY
Status: DISCONTINUED | OUTPATIENT
Start: 2024-10-31 | End: 2024-11-05 | Stop reason: HOSPADM

## 2024-10-31 RX ORDER — AMLODIPINE BESYLATE 5 MG/1
10 TABLET ORAL DAILY
Status: DISCONTINUED | OUTPATIENT
Start: 2024-10-31 | End: 2024-11-05 | Stop reason: HOSPADM

## 2024-10-31 RX ORDER — ACETAMINOPHEN 650 MG/1
650 SUPPOSITORY RECTAL EVERY 6 HOURS PRN
Status: DISCONTINUED | OUTPATIENT
Start: 2024-10-31 | End: 2024-11-05 | Stop reason: HOSPADM

## 2024-10-31 RX ORDER — LEVETIRACETAM 500 MG/1
1000 TABLET ORAL 2 TIMES DAILY
Status: DISCONTINUED | OUTPATIENT
Start: 2024-10-31 | End: 2024-11-05 | Stop reason: HOSPADM

## 2024-10-31 RX ORDER — LEVOTHYROXINE SODIUM 50 UG/1
50 TABLET ORAL DAILY
Status: DISCONTINUED | OUTPATIENT
Start: 2024-10-31 | End: 2024-11-05 | Stop reason: HOSPADM

## 2024-10-31 RX ORDER — GABAPENTIN 300 MG/1
300 CAPSULE ORAL 2 TIMES DAILY
Status: DISCONTINUED | OUTPATIENT
Start: 2024-10-31 | End: 2024-11-05 | Stop reason: HOSPADM

## 2024-10-31 RX ORDER — FAMOTIDINE 20 MG/1
20 TABLET, FILM COATED ORAL 2 TIMES DAILY
Status: DISCONTINUED | OUTPATIENT
Start: 2024-10-31 | End: 2024-11-05 | Stop reason: HOSPADM

## 2024-10-31 RX ADMIN — IPRATROPIUM BROMIDE AND ALBUTEROL SULFATE 1 DOSE: 2.5; .5 SOLUTION RESPIRATORY (INHALATION) at 14:53

## 2024-10-31 RX ADMIN — AMLODIPINE BESYLATE 10 MG: 5 TABLET ORAL at 10:19

## 2024-10-31 RX ADMIN — ACETYLCYSTEINE 600 MG: 200 INHALANT RESPIRATORY (INHALATION) at 21:07

## 2024-10-31 RX ADMIN — GABAPENTIN 300 MG: 300 CAPSULE ORAL at 10:19

## 2024-10-31 RX ADMIN — ATORVASTATIN CALCIUM 40 MG: 40 TABLET, FILM COATED ORAL at 10:19

## 2024-10-31 RX ADMIN — IPRATROPIUM BROMIDE AND ALBUTEROL SULFATE 3 DOSE: .5; 3 SOLUTION RESPIRATORY (INHALATION) at 04:09

## 2024-10-31 RX ADMIN — CITALOPRAM HYDROBROMIDE 20 MG: 20 TABLET ORAL at 10:19

## 2024-10-31 RX ADMIN — LEVOTHYROXINE SODIUM 50 MCG: 0.05 TABLET ORAL at 09:17

## 2024-10-31 RX ADMIN — ASPIRIN 81 MG: 81 TABLET, COATED ORAL at 22:22

## 2024-10-31 RX ADMIN — SODIUM CHLORIDE, PRESERVATIVE FREE 10 ML: 5 INJECTION INTRAVENOUS at 22:25

## 2024-10-31 RX ADMIN — FAMOTIDINE 20 MG: 20 TABLET, FILM COATED ORAL at 10:19

## 2024-10-31 RX ADMIN — WATER 40 MG: 1 INJECTION INTRAMUSCULAR; INTRAVENOUS; SUBCUTANEOUS at 15:52

## 2024-10-31 RX ADMIN — ACETYLCYSTEINE 600 MG: 200 INHALANT RESPIRATORY (INHALATION) at 09:09

## 2024-10-31 RX ADMIN — WATER 125 MG: 1 INJECTION INTRAMUSCULAR; INTRAVENOUS; SUBCUTANEOUS at 04:11

## 2024-10-31 RX ADMIN — LEVETIRACETAM 1000 MG: 500 TABLET, FILM COATED ORAL at 10:19

## 2024-10-31 RX ADMIN — DOXYCYCLINE HYCLATE 100 MG: 100 TABLET, COATED ORAL at 10:19

## 2024-10-31 RX ADMIN — WATER 40 MG: 1 INJECTION INTRAMUSCULAR; INTRAVENOUS; SUBCUTANEOUS at 22:22

## 2024-10-31 RX ADMIN — FAMOTIDINE 20 MG: 20 TABLET, FILM COATED ORAL at 22:22

## 2024-10-31 RX ADMIN — ARFORMOTEROL TARTRATE 15 MCG: 15 SOLUTION RESPIRATORY (INHALATION) at 09:15

## 2024-10-31 RX ADMIN — ENOXAPARIN SODIUM 40 MG: 100 INJECTION SUBCUTANEOUS at 10:23

## 2024-10-31 RX ADMIN — GABAPENTIN 300 MG: 300 CAPSULE ORAL at 22:22

## 2024-10-31 RX ADMIN — SODIUM CHLORIDE, PRESERVATIVE FREE 10 ML: 5 INJECTION INTRAVENOUS at 12:18

## 2024-10-31 RX ADMIN — IPRATROPIUM BROMIDE AND ALBUTEROL SULFATE 1 DOSE: 2.5; .5 SOLUTION RESPIRATORY (INHALATION) at 21:02

## 2024-10-31 RX ADMIN — GUAIFENESIN 600 MG: 600 TABLET, EXTENDED RELEASE ORAL at 12:18

## 2024-10-31 RX ADMIN — ARFORMOTEROL TARTRATE 15 MCG: 15 SOLUTION RESPIRATORY (INHALATION) at 21:08

## 2024-10-31 RX ADMIN — IPRATROPIUM BROMIDE AND ALBUTEROL SULFATE 1 DOSE: 2.5; .5 SOLUTION RESPIRATORY (INHALATION) at 12:03

## 2024-10-31 RX ADMIN — LEVETIRACETAM 1000 MG: 500 TABLET, FILM COATED ORAL at 22:22

## 2024-10-31 RX ADMIN — DOXYCYCLINE HYCLATE 100 MG: 100 TABLET, COATED ORAL at 22:22

## 2024-10-31 RX ADMIN — GUAIFENESIN 600 MG: 600 TABLET, EXTENDED RELEASE ORAL at 22:22

## 2024-10-31 RX ADMIN — ASPIRIN 81 MG: 81 TABLET, COATED ORAL at 10:19

## 2024-10-31 ASSESSMENT — PAIN SCALES - GENERAL
PAINLEVEL_OUTOF10: 9
PAINLEVEL_OUTOF10: 0

## 2024-10-31 ASSESSMENT — PAIN - FUNCTIONAL ASSESSMENT: PAIN_FUNCTIONAL_ASSESSMENT: 0-10

## 2024-10-31 ASSESSMENT — PAIN DESCRIPTION - LOCATION: LOCATION: GENERALIZED

## 2024-10-31 ASSESSMENT — PAIN DESCRIPTION - DESCRIPTORS: DESCRIPTORS: DISCOMFORT;SORE

## 2024-10-31 NOTE — ED NOTES
Verbal (telephone) report given to SARINA Humphrey (oncoming nurse) by Shannan (offgoing nurse). Report included the following information Nurse Handoff Report, ED Encounter Summary, ED SBAR, Intake/Output, MAR, Recent Results, Cardiac Rhythm NSR, and Neuro Assessment, and outstanding orders to be completed. Opportunity for questions and clarification provided.

## 2024-10-31 NOTE — ED PROVIDER NOTES
Diagnosis Date    Arthritis     Hips, Knees    CAD (coronary artery disease)     MI around     COPD (chronic obstructive pulmonary disease) (HCC)     Hepatitis A     High cholesterol     Hypertension     Other ill-defined conditions(799.89)     Light sensitivity, causes seizures    Seizures (HCC)     Last seizure 2008/work -up in  -possible seizure    Stroke (HCC)     Thyroid disease     Hypothyroid       Past Surgical History:  Past Surgical History:   Procedure Laterality Date    CARDIAC CATHETERIZATION  20 years ago    no stents    HERNIA REPAIR  10/8/14    left inguinal hernia- Pawan Hartman MD    ORTHOPEDIC SURGERY  2010    [facility]. Total Hip Replacement/Dr. Borrero    ORTHOPEDIC SURGERY      Left Knee Scope    ORTHOPEDIC SURGERY  14    R hip replacement     ORTHOPEDIC SURGERY Right     Shoulder fracture & surgical repair with hardware    REVISION TOTAL HIP ARTHROPLASTY Left 2023    LEFT HIP TOTAL ARTHROPLASTY REVISION POSTERIOR APPROACH performed by Austyn Medellin MD at [facility] MAIN OR       Family History:  Family History   Problem Relation Age of Onset    Stroke Mother     Diabetes Mother     Cancer Father         lung    Heart Disease Father     Hypertension Mother        Social History:  Social History     Tobacco Use    Smoking status: Former     Current packs/day: 0.00     Types: Cigarettes     Quit date: 3/1/2023     Years since quittin.6    Smokeless tobacco: Never   Vaping Use    Vaping status: Never Used   Substance Use Topics    Alcohol use: No    Drug use: No       Allergies:  Allergies   Allergen Reactions    Carbamazepine Rash       Medical Records Review:  I reviewed and interpreted the nursing notes and and vital signs from today's visit, as well as the electronic medical record system for any external medical records that were available that may contribute to the patients current condition.  This includes my independent reviewed and interpreted the following medical

## 2024-10-31 NOTE — ED NOTES
Patient calling out, stating \"I need to pee, but you need to help me.\" Pt placed on malewick external catheter at this time. Pt voiding independently without difficulty.

## 2024-10-31 NOTE — ED NOTES
Assumed care of patient at this time, report received from SARINA Petty. Patient resting in stretcher, bed locked and in low position, side rails x2, call bell within reach.

## 2024-10-31 NOTE — H&P
History & Physical    Primary Care Provider: Lukasz Awad DO  Source of Information: Patient/family     Chief complaint:   Chief Complaint   Patient presents with    Shortness of Breath     BIBEMS coming from home with cc of SOB. On 3L at baseline sating at 98%, hx of COPD. Patient is confused and disorientated upon arrival.Was given a breathing treatment PTA by family helped a little.      History of present illness  69-year-old gentleman with history of coronary artery disease, COPD on 2 to 3 L home O2 via nasal cannula, essential hypertension and seizure disorder presented to the ED with complaints of progressive shortness of breath and nonproductive cough for the last few days.  Notes shortness of breath with minimal exertion.  Denies recent sick contacts or recent travel.  Notes compliance with home medication including nebulizer treatments.  Continues to smoke 1 pack/day of tobacco.  Chest x-ray negative for consolidation.  Noted with significant bilateral expiratory wheezing.  Treated in the ED with IV steroids and DuoNebs and will be admitted for further treatment.        Review of Systems:  A comprehensive review of systems was negative except for that written in the History of Present Illness.     Past Medical History:   Diagnosis Date    Arthritis     Hips, Knees    CAD (coronary artery disease)     MI around 1990    COPD (chronic obstructive pulmonary disease) (HCC)     Hepatitis A     High cholesterol     Hypertension     Other ill-defined conditions(799.89)     Light sensitivity, causes seizures    Seizures (HCC)     Last seizure 12/2008/work -up in 2012 -possible seizure    Stroke (HCC) 2005    Thyroid disease     Hypothyroid        Past Surgical History:   Procedure Laterality Date    CARDIAC CATHETERIZATION  20 years ago    no stents    HERNIA REPAIR  10/8/14    left inguinal hernia- Pawan Hartman MD    ORTHOPEDIC SURGERY  2/2010    [facility]. Total Hip Replacement/Dr. Borrero    ORTHOPEDIC

## 2024-10-31 NOTE — ED NOTES
Report given to Shannan ARGUETA RN. Nurse was informed of reason for arrival, vitals, labs, medications, orders, procedures, results, anything left pending and current plan of action. Questions were asked and received prior to departure from the patient.

## 2024-11-01 LAB
ANION GAP SERPL CALC-SCNC: 5 MMOL/L (ref 2–12)
BACTERIA SPEC CULT: NORMAL
BASOPHILS # BLD: 0 K/UL (ref 0–0.1)
BASOPHILS NFR BLD: 0 % (ref 0–1)
BUN SERPL-MCNC: 22 MG/DL (ref 6–20)
BUN/CREAT SERPL: 39 (ref 12–20)
CALCIUM SERPL-MCNC: 8.9 MG/DL (ref 8.5–10.1)
CHLORIDE SERPL-SCNC: 103 MMOL/L (ref 97–108)
CO2 SERPL-SCNC: 30 MMOL/L (ref 21–32)
CREAT SERPL-MCNC: 0.56 MG/DL (ref 0.7–1.3)
DIFFERENTIAL METHOD BLD: ABNORMAL
EOSINOPHIL # BLD: 0 K/UL (ref 0–0.4)
EOSINOPHIL NFR BLD: 0 % (ref 0–7)
ERYTHROCYTE [DISTWIDTH] IN BLOOD BY AUTOMATED COUNT: 14.6 % (ref 11.5–14.5)
GLUCOSE SERPL-MCNC: 122 MG/DL (ref 65–100)
HCT VFR BLD AUTO: 39.7 % (ref 36.6–50.3)
HGB BLD-MCNC: 12.7 G/DL (ref 12.1–17)
IMM GRANULOCYTES # BLD AUTO: 0.1 K/UL (ref 0–0.04)
IMM GRANULOCYTES NFR BLD AUTO: 1 % (ref 0–0.5)
LYMPHOCYTES # BLD: 0.3 K/UL (ref 0.8–3.5)
LYMPHOCYTES NFR BLD: 3 % (ref 12–49)
MCH RBC QN AUTO: 30 PG (ref 26–34)
MCHC RBC AUTO-ENTMCNC: 32 G/DL (ref 30–36.5)
MCV RBC AUTO: 93.9 FL (ref 80–99)
MONOCYTES # BLD: 0.3 K/UL (ref 0–1)
MONOCYTES NFR BLD: 3 % (ref 5–13)
NEUTS SEG # BLD: 9.5 K/UL (ref 1.8–8)
NEUTS SEG NFR BLD: 93 % (ref 32–75)
NRBC # BLD: 0 K/UL (ref 0–0.01)
NRBC BLD-RTO: 0 PER 100 WBC
PLATELET # BLD AUTO: 310 K/UL (ref 150–400)
PMV BLD AUTO: 8.5 FL (ref 8.9–12.9)
POTASSIUM SERPL-SCNC: 3.8 MMOL/L (ref 3.5–5.1)
RBC # BLD AUTO: 4.23 M/UL (ref 4.1–5.7)
RBC MORPH BLD: ABNORMAL
SERVICE CMNT-IMP: NORMAL
SODIUM SERPL-SCNC: 138 MMOL/L (ref 136–145)
WBC # BLD AUTO: 10.2 K/UL (ref 4.1–11.1)

## 2024-11-01 PROCEDURE — 36415 COLL VENOUS BLD VENIPUNCTURE: CPT

## 2024-11-01 PROCEDURE — 85025 COMPLETE CBC W/AUTO DIFF WBC: CPT

## 2024-11-01 PROCEDURE — 6370000000 HC RX 637 (ALT 250 FOR IP): Performed by: INTERNAL MEDICINE

## 2024-11-01 PROCEDURE — 6360000002 HC RX W HCPCS: Performed by: INTERNAL MEDICINE

## 2024-11-01 PROCEDURE — 2700000000 HC OXYGEN THERAPY PER DAY

## 2024-11-01 PROCEDURE — 94640 AIRWAY INHALATION TREATMENT: CPT

## 2024-11-01 PROCEDURE — 1100000003 HC PRIVATE W/ TELEMETRY

## 2024-11-01 PROCEDURE — 80048 BASIC METABOLIC PNL TOTAL CA: CPT

## 2024-11-01 PROCEDURE — 2580000003 HC RX 258: Performed by: INTERNAL MEDICINE

## 2024-11-01 PROCEDURE — 94761 N-INVAS EAR/PLS OXIMETRY MLT: CPT

## 2024-11-01 RX ADMIN — WATER 40 MG: 1 INJECTION INTRAMUSCULAR; INTRAVENOUS; SUBCUTANEOUS at 10:16

## 2024-11-01 RX ADMIN — IPRATROPIUM BROMIDE AND ALBUTEROL SULFATE 1 DOSE: 2.5; .5 SOLUTION RESPIRATORY (INHALATION) at 08:15

## 2024-11-01 RX ADMIN — ACETYLCYSTEINE 600 MG: 200 INHALANT RESPIRATORY (INHALATION) at 08:15

## 2024-11-01 RX ADMIN — IPRATROPIUM BROMIDE AND ALBUTEROL SULFATE 1 DOSE: 2.5; .5 SOLUTION RESPIRATORY (INHALATION) at 21:07

## 2024-11-01 RX ADMIN — WATER 40 MG: 1 INJECTION INTRAMUSCULAR; INTRAVENOUS; SUBCUTANEOUS at 20:02

## 2024-11-01 RX ADMIN — IPRATROPIUM BROMIDE AND ALBUTEROL SULFATE 1 DOSE: 2.5; .5 SOLUTION RESPIRATORY (INHALATION) at 15:20

## 2024-11-01 RX ADMIN — AMLODIPINE BESYLATE 10 MG: 5 TABLET ORAL at 10:15

## 2024-11-01 RX ADMIN — LEVETIRACETAM 1000 MG: 500 TABLET, FILM COATED ORAL at 20:01

## 2024-11-01 RX ADMIN — ARFORMOTEROL TARTRATE 15 MCG: 15 SOLUTION RESPIRATORY (INHALATION) at 08:21

## 2024-11-01 RX ADMIN — LEVOTHYROXINE SODIUM 50 MCG: 0.05 TABLET ORAL at 10:15

## 2024-11-01 RX ADMIN — GABAPENTIN 300 MG: 300 CAPSULE ORAL at 10:14

## 2024-11-01 RX ADMIN — SODIUM CHLORIDE, PRESERVATIVE FREE 10 ML: 5 INJECTION INTRAVENOUS at 10:17

## 2024-11-01 RX ADMIN — ACETYLCYSTEINE 600 MG: 200 INHALANT RESPIRATORY (INHALATION) at 21:17

## 2024-11-01 RX ADMIN — WATER 40 MG: 1 INJECTION INTRAMUSCULAR; INTRAVENOUS; SUBCUTANEOUS at 03:31

## 2024-11-01 RX ADMIN — ARFORMOTEROL TARTRATE 15 MCG: 15 SOLUTION RESPIRATORY (INHALATION) at 21:17

## 2024-11-01 RX ADMIN — IPRATROPIUM BROMIDE AND ALBUTEROL SULFATE 1 DOSE: 2.5; .5 SOLUTION RESPIRATORY (INHALATION) at 11:45

## 2024-11-01 RX ADMIN — WATER 40 MG: 1 INJECTION INTRAMUSCULAR; INTRAVENOUS; SUBCUTANEOUS at 15:30

## 2024-11-01 RX ADMIN — GABAPENTIN 300 MG: 300 CAPSULE ORAL at 20:02

## 2024-11-01 RX ADMIN — GUAIFENESIN 600 MG: 600 TABLET, EXTENDED RELEASE ORAL at 10:15

## 2024-11-01 RX ADMIN — LEVETIRACETAM 1000 MG: 500 TABLET, FILM COATED ORAL at 10:14

## 2024-11-01 RX ADMIN — ASPIRIN 81 MG: 81 TABLET, COATED ORAL at 20:02

## 2024-11-01 RX ADMIN — CITALOPRAM HYDROBROMIDE 20 MG: 20 TABLET ORAL at 10:15

## 2024-11-01 RX ADMIN — GUAIFENESIN 600 MG: 600 TABLET, EXTENDED RELEASE ORAL at 20:01

## 2024-11-01 RX ADMIN — ENOXAPARIN SODIUM 40 MG: 100 INJECTION SUBCUTANEOUS at 10:17

## 2024-11-01 RX ADMIN — SODIUM CHLORIDE, PRESERVATIVE FREE 10 ML: 5 INJECTION INTRAVENOUS at 20:02

## 2024-11-01 RX ADMIN — ATORVASTATIN CALCIUM 40 MG: 40 TABLET, FILM COATED ORAL at 10:15

## 2024-11-01 RX ADMIN — DOXYCYCLINE HYCLATE 100 MG: 100 TABLET, COATED ORAL at 20:02

## 2024-11-01 RX ADMIN — ACETAMINOPHEN 650 MG: 325 TABLET ORAL at 17:30

## 2024-11-01 RX ADMIN — ASPIRIN 81 MG: 81 TABLET, COATED ORAL at 10:16

## 2024-11-01 RX ADMIN — FAMOTIDINE 20 MG: 20 TABLET, FILM COATED ORAL at 10:16

## 2024-11-01 RX ADMIN — FAMOTIDINE 20 MG: 20 TABLET, FILM COATED ORAL at 20:01

## 2024-11-01 RX ADMIN — DOXYCYCLINE HYCLATE 100 MG: 100 TABLET, COATED ORAL at 10:15

## 2024-11-01 ASSESSMENT — PAIN SCALES - GENERAL
PAINLEVEL_OUTOF10: 0
PAINLEVEL_OUTOF10: 10

## 2024-11-01 ASSESSMENT — PAIN DESCRIPTION - LOCATION: LOCATION: HEAD

## 2024-11-02 LAB
GLUCOSE BLD STRIP.AUTO-MCNC: 130 MG/DL (ref 65–117)
SERVICE CMNT-IMP: ABNORMAL

## 2024-11-02 PROCEDURE — 97530 THERAPEUTIC ACTIVITIES: CPT

## 2024-11-02 PROCEDURE — 6360000002 HC RX W HCPCS: Performed by: INTERNAL MEDICINE

## 2024-11-02 PROCEDURE — 97161 PT EVAL LOW COMPLEX 20 MIN: CPT

## 2024-11-02 PROCEDURE — 6370000000 HC RX 637 (ALT 250 FOR IP): Performed by: INTERNAL MEDICINE

## 2024-11-02 PROCEDURE — 2700000000 HC OXYGEN THERAPY PER DAY

## 2024-11-02 PROCEDURE — 82962 GLUCOSE BLOOD TEST: CPT

## 2024-11-02 PROCEDURE — 1100000003 HC PRIVATE W/ TELEMETRY

## 2024-11-02 PROCEDURE — 94761 N-INVAS EAR/PLS OXIMETRY MLT: CPT

## 2024-11-02 PROCEDURE — 94640 AIRWAY INHALATION TREATMENT: CPT

## 2024-11-02 PROCEDURE — 2580000003 HC RX 258: Performed by: INTERNAL MEDICINE

## 2024-11-02 PROCEDURE — 97165 OT EVAL LOW COMPLEX 30 MIN: CPT

## 2024-11-02 PROCEDURE — 97535 SELF CARE MNGMENT TRAINING: CPT

## 2024-11-02 RX ADMIN — ARFORMOTEROL TARTRATE 15 MCG: 15 SOLUTION RESPIRATORY (INHALATION) at 07:50

## 2024-11-02 RX ADMIN — ARFORMOTEROL TARTRATE 15 MCG: 15 SOLUTION RESPIRATORY (INHALATION) at 20:46

## 2024-11-02 RX ADMIN — IPRATROPIUM BROMIDE AND ALBUTEROL SULFATE 1 DOSE: 2.5; .5 SOLUTION RESPIRATORY (INHALATION) at 11:35

## 2024-11-02 RX ADMIN — GUAIFENESIN 600 MG: 600 TABLET, EXTENDED RELEASE ORAL at 08:33

## 2024-11-02 RX ADMIN — IPRATROPIUM BROMIDE AND ALBUTEROL SULFATE 1 DOSE: 2.5; .5 SOLUTION RESPIRATORY (INHALATION) at 16:30

## 2024-11-02 RX ADMIN — DOXYCYCLINE HYCLATE 100 MG: 100 TABLET, COATED ORAL at 08:37

## 2024-11-02 RX ADMIN — ACETAMINOPHEN 650 MG: 325 TABLET ORAL at 02:48

## 2024-11-02 RX ADMIN — ATORVASTATIN CALCIUM 40 MG: 40 TABLET, FILM COATED ORAL at 08:34

## 2024-11-02 RX ADMIN — SODIUM CHLORIDE, PRESERVATIVE FREE 10 ML: 5 INJECTION INTRAVENOUS at 21:55

## 2024-11-02 RX ADMIN — WATER 40 MG: 1 INJECTION INTRAMUSCULAR; INTRAVENOUS; SUBCUTANEOUS at 02:48

## 2024-11-02 RX ADMIN — ASPIRIN 81 MG: 81 TABLET, COATED ORAL at 08:33

## 2024-11-02 RX ADMIN — ASPIRIN 81 MG: 81 TABLET, COATED ORAL at 21:49

## 2024-11-02 RX ADMIN — FAMOTIDINE 20 MG: 20 TABLET, FILM COATED ORAL at 08:34

## 2024-11-02 RX ADMIN — IPRATROPIUM BROMIDE AND ALBUTEROL SULFATE 1 DOSE: 2.5; .5 SOLUTION RESPIRATORY (INHALATION) at 20:31

## 2024-11-02 RX ADMIN — ACETYLCYSTEINE 600 MG: 200 INHALANT RESPIRATORY (INHALATION) at 20:31

## 2024-11-02 RX ADMIN — SODIUM CHLORIDE, PRESERVATIVE FREE 10 ML: 5 INJECTION INTRAVENOUS at 08:38

## 2024-11-02 RX ADMIN — DOXYCYCLINE HYCLATE 100 MG: 100 TABLET, COATED ORAL at 21:49

## 2024-11-02 RX ADMIN — LEVETIRACETAM 1000 MG: 500 TABLET, FILM COATED ORAL at 21:49

## 2024-11-02 RX ADMIN — LEVOTHYROXINE SODIUM 50 MCG: 0.05 TABLET ORAL at 08:33

## 2024-11-02 RX ADMIN — ENOXAPARIN SODIUM 40 MG: 100 INJECTION SUBCUTANEOUS at 08:36

## 2024-11-02 RX ADMIN — ACETAMINOPHEN 650 MG: 325 TABLET ORAL at 21:48

## 2024-11-02 RX ADMIN — ACETAMINOPHEN 650 MG: 325 TABLET ORAL at 08:32

## 2024-11-02 RX ADMIN — CITALOPRAM HYDROBROMIDE 20 MG: 20 TABLET ORAL at 08:33

## 2024-11-02 RX ADMIN — PREDNISONE 40 MG: 20 TABLET ORAL at 08:35

## 2024-11-02 RX ADMIN — LEVETIRACETAM 1000 MG: 500 TABLET, FILM COATED ORAL at 08:33

## 2024-11-02 RX ADMIN — ACETYLCYSTEINE 600 MG: 200 INHALANT RESPIRATORY (INHALATION) at 07:59

## 2024-11-02 RX ADMIN — GABAPENTIN 300 MG: 300 CAPSULE ORAL at 08:33

## 2024-11-02 RX ADMIN — FAMOTIDINE 20 MG: 20 TABLET, FILM COATED ORAL at 21:49

## 2024-11-02 RX ADMIN — GUAIFENESIN 600 MG: 600 TABLET, EXTENDED RELEASE ORAL at 21:50

## 2024-11-02 RX ADMIN — ACETAMINOPHEN 650 MG: 325 TABLET ORAL at 13:33

## 2024-11-02 RX ADMIN — ACETAMINOPHEN 650 MG: 325 TABLET ORAL at 17:49

## 2024-11-02 RX ADMIN — LEVETIRACETAM 1000 MG: 500 TABLET, FILM COATED ORAL at 08:36

## 2024-11-02 RX ADMIN — IPRATROPIUM BROMIDE AND ALBUTEROL SULFATE 1 DOSE: 2.5; .5 SOLUTION RESPIRATORY (INHALATION) at 07:58

## 2024-11-02 RX ADMIN — GABAPENTIN 300 MG: 300 CAPSULE ORAL at 21:49

## 2024-11-02 RX ADMIN — AMLODIPINE BESYLATE 10 MG: 5 TABLET ORAL at 08:39

## 2024-11-02 ASSESSMENT — PAIN DESCRIPTION - LOCATION
LOCATION: HEAD
LOCATION: BACK;SACRUM
LOCATION: HEAD

## 2024-11-02 ASSESSMENT — PAIN SCALES - GENERAL
PAINLEVEL_OUTOF10: 3
PAINLEVEL_OUTOF10: 1

## 2024-11-02 ASSESSMENT — PAIN DESCRIPTION - ORIENTATION: ORIENTATION: ANTERIOR

## 2024-11-03 LAB
EKG ATRIAL RATE: 70 BPM
EKG DIAGNOSIS: NORMAL
EKG P AXIS: 93 DEGREES
EKG P-R INTERVAL: 176 MS
EKG Q-T INTERVAL: 392 MS
EKG QRS DURATION: 92 MS
EKG QTC CALCULATION (BAZETT): 423 MS
EKG R AXIS: 91 DEGREES
EKG T AXIS: 62 DEGREES
EKG VENTRICULAR RATE: 70 BPM

## 2024-11-03 PROCEDURE — 6370000000 HC RX 637 (ALT 250 FOR IP): Performed by: INTERNAL MEDICINE

## 2024-11-03 PROCEDURE — 6360000002 HC RX W HCPCS: Performed by: INTERNAL MEDICINE

## 2024-11-03 PROCEDURE — 2580000003 HC RX 258: Performed by: INTERNAL MEDICINE

## 2024-11-03 PROCEDURE — 1100000003 HC PRIVATE W/ TELEMETRY

## 2024-11-03 PROCEDURE — 2700000000 HC OXYGEN THERAPY PER DAY

## 2024-11-03 PROCEDURE — 94761 N-INVAS EAR/PLS OXIMETRY MLT: CPT

## 2024-11-03 PROCEDURE — 94640 AIRWAY INHALATION TREATMENT: CPT

## 2024-11-03 PROCEDURE — 6370000000 HC RX 637 (ALT 250 FOR IP)

## 2024-11-03 RX ORDER — LEVOFLOXACIN 5 MG/ML
500 INJECTION, SOLUTION INTRAVENOUS DAILY
Status: DISCONTINUED | OUTPATIENT
Start: 2024-11-03 | End: 2024-11-04

## 2024-11-03 RX ORDER — LEVOFLOXACIN 5 MG/ML
750 INJECTION, SOLUTION INTRAVENOUS EVERY 24 HOURS
Status: DISCONTINUED | OUTPATIENT
Start: 2024-11-03 | End: 2024-11-03 | Stop reason: SDUPTHER

## 2024-11-03 RX ORDER — ALPRAZOLAM 0.5 MG
0.5 TABLET ORAL
Status: COMPLETED | OUTPATIENT
Start: 2024-11-03 | End: 2024-11-03

## 2024-11-03 RX ORDER — LEVOFLOXACIN 5 MG/ML
250 INJECTION, SOLUTION INTRAVENOUS DAILY
Status: DISCONTINUED | OUTPATIENT
Start: 2024-11-03 | End: 2024-11-04

## 2024-11-03 RX ADMIN — LEVETIRACETAM 1000 MG: 500 TABLET, FILM COATED ORAL at 08:49

## 2024-11-03 RX ADMIN — ASPIRIN 81 MG: 81 TABLET, COATED ORAL at 08:50

## 2024-11-03 RX ADMIN — ACETAMINOPHEN 650 MG: 325 TABLET ORAL at 06:29

## 2024-11-03 RX ADMIN — IPRATROPIUM BROMIDE AND ALBUTEROL SULFATE 1 DOSE: 2.5; .5 SOLUTION RESPIRATORY (INHALATION) at 13:15

## 2024-11-03 RX ADMIN — ALPRAZOLAM 0.5 MG: 0.5 TABLET ORAL at 21:07

## 2024-11-03 RX ADMIN — ACETYLCYSTEINE 600 MG: 200 INHALANT RESPIRATORY (INHALATION) at 19:47

## 2024-11-03 RX ADMIN — DOXYCYCLINE HYCLATE 100 MG: 100 TABLET, COATED ORAL at 08:51

## 2024-11-03 RX ADMIN — ARFORMOTEROL TARTRATE 15 MCG: 15 SOLUTION RESPIRATORY (INHALATION) at 09:05

## 2024-11-03 RX ADMIN — SODIUM CHLORIDE, PRESERVATIVE FREE 10 ML: 5 INJECTION INTRAVENOUS at 20:46

## 2024-11-03 RX ADMIN — IPRATROPIUM BROMIDE AND ALBUTEROL SULFATE 1 DOSE: 2.5; .5 SOLUTION RESPIRATORY (INHALATION) at 09:13

## 2024-11-03 RX ADMIN — LEVOFLOXACIN 500 MG: 5 INJECTION, SOLUTION INTRAVENOUS at 15:11

## 2024-11-03 RX ADMIN — ATORVASTATIN CALCIUM 40 MG: 40 TABLET, FILM COATED ORAL at 08:51

## 2024-11-03 RX ADMIN — GUAIFENESIN 600 MG: 600 TABLET, EXTENDED RELEASE ORAL at 08:51

## 2024-11-03 RX ADMIN — CITALOPRAM HYDROBROMIDE 20 MG: 20 TABLET ORAL at 08:51

## 2024-11-03 RX ADMIN — IPRATROPIUM BROMIDE AND ALBUTEROL SULFATE 1 DOSE: 2.5; .5 SOLUTION RESPIRATORY (INHALATION) at 16:07

## 2024-11-03 RX ADMIN — AMLODIPINE BESYLATE 10 MG: 5 TABLET ORAL at 08:50

## 2024-11-03 RX ADMIN — GABAPENTIN 300 MG: 300 CAPSULE ORAL at 08:50

## 2024-11-03 RX ADMIN — GABAPENTIN 300 MG: 300 CAPSULE ORAL at 20:38

## 2024-11-03 RX ADMIN — ASPIRIN 81 MG: 81 TABLET, COATED ORAL at 20:38

## 2024-11-03 RX ADMIN — ARFORMOTEROL TARTRATE 15 MCG: 15 SOLUTION RESPIRATORY (INHALATION) at 19:57

## 2024-11-03 RX ADMIN — GUAIFENESIN 600 MG: 600 TABLET, EXTENDED RELEASE ORAL at 20:38

## 2024-11-03 RX ADMIN — ONDANSETRON 4 MG: 2 INJECTION INTRAMUSCULAR; INTRAVENOUS at 06:35

## 2024-11-03 RX ADMIN — FAMOTIDINE 20 MG: 20 TABLET, FILM COATED ORAL at 08:50

## 2024-11-03 RX ADMIN — LEVOTHYROXINE SODIUM 50 MCG: 0.05 TABLET ORAL at 06:27

## 2024-11-03 RX ADMIN — IPRATROPIUM BROMIDE AND ALBUTEROL SULFATE 1 DOSE: 2.5; .5 SOLUTION RESPIRATORY (INHALATION) at 19:47

## 2024-11-03 RX ADMIN — ACETYLCYSTEINE 600 MG: 200 INHALANT RESPIRATORY (INHALATION) at 09:18

## 2024-11-03 RX ADMIN — LEVOFLOXACIN 250 MG: 5 INJECTION, SOLUTION INTRAVENOUS at 16:13

## 2024-11-03 RX ADMIN — LEVETIRACETAM 1000 MG: 500 TABLET, FILM COATED ORAL at 20:38

## 2024-11-03 RX ADMIN — PREDNISONE 40 MG: 20 TABLET ORAL at 08:50

## 2024-11-03 RX ADMIN — ENOXAPARIN SODIUM 40 MG: 100 INJECTION SUBCUTANEOUS at 08:49

## 2024-11-03 RX ADMIN — FAMOTIDINE 20 MG: 20 TABLET, FILM COATED ORAL at 20:38

## 2024-11-03 RX ADMIN — SODIUM CHLORIDE, PRESERVATIVE FREE 10 ML: 5 INJECTION INTRAVENOUS at 08:48

## 2024-11-03 ASSESSMENT — PAIN DESCRIPTION - LOCATION: LOCATION: GENERALIZED

## 2024-11-04 LAB
ANION GAP SERPL CALC-SCNC: 1 MMOL/L (ref 2–12)
BUN SERPL-MCNC: 28 MG/DL (ref 6–20)
BUN/CREAT SERPL: 57 (ref 12–20)
CALCIUM SERPL-MCNC: 8.7 MG/DL (ref 8.5–10.1)
CHLORIDE SERPL-SCNC: 107 MMOL/L (ref 97–108)
CO2 SERPL-SCNC: 31 MMOL/L (ref 21–32)
CREAT SERPL-MCNC: 0.49 MG/DL (ref 0.7–1.3)
ERYTHROCYTE [DISTWIDTH] IN BLOOD BY AUTOMATED COUNT: 14.4 % (ref 11.5–14.5)
GLUCOSE SERPL-MCNC: 113 MG/DL (ref 65–100)
HCT VFR BLD AUTO: 42.9 % (ref 36.6–50.3)
HGB BLD-MCNC: 13.7 G/DL (ref 12.1–17)
MAGNESIUM SERPL-MCNC: 2 MG/DL (ref 1.6–2.4)
MCH RBC QN AUTO: 30.2 PG (ref 26–34)
MCHC RBC AUTO-ENTMCNC: 31.9 G/DL (ref 30–36.5)
MCV RBC AUTO: 94.7 FL (ref 80–99)
NRBC # BLD: 0 K/UL (ref 0–0.01)
NRBC BLD-RTO: 0 PER 100 WBC
PLATELET # BLD AUTO: 273 K/UL (ref 150–400)
PMV BLD AUTO: 8.6 FL (ref 8.9–12.9)
POTASSIUM SERPL-SCNC: 3.7 MMOL/L (ref 3.5–5.1)
RBC # BLD AUTO: 4.53 M/UL (ref 4.1–5.7)
SODIUM SERPL-SCNC: 139 MMOL/L (ref 136–145)
WBC # BLD AUTO: 6.8 K/UL (ref 4.1–11.1)

## 2024-11-04 PROCEDURE — 97530 THERAPEUTIC ACTIVITIES: CPT

## 2024-11-04 PROCEDURE — 36415 COLL VENOUS BLD VENIPUNCTURE: CPT

## 2024-11-04 PROCEDURE — 6370000000 HC RX 637 (ALT 250 FOR IP): Performed by: INTERNAL MEDICINE

## 2024-11-04 PROCEDURE — 94761 N-INVAS EAR/PLS OXIMETRY MLT: CPT

## 2024-11-04 PROCEDURE — 2700000000 HC OXYGEN THERAPY PER DAY

## 2024-11-04 PROCEDURE — 6360000002 HC RX W HCPCS: Performed by: INTERNAL MEDICINE

## 2024-11-04 PROCEDURE — 85027 COMPLETE CBC AUTOMATED: CPT

## 2024-11-04 PROCEDURE — 80048 BASIC METABOLIC PNL TOTAL CA: CPT

## 2024-11-04 PROCEDURE — 83735 ASSAY OF MAGNESIUM: CPT

## 2024-11-04 PROCEDURE — 2580000003 HC RX 258: Performed by: INTERNAL MEDICINE

## 2024-11-04 PROCEDURE — 97535 SELF CARE MNGMENT TRAINING: CPT

## 2024-11-04 PROCEDURE — 94640 AIRWAY INHALATION TREATMENT: CPT

## 2024-11-04 PROCEDURE — 1100000003 HC PRIVATE W/ TELEMETRY

## 2024-11-04 RX ORDER — LEVOFLOXACIN 500 MG/1
750 TABLET, FILM COATED ORAL DAILY
Status: DISCONTINUED | OUTPATIENT
Start: 2024-11-04 | End: 2024-11-05 | Stop reason: HOSPADM

## 2024-11-04 RX ADMIN — ARFORMOTEROL TARTRATE 15 MCG: 15 SOLUTION RESPIRATORY (INHALATION) at 20:53

## 2024-11-04 RX ADMIN — ACETYLCYSTEINE 600 MG: 200 INHALANT RESPIRATORY (INHALATION) at 09:01

## 2024-11-04 RX ADMIN — ARFORMOTEROL TARTRATE 15 MCG: 15 SOLUTION RESPIRATORY (INHALATION) at 09:01

## 2024-11-04 RX ADMIN — GABAPENTIN 300 MG: 300 CAPSULE ORAL at 09:50

## 2024-11-04 RX ADMIN — CITALOPRAM HYDROBROMIDE 20 MG: 20 TABLET ORAL at 09:50

## 2024-11-04 RX ADMIN — IPRATROPIUM BROMIDE AND ALBUTEROL SULFATE 1 DOSE: 2.5; .5 SOLUTION RESPIRATORY (INHALATION) at 20:48

## 2024-11-04 RX ADMIN — FAMOTIDINE 20 MG: 20 TABLET, FILM COATED ORAL at 21:18

## 2024-11-04 RX ADMIN — LEVOTHYROXINE SODIUM 50 MCG: 0.05 TABLET ORAL at 08:15

## 2024-11-04 RX ADMIN — SODIUM CHLORIDE, PRESERVATIVE FREE 10 ML: 5 INJECTION INTRAVENOUS at 10:04

## 2024-11-04 RX ADMIN — LEVOFLOXACIN 750 MG: 500 TABLET, FILM COATED ORAL at 10:08

## 2024-11-04 RX ADMIN — ACETYLCYSTEINE 600 MG: 200 INHALANT RESPIRATORY (INHALATION) at 21:03

## 2024-11-04 RX ADMIN — GABAPENTIN 300 MG: 300 CAPSULE ORAL at 21:19

## 2024-11-04 RX ADMIN — ASPIRIN 81 MG: 81 TABLET, COATED ORAL at 21:18

## 2024-11-04 RX ADMIN — ENOXAPARIN SODIUM 40 MG: 100 INJECTION SUBCUTANEOUS at 09:50

## 2024-11-04 RX ADMIN — PREDNISONE 40 MG: 20 TABLET ORAL at 09:51

## 2024-11-04 RX ADMIN — GUAIFENESIN 600 MG: 600 TABLET, EXTENDED RELEASE ORAL at 09:50

## 2024-11-04 RX ADMIN — ATORVASTATIN CALCIUM 40 MG: 40 TABLET, FILM COATED ORAL at 09:51

## 2024-11-04 RX ADMIN — GUAIFENESIN 600 MG: 600 TABLET, EXTENDED RELEASE ORAL at 21:18

## 2024-11-04 RX ADMIN — FAMOTIDINE 20 MG: 20 TABLET, FILM COATED ORAL at 09:51

## 2024-11-04 RX ADMIN — SODIUM CHLORIDE, PRESERVATIVE FREE 10 ML: 5 INJECTION INTRAVENOUS at 21:18

## 2024-11-04 RX ADMIN — LEVETIRACETAM 1000 MG: 500 TABLET, FILM COATED ORAL at 09:51

## 2024-11-04 RX ADMIN — ASPIRIN 81 MG: 81 TABLET, COATED ORAL at 09:51

## 2024-11-04 RX ADMIN — LEVETIRACETAM 1000 MG: 500 TABLET, FILM COATED ORAL at 21:18

## 2024-11-04 RX ADMIN — IPRATROPIUM BROMIDE AND ALBUTEROL SULFATE 1 DOSE: 2.5; .5 SOLUTION RESPIRATORY (INHALATION) at 14:51

## 2024-11-04 RX ADMIN — IPRATROPIUM BROMIDE AND ALBUTEROL SULFATE 1 DOSE: 2.5; .5 SOLUTION RESPIRATORY (INHALATION) at 09:06

## 2024-11-04 RX ADMIN — AMLODIPINE BESYLATE 10 MG: 5 TABLET ORAL at 10:09

## 2024-11-04 RX ADMIN — IPRATROPIUM BROMIDE AND ALBUTEROL SULFATE 1 DOSE: 2.5; .5 SOLUTION RESPIRATORY (INHALATION) at 11:15

## 2024-11-04 ASSESSMENT — PAIN SCALES - GENERAL: PAINLEVEL_OUTOF10: 0

## 2024-11-04 NOTE — CONSULTS
acute findings. COPD. Electronically signed by Brian Mao    Echo (TTE) complete (PRN contrast/bubble/strain/3D)    Result Date: 10/18/2024    Left Ventricle: Normal left ventricular systolic function with a visually estimated EF of 45 - 50%. Left ventricle size is normal. Normal wall thickness. Ventricular mass is normal. Normal wall motion. Normal diastolic function.   Image quality is technically difficult. Contrast used: Definity. Ef is 45 50.     Nuclear stress test with myocardial perfusion    Result Date: 10/18/2024    Stress Test: A pharmacological stress test was performed using regadenoson (Lexiscan). PO caffeine given as a reversal agent.   Nondiagnostic stress lab. Nuclear images to follow. INDICATION: Chest pain.     COMPARISON:  None. CORRELATIVE IMAGING STUDIES: None TRACER: Tc 99m Sestamibi TECHNIQUE:  Resting SPECT images of the heart were obtained following the uneventful intravenous administration of 30.6 mCi of Tc 99m Sestamibi. Gated stress SPECT images of the heart were obtained following Lexiscan protocol and the uneventful intravenous administration of 31.6 mCi of Tc 99m Sestamibi.    FINDINGS:  The rest and stress perfusion images demonstrate left ventricular dilatation without significant perfusion defect or evidence of myocardial reversibility. The gated images demonstrate mild global hypokinesia. Left ventricular ejection fraction is 51%. Impression: Left ventricular dilatation. No evidence of myocardial ischemia or infarction. Left ventricular ejection fraction is 51%. Mild global hypokinesia.     Vascular duplex lower extremity venous bilateral    Result Date: 10/13/2024    No evidence of deep vein thrombosis in the right lower extremity.   No evidence of deep vein thrombosis in the left lower extremity.     CTA CHEST W WO CONTRAST PE Eval    Result Date: 10/13/2024  EXAM:  CTA CHEST W WO CONTRAST INDICATION: PE COMPARISON: 9/13/2024 chest radiograph and 9/16/2024 CTA

## 2024-11-05 VITALS
OXYGEN SATURATION: 100 % | TEMPERATURE: 97.9 F | DIASTOLIC BLOOD PRESSURE: 63 MMHG | BODY MASS INDEX: 23.58 KG/M2 | SYSTOLIC BLOOD PRESSURE: 128 MMHG | HEIGHT: 73 IN | RESPIRATION RATE: 16 BRPM | HEART RATE: 59 BPM | WEIGHT: 177.91 LBS

## 2024-11-05 PROCEDURE — 6370000000 HC RX 637 (ALT 250 FOR IP): Performed by: INTERNAL MEDICINE

## 2024-11-05 PROCEDURE — 94640 AIRWAY INHALATION TREATMENT: CPT

## 2024-11-05 PROCEDURE — 2700000000 HC OXYGEN THERAPY PER DAY

## 2024-11-05 PROCEDURE — 4100000000 HC INO THERAPY EACH HOUR

## 2024-11-05 PROCEDURE — 94010 BREATHING CAPACITY TEST: CPT

## 2024-11-05 PROCEDURE — 6360000002 HC RX W HCPCS: Performed by: INTERNAL MEDICINE

## 2024-11-05 PROCEDURE — 94761 N-INVAS EAR/PLS OXIMETRY MLT: CPT

## 2024-11-05 PROCEDURE — 2580000003 HC RX 258: Performed by: INTERNAL MEDICINE

## 2024-11-05 RX ORDER — PREDNISONE 10 MG/1
TABLET ORAL
Qty: 1 EACH | Refills: 0 | Status: SHIPPED | OUTPATIENT
Start: 2024-11-05

## 2024-11-05 RX ORDER — LEVOFLOXACIN 750 MG/1
750 TABLET, FILM COATED ORAL DAILY
Qty: 3 TABLET | Refills: 0 | Status: SHIPPED | OUTPATIENT
Start: 2024-11-05 | End: 2024-11-08

## 2024-11-05 RX ADMIN — LEVOFLOXACIN 750 MG: 500 TABLET, FILM COATED ORAL at 10:19

## 2024-11-05 RX ADMIN — SODIUM CHLORIDE, PRESERVATIVE FREE 10 ML: 5 INJECTION INTRAVENOUS at 10:22

## 2024-11-05 RX ADMIN — IPRATROPIUM BROMIDE AND ALBUTEROL SULFATE 1 DOSE: 2.5; .5 SOLUTION RESPIRATORY (INHALATION) at 07:40

## 2024-11-05 RX ADMIN — ASPIRIN 81 MG: 81 TABLET, COATED ORAL at 10:19

## 2024-11-05 RX ADMIN — LEVOTHYROXINE SODIUM 50 MCG: 0.05 TABLET ORAL at 07:45

## 2024-11-05 RX ADMIN — AMLODIPINE BESYLATE 10 MG: 5 TABLET ORAL at 10:21

## 2024-11-05 RX ADMIN — ARFORMOTEROL TARTRATE 15 MCG: 15 SOLUTION RESPIRATORY (INHALATION) at 07:34

## 2024-11-05 RX ADMIN — ACETYLCYSTEINE 600 MG: 200 INHALANT RESPIRATORY (INHALATION) at 07:39

## 2024-11-05 RX ADMIN — ATORVASTATIN CALCIUM 40 MG: 40 TABLET, FILM COATED ORAL at 10:21

## 2024-11-05 RX ADMIN — GABAPENTIN 300 MG: 300 CAPSULE ORAL at 10:20

## 2024-11-05 RX ADMIN — LEVETIRACETAM 1000 MG: 500 TABLET, FILM COATED ORAL at 10:21

## 2024-11-05 RX ADMIN — ENOXAPARIN SODIUM 40 MG: 100 INJECTION SUBCUTANEOUS at 10:19

## 2024-11-05 RX ADMIN — CITALOPRAM HYDROBROMIDE 20 MG: 20 TABLET ORAL at 10:21

## 2024-11-05 RX ADMIN — FAMOTIDINE 20 MG: 20 TABLET, FILM COATED ORAL at 10:19

## 2024-11-05 RX ADMIN — IPRATROPIUM BROMIDE AND ALBUTEROL SULFATE 1 DOSE: 2.5; .5 SOLUTION RESPIRATORY (INHALATION) at 11:58

## 2024-11-05 RX ADMIN — GUAIFENESIN 600 MG: 600 TABLET, EXTENDED RELEASE ORAL at 10:20

## 2024-11-05 ASSESSMENT — COPD QUESTIONNAIRES
QUESTION7_SLEEPQUALITY: 0
QUESTION4_WALKINCLINE: 0
QUESTION5_HOMEACTIVITIES: 0
QUESTION8_ENERGYLEVEL: 3
QUESTION1_COUGHFREQUENCY: 3
TOTAL_EXACERBATIONS_PASTYEAR: 3
QUESTION3_CHESTTIGHTNESS: 0
CAT_TOTALSCORE: 9
QUESTION6_LEAVINGHOUSE: 0
QUESTION2_CHESTPHLEGM: 3

## 2024-11-05 NOTE — CARE COORDINATION
Pt is clear from CM standpoint for d/c.    Pt's brother will transport.      Transition of Care Plan:     RUR: 28%  Prior Level of Functioning: Independent   Disposition: Home   TRICIA: 11/5/24  If SNF or IPR: Date FOC offered:   Date FOC received:   Accepting facility:   Date authorization started with reference number:   Date authorization received and expires:   Follow up appointments: PCP   DME needed: No DME needed   Transportation at discharge: Pt's family   IM/IMM Medicare/ letter given: 11/5/24  Is patient a  and connected with [facility]? No              If yes, was  transfer form completed and VA notified? No  Caregiver Contact: pt's brother   Discharge Caregiver contacted prior to discharge? Pt was contacted   Care Conference needed? No  Barriers to discharge: None      CM recommend home health services but pt decline home health services. Pt stated to CM that if he wants home health services that he will follow up with his PCP.      11/05/24 0919   Services At/After Discharge   Transition of Care Consult (CM Consult) N/A   Services At/After Discharge None    Resource Information Provided? No   Mode of Transport at Discharge Self   Confirm Follow Up Transport Self   Condition of Participation: Discharge Planning   The Plan for Transition of Care is related to the following treatment goals: Goal is to return home with follow up appointments   The Patient and/or Patient Representative was provided with a Choice of Provider? Patient   The Patient and/Or Patient Representative agree with the Discharge Plan? Yes   Freedom of Choice list was provided with basic dialogue that supports the patient's individualized plan of care/goals, treatment preferences, and shares the quality data associated with the providers?  Yes     Krystal Vital      
Transition of Care Plan:    RUR: 28%  Prior Level of Functioning: Independent   Disposition: Home   TRICIA: 11/5/24  If SNF or IPR: Date FOC offered:   Date FOC received:   Accepting facility:   Date authorization started with reference number:   Date authorization received and expires:   Follow up appointments: PCP   DME needed: No DME needed   Transportation at discharge: Pt's family   IM/IMM Medicare/ letter given: Needs 2nd IMM letter   Is patient a [place] and connected with [facility]? No   If yes, was [place] transfer form completed and VA notified? No  Caregiver Contact: pt's brother   Discharge Caregiver contacted prior to discharge? Pt will be contacted   Care Conference needed? No  Barriers to discharge: Medical clearance       CM reviewed pt's chart and pt is not medically stable for d/c due to pulm clearance.    CM is aware that the recommendation is for SNF.    CM spoke to pt regarding the recommendation is for SNF and pt decline the recommendation for SNF.    CM will continue to follow and assist with d/c planning.    Krystal Vital      
Independent   Active  No   Occupation Retired   Discharge Planning   Type of Residence House   Living Arrangements Alone   Current Services Prior To Admission Durable Medical Equipment;Home Care;Oxygen Therapy  (rolling walker or cane, home oxygen ([facility]) and [facility] (SN/PT/OT))   Current DME Prior to Arrival Cane;Oxygen Therapy (Comment);Walker   Potential Assistance Needed Other (Comment)  (will need additional medical and therapy recommendations - resumption of care with [facility])   DME Ordered? No   Potential Assistance Purchasing Medications No   Type of Home Care Services PT;OT;Nursing Services  ([facility] - need resumption of care at discharge)   Patient expects to be discharged to: House   One/Two Story Residence One story   History of falls?   (unknown)        10/31/24 1135   Readmission Assessment   Number of Days since last admission? 8-30 days   Previous Disposition Home with Home Health   Who is being Interviewed   (brother - Moshe)   What was the patient's/caregiver's perception as to why they think they needed to return back to the hospital? Other (Comment)  (pt is noncompliant: continues to smoke, does not follow directions on oxygen concentrator and clogs filter (uses tap water), does not take medications as directed (takes too many or does not take). concerned for self caused)   Did you visit your Primary Care Physician after you left the hospital, before you returned this time? Yes   Did you see a specialist, such as Cardiac, Pulmonary, Orthopedic Physician, etc. after you left the hospital? Yes   Who advised the patient to return to the hospital? Self-referral   Does the patient report anything that got in the way of taking their medications? No  (pt chooses to follow direction)   In our efforts to provide the best possible care to you and others like you, can you think of anything that we could have done to help you after you left the hospital the first time, so that you

## 2024-11-05 NOTE — PROGRESS NOTES
Hospitalist Progress Note    NAME:   Rodolfo Delcid   : 1955   MRN: [id number]     Date/Time: 2024 12:12 PM  Patient PCP: Lukasz Awad DO    Estimated discharge date:  Barriers: Clinical improvement, PT/OT      Assessment / Plan:    Acute COPD exacerbation  Acute bronchitis  -- Continue DuoNeb treatment every 4 hours  -- IV Solu-Medrol 40 mg every 6 hours x 4 doses then switch to prednisone 20 mg oral twice daily  -- Mucinex 600 mg oral twice daily  -- Doxycycline 100 mg oral twice daily     Essential hypertension  -- Fairly stable  -- Resume home antihypertensives     Chronic tobacco abuse  -- Smokes 1 pack/day of tobacco  -- Counseled on tobacco cessation  -- Nicotine patch 14 mg daily     Coronary artery disease  -- Fairly stable  -- Continue aspirin 81 mg daily     Chronic respiratory failure  -- On 2 to 3 L oxygen by nasal cannula at home    Medical Decision Making:   I personally reviewed labs: cbc, bmp  I personally reviewed imaging: CXR  I personally reviewed EKG:  Toxic drug monitoring: None  Discussed case with:         Code Status: Full  DVT Prophylaxis: Lovenox  GI Prophylaxis:    Subjective:     Patient comfortably lying in the bed.  Continue breathing treatments, steroids, antibiotics.  PT/OT consulted.      Objective:     VITALS:   Last 24hrs VS reviewed since prior progress note. Most recent are:  Patient Vitals for the past 24 hrs:   BP Temp Temp src Pulse Resp SpO2   24 0820 126/62 98.1 °F (36.7 °C) Oral 60 22 97 %   24 0802 -- -- -- -- -- 97 %   24 0322 (!) 148/68 97.9 °F (36.6 °C) Oral 61 18 97 %   24 2107 -- -- -- 61 20 93 %   24 1954 123/64 98.1 °F (36.7 °C) Oral 64 20 97 %   24 1559 (!) 117/57 98.2 °F (36.8 °C) Oral 62 19 94 %         Intake/Output Summary (Last 24 hours) at 2024 1212  Last data filed at 2024 0308  Gross per 24 hour   Intake --   Output 600 ml   Net -600 ml        I had a face to face encounter and 
      Hospitalist Progress Note    NAME:   Rodolfo Delcid   : 1955   MRN: [id number]     Date/Time: 11/3/2024 2:09 PM  Patient PCP: Lukasz Awad DO    Estimated discharge date:  Barriers: Clinical improvement, PT/OT      Assessment / Plan:    Acute COPD exacerbation  Acute bronchitis  -- Continue DuoNeb treatment every 4 hours  -- IV Solu-Medrol 40 mg every 6 hours x 4 doses then switch to prednisone 20 mg oral twice daily  -- Mucinex 600 mg oral twice daily  -- DC doxycycline. Switch to Levaquin to cover pseudomass     Essential hypertension  -- Fairly stable  -- Resume home antihypertensives     Chronic tobacco abuse  -- Smokes 1 pack/day of tobacco  -- Counseled on tobacco cessation  -- Nicotine patch 14 mg daily     Coronary artery disease  -- Fairly stable  -- Continue aspirin 81 mg daily     Chronic respiratory failure  -- On 2 to 3 L oxygen by nasal cannula at home    Medical Decision Making:   I personally reviewed labs: cbc, bmp  I personally reviewed imaging: CXR  I personally reviewed EKG:  Toxic drug monitoring: None  Discussed case with:         Code Status: Full  DVT Prophylaxis: Lovenox  GI Prophylaxis:    Subjective:     Pt reports he does not feel back to his baseline, still sob/wheezing with minimal activity      Objective:     VITALS:   Last 24hrs VS reviewed since prior progress note. Most recent are:  Patient Vitals for the past 24 hrs:   BP Temp Temp src Pulse Resp SpO2   24 0916 -- -- -- 66 20 96 %   24 0845 133/64 97.3 °F (36.3 °C) Axillary 68 21 96 %   24 0815 -- -- -- -- -- 98 %   24 0318 137/73 97.7 °F (36.5 °C) Oral 62 16 96 %   24 2344 124/77 98.2 °F (36.8 °C) Oral 72 17 98 %   24 -- -- -- 58 18 97 %   24 116/67 97.9 °F (36.6 °C) Oral 72 18 97 %   24 1518 114/60 97.7 °F (36.5 °C) -- 66 -- 96 %         Intake/Output Summary (Last 24 hours) at 11/3/2024 1409  Last data filed at 11/3/2024 1130  Gross per 24 hour 
      Hospitalist Progress Note    NAME:   Rodolfo Delcid   : 1955   MRN: [id number]     Date/Time: 2024 2:30 PM  Patient PCP: Lukasz Awad DO    Estimated discharge date:  Barriers: Clinical improvement, PT/OT      Assessment / Plan:    Acute COPD exacerbation  Acute bronchitis  -- Continue DuoNeb treatment every 4 hours  -- IV Solu-Medrol 40 mg every 6 hours x 4 doses then switch to prednisone 20 mg oral twice daily  -- Mucinex 600 mg oral twice daily  -- Doxycycline 100 mg oral twice daily     Essential hypertension  -- Fairly stable  -- Resume home antihypertensives     Chronic tobacco abuse  -- Smokes 1 pack/day of tobacco  -- Counseled on tobacco cessation  -- Nicotine patch 14 mg daily     Coronary artery disease  -- Fairly stable  -- Continue aspirin 81 mg daily     Chronic respiratory failure  -- On 2 to 3 L oxygen by nasal cannula at home    Medical Decision Making:   I personally reviewed labs: cbc, bmp  I personally reviewed imaging: CXR  I personally reviewed EKG:  Toxic drug monitoring: None  Discussed case with:         Code Status: Full  DVT Prophylaxis: Lovenox  GI Prophylaxis:    Subjective:     Patient comfortably lying in the bed.  Continue breathing treatments, steroids, antibiotics.  PT/OT consulted.      Objective:     VITALS:   Last 24hrs VS reviewed since prior progress note. Most recent are:  Patient Vitals for the past 24 hrs:   BP Temp Temp src Pulse Resp SpO2   24 1015 130/62 -- -- -- -- --   24 0845 130/62 98.4 °F (36.9 °C) Oral 64 22 --   24 0400 109/61 -- -- -- -- --   24 0323 109/61 97.7 °F (36.5 °C) Oral 64 18 96 %   10/31/24 1959 121/61 98.1 °F (36.7 °C) Oral 60 18 95 %   10/31/24 1814 -- -- -- -- -- 95 %   10/31/24 1453 -- -- -- -- -- 95 %       No intake or output data in the 24 hours ending 24 1430     I had a face to face encounter and independently examined this patient on 2024, as outlined below:  PHYSICAL 
  Patient determined to be stable for discharge by attending provider. I have reviewed the discharge instructions and follow-up appointments with the patient and brother Moshe. They verbalized understanding and all questions were answered to their satisfaction. No complaints or further questions were expressed.       New medications: Appropriate educational materials and medication side effect teaching were provided.       PIV were removed prior to discharge.     All personal items collected during admission were returned to the patient prior to discharge.pt left via wheelchair for home.     POPPY DE LA PAZ RN   
  Physician Progress Note      PATIENT:               SHANAE GONZALEZ  CSN #:                  [id number]  :                       1955  ADMIT DATE:       10/31/2024 3:15 AM  DISCH DATE:  RESPONDING  PROVIDER #:        Theo Cabrera MD          QUERY TEXT:    Pt admitted with Acute COPD exacerbation and has Chronic respiratory failure   documented. If possible, please document in progress notes and discharge   summary further specificity regarding the type of respiratory failure:    The medical record reflects the following:  Risk Factors: Hx: COPD w/ 3L NC @baseline  Clinical Indicators: RR: 21; O2 Sats: 95% on 2L NC; O2 Sats 99% on 3L NC; CO2:   29, 28, 30; ABG: ph; 7.42; pCO2: 40; pO2: 73; HCO3: 25; sO2: 95%    10/31 AP: \"Chronic respiratory failure  -- On 2 to 3 L oxygen by nasal cannula at home\".    Treatment: Supplemental O2 w/ 2-3L NC; Monitor O2; CXR; CTA Chest; ABG; vBG;    Thank you,    [email or web address]  Options provided:  -- Chronic respiratory failure with hypoxia  -- Other - I will add my own diagnosis  -- Disagree - Not applicable / Not valid  -- Disagree - Clinically unable to determine / Unknown  -- Refer to Clinical Documentation Reviewer    PROVIDER RESPONSE TEXT:    This patient has chronic respiratory failure with hypoxia.    Query created by: Norma Gallardo on 2024 3:24 PM      Electronically signed by:  Theo Cabrera MD 2024 4:41 PM          
ADULT PROTOCOL: JET AEROSOL ASSESSMENT    Patient  Rodolfo Delcid     69 y.o.   male     11/2/2024  8:20 AM    Breath Sounds Pre Procedure: Breath Sounds Pre-Tx TEMO: Diminished                                  Breath Sounds Pre-Tx LLL: Diminished        Breath Sounds Pre-Tx RUL: Diminished        Breath Sounds Pre-Tx RML: Diminished        Breath Sounds Pre-Tx RLL: Diminished  Breath Sounds Post Procedure: Breath Sounds Post-Tx TEMO: Diminished          Breath Sounds Post-Tx LLL: Diminished          Breath Sounds Post-Tx RUL: Diminished          Breath Sounds Post-Tx RML: Diminished          Breath Sounds Post-Tx RLL: Diminished                                       Heart Rate: Pre procedure Pre-Tx Pulse: 68           Post procedure Post-Tx Pulse: 68    Resp Rate: Pre procedure Pre-Tx Resps: 18           Post procedure Post-Tx Resps: 18      Oxygen: O2 Therapy: Oxygen humidified   nasal cannula     Changed: No    SpO2:  SpO2: 97 %   with Oxygen                Nebulizer Therapy: Current medications Medications: N-Acetylcysteine      Changed: No    Comments:     Problem List:   Patient Active Problem List   Diagnosis    Hypercholesterolemia    Failed total hip arthroplasty (HCC)    Hepatitis C    Muscle strain    HTN (hypertension)    Carotid artery stenosis with cerebral infarction (HCC)    Hepatitis A    Infected prosthetic hip (HCC)    CAD (coronary artery disease)    Late effect of stroke    Complex partial seizure evolving to generalized seizure (HCC)    Cerebral infarction due to thrombosis of left middle cerebral artery (HCC)    Osteoarthrosis, hip    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 
ADULT PROTOCOL: JET AEROSOL ASSESSMENT    Patient  Rodolfo Delcid     69 y.o.   male     11/5/2024  10:21 AM    Breath Sounds Pre Procedure: Breath Sounds Pre-Tx TEMO: Diminished                                  Breath Sounds Pre-Tx LLL: Diminished        Breath Sounds Pre-Tx RUL: Diminished        Breath Sounds Pre-Tx RML: Diminished        Breath Sounds Pre-Tx RLL: Diminished  Breath Sounds Post Procedure: Breath Sounds Post-Tx TEMO: Diminished          Breath Sounds Post-Tx LLL: Diminished          Breath Sounds Post-Tx RUL: Diminished          Breath Sounds Post-Tx RML: Diminished          Breath Sounds Post-Tx RLL: Diminished                                       Heart Rate: Pre procedure Pre-Tx Pulse: 59           Post procedure Post-Tx Pulse: 59    Resp Rate: Pre procedure Pre-Tx Resps: 18           Post procedure Post-Tx Resps: 18    Peak Flow: Pre bronchodilator             Post bronchodilator       Oxygen: O2 Therapy: Oxygen humidified   nasal cannula     Changed: No    SpO2:  SpO2: 100 %   with Oxygen                Nebulizer Therapy: Current medications Medications: Albuterol/Ipratropium, N-Acetylcysteine      Changed: Yes      Problem List:   Patient Active Problem List   Diagnosis    Hypercholesterolemia    Failed total hip arthroplasty (HCC)    Hepatitis C    Muscle strain    HTN (hypertension)    Carotid artery stenosis with cerebral infarction (HCC)    Hepatitis A    Infected prosthetic hip (HCC)    CAD (coronary artery disease)    Late effect of stroke    Complex partial seizure evolving to generalized seizure (HCC)    Cerebral infarction due to thrombosis of left middle cerebral artery (HCC)    Osteoarthrosis, hip    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    
Attempted to schedule PULMONOLOGY hospital follow up appointment. Unable to reach anyone, unable to leave voicemail.  Pending patient discharge.   
End of Shift Note    Bedside shift change report given to Pauline BRANCH (oncoming nurse) by POPPY DE LA PAZ RN (offgoing nurse).  Report included the following information SBAR and MAR    Shift worked:  7am - 7pm     Shift summary and any significant changes:     Medication given per MAR and education complete. Pt had one moment earlier this a.m. where patient became irritable and anxious. Physician made aware, no orders received. Pt rested for remaining of the day with no complaints. Safety and caring rounds complete.          POPPY DE LA PAZ, RN                            
End of Shift Note    Bedside shift change report given to SARINA Boss (oncoming nurse) by Narcisa Cristobal RN (offgoing nurse).  Report included the following information SBAR, Kardex, Intake/Output, MAR, Recent Results, and Cardiac Rhythm SB    Shift worked:  7p -7a     Shift summary and any significant changes:     Pt repeatedly called for assistance indicating that he was sob. Upon taking Sat pt was high 90's to 100.  Notified On-call physcian of his anxiety and requested something to help call him down.  0.5 mg Alprazalam was ordered and patient rested comfortably for the rest of shift. Patient's  urine is tea color.  He indicated that it is usually not that dark.  Provided pt with large cup of water and instructed to increase water intake. Passed on to dayshift nurse for possible additional intervention.   Concerns for physician to address:  Patient may benefif from anxiolytic for remainder of hospital stay     Zone phone for oncoming shift:   N/a       Activity:     Number times ambulated in hallways past shift: 0  Number of times OOB to chair past shift: 1    Cardiac:   Cardiac Monitoring: Yes           Access:  Current line(s): PIV     Genitourinary:   Urinary status: voiding and external catheter    Respiratory:      Chronic home O2 use?: YES  Incentive spirometer at bedside: NO       GI:     Current diet:  ADULT DIET; Regular; Low Sodium (2 gm)  Passing flatus: YES  Tolerating current diet: YES       Pain Management:   Patient states pain is manageable on current regimen: N/A    Skin:     Interventions: internal/external urinary devices    Patient Safety:  Fall Score:    Interventions: bed/chair alarm, assistive device (walker, cane. etc), and gripper socks       Length of Stay:  Expected LOS: 3  Actual LOS: 4      Narcisa Cristobal RN                            
End of Shift Note    Bedside shift change report given to SARINA Boss (oncoming nurse) by Светлана Fallon RN (offgoing nurse).  Report included the following information SBAR, Kardex, ED Summary, Procedure Summary, Intake/Output, MAR, Recent Results, and Cardiac Rhythm NSR    Shift worked:  0079-6241     Shift summary and any significant changes:     Pt ambulating around room w/ 1x assist and front wheel walker w/o issue. Pt weaned to 2 L nasal cannula over shift. VSS. No significant events.     Concerns for physician to address:       Zone phone for oncoming shift:   [phone]           Светлана Fallon RN                            
End of Shift Note    Bedside shift change report given to SARINA Felder (oncoming nurse) by Kam Duncan RN (offgoing nurse).  Report included the following information SBAR, Kardex, MAR, and Recent Results    Shift worked:  2129-3637     Shift summary and any significant changes:     Patient continues to be anxious through out the shift. He continues to call for help -to get up or help him breath. Gave him an incentive spherometer [id number] O2 95-97 on 2L. Patient is a current smoker so I messaged provider for a nicotine patch. He continued to have anxiety.    VSS. Scheduled meds given.      Concerns for physician to address:  Patient anxiety     Zone phone for oncoming shift:          Activity:  Level of Assistance: Standby assist, set-up cues, supervision of patient - no hands on    Cardiac:   Cardiac Monitoring:  yes - NS    Access:  Current line(s): PIV     Genitourinary:   Urinary Status: Voiding    Respiratory:   O2 Device: Nasal cannula    GI:  Current diet: ADULT DIET; Regular; Low Sodium (2 gm)    Pain Management:   Patient states pain is manageable on current regimen: YES    Skin:  Silver Scale Score: 19  Interventions:    Pressure injury: no    Patient Safety:  Fall Score: [place] Total Score: 15  Fall Risk Interventions  Toilet Every 2 Hours-In Advance of Need: Yes  Hourly Visual Checks: Awake, In bed  Fall Visual Posted: Socks, Fall sign posted  Room Door Open: Deferred to promote rest  Alarm On: Bed  Patient Moved Closer to Nursing Station: No    Active Consults:  IP CONSULT TO HOSPITALIST    Length of Stay:  Expected LOS: 2  Actual LOS: 0      Kam Duncan RN    
End of Shift Note    Bedside shift change report given to SARINA Jerome (oncoming nurse) by DONNA PARK RN (offgoing nurse).  Report included the following information SBAR, Kardex, and MAR    Shift worked:  7p-7a     Shift summary and any significant changes:     AAOx3, VSS, no complaint of pain, hourly rounding completed.     Concerns for physician to address:  Anxiety     Zone phone for oncoming shift:   [phone]       Activity:  Level of Assistance: Standby assist, set-up cues, supervision of patient - no hands on    Cardiac:   Cardiac Monitoring:  yes     Access:  Current line(s): PIV     Genitourinary:   Urinary Status: Voiding, External catheter    Respiratory:   O2 Device: Nasal cannula    GI:  Current diet: ADULT DIET; Regular; Low Sodium (2 gm)    Pain Management:   Patient states pain is manageable on current regimen: N/A    Skin:  Silver Scale Score: 19  Interventions: Wound Offloading (Prevention Methods): Bed, pressure reduction mattress, Pillows, Repositioning  Pressure injury: no    Patient Safety:  Fall Score: [place] Total Score: 35  Fall Risk Interventions  Nursing Judgement-Fall Risk High(Add Comments): Yes  Toilet Every 2 Hours-In Advance of Need: Yes  Hourly Visual Checks: Awake, In bed  Fall Visual Posted: Armband, Fall sign posted, Socks  Room Door Open: Deferred to promote rest  Alarm On: Bed  Patient Moved Closer to Nursing Station: No    Active Consults:  IP CONSULT TO HOSPITALIST    Length of Stay:  Expected LOS: 2  Actual LOS: 1      DONNA PARK RN    
Mr. Delcid has been quite agitated and repeatedly ask for help as he states that he is not getting any air.  O2 levels repeatedly checked and found to be normal.      He gets verbally aggressive when agitated and later apologizes for his behavior.  
PCP Hospital follow-up transitional care appointment has been scheduled with DOYLE Berrios on 11/8/24 1100. This is the first available appt due to limited provider availability. [facility] placed [facility] information AVS for patient resource. Pending patient discharge. Staci Landry, Care Management Assistant  
Physical therapy services attempted 10:54AM. Per cursory review of medical chart and communication with RN Team, Pt pending dc to rehab today @ 1PM.     Maria Guadalupe Arrieta, PT, DPT   
Spiritual Health History and Assessment/Progress Note  [facility]    Initial Encounter,  ,  ,      Name: Rodolfo Delcid MRN: [id number]    Age: 69 y.o.     Sex: male   Language: English   Buddhist: Other   Acute metabolic encephalopathy     Date: 11/1/2024            Total Time Calculated: 21 min              Spiritual Assessment began in [facility] MULTI-SPECIALTY TELEMETRY        Referral/Consult From: Rounding   Encounter Overview/Reason: Initial Encounter  Service Provided For: Patient    Jillian, Belief, Meaning:   Patient unable to assess at this time  Family/Friends No family/friends present      Importance and Influence:  Patient unable to assess at this time  Family/Friends No family/friends present    Community:  Patient feels well-supported. Support system includes: Other: brother and sister  Family/Friends No family/friends present    Assessment and Plan of Care:     Patient Interventions include: Facilitated expression of thoughts and feelings and Other: provided words of reassurance and calming presence  Family/Friends Interventions include: No family/friends present    Patient Plan of Care: No spiritual needs identified for follow-up and Spiritual Care available upon further referral  Family/Friends Plan of Care: No family/friends present    Unable to complete spiritual assessment as patient was anxious about his breathing. He kept repeating \"help me breathe better\".  Notified his nurse that he wanted to see her.  He shared his brother and sister visited yesterday.      Electronically signed by Chaplain AIYANA [facility] on 11/1/2024 at 11:51 AM    
Axillary 79 18 97 %   11/03/24 1949 -- -- -- 78 17 98 %   11/03/24 1654 117/62 98.6 °F (37 °C) -- 82 -- 98 %         Intake/Output Summary (Last 24 hours) at 11/4/2024 1621  Last data filed at 11/3/2024 1700  Gross per 24 hour   Intake --   Output 450 ml   Net -450 ml        I had a face to face encounter and independently examined this patient on 11/4/2024, as outlined below:  PHYSICAL EXAM:  General: Alert, cooperative  EENT:  EOMI. Anicteric sclerae.  Resp:  Diminished air entry bilateral lower lobes, no wheezing  CV:  Regular  rhythm,  No edema  GI:  Soft, Non distended, Non tender.  +Bowel sounds  Neurologic:  Alert and oriented X 3, normal speech,   Psych:   Good insight. Not anxious nor agitated  Skin:  No rashes.  No jaundice    Reviewed most current lab test results and cultures  YES  Reviewed most current radiology test results   YES  Review and summation of old records today    NO  Reviewed patient's current orders and MAR    YES  PMH/SH reviewed - no change compared to H&P    Procedures: see electronic medical records for all procedures/Xrays and details which were not copied into this note but were reviewed prior to creation of Plan.      LABS:  I reviewed today's most current labs and imaging studies.  Pertinent labs include:  Recent Labs     11/04/24  0504   WBC 6.8   HGB 13.7   HCT 42.9        Recent Labs     11/04/24  0504      K 3.7      CO2 31   GLUCOSE 113*   BUN 28*   CREATININE 0.49*   CALCIUM 8.7   MG 2.0       Signed: Pedro Romero MD          
recommendations:     [] ABG  [] 6MW  [] Sleep study  [] NIV  [] Pulmonary Rehab  [] PFT on discharge  [x] Smoking Cessation  []Pulmonary Consult  []Palliative Care Consult  []Low Dose Lung CT (LDCT)      Patient Education:      Understanding COPD  Exacerbations/Action Plan  Triggers  Precautions  Smoking Cessation  Inhaler/Nebulizer use (Spacer)  Incentive Spirometry  Breathing Techniques  Managing Stress  Educational resources provided: “Managing Your COPD\" Booklet.      Comments: Patient is a 30 day readmit.  Reviewed education and discussed medications.  Patient is a poor historian, was unsure of his medications and when explained each he said he took all of them and he said he had a working nebulizer. Patient advised that he was just ready to go home     Electronically signed by RT Yusra on 11/5/24 at 9:51 AM EST

## 2024-11-05 NOTE — PLAN OF CARE
Problem: Occupational Therapy - Adult  Goal: By Discharge: Performs self-care activities at highest level of function for planned discharge setting.  See evaluation for individualized goals.  Description: FUNCTIONAL STATUS PRIOR TO ADMISSION:  Patient was ambulatory using Rollator.  Reports Mod Indep with ADLs and has assist from sister for IADLs.  Reports brother also lives close by and provides PRN assist.  Receives Help From: Family (per chart, pt's sister comes daily and cooks dinner), ADL Assistance: Independent,  ,  ,  ,  ,  , Homemaking Assistance: Needs assistance, Ambulation Assistance: Independent (independent with cane or rolling walker), Transfer Assistance: Independent, Active : No     HOME SUPPORT: Patient lived alone with brother/sister to provide assistance.  Reports siblings living close by.    Occupational Therapy Goals:  Initiated 11/2/2024  1.  Patient will perform RW grooming with Modified Robertson within 7 day(s).  2.  Patient will perform RW lower body dressing with Modified Robertson within 7 day(s).  3.  Patient will perform bathing with Modified Robertson within 7 day(s).  4.  Patient will perform RW toilet transfers with Modified Robertson  within 7 day(s).  5.  Patient will perform all aspects of RW toileting with Modified Robertson within 7 day(s).    Outcome: Progressing    OCCUPATIONAL THERAPY EVALUATION    Patient: Rodolfo Delcid (69 y.o. male)  Date: 11/2/2024  Primary Diagnosis: Bronchitis [J40]  COPD exacerbation (HCC) [J44.1]  Acute encephalopathy [G93.40]  Acute metabolic encephalopathy [G93.41]         Precautions: Fall Risk                  ASSESSMENT :  The patient is limited by decreased strength/endurance, decreased mobility/balance, decreased activity tolerance with high fatigue noted during evaluation and decreased insight into deficits, all of which limit pt's ability to safely complete self-care routine.  Pt noted with good Min A functional 
  Problem: Physical Therapy - Adult  Goal: By Discharge: Performs mobility at highest level of function for planned discharge setting.  See evaluation for individualized goals.  Description: FUNCTIONAL STATUS PRIOR TO ADMISSION: Patient was modified independent using a rollator for functional mobility.    HOME SUPPORT PRIOR TO ADMISSION: The patient lived alone with family to provide assistance. Pt's sister checks on him daily and cooks him dinner. Sister drives pt to appointments.    Physical Therapy Goals  Initiated 11/2/2024  1.  Patient will move from supine to sit and sit to supine, scoot up and down, and roll side to side in bed with modified independence within 7 day(s).    2.  Patient will perform sit to stand with modified independence within 7 day(s).  3.  Patient will transfer from bed to chair and chair to bed with modified independence using the least restrictive device within 7 day(s).  4.  Patient will ambulate with modified independence for 50 feet with the least restrictive device within 7 day(s).   5.  Patient will ascend/descend 3 stairs with unilateral handrail(s) with supervision/set-up within 7 day(s).   Outcome: Progressing     PHYSICAL THERAPY EVALUATION    Patient: Rodolfo Delcid (69 y.o. male)  Date: 11/2/2024  Primary Diagnosis: Bronchitis [J40]  COPD exacerbation (HCC) [J44.1]  Acute encephalopathy [G93.40]  Acute metabolic encephalopathy [G93.41]       Precautions: Restrictions/Precautions: Fall Risk                      ASSESSMENT :   DEFICITS/IMPAIRMENTS:   The patient is limited by decreased functional mobility, strength, body mechanics, activity tolerance, endurance, safety awareness, balance, posture. Patient is a 68 y/o male who was admitted to the hospital with c/o SOB. Pt being treated for acute COPD exacerbation. Patient educated on the purpose and benefits of skilled PT and goals to be addressed with pt verbalizing good understanding.  PT approached pt and pt c/o eyes hurting. 
  Problem: Respiratory - Adult  Goal: Achieves optimal ventilation and oxygenation  11/1/2024 0140 by Emerita Navarrete RN  Outcome: Progressing  11/1/2024 0106 by Saúl Gonzalez RCP  Outcome: Progressing  Flowsheets (Taken 10/31/2024 1959 by Emerita Navarrete, RN)  Achieves optimal ventilation and oxygenation:   Assess for changes in respiratory status   Assess for changes in mentation and behavior   Position to facilitate oxygenation and minimize respiratory effort   Oxygen supplementation based on oxygen saturation or arterial blood gases  10/31/2024 1207 by Rani Russell, JUD  Outcome: Progressing     Problem: Chronic Conditions and Co-morbidities  Goal: Patient's chronic conditions and co-morbidity symptoms are monitored and maintained or improved  Outcome: Progressing     Problem: Discharge Planning  Goal: Discharge to home or other facility with appropriate resources  Outcome: Progressing     Problem: Pain  Goal: Verbalizes/displays adequate comfort level or baseline comfort level  Outcome: Progressing     
  Problem: Respiratory - Adult  Goal: Achieves optimal ventilation and oxygenation  11/1/2024 2139 by Twila Roy, RN  Outcome: Progressing     Problem: Safety - Adult  Goal: Free from fall injury  Outcome: Progressing     Problem: Chronic Conditions and Co-morbidities  Goal: Patient's chronic conditions and co-morbidity symptoms are monitored and maintained or improved  Outcome: Progressing     Problem: Discharge Planning  Goal: Discharge to home or other facility with appropriate resources  Outcome: Progressing     Problem: Pain  Goal: Verbalizes/displays adequate comfort level or baseline comfort level  Outcome: Progressing     
  Problem: Safety - Adult  Goal: Free from fall injury  Outcome: Progressing  Note: Bed is in the lowest position and wheels are locked, call bell is within reach, bathroom light is on during evening hours, gripper socks are on and patient has been instructed to call out for assistance if needed.     As of now, patient is free from falls and will continue to be monitored.        
D/C home  
Requires cues for some  Cognition Comment: lives alone; recommend cog screen to clarify current cognition; discharge planning    Functional Mobility and Transfers for ADLs:  Bed Mobility:  Bed Mobility Training  Bed Mobility Training: Yes  Overall Level of Assistance: Stand-by assistance;Additional time;Adaptive equipment  Interventions: Safety awareness training;Verbal cues  Rolling: Stand-by assistance;Additional time;Adaptive equipment;Assist X1  Supine to Sit: Additional time;Adaptive equipment;Supervision;Assist X1  Sit to Supine: Supervision;Assist X1;Additional time;Adaptive equipment  Scooting: Stand-by assistance;Additional time;Adaptive equipment     Transfers:   Transfer Training  Transfer Training: Yes  Overall Level of Assistance: Contact-guard assistance  Interventions: Verbal cues;Safety awareness training;Visual cues;Tactile cues  Sit to Stand: Contact-guard assistance;Additional time;Adaptive equipment;Assist X1  Stand to Sit: Contact-guard assistance;Additional time;Adaptive equipment;Assist X1  Bed to Chair: Minimum assistance;Adaptive equipment;Additional time;Assist X1  Toilet Transfer: Contact-guard assistance;Assist X1;Additional time;Adaptive equipment           Balance:     Balance  Sitting: Intact;Impaired  Sitting - Static: Good (unsupported)  Sitting - Dynamic: Good (unsupported);Fair (occasional) (limited in functional sitting with SOB, O2 line management)  Standing: Impaired  Standing - Static: Fair;Constant support  Standing - Dynamic: Fair;Constant support (RW in use with 2.5L O2)      ADL Intervention:         Feeding: Modified independent ;Increased time to complete   Feeding Skilled Clinical Factors: refused lunch; \"I dont want any of that food; get it away from me!\"     Grooming: Stand by assistance;Increased time to complete;Adaptive equipment   Grooming Skilled Clinical Factors: standing with RW for shaving attempt; needs better razor if family could bring from home; O2 sats

## 2024-11-05 NOTE — DISCHARGE SUMMARY
day.  Pertinent Findings:  Patient Vitals for the past 24 hrs:   BP Temp Temp src Pulse Resp SpO2   11/05/24 0740 -- -- -- -- -- 100 %   11/05/24 0734 -- -- -- -- -- 100 %   11/05/24 0733 128/63 97.9 °F (36.6 °C) -- 59 16 100 %   11/05/24 0322 118/68 97.3 °F (36.3 °C) Oral 61 18 97 %   11/04/24 2048 -- -- -- 67 18 97 %   11/04/24 1958 111/60 98.2 °F (36.8 °C) Oral 62 17 97 %   11/04/24 1507 -- -- -- -- -- 97 %   11/04/24 1504 108/64 97.9 °F (36.6 °C) Oral (!) 37 18 97 %   11/04/24 1451 -- -- -- -- -- 99 %   11/04/24 1128 -- -- -- -- -- 98 %   11/04/24 1115 -- -- -- -- -- 98 %   11/04/24 0916 -- -- -- -- -- 97 %   11/04/24 0906 -- -- -- -- -- 97 %       Gen:    Not in distress  Chest: Clear lungs  CVS:   Regular rhythm.  No edema  Abd:  Soft, not distended, not tender  Neuro: awake, moving all exts    Discharge/Recent Laboratory Results:  Recent Labs     11/04/24  0504      K 3.7      CO2 31   BUN 28*   CREATININE 0.49*   GLUCOSE 113*   CALCIUM 8.7   MG 2.0     Recent Labs     11/04/24  0504   HGB 13.7   HCT 42.9   WBC 6.8          Discharge Medications:     Medication List        START taking these medications      levoFLOXacin 750 MG tablet  Commonly known as: LEVAQUIN  Take 1 tablet by mouth daily for 3 doses     predniSONE 10 MG (21) Tbpk  Take as instructed            CONTINUE taking these medications      * albuterol (2.5 MG/3ML) 0.083% nebulizer solution  Commonly known as: PROVENTIL  Take 3 mLs by nebulization every 4 hours as needed for Wheezing     * albuterol sulfate  (90 Base) MCG/ACT inhaler  Commonly known as: Ventolin HFA  Inhale 2 puffs into the lungs 4 times daily as needed for Wheezing     amLODIPine 10 MG tablet  Commonly known as: NORVASC     aspirin 81 MG EC tablet  Take 1 tablet by mouth 2 times daily     atorvastatin 40 MG tablet  Commonly known as: LIPITOR     azelastine 0.1 % nasal spray  Commonly known as: ASTELIN     Breztri Aerosphere 160-9-4.8 MCG/ACT

## 2024-11-05 NOTE — DISCHARGE INSTRUCTIONS
HOSPITALIST DISCHARGE INSTRUCTIONS    NAME: Rodolfo Delcid   :  1955   MRN:  [id number]     Date/Time:  2024 8:57 AM    ADMIT DATE: 10/31/2024   DISCHARGE DATE: 2024     Acute COPD exacerbation  Acute bronchitis  Essential hypertension  Chronic tobacco abuse  Coronary artery disease  Chronic respiratory failure    It is important that you take the medication exactly as they are prescribed.   Keep your medication in the bottles provided by the pharmacist and keep a list of the medication names, dosages, and times to be taken in your wallet.   Do not take other medications without consulting your doctor.       What to do at Home    Recommended diet:  cardiac diet    Recommended activity: activity as tolerated      If you have questions regarding the hospital related prescriptions or hospital related issues please call [facility]' office at . You can always direct your questions to your primary care doctor if you are unable to reach your hospital physician; your PCP works as an extension of your hospital doctor just like your hospital doctor is an extension of your PCP for your time at the hospital ([facility])    If you experience any of the following symptoms then please call your primary care physician or return to the emergency room if you cannot get hold of your doctor:    Fever, chills, nausea, vomiting, or persistent diarrhea  Worsening weakness or new problems with your speech or balance  Dark stools or visible blood in your stools  New Leg swelling or shortness of breath as these could be signs of a clot    Additional Instructions:      Bring these papers with you to your follow up appointments. The papers will help your doctors be sure to continue the care plan from the hospital.              Information obtained by :  I understand that if any problems occur once I am at home I am to contact my physician.    I understand and acknowledge receipt of the instructions

## 2024-11-08 ASSESSMENT — ENCOUNTER SYMPTOMS
COUGH: 1
DIARRHEA: 0
BACK PAIN: 0
CHEST TIGHTNESS: 1
NAUSEA: 0
ABDOMINAL PAIN: 0
VOMITING: 0
TROUBLE SWALLOWING: 0
COLOR CHANGE: 0
WHEEZING: 1
SHORTNESS OF BREATH: 1

## 2024-12-13 ENCOUNTER — TELEPHONE (OUTPATIENT)
Age: 69
End: 2024-12-13

## 2024-12-13 NOTE — TELEPHONE ENCOUNTER
Ebonie harp/ [facility] called stating that Pt came in to see his PCP, Dr. Lukasz Awad today. States Pt told them that since he has been on the Levetiracetam, he has been dealing with open sores and burning sensation in his arms, lips etc. Requesting advise on next steps. Please advise. [phone]

## 2024-12-16 NOTE — TELEPHONE ENCOUNTER
Verified patient with 2 identifiers   Patient states he started taking Keppra 4-5 months ago. States since then he was developing open sores on his arms and constant burning sensation in his arms and lips. Patient states he stopped taking Keppra on Friday and the burning sensations are easing up. Please advise.

## 2024-12-17 ENCOUNTER — TELEPHONE (OUTPATIENT)
Age: 69
End: 2024-12-17

## 2024-12-17 NOTE — TELEPHONE ENCOUNTER
Spoke with Ebonie at Dr Awad office. Ebonie stated that Dr Awad did believe the sores and burning was from the Keppra. Ebonie states that Dr Awad advised to stop the Keppra.

## 2024-12-17 NOTE — TELEPHONE ENCOUNTER
Called Dr Awad office to speak with Karie. Left detailed message with psr to call me back.   You have no availability til March ??

## 2024-12-18 DIAGNOSIS — G40.209 LOCALIZATION-RELATED PARTIAL EPILEPSY WITH COMPLEX PARTIAL SEIZURES (HCC): Primary | ICD-10-CM

## 2024-12-18 RX ORDER — DIVALPROEX SODIUM 250 MG/1
250 TABLET, DELAYED RELEASE ORAL 2 TIMES DAILY
Qty: 60 TABLET | Refills: 3 | Status: SHIPPED | OUTPATIENT
Start: 2024-12-18

## 2025-04-06 DIAGNOSIS — G40.209 LOCALIZATION-RELATED PARTIAL EPILEPSY WITH COMPLEX PARTIAL SEIZURES: ICD-10-CM

## 2025-04-10 RX ORDER — DIVALPROEX SODIUM 250 MG/1
TABLET, DELAYED RELEASE ORAL
Qty: 60 TABLET | Refills: 3 | Status: ON HOLD | OUTPATIENT
Start: 2025-04-10

## 2025-04-13 ENCOUNTER — HOSPITAL ENCOUNTER (INPATIENT)
Facility: HOSPITAL | Age: 70
LOS: 4 days | Discharge: NURSING, REHAB OR LONG-TERM CARE | DRG: 101 | End: 2025-04-17
Attending: STUDENT IN AN ORGANIZED HEALTH CARE EDUCATION/TRAINING PROGRAM | Admitting: INTERNAL MEDICINE
Payer: MEDICARE

## 2025-04-13 ENCOUNTER — APPOINTMENT (OUTPATIENT)
Facility: HOSPITAL | Age: 70
DRG: 101 | End: 2025-04-13
Payer: MEDICARE

## 2025-04-13 DIAGNOSIS — M97.8XXA PERIPROSTHETIC FRACTURE OF SHAFT OF FEMUR: ICD-10-CM

## 2025-04-13 DIAGNOSIS — R56.9 SEIZURES (HCC): ICD-10-CM

## 2025-04-13 DIAGNOSIS — Z96.649 PERIPROSTHETIC FRACTURE OF SHAFT OF FEMUR: ICD-10-CM

## 2025-04-13 DIAGNOSIS — R55 SYNCOPE AND COLLAPSE: Primary | ICD-10-CM

## 2025-04-13 DIAGNOSIS — R53.1 GENERAL WEAKNESS: ICD-10-CM

## 2025-04-13 DIAGNOSIS — S32.591A CLOSED FRACTURE OF RAMUS OF RIGHT PUBIS, INITIAL ENCOUNTER (HCC): ICD-10-CM

## 2025-04-13 LAB
ALBUMIN SERPL-MCNC: 3.3 G/DL (ref 3.5–5)
ALBUMIN/GLOB SERPL: 0.9 (ref 1.1–2.2)
ALP SERPL-CCNC: 99 U/L (ref 45–117)
ALT SERPL-CCNC: 23 U/L (ref 12–78)
ANION GAP SERPL CALC-SCNC: 5 MMOL/L (ref 2–12)
APPEARANCE UR: CLEAR
AST SERPL-CCNC: 19 U/L (ref 15–37)
BACTERIA URNS QL MICRO: NEGATIVE /HPF
BASOPHILS # BLD: 0.05 K/UL (ref 0–0.1)
BASOPHILS NFR BLD: 0.5 % (ref 0–1)
BILIRUB SERPL-MCNC: 0.7 MG/DL (ref 0.2–1)
BILIRUB UR QL: NEGATIVE
BUN SERPL-MCNC: 9 MG/DL (ref 6–20)
BUN/CREAT SERPL: 13 (ref 12–20)
CALCIUM SERPL-MCNC: 8.9 MG/DL (ref 8.5–10.1)
CHLORIDE SERPL-SCNC: 92 MMOL/L (ref 97–108)
CO2 SERPL-SCNC: 31 MMOL/L (ref 21–32)
COLOR UR: ABNORMAL
COMMENT:: NORMAL
CREAT SERPL-MCNC: 0.67 MG/DL (ref 0.7–1.3)
DIFFERENTIAL METHOD BLD: ABNORMAL
EOSINOPHIL # BLD: 0.05 K/UL (ref 0–0.4)
EOSINOPHIL NFR BLD: 0.5 % (ref 0–7)
EPITH CASTS URNS QL MICRO: ABNORMAL /LPF
ERYTHROCYTE [DISTWIDTH] IN BLOOD BY AUTOMATED COUNT: 13.7 % (ref 11.5–14.5)
GLOBULIN SER CALC-MCNC: 3.6 G/DL (ref 2–4)
GLUCOSE SERPL-MCNC: 90 MG/DL (ref 65–100)
GLUCOSE UR STRIP.AUTO-MCNC: NEGATIVE MG/DL
HCT VFR BLD AUTO: 38.5 % (ref 36.6–50.3)
HGB BLD-MCNC: 12.3 G/DL (ref 12.1–17)
HGB UR QL STRIP: NEGATIVE
HYALINE CASTS URNS QL MICRO: ABNORMAL /LPF (ref 0–2)
IMM GRANULOCYTES # BLD AUTO: 0.04 K/UL (ref 0–0.04)
IMM GRANULOCYTES NFR BLD AUTO: 0.4 % (ref 0–0.5)
KETONES UR QL STRIP.AUTO: NEGATIVE MG/DL
LEUKOCYTE ESTERASE UR QL STRIP.AUTO: NEGATIVE
LYMPHOCYTES # BLD: 0.95 K/UL (ref 0.8–3.5)
LYMPHOCYTES NFR BLD: 8.6 % (ref 12–49)
MCH RBC QN AUTO: 28.5 PG (ref 26–34)
MCHC RBC AUTO-ENTMCNC: 31.9 G/DL (ref 30–36.5)
MCV RBC AUTO: 89.3 FL (ref 80–99)
MONOCYTES # BLD: 0.95 K/UL (ref 0–1)
MONOCYTES NFR BLD: 8.6 % (ref 5–13)
NEUTS SEG # BLD: 8.97 K/UL (ref 1.8–8)
NEUTS SEG NFR BLD: 81.4 % (ref 32–75)
NITRITE UR QL STRIP.AUTO: NEGATIVE
NRBC # BLD: 0 K/UL (ref 0–0.01)
NRBC BLD-RTO: 0 PER 100 WBC
PH UR STRIP: 8.5 (ref 5–8)
PLATELET # BLD AUTO: 309 K/UL (ref 150–400)
PMV BLD AUTO: 8.7 FL (ref 8.9–12.9)
POTASSIUM SERPL-SCNC: 4.3 MMOL/L (ref 3.5–5.1)
PROT SERPL-MCNC: 6.9 G/DL (ref 6.4–8.2)
PROT UR STRIP-MCNC: NEGATIVE MG/DL
RBC # BLD AUTO: 4.31 M/UL (ref 4.1–5.7)
RBC #/AREA URNS HPF: ABNORMAL /HPF (ref 0–5)
SODIUM SERPL-SCNC: 128 MMOL/L (ref 136–145)
SP GR UR REFRACTOMETRY: 1.01
SPECIMEN HOLD: NORMAL
TROPONIN I SERPL HS-MCNC: 7 NG/L (ref 0–76)
TROPONIN I SERPL HS-MCNC: 8 NG/L (ref 0–76)
URINE CULTURE IF INDICATED: ABNORMAL
UROBILINOGEN UR QL STRIP.AUTO: 1 EU/DL (ref 0.2–1)
WBC # BLD AUTO: 11 K/UL (ref 4.1–11.1)
WBC URNS QL MICRO: ABNORMAL /HPF (ref 0–4)

## 2025-04-13 PROCEDURE — 72100 X-RAY EXAM L-S SPINE 2/3 VWS: CPT

## 2025-04-13 PROCEDURE — 99285 EMERGENCY DEPT VISIT HI MDM: CPT

## 2025-04-13 PROCEDURE — 80053 COMPREHEN METABOLIC PANEL: CPT

## 2025-04-13 PROCEDURE — 2700000000 HC OXYGEN THERAPY PER DAY

## 2025-04-13 PROCEDURE — 6370000000 HC RX 637 (ALT 250 FOR IP): Performed by: INTERNAL MEDICINE

## 2025-04-13 PROCEDURE — 74176 CT ABD & PELVIS W/O CONTRAST: CPT

## 2025-04-13 PROCEDURE — 2500000003 HC RX 250 WO HCPCS: Performed by: INTERNAL MEDICINE

## 2025-04-13 PROCEDURE — 6370000000 HC RX 637 (ALT 250 FOR IP)

## 2025-04-13 PROCEDURE — 73522 X-RAY EXAM HIPS BI 3-4 VIEWS: CPT

## 2025-04-13 PROCEDURE — 6360000002 HC RX W HCPCS: Performed by: INTERNAL MEDICINE

## 2025-04-13 PROCEDURE — 6360000002 HC RX W HCPCS

## 2025-04-13 PROCEDURE — 96374 THER/PROPH/DIAG INJ IV PUSH: CPT

## 2025-04-13 PROCEDURE — 94762 N-INVAS EAR/PLS OXIMTRY CONT: CPT

## 2025-04-13 PROCEDURE — 84484 ASSAY OF TROPONIN QUANT: CPT

## 2025-04-13 PROCEDURE — 96375 TX/PRO/DX INJ NEW DRUG ADDON: CPT

## 2025-04-13 PROCEDURE — 72125 CT NECK SPINE W/O DYE: CPT

## 2025-04-13 PROCEDURE — 2580000003 HC RX 258: Performed by: INTERNAL MEDICINE

## 2025-04-13 PROCEDURE — 81001 URINALYSIS AUTO W/SCOPE: CPT

## 2025-04-13 PROCEDURE — 70450 CT HEAD/BRAIN W/O DYE: CPT

## 2025-04-13 PROCEDURE — 71250 CT THORAX DX C-: CPT

## 2025-04-13 PROCEDURE — 85025 COMPLETE CBC W/AUTO DIFF WBC: CPT

## 2025-04-13 PROCEDURE — 94640 AIRWAY INHALATION TREATMENT: CPT

## 2025-04-13 PROCEDURE — 1100000003 HC PRIVATE W/ TELEMETRY

## 2025-04-13 PROCEDURE — 36415 COLL VENOUS BLD VENIPUNCTURE: CPT

## 2025-04-13 RX ORDER — LACOSAMIDE 100 MG/1
200 TABLET ORAL 2 TIMES DAILY
Status: DISCONTINUED | OUTPATIENT
Start: 2025-04-13 | End: 2025-04-15

## 2025-04-13 RX ORDER — IPRATROPIUM BROMIDE AND ALBUTEROL SULFATE 2.5; .5 MG/3ML; MG/3ML
2 SOLUTION RESPIRATORY (INHALATION)
Status: COMPLETED | OUTPATIENT
Start: 2025-04-13 | End: 2025-04-13

## 2025-04-13 RX ORDER — ACETAMINOPHEN 650 MG/1
650 SUPPOSITORY RECTAL EVERY 6 HOURS PRN
Status: DISCONTINUED | OUTPATIENT
Start: 2025-04-13 | End: 2025-04-17 | Stop reason: HOSPADM

## 2025-04-13 RX ORDER — POTASSIUM CHLORIDE 1500 MG/1
40 TABLET, EXTENDED RELEASE ORAL PRN
Status: DISCONTINUED | OUTPATIENT
Start: 2025-04-13 | End: 2025-04-17 | Stop reason: HOSPADM

## 2025-04-13 RX ORDER — BUDESONIDE 0.5 MG/2ML
0.5 INHALANT ORAL
Status: DISCONTINUED | OUTPATIENT
Start: 2025-04-13 | End: 2025-04-17 | Stop reason: HOSPADM

## 2025-04-13 RX ORDER — DIVALPROEX SODIUM 250 MG/1
250 TABLET, DELAYED RELEASE ORAL EVERY 12 HOURS SCHEDULED
Status: DISCONTINUED | OUTPATIENT
Start: 2025-04-13 | End: 2025-04-17 | Stop reason: HOSPADM

## 2025-04-13 RX ORDER — ACETAMINOPHEN 500 MG
1000 TABLET ORAL
Status: COMPLETED | OUTPATIENT
Start: 2025-04-13 | End: 2025-04-13

## 2025-04-13 RX ORDER — FUROSEMIDE 20 MG/1
20 TABLET ORAL DAILY
Status: ON HOLD | COMMUNITY
End: 2025-04-17 | Stop reason: HOSPADM

## 2025-04-13 RX ORDER — PANTOPRAZOLE SODIUM 40 MG/1
40 TABLET, DELAYED RELEASE ORAL
Status: DISCONTINUED | OUTPATIENT
Start: 2025-04-14 | End: 2025-04-17 | Stop reason: HOSPADM

## 2025-04-13 RX ORDER — LACOSAMIDE 200 MG/1
200 TABLET ORAL 2 TIMES DAILY
Status: ON HOLD | COMMUNITY
End: 2025-04-14 | Stop reason: ALTCHOICE

## 2025-04-13 RX ORDER — ONDANSETRON 2 MG/ML
4 INJECTION INTRAMUSCULAR; INTRAVENOUS EVERY 6 HOURS PRN
Status: DISCONTINUED | OUTPATIENT
Start: 2025-04-13 | End: 2025-04-17 | Stop reason: HOSPADM

## 2025-04-13 RX ORDER — SODIUM CHLORIDE 0.9 % (FLUSH) 0.9 %
5-40 SYRINGE (ML) INJECTION PRN
Status: DISCONTINUED | OUTPATIENT
Start: 2025-04-13 | End: 2025-04-17 | Stop reason: HOSPADM

## 2025-04-13 RX ORDER — ACETAMINOPHEN 325 MG/1
650 TABLET ORAL EVERY 6 HOURS PRN
Status: DISCONTINUED | OUTPATIENT
Start: 2025-04-13 | End: 2025-04-17 | Stop reason: HOSPADM

## 2025-04-13 RX ORDER — POTASSIUM CHLORIDE 7.45 MG/ML
10 INJECTION INTRAVENOUS PRN
Status: DISCONTINUED | OUTPATIENT
Start: 2025-04-13 | End: 2025-04-17 | Stop reason: HOSPADM

## 2025-04-13 RX ORDER — IPRATROPIUM BROMIDE AND ALBUTEROL SULFATE 2.5; .5 MG/3ML; MG/3ML
1 SOLUTION RESPIRATORY (INHALATION)
Status: DISCONTINUED | OUTPATIENT
Start: 2025-04-13 | End: 2025-04-13

## 2025-04-13 RX ORDER — ONDANSETRON 4 MG/1
4 TABLET, ORALLY DISINTEGRATING ORAL EVERY 8 HOURS PRN
Status: DISCONTINUED | OUTPATIENT
Start: 2025-04-13 | End: 2025-04-17 | Stop reason: HOSPADM

## 2025-04-13 RX ORDER — MORPHINE SULFATE 4 MG/ML
4 INJECTION, SOLUTION INTRAMUSCULAR; INTRAVENOUS
Status: COMPLETED | OUTPATIENT
Start: 2025-04-13 | End: 2025-04-13

## 2025-04-13 RX ORDER — SODIUM CHLORIDE 0.9 % (FLUSH) 0.9 %
5-40 SYRINGE (ML) INJECTION EVERY 12 HOURS SCHEDULED
Status: DISCONTINUED | OUTPATIENT
Start: 2025-04-13 | End: 2025-04-17 | Stop reason: HOSPADM

## 2025-04-13 RX ORDER — AZELASTINE HYDROCHLORIDE 137 UG/1
1 SPRAY, METERED NASAL 2 TIMES DAILY
Status: DISCONTINUED | OUTPATIENT
Start: 2025-04-13 | End: 2025-04-17 | Stop reason: HOSPADM

## 2025-04-13 RX ORDER — DEXAMETHASONE SODIUM PHOSPHATE 10 MG/ML
10 INJECTION, SOLUTION INTRAMUSCULAR; INTRAVENOUS
Status: COMPLETED | OUTPATIENT
Start: 2025-04-13 | End: 2025-04-13

## 2025-04-13 RX ORDER — MAGNESIUM SULFATE IN WATER 40 MG/ML
2000 INJECTION, SOLUTION INTRAVENOUS PRN
Status: DISCONTINUED | OUTPATIENT
Start: 2025-04-13 | End: 2025-04-17 | Stop reason: HOSPADM

## 2025-04-13 RX ORDER — OXYCODONE AND ACETAMINOPHEN 5; 325 MG/1; MG/1
1 TABLET ORAL
Refills: 0 | Status: COMPLETED | OUTPATIENT
Start: 2025-04-13 | End: 2025-04-13

## 2025-04-13 RX ORDER — BUDESONIDE 0.5 MG/2ML
0.5 INHALANT ORAL
Status: DISCONTINUED | OUTPATIENT
Start: 2025-04-13 | End: 2025-04-13

## 2025-04-13 RX ORDER — ARFORMOTEROL TARTRATE 15 UG/2ML
15 SOLUTION RESPIRATORY (INHALATION)
Status: DISCONTINUED | OUTPATIENT
Start: 2025-04-13 | End: 2025-04-13

## 2025-04-13 RX ORDER — POLYETHYLENE GLYCOL 3350 17 G/17G
17 POWDER, FOR SOLUTION ORAL DAILY PRN
Status: DISCONTINUED | OUTPATIENT
Start: 2025-04-13 | End: 2025-04-17 | Stop reason: HOSPADM

## 2025-04-13 RX ORDER — CALCITONIN SALMON 200 [IU]/.09ML
1 SPRAY, METERED NASAL DAILY
Status: DISCONTINUED | OUTPATIENT
Start: 2025-04-13 | End: 2025-04-17 | Stop reason: HOSPADM

## 2025-04-13 RX ORDER — ASPIRIN 81 MG/1
81 TABLET ORAL 2 TIMES DAILY
Status: DISCONTINUED | OUTPATIENT
Start: 2025-04-13 | End: 2025-04-17 | Stop reason: HOSPADM

## 2025-04-13 RX ORDER — SODIUM CHLORIDE 9 MG/ML
INJECTION, SOLUTION INTRAVENOUS PRN
Status: DISCONTINUED | OUTPATIENT
Start: 2025-04-13 | End: 2025-04-17 | Stop reason: HOSPADM

## 2025-04-13 RX ORDER — ERGOCALCIFEROL 1.25 MG/1
50000 CAPSULE, LIQUID FILLED ORAL WEEKLY
Status: DISCONTINUED | OUTPATIENT
Start: 2025-04-13 | End: 2025-04-17 | Stop reason: HOSPADM

## 2025-04-13 RX ORDER — DOXAZOSIN 4 MG/1
4 TABLET ORAL NIGHTLY
Status: ON HOLD | COMMUNITY
End: 2025-04-17 | Stop reason: HOSPADM

## 2025-04-13 RX ORDER — ARFORMOTEROL TARTRATE 15 UG/2ML
15 SOLUTION RESPIRATORY (INHALATION)
Status: DISCONTINUED | OUTPATIENT
Start: 2025-04-13 | End: 2025-04-17 | Stop reason: HOSPADM

## 2025-04-13 RX ORDER — OXYCODONE HYDROCHLORIDE 5 MG/1
5 TABLET ORAL EVERY 4 HOURS PRN
Status: DISCONTINUED | OUTPATIENT
Start: 2025-04-13 | End: 2025-04-17 | Stop reason: HOSPADM

## 2025-04-13 RX ORDER — OXYCODONE HYDROCHLORIDE 5 MG/1
10 TABLET ORAL EVERY 4 HOURS PRN
Status: DISCONTINUED | OUTPATIENT
Start: 2025-04-13 | End: 2025-04-17 | Stop reason: HOSPADM

## 2025-04-13 RX ORDER — LEVOTHYROXINE SODIUM 50 UG/1
50 TABLET ORAL
Status: DISCONTINUED | OUTPATIENT
Start: 2025-04-14 | End: 2025-04-17 | Stop reason: HOSPADM

## 2025-04-13 RX ORDER — ENOXAPARIN SODIUM 100 MG/ML
40 INJECTION SUBCUTANEOUS DAILY
Status: DISCONTINUED | OUTPATIENT
Start: 2025-04-13 | End: 2025-04-17 | Stop reason: HOSPADM

## 2025-04-13 RX ORDER — SODIUM CHLORIDE 9 MG/ML
INJECTION, SOLUTION INTRAVENOUS CONTINUOUS
Status: DISCONTINUED | OUTPATIENT
Start: 2025-04-13 | End: 2025-04-14

## 2025-04-13 RX ORDER — MONTELUKAST SODIUM 10 MG/1
10 TABLET ORAL DAILY
Status: DISCONTINUED | OUTPATIENT
Start: 2025-04-13 | End: 2025-04-17 | Stop reason: HOSPADM

## 2025-04-13 RX ORDER — GABAPENTIN 300 MG/1
300 CAPSULE ORAL 3 TIMES DAILY
Status: DISCONTINUED | OUTPATIENT
Start: 2025-04-13 | End: 2025-04-17 | Stop reason: HOSPADM

## 2025-04-13 RX ADMIN — IPRATROPIUM BROMIDE AND ALBUTEROL SULFATE 2 DOSE: 2.5; .5 SOLUTION RESPIRATORY (INHALATION) at 09:54

## 2025-04-13 RX ADMIN — ASPIRIN 81 MG: 81 TABLET, COATED ORAL at 14:40

## 2025-04-13 RX ADMIN — ARFORMOTEROL TARTRATE 15 MCG: 15 SOLUTION RESPIRATORY (INHALATION) at 13:45

## 2025-04-13 RX ADMIN — ARFORMOTEROL TARTRATE 15 MCG: 15 SOLUTION RESPIRATORY (INHALATION) at 19:22

## 2025-04-13 RX ADMIN — MELATONIN 3 MG: at 21:24

## 2025-04-13 RX ADMIN — ENOXAPARIN SODIUM 40 MG: 100 INJECTION SUBCUTANEOUS at 14:41

## 2025-04-13 RX ADMIN — BUDESONIDE 500 MCG: 0.5 INHALANT RESPIRATORY (INHALATION) at 19:22

## 2025-04-13 RX ADMIN — OXYCODONE HYDROCHLORIDE AND ACETAMINOPHEN 1 TABLET: 5; 325 TABLET ORAL at 13:31

## 2025-04-13 RX ADMIN — SODIUM CHLORIDE: 0.9 INJECTION, SOLUTION INTRAVENOUS at 14:40

## 2025-04-13 RX ADMIN — ACETAMINOPHEN 1000 MG: 500 TABLET, FILM COATED ORAL at 09:51

## 2025-04-13 RX ADMIN — ASPIRIN 81 MG: 81 TABLET, COATED ORAL at 21:24

## 2025-04-13 RX ADMIN — GABAPENTIN 300 MG: 300 CAPSULE ORAL at 14:40

## 2025-04-13 RX ADMIN — DIVALPROEX SODIUM 250 MG: 250 TABLET, DELAYED RELEASE ORAL at 21:24

## 2025-04-13 RX ADMIN — BUDESONIDE 500 MCG: 0.5 INHALANT RESPIRATORY (INHALATION) at 13:45

## 2025-04-13 RX ADMIN — GABAPENTIN 300 MG: 300 CAPSULE ORAL at 21:24

## 2025-04-13 RX ADMIN — AZELASTINE HYDROCHLORIDE 1 SPRAY: 137 SPRAY, METERED NASAL at 21:35

## 2025-04-13 RX ADMIN — IPRATROPIUM BROMIDE 0.5 MG: 0.5 SOLUTION RESPIRATORY (INHALATION) at 13:45

## 2025-04-13 RX ADMIN — OXYCODONE 10 MG: 5 TABLET ORAL at 18:12

## 2025-04-13 RX ADMIN — LACOSAMIDE 200 MG: 100 TABLET, FILM COATED ORAL at 21:23

## 2025-04-13 RX ADMIN — MORPHINE SULFATE 4 MG: 4 INJECTION INTRAVENOUS at 11:11

## 2025-04-13 RX ADMIN — LACOSAMIDE 200 MG: 100 TABLET, FILM COATED ORAL at 14:40

## 2025-04-13 RX ADMIN — WATER 1000 MG: 1 INJECTION INTRAMUSCULAR; INTRAVENOUS; SUBCUTANEOUS at 20:00

## 2025-04-13 RX ADMIN — SODIUM CHLORIDE, PRESERVATIVE FREE 10 ML: 5 INJECTION INTRAVENOUS at 21:24

## 2025-04-13 RX ADMIN — ACETAMINOPHEN 650 MG: 325 TABLET ORAL at 18:13

## 2025-04-13 RX ADMIN — DEXAMETHASONE SODIUM PHOSPHATE 10 MG: 10 INJECTION, SOLUTION INTRAMUSCULAR; INTRAVENOUS at 09:51

## 2025-04-13 ASSESSMENT — PAIN DESCRIPTION - LOCATION
LOCATION: BACK;HEAD
LOCATION: HEAD;BACK
LOCATION: BACK
LOCATION: BACK;HEAD

## 2025-04-13 ASSESSMENT — PAIN SCALES - GENERAL
PAINLEVEL_OUTOF10: 10
PAINLEVEL_OUTOF10: 8
PAINLEVEL_OUTOF10: 10
PAINLEVEL_OUTOF10: 10

## 2025-04-13 ASSESSMENT — PAIN DESCRIPTION - DESCRIPTORS
DESCRIPTORS: ACHING
DESCRIPTORS: ACHING;DULL
DESCRIPTORS: ACHING
DESCRIPTORS: ACHING

## 2025-04-13 ASSESSMENT — PAIN DESCRIPTION - ORIENTATION
ORIENTATION: LOWER
ORIENTATION: LOWER
ORIENTATION: RIGHT;POSTERIOR
ORIENTATION: MID;LOWER
ORIENTATION: RIGHT
ORIENTATION: MID;LOWER

## 2025-04-13 ASSESSMENT — PAIN - FUNCTIONAL ASSESSMENT
PAIN_FUNCTIONAL_ASSESSMENT: 0-10
PAIN_FUNCTIONAL_ASSESSMENT: ACTIVITIES ARE NOT PREVENTED

## 2025-04-13 NOTE — PROGRESS NOTES
End of Shift Note    Bedside shift change report given to  SARINA Mora  (oncoming nurse) by SARINA Mcmahon .        Shift worked:  Days   Shift summary and any significant changes:     Patient received from the ER this evening. Bumped to 3L/NC per patient request for SOB. Patient Q2 turns. OXY ordered for pain. Oxy X 1 and Tylenol X 1 for severe pain and headache.        Concerns for physician to address:  Results/Plan of care    Zone phone for oncoming shift:        Patient Information  Rodolfo Delcid  70 y.o.  4/13/2025  8:11 AM by Stanton Oquendo MD. Rodolfo Delcid was admitted from [facility]    Problem List  Patient Active Problem List    Diagnosis Date Noted    Syncope and collapse 04/13/2025    Acute metabolic encephalopathy 10/31/2024    Acute chest pain 10/13/2024    COPD with acute exacerbation (HCC) 09/17/2024    MCI (mild cognitive impairment) 09/17/2024    Confusion 09/17/2024    Palliative care encounter 04/25/2024    Altered mental status 04/25/2024    Metabolic encephalopathy 04/24/2024    Acute right-sided muscle weakness 04/24/2024    Aphasia 04/24/2024    Seizures (HCC) 04/24/2024    Shortness of breath 04/24/2024    Acute cholecystitis 12/01/2023    Fracture 05/16/2023    Complex partial seizure evolving to generalized seizure (HCC) 11/22/2016    Cerebral infarction due to thrombosis of left middle cerebral artery (HCC) 11/22/2016    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 History:   Diagnosis Date    Arthritis

## 2025-04-13 NOTE — PROGRESS NOTES
Patient says he has some meds through mail order and some of them through [email or web address] in [facility] on [address]. Patient does not remember the names of his medications but \"knows how\" he's supposed to take them. - Unable to do Med Rec will add pharmacy consult.

## 2025-04-13 NOTE — ED PROVIDER NOTES
[facility] EMERGENCY DEPT  EMERGENCY DEPARTMENT HISTORY AND PHYSICAL EXAM      Date of evaluation: 4/13/2025  Patient Name: Rodolfo Delcid  Birthdate 1955  MRN: [id number]  ED Provider: BEAU Birch NP   Note Started: 10:37 AM EDT 4/13/25    HISTORY OF PRESENT ILLNESS     Chief Complaint   Patient presents with    Loss of Consciousness     Pt BIBEMS from home where he lives alone stating he got out of bed last night around midnight, fell backwards hitting his head on a door and \"doesn't remember anything after that\". Endorses blurred vision and dizziness. Takes daily low dose aspirin.     Shortness of Breath     Pt complains of SOB at baseline d/t COPD, but worsening with labored breathing since his fall this morning. Wears 2L at baseline. Trachea midline.        History Provided By: Patient, only     HPI: Rodolfo Delcid is a 70 y.o. male with past medical history of CAD, hypertension, hyperlipidemia, seizures, hypothyroid, COPD (2L O2 baseline), presents to the emergency department via EMS with complaints of lower back pain, headache, neck pain, increased shortness of breath, increased numbness/tingling to right arm/leg s/p syncopal episode/fall around midnight.  He lives alone and got out of bed to walk to the restroom, with use of his walker. On his way to the restroom he experienced a syncopal episode and did not feel ill prior.  He fell to the floor but was able to get himself up and went back into bed.  He denies chest pain, back pain, abdominal pain, nausea/vomiting, diarrhea, urinary symptoms, rash.  On arrival he is alert and oriented x 3,interacting appropriately but is notably dyspneic and tachypneic.    PAST MEDICAL HISTORY   Past Medical History:  Past Medical History:   Diagnosis Date    Arthritis     Hips, Knees    CAD (coronary artery disease)     MI around 1990    COPD (chronic obstructive pulmonary disease) (HCC)     Hepatitis A     High cholesterol     Hypertension     Other ill-defined  Sonido PINEDO MD        [START ON 4/14/2025] pantoprazole (PROTONIX) tablet 40 mg  40 mg Oral QAM AC Sonido Lopez MD        vitamin D (ERGOCALCIFEROL) capsule 50,000 Units  50,000 Units Oral Weekly Sonido Lopez MD        budesonide (PULMICORT) nebulizer suspension 500 mcg  0.5 mg Nebulization BID RT Sonido Lopez MD        And    arformoterol tartrate (BROVANA) nebulizer solution 15 mcg  15 mcg Nebulization BID RT Sonido Lopez MD        And    [START ON 4/14/2025] ipratropium (ATROVENT) 0.02 % nebulizer solution 0.5 mg  0.5 mg Nebulization BID RT Sonido Lopez MD           Social Determinants of Health:   Social Drivers of Health     Tobacco Use: Medium Risk (4/13/2025)    Patient History     Smoking Tobacco Use: Former     Smokeless Tobacco Use: Never     Passive Exposure: Not on file   Alcohol Use: Not At Risk (4/13/2025)    AUDIT-C     Frequency of Alcohol Consumption: Never     Average Number of Drinks: Patient does not drink     Frequency of Binge Drinking: Never   Financial Resource Strain: Not on file   Food Insecurity: No Food Insecurity (4/13/2025)    Hunger Vital Sign     Worried About Running Out of Food in the Last Year: Never true     Ran Out of Food in the Last Year: Never true   Transportation Needs: No Transportation Needs (4/13/2025)    PRAPARE - Transportation     Lack of Transportation (Medical): No     Lack of Transportation (Non-Medical): No   Physical Activity: Not on file   Stress: Not on file   Social Connections: Not on file   Intimate Partner Violence: Not on file   Depression: Not at risk (7/15/2024)    PHQ-2     PHQ-2 Score: 0   Housing Stability: Low Risk  (4/13/2025)    Housing Stability Vital Sign     Unable to Pay for Housing in the Last Year: No     Number of Times Moved in the Last Year: 0     Homeless in the Last Year: No   Interpersonal Safety: Not At Risk (4/13/2025)    Interpersonal Safety Domain Source: IP Abuse Screening     Physical abuse:

## 2025-04-13 NOTE — H&P
Hospitalist Admission Note    NAME:   Rodolfo Delcid   : 1955   MRN: [id number]     Date/Time: 2025 1:19 PM    Patient PCP: Lukasz Awad, DO    ______________________________________________________________________  Given the patient's current clinical presentation, I have a high level of concern for decompensation if discharged from the emergency department.  Complex decision making was performed, which includes reviewing the patient's available past medical records, laboratory results, and x-ray films.       My assessment of this patient's clinical condition and my plan of care is as follows.    Assessment / Plan:  Principal Problem:    Seizures (HCC)  Active Problems:    HTN (hypertension)    Carotid artery stenosis with cerebral infarction (HCC)    CAD (coronary artery disease)    Late effect of stroke    Complex partial seizure evolving to generalized seizure (HCC)    Cerebral infarction due to thrombosis of left middle cerebral artery (HCC)    Osteoarthrosis, hip    Localization-related partial epilepsy with complex partial seizures    COPD with acute exacerbation (HCC)    MCI (mild cognitive impairment)    Syncope and collapse  Resolved Problems:    * No resolved hospital problems. *    Plan:  The Syncope and collapse is in all likelyhood one of his seizures  Patient now with probable pelvic fracture with  CT scan pending.  Continue home meds including his seizure meds.  Physical therapy and Occupational Therapy consults.  Further plans pending outcome of pelvic CTKayley Enamorado for his COPD    Medical Decision Making:   I personally reviewed labs: yes  I personally reviewed imaging:yes  I personally reviewed EKG:  Toxic drug monitoring: seizure meds  Discussed case with: ED provider. After discussion I am in agreement that acuity of patient's medical condition necessitates hospital stay.      Code Status: full code  DVT Prophylaxis: lovenox  Baseline: lives alone in house behind his sister  10/30/24   Hay Ghosh MD   aspirin 81 MG EC tablet Take 1 tablet by mouth 2 times daily 9/23/24 11/22/24  Kenia Bowser MD   gabapentin (NEURONTIN) 300 MG capsule TAKE 2 CAPSULES FOUR TIMES DAILY 7/15/24 7/10/25  Alannah Hewitt APRN - NP   vitamin D (ERGOCALCIFEROL) 1.25 MG (59639 UT) CAPS capsule Take 1 capsule by mouth once a week 12/4/23   Blaire Sylvester MD   famotidine (PEPCID) 20 MG tablet Take 1 tablet by mouth 2 times daily 3/21/23   Blaire Sylvester MD   ketoconazole (NIZORAL) 2 % shampoo Apply topically daily as needed 3/5/24   Blaire Sylvester MD   omeprazole (PRILOSEC) 20 MG delayed release capsule Take 1 capsule by mouth Daily 3/5/24   Blaire Sylvester MD   amLODIPine (NORVASC) 10 MG tablet TAKE ONE TABLET BY MOUTH EVERY DAY 2/28/14   Automatic Reconciliation, Ar   atorvastatin (LIPITOR) 40 MG tablet Take by mouth daily    Automatic Reconciliation, Ar   azelastine (ASTELIN) 0.1 % nasal spray USE 2 SPRAYS IN EACH NOSTRIL TWICE DAILY 10/14/21   Automatic Reconciliation, Ar   Budeson-Glycopyrrol-Formoterol (BREZTRI AEROSPHERE) 160-9-4.8 MCG/ACT AERO Inhale 2 puffs into the lungs 2 times daily    Automatic Reconciliation, Ar   citalopram (CELEXA) 20 MG tablet TAKE ONE TABLET BY MOUTH ONCE DAILY 6/20/14   Automatic Reconciliation, Ar   ipratropium-albuterol (DUONEB) 0.5-2.5 (3) MG/3ML SOLN nebulizer solution Inhale into the lungs every 6 hours as needed 4/27/22   Automatic Reconciliation, Ar   levothyroxine (SYNTHROID) 50 MCG tablet TAKE ONE TABLET BY MOUTH EVERY DAY 4/14/14   Automatic Reconciliation, Ar   montelukast (SINGULAIR) 10 MG tablet Take by mouth daily 11/27/19   Automatic Reconciliation, Ar         Objective:   VITALS:    Patient Vitals for the past 24 hrs:   BP Temp Temp src Pulse Resp SpO2 Height Weight   04/13/25 1245 134/64 -- -- 67 17 97 % -- --   04/13/25 1230 131/69 -- -- 68 18 97 % -- --   04/13/25 1215 139/64 -- -- -- -- -- -- --   04/13/25 1200 (!) 128/98

## 2025-04-13 NOTE — ED NOTES
Pt now complaining of neck pain and R sided tingling. NP at bedside for eval. Pt placed in c-collar

## 2025-04-14 PROBLEM — E44.0 MODERATE PROTEIN-CALORIE MALNUTRITION: Status: ACTIVE | Noted: 2025-04-14

## 2025-04-14 PROCEDURE — 6370000000 HC RX 637 (ALT 250 FOR IP): Performed by: INTERNAL MEDICINE

## 2025-04-14 PROCEDURE — 97161 PT EVAL LOW COMPLEX 20 MIN: CPT

## 2025-04-14 PROCEDURE — 2700000000 HC OXYGEN THERAPY PER DAY

## 2025-04-14 PROCEDURE — 97530 THERAPEUTIC ACTIVITIES: CPT

## 2025-04-14 PROCEDURE — 97165 OT EVAL LOW COMPLEX 30 MIN: CPT | Performed by: OCCUPATIONAL THERAPIST

## 2025-04-14 PROCEDURE — 94640 AIRWAY INHALATION TREATMENT: CPT

## 2025-04-14 PROCEDURE — 94761 N-INVAS EAR/PLS OXIMETRY MLT: CPT

## 2025-04-14 PROCEDURE — 2500000003 HC RX 250 WO HCPCS: Performed by: INTERNAL MEDICINE

## 2025-04-14 PROCEDURE — 2580000003 HC RX 258: Performed by: INTERNAL MEDICINE

## 2025-04-14 PROCEDURE — 1100000003 HC PRIVATE W/ TELEMETRY

## 2025-04-14 PROCEDURE — 6360000002 HC RX W HCPCS: Performed by: INTERNAL MEDICINE

## 2025-04-14 PROCEDURE — 99221 1ST HOSP IP/OBS SF/LOW 40: CPT | Performed by: ORTHOPAEDIC SURGERY

## 2025-04-14 PROCEDURE — 97530 THERAPEUTIC ACTIVITIES: CPT | Performed by: OCCUPATIONAL THERAPIST

## 2025-04-14 PROCEDURE — 97535 SELF CARE MNGMENT TRAINING: CPT | Performed by: OCCUPATIONAL THERAPIST

## 2025-04-14 RX ORDER — CALCIUM CARBONATE 500 MG/1
500 TABLET, CHEWABLE ORAL 2 TIMES DAILY
Status: DISCONTINUED | OUTPATIENT
Start: 2025-04-14 | End: 2025-04-17 | Stop reason: HOSPADM

## 2025-04-14 RX ORDER — GLIPIZIDE 10 MG/1
1 TABLET ORAL PRN
Status: DISCONTINUED | OUTPATIENT
Start: 2025-04-14 | End: 2025-04-17 | Stop reason: HOSPADM

## 2025-04-14 RX ORDER — M-VIT,TX,IRON,MINS/CALC/FOLIC 27MG-0.4MG
1 TABLET ORAL DAILY
Status: DISCONTINUED | OUTPATIENT
Start: 2025-04-14 | End: 2025-04-17 | Stop reason: HOSPADM

## 2025-04-14 RX ORDER — LEVETIRACETAM 1000 MG/1
1000 TABLET ORAL 2 TIMES DAILY
Status: ON HOLD | COMMUNITY
End: 2025-04-17 | Stop reason: HOSPADM

## 2025-04-14 RX ORDER — METHOCARBAMOL 750 MG/1
750 TABLET, FILM COATED ORAL 3 TIMES DAILY
Status: DISCONTINUED | OUTPATIENT
Start: 2025-04-14 | End: 2025-04-17 | Stop reason: HOSPADM

## 2025-04-14 RX ORDER — MINERAL OIL AND WHITE PETROLATUM 150; 830 MG/G; MG/G
OINTMENT OPHTHALMIC PRN
Status: DISCONTINUED | OUTPATIENT
Start: 2025-04-14 | End: 2025-04-14 | Stop reason: SDUPTHER

## 2025-04-14 RX ADMIN — GABAPENTIN 300 MG: 300 CAPSULE ORAL at 14:37

## 2025-04-14 RX ADMIN — AZELASTINE HYDROCHLORIDE 1 SPRAY: 137 SPRAY, METERED NASAL at 21:22

## 2025-04-14 RX ADMIN — LACOSAMIDE 200 MG: 100 TABLET, FILM COATED ORAL at 09:50

## 2025-04-14 RX ADMIN — CALCITONIN SALMON 1 SPRAY: 200 SPRAY, METERED NASAL at 09:52

## 2025-04-14 RX ADMIN — OXYCODONE 10 MG: 5 TABLET ORAL at 19:11

## 2025-04-14 RX ADMIN — GABAPENTIN 300 MG: 300 CAPSULE ORAL at 09:50

## 2025-04-14 RX ADMIN — ENOXAPARIN SODIUM 40 MG: 100 INJECTION SUBCUTANEOUS at 09:49

## 2025-04-14 RX ADMIN — LACOSAMIDE 200 MG: 100 TABLET, FILM COATED ORAL at 21:21

## 2025-04-14 RX ADMIN — IPRATROPIUM BROMIDE 0.5 MG: 0.5 SOLUTION RESPIRATORY (INHALATION) at 20:02

## 2025-04-14 RX ADMIN — DEXTRAN 70, GLYCERIN, HYPROMELLOSE 1 DROP: 1; 2; 3 SOLUTION/ DROPS OPHTHALMIC at 21:22

## 2025-04-14 RX ADMIN — ARFORMOTEROL TARTRATE 15 MCG: 15 SOLUTION RESPIRATORY (INHALATION) at 07:43

## 2025-04-14 RX ADMIN — CALCIUM CARBONATE (ANTACID) CHEW TAB 500 MG 500 MG: 500 CHEW TAB at 21:21

## 2025-04-14 RX ADMIN — DIVALPROEX SODIUM 250 MG: 250 TABLET, DELAYED RELEASE ORAL at 09:51

## 2025-04-14 RX ADMIN — ASPIRIN 81 MG: 81 TABLET, COATED ORAL at 21:21

## 2025-04-14 RX ADMIN — OXYCODONE 10 MG: 5 TABLET ORAL at 14:37

## 2025-04-14 RX ADMIN — SODIUM CHLORIDE: 0.9 INJECTION, SOLUTION INTRAVENOUS at 06:16

## 2025-04-14 RX ADMIN — BUDESONIDE 500 MCG: 0.5 INHALANT RESPIRATORY (INHALATION) at 20:02

## 2025-04-14 RX ADMIN — Medication 1 TABLET: at 17:29

## 2025-04-14 RX ADMIN — GABAPENTIN 300 MG: 300 CAPSULE ORAL at 21:21

## 2025-04-14 RX ADMIN — WATER 1000 MG: 1 INJECTION INTRAMUSCULAR; INTRAVENOUS; SUBCUTANEOUS at 16:46

## 2025-04-14 RX ADMIN — MELATONIN 3 MG: at 21:21

## 2025-04-14 RX ADMIN — METHOCARBAMOL TABLETS 750 MG: 750 TABLET, COATED ORAL at 21:21

## 2025-04-14 RX ADMIN — AZELASTINE HYDROCHLORIDE 1 SPRAY: 137 SPRAY, METERED NASAL at 09:54

## 2025-04-14 RX ADMIN — MONTELUKAST 10 MG: 10 TABLET, FILM COATED ORAL at 09:51

## 2025-04-14 RX ADMIN — OXYCODONE 10 MG: 5 TABLET ORAL at 09:50

## 2025-04-14 RX ADMIN — METHOCARBAMOL TABLETS 750 MG: 750 TABLET, COATED ORAL at 16:28

## 2025-04-14 RX ADMIN — ASPIRIN 81 MG: 81 TABLET, COATED ORAL at 09:50

## 2025-04-14 RX ADMIN — PANTOPRAZOLE SODIUM 40 MG: 40 TABLET, DELAYED RELEASE ORAL at 06:14

## 2025-04-14 RX ADMIN — DIVALPROEX SODIUM 250 MG: 250 TABLET, DELAYED RELEASE ORAL at 21:21

## 2025-04-14 RX ADMIN — SODIUM CHLORIDE, PRESERVATIVE FREE 10 ML: 5 INJECTION INTRAVENOUS at 21:21

## 2025-04-14 RX ADMIN — ARFORMOTEROL TARTRATE 15 MCG: 15 SOLUTION RESPIRATORY (INHALATION) at 20:02

## 2025-04-14 RX ADMIN — ARFORMOTEROL TARTRATE 15 MCG: 15 SOLUTION RESPIRATORY (INHALATION) at 16:47

## 2025-04-14 RX ADMIN — SODIUM CHLORIDE, PRESERVATIVE FREE 10 ML: 5 INJECTION INTRAVENOUS at 09:51

## 2025-04-14 RX ADMIN — LEVOTHYROXINE SODIUM 50 MCG: 0.05 TABLET ORAL at 06:14

## 2025-04-14 RX ADMIN — BUDESONIDE 500 MCG: 0.5 INHALANT RESPIRATORY (INHALATION) at 07:43

## 2025-04-14 ASSESSMENT — PAIN DESCRIPTION - LOCATION
LOCATION: BACK

## 2025-04-14 ASSESSMENT — PAIN SCALES - GENERAL
PAINLEVEL_OUTOF10: 10
PAINLEVEL_OUTOF10: 10
PAINLEVEL_OUTOF10: 9
PAINLEVEL_OUTOF10: 7
PAINLEVEL_OUTOF10: 8
PAINLEVEL_OUTOF10: 10

## 2025-04-14 ASSESSMENT — PAIN DESCRIPTION - ORIENTATION
ORIENTATION: POSTERIOR

## 2025-04-14 ASSESSMENT — PAIN SCALES - WONG BAKER: WONGBAKER_NUMERICALRESPONSE: NO HURT

## 2025-04-14 NOTE — PLAN OF CARE
Problem: Occupational Therapy - Adult  Goal: By Discharge: Performs self-care activities at highest level of function for planned discharge setting.  See evaluation for individualized goals.  Description: FUNCTIONAL STATUS PRIOR TO ADMISSION:  Patient reports being independent to mod I for ADLs and ambulates with a rollator. Family performs all IADLs. He is on 2-3 L NC at baseline.   HOME SUPPORT: Patient was living alone, but has supportive family members living near him who assist with IADLs.     Occupational Therapy Goals:  Initiated 4/14/2025  1.  Patient will perform grooming standing at sink with Stand by Assist and Set-up within 7 day(s).  2.  Patient will perform upper body dressing with Minimal Assist within 7 day(s).  3.  Patient will perform lower body dressing with Moderate Assist within 7 day(s).  4.  Patient will perform toilet transfers with Stand by Assist  within 7 day(s).  5.  Patient will perform all aspects of toileting with Minimal Assist within 7 day(s).  6.  Patient will perform sponge bathing with Moderate Assist within 7 day(s).     Outcome: Progressing     OCCUPATIONAL THERAPY EVALUATION    Patient: Rodolfo Delcid (70 y.o. male)  Date: 4/14/2025  Primary Diagnosis: Syncope and collapse [R55]  General weakness [R53.1]  Closed fracture of ramus of right pubis, initial encounter ([facility]) [S32.204S]         Precautions: Fall Risk  WBAT    ASSESSMENT :  The patient is currently limited by GW, poor activity tolerance, decreased safety awareness and decreased balance which is impairing his functional independence. He is functioning below his independent to mod I baseline, now performing ADLs at a supervision/setup to max A level and is CGA to min A for functional mobility. The patient is typically on 2 L NC at baseline, but he desaturated to 87% with activity during this evaluation, now requiring 4 L NC during activity.  At this time the patient will continue to benefit from acute OT and he will

## 2025-04-14 NOTE — PROGRESS NOTES
Comprehensive Nutrition Assessment    Type and Reason for Visit:  Initial, Consult    Nutrition Recommendations/Plan:   Continue regular diet  Add ensure plus high protein BID     Malnutrition Assessment:  Malnutrition Status:  Moderate malnutrition (04/14/25 1150)    Context:  Chronic Illness     Findings of the 6 clinical characteristics of malnutrition:  Energy Intake:  Mild decrease in energy intake  Weight Loss:  No weight loss     Body Fat Loss:  Mild body fat loss Orbital, Triceps   Muscle Mass Loss:  Mild muscle mass loss Clavicles (pectoralis & deltoids), Temples (temporalis), Hand (interosseous)  Fluid Accumulation:  No fluid accumulation     Strength:  Not Performed    Nutrition Assessment:    Patient medically noted for syncope and collapse, generalized weakness, and pelvic fracture. PMH COPD, CAD, HTN, HLD, and seizures. Consult received for poor PO intake/appetite. Patient reports his appetite is good today; looking forward to getting lunch. States his appetite had been decreased at home; not eating as much as normal for a few weeks but he denied any recent weight changes. No weight loss noted on chart review. He denied supplement use and kept repeating that he drinks diet pepsi. Will add ensure plus high protein for him to try; continue liberalized diet. Encourage intake of meals.     Wt Readings from Last 5 Encounters:   04/13/25 83.1 kg (183 lb 3.2 oz)   10/31/24 80.7 kg (177 lb 14.6 oz)   10/30/24 78 kg (171 lb 15.3 oz)   10/15/24 78.5 kg (173 lb)   09/20/24 76.1 kg (167 lb 12.3 oz)     Nutrition Related Findings:    Na 128, BG 90   BM 4/12   Synthroid, Protonix, Vitamin D   Wound Type: Skin Tears       Current Nutrition Intake & Therapies:          ADULT DIET; Regular  DIET ONE TIME MESSAGE;    Anthropometric Measures:  Height: 185.4 cm (6' 1\")  Ideal Body Weight (IBW): 184 lbs (84 kg)       Current Body Weight: 83.1 kg (183 lb 3.2 oz),   IBW.    Current BMI (kg/m2): 24.2

## 2025-04-14 NOTE — PROGRESS NOTES
End of Shift Note    Bedside shift change report given to  SARINA Mora  (oncoming nurse) by SARINA Mcmahon .        Shift worked:  Days   Shift summary and any significant changes:    Oxy X  2  For pain. Eye drops ordered for Right eye irritation.   Ortho consult added/called/came to see. No surgery. Ok to bare weight. Wound Care notes in. Dietician came to see. Wound care provided to patient old skin tears (present on admission) and patient turned Q 2. PT/OT came to see, patient bumped to 4L/NC and MD gave permission via perfect serve for nurse to give breathing tx early for pt SOB.      Concerns for physician to address:  Results/Plan of care    Zone phone for oncoming shift:        Patient Information  Rodolfo Delcid  70 y.o.  4/13/2025  8:11 AM by Stanton Oquendo MD. Rodolfo Delcid was admitted from [facility]    Problem List  Patient Active Problem List    Diagnosis Date Noted    Moderate protein-calorie malnutrition 04/14/2025    Syncope and collapse 04/13/2025    Acute metabolic encephalopathy 10/31/2024    Acute chest pain 10/13/2024    COPD with acute exacerbation (HCC) 09/17/2024    MCI (mild cognitive impairment) 09/17/2024    Confusion 09/17/2024    Palliative care encounter 04/25/2024    Altered mental status 04/25/2024    Metabolic encephalopathy 04/24/2024    Acute right-sided muscle weakness 04/24/2024    Aphasia 04/24/2024    Seizures (HCC) 04/24/2024    Shortness of breath 04/24/2024    Acute cholecystitis 12/01/2023    Fracture 05/16/2023    Complex partial seizure evolving to generalized seizure (HCC) 11/22/2016    Cerebral infarction due to thrombosis of left middle cerebral artery (HCC) 11/22/2016    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

## 2025-04-14 NOTE — WOUND CARE
Wound care nurse consult for POA multiple skin tears to BUE.    69 y/o male admitted for seizures. Multiples falls at home. Possible pelvic fracture with CT pending.  Past Medical History:   Diagnosis Date    Arthritis     Hips, Knees    CAD (coronary artery disease)     MI around 1990    COPD (chronic obstructive pulmonary disease) (HCC)     Hepatitis A     High cholesterol     Hypertension     Other ill-defined conditions(799.89)     Light sensitivity, causes seizures    Seizures (HCC)     Last seizure 12/2008/work -up in 2012 -possible seizure    Stroke (HCC) 2005    Thyroid disease     Hypothyroid     Past Surgical History:   Procedure Laterality Date    CARDIAC CATHETERIZATION  20 years ago    no stents    HERNIA REPAIR  10/8/14    left inguinal hernia- Pawan Hartman MD    ORTHOPEDIC SURGERY  2/2010    [facility]. Total Hip Replacement/Dr. Borrero    ORTHOPEDIC SURGERY      Left Knee Scope    ORTHOPEDIC SURGERY  6/9/14    R hip replacement     ORTHOPEDIC SURGERY Right     Shoulder fracture & surgical repair with hardware    REVISION TOTAL HIP ARTHROPLASTY Left 5/17/2023    LEFT HIP TOTAL ARTHROPLASTY REVISION POSTERIOR APPROACH performed by Austyn Medellin MD at [facility] MAIN OR    Staff took pictures of skin tears:                  Recommend skin tear protocol to be performed by staff nurse as follows:    BUE skin tears and abrasions: MWF, cleanse with NS moist 4x4 or soap and water, cover wounds with pieces of Xeroform/petroleum gauze. Cover with dry dressings 4x4/ABD pad wrapped with yossi or a foam dressing. Date and time dressings.    CATALINA MARRERO RN, CWON

## 2025-04-14 NOTE — PROGRESS NOTES
End of Shift Note    Bedside shift change report given to  Salome RN  (oncoming nurse) by SARINA Mcmahon .        Shift worked: Nights   Shift summary and any significant changes:     Maintained oxygen at 2L NC, no complaints of SOB unlabored breathing.     No complaints of pain.     Pending PT/OT, wound care, & dietitian eval   Concerns for physician to address:  Results/Plan of care    Zone phone for oncoming shift:        Patient Information  Rodolfo Delcid  70 y.o.  4/13/2025  8:11 AM by Stanton Oquendo MD. Rodolfo Delcid was admitted from [facility]    Problem List  Patient Active Problem List    Diagnosis Date Noted    Syncope and collapse 04/13/2025    Acute metabolic encephalopathy 10/31/2024    Acute chest pain 10/13/2024    COPD with acute exacerbation (HCC) 09/17/2024    MCI (mild cognitive impairment) 09/17/2024    Confusion 09/17/2024    Palliative care encounter 04/25/2024    Altered mental status 04/25/2024    Metabolic encephalopathy 04/24/2024    Acute right-sided muscle weakness 04/24/2024    Aphasia 04/24/2024    Seizures (HCC) 04/24/2024    Shortness of breath 04/24/2024    Acute cholecystitis 12/01/2023    Fracture 05/16/2023    Complex partial seizure evolving to generalized seizure (HCC) 11/22/2016    Cerebral infarction due to thrombosis of left middle cerebral artery (HCC) 11/22/2016    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

## 2025-04-14 NOTE — PROGRESS NOTES
Hospitalist Progress Note    NAME:   Rodolfo Delcid   : 1955   MRN: [id number]     Date/Time: 2025 4:03 PM  Patient PCP: Lukasz Awad DO    Estimated discharge date:  Barriers:       Assessment / Plan:    Principal Problem:    Seizures (HCC)  Active Problems:    HTN (hypertension)    Carotid artery stenosis with cerebral infarction (HCC)    CAD (coronary artery disease)    Late effect of stroke    Complex partial seizure evolving to generalized seizure (HCC)    Cerebral infarction due to thrombosis of left middle cerebral artery (HCC)    Osteoarthrosis, hip    Localization-related partial epilepsy with complex partial seizures    COPD with acute exacerbation (HCC)    MCI (mild cognitive impairment)    Syncope and collapse  Resolved Problems:    * No resolved hospital problems. *     Plan:  The Syncope and collapse is in all likelyhood one of his seizures  Patient now with probable pelvic fracture by CT scan and Ortho and internal medicine opinion  Continue home meds including his seizure meds.  Physical therapy and Occupational Therapy consults.  Oxycodone and Robaxin for pain.  Calcitonin should kick in in 48 to 72 hours and continue to improve things for up to 6 weeks.  Kelsea for his COPD     Medical Decision Making:   I personally reviewed labs: yes  I personally reviewed imaging:yes  I personally reviewed EKG:  Toxic drug monitoring: seizure meds  Discussed case with: Patient's RN        Code Status: full code  DVT Prophylaxis: lovenox  Baseline: lives alone in house behind his sister house      Subjective:     Chief Complaint / Reason for Physician Visit  \" Patient had an worsening pain following physical therapy.  Oxycodone given.  Will add Robaxin 3 times a day.\".  Discussed with RN events overnight.       Objective:     VITALS:   Last 24hrs VS reviewed since prior progress note. Most recent are:  Patient Vitals for the past 24 hrs:   BP Temp Temp src Pulse Resp SpO2   25 1544

## 2025-04-14 NOTE — PLAN OF CARE
Problem: Respiratory - Adult  Goal: Achieves optimal ventilation and oxygenation  4/14/2025 1015 by Salome López RN  Outcome: Progressing  4/13/2025 2243 by Yamilet Solano RN  Outcome: Progressing     Problem: Chronic Conditions and Co-morbidities  Goal: Patient's chronic conditions and co-morbidity symptoms are monitored and maintained or improved  4/14/2025 1015 by Salome López RN  Outcome: Progressing  4/13/2025 2243 by Yamilet Solano RN  Outcome: Progressing     Problem: Discharge Planning  Goal: Discharge to home or other facility with appropriate resources  4/14/2025 1015 by Salome López RN  Outcome: Progressing  4/13/2025 2243 by Yamilet Solano RN  Outcome: Progressing     Problem: Pain  Goal: Verbalizes/displays adequate comfort level or baseline comfort level  4/14/2025 1015 by Salome López RN  Outcome: Progressing  4/13/2025 2243 by Yamilet Solano RN  Outcome: Progressing     Problem: Safety - Adult  Goal: Free from fall injury  4/14/2025 1015 by Salome López RN  Outcome: Progressing  4/13/2025 2243 by Yamilet Solano RN  Outcome: Progressing     Problem: ABCDS Injury Assessment  Goal: Absence of physical injury  4/14/2025 1015 by Salome López RN  Outcome: Progressing  4/13/2025 2243 by Yamilet Solano RN  Outcome: Progressing     Problem: Skin/Tissue Integrity  Goal: Skin integrity remains intact  Description: 1.  Monitor for areas of redness and/or skin breakdown2.  Assess vascular access sites hourly3.  Every 4-6 hours minimum:  Change oxygen saturation probe site4.  Every 4-6 hours:  If on nasal continuous positive airway pressure, respiratory therapy assess nares and determine need for appliance change or resting period  Outcome: Progressing

## 2025-04-14 NOTE — PLAN OF CARE
Problem: Physical Therapy - Adult  Goal: By Discharge: Performs mobility at highest level of function for planned discharge setting.  See evaluation for individualized goals.  Description:  FUNCTIONAL STATUS PRIOR TO ADMISSION: Patient was modified independent using a rollator for functional mobility.    HOME SUPPORT PRIOR TO ADMISSION: The patient lived alone with family to provide assistance.    Physical Therapy Goals  Initiated 4/14/2025  1.  Patient will move from supine to sit and sit to supine in bed with supervision/set-up within 7 day(s).    2.  Patient will perform sit to stand with supervision/set-up within 7 day(s).  3.  Patient will transfer from bed to chair and chair to bed with contact guard assist using the least restrictive device within 7 day(s).  4.  Patient will ambulate with contact guard assist for 50 feet with the least restrictive device within 7 day(s).          Outcome: Not Progressing   PHYSICAL THERAPY EVALUATION    Patient: Rodolfo Delcid (70 y.o. male)  Date: 4/14/2025  Primary Diagnosis: Syncope and collapse [R55]  General weakness [R53.1]  Closed fracture of ramus of right pubis, initial encounter ([facility]) [S32.591A]       Precautions: Restrictions/Precautions  Restrictions/Precautions: Fall Risk            ASSESSMENT :   DEFICITS/IMPAIRMENTS:   The patient is limited by decreased functional mobility, strength, activity tolerance, cognition, attention/concentration, coordination, balance following admit with R pubic ramus fx s/p fall at home; presented to ED with increased SOB, pain in back/ head/ neck.     Pt received with dyspnea on 2L O2 (baseline) and c/o back pain. Noted productive cough and pt tendency to mouth breath, SpO2 low 90s at rest. Pt required increased time and min A overall to mobilize. Tolerated bed to chair stand pivot with RW, assist for balance, repeated cues for sequence. Noted O2 desat to 87% with activity, pt c/o difficulty breathing. Required O2 titration to 4L

## 2025-04-14 NOTE — PROGRESS NOTES
Occupational Therapy  OT referral received, chart reviewed, and attempted to see patient for evaluation. Noted that the patient has a R pubic ramus fx s/p GLF/syncope. No ortho consult ordered to confirm WB status. Notified PT attending rounds to discuss this with the medical team.  Will defer OT evaluation pending determination of need for ortho consult and confirmation of WB status.     Gurpreet DAVEY Fovel, OTR/L

## 2025-04-14 NOTE — CONSULTS
Orthopedic consult note:    70-year-old male consulted for right chronic versus acute inferior pubic rami fracture.  Patient admitted yesterday for syncope and collapse/generalized weakness.  Our services were consulted to give weightbearing recommendations for his fracture.  Patient does complain of some mild right hip pain today.  He denies any pain with range of motion of his right hip.  He denies any other focal areas of complaint in the lower extremities.  He denies any sensation changes.    Exam: Inspection of the right lower extremity does not reveal leg length discrepancy.  Palpation does not elicit an area of focal pain in the right hip.  Patient is tolerating logrolling of the right hip.  Patient tolerating passive range of motion of the right hip with no obvious pain with straight leg raise.  Patient able to externally and internally rotate his right hip with no obvious pain.  Full active range of motion distal right lower extremity.  Sensation light touch intact right lower extremity.  No open wounds appreciated.    Imaging:EXAM: CT abdomen and pelvis without contrast     INDICATION: attn: R pubic ramus fx suggested on plain film s/p fall     TECHNIQUE: Helical CT of the abdomen and pelvis was obtained without intravenous  contrast. Axial, coronal and sagittal images were created.     Dose reduction technique was used including one or more of the following:  automated exposure control, adjustment of mA and kV according to patient size,  and/or iterative reconstruction.     Lack of IV contrast limits sensitivity for detecting vascular/visceral injury,  potential inflammatory processes and malignancies.     COMPARISON: September 2024     Beam hardening artifact significantly degrades regional exam quality.     FINDINGS:     LUNG BASE: Right lower lobe atelectasis/consolidation. Emphysematous changes of  the lungs.     LIVER: No mass.     BILIARY: No biliary ductal dilation.     PANCREAS: No mass or ductal

## 2025-04-14 NOTE — PROGRESS NOTES
Physical Therapy    Order received, chart reviewed with note of R pubic ramus fx after GLF. No ortho consult or WBing status noted, IDR team notified with request for clarification. Will defer PT evaluation and follow up later today.    Kelle Shah, PT, MPT

## 2025-04-15 PROBLEM — R41.3 MEMORY LOSS: Status: ACTIVE | Noted: 2025-04-15

## 2025-04-15 PROCEDURE — 94640 AIRWAY INHALATION TREATMENT: CPT

## 2025-04-15 PROCEDURE — 2500000003 HC RX 250 WO HCPCS: Performed by: INTERNAL MEDICINE

## 2025-04-15 PROCEDURE — 1100000003 HC PRIVATE W/ TELEMETRY

## 2025-04-15 PROCEDURE — 97530 THERAPEUTIC ACTIVITIES: CPT

## 2025-04-15 PROCEDURE — 6360000002 HC RX W HCPCS: Performed by: INTERNAL MEDICINE

## 2025-04-15 PROCEDURE — 99223 1ST HOSP IP/OBS HIGH 75: CPT | Performed by: PSYCHIATRY & NEUROLOGY

## 2025-04-15 PROCEDURE — 2700000000 HC OXYGEN THERAPY PER DAY

## 2025-04-15 PROCEDURE — 6370000000 HC RX 637 (ALT 250 FOR IP): Performed by: PSYCHIATRY & NEUROLOGY

## 2025-04-15 PROCEDURE — 6370000000 HC RX 637 (ALT 250 FOR IP): Performed by: INTERNAL MEDICINE

## 2025-04-15 PROCEDURE — 94761 N-INVAS EAR/PLS OXIMETRY MLT: CPT

## 2025-04-15 RX ORDER — LACOSAMIDE 100 MG/1
100 TABLET ORAL 2 TIMES DAILY
Status: DISCONTINUED | OUTPATIENT
Start: 2025-04-15 | End: 2025-04-16

## 2025-04-15 RX ADMIN — CALCIUM CARBONATE (ANTACID) CHEW TAB 500 MG 500 MG: 500 CHEW TAB at 21:26

## 2025-04-15 RX ADMIN — GABAPENTIN 300 MG: 300 CAPSULE ORAL at 21:26

## 2025-04-15 RX ADMIN — GABAPENTIN 300 MG: 300 CAPSULE ORAL at 08:59

## 2025-04-15 RX ADMIN — CALCIUM CARBONATE (ANTACID) CHEW TAB 500 MG 500 MG: 500 CHEW TAB at 08:58

## 2025-04-15 RX ADMIN — DIVALPROEX SODIUM 250 MG: 250 TABLET, DELAYED RELEASE ORAL at 08:59

## 2025-04-15 RX ADMIN — MELATONIN 3 MG: at 21:26

## 2025-04-15 RX ADMIN — SODIUM CHLORIDE, PRESERVATIVE FREE 10 ML: 5 INJECTION INTRAVENOUS at 09:07

## 2025-04-15 RX ADMIN — ASPIRIN 81 MG: 81 TABLET, COATED ORAL at 08:57

## 2025-04-15 RX ADMIN — CALCITONIN SALMON 1 SPRAY: 200 SPRAY, METERED NASAL at 09:06

## 2025-04-15 RX ADMIN — METHOCARBAMOL TABLETS 750 MG: 750 TABLET, COATED ORAL at 08:58

## 2025-04-15 RX ADMIN — AZELASTINE HYDROCHLORIDE 1 SPRAY: 137 SPRAY, METERED NASAL at 09:05

## 2025-04-15 RX ADMIN — DEXTRAN 70, GLYCERIN, HYPROMELLOSE 1 DROP: 1; 2; 3 SOLUTION/ DROPS OPHTHALMIC at 21:26

## 2025-04-15 RX ADMIN — GABAPENTIN 300 MG: 300 CAPSULE ORAL at 13:52

## 2025-04-15 RX ADMIN — ARFORMOTEROL TARTRATE 15 MCG: 15 SOLUTION RESPIRATORY (INHALATION) at 08:54

## 2025-04-15 RX ADMIN — MONTELUKAST 10 MG: 10 TABLET, FILM COATED ORAL at 09:00

## 2025-04-15 RX ADMIN — BUDESONIDE 500 MCG: 0.5 INHALANT RESPIRATORY (INHALATION) at 19:29

## 2025-04-15 RX ADMIN — ARFORMOTEROL TARTRATE 15 MCG: 15 SOLUTION RESPIRATORY (INHALATION) at 19:29

## 2025-04-15 RX ADMIN — LEVOTHYROXINE SODIUM 50 MCG: 0.05 TABLET ORAL at 06:27

## 2025-04-15 RX ADMIN — METHOCARBAMOL TABLETS 750 MG: 750 TABLET, COATED ORAL at 13:52

## 2025-04-15 RX ADMIN — Medication 1 TABLET: at 08:57

## 2025-04-15 RX ADMIN — IPRATROPIUM BROMIDE 0.5 MG: 0.5 SOLUTION RESPIRATORY (INHALATION) at 19:25

## 2025-04-15 RX ADMIN — DIVALPROEX SODIUM 250 MG: 250 TABLET, DELAYED RELEASE ORAL at 21:26

## 2025-04-15 RX ADMIN — PANTOPRAZOLE SODIUM 40 MG: 40 TABLET, DELAYED RELEASE ORAL at 06:27

## 2025-04-15 RX ADMIN — IPRATROPIUM BROMIDE 0.5 MG: 0.5 SOLUTION RESPIRATORY (INHALATION) at 09:00

## 2025-04-15 RX ADMIN — LACOSAMIDE 100 MG: 100 TABLET, FILM COATED ORAL at 21:26

## 2025-04-15 RX ADMIN — AZELASTINE HYDROCHLORIDE 1 SPRAY: 137 SPRAY, METERED NASAL at 21:26

## 2025-04-15 RX ADMIN — ASPIRIN 81 MG: 81 TABLET, COATED ORAL at 21:26

## 2025-04-15 RX ADMIN — ENOXAPARIN SODIUM 40 MG: 100 INJECTION SUBCUTANEOUS at 09:00

## 2025-04-15 RX ADMIN — METHOCARBAMOL TABLETS 750 MG: 750 TABLET, COATED ORAL at 21:26

## 2025-04-15 RX ADMIN — SODIUM CHLORIDE, PRESERVATIVE FREE 10 ML: 5 INJECTION INTRAVENOUS at 21:26

## 2025-04-15 RX ADMIN — WATER 1000 MG: 1 INJECTION INTRAMUSCULAR; INTRAVENOUS; SUBCUTANEOUS at 17:52

## 2025-04-15 RX ADMIN — BUDESONIDE 500 MCG: 0.5 INHALANT RESPIRATORY (INHALATION) at 08:54

## 2025-04-15 RX ADMIN — LACOSAMIDE 200 MG: 100 TABLET, FILM COATED ORAL at 08:58

## 2025-04-15 RX ADMIN — OXYCODONE 10 MG: 5 TABLET ORAL at 11:42

## 2025-04-15 ASSESSMENT — PAIN DESCRIPTION - ORIENTATION
ORIENTATION: POSTERIOR
ORIENTATION: POSTERIOR

## 2025-04-15 ASSESSMENT — PAIN DESCRIPTION - DESCRIPTORS
DESCRIPTORS: ACHING

## 2025-04-15 ASSESSMENT — PAIN SCALES - GENERAL
PAINLEVEL_OUTOF10: 4
PAINLEVEL_OUTOF10: 9

## 2025-04-15 ASSESSMENT — PAIN DESCRIPTION - LOCATION
LOCATION: BACK

## 2025-04-15 ASSESSMENT — PAIN - FUNCTIONAL ASSESSMENT
PAIN_FUNCTIONAL_ASSESSMENT: ACTIVITIES ARE NOT PREVENTED
PAIN_FUNCTIONAL_ASSESSMENT: ACTIVITIES ARE NOT PREVENTED

## 2025-04-15 ASSESSMENT — PAIN DESCRIPTION - FREQUENCY: FREQUENCY: INTERMITTENT

## 2025-04-15 ASSESSMENT — PAIN DESCRIPTION - PAIN TYPE: TYPE: ACUTE PAIN

## 2025-04-15 NOTE — PROGRESS NOTES
End of Shift Note    Bedside shift change report given to  SARINA Mora  (oncoming nurse) by SARINA Mcmahon .        Shift worked:  Days   Shift summary and any significant changes:    Oxy X  1  For pain. Right eye discomfort improved. Pain improved today with addition of Robaxin yesterday. Patient drowsy. Pharmacy med consult ordered and completed. NO BM.   Concerns for physician to address:  Results/Plan of care    Zone phone for oncoming shift:        Patient Information  Rodolfo Delcid  70 y.o.  4/13/2025  8:11 AM by Stanton Oquendo MD. Rodolfo Delcid was admitted from [facility]    Problem List  Patient Active Problem List    Diagnosis Date Noted    Moderate protein-calorie malnutrition 04/14/2025    Syncope and collapse 04/13/2025    Acute metabolic encephalopathy 10/31/2024    Acute chest pain 10/13/2024    COPD with acute exacerbation (HCC) 09/17/2024    MCI (mild cognitive impairment) 09/17/2024    Confusion 09/17/2024    Palliative care encounter 04/25/2024    Altered mental status 04/25/2024    Metabolic encephalopathy 04/24/2024    Acute right-sided muscle weakness 04/24/2024    Aphasia 04/24/2024    Seizures (HCC) 04/24/2024    Shortness of breath 04/24/2024    Acute cholecystitis 12/01/2023    Fracture 05/16/2023    Complex partial seizure evolving to generalized seizure (HCC) 11/22/2016    Cerebral infarction due to thrombosis of left middle cerebral artery (HCC) 11/22/2016    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

## 2025-04-15 NOTE — PLAN OF CARE
Problem: Respiratory - Adult  Goal: Achieves optimal ventilation and oxygenation  4/15/2025 1044 by Salome López RN  Outcome: Progressing  4/15/2025 0914 by Rani Russell RCP  Outcome: Progressing  4/15/2025 0025 by Yamilet Solano RN  Outcome: Progressing  4/14/2025 2125 by Vandana Bautista TAMICA  Outcome: Progressing     Problem: Chronic Conditions and Co-morbidities  Goal: Patient's chronic conditions and co-morbidity symptoms are monitored and maintained or improved  4/15/2025 1044 by Salome López RN  Outcome: Progressing  4/15/2025 0025 by Yamilet Solano RN  Outcome: Progressing     Problem: Discharge Planning  Goal: Discharge to home or other facility with appropriate resources  4/15/2025 1044 by Salome López RN  Outcome: Progressing  4/15/2025 0025 by Yamilet Solano RN  Outcome: Progressing     Problem: Pain  Goal: Verbalizes/displays adequate comfort level or baseline comfort level  4/15/2025 1044 by Salome López RN  Outcome: Progressing  4/15/2025 0025 by Yamilet Solano RN  Outcome: Progressing     Problem: Safety - Adult  Goal: Free from fall injury  4/15/2025 1044 by Salome López RN  Outcome: Progressing  4/15/2025 0025 by Yamilet Solano RN  Outcome: Progressing     Problem: ABCDS Injury Assessment  Goal: Absence of physical injury  4/15/2025 1044 by Salome López RN  Outcome: Progressing  4/15/2025 0025 by Yamilet Solano RN  Outcome: Progressing     Problem: Skin/Tissue Integrity  Goal: Skin integrity remains intact  Description: 1.  Monitor for areas of redness and/or skin breakdown2.  Assess vascular access sites hourly3.  Every 4-6 hours minimum:  Change oxygen saturation probe site4.  Every 4-6 hours:  If on nasal continuous positive airway pressure, respiratory therapy assess nares and determine need for appliance change or resting period  4/15/2025 1044 by Salome López RN  Outcome:

## 2025-04-15 NOTE — PLAN OF CARE
Problem: Respiratory - Adult  Goal: Achieves optimal ventilation and oxygenation  4/14/2025 2125 by Vandana Bautista RCP  Outcome: Progressing  4/14/2025 1403 by Gina Gandhi RCP  Outcome: Progressing  4/14/2025 1015 by Salome López RN  Outcome: Progressing     Problem: Physical Therapy - Adult  Goal: By Discharge: Performs mobility at highest level of function for planned discharge setting.  See evaluation for individualized goals.  Description:  FUNCTIONAL STATUS PRIOR TO ADMISSION: Patient was modified independent using a rollator for functional mobility.    HOME SUPPORT PRIOR TO ADMISSION: The patient lived alone with family to provide assistance.    Physical Therapy Goals  Initiated 4/14/2025  1.  Patient will move from supine to sit and sit to supine in bed with supervision/set-up within 7 day(s).    2.  Patient will perform sit to stand with supervision/set-up within 7 day(s).  3.  Patient will transfer from bed to chair and chair to bed with contact guard assist using the least restrictive device within 7 day(s).  4.  Patient will ambulate with contact guard assist for 50 feet with the least restrictive device within 7 day(s).                        4/14/2025 1537 by Kelle Shah, PT  Outcome: Not Progressing

## 2025-04-15 NOTE — CARE COORDINATION
Care Management Initial Assessment       RUR: 18% - \"moderate risk\"  Readmission? No  1st IM letter given? Yes - provided by Patient Access Team 4/13/25  1st  letter given: N/A    Initial note: Chart reviewed, IDR's completed. TRICIA anticipated within the next 24-48 hrs. Therapy team's recommendations for moderate intensity short-term skilled therapy up to 5x/week acknowledged. Pt will require insurance auth via [employer] for SNF intervention. CM met with pt at bedside, introduced role, & confirmed demographic information is up-to-date in the chart. Pt reported that he resides with her sister (Sandra Angel: [phone]) in a single story home with 4 PATT. PCP is Dr. Lukasz Awad associated with [facility]; last office visit was 3/21/25. Preferred pharmacy identified as the [facility] on [address] in [place], [place] (phone: [phone]). Pt identified his brother (Moshe Delcid, home: [phone], mobile: [phone]) as primary point of contact; pt also identified brother as primary mPOA. Sister (Karla Angel, home: [phone], mobile: [phone]) is secondary mPOA.     Pt reported being independent with ADL's at baseline. Pt reported being an active  prior to current admission. Pt reported having access to a RW, rollator, shower chair w/ back, and home O2 (2-3L continuous flow) supplied by [facility]. Pt reported prior Hx of utilizing HH intervention via [facility]. Pt also reported prior Hx of utilizing SNF intervention at [facility] and [facility]. CM discussed therapy team's recommendations for d/c; pt agreeable. Pt requested for referrals to be sent to [facility] and [facility] for review. CM provided SNF list & encouraged pt to review for additional options in the event the facilities provided do not have bed availability to accept. No immediate questions/concerns identified. Referrals to be sent to the facilities above; updates to be

## 2025-04-15 NOTE — CONSULTS
Neurology Note    Patient ID:  Rodolfo Delcid  [id number]  70 y.o.  1955      Date of Consultation:  April 15, 2025    Referring Physician: Dr. Lopez    Reason for Consultation:  seizures      Assessment and Plan:    The patient is a pleasant 70-year-old male with multiple medical conditions including prior stroke and seizures who was admitted to the hospital after having had a episode of loss of consciousness at home.  Subsequently had a pelvic fracture.  Etiology surrounding the loss of consciousness is unclear.    epilepsy:  Unclear if seizure may have caused the fall  He did have a prior history of noncompliance  He is uncertain about his current epilepsy medication  It appears he is currently on lacosamide 200 mg twice a day and Depakote 250 mg twice a day  I have asked for a pharmacy consult.  During the last hospitalization he was on Keppra.  Possible side effect of itching that may have occurred with that medication  I will place order for Depakote and lacosamide levels  Will not make adjustments to medications at this time  No driving x 6 months due to loss of consciousness with unclear etiology    Cognitive impairment:  Patient does have reported history of mild cognitive impairment  Patient does have a history of a prior stroke  Does have cognitive slowing today  Will need to clarify his baseline mental status  Differential includes medication induced, metabolic abnormalities (hyponatremia), post concussive, progressive cognitive loss.  Potentially multifactorial.  Correct any underlying metabolic derangements  I will order a vitamin B12, TSH and ammonia level for tomorrow morning      History of hypertension with orthostasis today:  Medication adjustments through primary team    Pelvic fracture:  Ortho following  Pt/ot following      Neurology will continue to follow closely in this complicated patient with ongoing neurological symptoms necessitating additional testing. Updated orders placed.

## 2025-04-15 NOTE — PLAN OF CARE
Problem: Physical Therapy - Adult  Goal: By Discharge: Performs mobility at highest level of function for planned discharge setting.  See evaluation for individualized goals.  Description:  FUNCTIONAL STATUS PRIOR TO ADMISSION: Patient was modified independent using a rollator for functional mobility.    HOME SUPPORT PRIOR TO ADMISSION: The patient lived alone with family to provide assistance.    Physical Therapy Goals  Initiated 4/14/2025  1.  Patient will move from supine to sit and sit to supine in bed with supervision/set-up within 7 day(s).    2.  Patient will perform sit to stand with supervision/set-up within 7 day(s).  3.  Patient will transfer from bed to chair and chair to bed with contact guard assist using the least restrictive device within 7 day(s).  4.  Patient will ambulate with contact guard assist for 50 feet with the least restrictive device within 7 day(s).      Outcome: Not Progressing     PHYSICAL THERAPY TREATMENT    Patient: Rodolfo Delcid (70 y.o. male)  Date: 4/15/2025  Diagnosis: Syncope and collapse [R55]  General weakness [R53.1]  Closed fracture of ramus of right pubis, initial encounter ([facility]) [S32.591A] Seizures ([facility])      Precautions: Restrictions/Precautions  Restrictions/Precautions: Fall Risk            ASSESSMENT:  Patient continues to benefit from skilled PT services and is not progressing towards goals. Patient received laying in bed and agreeable to activity. Pt's O2 sats monitored throughout session and remaining between 93-95%. Pt directed in rolling to R side and transitioning to sitting with max direction for sequencing and min-mod A to achieve sitting. Pt stood to RW with min A. Pt with narrow ELIZABETH. Pt directed in laterally stepping towards HOB with pt requiring increased time to step and max direction. Pt then directed to sit EOB. Pt requires multiple verbal direction to initiate movement. Pt requires increased time for processing. Pt scooted back EOB and required mod A  for LE management. Pt laying in bed at end of session with all needs met.          PLAN:  Patient continues to benefit from skilled intervention to address the above impairments.  Continue treatment per established plan of care.    Recommend for next PT session: bed to bedside chair transfers    Recommendation for discharge: (in order for the patient to meet his/her long term goals):   Moderate intensity short-term skilled physical therapy up to 5x/week    Other factors to consider for discharge: patient's current support system is unable to meet their requirements for physical assistance, poor safety awareness, impaired cognition, high risk for falls, and not safe to be alone    IF patient discharges home will need the following DME: continuing to assess with progress       SUBJECTIVE:   Patient stated, \"My head hurts.\"    OBJECTIVE DATA SUMMARY:   Critical Behavior:          Functional Mobility Training:  Bed Mobility:  Bed Mobility Training  Rolling: Minimal assistance  Supine to Sit: Minimal assistance;Partial/Moderate assistance  Sit to Supine: Minimal assistance;Partial/Moderate assistance  Transfers:  Transfer Training  Transfer Training: Yes  Interventions: Verbal cues;Safety awareness training;Visual cues;Tactile cues  Sit to Stand: Minimal assistance  Stand to Sit: Minimal assistance  Balance:  Balance  Sitting: Impaired  Sitting - Static: Good (unsupported)  Sitting - Dynamic: Fair (occasional)  Standing: Impaired  Standing - Static: Constant support;Fair  Standing - Dynamic: Constant support;Fair   Ambulation/Gait Training:              Neuro Re-Education:                    Pain Rating:  No pain rating given but reports headache and back pain with mobility  Pain Intervention(s):   rest    Activity Tolerance:   Poor    After treatment:   Patient left in no apparent distress in bed, Call bell within reach, and Bed/ chair alarm activated      COMMUNICATION/EDUCATION:   The patient's plan of care was

## 2025-04-15 NOTE — PLAN OF CARE
Problem: Physical Therapy - Adult  Goal: By Discharge: Performs mobility at highest level of function for planned discharge setting.  See evaluation for individualized goals.  Description:  FUNCTIONAL STATUS PRIOR TO ADMISSION: Patient was modified independent using a rollator for functional mobility.    HOME SUPPORT PRIOR TO ADMISSION: The patient lived alone with family to provide assistance.    Physical Therapy Goals  Initiated 4/14/2025  1.  Patient will move from supine to sit and sit to supine in bed with supervision/set-up within 7 day(s).    2.  Patient will perform sit to stand with supervision/set-up within 7 day(s).  3.  Patient will transfer from bed to chair and chair to bed with contact guard assist using the least restrictive device within 7 day(s).  4.  Patient will ambulate with contact guard assist for 50 feet with the least restrictive device within 7 day(s).                        4/14/2025 1537 by Kelle Shah, PT  Outcome: Not Progressing

## 2025-04-15 NOTE — PROGRESS NOTES
End of Shift Note    Bedside shift change report given to  SalomeRN  (oncoming nurse) by SARINA Mcmahon .        Shift worked:  Days   Shift summary and any significant changes:    Oxy X  2  For pain. Eye drops ordered for irritation.     Still on 02 at 4L NC tolerated well.      Concerns for physician to address:  Results/Plan of care    Zone phone for oncoming shift:   [phone]     Patient Information  Rodolfo Delcid  70 y.o.  4/13/2025  8:11 AM by Stanton Oquendo MD. Rodolfo Delcid was admitted from [facility]    Problem List  Patient Active Problem List    Diagnosis Date Noted    Moderate protein-calorie malnutrition 04/14/2025    Syncope and collapse 04/13/2025    Acute metabolic encephalopathy 10/31/2024    Acute chest pain 10/13/2024    COPD with acute exacerbation (HCC) 09/17/2024    MCI (mild cognitive impairment) 09/17/2024    Confusion 09/17/2024    Palliative care encounter 04/25/2024    Altered mental status 04/25/2024    Metabolic encephalopathy 04/24/2024    Acute right-sided muscle weakness 04/24/2024    Aphasia 04/24/2024    Seizures (HCC) 04/24/2024    Shortness of breath 04/24/2024    Acute cholecystitis 12/01/2023    Fracture 05/16/2023    Complex partial seizure evolving to generalized seizure (HCC) 11/22/2016    Cerebral infarction due to thrombosis of left middle cerebral artery (HCC) 11/22/2016    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

## 2025-04-15 NOTE — PROGRESS NOTES
Hospitalist Progress Note    NAME:   Rodolfo Delcid   : 1955   MRN: [id number]     Date/Time: 4/15/2025 12:32 PM  Patient PCP: Lukasz Awad DO    Estimated discharge date:  Barriers:       Assessment / Plan:    Principal Problem:    Seizures (HCC)  Active Problems:    HTN (hypertension)    Carotid artery stenosis with cerebral infarction (HCC)    CAD (coronary artery disease)    Late effect of stroke    Complex partial seizure evolving to generalized seizure (HCC)    Cerebral infarction due to thrombosis of left middle cerebral artery (HCC)    Osteoarthrosis, hip    Localization-related partial epilepsy with complex partial seizures    COPD with acute exacerbation (HCC)    MCI (mild cognitive impairment)    Syncope and collapse  Resolved Problems:    * No resolved hospital problems. *     Plan:  The Syncope and collapse is in all likelyhood one of his seizures  Patient now with probable pelvic fracture by CT scan and Ortho and internal medicine opinion  Continue home meds including his seizure meds.  Physical therapy and Occupational Therapy consults.  Oxycodone and Robaxin for pain.  Calcitonin should kick in in 48 to 72 hours and continue to improve things for up to 6 weeks.  Duonebs for his COPD  4/15  Patient looking much better today, he was never set up inside of the bed with physical therapy.  His hip pain and pelvic pain is much improved  He is quite orthostatic so I have held his BP meds and they need to be readjusted and titrated to sitting lying standing.  Consult his neurologist.       Medical Decision Making:   I personally reviewed labs: yes  I personally reviewed imaging:yes  I personally reviewed EKG:  Toxic drug monitoring: seizure meds  Discussed case with: Patient's RN        Code Status: full code  DVT Prophylaxis: lovenox  Baseline: lives alone in house behind his sister house      Subjective:     Chief Complaint / Reason for Physician Visit  \" Patient had an worsening pain  following physical therapy.  Oxycodone given.  Will add Robaxin 3 times a day.\".  Discussed with RN events overnight.       Objective:     VITALS:   Last 24hrs VS reviewed since prior progress note. Most recent are:  Patient Vitals for the past 24 hrs:   BP Temp Temp src Pulse Resp SpO2   04/15/25 0900 -- -- -- 58 14 97 %   04/15/25 0855 -- -- -- 58 14 97 %   04/15/25 0754 (!) 125/59 97.9 °F (36.6 °C) Oral 56 18 97 %   04/14/25 2030 135/70 98.5 °F (36.9 °C) Oral 82 19 98 %   04/14/25 1544 132/66 -- -- 67 22 98 %   04/14/25 1507 -- -- -- -- 22 --         Intake/Output Summary (Last 24 hours) at 4/15/2025 1232  Last data filed at 4/15/2025 0536  Gross per 24 hour   Intake --   Output 600 ml   Net -600 ml        I had a face to face encounter and independently examined this patient on 4/15/2025, as outlined below:  PHYSICAL EXAM:  General: Alert, cooperative  EENT:  EOMI. Anicteric sclerae.  Resp:  CTA bilaterally, no wheezing or rales.  No accessory muscle use  CV:  Regular  rhythm,  No edema  GI:  Soft, Non distended, Non tender.  +Bowel sounds  Neurologic:  Alert and oriented X 3, normal speech,   Psych:   Good insight. Not anxious nor agitated  Skin:  No rashes.  No jaundice    Reviewed most current lab test results and cultures  YES  Reviewed most current radiology test results   YES  Review and summation of old records today    NO  Reviewed patient's current orders and MAR    YES  PMH/SH reviewed - no change compared to H&P    Procedures: see electronic medical records for all procedures/Xrays and details which were not copied into this note but were reviewed prior to creation of Plan.      LABS:  I reviewed today's most current labs and imaging studies.  Pertinent labs include:  Recent Labs     04/13/25  0858   WBC 11.0   HGB 12.3   HCT 38.5        Recent Labs     04/13/25  0858   *   K 4.3   CL 92*   CO2 31   GLUCOSE 90   BUN 9   CREATININE 0.67*   CALCIUM 8.9   BILITOT 0.7   AST 19   ALT 23

## 2025-04-16 ENCOUNTER — TELEPHONE (OUTPATIENT)
Age: 70
End: 2025-04-16

## 2025-04-16 LAB
ALBUMIN SERPL-MCNC: 2.7 G/DL (ref 3.5–5)
AMMONIA PLAS-SCNC: 35 UMOL/L
ANION GAP SERPL CALC-SCNC: 4 MMOL/L (ref 2–12)
BASOPHILS # BLD: 0.06 K/UL (ref 0–0.1)
BASOPHILS NFR BLD: 1 % (ref 0–1)
BUN SERPL-MCNC: 16 MG/DL (ref 6–20)
BUN/CREAT SERPL: 27 (ref 12–20)
CALCIUM SERPL-MCNC: 8.6 MG/DL (ref 8.5–10.1)
CHLORIDE SERPL-SCNC: 100 MMOL/L (ref 97–108)
CO2 SERPL-SCNC: 32 MMOL/L (ref 21–32)
CREAT SERPL-MCNC: 0.59 MG/DL (ref 0.7–1.3)
DIFFERENTIAL METHOD BLD: ABNORMAL
EOSINOPHIL # BLD: 0.18 K/UL (ref 0–0.4)
EOSINOPHIL NFR BLD: 3 % (ref 0–7)
ERYTHROCYTE [DISTWIDTH] IN BLOOD BY AUTOMATED COUNT: 14.4 % (ref 11.5–14.5)
GLUCOSE SERPL-MCNC: 85 MG/DL (ref 65–100)
HCT VFR BLD AUTO: 37 % (ref 36.6–50.3)
HGB BLD-MCNC: 12 G/DL (ref 12.1–17)
IMM GRANULOCYTES # BLD AUTO: 0.03 K/UL (ref 0–0.04)
IMM GRANULOCYTES NFR BLD AUTO: 0.5 % (ref 0–0.5)
LYMPHOCYTES # BLD: 1.48 K/UL (ref 0.8–3.5)
LYMPHOCYTES NFR BLD: 24.3 % (ref 12–49)
MCH RBC QN AUTO: 29.3 PG (ref 26–34)
MCHC RBC AUTO-ENTMCNC: 32.4 G/DL (ref 30–36.5)
MCV RBC AUTO: 90.5 FL (ref 80–99)
MONOCYTES # BLD: 0.8 K/UL (ref 0–1)
MONOCYTES NFR BLD: 13.1 % (ref 5–13)
NEUTS SEG # BLD: 3.54 K/UL (ref 1.8–8)
NEUTS SEG NFR BLD: 58.1 % (ref 32–75)
NRBC # BLD: 0 K/UL (ref 0–0.01)
NRBC BLD-RTO: 0 PER 100 WBC
PHOSPHATE SERPL-MCNC: 4.2 MG/DL (ref 2.6–4.7)
PLATELET # BLD AUTO: 287 K/UL (ref 150–400)
PMV BLD AUTO: 8.5 FL (ref 8.9–12.9)
POTASSIUM SERPL-SCNC: 4 MMOL/L (ref 3.5–5.1)
RBC # BLD AUTO: 4.09 M/UL (ref 4.1–5.7)
SODIUM SERPL-SCNC: 136 MMOL/L (ref 136–145)
T4 FREE SERPL-MCNC: 1.3 NG/DL (ref 0.8–1.5)
TSH SERPL DL<=0.05 MIU/L-ACNC: 1.61 UIU/ML (ref 0.36–3.74)
VALPROATE SERPL-MCNC: 37 UG/ML (ref 50–100)
VIT B12 SERPL-MCNC: 427 PG/ML (ref 193–986)
WBC # BLD AUTO: 6.1 K/UL (ref 4.1–11.1)

## 2025-04-16 PROCEDURE — 99233 SBSQ HOSP IP/OBS HIGH 50: CPT

## 2025-04-16 PROCEDURE — 6370000000 HC RX 637 (ALT 250 FOR IP)

## 2025-04-16 PROCEDURE — 80235 DRUG ASSAY LACOSAMIDE: CPT

## 2025-04-16 PROCEDURE — 2700000000 HC OXYGEN THERAPY PER DAY

## 2025-04-16 PROCEDURE — 94640 AIRWAY INHALATION TREATMENT: CPT

## 2025-04-16 PROCEDURE — 82140 ASSAY OF AMMONIA: CPT

## 2025-04-16 PROCEDURE — 94761 N-INVAS EAR/PLS OXIMETRY MLT: CPT

## 2025-04-16 PROCEDURE — 85025 COMPLETE CBC W/AUTO DIFF WBC: CPT

## 2025-04-16 PROCEDURE — 1100000003 HC PRIVATE W/ TELEMETRY

## 2025-04-16 PROCEDURE — 97535 SELF CARE MNGMENT TRAINING: CPT | Performed by: OCCUPATIONAL THERAPIST

## 2025-04-16 PROCEDURE — 84443 ASSAY THYROID STIM HORMONE: CPT

## 2025-04-16 PROCEDURE — 6360000002 HC RX W HCPCS: Performed by: INTERNAL MEDICINE

## 2025-04-16 PROCEDURE — 36415 COLL VENOUS BLD VENIPUNCTURE: CPT

## 2025-04-16 PROCEDURE — 97530 THERAPEUTIC ACTIVITIES: CPT | Performed by: OCCUPATIONAL THERAPIST

## 2025-04-16 PROCEDURE — 82607 VITAMIN B-12: CPT

## 2025-04-16 PROCEDURE — 80164 ASSAY DIPROPYLACETIC ACD TOT: CPT

## 2025-04-16 PROCEDURE — 6370000000 HC RX 637 (ALT 250 FOR IP): Performed by: PSYCHIATRY & NEUROLOGY

## 2025-04-16 PROCEDURE — 6370000000 HC RX 637 (ALT 250 FOR IP): Performed by: INTERNAL MEDICINE

## 2025-04-16 PROCEDURE — 80069 RENAL FUNCTION PANEL: CPT

## 2025-04-16 PROCEDURE — 2500000003 HC RX 250 WO HCPCS: Performed by: INTERNAL MEDICINE

## 2025-04-16 PROCEDURE — 84439 ASSAY OF FREE THYROXINE: CPT

## 2025-04-16 RX ORDER — LACOSAMIDE 50 MG/1
50 TABLET ORAL 2 TIMES DAILY
Status: DISCONTINUED | OUTPATIENT
Start: 2025-04-16 | End: 2025-04-17 | Stop reason: HOSPADM

## 2025-04-16 RX ADMIN — MONTELUKAST 10 MG: 10 TABLET, FILM COATED ORAL at 09:30

## 2025-04-16 RX ADMIN — GABAPENTIN 300 MG: 300 CAPSULE ORAL at 09:29

## 2025-04-16 RX ADMIN — GABAPENTIN 300 MG: 300 CAPSULE ORAL at 21:00

## 2025-04-16 RX ADMIN — ARFORMOTEROL TARTRATE 15 MCG: 15 SOLUTION RESPIRATORY (INHALATION) at 19:37

## 2025-04-16 RX ADMIN — LACOSAMIDE 100 MG: 100 TABLET, FILM COATED ORAL at 09:29

## 2025-04-16 RX ADMIN — METHOCARBAMOL TABLETS 750 MG: 750 TABLET, COATED ORAL at 09:29

## 2025-04-16 RX ADMIN — ARFORMOTEROL TARTRATE 15 MCG: 15 SOLUTION RESPIRATORY (INHALATION) at 07:28

## 2025-04-16 RX ADMIN — BUDESONIDE 500 MCG: 0.5 INHALANT RESPIRATORY (INHALATION) at 19:37

## 2025-04-16 RX ADMIN — PANTOPRAZOLE SODIUM 40 MG: 40 TABLET, DELAYED RELEASE ORAL at 06:07

## 2025-04-16 RX ADMIN — LACOSAMIDE 50 MG: 50 TABLET, FILM COATED ORAL at 21:00

## 2025-04-16 RX ADMIN — ENOXAPARIN SODIUM 40 MG: 100 INJECTION SUBCUTANEOUS at 09:29

## 2025-04-16 RX ADMIN — DIVALPROEX SODIUM 250 MG: 250 TABLET, DELAYED RELEASE ORAL at 09:30

## 2025-04-16 RX ADMIN — Medication 1 TABLET: at 09:30

## 2025-04-16 RX ADMIN — CALCIUM CARBONATE (ANTACID) CHEW TAB 500 MG 500 MG: 500 CHEW TAB at 09:30

## 2025-04-16 RX ADMIN — AZELASTINE HYDROCHLORIDE 1 SPRAY: 137 SPRAY, METERED NASAL at 21:01

## 2025-04-16 RX ADMIN — METHOCARBAMOL TABLETS 750 MG: 750 TABLET, COATED ORAL at 14:49

## 2025-04-16 RX ADMIN — OXYCODONE 10 MG: 5 TABLET ORAL at 16:42

## 2025-04-16 RX ADMIN — AZELASTINE HYDROCHLORIDE 1 SPRAY: 137 SPRAY, METERED NASAL at 09:31

## 2025-04-16 RX ADMIN — GABAPENTIN 300 MG: 300 CAPSULE ORAL at 14:49

## 2025-04-16 RX ADMIN — LEVOTHYROXINE SODIUM 50 MCG: 0.05 TABLET ORAL at 06:07

## 2025-04-16 RX ADMIN — BUDESONIDE 500 MCG: 0.5 INHALANT RESPIRATORY (INHALATION) at 07:28

## 2025-04-16 RX ADMIN — IPRATROPIUM BROMIDE 0.5 MG: 0.5 SOLUTION RESPIRATORY (INHALATION) at 19:32

## 2025-04-16 RX ADMIN — DEXTRAN 70, GLYCERIN, HYPROMELLOSE 1 DROP: 1; 2; 3 SOLUTION/ DROPS OPHTHALMIC at 09:30

## 2025-04-16 RX ADMIN — SODIUM CHLORIDE, PRESERVATIVE FREE 10 ML: 5 INJECTION INTRAVENOUS at 09:31

## 2025-04-16 RX ADMIN — IPRATROPIUM BROMIDE 0.5 MG: 0.5 SOLUTION RESPIRATORY (INHALATION) at 07:28

## 2025-04-16 RX ADMIN — MELATONIN 3 MG: at 21:00

## 2025-04-16 RX ADMIN — SODIUM CHLORIDE, PRESERVATIVE FREE 10 ML: 5 INJECTION INTRAVENOUS at 21:00

## 2025-04-16 RX ADMIN — ASPIRIN 81 MG: 81 TABLET, COATED ORAL at 21:00

## 2025-04-16 RX ADMIN — ASPIRIN 81 MG: 81 TABLET, COATED ORAL at 09:30

## 2025-04-16 RX ADMIN — CALCITONIN SALMON 1 SPRAY: 200 SPRAY, METERED NASAL at 09:31

## 2025-04-16 RX ADMIN — DIVALPROEX SODIUM 250 MG: 250 TABLET, DELAYED RELEASE ORAL at 21:00

## 2025-04-16 RX ADMIN — METHOCARBAMOL TABLETS 750 MG: 750 TABLET, COATED ORAL at 21:00

## 2025-04-16 RX ADMIN — CALCIUM CARBONATE (ANTACID) CHEW TAB 500 MG 500 MG: 500 CHEW TAB at 21:00

## 2025-04-16 ASSESSMENT — PAIN DESCRIPTION - DESCRIPTORS: DESCRIPTORS: ACHING

## 2025-04-16 ASSESSMENT — PAIN DESCRIPTION - LOCATION: LOCATION: HEAD

## 2025-04-16 ASSESSMENT — PAIN SCALES - GENERAL: PAINLEVEL_OUTOF10: 9

## 2025-04-16 NOTE — PROGRESS NOTES
End of Shift Note    Bedside shift change report given to RN  (oncoming nurse) by SARINA Mcmahon .        Shift worked:  Nights   Shift summary and any significant changes:    No acute changes overnight, right eye irritation improved.     Needs depakote & lacosamide level    MWF dressing change for wound on arms    Possible discharge on rehab facility   Concerns for physician to address: See above   Zone phone for oncoming shift:   [phone]     Patient Information  Rodolfo Delcid  70 y.o.  4/13/2025  8:11 AM by Stanton Oquendo MD. Rodolfo Delcid was admitted from [facility]    Problem List  Patient Active Problem List    Diagnosis Date Noted    Memory loss 04/15/2025    Moderate protein-calorie malnutrition 04/14/2025    Syncope and collapse 04/13/2025    Acute metabolic encephalopathy 10/31/2024    Acute chest pain 10/13/2024    COPD with acute exacerbation (HCC) 09/17/2024    MCI (mild cognitive impairment) 09/17/2024    Confusion 09/17/2024    Palliative care encounter 04/25/2024    Altered mental status 04/25/2024    Metabolic encephalopathy 04/24/2024    Acute right-sided muscle weakness 04/24/2024    Aphasia 04/24/2024    Seizures (HCC) 04/24/2024    Shortness of breath 04/24/2024    Acute cholecystitis 12/01/2023    Fracture 05/16/2023    Complex partial seizure evolving to generalized seizure (HCC) 11/22/2016    Cerebral infarction due to thrombosis of left middle cerebral artery (HCC) 11/22/2016    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

## 2025-04-16 NOTE — PROGRESS NOTES
End of Shift Note    Bedside shift change report given to Tanvi RN  (oncoming nurse) by SARINA Baron .        Shift worked: Days    Shift summary and any significant changes:    No acute changes this shift,  MWF dressing change for wound on arms  [facility] (pending bed availability)   No acute changes this shift    Concerns for physician to address: See above   Zone phone for oncoming shift:   [phone]     Patient Information  Rodolfo Delcid  70 y.o.  4/13/2025  8:11 AM by Stanton Oquendo MD. Rodolfo Delcid was admitted from [facility]    Problem List  Patient Active Problem List    Diagnosis Date Noted    Memory loss 04/15/2025    Moderate protein-calorie malnutrition 04/14/2025    Syncope and collapse 04/13/2025    Acute metabolic encephalopathy 10/31/2024    Acute chest pain 10/13/2024    COPD with acute exacerbation (HCC) 09/17/2024    MCI (mild cognitive impairment) 09/17/2024    Confusion 09/17/2024    Palliative care encounter 04/25/2024    Altered mental status 04/25/2024    Metabolic encephalopathy 04/24/2024    Acute right-sided muscle weakness 04/24/2024    Aphasia 04/24/2024    Seizures (HCC) 04/24/2024    Shortness of breath 04/24/2024    Acute cholecystitis 12/01/2023    Fracture 05/16/2023    Complex partial seizure evolving to generalized seizure (HCC) 11/22/2016    Cerebral infarction due to thrombosis of left middle cerebral artery (HCC) 11/22/2016    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  today

## 2025-04-16 NOTE — CARE COORDINATION
CAROLYN Plan: SNF - [facility] (pending bed availability)    Update - 3:01 PM: Insurance auth approved for SNF (Reference #: [id number], Service Dates: 04/17/2025-04/21/2025). CM communicated update to [facility] admission liaison (Rosita). Rosita requested for CM to contact her tomorrow morning to confirm bed availability for admission.    Update - 1:12 PM: CM contacted pt's sister (Karla Angel: [phone]) to provide updates related to the d/c plan. Sister answered phone & requested for CM to contact her back in 1-2 hrs, reporting that she was driving.    Update - 12:12 PM: CM selected [facility] as preference in [email or web address] and informed facility that insurance auth will be initiated this afternoon. CM requested for facility to confirm bed availability for anticipated d/c 4/17/25. CMA to start auth.    Initial note: Chart reviewed. TRICIA anticipated for 4/17/25. SNF referrals sent 4/15/25 to [facility] and [facility]; both facilities accepted. CM met with pt at bedside & provided updates regarding accepting SNF's. Pt identified [facility] as preference. CM offered an opportunity for pt to ask clarifying questions as needed. Pt informed that CM will initiate insurance auth for preferred facility & report updates once available. MD updated.    NATHAN Delgadillo  [facility] CM   Supervisee in Social Work  [phone]

## 2025-04-16 NOTE — PROGRESS NOTES
Physical Therapy     Chart reviewed up to date. Attempted to see pt for PT session. Pt received with bed in chair position, family present. Pt declining therapy at this time 2/2 wanting to eat lunch. Offered up to chair and education on importance of eating upright for safety/ aspiration risk. Pt continued to decline \"maybe later\". Will defer and follow up later as able.     Malinda Gan PT, DPT, NCS

## 2025-04-16 NOTE — TELEPHONE ENCOUNTER
Hello,    Can this patient be scheduled for hospital follow-up in 2 to 4 weeks with any other neurology providers?    Thanks,  Nati

## 2025-04-16 NOTE — PROGRESS NOTES
HOSPITAL NEUROLOGY NOTE     Chief Complaint   Patient presents with    Loss of Consciousness     Pt BIBEMS from home where he lives alone stating he got out of bed last night around midnight, fell backwards hitting his head on a door and \"doesn't remember anything after that\". Endorses blurred vision and dizziness. Takes daily low dose aspirin.     Shortness of Breath     Pt complains of SOB at baseline d/t COPD, but worsening with labored breathing since his fall this morning. Wears 2L at baseline. Trachea midline.         HPI  History excerpted from Dr. Mello's note on 4/15/2025  \"Rodolfo Delcid is a 70 y.o. male  with a history of seizures, coronary artery disease, COPD who had a syncopal episode at home and ended up in the floor.  He had significant low back pain, no neck pain, shortness of breath.  He.  He lives alone and got out of bed to walk to the restroom while using his walker.  He woke up on the floor in pain.  He called EMS the following morning due to the pain and increasing shortness of breath.  He was found to have a pelvic fracture.  He was found to be orthostatic and his blood pressure medicines have been held.  Neurology was consulted due to his history of seizures and concern for possible seizures leading to the admission     Patient is unsure of the doses and name of his seizure medication.  It appeared the patient was taking Keppra in the past but this was then stopped because of itching.  It appears that the patient is now on lacosamide 200 mg twice a day and Depakote 250 mg twice a day.     Pertinent labs from April 13 include a sodium of 128, potassium 4.3, creatinine 0.67.  Albumin 3.3.  AST 19 ALT of 23.  White blood cell count 11.0, hemoglobin 12.3, platelets of 309     I did independently review the head CT from April 13, 2025.  There is no acute abnormalities.  There is chronic encephalomalacia in the left parietal and occipital lobe     Upon review of the medical records, the patient was

## 2025-04-16 NOTE — PLAN OF CARE
Problem: Physical Therapy - Adult  Goal: By Discharge: Performs mobility at highest level of function for planned discharge setting.  See evaluation for individualized goals.  Description:  FUNCTIONAL STATUS PRIOR TO ADMISSION: Patient was modified independent using a rollator for functional mobility.    HOME SUPPORT PRIOR TO ADMISSION: The patient lived alone with family to provide assistance.    Physical Therapy Goals  Initiated 4/14/2025  1.  Patient will move from supine to sit and sit to supine in bed with supervision/set-up within 7 day(s).    2.  Patient will perform sit to stand with supervision/set-up within 7 day(s).  3.  Patient will transfer from bed to chair and chair to bed with contact guard assist using the least restrictive device within 7 day(s).  4.  Patient will ambulate with contact guard assist for 50 feet with the least restrictive device within 7 day(s).                        4/15/2025 1308 by Maxine Zamora, PT  Outcome: Not Progressing

## 2025-04-16 NOTE — PLAN OF CARE
Problem: Respiratory - Adult  Goal: Achieves optimal ventilation and oxygenation  4/16/2025 1107 by Loraine Shelby RN  Outcome: Progressing  4/16/2025 1017 by Maxine López RCP  Outcome: Progressing  4/15/2025 2224 by Yamilet Solano RN  Outcome: Progressing     Problem: Chronic Conditions and Co-morbidities  Goal: Patient's chronic conditions and co-morbidity symptoms are monitored and maintained or improved  4/16/2025 1107 by Loraine Shelby RN  Outcome: Progressing  4/15/2025 2224 by Yamilet Solano RN  Outcome: Progressing     Problem: Discharge Planning  Goal: Discharge to home or other facility with appropriate resources  4/16/2025 1107 by Loraine Shelby RN  Outcome: Progressing  4/15/2025 2224 by Yamilet Solano RN  Outcome: Progressing     Problem: Pain  Goal: Verbalizes/displays adequate comfort level or baseline comfort level  4/16/2025 1107 by Loraine Shelby RN  Outcome: Progressing  4/15/2025 2224 by Yamilet Solano RN  Outcome: Progressing     Problem: Safety - Adult  Goal: Free from fall injury  4/16/2025 1107 by Loraine Shelby RN  Outcome: Progressing  4/15/2025 2224 by Yamilet Solano RN  Outcome: Progressing     Problem: ABCDS Injury Assessment  Goal: Absence of physical injury  4/16/2025 1107 by Loraine Shelby RN  Outcome: Progressing  4/15/2025 2224 by Yamilet Solano RN  Outcome: Progressing     Problem: Skin/Tissue Integrity  Goal: Skin integrity remains intact  Description: 1.  Monitor for areas of redness and/or skin breakdown2.  Assess vascular access sites hourly3.  Every 4-6 hours minimum:  Change oxygen saturation probe site4.  Every 4-6 hours:  If on nasal continuous positive airway pressure, respiratory therapy assess nares and determine need for appliance change or resting period  4/16/2025 1107 by Loraine Shelby RN  Outcome: Progressing  4/15/2025 2224 by Yamilet Solano

## 2025-04-16 NOTE — PLAN OF CARE
Problem: Occupational Therapy - Adult  Goal: By Discharge: Performs self-care activities at highest level of function for planned discharge setting.  See evaluation for individualized goals.  Description: FUNCTIONAL STATUS PRIOR TO ADMISSION:  Patient reports being independent to mod I for ADLs and ambulates with a rollator. Family performs all IADLs. He is on 2-3 L NC at baseline.     HOME SUPPORT: Patient was living alone, but has supportive family members living near him who assist with IADLs.     Occupational Therapy Goals:  Initiated 4/14/2025  1.  Patient will perform grooming standing at sink with Stand by Assist and Set-up within 7 day(s).  2.  Patient will perform upper body dressing with Minimal Assist within 7 day(s).  3.  Patient will perform lower body dressing with Moderate Assist within 7 day(s).  4.  Patient will perform toilet transfers with Stand by Assist  within 7 day(s).  5.  Patient will perform all aspects of toileting with Minimal Assist within 7 day(s).  6.  Patient will perform sponge bathing with Moderate Assist within 7 day(s).     Outcome: Progressing     OCCUPATIONAL THERAPY TREATMENT  Patient: Rodolfo Delcid (70 y.o. male)  Date: 4/16/2025  Primary Diagnosis: Syncope and collapse [R55]  General weakness [R53.1]  Closed fracture of ramus of right pubis, initial encounter ([facility]) [S32.614H]       Precautions: Fall Risk                Chart, occupational therapy assessment, plan of care, and goals were reviewed.    ASSESSMENT  Patient presented supine in bed, agreeable to work with OT. He is demonstrating a significant improvement in his activity tolerance, strength and balance for the performance of functional activity. The patient is having more R hip/pelvic pain, which occurs during transfers and when bending to attempt LB ADLs. Overall he was SBA for bed mobility, min A for transfers, CGA for ambulation with a RW, CGA for standing grooming, supervision/setup for UB ADLs, and he was min

## 2025-04-17 VITALS
SYSTOLIC BLOOD PRESSURE: 138 MMHG | HEART RATE: 51 BPM | WEIGHT: 183.2 LBS | OXYGEN SATURATION: 97 % | HEIGHT: 73 IN | DIASTOLIC BLOOD PRESSURE: 64 MMHG | RESPIRATION RATE: 18 BRPM | TEMPERATURE: 97.9 F | BODY MASS INDEX: 24.28 KG/M2

## 2025-04-17 LAB
ALBUMIN SERPL-MCNC: 2.8 G/DL (ref 3.5–5)
ANION GAP SERPL CALC-SCNC: 3 MMOL/L (ref 2–12)
BASOPHILS # BLD: 0.05 K/UL (ref 0–0.1)
BASOPHILS NFR BLD: 1 % (ref 0–1)
BUN SERPL-MCNC: 15 MG/DL (ref 6–20)
BUN/CREAT SERPL: 28 (ref 12–20)
CALCIUM SERPL-MCNC: 8.9 MG/DL (ref 8.5–10.1)
CHLORIDE SERPL-SCNC: 101 MMOL/L (ref 97–108)
CO2 SERPL-SCNC: 30 MMOL/L (ref 21–32)
CREAT SERPL-MCNC: 0.54 MG/DL (ref 0.7–1.3)
DIFFERENTIAL METHOD BLD: ABNORMAL
EOSINOPHIL # BLD: 0.22 K/UL (ref 0–0.4)
EOSINOPHIL NFR BLD: 4.5 % (ref 0–7)
ERYTHROCYTE [DISTWIDTH] IN BLOOD BY AUTOMATED COUNT: 14.2 % (ref 11.5–14.5)
GLUCOSE SERPL-MCNC: 91 MG/DL (ref 65–100)
HCT VFR BLD AUTO: 39.7 % (ref 36.6–50.3)
HGB BLD-MCNC: 12.7 G/DL (ref 12.1–17)
IMM GRANULOCYTES # BLD AUTO: 0.02 K/UL (ref 0–0.04)
IMM GRANULOCYTES NFR BLD AUTO: 0.4 % (ref 0–0.5)
LYMPHOCYTES # BLD: 1.5 K/UL (ref 0.8–3.5)
LYMPHOCYTES NFR BLD: 30.4 % (ref 12–49)
MCH RBC QN AUTO: 29 PG (ref 26–34)
MCHC RBC AUTO-ENTMCNC: 32 G/DL (ref 30–36.5)
MCV RBC AUTO: 90.6 FL (ref 80–99)
MONOCYTES # BLD: 0.66 K/UL (ref 0–1)
MONOCYTES NFR BLD: 13.4 % (ref 5–13)
NEUTS SEG # BLD: 2.49 K/UL (ref 1.8–8)
NEUTS SEG NFR BLD: 50.3 % (ref 32–75)
NRBC # BLD: 0 K/UL (ref 0–0.01)
NRBC BLD-RTO: 0 PER 100 WBC
PHOSPHATE SERPL-MCNC: 4 MG/DL (ref 2.6–4.7)
PLATELET # BLD AUTO: 306 K/UL (ref 150–400)
PMV BLD AUTO: 8.6 FL (ref 8.9–12.9)
POTASSIUM SERPL-SCNC: 4 MMOL/L (ref 3.5–5.1)
RBC # BLD AUTO: 4.38 M/UL (ref 4.1–5.7)
SODIUM SERPL-SCNC: 134 MMOL/L (ref 136–145)
WBC # BLD AUTO: 4.9 K/UL (ref 4.1–11.1)

## 2025-04-17 PROCEDURE — 94640 AIRWAY INHALATION TREATMENT: CPT

## 2025-04-17 PROCEDURE — 85025 COMPLETE CBC W/AUTO DIFF WBC: CPT

## 2025-04-17 PROCEDURE — 94761 N-INVAS EAR/PLS OXIMETRY MLT: CPT

## 2025-04-17 PROCEDURE — 6360000002 HC RX W HCPCS: Performed by: INTERNAL MEDICINE

## 2025-04-17 PROCEDURE — 36415 COLL VENOUS BLD VENIPUNCTURE: CPT

## 2025-04-17 PROCEDURE — 6370000000 HC RX 637 (ALT 250 FOR IP)

## 2025-04-17 PROCEDURE — 2500000003 HC RX 250 WO HCPCS: Performed by: INTERNAL MEDICINE

## 2025-04-17 PROCEDURE — 2700000000 HC OXYGEN THERAPY PER DAY

## 2025-04-17 PROCEDURE — 6370000000 HC RX 637 (ALT 250 FOR IP): Performed by: INTERNAL MEDICINE

## 2025-04-17 PROCEDURE — 80069 RENAL FUNCTION PANEL: CPT

## 2025-04-17 RX ORDER — METHOCARBAMOL 750 MG/1
750 TABLET, FILM COATED ORAL 3 TIMES DAILY PRN
Qty: 30 TABLET | Refills: 0 | Status: SHIPPED | OUTPATIENT
Start: 2025-04-17 | End: 2025-04-27

## 2025-04-17 RX ORDER — ASPIRIN 81 MG/1
81 TABLET ORAL DAILY
Qty: 90 TABLET | Refills: 3 | Status: SHIPPED | OUTPATIENT
Start: 2025-04-17

## 2025-04-17 RX ORDER — GABAPENTIN 300 MG/1
300 CAPSULE ORAL 3 TIMES DAILY
Qty: 90 CAPSULE | Refills: 0 | Status: SHIPPED | OUTPATIENT
Start: 2025-04-17 | End: 2025-05-17

## 2025-04-17 RX ORDER — OXYCODONE HYDROCHLORIDE 5 MG/1
5-10 TABLET ORAL EVERY 8 HOURS PRN
Qty: 30 TABLET | Refills: 0 | Status: SHIPPED | OUTPATIENT
Start: 2025-04-17 | End: 2025-04-24

## 2025-04-17 RX ORDER — LACTULOSE 10 G/15ML
20 SOLUTION ORAL DAILY PRN
Status: SHIPPED | COMMUNITY
Start: 2025-04-17

## 2025-04-17 RX ORDER — POLYETHYLENE GLYCOL 3350 17 G/17G
17 POWDER, FOR SOLUTION ORAL DAILY
Status: SHIPPED | COMMUNITY
Start: 2025-04-17 | End: 2025-05-17

## 2025-04-17 RX ORDER — LACOSAMIDE 50 MG/1
50 TABLET ORAL 2 TIMES DAILY
Qty: 60 TABLET | Refills: 0 | Status: SHIPPED | OUTPATIENT
Start: 2025-04-17 | End: 2025-05-17

## 2025-04-17 RX ORDER — ACETAMINOPHEN 325 MG/1
650 TABLET ORAL EVERY 6 HOURS PRN
Status: SHIPPED | COMMUNITY
Start: 2025-04-17

## 2025-04-17 RX ORDER — CALCIUM CARBONATE 500 MG/1
500 TABLET, CHEWABLE ORAL 2 TIMES DAILY
Qty: 60 TABLET | Refills: 0 | Status: SHIPPED
Start: 2025-04-17 | End: 2025-05-17

## 2025-04-17 RX ORDER — BUDESONIDE, GLYCOPYRROLATE, AND FORMOTEROL FUMARATE 160; 9; 4.8 UG/1; UG/1; UG/1
2 AEROSOL, METERED RESPIRATORY (INHALATION) 2 TIMES DAILY
Qty: 2 EACH | Refills: 3 | Status: SHIPPED
Start: 2025-04-17

## 2025-04-17 RX ADMIN — LACOSAMIDE 50 MG: 50 TABLET, FILM COATED ORAL at 09:41

## 2025-04-17 RX ADMIN — ASPIRIN 81 MG: 81 TABLET, COATED ORAL at 09:42

## 2025-04-17 RX ADMIN — METHOCARBAMOL TABLETS 750 MG: 750 TABLET, COATED ORAL at 09:42

## 2025-04-17 RX ADMIN — OXYCODONE 10 MG: 5 TABLET ORAL at 14:21

## 2025-04-17 RX ADMIN — DIVALPROEX SODIUM 250 MG: 250 TABLET, DELAYED RELEASE ORAL at 09:42

## 2025-04-17 RX ADMIN — BUDESONIDE 500 MCG: 0.5 INHALANT RESPIRATORY (INHALATION) at 07:29

## 2025-04-17 RX ADMIN — IPRATROPIUM BROMIDE 0.5 MG: 0.5 SOLUTION RESPIRATORY (INHALATION) at 07:29

## 2025-04-17 RX ADMIN — CALCITONIN SALMON 1 SPRAY: 200 SPRAY, METERED NASAL at 09:43

## 2025-04-17 RX ADMIN — MONTELUKAST 10 MG: 10 TABLET, FILM COATED ORAL at 09:42

## 2025-04-17 RX ADMIN — GABAPENTIN 300 MG: 300 CAPSULE ORAL at 14:17

## 2025-04-17 RX ADMIN — SODIUM CHLORIDE, PRESERVATIVE FREE 10 ML: 5 INJECTION INTRAVENOUS at 09:43

## 2025-04-17 RX ADMIN — GABAPENTIN 300 MG: 300 CAPSULE ORAL at 09:42

## 2025-04-17 RX ADMIN — POLYETHYLENE GLYCOL 3350 17 G: 17 POWDER, FOR SOLUTION ORAL at 09:51

## 2025-04-17 RX ADMIN — CALCIUM CARBONATE (ANTACID) CHEW TAB 500 MG 500 MG: 500 CHEW TAB at 09:42

## 2025-04-17 RX ADMIN — ENOXAPARIN SODIUM 40 MG: 100 INJECTION SUBCUTANEOUS at 09:43

## 2025-04-17 RX ADMIN — PANTOPRAZOLE SODIUM 40 MG: 40 TABLET, DELAYED RELEASE ORAL at 05:46

## 2025-04-17 RX ADMIN — LEVOTHYROXINE SODIUM 50 MCG: 0.05 TABLET ORAL at 05:46

## 2025-04-17 RX ADMIN — AZELASTINE HYDROCHLORIDE 1 SPRAY: 137 SPRAY, METERED NASAL at 09:43

## 2025-04-17 RX ADMIN — Medication 1 TABLET: at 09:42

## 2025-04-17 RX ADMIN — OXYCODONE 10 MG: 5 TABLET ORAL at 09:51

## 2025-04-17 RX ADMIN — METHOCARBAMOL TABLETS 750 MG: 750 TABLET, COATED ORAL at 14:17

## 2025-04-17 RX ADMIN — ARFORMOTEROL TARTRATE 15 MCG: 15 SOLUTION RESPIRATORY (INHALATION) at 07:29

## 2025-04-17 ASSESSMENT — PAIN SCALES - GENERAL
PAINLEVEL_OUTOF10: 6
PAINLEVEL_OUTOF10: 10
PAINLEVEL_OUTOF10: 8
PAINLEVEL_OUTOF10: 10
PAINLEVEL_OUTOF10: 7

## 2025-04-17 ASSESSMENT — PAIN DESCRIPTION - ORIENTATION
ORIENTATION: MID
ORIENTATION: LOWER
ORIENTATION: LOWER

## 2025-04-17 ASSESSMENT — PAIN DESCRIPTION - LOCATION
LOCATION: BACK

## 2025-04-17 ASSESSMENT — PAIN DESCRIPTION - DESCRIPTORS
DESCRIPTORS: ACHING

## 2025-04-17 NOTE — PLAN OF CARE
Problem: Respiratory - Adult  Goal: Achieves optimal ventilation and oxygenation  4/16/2025 2208 by Sunny Ferraro RN  Outcome: Progressing  4/16/2025 1107 by Loraine Shelby RN  Outcome: Progressing  4/16/2025 1017 by Maxine López RCP  Outcome: Progressing     Problem: Discharge Planning  Goal: Discharge to home or other facility with appropriate resources  4/16/2025 1107 by Loraine Shelby RN  Outcome: Progressing     Problem: Discharge Planning  Goal: Discharge to home or other facility with appropriate resources  4/16/2025 1107 by Loraine Shelyb RN  Outcome: Progressing     Problem: Chronic Conditions and Co-morbidities  Goal: Patient's chronic conditions and co-morbidity symptoms are monitored and maintained or improved  4/16/2025 2208 by Sunny Ferraro RN  Outcome: Progressing  4/16/2025 1107 by Loraine Shelby RN  Outcome: Progressing     Problem: Pain  Goal: Verbalizes/displays adequate comfort level or baseline comfort level  4/16/2025 1107 by Loraine Shelby RN  Outcome: Progressing     Problem: Pain  Goal: Verbalizes/displays adequate comfort level or baseline comfort level  4/16/2025 1107 by Loraine Shelby RN  Outcome: Progressing     Problem: Safety - Adult  Goal: Free from fall injury  4/16/2025 2208 by Sunny Ferraro RN  Outcome: Progressing  4/16/2025 1107 by Loraine Shelby RN  Outcome: Progressing     Problem: ABCDS Injury Assessment  Goal: Absence of physical injury  4/16/2025 1107 by Loraine Shelby RN  Outcome: Progressing     Problem: Skin/Tissue Integrity  Goal: Skin integrity remains intact  Description: 1.  Monitor for areas of redness and/or skin breakdown2.  Assess vascular access sites hourly3.  Every 4-6 hours minimum:  Change oxygen saturation probe site4.  Every 4-6 hours:  If on nasal continuous positive airway pressure, respiratory therapy assess nares and determine need for appliance change or resting period  4/16/2025 2208 by Sunny Ferraro RN  Outcome:

## 2025-04-17 NOTE — CARE COORDINATION
CM needs completed - [facility] @ 1700    CAROLYN Plan: SNF - [facility] (bed ready at/after 5 PM)    Report: [phone]  Room: 315  Transportation: [facility] @ 1700; transport packet on chart  IMM: 2nd IMM signed 4/17/25; copy on chart    Update - 1:46 PM: [facility] requested for 1700; transport packet on chart. CM will remain accessible for consult if additional needs arise prior to d/c.    Update - 1:26 PM: Room assignment detailed above. CM met with pt at bedside & provided overview of the d/c plan; pt agreeable. CM discussed method of transportation to the facility; pt requested BLS transport. Pt informed that his insurance will be billed for trip, however, CM is unable to guarantee 100% coverage for the service. Pt verbalized understanding & maintained request for BLS transportation. 2nd IMM provided; signed copy on chart.    CM contacted pt's sister (Essie Stokes: [phone]), introduced role, & informed her of TRICIA for this afternoon to [facility]. CM provided sister with pt's room assignment at the facility for reference and notified her that his bed will not be available until at/after 5 PM. Sister informed that CM is working to arrange BLS transportation for d/c. Sister agreeable to the d/c plan; no additional questions/concerns identified.    Initial note: Chart reviewed, IDR's completed. MD confirmed that pt is medically stable for d/c this afternoon. CM contacted [facility] admission liaison (Rosita) and confirmed bed availability for this evening. Rosita reported bed will be available at/after 5 PM. Rosita to contact CM back with specific room assignment. Number for report reflected above. CM will meet with pt at bedside to provide final overview of the d/c plan & confirm method of transportation to the facility.       04/17/25 1344   Services At/After Discharge   Transition of Care Consult (CM Consult) SNF   Partner SNF No   Reason Why Partner SNF Not Chosen Location

## 2025-04-17 NOTE — DISCHARGE SUMMARY
which have CHANGED    Details   aspirin 81 MG EC tablet Take 1 tablet by mouth daily  Qty: 90 tablet, Refills: 3      Budeson-Glycopyrrol-Formoterol (BREZTRI AEROSPHERE) 160-9-4.8 MCG/ACT AERO Inhale 2 puffs into the lungs 2 times daily  Qty: 2 each, Refills: 3      gabapentin (NEURONTIN) 300 MG capsule Take 1 capsule by mouth 3 times daily for 30 days. TAKE 2 CAPSULES FOUR TIMES DAILY Max Daily Amount: 900 mg  Qty: 90 capsule, Refills: 0    Associated Diagnoses: Periprosthetic fracture of shaft of femur      lacosamide (VIMPAT) 50 MG TABS tablet Take 1 tablet by mouth in the morning and at bedtime for 30 days. Max Daily Amount: 100 mg  Qty: 60 tablet, Refills: 0    Associated Diagnoses: Seizures (HCC)           CONTINUE these medications which have NOT CHANGED    Details   divalproex (DEPAKOTE) 250 MG DR tablet TAKE 1 TABLET BY MOUTH IN THE MORNING AND 1 TABLET AT BEDTIME  Qty: 60 tablet, Refills: 3    Associated Diagnoses: Localization-related partial epilepsy with complex partial seizures      albuterol (PROVENTIL) (2.5 MG/3ML) 0.083% nebulizer solution Take 3 mLs by nebulization every 4 hours as needed for Wheezing  Qty: 120 each, Refills: 0      albuterol sulfate HFA (VENTOLIN HFA) 108 (90 Base) MCG/ACT inhaler Inhale 2 puffs into the lungs 4 times daily as needed for Wheezing  Qty: 54 g, Refills: 1      vitamin D (ERGOCALCIFEROL) 1.25 MG (70791 UT) CAPS capsule Take 1 capsule by mouth once a week      omeprazole (PRILOSEC) 20 MG delayed release capsule Take 1 capsule by mouth Daily      atorvastatin (LIPITOR) 40 MG tablet Take 1 tablet by mouth daily      azelastine (ASTELIN) 0.1 % nasal spray USE 2 SPRAYS IN EACH NOSTRIL TWICE DAILY      levothyroxine (SYNTHROID) 50 MCG tablet TAKE ONE TABLET BY MOUTH EVERY DAY      montelukast (SINGULAIR) 10 MG tablet Take 1 tablet by mouth nightly           STOP taking these medications       levETIRAcetam (KEPPRA) 1000 MG tablet Comments:   Reason for Stopping:          arrhythmias, serial hs troponins 8 and then 7  PT and OT recommended SNF rehab  Daily MiraLAX and as needed lactulose while on the narcotic   Received a soapsuds enema before discharge    Acute versus subacute fracture of the right inferior pubic ramus POA  Occurred in setting of fall  Seen by orthopedic surgery Olivier Drake  \"-Acute versus subacute fracture of the right inferior pubic ramus on CT scan.  -Okay to weight-bear as tolerated with PT OT.  -No plans for surgical intervention for this.  -Follow-up outpatient as needed.     Dr. Florian aware and agrees.\"  CT report suggested a chronic fracture but orthopedics feel this is more likely acute versus subacute   Symptom control and physical therapy  Physical therapy and Occupational Therapy consults --> SNF  Oxycodone and Robaxin for pain.  Calcitonin given, should kick in in 48 to 72 hours and continue to improve things for up to 6 weeks.     COPD with acute exacerbation (HCC) POA  Chronic respiratory failure on 2 L nasal cannula oxygen at home POA  Duonebs for his COPD  Remains on baseline oxygen  Continue home treatments    Essential hypertension POA  Hyperlipidemia POA  Concern for orthostasis that admission although I do not see it documented   Norvasc 10 mg p.o. daily, doxazosin 2 mg p.o. nightly, Lasix 20 mg p.o. daily at home   All held at admission   Blood pressure generally 130s over 60s for the past 48 hours     Will continue to hold medications at discharge, resume if blood pressures consistently greater than 140/90    Would add back 1 at a time perhaps starting with Norvasc    Watch for orthostasis while adding back  Continue statin with prior stroke    Prior stroke POA, continue aspirin 81 mg daily and statin    Hypothyroidism on levothyroxine  TSH 1.61    Code Status: full code  DVT Prophylaxis: lovenox  Baseline: lives alone in house behind his sister house      Subjective:     Chief Complaint / Reason for Physician Visit  \" Patient had an worsening

## 2025-04-17 NOTE — PROGRESS NOTES
Hospitalist Progress Note    NAME:   Rodolfo Delcid   : 1955   MRN: [id number]     Date/Time: 2025 11:23 PM  Patient PCP: Lukasz Awad DO    Estimated discharge date:    Barriers:       Assessment / Plan:    Syncope and collapse POA  ? Seizure vs orthostatic hypotension vs vasovagal POA  Hospital delirium POA confusion in the hospital  \"Passed out and ended up on the floor\"  Lives alone  Back and pelvic pain after the fall, came to ED  Head CT IMPRESSION:  Chronic encephalomalacia in the left parietal and occipital lobe.   No acute intracranial abnormality  C-spine CT scan IMPRESSION:  Multilevel spondylosis. No acute abnormality.  CT chest IMPRESSION:  No acute pathology.   Background of emphysema with chronic volume loss and bronchiectasis in the right lower lobe  CT abdomen pelvis IMPRESSION:  Diffuse bone demineralization and beam hardening artifact limits evaluation.     Chronic right inferior pubic rami fracture with cortical irregularity anteriorly  indeterminate for chronic remodeling versus nondisplaced acute on chronic  fracture. MRI pelvis may be helpful for further evaluation.  Unclear what caused the syncope and collapse leading to the pelvic fracture  Previous colic documented \"severe orthostasis\"  I cannot find this documented anywhere in the chart  I reviewed PT and OT notes, looked at all the vital sign sheets  Seen by [facility] neurology Dr Mello  \"Episode of loss of consciousness: Unclear if patient had a seizure and may have caused to fall.    Prior history of noncompliance reported.  - Will decrease lacosamide to 50 mg twice daily .  This can be further managed in the outpatient setting after discharge.  - Continue Depakote 250 mg twice daily  - Continue to correct any metabolic abnormality  - Continue to optimize nutritional status  - Valproic acid level was low at 37.  Given elevated ammonia, will not make changes on his Depakote.  - Lacosamide level:  pending  - No

## 2025-04-17 NOTE — DISCHARGE INSTRUCTIONS
HOSPITALIST DISCHARGE INSTRUCTIONS    NAME: Rodolfo Delcid   :  1955   MRN:  [id number]     Date/Time:  2025 4:35 PM    ADMIT DATE: 2025     DISCHARGE DATE: 2025     Attending Physician: Sunny Clement Jr, MD    Medications: Per above medication reconciliation.    Pain Management: per above medications    Recommended diet: regular diet    Recommended activity: activity as tolerated    Wound care: None    Indwelling devices:  None    Supplemental Oxygen: Chronic Oxygen,  2  LNC at baseline    Required Lab work: Per SNF routine    Glucose management:  None    Code status: Full code       Blood pressure medicines currently on hold for reported orthostasis in the hospital   Blood pressures generally running 130/60 for the past 48 hours off medications     If blood pressure consistently greater than 140/80      Could consider adding back BP meds 1 at a time watching for orthostasis    Patient had an unresponsive episode of unclear etiology, may have been seizure     Per neurology recommendations, 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

## 2025-04-18 LAB — LACOSAMIDE SERPL-MCNC: 10.5 UG/ML (ref 5–10)

## 2025-04-21 ENCOUNTER — TELEPHONE (OUTPATIENT)
Age: 70
End: 2025-04-21

## 2025-05-05 ENCOUNTER — APPOINTMENT (OUTPATIENT)
Facility: HOSPITAL | Age: 70
DRG: 190 | End: 2025-05-05
Payer: MEDICARE

## 2025-05-05 ENCOUNTER — HOSPITAL ENCOUNTER (INPATIENT)
Facility: HOSPITAL | Age: 70
LOS: 4 days | Discharge: HOME | DRG: 190 | End: 2025-05-09
Attending: STUDENT IN AN ORGANIZED HEALTH CARE EDUCATION/TRAINING PROGRAM | Admitting: INTERNAL MEDICINE
Payer: MEDICARE

## 2025-05-05 DIAGNOSIS — J44.1 COPD EXACERBATION (HCC): Primary | ICD-10-CM

## 2025-05-05 DIAGNOSIS — R06.02 SHORTNESS OF BREATH: ICD-10-CM

## 2025-05-05 DIAGNOSIS — J18.9 PNEUMONIA DUE TO INFECTIOUS ORGANISM, UNSPECIFIED LATERALITY, UNSPECIFIED PART OF LUNG: ICD-10-CM

## 2025-05-05 LAB
ALBUMIN SERPL-MCNC: 2.8 G/DL (ref 3.5–5)
ALBUMIN/GLOB SERPL: 0.6 (ref 1.1–2.2)
ALP SERPL-CCNC: 216 U/L (ref 45–117)
ALT SERPL-CCNC: 18 U/L (ref 12–78)
ANION GAP SERPL CALC-SCNC: 5 MMOL/L (ref 2–12)
ARTERIAL PATENCY WRIST A: POSITIVE
AST SERPL-CCNC: 13 U/L (ref 15–37)
BASE EXCESS BLD CALC-SCNC: 5.7 MMOL/L
BASOPHILS # BLD: 0.04 K/UL (ref 0–0.1)
BASOPHILS NFR BLD: 0.6 % (ref 0–1)
BDY SITE: ABNORMAL
BILIRUB SERPL-MCNC: 1.3 MG/DL (ref 0.2–1)
BUN SERPL-MCNC: 13 MG/DL (ref 6–20)
BUN/CREAT SERPL: 18 (ref 12–20)
CALCIUM SERPL-MCNC: 9 MG/DL (ref 8.5–10.1)
CHLORIDE SERPL-SCNC: 96 MMOL/L (ref 97–108)
CO2 SERPL-SCNC: 30 MMOL/L (ref 21–32)
CREAT SERPL-MCNC: 0.71 MG/DL (ref 0.7–1.3)
DIFFERENTIAL METHOD BLD: ABNORMAL
EKG ATRIAL RATE: 92 BPM
EKG DIAGNOSIS: NORMAL
EKG P AXIS: 90 DEGREES
EKG P-R INTERVAL: 152 MS
EKG Q-T INTERVAL: 356 MS
EKG QRS DURATION: 94 MS
EKG QTC CALCULATION (BAZETT): 440 MS
EKG R AXIS: 69 DEGREES
EKG T AXIS: -8 DEGREES
EKG VENTRICULAR RATE: 92 BPM
EOSINOPHIL # BLD: 0.03 K/UL (ref 0–0.4)
EOSINOPHIL NFR BLD: 0.5 % (ref 0–7)
ERYTHROCYTE [DISTWIDTH] IN BLOOD BY AUTOMATED COUNT: 14.2 % (ref 11.5–14.5)
GLOBULIN SER CALC-MCNC: 4.4 G/DL (ref 2–4)
GLUCOSE SERPL-MCNC: 103 MG/DL (ref 65–100)
HCO3 BLD-SCNC: 31.2 MMOL/L (ref 21–28)
HCT VFR BLD AUTO: 38.8 % (ref 36.6–50.3)
HGB BLD-MCNC: 12.4 G/DL (ref 12.1–17)
IMM GRANULOCYTES # BLD AUTO: 0.02 K/UL (ref 0–0.04)
IMM GRANULOCYTES NFR BLD AUTO: 0.3 % (ref 0–0.5)
LYMPHOCYTES # BLD: 0.84 K/UL (ref 0.8–3.5)
LYMPHOCYTES NFR BLD: 13.5 % (ref 12–49)
MAGNESIUM SERPL-MCNC: 1.7 MG/DL (ref 1.6–2.4)
MCH RBC QN AUTO: 29.1 PG (ref 26–34)
MCHC RBC AUTO-ENTMCNC: 32 G/DL (ref 30–36.5)
MCV RBC AUTO: 91.1 FL (ref 80–99)
MONOCYTES # BLD: 1.2 K/UL (ref 0–1)
MONOCYTES NFR BLD: 19.3 % (ref 5–13)
NEUTS SEG # BLD: 4.1 K/UL (ref 1.8–8)
NEUTS SEG NFR BLD: 65.8 % (ref 32–75)
NRBC # BLD: 0 K/UL (ref 0–0.01)
NRBC BLD-RTO: 0 PER 100 WBC
NT PRO BNP: 722 PG/ML
PCO2 BLD: 48.2 MMHG (ref 35–48)
PH BLD: 7.42 (ref 7.35–7.45)
PLATELET # BLD AUTO: 439 K/UL (ref 150–400)
PMV BLD AUTO: 8.4 FL (ref 8.9–12.9)
PO2 BLD: 74 MMHG (ref 83–108)
POTASSIUM SERPL-SCNC: 4.1 MMOL/L (ref 3.5–5.1)
PROT SERPL-MCNC: 7.2 G/DL (ref 6.4–8.2)
RBC # BLD AUTO: 4.26 M/UL (ref 4.1–5.7)
SAO2 % BLD: 94.8 % (ref 92–97)
SODIUM SERPL-SCNC: 131 MMOL/L (ref 136–145)
SPECIMEN TYPE: ABNORMAL
TROPONIN I SERPL HS-MCNC: 5 NG/L (ref 0–76)
WBC # BLD AUTO: 6.2 K/UL (ref 4.1–11.1)

## 2025-05-05 PROCEDURE — 6360000002 HC RX W HCPCS: Performed by: STUDENT IN AN ORGANIZED HEALTH CARE EDUCATION/TRAINING PROGRAM

## 2025-05-05 PROCEDURE — 96375 TX/PRO/DX INJ NEW DRUG ADDON: CPT

## 2025-05-05 PROCEDURE — 6370000000 HC RX 637 (ALT 250 FOR IP): Performed by: INTERNAL MEDICINE

## 2025-05-05 PROCEDURE — 2500000003 HC RX 250 WO HCPCS: Performed by: INTERNAL MEDICINE

## 2025-05-05 PROCEDURE — 99285 EMERGENCY DEPT VISIT HI MDM: CPT

## 2025-05-05 PROCEDURE — 1100000000 HC RM PRIVATE

## 2025-05-05 PROCEDURE — 94640 AIRWAY INHALATION TREATMENT: CPT

## 2025-05-05 PROCEDURE — 80053 COMPREHEN METABOLIC PANEL: CPT

## 2025-05-05 PROCEDURE — 83735 ASSAY OF MAGNESIUM: CPT

## 2025-05-05 PROCEDURE — 36600 WITHDRAWAL OF ARTERIAL BLOOD: CPT

## 2025-05-05 PROCEDURE — 6370000000 HC RX 637 (ALT 250 FOR IP): Performed by: STUDENT IN AN ORGANIZED HEALTH CARE EDUCATION/TRAINING PROGRAM

## 2025-05-05 PROCEDURE — 71045 X-RAY EXAM CHEST 1 VIEW: CPT

## 2025-05-05 PROCEDURE — 71260 CT THORAX DX C+: CPT

## 2025-05-05 PROCEDURE — 6360000002 HC RX W HCPCS: Performed by: INTERNAL MEDICINE

## 2025-05-05 PROCEDURE — 94761 N-INVAS EAR/PLS OXIMETRY MLT: CPT

## 2025-05-05 PROCEDURE — 84484 ASSAY OF TROPONIN QUANT: CPT

## 2025-05-05 PROCEDURE — 96365 THER/PROPH/DIAG IV INF INIT: CPT

## 2025-05-05 PROCEDURE — 83880 ASSAY OF NATRIURETIC PEPTIDE: CPT

## 2025-05-05 PROCEDURE — 93005 ELECTROCARDIOGRAM TRACING: CPT | Performed by: STUDENT IN AN ORGANIZED HEALTH CARE EDUCATION/TRAINING PROGRAM

## 2025-05-05 PROCEDURE — 2580000003 HC RX 258: Performed by: STUDENT IN AN ORGANIZED HEALTH CARE EDUCATION/TRAINING PROGRAM

## 2025-05-05 PROCEDURE — 82803 BLOOD GASES ANY COMBINATION: CPT

## 2025-05-05 PROCEDURE — 2500000003 HC RX 250 WO HCPCS: Performed by: STUDENT IN AN ORGANIZED HEALTH CARE EDUCATION/TRAINING PROGRAM

## 2025-05-05 PROCEDURE — 85025 COMPLETE CBC W/AUTO DIFF WBC: CPT

## 2025-05-05 PROCEDURE — 6360000004 HC RX CONTRAST MEDICATION: Performed by: STUDENT IN AN ORGANIZED HEALTH CARE EDUCATION/TRAINING PROGRAM

## 2025-05-05 RX ORDER — DOXAZOSIN 4 MG/1
4 TABLET ORAL NIGHTLY
COMMUNITY

## 2025-05-05 RX ORDER — ACETAMINOPHEN 650 MG/1
650 SUPPOSITORY RECTAL EVERY 6 HOURS PRN
Status: DISCONTINUED | OUTPATIENT
Start: 2025-05-05 | End: 2025-05-09 | Stop reason: HOSPADM

## 2025-05-05 RX ORDER — ATORVASTATIN CALCIUM 40 MG/1
40 TABLET, FILM COATED ORAL DAILY
Status: DISCONTINUED | OUTPATIENT
Start: 2025-05-06 | End: 2025-05-09 | Stop reason: HOSPADM

## 2025-05-05 RX ORDER — DIVALPROEX SODIUM 250 MG/1
250 TABLET, DELAYED RELEASE ORAL EVERY 12 HOURS SCHEDULED
Status: DISCONTINUED | OUTPATIENT
Start: 2025-05-05 | End: 2025-05-09 | Stop reason: HOSPADM

## 2025-05-05 RX ORDER — SODIUM CHLORIDE 0.9 % (FLUSH) 0.9 %
5-40 SYRINGE (ML) INJECTION EVERY 12 HOURS SCHEDULED
Status: DISCONTINUED | OUTPATIENT
Start: 2025-05-05 | End: 2025-05-09 | Stop reason: HOSPADM

## 2025-05-05 RX ORDER — PANTOPRAZOLE SODIUM 40 MG/1
40 TABLET, DELAYED RELEASE ORAL
Status: DISCONTINUED | OUTPATIENT
Start: 2025-05-06 | End: 2025-05-09 | Stop reason: HOSPADM

## 2025-05-05 RX ORDER — HYDRALAZINE HYDROCHLORIDE 20 MG/ML
10 INJECTION INTRAMUSCULAR; INTRAVENOUS EVERY 6 HOURS PRN
Status: DISCONTINUED | OUTPATIENT
Start: 2025-05-05 | End: 2025-05-09 | Stop reason: HOSPADM

## 2025-05-05 RX ORDER — BUDESONIDE 0.5 MG/2ML
0.5 INHALANT ORAL
Status: DISCONTINUED | OUTPATIENT
Start: 2025-05-05 | End: 2025-05-09 | Stop reason: HOSPADM

## 2025-05-05 RX ORDER — MONTELUKAST SODIUM 10 MG/1
10 TABLET ORAL NIGHTLY
Status: DISCONTINUED | OUTPATIENT
Start: 2025-05-05 | End: 2025-05-09 | Stop reason: HOSPADM

## 2025-05-05 RX ORDER — TRAMADOL HYDROCHLORIDE 50 MG/1
25 TABLET ORAL EVERY 6 HOURS PRN
Status: DISCONTINUED | OUTPATIENT
Start: 2025-05-05 | End: 2025-05-09 | Stop reason: HOSPADM

## 2025-05-05 RX ORDER — ONDANSETRON 4 MG/1
4 TABLET, ORALLY DISINTEGRATING ORAL EVERY 8 HOURS PRN
Status: DISCONTINUED | OUTPATIENT
Start: 2025-05-05 | End: 2025-05-09 | Stop reason: HOSPADM

## 2025-05-05 RX ORDER — ONDANSETRON 2 MG/ML
4 INJECTION INTRAMUSCULAR; INTRAVENOUS EVERY 6 HOURS PRN
Status: DISCONTINUED | OUTPATIENT
Start: 2025-05-05 | End: 2025-05-09 | Stop reason: HOSPADM

## 2025-05-05 RX ORDER — SODIUM CHLORIDE 9 MG/ML
INJECTION, SOLUTION INTRAVENOUS PRN
Status: DISCONTINUED | OUTPATIENT
Start: 2025-05-05 | End: 2025-05-09 | Stop reason: HOSPADM

## 2025-05-05 RX ORDER — IOPAMIDOL 755 MG/ML
100 INJECTION, SOLUTION INTRAVASCULAR
Status: COMPLETED | OUTPATIENT
Start: 2025-05-05 | End: 2025-05-05

## 2025-05-05 RX ORDER — FAMOTIDINE 20 MG/1
20 TABLET, FILM COATED ORAL DAILY
Status: DISCONTINUED | OUTPATIENT
Start: 2025-05-05 | End: 2025-05-09 | Stop reason: HOSPADM

## 2025-05-05 RX ORDER — IPRATROPIUM BROMIDE AND ALBUTEROL SULFATE 2.5; .5 MG/3ML; MG/3ML
1 SOLUTION RESPIRATORY (INHALATION)
Status: COMPLETED | OUTPATIENT
Start: 2025-05-05 | End: 2025-05-05

## 2025-05-05 RX ORDER — METHYLPREDNISOLONE SODIUM SUCCINATE 125 MG/2ML
125 INJECTION INTRAMUSCULAR; INTRAVENOUS ONCE
Status: COMPLETED | OUTPATIENT
Start: 2025-05-05 | End: 2025-05-05

## 2025-05-05 RX ORDER — AMLODIPINE BESYLATE 10 MG/1
10 TABLET ORAL DAILY
COMMUNITY

## 2025-05-05 RX ORDER — CELECOXIB 200 MG/1
200 CAPSULE ORAL DAILY
COMMUNITY

## 2025-05-05 RX ORDER — IPRATROPIUM BROMIDE AND ALBUTEROL SULFATE 2.5; .5 MG/3ML; MG/3ML
1 SOLUTION RESPIRATORY (INHALATION)
Status: DISCONTINUED | OUTPATIENT
Start: 2025-05-05 | End: 2025-05-09 | Stop reason: HOSPADM

## 2025-05-05 RX ORDER — ACETAMINOPHEN 500 MG
1000 TABLET ORAL
Status: COMPLETED | OUTPATIENT
Start: 2025-05-05 | End: 2025-05-05

## 2025-05-05 RX ORDER — ENOXAPARIN SODIUM 100 MG/ML
40 INJECTION SUBCUTANEOUS EVERY 24 HOURS
Status: DISCONTINUED | OUTPATIENT
Start: 2025-05-05 | End: 2025-05-09 | Stop reason: HOSPADM

## 2025-05-05 RX ORDER — CALCIUM CARBONATE 500 MG/1
500 TABLET, CHEWABLE ORAL 2 TIMES DAILY
Status: DISCONTINUED | OUTPATIENT
Start: 2025-05-05 | End: 2025-05-09 | Stop reason: HOSPADM

## 2025-05-05 RX ORDER — ARFORMOTEROL TARTRATE 15 UG/2ML
15 SOLUTION RESPIRATORY (INHALATION)
Status: DISCONTINUED | OUTPATIENT
Start: 2025-05-05 | End: 2025-05-09 | Stop reason: HOSPADM

## 2025-05-05 RX ORDER — POTASSIUM CHLORIDE 1500 MG/1
40 TABLET, EXTENDED RELEASE ORAL PRN
Status: DISCONTINUED | OUTPATIENT
Start: 2025-05-05 | End: 2025-05-09 | Stop reason: HOSPADM

## 2025-05-05 RX ORDER — CITALOPRAM HYDROBROMIDE 20 MG/1
20 TABLET ORAL DAILY
COMMUNITY

## 2025-05-05 RX ORDER — AMLODIPINE BESYLATE 5 MG/1
10 TABLET ORAL DAILY
Status: DISCONTINUED | OUTPATIENT
Start: 2025-05-06 | End: 2025-05-09 | Stop reason: HOSPADM

## 2025-05-05 RX ORDER — DOXAZOSIN 2 MG/1
4 TABLET ORAL NIGHTLY
Status: DISCONTINUED | OUTPATIENT
Start: 2025-05-05 | End: 2025-05-09 | Stop reason: HOSPADM

## 2025-05-05 RX ORDER — ASPIRIN 81 MG/1
81 TABLET ORAL DAILY
Status: DISCONTINUED | OUTPATIENT
Start: 2025-05-05 | End: 2025-05-09 | Stop reason: HOSPADM

## 2025-05-05 RX ORDER — CELECOXIB 100 MG/1
200 CAPSULE ORAL DAILY
Status: DISCONTINUED | OUTPATIENT
Start: 2025-05-06 | End: 2025-05-09 | Stop reason: HOSPADM

## 2025-05-05 RX ORDER — POLYETHYLENE GLYCOL 3350 17 G/17G
17 POWDER, FOR SOLUTION ORAL DAILY
Status: DISCONTINUED | OUTPATIENT
Start: 2025-05-05 | End: 2025-05-09 | Stop reason: HOSPADM

## 2025-05-05 RX ORDER — GABAPENTIN 300 MG/1
300 CAPSULE ORAL 3 TIMES DAILY
Status: DISCONTINUED | OUTPATIENT
Start: 2025-05-05 | End: 2025-05-09 | Stop reason: HOSPADM

## 2025-05-05 RX ORDER — SODIUM CHLORIDE 0.9 % (FLUSH) 0.9 %
5-40 SYRINGE (ML) INJECTION PRN
Status: DISCONTINUED | OUTPATIENT
Start: 2025-05-05 | End: 2025-05-09 | Stop reason: HOSPADM

## 2025-05-05 RX ORDER — GUAIFENESIN 200 MG/10ML
200 LIQUID ORAL EVERY 4 HOURS PRN
Status: DISCONTINUED | OUTPATIENT
Start: 2025-05-05 | End: 2025-05-09 | Stop reason: HOSPADM

## 2025-05-05 RX ORDER — CITALOPRAM HYDROBROMIDE 20 MG/1
20 TABLET ORAL DAILY
Status: DISCONTINUED | OUTPATIENT
Start: 2025-05-06 | End: 2025-05-09 | Stop reason: HOSPADM

## 2025-05-05 RX ORDER — POLYETHYLENE GLYCOL 3350 17 G/17G
17 POWDER, FOR SOLUTION ORAL DAILY PRN
Status: DISCONTINUED | OUTPATIENT
Start: 2025-05-05 | End: 2025-05-09 | Stop reason: HOSPADM

## 2025-05-05 RX ORDER — POTASSIUM CHLORIDE 7.45 MG/ML
10 INJECTION INTRAVENOUS PRN
Status: DISCONTINUED | OUTPATIENT
Start: 2025-05-05 | End: 2025-05-09 | Stop reason: HOSPADM

## 2025-05-05 RX ORDER — KETOCONAZOLE 20 MG/ML
SHAMPOO, SUSPENSION TOPICAL
COMMUNITY

## 2025-05-05 RX ORDER — FAMOTIDINE 20 MG/1
20 TABLET, FILM COATED ORAL DAILY
COMMUNITY

## 2025-05-05 RX ORDER — FUROSEMIDE 20 MG/1
20 TABLET ORAL DAILY
Status: DISCONTINUED | OUTPATIENT
Start: 2025-05-05 | End: 2025-05-09 | Stop reason: HOSPADM

## 2025-05-05 RX ORDER — ACETAMINOPHEN 325 MG/1
650 TABLET ORAL EVERY 6 HOURS PRN
Status: DISCONTINUED | OUTPATIENT
Start: 2025-05-05 | End: 2025-05-09 | Stop reason: HOSPADM

## 2025-05-05 RX ORDER — MAGNESIUM SULFATE IN WATER 40 MG/ML
2000 INJECTION, SOLUTION INTRAVENOUS PRN
Status: DISCONTINUED | OUTPATIENT
Start: 2025-05-05 | End: 2025-05-09 | Stop reason: HOSPADM

## 2025-05-05 RX ORDER — FUROSEMIDE 20 MG/1
20 TABLET ORAL DAILY
COMMUNITY

## 2025-05-05 RX ADMIN — CALCIUM CARBONATE (ANTACID) CHEW TAB 500 MG 500 MG: 500 CHEW TAB at 21:35

## 2025-05-05 RX ADMIN — DOXAZOSIN 4 MG: 2 TABLET ORAL at 21:35

## 2025-05-05 RX ADMIN — IOPAMIDOL 100 ML: 755 INJECTION, SOLUTION INTRAVENOUS at 15:24

## 2025-05-05 RX ADMIN — IPRATROPIUM BROMIDE AND ALBUTEROL SULFATE 1 DOSE: .5; 2.5 SOLUTION RESPIRATORY (INHALATION) at 12:52

## 2025-05-05 RX ADMIN — IPRATROPIUM BROMIDE AND ALBUTEROL SULFATE 1 DOSE: .5; 2.5 SOLUTION RESPIRATORY (INHALATION) at 19:44

## 2025-05-05 RX ADMIN — ARFORMOTEROL TARTRATE 15 MCG: 15 SOLUTION RESPIRATORY (INHALATION) at 19:49

## 2025-05-05 RX ADMIN — METHYLPREDNISOLONE SODIUM SUCCINATE 125 MG: 125 INJECTION INTRAMUSCULAR; INTRAVENOUS at 12:42

## 2025-05-05 RX ADMIN — GABAPENTIN 300 MG: 300 CAPSULE ORAL at 21:36

## 2025-05-05 RX ADMIN — FAMOTIDINE 20 MG: 20 TABLET, FILM COATED ORAL at 19:26

## 2025-05-05 RX ADMIN — POLYETHYLENE GLYCOL 3350 17 G: 17 POWDER, FOR SOLUTION ORAL at 19:26

## 2025-05-05 RX ADMIN — AZITHROMYCIN MONOHYDRATE 500 MG: 500 INJECTION, POWDER, LYOPHILIZED, FOR SOLUTION INTRAVENOUS at 12:46

## 2025-05-05 RX ADMIN — IPRATROPIUM BROMIDE AND ALBUTEROL SULFATE 1 DOSE: .5; 3 SOLUTION RESPIRATORY (INHALATION) at 12:48

## 2025-05-05 RX ADMIN — BUDESONIDE 500 MCG: 0.5 INHALANT RESPIRATORY (INHALATION) at 19:49

## 2025-05-05 RX ADMIN — SODIUM CHLORIDE, PRESERVATIVE FREE 10 ML: 5 INJECTION INTRAVENOUS at 21:37

## 2025-05-05 RX ADMIN — ASPIRIN 81 MG: 81 TABLET, COATED ORAL at 19:26

## 2025-05-05 RX ADMIN — IPRATROPIUM BROMIDE AND ALBUTEROL SULFATE 1 DOSE: .5; 3 SOLUTION RESPIRATORY (INHALATION) at 14:12

## 2025-05-05 RX ADMIN — WATER 1000 MG: 1 INJECTION INTRAMUSCULAR; INTRAVENOUS; SUBCUTANEOUS at 12:40

## 2025-05-05 RX ADMIN — ACETAMINOPHEN 1000 MG: 500 TABLET ORAL at 14:31

## 2025-05-05 RX ADMIN — FUROSEMIDE 20 MG: 20 TABLET ORAL at 19:26

## 2025-05-05 RX ADMIN — MONTELUKAST 10 MG: 10 TABLET, FILM COATED ORAL at 21:35

## 2025-05-05 RX ADMIN — METHYLPREDNISOLONE SODIUM SUCCINATE 40 MG: 40 INJECTION, POWDER, LYOPHILIZED, FOR SOLUTION INTRAMUSCULAR; INTRAVENOUS at 21:35

## 2025-05-05 RX ADMIN — DIVALPROEX SODIUM 250 MG: 250 TABLET, DELAYED RELEASE ORAL at 21:35

## 2025-05-05 RX ADMIN — ENOXAPARIN SODIUM 40 MG: 100 INJECTION SUBCUTANEOUS at 19:26

## 2025-05-05 ASSESSMENT — PAIN - FUNCTIONAL ASSESSMENT: PAIN_FUNCTIONAL_ASSESSMENT: 0-10

## 2025-05-05 ASSESSMENT — PAIN DESCRIPTION - DESCRIPTORS
DESCRIPTORS: ACHING
DESCRIPTORS: DULL

## 2025-05-05 ASSESSMENT — PAIN DESCRIPTION - LOCATION
LOCATION: HEAD
LOCATION: CHEST

## 2025-05-05 ASSESSMENT — PAIN DESCRIPTION - ORIENTATION: ORIENTATION: ANTERIOR

## 2025-05-05 ASSESSMENT — PAIN SCALES - GENERAL
PAINLEVEL_OUTOF10: 5
PAINLEVEL_OUTOF10: 5

## 2025-05-05 NOTE — H&P
fracture of the right superior pubic ramus and irregularity of the right inferior pubic ramus. Consider cross-sectional imaging for further assessment. Electronically signed by Todd Castano    CT CHEST WO CONTRAST  Result Date: 4/13/2025  INDICATION: Shortness of breath and injury COMPARISON: 10/30/2024 CONTRAST: None. TECHNIQUE:  5 mm axial images were obtained through the chest. Coronal and sagittal reformats were generated.  CT dose reduction was achieved through use of a standardized protocol tailored for this examination and automatic exposure control for dose modulation. The absence of intravenous contrast reduces the sensitivity for evaluation of the mediastinum, quinn, vasculature, and upper abdominal organs. FINDINGS: CHEST WALL: No mass or axillary lymphadenopathy. MEDIASTINUM: No mass or lymphadenopathy. QUINN: No mass or lymphadenopathy. THORACIC AORTA: No aneurysm. MAIN PULMONARY ARTERY: Normal in caliber. TRACHEA/BRONCHI: Patent. ESOPHAGUS: No wall thickening or dilatation. HEART: Normal in size. Coronary artery calcium: absent PLEURA: No effusion or pneumothorax. LUNGS: Emphysema. Focal volume loss and bronchiectasis in the right lower lobe unchanged INCIDENTALLY IMAGED UPPER ABDOMEN: No significant abnormality in the incidentally imaged upper abdomen. BONES: No destructive bone lesion.     No acute pathology. Background of emphysema with chronic volume loss and bronchiectasis in the right lower lobe Electronically signed by Todd Castano    CT HEAD WO CONTRAST  Result Date: 4/13/2025  EXAM: CT HEAD WO CONTRAST INDICATION: Injury, HA COMPARISON: None. CONTRAST: None. TECHNIQUE: Unenhanced CT of the head was performed using 5 mm images. Brain and bone windows were generated. Coronal and sagittal reformats. CT dose reduction was achieved through use of a standardized protocol tailored for this examination and automatic exposure control for dose modulation.  FINDINGS: The ventricles and sulci are normal  neural foraminal stenosis.     Multilevel spondylosis. No acute abnormality. Electronically signed by LAXMI GONZALES     _______________________________________________________________________    TOTAL TIME:  76 Minutes    Critical Care Provided     Minutes non procedure based    Signed: Yfn Alvarado MD    Procedures: see electronic medical records for all procedures/Xrays and details which were not copied into this note but were reviewed prior to creation of Plan.

## 2025-05-05 NOTE — ED PROVIDER NOTES
[facility] EMERGENCY DEPT  EMERGENCY DEPARTMENT HISTORY AND PHYSICAL EXAM      Date of evaluation: 5/5/2025  Patient Name: Rodolfo Delcid  Birthdate 1955  MRN: [id number]  ED Provider: Hay Aviles MD   Note Started: 1:36 PM EDT 5/5/25    HISTORY OF PRESENT ILLNESS     Chief Complaint   Patient presents with   • Shortness of Breath     BIBEMS from home for SOB. Given due neb in Route, O2 sats improved.  Wheezing bilaterally. COPD, HTN, MI, Stroke. Pt reports non prod cough, chest pain. Denies fevers, NVD, belly pain. Had to increase O2  OM        History Provided By: Patient    HPI: Rodolfo Delcid is a 70 y.o. male PMH as below including COPD on 2 L nasal cannula presenting with worsening shortness of breath and difficulty breathing.  He reports nonproductive cough as well as chest tightness/chest pain.  He does have a history of CAD.  He denies any fevers, nausea, vomiting, abdominal pain.    PAST MEDICAL HISTORY   Past Medical History:  Past Medical History:   Diagnosis Date   • Arthritis     Hips, Knees   • CAD (coronary artery disease)     MI around 1990   • COPD (chronic obstructive pulmonary disease) (HCC)    • Hepatitis A    • High cholesterol    • Hypertension    • Other ill-defined conditions(799.89)     Light sensitivity, causes seizures   • Seizures (HCC)     Last seizure 12/2008/work -up in 2012 -possible seizure   • Stroke (HCC) 2005   • Thyroid disease     Hypothyroid       Past Surgical History:  Past Surgical History:   Procedure Laterality Date   • CARDIAC CATHETERIZATION  20 years ago    no stents   • HERNIA REPAIR  10/8/14    left inguinal hernia- Pawan Hartman MD   • ORTHOPEDIC SURGERY  2/2010    [facility]. Total Hip Replacement/Dr. Borrero   • ORTHOPEDIC SURGERY      Left Knee Scope   • ORTHOPEDIC SURGERY  6/9/14    R hip replacement    • ORTHOPEDIC SURGERY Right     Shoulder fracture & surgical repair with hardware   • REVISION TOTAL HIP ARTHROPLASTY Left 5/17/2023    LEFT HIP TOTAL ARTHROPLASTY

## 2025-05-05 NOTE — PROGRESS NOTES
Pharmacy Medication History Review    Medication history obtained by Deb Sutherland while patient was in room ER28/28 and was completed based on information available during current patient encounter. Medication history was completed before home meds were reconciled by provider.    Pharmacist Admission Medication Reconciliation Recommendations:            PTA medication list was corrected to the following:   Prior to Admission Medications   Prescriptions Last Dose Informant   Budeson-Glycopyrrol-Formoterol (BREZTRI AEROSPHERE) 160-9-4.8 MCG/ACT AERO 5/5/2025 Morning Self   Sig: Inhale 2 puffs into the lungs 2 times daily   acetaminophen (TYLENOL) 325 MG tablet Unknown Self   Sig: Take 2 tablets by mouth every 6 hours as needed for Pain or Fever   albuterol (PROVENTIL) (2.5 MG/3ML) 0.083% nebulizer solution 5/5/2025 Morning Self   Sig: Take 3 mLs by nebulization every 4 hours as needed for Wheezing   albuterol sulfate HFA (VENTOLIN HFA) 108 (90 Base) MCG/ACT inhaler 5/5/2025 Self   Sig: Inhale 2 puffs into the lungs 4 times daily as needed for Wheezing   amLODIPine (NORVASC) 10 MG tablet 5/5/2025 Morning Self   Sig: Take 1 tablet by mouth daily   aspirin 81 MG EC tablet 5/4/2025 Self   Sig: Take 1 tablet by mouth daily   atorvastatin (LIPITOR) 40 MG tablet 5/5/2025 Morning Self   Sig: Take 1 tablet by mouth daily   azelastine (ASTELIN) 0.1 % nasal spray 5/5/2025 Morning Self   Sig: USE 2 SPRAYS IN EACH NOSTRIL TWICE DAILY   calcium carbonate (TUMS) 500 MG chewable tablet 5/5/2025 Morning Self   Sig: Take 1 tablet by mouth in the morning and at bedtime   celecoxib (CELEBREX) 200 MG capsule 5/5/2025 Morning Self   Sig: Take 1 capsule by mouth daily   citalopram (CELEXA) 20 MG tablet 5/5/2025 Morning Self   Sig: Take 1 tablet by mouth daily   divalproex (DEPAKOTE) 250 MG DR tablet 5/4/2025 Self   Sig: TAKE 1 TABLET BY MOUTH IN THE MORNING AND 1 TABLET AT BEDTIME   doxazosin (CARDURA) 4 MG tablet 5/4/2025 Self   Sig:

## 2025-05-06 ENCOUNTER — APPOINTMENT (OUTPATIENT)
Facility: HOSPITAL | Age: 70
DRG: 190 | End: 2025-05-06
Payer: MEDICARE

## 2025-05-06 LAB
ANION GAP SERPL CALC-SCNC: 4 MMOL/L (ref 2–12)
ARTERIAL PATENCY WRIST A: YES
B PERT DNA SPEC QL NAA+PROBE: NOT DETECTED
BASE EXCESS BLDA CALC-SCNC: 5.8 MMOL/L
BASOPHILS # BLD: 0 K/UL (ref 0–0.1)
BASOPHILS NFR BLD: 0 % (ref 0–1)
BDY SITE: ABNORMAL
BORDETELLA PARAPERTUSSIS BY PCR: NOT DETECTED
BUN SERPL-MCNC: 14 MG/DL (ref 6–20)
BUN/CREAT SERPL: 33 (ref 12–20)
C PNEUM DNA SPEC QL NAA+PROBE: NOT DETECTED
CALCIUM SERPL-MCNC: 9.3 MG/DL (ref 8.5–10.1)
CHLORIDE SERPL-SCNC: 99 MMOL/L (ref 97–108)
CO2 SERPL-SCNC: 31 MMOL/L (ref 21–32)
CREAT SERPL-MCNC: 0.43 MG/DL (ref 0.7–1.3)
DIFFERENTIAL METHOD BLD: ABNORMAL
EOSINOPHIL # BLD: 0 K/UL (ref 0–0.4)
EOSINOPHIL NFR BLD: 0 % (ref 0–7)
ERYTHROCYTE [DISTWIDTH] IN BLOOD BY AUTOMATED COUNT: 14.1 % (ref 11.5–14.5)
FLUAV SUBTYP SPEC NAA+PROBE: NOT DETECTED
FLUBV RNA SPEC QL NAA+PROBE: NOT DETECTED
GAS FLOW.O2 O2 DELIVERY SYS: 3 L/MIN
GLUCOSE BLD STRIP.AUTO-MCNC: 138 MG/DL (ref 65–117)
GLUCOSE SERPL-MCNC: 128 MG/DL (ref 65–100)
HADV DNA SPEC QL NAA+PROBE: NOT DETECTED
HCO3 BLDA-SCNC: 31 MMOL/L (ref 22–26)
HCOV 229E RNA SPEC QL NAA+PROBE: NOT DETECTED
HCOV HKU1 RNA SPEC QL NAA+PROBE: NOT DETECTED
HCOV NL63 RNA SPEC QL NAA+PROBE: NOT DETECTED
HCOV OC43 RNA SPEC QL NAA+PROBE: NOT DETECTED
HCT VFR BLD AUTO: 35.4 % (ref 36.6–50.3)
HGB BLD-MCNC: 11.4 G/DL (ref 12.1–17)
HMPV RNA SPEC QL NAA+PROBE: NOT DETECTED
HPIV1 RNA SPEC QL NAA+PROBE: NOT DETECTED
HPIV2 RNA SPEC QL NAA+PROBE: NOT DETECTED
HPIV3 RNA SPEC QL NAA+PROBE: NOT DETECTED
HPIV4 RNA SPEC QL NAA+PROBE: NOT DETECTED
IMM GRANULOCYTES # BLD AUTO: 0 K/UL (ref 0–0.04)
IMM GRANULOCYTES NFR BLD AUTO: 0 % (ref 0–0.5)
LYMPHOCYTES # BLD: 0.62 K/UL (ref 0.8–3.5)
LYMPHOCYTES NFR BLD: 11 % (ref 12–49)
M PNEUMO DNA SPEC QL NAA+PROBE: NOT DETECTED
MCH RBC QN AUTO: 28.9 PG (ref 26–34)
MCHC RBC AUTO-ENTMCNC: 32.2 G/DL (ref 30–36.5)
MCV RBC AUTO: 89.8 FL (ref 80–99)
MONOCYTES # BLD: 0.11 K/UL (ref 0–1)
MONOCYTES NFR BLD: 2 % (ref 5–13)
NEUTS BAND NFR BLD MANUAL: 9 %
NEUTS SEG # BLD: 4.87 K/UL (ref 1.8–8)
NEUTS SEG NFR BLD: 78 % (ref 32–75)
NRBC # BLD: 0 K/UL (ref 0–0.01)
NRBC BLD-RTO: 0 PER 100 WBC
PCO2 BLDA: 45 MMHG (ref 35–45)
PH BLDA: 7.45 (ref 7.35–7.45)
PLATELET # BLD AUTO: 435 K/UL (ref 150–400)
PMV BLD AUTO: 8.7 FL (ref 8.9–12.9)
PO2 BLDA: 75 MMHG (ref 80–100)
POTASSIUM SERPL-SCNC: 4.1 MMOL/L (ref 3.5–5.1)
RBC # BLD AUTO: 3.94 M/UL (ref 4.1–5.7)
RBC MORPH BLD: ABNORMAL
RSV RNA SPEC QL NAA+PROBE: NOT DETECTED
RV+EV RNA SPEC QL NAA+PROBE: NOT DETECTED
SAO2 % BLD: 96 % (ref 92–97)
SAO2% DEVICE SAO2% SENSOR NAME: ABNORMAL
SARS-COV-2 RNA RESP QL NAA+PROBE: NOT DETECTED
SERVICE CMNT-IMP: ABNORMAL
SODIUM SERPL-SCNC: 134 MMOL/L (ref 136–145)
SPECIMEN SITE: ABNORMAL
TROPONIN I SERPL HS-MCNC: 5 NG/L (ref 0–76)
TROPONIN I SERPL HS-MCNC: 5 NG/L (ref 0–76)
WBC # BLD AUTO: 5.6 K/UL (ref 4.1–11.1)

## 2025-05-06 PROCEDURE — 36415 COLL VENOUS BLD VENIPUNCTURE: CPT

## 2025-05-06 PROCEDURE — 6360000002 HC RX W HCPCS: Performed by: INTERNAL MEDICINE

## 2025-05-06 PROCEDURE — 87070 CULTURE OTHR SPECIMN AEROBIC: CPT

## 2025-05-06 PROCEDURE — 94669 MECHANICAL CHEST WALL OSCILL: CPT

## 2025-05-06 PROCEDURE — 2700000000 HC OXYGEN THERAPY PER DAY

## 2025-05-06 PROCEDURE — 84484 ASSAY OF TROPONIN QUANT: CPT

## 2025-05-06 PROCEDURE — 6370000000 HC RX 637 (ALT 250 FOR IP): Performed by: INTERNAL MEDICINE

## 2025-05-06 PROCEDURE — 82803 BLOOD GASES ANY COMBINATION: CPT

## 2025-05-06 PROCEDURE — 82962 GLUCOSE BLOOD TEST: CPT

## 2025-05-06 PROCEDURE — 2500000003 HC RX 250 WO HCPCS: Performed by: INTERNAL MEDICINE

## 2025-05-06 PROCEDURE — 36600 WITHDRAWAL OF ARTERIAL BLOOD: CPT

## 2025-05-06 PROCEDURE — 94761 N-INVAS EAR/PLS OXIMETRY MLT: CPT

## 2025-05-06 PROCEDURE — 87205 SMEAR GRAM STAIN: CPT

## 2025-05-06 PROCEDURE — 6370000000 HC RX 637 (ALT 250 FOR IP): Performed by: PHYSICIAN ASSISTANT

## 2025-05-06 PROCEDURE — 94640 AIRWAY INHALATION TREATMENT: CPT

## 2025-05-06 PROCEDURE — 71045 X-RAY EXAM CHEST 1 VIEW: CPT

## 2025-05-06 PROCEDURE — 85025 COMPLETE CBC W/AUTO DIFF WBC: CPT

## 2025-05-06 PROCEDURE — 2580000003 HC RX 258: Performed by: INTERNAL MEDICINE

## 2025-05-06 PROCEDURE — 1100000000 HC RM PRIVATE

## 2025-05-06 PROCEDURE — 0202U NFCT DS 22 TRGT SARS-COV-2: CPT

## 2025-05-06 RX ORDER — GUAIFENESIN 600 MG/1
1200 TABLET, EXTENDED RELEASE ORAL 2 TIMES DAILY
Status: DISCONTINUED | OUTPATIENT
Start: 2025-05-06 | End: 2025-05-09 | Stop reason: HOSPADM

## 2025-05-06 RX ORDER — GUAIFENESIN 600 MG/1
600 TABLET, EXTENDED RELEASE ORAL 2 TIMES DAILY
Status: DISCONTINUED | OUTPATIENT
Start: 2025-05-06 | End: 2025-05-06

## 2025-05-06 RX ADMIN — METHYLPREDNISOLONE SODIUM SUCCINATE 40 MG: 40 INJECTION, POWDER, LYOPHILIZED, FOR SOLUTION INTRAMUSCULAR; INTRAVENOUS at 14:27

## 2025-05-06 RX ADMIN — DIVALPROEX SODIUM 250 MG: 250 TABLET, DELAYED RELEASE ORAL at 20:47

## 2025-05-06 RX ADMIN — GUAIFENESIN 200 MG: 200 SOLUTION ORAL at 12:24

## 2025-05-06 RX ADMIN — GUAIFENESIN 1200 MG: 600 TABLET, MULTILAYER, EXTENDED RELEASE ORAL at 20:49

## 2025-05-06 RX ADMIN — IPRATROPIUM BROMIDE AND ALBUTEROL SULFATE 1 DOSE: .5; 2.5 SOLUTION RESPIRATORY (INHALATION) at 11:55

## 2025-05-06 RX ADMIN — METHYLPREDNISOLONE SODIUM SUCCINATE 40 MG: 40 INJECTION, POWDER, LYOPHILIZED, FOR SOLUTION INTRAMUSCULAR; INTRAVENOUS at 02:21

## 2025-05-06 RX ADMIN — POLYETHYLENE GLYCOL 3350 17 G: 17 POWDER, FOR SOLUTION ORAL at 09:39

## 2025-05-06 RX ADMIN — ENOXAPARIN SODIUM 40 MG: 100 INJECTION SUBCUTANEOUS at 18:15

## 2025-05-06 RX ADMIN — MONTELUKAST 10 MG: 10 TABLET, FILM COATED ORAL at 20:49

## 2025-05-06 RX ADMIN — CALCIUM CARBONATE (ANTACID) CHEW TAB 500 MG 500 MG: 500 CHEW TAB at 09:38

## 2025-05-06 RX ADMIN — SODIUM CHLORIDE, PRESERVATIVE FREE 10 ML: 5 INJECTION INTRAVENOUS at 20:50

## 2025-05-06 RX ADMIN — IPRATROPIUM BROMIDE AND ALBUTEROL SULFATE 1 DOSE: .5; 2.5 SOLUTION RESPIRATORY (INHALATION) at 15:58

## 2025-05-06 RX ADMIN — DIVALPROEX SODIUM 250 MG: 250 TABLET, DELAYED RELEASE ORAL at 09:37

## 2025-05-06 RX ADMIN — METHYLPREDNISOLONE SODIUM SUCCINATE 40 MG: 40 INJECTION, POWDER, LYOPHILIZED, FOR SOLUTION INTRAMUSCULAR; INTRAVENOUS at 09:38

## 2025-05-06 RX ADMIN — IPRATROPIUM BROMIDE AND ALBUTEROL SULFATE 1 DOSE: .5; 2.5 SOLUTION RESPIRATORY (INHALATION) at 07:38

## 2025-05-06 RX ADMIN — CELECOXIB 200 MG: 100 CAPSULE ORAL at 09:38

## 2025-05-06 RX ADMIN — AZITHROMYCIN MONOHYDRATE 500 MG: 500 INJECTION, POWDER, LYOPHILIZED, FOR SOLUTION INTRAVENOUS at 14:36

## 2025-05-06 RX ADMIN — DOXAZOSIN 4 MG: 2 TABLET ORAL at 20:48

## 2025-05-06 RX ADMIN — CALCIUM CARBONATE (ANTACID) CHEW TAB 500 MG 500 MG: 500 CHEW TAB at 20:49

## 2025-05-06 RX ADMIN — IPRATROPIUM BROMIDE AND ALBUTEROL SULFATE 1 DOSE: .5; 2.5 SOLUTION RESPIRATORY (INHALATION) at 20:05

## 2025-05-06 RX ADMIN — WATER 1000 MG: 1 INJECTION INTRAMUSCULAR; INTRAVENOUS; SUBCUTANEOUS at 12:25

## 2025-05-06 RX ADMIN — GABAPENTIN 300 MG: 300 CAPSULE ORAL at 20:49

## 2025-05-06 RX ADMIN — FAMOTIDINE 20 MG: 20 TABLET, FILM COATED ORAL at 09:38

## 2025-05-06 RX ADMIN — ATORVASTATIN CALCIUM 40 MG: 40 TABLET, FILM COATED ORAL at 09:38

## 2025-05-06 RX ADMIN — BUDESONIDE 500 MCG: 0.5 INHALANT RESPIRATORY (INHALATION) at 20:05

## 2025-05-06 RX ADMIN — METHYLPREDNISOLONE SODIUM SUCCINATE 40 MG: 40 INJECTION, POWDER, LYOPHILIZED, FOR SOLUTION INTRAMUSCULAR; INTRAVENOUS at 20:49

## 2025-05-06 RX ADMIN — GABAPENTIN 300 MG: 300 CAPSULE ORAL at 14:27

## 2025-05-06 RX ADMIN — ARFORMOTEROL TARTRATE 15 MCG: 15 SOLUTION RESPIRATORY (INHALATION) at 07:43

## 2025-05-06 RX ADMIN — PANTOPRAZOLE SODIUM 40 MG: 40 TABLET, DELAYED RELEASE ORAL at 06:26

## 2025-05-06 RX ADMIN — SODIUM CHLORIDE, PRESERVATIVE FREE 10 ML: 5 INJECTION INTRAVENOUS at 09:39

## 2025-05-06 RX ADMIN — CITALOPRAM HYDROBROMIDE 20 MG: 20 TABLET ORAL at 09:37

## 2025-05-06 RX ADMIN — BUDESONIDE 500 MCG: 0.5 INHALANT RESPIRATORY (INHALATION) at 07:43

## 2025-05-06 RX ADMIN — GABAPENTIN 300 MG: 300 CAPSULE ORAL at 09:38

## 2025-05-06 RX ADMIN — ARFORMOTEROL TARTRATE 15 MCG: 15 SOLUTION RESPIRATORY (INHALATION) at 20:05

## 2025-05-06 RX ADMIN — ASPIRIN 81 MG: 81 TABLET, COATED ORAL at 09:38

## 2025-05-06 RX ADMIN — AMLODIPINE BESYLATE 10 MG: 5 TABLET ORAL at 09:38

## 2025-05-06 RX ADMIN — GUAIFENESIN 600 MG: 600 TABLET, MULTILAYER, EXTENDED RELEASE ORAL at 12:23

## 2025-05-06 RX ADMIN — FUROSEMIDE 20 MG: 20 TABLET ORAL at 09:37

## 2025-05-06 ASSESSMENT — PAIN SCALES - GENERAL
PAINLEVEL_OUTOF10: 0
PAINLEVEL_OUTOF10: 0

## 2025-05-06 NOTE — ED NOTES
TRANSFER - OUT REPORT:    Verbal report given to , RN on Rodoflo Delcid  being transferred to [facility] for routine progression of patient care       Report consisted of patient's Situation, Background, Assessment and   Recommendations(SBAR).     Information from the following report(s) Nurse Handoff Report was reviewed with the receiving nurse.    Tiffanie Fall Assessment:    Presents to emergency department  because of falls (Syncope, seizure, or loss of consciousness): No  Age > 70: No  Altered Mental Status, Intoxication with alcohol or substance confusion (Disorientation, impaired judgment, poor safety awaremess, or inability to follow instructions): No  Impaired Mobility: Ambulates or transfers with assistive devices or assistance; Unable to ambulate or transer.: No  Nursing Judgement: No          Lines:   Peripheral IV 05/05/25 Right Antecubital (Active)   Site Assessment Clean, dry & intact 05/05/25 1203   Line Status Blood return noted 05/05/25 1203   Phlebitis Assessment No symptoms 05/05/25 1203   Infiltration Assessment 0 05/05/25 1203        Opportunity for questions and clarification was provided.      Patient transported with:  Tech      3

## 2025-05-06 NOTE — CONSULTS
Pulmonary, Critical Care, and Sleep Medicine~Consult Note    Name: Rodolfo Delcid MRN: [id number]   : 1955 Hospital: [facility]   Date: 2025 1:02 PM Admission: 2025     Impression Plan   Acute on chronic hypoxemic respiratory failure, baseline 2-3L NC  COPD exacerbation   CAD   HTN  HLD   Hypothyroidism   Depression/anxiety   Seizures   CVA  Continue supplemental O2 to maintain SpO2 >88%  RVP pending   Check procal   Sputum culture  Bronchodilator equivalent of home breztri   IVCS   Azithro, ceftriaxone   Scheduled duonebs   Pulmonary toilet   Oral lasix   VTE prophylaxis: lovenox     Thank you for this consult, will follow      Daily Progression:    Consult Note    70 yom with pmhx significant for chronic hypoxemic respiratory failure on 2L NC baseline, COPD, CAD, HTN, HLD, hypothyroidism, depression/anxiety, seizures, CVA presented to the ED  with increased shortness of breath. In the ED, required 6L NC. No leukocytosis. . ABG 7.42/48.2/74/31.2. CXR negative. CT chest with vague infiltrates.     Pt reports increased dyspnea for the last week. Using albuterol twice daily with minimal improvement. Has cough with clearish sputum. Feels \"like he is burning up.\" Took temp 98.1F. Satting 98% on 3L NC, weaned to baseline 2L NC. No known sick contacts.     Patient follows with Dr. Souza. Last office visit Oct 24, 2024. PFTs  FEV1 1.41 37% predicted. Maintained on breztri, albuterol, singulair.     Social hx: quit smoking 4 months ago   Family hx: father +lung cancer     I have reviewed the labs and previous day’s notes.    Pertinent items are noted in HPI.  Past Medical History:   Diagnosis Date    Arthritis     Hips, Knees    CAD (coronary artery disease)     MI around     COPD (chronic obstructive pulmonary disease) (HCC)     Hepatitis A     High cholesterol     Hypertension     Other ill-defined conditions(799.89)     Light

## 2025-05-06 NOTE — PLAN OF CARE
Problem: Respiratory - Adult  Goal: Achieves optimal ventilation and oxygenation  Outcome: Progressing     Problem: Chronic Conditions and Co-morbidities  Goal: Patient's chronic conditions and co-morbidity symptoms are monitored and maintained or improved  Outcome: Progressing     Problem: Discharge Planning  Goal: Discharge to home or other facility with appropriate resources  Outcome: Progressing     Problem: Safety - Adult  Goal: Free from fall injury  Outcome: Progressing     Problem: Neurosensory - Adult  Goal: Achieves stable or improved neurological status  Outcome: Progressing  Goal: Absence of seizures  Outcome: Progressing  Goal: Remains free of injury related to seizures activity  Outcome: Progressing     Problem: Skin/Tissue Integrity - Adult  Goal: Skin integrity remains intact  Outcome: Progressing  Flowsheets (Taken 5/5/2025 2042)  Skin Integrity Remains Intact: Monitor for areas of redness and/or skin breakdown     Problem: Musculoskeletal - Adult  Goal: Return mobility to safest level of function  Outcome: Progressing  Goal: Maintain proper alignment of affected body part  Outcome: Progressing  Goal: Return ADL status to a safe level of function  Outcome: Progressing     Problem: Genitourinary - Adult  Goal: Absence of urinary retention  Outcome: Progressing

## 2025-05-06 NOTE — PROGRESS NOTES
Hospitalist Progress Note    NAME:   Rodolfo Delcid   : 1955   MRN: [id number]     Date/Time: 2025 2:39 PM  Patient PCP: Lukasz Awad DO    Estimated discharge date:   Barriers: Improvement of shortness of breath    Rodolfo Delcid is a 70 y.o.  male with PMHx significant for COPD, chronic respiratory failure on 2 L, seizures, CVA is coming the hospital chief complaint of shortness of breath.  Reports being in his usual state of health until about 2 days ago when he started having shortness of breath along with some increased cough and phlegm.  Reports phlegm is very minimal.  Reports some exertional chest tightness as well.  Does not report any abdominal pain, nausea or vomiting.  No fever or chills.     Assessment / Plan:      Acute COPD exacerbation  Community-acquired pneumonia  Acute on chronic hypoxic respiratory failure  - CT chest shows evidence of emphysema with dilated main pulmonary artery, vague infiltrates in 3 lobes  - He is currently on 4 L nasal cannula and is saturating around 95% and is being weaned down  - Does not meet criteria for sepsis  -Continue Solu-Medrol, DuoNeb.  Continue ceftriaxone and Zithromax.  Check respiratory viral panel.  Symptomatic management.  Pulmonary consulted.  -He is currently on 3 L which is close to his baseline         Mild chronic hyponatremia  - Likely due to Depakote.  Sodium is now improved and is 134     Incidental finding of ectatic ascending thoracic aorta  - Will need outpatient follow-up.  Needs 3.8 cm     History of coronary artery disease  Hypertension  Dyslipidemia  Seizure disorder  History of CVA  Hypothyroidism  - Continue PTA aspirin, Norvasc, Lipitor, Depakote  - Continue PTA Pepcid, PPI              Medical Decision Making:   I personally reviewed labs: CBC, BMP  I personally reviewed imaging: CT chest  I personally reviewed EKG: Telemetry  Toxic drug monitoring: Monitor blood sugar due to risk of hypoglycemia while on

## 2025-05-06 NOTE — PLAN OF CARE
Problem: Respiratory - Adult  Goal: Achieves optimal ventilation and oxygenation  5/6/2025 1834 by Abner Romero RN  Outcome: Progressing  5/6/2025 0747 by Ofelia Hardwick, RT  Outcome: Progressing     Problem: Chronic Conditions and Co-morbidities  Goal: Patient's chronic conditions and co-morbidity symptoms are monitored and maintained or improved  Outcome: Progressing     Problem: Discharge Planning  Goal: Discharge to home or other facility with appropriate resources  Outcome: Progressing     Problem: Safety - Adult  Goal: Free from fall injury  Outcome: Progressing     Problem: Neurosensory - Adult  Goal: Achieves stable or improved neurological status  Outcome: Progressing  Goal: Absence of seizures  Outcome: Progressing  Goal: Remains free of injury related to seizures activity  Outcome: Progressing     Problem: Skin/Tissue Integrity - Adult  Goal: Skin integrity remains intact  Outcome: Progressing     Problem: Musculoskeletal - Adult  Goal: Return mobility to safest level of function  Outcome: Progressing  Goal: Maintain proper alignment of affected body part  Outcome: Progressing  Goal: Return ADL status to a safe level of function  Outcome: Progressing     Problem: Genitourinary - Adult  Goal: Absence of urinary retention  Outcome: Progressing

## 2025-05-06 NOTE — PROGRESS NOTES
End of Shift Note    Bedside shift change report given to Og BRANCH (oncoming nurse) by Maria Mobley RN (offgoing nurse).  Report included the following information SBAR, ED Summary, Intake/Output, MAR, Recent Results, Cardiac Rhythm NSR, and Alarm Parameters     Shift worked:  7P- 7A     Shift summary and any significant changes:    Admitted form ER, as per ER nurse patient is sundowning/ confused   Patient able to answer questions, as per patient having difficulty finding words at times.Patient pleasant,  oriented/disoriented at times. On 2L oxygen, dyspneic at rest and when head flat on bed. Patient slept fairly, with occasional coughing.  Caring rounds done, CHG bathed/incontinence care rendered.  Call bell within reach. Labs drawn.  Plan of care ongoing     Concerns for physician to address:  Respiratory Panel?   Zone phone for oncoming shift:          Activity:  Level of Assistance: Moderate assist, patient does 50-74%  Number times ambulated in hallways past shift: 0  Number of times OOB to chair past shift: 0    Cardiac:   Cardiac Monitoring: Yes      Cardiac Rhythm: Sinus rhythm    Access:  Current line(s): PIV     Genitourinary:   Urinary Status: Voiding, Incontinent briefs    Respiratory:   O2 Device: Nasal cannula  Chronic home O2 use?: NO  Incentive spirometer at bedside: NO    GI:  Last BM (including prior to admit): 05/05/25  Current diet:  ADULT DIET; Regular  Passing flatus: YES    Pain Management:   Patient states pain is manageable on current regimen: N/A    Skin:  Silver Scale Score: 18  Interventions: Wound Offloading (Prevention Methods): Pillows, Repositioning    Patient Safety:  Fall Risk: Nursing Judgement-Fall Risk High(Add Comments): Yes  Fall Risk Interventions  Nursing Judgement-Fall Risk High(Add Comments): Yes  Toilet Every 2 Hours-In Advance of Need: Yes  Hourly Visual Checks: Awake, In bed  Fall Visual Posted: Socks  Room Door Open: Yes  Alarm On: Bed  Patient Moved Closer to

## 2025-05-07 LAB
ANION GAP SERPL CALC-SCNC: 6 MMOL/L (ref 2–12)
BASOPHILS # BLD: 0.01 K/UL (ref 0–0.1)
BASOPHILS NFR BLD: 0.2 % (ref 0–1)
BUN SERPL-MCNC: 25 MG/DL (ref 6–20)
BUN/CREAT SERPL: 44 (ref 12–20)
CALCIUM SERPL-MCNC: 8.9 MG/DL (ref 8.5–10.1)
CHLORIDE SERPL-SCNC: 97 MMOL/L (ref 97–108)
CO2 SERPL-SCNC: 30 MMOL/L (ref 21–32)
CREAT SERPL-MCNC: 0.57 MG/DL (ref 0.7–1.3)
DIFFERENTIAL METHOD BLD: ABNORMAL
EOSINOPHIL # BLD: 0 K/UL (ref 0–0.4)
EOSINOPHIL NFR BLD: 0 % (ref 0–7)
ERYTHROCYTE [DISTWIDTH] IN BLOOD BY AUTOMATED COUNT: 14.2 % (ref 11.5–14.5)
GLUCOSE BLD STRIP.AUTO-MCNC: 147 MG/DL (ref 65–117)
GLUCOSE SERPL-MCNC: 131 MG/DL (ref 65–100)
HCT VFR BLD AUTO: 35.7 % (ref 36.6–50.3)
HGB BLD-MCNC: 11.5 G/DL (ref 12.1–17)
IMM GRANULOCYTES # BLD AUTO: 0.04 K/UL (ref 0–0.04)
IMM GRANULOCYTES NFR BLD AUTO: 0.6 % (ref 0–0.5)
LYMPHOCYTES # BLD: 0.51 K/UL (ref 0.8–3.5)
LYMPHOCYTES NFR BLD: 7.8 % (ref 12–49)
MCH RBC QN AUTO: 29.1 PG (ref 26–34)
MCHC RBC AUTO-ENTMCNC: 32.2 G/DL (ref 30–36.5)
MCV RBC AUTO: 90.4 FL (ref 80–99)
MONOCYTES # BLD: 0.46 K/UL (ref 0–1)
MONOCYTES NFR BLD: 7 % (ref 5–13)
NEUTS SEG # BLD: 5.53 K/UL (ref 1.8–8)
NEUTS SEG NFR BLD: 84.4 % (ref 32–75)
NRBC # BLD: 0 K/UL (ref 0–0.01)
NRBC BLD-RTO: 0 PER 100 WBC
PLATELET # BLD AUTO: 475 K/UL (ref 150–400)
PMV BLD AUTO: 8.7 FL (ref 8.9–12.9)
POTASSIUM SERPL-SCNC: 4.3 MMOL/L (ref 3.5–5.1)
PROCALCITONIN SERPL-MCNC: 0.1 NG/ML
RBC # BLD AUTO: 3.95 M/UL (ref 4.1–5.7)
SERVICE CMNT-IMP: ABNORMAL
SODIUM SERPL-SCNC: 133 MMOL/L (ref 136–145)
WBC # BLD AUTO: 6.6 K/UL (ref 4.1–11.1)

## 2025-05-07 PROCEDURE — 97165 OT EVAL LOW COMPLEX 30 MIN: CPT

## 2025-05-07 PROCEDURE — 36415 COLL VENOUS BLD VENIPUNCTURE: CPT

## 2025-05-07 PROCEDURE — 6360000002 HC RX W HCPCS: Performed by: INTERNAL MEDICINE

## 2025-05-07 PROCEDURE — 2580000003 HC RX 258: Performed by: INTERNAL MEDICINE

## 2025-05-07 PROCEDURE — 1100000000 HC RM PRIVATE

## 2025-05-07 PROCEDURE — 94761 N-INVAS EAR/PLS OXIMETRY MLT: CPT

## 2025-05-07 PROCEDURE — 2500000003 HC RX 250 WO HCPCS: Performed by: PHYSICIAN ASSISTANT

## 2025-05-07 PROCEDURE — 2700000000 HC OXYGEN THERAPY PER DAY

## 2025-05-07 PROCEDURE — 94640 AIRWAY INHALATION TREATMENT: CPT

## 2025-05-07 PROCEDURE — 84145 PROCALCITONIN (PCT): CPT

## 2025-05-07 PROCEDURE — 6360000002 HC RX W HCPCS: Performed by: PHYSICIAN ASSISTANT

## 2025-05-07 PROCEDURE — 6370000000 HC RX 637 (ALT 250 FOR IP): Performed by: PHYSICIAN ASSISTANT

## 2025-05-07 PROCEDURE — 2500000003 HC RX 250 WO HCPCS: Performed by: INTERNAL MEDICINE

## 2025-05-07 PROCEDURE — 6370000000 HC RX 637 (ALT 250 FOR IP): Performed by: INTERNAL MEDICINE

## 2025-05-07 PROCEDURE — 80048 BASIC METABOLIC PNL TOTAL CA: CPT

## 2025-05-07 PROCEDURE — 94669 MECHANICAL CHEST WALL OSCILL: CPT

## 2025-05-07 PROCEDURE — 97116 GAIT TRAINING THERAPY: CPT

## 2025-05-07 PROCEDURE — 97161 PT EVAL LOW COMPLEX 20 MIN: CPT

## 2025-05-07 PROCEDURE — 85025 COMPLETE CBC W/AUTO DIFF WBC: CPT

## 2025-05-07 PROCEDURE — 97530 THERAPEUTIC ACTIVITIES: CPT

## 2025-05-07 PROCEDURE — 82962 GLUCOSE BLOOD TEST: CPT

## 2025-05-07 RX ORDER — CETIRIZINE HYDROCHLORIDE 10 MG/1
10 TABLET ORAL DAILY
Status: DISCONTINUED | OUTPATIENT
Start: 2025-05-07 | End: 2025-05-09 | Stop reason: HOSPADM

## 2025-05-07 RX ORDER — FLUTICASONE PROPIONATE 50 MCG
1 SPRAY, SUSPENSION (ML) NASAL DAILY PRN
Status: DISCONTINUED | OUTPATIENT
Start: 2025-05-07 | End: 2025-05-09 | Stop reason: HOSPADM

## 2025-05-07 RX ORDER — OXYMETAZOLINE HYDROCHLORIDE 0.05 G/100ML
2 SPRAY NASAL 2 TIMES DAILY
Status: DISCONTINUED | OUTPATIENT
Start: 2025-05-07 | End: 2025-05-09 | Stop reason: HOSPADM

## 2025-05-07 RX ADMIN — WATER 40 MG: 1 INJECTION INTRAMUSCULAR; INTRAVENOUS; SUBCUTANEOUS at 20:55

## 2025-05-07 RX ADMIN — METHYLPREDNISOLONE SODIUM SUCCINATE 40 MG: 40 INJECTION, POWDER, LYOPHILIZED, FOR SOLUTION INTRAMUSCULAR; INTRAVENOUS at 09:28

## 2025-05-07 RX ADMIN — AMLODIPINE BESYLATE 10 MG: 5 TABLET ORAL at 09:27

## 2025-05-07 RX ADMIN — CITALOPRAM HYDROBROMIDE 20 MG: 20 TABLET ORAL at 09:28

## 2025-05-07 RX ADMIN — GUAIFENESIN 1200 MG: 600 TABLET, MULTILAYER, EXTENDED RELEASE ORAL at 20:53

## 2025-05-07 RX ADMIN — IPRATROPIUM BROMIDE AND ALBUTEROL SULFATE 1 DOSE: .5; 2.5 SOLUTION RESPIRATORY (INHALATION) at 08:10

## 2025-05-07 RX ADMIN — CETIRIZINE HYDROCHLORIDE 10 MG: 10 TABLET, FILM COATED ORAL at 17:00

## 2025-05-07 RX ADMIN — GABAPENTIN 300 MG: 300 CAPSULE ORAL at 09:27

## 2025-05-07 RX ADMIN — CELECOXIB 200 MG: 100 CAPSULE ORAL at 09:27

## 2025-05-07 RX ADMIN — CALCIUM CARBONATE (ANTACID) CHEW TAB 500 MG 500 MG: 500 CHEW TAB at 20:55

## 2025-05-07 RX ADMIN — FUROSEMIDE 20 MG: 20 TABLET ORAL at 09:27

## 2025-05-07 RX ADMIN — SODIUM CHLORIDE, PRESERVATIVE FREE 10 ML: 5 INJECTION INTRAVENOUS at 09:30

## 2025-05-07 RX ADMIN — ASPIRIN 81 MG: 81 TABLET, COATED ORAL at 09:27

## 2025-05-07 RX ADMIN — GABAPENTIN 300 MG: 300 CAPSULE ORAL at 15:55

## 2025-05-07 RX ADMIN — DIVALPROEX SODIUM 250 MG: 250 TABLET, DELAYED RELEASE ORAL at 09:27

## 2025-05-07 RX ADMIN — BUDESONIDE 500 MCG: 0.5 INHALANT RESPIRATORY (INHALATION) at 19:42

## 2025-05-07 RX ADMIN — PANTOPRAZOLE SODIUM 40 MG: 40 TABLET, DELAYED RELEASE ORAL at 09:32

## 2025-05-07 RX ADMIN — ARFORMOTEROL TARTRATE 15 MCG: 15 SOLUTION RESPIRATORY (INHALATION) at 19:42

## 2025-05-07 RX ADMIN — WATER 1000 MG: 1 INJECTION INTRAMUSCULAR; INTRAVENOUS; SUBCUTANEOUS at 15:55

## 2025-05-07 RX ADMIN — SODIUM CHLORIDE, PRESERVATIVE FREE 10 ML: 5 INJECTION INTRAVENOUS at 20:56

## 2025-05-07 RX ADMIN — BUDESONIDE 500 MCG: 0.5 INHALANT RESPIRATORY (INHALATION) at 08:15

## 2025-05-07 RX ADMIN — ENOXAPARIN SODIUM 40 MG: 100 INJECTION SUBCUTANEOUS at 16:49

## 2025-05-07 RX ADMIN — IPRATROPIUM BROMIDE AND ALBUTEROL SULFATE 1 DOSE: .5; 2.5 SOLUTION RESPIRATORY (INHALATION) at 19:42

## 2025-05-07 RX ADMIN — GABAPENTIN 300 MG: 300 CAPSULE ORAL at 20:53

## 2025-05-07 RX ADMIN — METHYLPREDNISOLONE SODIUM SUCCINATE 40 MG: 40 INJECTION, POWDER, LYOPHILIZED, FOR SOLUTION INTRAMUSCULAR; INTRAVENOUS at 03:20

## 2025-05-07 RX ADMIN — GUAIFENESIN 1200 MG: 600 TABLET, MULTILAYER, EXTENDED RELEASE ORAL at 09:28

## 2025-05-07 RX ADMIN — DIVALPROEX SODIUM 250 MG: 250 TABLET, DELAYED RELEASE ORAL at 20:53

## 2025-05-07 RX ADMIN — POLYETHYLENE GLYCOL 3350 17 G: 17 POWDER, FOR SOLUTION ORAL at 09:28

## 2025-05-07 RX ADMIN — AZITHROMYCIN MONOHYDRATE 500 MG: 500 INJECTION, POWDER, LYOPHILIZED, FOR SOLUTION INTRAVENOUS at 15:58

## 2025-05-07 RX ADMIN — IPRATROPIUM BROMIDE AND ALBUTEROL SULFATE 1 DOSE: .5; 2.5 SOLUTION RESPIRATORY (INHALATION) at 16:35

## 2025-05-07 RX ADMIN — DOXAZOSIN 4 MG: 2 TABLET ORAL at 20:54

## 2025-05-07 RX ADMIN — OXYMETAZOLINE HYDROCHLORIDE 2 SPRAY: 0.5 SPRAY NASAL at 21:03

## 2025-05-07 RX ADMIN — CALCIUM CARBONATE (ANTACID) CHEW TAB 500 MG 500 MG: 500 CHEW TAB at 09:28

## 2025-05-07 RX ADMIN — ARFORMOTEROL TARTRATE 15 MCG: 15 SOLUTION RESPIRATORY (INHALATION) at 08:15

## 2025-05-07 RX ADMIN — ATORVASTATIN CALCIUM 40 MG: 40 TABLET, FILM COATED ORAL at 09:29

## 2025-05-07 RX ADMIN — MONTELUKAST 10 MG: 10 TABLET, FILM COATED ORAL at 20:55

## 2025-05-07 RX ADMIN — IPRATROPIUM BROMIDE AND ALBUTEROL SULFATE 1 DOSE: .5; 2.5 SOLUTION RESPIRATORY (INHALATION) at 12:50

## 2025-05-07 RX ADMIN — FAMOTIDINE 20 MG: 20 TABLET, FILM COATED ORAL at 09:27

## 2025-05-07 ASSESSMENT — PAIN SCALES - GENERAL
PAINLEVEL_OUTOF10: 0
PAINLEVEL_OUTOF10: 0

## 2025-05-07 NOTE — PLAN OF CARE
Problem: Respiratory - Adult  Goal: Achieves optimal ventilation and oxygenation  5/7/2025 0946 by Javier Wills RN  Outcome: Progressing  5/7/2025 0819 by Ofelia Hardwick RT  Outcome: Progressing  5/7/2025 0622 by Demetrio Pfeiffer RN  Outcome: Progressing  5/6/2025 2311 by Vandana Bautista RCP  Outcome: Progressing     Problem: Chronic Conditions and Co-morbidities  Goal: Patient's chronic conditions and co-morbidity symptoms are monitored and maintained or improved  5/7/2025 0946 by Javier Wills RN  Outcome: Progressing  5/7/2025 0622 by Demetrio Pfeiffer RN  Outcome: Progressing     Problem: Discharge Planning  Goal: Discharge to home or other facility with appropriate resources  5/7/2025 0946 by Javier Wills RN  Outcome: Progressing  5/7/2025 0622 by Demetrio Pfeiffer RN  Outcome: Progressing     Problem: Safety - Adult  Goal: Free from fall injury  5/7/2025 0946 by Javier Wills RN  Outcome: Progressing  5/7/2025 0622 by Demetrio Pfeiffer RN  Outcome: Progressing     Problem: Neurosensory - Adult  Goal: Achieves stable or improved neurological status  5/7/2025 0946 by Javier Wills RN  Outcome: Progressing  5/7/2025 0622 by Demetrio Pfeiffer RN  Outcome: Progressing  Goal: Absence of seizures  5/7/2025 0946 by Javier Wills RN  Outcome: Progressing  5/7/2025 0622 by Demetrio Pfeiffer RN  Outcome: Progressing  Goal: Remains free of injury related to seizures activity  5/7/2025 0946 by Javier Wills RN  Outcome: Progressing  5/7/2025 0622 by Demetrio Pfeiffer RN  Outcome: Progressing     Problem: Skin/Tissue Integrity - Adult  Goal: Skin integrity remains intact  5/7/2025 0946 by Javier Wills RN  Outcome: Progressing  5/7/2025 0622 by Demetrio Pfeiffer RN  Outcome: Progressing     Problem: Musculoskeletal - Adult  Goal: Return mobility to safest level of function  5/7/2025 0946 by Javier Wills RN  Outcome: Progressing  5/7/2025 0622 by Demetrio Pfeiffer, RN  Outcome: Progressing  Goal: Maintain proper  alignment of affected body part  5/7/2025 0946 by Javier Wills RN  Outcome: Progressing  5/7/2025 0622 by Demetrio Pfeiffer RN  Outcome: Progressing  Goal: Return ADL status to a safe level of function  5/7/2025 0946 by Javier Wills RN  Outcome: Progressing  5/7/2025 0622 by Demetrio Pfeiffer RN  Outcome: Progressing     Problem: Genitourinary - Adult  Goal: Absence of urinary retention  5/7/2025 0946 by Javier Wills RN  Outcome: Progressing  5/7/2025 0622 by Demetrio Pfeiffer RN  Outcome: Progressing

## 2025-05-07 NOTE — PROGRESS NOTES
End of Shift Note    Bedside shift change report given to SARINA JUAREZ (oncoming nurse) by Demetrio Pfeiffer RN (offgoing nurse).  Report included the following information SBAR, Intake/Output, and Quality Measures    Shift worked:  7pm-7am     Shift summary and any significant changes:     Patient was calm, throughout the entire shift, he took his pills whole. Hourly rounding was done on patient . Patient care still ongoing.      Concerns for physician to address:  none     Zone phone for oncoming shift:   [phone]       Activity:  Level of Assistance: Moderate assist, patient does 50-74%  Number times ambulated in hallways past shift: 0  Number of times OOB to chair past shift: 1    Cardiac:   Cardiac Monitoring: Yes      Cardiac Rhythm: Sinus rhythm    Access:  Current line(s): PIV    Genitourinary:   Urinary Status: Voiding    Respiratory:   O2 Device: Nasal cannula  Chronic home O2 use?: NO  Incentive spirometer at bedside: NO    GI:  Last BM (including prior to admit): 05/05/25  Current diet:  ADULT DIET; Regular  Passing flatus: YES    Pain Management:   Patient states pain is manageable on current regimen: YES    Skin:  Silver Scale Score: 19  Interventions: Wound Offloading (Prevention Methods): Pillows, Repositioning    Patient Safety:  Fall Risk: Nursing Judgement-Fall Risk High(Add Comments): Yes  Fall Risk Interventions  Nursing Judgement-Fall Risk High(Add Comments): Yes  Toilet Every 2 Hours-In Advance of Need: Yes  Hourly Visual Checks: Awake, In bed  Fall Visual Posted: Socks  Room Door Open: Yes  Alarm On: Bed  Patient Moved Closer to Nursing Station: No    Active Consults:   IP CONSULT TO PULMONOLOGY    Length of Stay:  Expected LOS: 3  Actual LOS: 2    Demetrio Pfeiffer RN

## 2025-05-07 NOTE — PROGRESS NOTES
End of Shift Note    Bedside shift change report given to SARINA Atkinson (oncoming nurse) by Abner Romero RN (offgoing nurse).  Report included the following information SBAR, ED Summary, Intake/Output, MAR, Recent Results, Cardiac Rhythm NSR, and Alarm Parameters     Shift worked:  Day     Shift summary and any significant changes:    Pt having  complain about SOB put On 3L oxygen, dyspneic at rest and when head flat on bed.  Called for breathing treatment.  Called [facility] nurse to evaluate, done EKG, Trop, ABG, Chest Xray, Respiratory Pennal and culture done.  Given meds as per mar. Give PRN cough meds as per pt request.  Caring rounds done,   Call bell within reach.  Plan of care ongoing.     Concerns for physician to address:    Zone phone for oncoming shift:          Activity:  Level of Assistance: Moderate assist, patient does 50-74%  Number times ambulated in hallways past shift: 0  Number of times OOB to chair past shift: 0    Cardiac:   Cardiac Monitoring: Yes      Cardiac Rhythm: Sinus rhythm    Access:  Current line(s): PIV     Genitourinary:   Urinary Status: Voiding, Incontinent briefs (Urinal)    Respiratory:   O2 Device: Nasal cannula  Chronic home O2 use?: NO  Incentive spirometer at bedside: NO    GI:  Last BM (including prior to admit): 05/05/25  Current diet:  ADULT DIET; Regular  Passing flatus: YES    Pain Management:   Patient states pain is manageable on current regimen: N/A    Skin:  Silver Scale Score: 18  Interventions: Wound Offloading (Prevention Methods): Repositioning, Pillows, Turning    Patient Safety:  Fall Risk: Nursing Judgement-Fall Risk High(Add Comments): Yes  Fall Risk Interventions  Nursing Judgement-Fall Risk High(Add Comments): Yes  Toilet Every 2 Hours-In Advance of Need: Yes  Hourly Visual Checks: Awake, In bed  Fall Visual Posted: Socks  Room Door Open: Yes  Alarm On: Bed  Patient Moved Closer to Nursing Station: No    Active Consults:   IP CONSULT TO PULMONOLOGY    Length

## 2025-05-07 NOTE — PLAN OF CARE
Problem: Respiratory - Adult  Goal: Achieves optimal ventilation and oxygenation  5/6/2025 2311 by Vandana Bautista RCP  Outcome: Progressing

## 2025-05-07 NOTE — PROGRESS NOTES
Spiritual Health History and Assessment/Progress Note  [facility]    Initial Encounter,  ,  ,      Name: Rodolfo Delcid MRN: [id number]    Age: 70 y.o.     Sex: male   Language: English   Episcopalian: Other   Acute exacerbation of chronic obstructive pulmonary disease (COPD) ([facility])     Date: 5/7/2025            Total Time Calculated: 13 min              Spiritual Assessment began in [facility] 3 MED TELE        Referral/Consult From: Rounding   Encounter Overview/Reason: Initial Encounter  Service Provided For: Patient    Jillian, Belief, Meaning:   Patient identifies as spiritual, is connected with a jillian tradition or spiritual practice, and has beliefs or practices that help with coping during difficult times  Family/Friends No family/friends present      Importance and Influence:  Patient has no beliefs influential to healthcare decision-making identified during this visit  Family/Friends No family/friends present    Community:  Patient is connected with a spiritual community and feels well-supported. Support system includes: [facility] and Extended family  Family/Friends No family/friends present    Assessment and Plan of Care:     Patient Interventions include: Facilitated expression of thoughts and feelings, Explored spiritual coping/struggle/distress, Affirmed coping skills/support systems, and Other: assurance of prayer  Family/Friends Interventions include: No family/friends present    Patient Plan of Care: No spiritual needs identified for follow-up  Family/Friends Plan of Care: No family/friends present    Electronically signed by Chaplain DAVEY BRONSON on 5/7/2025 at 11:59 AM

## 2025-05-07 NOTE — PROGRESS NOTES
Hospitalist Progress Note    NAME:   Rodolfo Delcid   : 1955   MRN: [id number]     Date/Time: 2025 4:36 PM  Patient PCP: Lukasz Awad DO    Estimated discharge date:   Barriers: Improvement of shortness of breath    Rodolfo Delcid is a 70 y.o.  male with PMHx significant for COPD, chronic respiratory failure on 2 L, seizures, CVA is coming the hospital chief complaint of shortness of breath.  Reports being in his usual state of health until about 2 days ago when he started having shortness of breath along with some increased cough and phlegm.  Reports phlegm is very minimal.  Reports some exertional chest tightness as well.  Does not report any abdominal pain, nausea or vomiting.  No fever or chills.     Assessment / Plan:      Acute COPD exacerbation  Community-acquired pneumonia  Acute on chronic hypoxic respiratory failure  - CT chest shows evidence of emphysema with dilated main pulmonary artery, vague infiltrates in 3 lobes  - He is currently on 4 L nasal cannula and is saturating around 95% and is being weaned down  - Does not meet criteria for sepsis  -Continue Solu-Medrol, DuoNeb.  Continue ceftriaxone and Zithromax Symptomatic management.  Appreciate pulmonology consult  -He is currently on 3 L which is close to his baseline      Continues to complain cannot breathe, though saturation remains good.  Nasal congestion  Added cetirizine  Added Flonase  Xylometazoline spray x 4 doses  Sputum culture pending  Solu-Medrol decreased to 40 twice daily     Mild chronic hyponatremia  - Likely due to Depakote.  Sodium is now improved and is 134>133     Incidental finding of ectatic ascending thoracic aorta  - Will need outpatient follow-up.  Needs 3.8 cm     History of coronary artery disease  Hypertension  Dyslipidemia  Seizure disorder  History of CVA  Hypothyroidism  - Continue PTA aspirin, Norvasc, Lipitor, Depakote  - Continue PTA Pepcid, PPI              Medical Decision Making:   I

## 2025-05-07 NOTE — PROGRESS NOTES
End of Shift Note    Bedside shift change report given to David BRANCH (oncoming nurse) by Javier Webb RN (offgoing nurse).  Report included the following information SBAR    Shift worked: 7am to 7:30pm     Shift summary and any significant changes:     Pt continues to state, \"I am having a hard time breathing.\" Post respiratory treatment this morning and post AM medications. Shallow breathing congested lung sounds. Small speutom output. Oxygen 97% on 3L, other VSS. Pt calm and not agitated or restless.      Concerns for physician to address: Continued pulmonary congestion and related unproductive cough.      Zone phone for oncoming shift:   [phone]       Activity:  Level of Assistance: Moderate assist, patient does 50-74%  Number times ambulated in hallways past shift: 0  Number of times OOB to chair past shift: 0    Cardiac:   Cardiac Monitoring: Yes      Cardiac Rhythm: Sinus rhythm    Access:  Current line(s): PIV    Genitourinary:   Urinary Status: Voiding    Respiratory:   O2 Device: Nasal cannula  Chronic home O2 use?: YES  Incentive spirometer at bedside: YES    GI:  Last BM (including prior to admit): 05/05/25  Current diet:  ADULT DIET; Regular  Passing flatus: YES    Pain Management:   Patient states pain is manageable on current regimen: YES    Skin:  Silver Scale Score: 19  Interventions: Wound Offloading (Prevention Methods): Pillows, Repositioning    Patient Safety:  Fall Risk: Nursing Judgement-Fall Risk High(Add Comments): Yes  Fall Risk Interventions  Nursing Judgement-Fall Risk High(Add Comments): Yes  Toilet Every 2 Hours-In Advance of Need: Yes  Hourly Visual Checks: Awake, In bed  Fall Visual Posted: Socks  Room Door Open: Yes  Alarm On: Bed  Patient Moved Closer to Nursing Station: No    Active Consults:   IP CONSULT TO PULMONOLOGY    Length of Stay:  Expected LOS: 3  Actual LOS: 2    Javire Webb, RN

## 2025-05-07 NOTE — PLAN OF CARE
Problem: Occupational Therapy - Adult  Goal: By Discharge: Performs self-care activities at highest level of function for planned discharge setting.  See evaluation for individualized goals.  Description: FUNCTIONAL STATUS PRIOR TO ADMISSION:  pt reports independence to mod I for ADLs (DME used for LE dressing), mod I for mobility w/ use of RW and has home O2 (2L NC).   Receives Help From: Family, Prior Level of Assist for ADLs: Independent,  ,  ,  ,  ,  , Prior Level of Assist for Homemaking: Independent, Ambulation Assistance: Independent, Prior Level of Assist for Transfers: Independent, Active : No     HOME SUPPORT: Patient lived alone, but sister (next door) and brother (a house down) live on same property .    Occupational Therapy Goals:  Initiated 5/7/2025  1.  Patient will perform grooming while in stance with Contact Guard Assist within 7 day(s).  2.  Patient will perform upper body dressing with [place] within 7 day(s).  3.  Patient will perform lower body dressing with Modified [place], w/ use of DME, PRN, within 7 day(s).  4.  Patient will perform toilet transfers with Modified [place]  within 7 day(s).  5.  Patient will perform all aspects of toileting with [place] within 7 day(s).  6.  Patient will participate in upper extremity therapeutic exercise/activities with Supervision for 5 minutes within 7 day(s).    7.  Patient will utilize energy conservation techniques during functional activities with verbal cues within 7 day(s).   Outcome: Progressing    OCCUPATIONAL THERAPY EVALUATION    Patient: Rodolfo Delcid (70 y.o. male)  Date: 5/7/2025  Primary Diagnosis: Shortness of breath [R06.02]  Acute exacerbation of chronic obstructive pulmonary disease (COPD) ([facility]) [J44.1]  COPD exacerbation ([facility]) [J44.1]  Pneumonia due to infectious organism, unspecified laterality, unspecified part of lung [J18.9]         Precautions:                    ASSESSMENT :  The patient is limited by

## 2025-05-07 NOTE — PROGRESS NOTES
Pulmonary, Critical Care, and Sleep Medicine~Progress Note    Name: Rodolfo Delcid MRN: [id number]   : 1955 Hospital: [facility]   Date: 2025 10:48 AM Admission: 2025     Impression Plan   Acute on chronic hypoxemic respiratory failure, baseline 2-3L NC  COPD exacerbation- RVP and procal negative (after started on abx)  CAD   HTN  HLD   Hypothyroidism   Depression/anxiety   Seizures   CVA  Continue supplemental O2 to maintain SpO2 >88%  Sputum culture pending   Bronchodilator equivalent of home breztri   IVCS taper today   Azithro, ceftriaxone   Scheduled duone   Pulmonary toilet   Oral lasix   VTE prophylaxis: lovenox        Daily Progression:     Pt seen this morning lying in bed. Complains of ongoing difficulty breathing and cough with thick mucus. Does not feel any better since presentation. On 2L NC     Consult Note    70 yom with pmhx significant for chronic hypoxemic respiratory failure on 2L NC baseline, COPD, CAD, HTN, HLD, hypothyroidism, depression/anxiety, seizures, CVA presented to the ED  with increased shortness of breath. In the ED, required 6L NC. No leukocytosis. . ABG 7.42/48.2/74/31.2. CXR negative. CT chest with vague infiltrates.     Pt reports increased dyspnea for the last week. Using albuterol twice daily with minimal improvement. Has cough with clearish sputum. Feels \"like he is burning up.\" Took temp 98.1F. Satting 98% on 3L NC, weaned to baseline 2L NC. No known sick contacts.     Patient follows with Dr. Souza. Last office visit Oct 24, 2024. PFTs  FEV1 1.41 37% predicted. Maintained on breztri, albuterol, singulair.     Social hx: quit smoking 4 months ago   Family hx: father +lung cancer     I have reviewed the labs and previous day’s notes.    Pertinent items are noted in HPI.  Past Medical History:   Diagnosis Date    Arthritis     Hips, Knees    CAD (coronary artery disease)     MI around         citalopram (CELEXA) tablet 20 mg  20 mg Oral Daily    divalproex (DEPAKOTE) DR tablet 250 mg  250 mg Oral 2 times per day    doxazosin (CARDURA) tablet 4 mg  4 mg Oral Nightly    famotidine (PEPCID) tablet 20 mg  20 mg Oral Daily    furosemide (LASIX) tablet 20 mg  20 mg Oral Daily    gabapentin (NEURONTIN) capsule 300 mg  300 mg Oral TID    montelukast (SINGULAIR) tablet 10 mg  10 mg Oral Nightly    pantoprazole (PROTONIX) tablet 40 mg  40 mg Oral QAM AC    polyethylene glycol (GLYCOLAX) packet 17 g  17 g Oral Daily    sodium chloride flush 0.9 % injection 5-40 mL  5-40 mL IntraVENous 2 times per day    sodium chloride flush 0.9 % injection 5-40 mL  5-40 mL IntraVENous PRN    0.9 % sodium chloride infusion   IntraVENous PRN    potassium chloride (KLOR-CON M) extended release tablet 40 mEq  40 mEq Oral PRN    Or    potassium bicarb-citric acid (EFFER-K) effervescent tablet 40 mEq  40 mEq Oral PRN    Or    potassium chloride 10 mEq/100 mL IVPB (Peripheral Line)  10 mEq IntraVENous PRN    magnesium sulfate 2000 mg in 50 mL IVPB premix  2,000 mg IntraVENous PRN    enoxaparin (LOVENOX) injection 40 mg  40 mg SubCUTAneous Q24H    ondansetron (ZOFRAN-ODT) disintegrating tablet 4 mg  4 mg Oral Q8H PRN    Or    ondansetron (ZOFRAN) injection 4 mg  4 mg IntraVENous Q6H PRN    polyethylene glycol (GLYCOLAX) packet 17 g  17 g Oral Daily PRN    acetaminophen (TYLENOL) tablet 650 mg  650 mg Oral Q6H PRN    Or    acetaminophen (TYLENOL) suppository 650 mg  650 mg Rectal Q6H PRN    methylPREDNISolone sodium succ (SOLU-MEDROL) 40 mg in sterile water 1 mL injection  40 mg IntraVENous Q6H    ipratropium 0.5 mg-albuterol 2.5 mg (DUONEB) nebulizer solution 1 Dose  1 Dose Inhalation 4x Daily RT    cefTRIAXone (ROCEPHIN) 1,000 mg in sterile water 10 mL IV syringe  1,000 mg IntraVENous Q24H    azithromycin (ZITHROMAX) 500 mg in sodium chloride 0.9 % 250 mL IVPB ([id number])  500 mg IntraVENous Q24H    melatonin tablet 3 mg  3 mg Oral

## 2025-05-07 NOTE — PLAN OF CARE
Problem: Physical Therapy - Adult  Goal: By Discharge: Performs mobility at highest level of function for planned discharge setting.  See evaluation for individualized goals.  Description: FUNCTIONAL STATUS PRIOR TO ADMISSION: Patient was modified independent using a rolling walker for functional mobility.  Pt ambulates community distances at BL but has been having increased fatigue recently limiting distance. 2L supplemental O2 at BL.     HOME SUPPORT PRIOR TO ADMISSION: The patient lived alone with sister living next door and another family member close by to provide assistance.    Physical Therapy Goals  Initiated 5/7/2025  1.  Patient will move from supine to sit and sit to supine in bed with modified independence within 7 day(s).    2.  Patient will perform sit to stand with modified independence within 7 day(s).  3.  Patient will transfer from bed to chair and chair to bed with modified independence using the least restrictive device within 7 day(s).  4.  Patient will ambulate with modified independence for 250 feet with the least restrictive device within 7 day(s).   5.  Patient will ascend/descend 4 stairs with 2 handrail(s) with supervision/set-up within 7 day(s).    Outcome: Progressing       PHYSICAL THERAPY EVALUATION    Patient: Rodolfo Delcid (70 y.o. male)  Date: 5/7/2025  Primary Diagnosis: Shortness of breath [R06.02]  Acute exacerbation of chronic obstructive pulmonary disease (COPD) ([facility]) [J44.1]  COPD exacerbation (HCC) [J44.1]  Pneumonia due to infectious organism, unspecified laterality, unspecified part of lung [J18.9]       Precautions: Restrictions/Precautions  Restrictions/Precautions: Fall Risk            ASSESSMENT :   DEFICITS/IMPAIRMENTS:   The patient is limited by decreased functional mobility, independence in ADLs, activity tolerance, endurance, orthostatic hypotension, and at times requiring additional supplemental O2 support.  Pt with pmh of chronic hypoxemic respiratory failure

## 2025-05-07 NOTE — PLAN OF CARE
Problem: Respiratory - Adult  Goal: Achieves optimal ventilation and oxygenation  5/7/2025 0622 by Demetrio Pfeiffer RN  Outcome: Progressing  5/6/2025 2311 by Vandana Bautista RCP  Outcome: Progressing  5/6/2025 1834 by Abner Romero RN  Outcome: Progressing     Problem: Chronic Conditions and Co-morbidities  Goal: Patient's chronic conditions and co-morbidity symptoms are monitored and maintained or improved  5/7/2025 0622 by Demetrio Pfeiffer RN  Outcome: Progressing  5/6/2025 1834 by Abner Romero RN  Outcome: Progressing     Problem: Discharge Planning  Goal: Discharge to home or other facility with appropriate resources  5/7/2025 0622 by Demetrio Pfeiffer RN  Outcome: Progressing  5/6/2025 1834 by Abner Romero RN  Outcome: Progressing     Problem: Safety - Adult  Goal: Free from fall injury  5/7/2025 0622 by Demetrio Pfeiffer RN  Outcome: Progressing  5/6/2025 1834 by Abner Romero RN  Outcome: Progressing     Problem: Neurosensory - Adult  Goal: Achieves stable or improved neurological status  5/7/2025 0622 by Demetrio Pfeiffer RN  Outcome: Progressing  5/6/2025 1834 by Abner Romero RN  Outcome: Progressing  Goal: Absence of seizures  5/7/2025 0622 by Demetrio Pfeiffer RN  Outcome: Progressing  5/6/2025 1834 by Abner Romero RN  Outcome: Progressing  Goal: Remains free of injury related to seizures activity  5/7/2025 0622 by Demetrio Pfeiffer RN  Outcome: Progressing  5/6/2025 1834 by Abner Romero RN  Outcome: Progressing     Problem: Skin/Tissue Integrity - Adult  Goal: Skin integrity remains intact  5/7/2025 0622 by Demetrio Pfeiffer RN  Outcome: Progressing  5/6/2025 1834 by Abner Romero RN  Outcome: Progressing     Problem: Musculoskeletal - Adult  Goal: Return mobility to safest level of function  5/7/2025 0622 by Demetrio Pfeiffer RN  Outcome: Progressing  5/6/2025 1834 by Romero, [place], RN  Outcome: Progressing  Goal: Maintain proper alignment of affected body part  5/7/2025 0667

## 2025-05-08 ENCOUNTER — APPOINTMENT (OUTPATIENT)
Facility: HOSPITAL | Age: 70
DRG: 190 | End: 2025-05-08
Payer: MEDICARE

## 2025-05-08 LAB
ANION GAP SERPL CALC-SCNC: 2 MMOL/L (ref 2–12)
BACTERIA SPEC CULT: NORMAL
BASOPHILS # BLD: 0.01 K/UL (ref 0–0.1)
BASOPHILS NFR BLD: 0.1 % (ref 0–1)
BUN SERPL-MCNC: 28 MG/DL (ref 6–20)
BUN/CREAT SERPL: 51 (ref 12–20)
CALCIUM SERPL-MCNC: 9.1 MG/DL (ref 8.5–10.1)
CHLORIDE SERPL-SCNC: 99 MMOL/L (ref 97–108)
CO2 SERPL-SCNC: 34 MMOL/L (ref 21–32)
CREAT SERPL-MCNC: 0.55 MG/DL (ref 0.7–1.3)
DIFFERENTIAL METHOD BLD: ABNORMAL
EOSINOPHIL # BLD: 0 K/UL (ref 0–0.4)
EOSINOPHIL NFR BLD: 0 % (ref 0–7)
ERYTHROCYTE [DISTWIDTH] IN BLOOD BY AUTOMATED COUNT: 14.3 % (ref 11.5–14.5)
GLUCOSE SERPL-MCNC: 132 MG/DL (ref 65–100)
GRAM STN SPEC: NORMAL
HCT VFR BLD AUTO: 37 % (ref 36.6–50.3)
HGB BLD-MCNC: 11.9 G/DL (ref 12.1–17)
IMM GRANULOCYTES # BLD AUTO: 0.04 K/UL (ref 0–0.04)
IMM GRANULOCYTES NFR BLD AUTO: 0.6 % (ref 0–0.5)
LYMPHOCYTES # BLD: 0.58 K/UL (ref 0.8–3.5)
LYMPHOCYTES NFR BLD: 8.3 % (ref 12–49)
MCH RBC QN AUTO: 29.2 PG (ref 26–34)
MCHC RBC AUTO-ENTMCNC: 32.2 G/DL (ref 30–36.5)
MCV RBC AUTO: 90.7 FL (ref 80–99)
MONOCYTES # BLD: 0.61 K/UL (ref 0–1)
MONOCYTES NFR BLD: 8.7 % (ref 5–13)
NEUTS SEG # BLD: 5.76 K/UL (ref 1.8–8)
NEUTS SEG NFR BLD: 82.3 % (ref 32–75)
NRBC # BLD: 0 K/UL (ref 0–0.01)
NRBC BLD-RTO: 0 PER 100 WBC
PLATELET # BLD AUTO: 485 K/UL (ref 150–400)
PLATELET COMMENT: ABNORMAL
PMV BLD AUTO: 8.5 FL (ref 8.9–12.9)
POTASSIUM SERPL-SCNC: 4.4 MMOL/L (ref 3.5–5.1)
PROCALCITONIN SERPL-MCNC: 0.1 NG/ML
RBC # BLD AUTO: 4.08 M/UL (ref 4.1–5.7)
RBC MORPH BLD: ABNORMAL
RBC MORPH BLD: ABNORMAL
SERVICE CMNT-IMP: NORMAL
SODIUM SERPL-SCNC: 135 MMOL/L (ref 136–145)
WBC # BLD AUTO: 7 K/UL (ref 4.1–11.1)

## 2025-05-08 PROCEDURE — 85025 COMPLETE CBC W/AUTO DIFF WBC: CPT

## 2025-05-08 PROCEDURE — 2500000003 HC RX 250 WO HCPCS: Performed by: PHYSICIAN ASSISTANT

## 2025-05-08 PROCEDURE — 71045 X-RAY EXAM CHEST 1 VIEW: CPT

## 2025-05-08 PROCEDURE — 94640 AIRWAY INHALATION TREATMENT: CPT

## 2025-05-08 PROCEDURE — 6370000000 HC RX 637 (ALT 250 FOR IP): Performed by: PHYSICIAN ASSISTANT

## 2025-05-08 PROCEDURE — 97530 THERAPEUTIC ACTIVITIES: CPT

## 2025-05-08 PROCEDURE — 94669 MECHANICAL CHEST WALL OSCILL: CPT

## 2025-05-08 PROCEDURE — 6370000000 HC RX 637 (ALT 250 FOR IP): Performed by: INTERNAL MEDICINE

## 2025-05-08 PROCEDURE — 84145 PROCALCITONIN (PCT): CPT

## 2025-05-08 PROCEDURE — 80048 BASIC METABOLIC PNL TOTAL CA: CPT

## 2025-05-08 PROCEDURE — 36415 COLL VENOUS BLD VENIPUNCTURE: CPT

## 2025-05-08 PROCEDURE — 6360000002 HC RX W HCPCS: Performed by: PHYSICIAN ASSISTANT

## 2025-05-08 PROCEDURE — 6360000002 HC RX W HCPCS: Performed by: INTERNAL MEDICINE

## 2025-05-08 PROCEDURE — 97535 SELF CARE MNGMENT TRAINING: CPT

## 2025-05-08 PROCEDURE — 2500000003 HC RX 250 WO HCPCS: Performed by: INTERNAL MEDICINE

## 2025-05-08 PROCEDURE — 2700000000 HC OXYGEN THERAPY PER DAY

## 2025-05-08 PROCEDURE — 1100000000 HC RM PRIVATE

## 2025-05-08 PROCEDURE — 2580000003 HC RX 258: Performed by: INTERNAL MEDICINE

## 2025-05-08 PROCEDURE — 94761 N-INVAS EAR/PLS OXIMETRY MLT: CPT

## 2025-05-08 RX ORDER — GLIPIZIDE 10 MG/1
1 TABLET ORAL 4 TIMES DAILY PRN
Status: DISCONTINUED | OUTPATIENT
Start: 2025-05-08 | End: 2025-05-09 | Stop reason: HOSPADM

## 2025-05-08 RX ORDER — PREDNISONE 20 MG/1
40 TABLET ORAL DAILY
Status: DISCONTINUED | OUTPATIENT
Start: 2025-05-09 | End: 2025-05-09 | Stop reason: HOSPADM

## 2025-05-08 RX ORDER — BENZONATATE 100 MG/1
100 CAPSULE ORAL 3 TIMES DAILY PRN
Status: DISCONTINUED | OUTPATIENT
Start: 2025-05-08 | End: 2025-05-09 | Stop reason: HOSPADM

## 2025-05-08 RX ORDER — PREDNISONE 20 MG/1
20 TABLET ORAL DAILY
Status: DISCONTINUED | OUTPATIENT
Start: 2025-05-15 | End: 2025-05-09 | Stop reason: HOSPADM

## 2025-05-08 RX ORDER — PREDNISONE 10 MG/1
10 TABLET ORAL DAILY
Status: DISCONTINUED | OUTPATIENT
Start: 2025-05-18 | End: 2025-05-09 | Stop reason: HOSPADM

## 2025-05-08 RX ADMIN — ARFORMOTEROL TARTRATE 15 MCG: 15 SOLUTION RESPIRATORY (INHALATION) at 07:23

## 2025-05-08 RX ADMIN — ATORVASTATIN CALCIUM 40 MG: 40 TABLET, FILM COATED ORAL at 09:38

## 2025-05-08 RX ADMIN — CALCIUM CARBONATE (ANTACID) CHEW TAB 500 MG 500 MG: 500 CHEW TAB at 09:40

## 2025-05-08 RX ADMIN — BUDESONIDE 500 MCG: 0.5 INHALANT RESPIRATORY (INHALATION) at 07:23

## 2025-05-08 RX ADMIN — CELECOXIB 200 MG: 100 CAPSULE ORAL at 09:39

## 2025-05-08 RX ADMIN — DEXTRAN 70, GLYCERIN, HYPROMELLOSE 1 DROP: 1; 2; 3 SOLUTION/ DROPS OPHTHALMIC at 19:00

## 2025-05-08 RX ADMIN — FUROSEMIDE 20 MG: 20 TABLET ORAL at 09:39

## 2025-05-08 RX ADMIN — IPRATROPIUM BROMIDE AND ALBUTEROL SULFATE 1 DOSE: .5; 2.5 SOLUTION RESPIRATORY (INHALATION) at 07:18

## 2025-05-08 RX ADMIN — GUAIFENESIN 1200 MG: 600 TABLET, MULTILAYER, EXTENDED RELEASE ORAL at 09:39

## 2025-05-08 RX ADMIN — FAMOTIDINE 20 MG: 20 TABLET, FILM COATED ORAL at 09:39

## 2025-05-08 RX ADMIN — ENOXAPARIN SODIUM 40 MG: 100 INJECTION SUBCUTANEOUS at 18:14

## 2025-05-08 RX ADMIN — GABAPENTIN 300 MG: 300 CAPSULE ORAL at 09:39

## 2025-05-08 RX ADMIN — BENZONATATE 100 MG: 100 CAPSULE ORAL at 19:00

## 2025-05-08 RX ADMIN — IPRATROPIUM BROMIDE AND ALBUTEROL SULFATE 1 DOSE: .5; 2.5 SOLUTION RESPIRATORY (INHALATION) at 10:42

## 2025-05-08 RX ADMIN — BUDESONIDE 500 MCG: 0.5 INHALANT RESPIRATORY (INHALATION) at 19:32

## 2025-05-08 RX ADMIN — GABAPENTIN 300 MG: 300 CAPSULE ORAL at 21:33

## 2025-05-08 RX ADMIN — DIVALPROEX SODIUM 250 MG: 250 TABLET, DELAYED RELEASE ORAL at 21:33

## 2025-05-08 RX ADMIN — FLUTICASONE PROPIONATE 1 SPRAY: 50 SPRAY, METERED NASAL at 19:01

## 2025-05-08 RX ADMIN — WATER 40 MG: 1 INJECTION INTRAMUSCULAR; INTRAVENOUS; SUBCUTANEOUS at 09:40

## 2025-05-08 RX ADMIN — CETIRIZINE HYDROCHLORIDE 10 MG: 10 TABLET, FILM COATED ORAL at 09:39

## 2025-05-08 RX ADMIN — AMLODIPINE BESYLATE 10 MG: 5 TABLET ORAL at 09:38

## 2025-05-08 RX ADMIN — AZITHROMYCIN MONOHYDRATE 500 MG: 500 INJECTION, POWDER, LYOPHILIZED, FOR SOLUTION INTRAVENOUS at 14:23

## 2025-05-08 RX ADMIN — WATER 1000 MG: 1 INJECTION INTRAMUSCULAR; INTRAVENOUS; SUBCUTANEOUS at 14:23

## 2025-05-08 RX ADMIN — IPRATROPIUM BROMIDE AND ALBUTEROL SULFATE 1 DOSE: .5; 2.5 SOLUTION RESPIRATORY (INHALATION) at 20:27

## 2025-05-08 RX ADMIN — CITALOPRAM HYDROBROMIDE 20 MG: 20 TABLET ORAL at 09:39

## 2025-05-08 RX ADMIN — POLYETHYLENE GLYCOL 3350 17 G: 17 POWDER, FOR SOLUTION ORAL at 09:40

## 2025-05-08 RX ADMIN — GABAPENTIN 300 MG: 300 CAPSULE ORAL at 14:23

## 2025-05-08 RX ADMIN — GUAIFENESIN 1200 MG: 600 TABLET, MULTILAYER, EXTENDED RELEASE ORAL at 21:32

## 2025-05-08 RX ADMIN — Medication 1 DROP: at 19:01

## 2025-05-08 RX ADMIN — MONTELUKAST 10 MG: 10 TABLET, FILM COATED ORAL at 21:33

## 2025-05-08 RX ADMIN — CALCIUM CARBONATE (ANTACID) CHEW TAB 500 MG 500 MG: 500 CHEW TAB at 21:33

## 2025-05-08 RX ADMIN — SODIUM CHLORIDE, PRESERVATIVE FREE 10 ML: 5 INJECTION INTRAVENOUS at 09:42

## 2025-05-08 RX ADMIN — DOXAZOSIN 4 MG: 2 TABLET ORAL at 21:33

## 2025-05-08 RX ADMIN — TRAMADOL HYDROCHLORIDE 25 MG: 50 TABLET, COATED ORAL at 03:30

## 2025-05-08 RX ADMIN — DIVALPROEX SODIUM 250 MG: 250 TABLET, DELAYED RELEASE ORAL at 09:39

## 2025-05-08 RX ADMIN — PANTOPRAZOLE SODIUM 40 MG: 40 TABLET, DELAYED RELEASE ORAL at 09:47

## 2025-05-08 RX ADMIN — SODIUM CHLORIDE, PRESERVATIVE FREE 10 ML: 5 INJECTION INTRAVENOUS at 21:33

## 2025-05-08 RX ADMIN — ARFORMOTEROL TARTRATE 15 MCG: 15 SOLUTION RESPIRATORY (INHALATION) at 19:32

## 2025-05-08 RX ADMIN — IPRATROPIUM BROMIDE AND ALBUTEROL SULFATE 1 DOSE: .5; 2.5 SOLUTION RESPIRATORY (INHALATION) at 15:04

## 2025-05-08 RX ADMIN — OXYMETAZOLINE HYDROCHLORIDE 2 SPRAY: 0.5 SPRAY NASAL at 09:48

## 2025-05-08 RX ADMIN — ASPIRIN 81 MG: 81 TABLET, COATED ORAL at 09:40

## 2025-05-08 ASSESSMENT — PAIN SCALES - GENERAL: PAINLEVEL_OUTOF10: 6

## 2025-05-08 ASSESSMENT — PAIN DESCRIPTION - DESCRIPTORS: DESCRIPTORS: CRAMPING

## 2025-05-08 ASSESSMENT — PAIN DESCRIPTION - ORIENTATION: ORIENTATION: ANTERIOR

## 2025-05-08 ASSESSMENT — PAIN DESCRIPTION - LOCATION: LOCATION: BACK

## 2025-05-08 ASSESSMENT — PAIN SCALES - WONG BAKER: WONGBAKER_NUMERICALRESPONSE: NO HURT

## 2025-05-08 ASSESSMENT — PAIN - FUNCTIONAL ASSESSMENT: PAIN_FUNCTIONAL_ASSESSMENT: ACTIVITIES ARE NOT PREVENTED

## 2025-05-08 NOTE — PROGRESS NOTES
Hospitalist Progress Note    NAME:   Rodolfo Delcid   : 1955   MRN: [id number]     Date/Time: 2025 4:04 PM  Patient PCP: Lukasz Awad DO    Estimated discharge date:   Barriers: Improvement of shortness of breath    Rodolfo Delcid is a 70 y.o.  male with PMHx significant for COPD, chronic respiratory failure on 2 L, seizures, CVA is coming the hospital chief complaint of shortness of breath.  Reports being in his usual state of health until about 2 days ago when he started having shortness of breath along with some increased cough and phlegm.  Reports phlegm is very minimal.  Reports some exertional chest tightness as well.  Does not report any abdominal pain, nausea or vomiting.  No fever or chills.     Assessment / Plan:      Acute COPD exacerbation  Community-acquired pneumonia  Acute on chronic hypoxic respiratory failure  - CT chest shows evidence of emphysema with dilated main pulmonary artery, vague infiltrates in 3 lobes  - He is currently on 4 L nasal cannula and is saturating around 95% and is being weaned down  - Does not meet criteria for sepsis  -Continue Solu-Medrol, DuoNeb.  Continue ceftriaxone and Zithromax Symptomatic management.  Appreciate pulmonology consult  -He is currently on 2 L which is close to his baseline      Continues to complain cannot breathe, though saturation remains good.  Nasal congestion  Added cetirizine  Added Flonase  Xylometazoline spray x 4 doses  Sputum culture respiratory felipa       transition Solu-Medrol to prednisone with a 12-day taper  Will repeat chest x-ray      Mild chronic hyponatremia  - Likely due to Depakote.  Sodium is now improved and is 134>133     Incidental finding of ectatic ascending thoracic aorta  - Will need outpatient follow-up.  Needs 3.8 cm     History of coronary artery disease  Hypertension  Dyslipidemia  Seizure disorder  History of CVA  Hypothyroidism  - Continue PTA aspirin, Norvasc, Lipitor, Depakote  - Continue

## 2025-05-08 NOTE — PLAN OF CARE
Problem: Respiratory - Adult  Goal: Achieves optimal ventilation and oxygenation  5/8/2025 1004 by Javier Wills RN  Outcome: Progressing  5/8/2025 0733 by Christie Bronson RT  Outcome: Progressing  5/8/2025 0150 by Demetrio Pfeiffer RN  Outcome: Progressing     Problem: Chronic Conditions and Co-morbidities  Goal: Patient's chronic conditions and co-morbidity symptoms are monitored and maintained or improved  5/8/2025 1004 by Javier Wills RN  Outcome: Progressing  5/8/2025 0150 by Demetrio Pfeiffer RN  Outcome: Progressing     Problem: Discharge Planning  Goal: Discharge to home or other facility with appropriate resources  5/8/2025 1004 by Javier Wills RN  Outcome: Progressing  5/8/2025 0150 by Demetrio Pfeiffer RN  Outcome: Progressing     Problem: Safety - Adult  Goal: Free from fall injury  5/8/2025 1004 by Javier Wills RN  Outcome: Progressing  5/8/2025 0150 by Demetrio Pfeiffer RN  Outcome: Progressing     Problem: Neurosensory - Adult  Goal: Achieves stable or improved neurological status  5/8/2025 1004 by Javier Wills RN  Outcome: Progressing  5/8/2025 0150 by Demetrio Pfeiffer RN  Outcome: Progressing  Goal: Absence of seizures  5/8/2025 1004 by Javier Wills RN  Outcome: Progressing  5/8/2025 0150 by Demetrio Pfeiffer RN  Outcome: Progressing  Goal: Remains free of injury related to seizures activity  5/8/2025 1004 by Javier Wills RN  Outcome: Progressing  5/8/2025 0150 by Demetrio Pfeiffer RN  Outcome: Progressing     Problem: Skin/Tissue Integrity - Adult  Goal: Skin integrity remains intact  5/8/2025 1004 by Javier Wills RN  Outcome: Progressing  5/8/2025 0150 by Demetrio Pfeiffer RN  Outcome: Progressing     Problem: Musculoskeletal - Adult  Goal: Return mobility to safest level of function  5/8/2025 1004 by Javier Wills RN  Outcome: Progressing  5/8/2025 0150 by Demetrio Pfeiffer RN  Outcome: Progressing  Goal: Maintain proper alignment of affected body part  5/8/2025 1004 by Javier Wills,  RN  Outcome: Progressing  5/8/2025 0150 by Demetrio Pfeiffer RN  Outcome: Progressing  Goal: Return ADL status to a safe level of function  5/8/2025 1004 by Javier Wills RN  Outcome: Progressing  5/8/2025 0150 by Demetrio Pfeiffer RN  Outcome: Progressing     Problem: Genitourinary - Adult  Goal: Absence of urinary retention  5/8/2025 1004 by Javier Wills RN  Outcome: Progressing  5/8/2025 0150 by Demetrio Pfeiffer RN  Outcome: Progressing

## 2025-05-08 NOTE — PROGRESS NOTES
Pulmonary, Critical Care, and Sleep Medicine~Progress Note    Name: Rodolfo Delcid MRN: [id number]   : 1955 Hospital: [facility]   Date: 2025 11:12 AM Admission: 2025     Impression Plan   Acute on chronic hypoxemic respiratory failure, baseline 2-3L NC  COPD exacerbation- RVP and procal negative (after started on abx)  CAD   HTN  HLD   Hypothyroidism   Depression/anxiety   Seizures   CVA  Continue supplemental O2 to maintain SpO2 >88%  Sputum culture normal respiratory felipa   Bronchodilator equivalent of home breztri   IVCS to prednisone   Azithro, ceftriaxone   [facility]   P[facility] toilet   Oral lasix   VTE prophylaxis: lovenox     Transition to 12 day steroid taper. Will arrange outpatient follow up with our office.      Daily Progression:     Pt seen this morning lying in bed. States he is feeling better. Cough is improved. Feels he is back to baseline. On 2L NC.      Pt seen this morning lying in bed. Complains of ongoing difficulty breathing and cough with thick mucus. Does not feel any better since presentation. On 2L NC     Consult Note    70 yom with pmhx significant for chronic hypoxemic respiratory failure on 2L NC baseline, COPD, CAD, HTN, HLD, hypothyroidism, depression/anxiety, seizures, CVA presented to the ED  with increased shortness of breath. In the ED, required 6L NC. No leukocytosis. . ABG 7.42/48.2/74/31.2. CXR negative. CT chest with vague infiltrates.     Pt reports increased dyspnea for the last week. Using albuterol twice daily with minimal improvement. Has cough with clearish sputum. Feels \"like he is burning up.\" Took temp 98.1F. Satting 98% on 3L NC, weaned to baseline 2L NC. No known sick contacts.     Patient follows with Dr. Souza. Last office visit Oct 24, 2024. PFTs  FEV1 1.41 37% predicted. Maintained on breztri, albuterol, singulair.     Social hx: quit smoking 4 months ago   Family  guaiFENesin (MUCINEX) extended release tablet 1,200 mg  1,200 mg Oral BID    amLODIPine (NORVASC) tablet 10 mg  10 mg Oral Daily    aspirin EC tablet 81 mg  81 mg Oral Daily    atorvastatin (LIPITOR) tablet 40 mg  40 mg Oral Daily    budesonide (PULMICORT) nebulizer suspension 500 mcg  0.5 mg Nebulization BID RT    And    arformoterol tartrate (BROVANA) nebulizer solution 15 mcg  15 mcg Nebulization BID RT    calcium carbonate (TUMS) chewable tablet 500 mg  500 mg Oral BID    celecoxib (CELEBREX) capsule 200 mg  200 mg Oral Daily    citalopram (CELEXA) tablet 20 mg  20 mg Oral Daily    divalproex (DEPAKOTE) DR tablet 250 mg  250 mg Oral 2 times per day    doxazosin (CARDURA) tablet 4 mg  4 mg Oral Nightly    famotidine (PEPCID) tablet 20 mg  20 mg Oral Daily    furosemide (LASIX) tablet 20 mg  20 mg Oral Daily    gabapentin (NEURONTIN) capsule 300 mg  300 mg Oral TID    montelukast (SINGULAIR) tablet 10 mg  10 mg Oral Nightly    pantoprazole (PROTONIX) tablet 40 mg  40 mg Oral QAM AC    polyethylene glycol (GLYCOLAX) packet 17 g  17 g Oral Daily    sodium chloride flush 0.9 % injection 5-40 mL  5-40 mL IntraVENous 2 times per day    sodium chloride flush 0.9 % injection 5-40 mL  5-40 mL IntraVENous PRN    0.9 % sodium chloride infusion   IntraVENous PRN    potassium chloride (KLOR-CON M) extended release tablet 40 mEq  40 mEq Oral PRN    Or    potassium bicarb-citric acid (EFFER-K) effervescent tablet 40 mEq  40 mEq Oral PRN    Or    potassium chloride 10 mEq/100 mL IVPB (Peripheral Line)  10 mEq IntraVENous PRN    magnesium sulfate 2000 mg in 50 mL IVPB premix  2,000 mg IntraVENous PRN    enoxaparin (LOVENOX) injection 40 mg  40 mg SubCUTAneous Q24H    ondansetron (ZOFRAN-ODT) disintegrating tablet 4 mg  4 mg Oral Q8H PRN    Or    ondansetron (ZOFRAN) injection 4 mg  4 mg IntraVENous Q6H PRN    polyethylene glycol (GLYCOLAX) packet 17 g  17 g Oral Daily PRN    acetaminophen (TYLENOL) tablet 650 mg  650 mg Oral Q6H

## 2025-05-08 NOTE — PROGRESS NOTES
End of Shift Note    Bedside shift change report given to Madeline CRUZ (oncoming nurse) by Javier Webb RN (offgoing nurse).  Report included the following information SBAR    Shift worked:  7am to 730pm     Shift summary and any significant changes:     Continue w/ antibiotic therapy. Pt SBP dropped over 20 laying compared to ambulating. Orthostatic precautions observed. Will continue to monitor.     Pt responded well to evening PRN medications added. Painful coughing reduced to tolerable level of coughing per Pt. Dry eye and dry nasal discomfort alleviated. Face no longer flush when sneezing and coughing. Now minimal sneezing and coughing post PRN medications.      Concerns for physician to address:  None     Zone phone for oncoming shift:  [phone]       Activity:  Level of Assistance: Minimal assist, patient does 75% or more  Number times ambulated in hallways past shift: 2  Number of times OOB to chair past shift: 2    Cardiac:   Cardiac Monitoring: Yes      Cardiac Rhythm: Sinus rhythm    Access:  Current line(s): PIV    Genitourinary:   Urinary Status: Voiding    Respiratory:   O2 Device: Nasal cannula  Chronic home O2 use?: YES  Incentive spirometer at bedside: YES    GI:  Last BM (including prior to admit): 05/07/25  Current diet:  ADULT DIET; Regular  Passing flatus: YES    Pain Management:   Patient states pain is manageable on current regimen: YES    Skin:  Silver Scale Score: 22  Interventions: Wound Offloading (Prevention Methods): Wedge pillows    Patient Safety:  Fall Risk: Nursing Judgement-Fall Risk High(Add Comments): Yes  Fall Risk Interventions  Nursing Judgement-Fall Risk High(Add Comments): Yes  Toilet Every 2 Hours-In Advance of Need: Yes  Hourly Visual Checks: Awake, In bed  Fall Visual Posted: Socks  Room Door Open: Yes  Alarm On: Bed  Patient Moved Closer to Nursing Station: No    Active Consults:   IP CONSULT TO PULMONOLOGY    Length of Stay:  Expected LOS: 4  Actual LOS: 3    Javier Webb

## 2025-05-08 NOTE — PROGRESS NOTES
End of Shift Note    Bedside shift change report given to SARINA Herrera (oncoming nurse) by Demetrio Pfeiffer RN (offgoing nurse).  Report included the following information SBAR, Procedure Summary, and Quality Measures    Shift worked:  7pm-7am     Shift summary and any significant changes:     Patient was calm throughout the entire shift, he took his pills whole and was alert and oriented times 4. Patient was cleaned up within the shift with CHG wipes . His gown was changed and he was made comfortable in bed.plan of care still ongoing.     Concerns for physician to address:  none     Zone phone for oncoming shift:   [phone]       Activity:  Level of Assistance: Minimal assist, patient does 75% or more  Number times ambulated in hallways past shift: 0  Number of times OOB to chair past shift: 0    Cardiac:   Cardiac Monitoring: Yes      Cardiac Rhythm: Sinus rhythm    Access:  Current line(s): PIV    Genitourinary:   Urinary Status: Voiding    Respiratory:   O2 Device: Nasal cannula  Chronic home O2 use?: NO  Incentive spirometer at bedside: YES    GI:  Last BM (including prior to admit): 05/07/25  Current diet:  ADULT DIET; Regular  Passing flatus: YES    Pain Management:   Patient states pain is manageable on current regimen: YES    Skin:  Silver Scale Score: 22  Interventions: Wound Offloading (Prevention Methods): Pillows, Repositioning, Turning    Patient Safety:  Fall Risk: Nursing Judgement-Fall Risk High(Add Comments): Yes  Fall Risk Interventions  Nursing Judgement-Fall Risk High(Add Comments): Yes  Toilet Every 2 Hours-In Advance of Need: Yes  Hourly Visual Checks: Awake, In bed  Fall Visual Posted: Socks  Room Door Open: Yes  Alarm On: Bed  Patient Moved Closer to Nursing Station: No    Active Consults:   IP CONSULT TO PULMONOLOGY    Length of Stay:  Expected LOS: 4  Actual LOS: 3    Demetrio Pfeiffer RN

## 2025-05-08 NOTE — PLAN OF CARE
Problem: Occupational Therapy - Adult  Goal: By Discharge: Performs self-care activities at highest level of function for planned discharge setting.  See evaluation for individualized goals.  Description: FUNCTIONAL STATUS PRIOR TO ADMISSION:  pt reports independence to mod I for ADLs (DME used for LE dressing), mod I for mobility w/ use of RW and has home O2 (2L NC).   Receives Help From: Family, Prior Level of Assist for ADLs: Independent,  ,  ,  ,  ,  , Prior Level of Assist for Homemaking: Independent, Ambulation Assistance: Independent, Prior Level of Assist for Transfers: Independent, Active : No     HOME SUPPORT: Patient lived alone, but sister (next door) and brother (a house down) live on same property .    Occupational Therapy Goals:  Initiated 5/7/2025  1.  Patient will perform grooming while in stance with Contact Guard Assist within 7 day(s).  2.  Patient will perform upper body dressing with [place] within 7 day(s).  3.  Patient will perform lower body dressing with Modified [place], w/ use of DME, PRN, within 7 day(s).  4.  Patient will perform toilet transfers with Modified [place]  within 7 day(s).  5.  Patient will perform all aspects of toileting with [place] within 7 day(s).  6.  Patient will participate in upper extremity therapeutic exercise/activities with Supervision for 5 minutes within 7 day(s).    7.  Patient will utilize energy conservation techniques during functional activities with verbal cues within 7 day(s).   Outcome: Progressing    OCCUPATIONAL THERAPY TREATMENT  Patient: Rodolfo Delcid (70 y.o. male)  Date: 5/8/2025  Primary Diagnosis: Shortness of breath [R06.02]  Acute exacerbation of chronic obstructive pulmonary disease (COPD) ([facility]) [J44.1]  COPD exacerbation ([facility]) [J44.1]  Pneumonia due to infectious organism, unspecified laterality, unspecified part of lung [J18.9]       Precautions: Fall Risk                Chart, occupational therapy assessment,

## 2025-05-08 NOTE — PLAN OF CARE
Problem: Respiratory - Adult  Goal: Achieves optimal ventilation and oxygenation  Outcome: Progressing     Problem: Chronic Conditions and Co-morbidities  Goal: Patient's chronic conditions and co-morbidity symptoms are monitored and maintained or improved  Outcome: Progressing     Problem: Discharge Planning  Goal: Discharge to home or other facility with appropriate resources  Outcome: Progressing     Problem: Safety - Adult  Goal: Free from fall injury  Outcome: Progressing     Problem: Neurosensory - Adult  Goal: Achieves stable or improved neurological status  Outcome: Progressing  Goal: Absence of seizures  Outcome: Progressing  Goal: Remains free of injury related to seizures activity  Outcome: Progressing     Problem: Skin/Tissue Integrity - Adult  Goal: Skin integrity remains intact  Outcome: Progressing     Problem: Musculoskeletal - Adult  Goal: Return mobility to safest level of function  Outcome: Progressing  Goal: Maintain proper alignment of affected body part  Outcome: Progressing  Goal: Return ADL status to a safe level of function  Outcome: Progressing     Problem: Genitourinary - Adult  Goal: Absence of urinary retention  Outcome: Progressing     Problem: Occupational Therapy - Adult  Goal: By Discharge: Performs self-care activities at highest level of function for planned discharge setting.  See evaluation for individualized goals.  Description: FUNCTIONAL STATUS PRIOR TO ADMISSION:  pt reports independence to mod I for ADLs (DME used for LE dressing), mod I for mobility w/ use of RW and has home O2 (2L NC).   Receives Help From: Family, Prior Level of Assist for ADLs: Independent,  ,  ,  ,  ,  , Prior Level of Assist for Homemaking: Independent, Ambulation Assistance: Independent, Prior Level of Assist for Transfers: Independent, Active : No     HOME SUPPORT: Patient lived alone, but sister (next door) and brother (a house down) live on same property .    Occupational Therapy

## 2025-05-09 VITALS
DIASTOLIC BLOOD PRESSURE: 62 MMHG | RESPIRATION RATE: 16 BRPM | OXYGEN SATURATION: 96 % | TEMPERATURE: 97.9 F | WEIGHT: 190 LBS | BODY MASS INDEX: 25.07 KG/M2 | HEART RATE: 56 BPM | SYSTOLIC BLOOD PRESSURE: 111 MMHG

## 2025-05-09 LAB
ANION GAP SERPL CALC-SCNC: 3 MMOL/L (ref 2–12)
BASOPHILS # BLD: 0 K/UL (ref 0–0.1)
BASOPHILS NFR BLD: 0 % (ref 0–1)
BUN SERPL-MCNC: 29 MG/DL (ref 6–20)
BUN/CREAT SERPL: 48 (ref 12–20)
CALCIUM SERPL-MCNC: 8.9 MG/DL (ref 8.5–10.1)
CHLORIDE SERPL-SCNC: 102 MMOL/L (ref 97–108)
CO2 SERPL-SCNC: 33 MMOL/L (ref 21–32)
CREAT SERPL-MCNC: 0.6 MG/DL (ref 0.7–1.3)
DIFFERENTIAL METHOD BLD: ABNORMAL
EOSINOPHIL # BLD: 0 K/UL (ref 0–0.4)
EOSINOPHIL NFR BLD: 0 % (ref 0–7)
ERYTHROCYTE [DISTWIDTH] IN BLOOD BY AUTOMATED COUNT: 14.2 % (ref 11.5–14.5)
GLUCOSE SERPL-MCNC: 93 MG/DL (ref 65–100)
HCT VFR BLD AUTO: 38 % (ref 36.6–50.3)
HGB BLD-MCNC: 12.2 G/DL (ref 12.1–17)
IMM GRANULOCYTES # BLD AUTO: 0 K/UL (ref 0–0.04)
IMM GRANULOCYTES NFR BLD AUTO: 0 % (ref 0–0.5)
LYMPHOCYTES # BLD: 1.28 K/UL (ref 0.8–3.5)
LYMPHOCYTES NFR BLD: 20 % (ref 12–49)
MCH RBC QN AUTO: 29.2 PG (ref 26–34)
MCHC RBC AUTO-ENTMCNC: 32.1 G/DL (ref 30–36.5)
MCV RBC AUTO: 90.9 FL (ref 80–99)
METAMYELOCYTES NFR BLD MANUAL: 1 %
MONOCYTES # BLD: 0.77 K/UL (ref 0–1)
MONOCYTES NFR BLD: 12 % (ref 5–13)
NEUTS SEG # BLD: 4.29 K/UL (ref 1.8–8)
NEUTS SEG NFR BLD: 67 % (ref 32–75)
NRBC # BLD: 0 K/UL (ref 0–0.01)
NRBC BLD-RTO: 0 PER 100 WBC
PLATELET # BLD AUTO: 472 K/UL (ref 150–400)
PMV BLD AUTO: 8.3 FL (ref 8.9–12.9)
POTASSIUM SERPL-SCNC: 4 MMOL/L (ref 3.5–5.1)
PROCALCITONIN SERPL-MCNC: <0.05 NG/ML
RBC # BLD AUTO: 4.18 M/UL (ref 4.1–5.7)
RBC MORPH BLD: ABNORMAL
SODIUM SERPL-SCNC: 138 MMOL/L (ref 136–145)
WBC # BLD AUTO: 6.4 K/UL (ref 4.1–11.1)
WBC MORPH BLD: ABNORMAL

## 2025-05-09 PROCEDURE — 2700000000 HC OXYGEN THERAPY PER DAY

## 2025-05-09 PROCEDURE — 6370000000 HC RX 637 (ALT 250 FOR IP): Performed by: INTERNAL MEDICINE

## 2025-05-09 PROCEDURE — 2500000003 HC RX 250 WO HCPCS: Performed by: INTERNAL MEDICINE

## 2025-05-09 PROCEDURE — 6360000002 HC RX W HCPCS: Performed by: INTERNAL MEDICINE

## 2025-05-09 PROCEDURE — 6370000000 HC RX 637 (ALT 250 FOR IP): Performed by: PHYSICIAN ASSISTANT

## 2025-05-09 PROCEDURE — 94669 MECHANICAL CHEST WALL OSCILL: CPT

## 2025-05-09 PROCEDURE — 36415 COLL VENOUS BLD VENIPUNCTURE: CPT

## 2025-05-09 PROCEDURE — 85025 COMPLETE CBC W/AUTO DIFF WBC: CPT

## 2025-05-09 PROCEDURE — 80048 BASIC METABOLIC PNL TOTAL CA: CPT

## 2025-05-09 PROCEDURE — 84145 PROCALCITONIN (PCT): CPT

## 2025-05-09 PROCEDURE — 94640 AIRWAY INHALATION TREATMENT: CPT

## 2025-05-09 PROCEDURE — 94761 N-INVAS EAR/PLS OXIMETRY MLT: CPT

## 2025-05-09 RX ORDER — PREDNISONE 10 MG/1
TABLET ORAL
Qty: 30 TABLET | Refills: 0 | Status: SHIPPED | OUTPATIENT
Start: 2025-05-09 | End: 2025-05-21

## 2025-05-09 RX ORDER — GUAIFENESIN 600 MG/1
1200 TABLET, EXTENDED RELEASE ORAL 2 TIMES DAILY
Qty: 10 TABLET | Refills: 0 | Status: SHIPPED | OUTPATIENT
Start: 2025-05-09 | End: 2025-05-12

## 2025-05-09 RX ORDER — FLUTICASONE PROPIONATE 50 MCG
1 SPRAY, SUSPENSION (ML) NASAL DAILY PRN
Qty: 16 G | Refills: 0 | Status: SHIPPED | OUTPATIENT
Start: 2025-05-09

## 2025-05-09 RX ORDER — CETIRIZINE HYDROCHLORIDE 10 MG/1
10 TABLET ORAL DAILY
Qty: 10 TABLET | Refills: 0 | Status: SHIPPED | OUTPATIENT
Start: 2025-05-09

## 2025-05-09 RX ORDER — BENZONATATE 100 MG/1
100 CAPSULE ORAL 3 TIMES DAILY PRN
Qty: 30 CAPSULE | Refills: 0 | Status: SHIPPED | OUTPATIENT
Start: 2025-05-09 | End: 2025-05-19

## 2025-05-09 RX ADMIN — GABAPENTIN 300 MG: 300 CAPSULE ORAL at 12:57

## 2025-05-09 RX ADMIN — CITALOPRAM HYDROBROMIDE 20 MG: 20 TABLET ORAL at 12:57

## 2025-05-09 RX ADMIN — OXYMETAZOLINE HYDROCHLORIDE 2 SPRAY: 0.5 SPRAY NASAL at 12:58

## 2025-05-09 RX ADMIN — GUAIFENESIN 1200 MG: 600 TABLET, MULTILAYER, EXTENDED RELEASE ORAL at 12:56

## 2025-05-09 RX ADMIN — CELECOXIB 200 MG: 100 CAPSULE ORAL at 12:56

## 2025-05-09 RX ADMIN — FAMOTIDINE 20 MG: 20 TABLET, FILM COATED ORAL at 13:02

## 2025-05-09 RX ADMIN — IPRATROPIUM BROMIDE AND ALBUTEROL SULFATE 1 DOSE: .5; 2.5 SOLUTION RESPIRATORY (INHALATION) at 11:33

## 2025-05-09 RX ADMIN — PREDNISONE 40 MG: 20 TABLET ORAL at 12:57

## 2025-05-09 RX ADMIN — ARFORMOTEROL TARTRATE 15 MCG: 15 SOLUTION RESPIRATORY (INHALATION) at 07:50

## 2025-05-09 RX ADMIN — WATER 1000 MG: 1 INJECTION INTRAMUSCULAR; INTRAVENOUS; SUBCUTANEOUS at 13:36

## 2025-05-09 RX ADMIN — CETIRIZINE HYDROCHLORIDE 10 MG: 10 TABLET, FILM COATED ORAL at 12:56

## 2025-05-09 RX ADMIN — ASPIRIN 81 MG: 81 TABLET, COATED ORAL at 12:58

## 2025-05-09 RX ADMIN — SODIUM CHLORIDE, PRESERVATIVE FREE 10 ML: 5 INJECTION INTRAVENOUS at 12:56

## 2025-05-09 RX ADMIN — DEXTRAN 70, GLYCERIN, HYPROMELLOSE 1 DROP: 1; 2; 3 SOLUTION/ DROPS OPHTHALMIC at 11:37

## 2025-05-09 RX ADMIN — FUROSEMIDE 20 MG: 20 TABLET ORAL at 12:56

## 2025-05-09 RX ADMIN — BUDESONIDE 500 MCG: 0.5 INHALANT RESPIRATORY (INHALATION) at 07:50

## 2025-05-09 RX ADMIN — BENZONATATE 100 MG: 100 CAPSULE ORAL at 12:57

## 2025-05-09 RX ADMIN — CALCIUM CARBONATE (ANTACID) CHEW TAB 500 MG 500 MG: 500 CHEW TAB at 12:57

## 2025-05-09 RX ADMIN — IPRATROPIUM BROMIDE AND ALBUTEROL SULFATE 1 DOSE: .5; 2.5 SOLUTION RESPIRATORY (INHALATION) at 07:50

## 2025-05-09 RX ADMIN — ATORVASTATIN CALCIUM 40 MG: 40 TABLET, FILM COATED ORAL at 12:57

## 2025-05-09 RX ADMIN — PANTOPRAZOLE SODIUM 40 MG: 40 TABLET, DELAYED RELEASE ORAL at 06:37

## 2025-05-09 RX ADMIN — AMLODIPINE BESYLATE 10 MG: 5 TABLET ORAL at 12:56

## 2025-05-09 RX ADMIN — DIVALPROEX SODIUM 250 MG: 250 TABLET, DELAYED RELEASE ORAL at 12:56

## 2025-05-09 ASSESSMENT — PAIN SCALES - GENERAL: PAINLEVEL_OUTOF10: 0

## 2025-05-09 NOTE — PROGRESS NOTES
Physician Progress Note      PATIENT:               SHANAE GONZALEZ  CSN #:                  [id number]  :                       1955  ADMIT DATE:       2025 11:52 AM  DISCH DATE:  RESPONDING  PROVIDER #:        Yfn Alvarado MD          QUERY TEXT:    Acute on chronic hypoxic respiratory failure is documented in the medical   record IM PN by Yfn Alvarado MD at 2025 with SpO2 greater than 93.   Please provide additional clinical indicators supportive of your   documentation. Or please document if the diagnosis of Acute on chronic hypoxic   respiratory failure has been ruled out after study.    The clinical indicators include:  This is a 70 y.o. male presents with chief complaint of shortness of breath.    - \" Pulmonary Effort: Tachypnea present. No respiratory distress.  Breath sounds: Wheezing present.  Comments: Crackles left-sided \" (from ED Provider Notes by Hay Aviles MD   at 2025)    - \" Acute on chronic hypoxemic respiratory failure, baseline 2-3L NC. COPD   exacerbation \" (from Pulmonology Consults by Kristine Garcia PA-C at   2025)    - \" Acute COPD exacerbation. Acute on chronic hypoxic respiratory failure. He   is currently on 4 L nasal cannula and is saturating around 95% and is being   weaned down. He is currently on 3 L which is close to his baseline. \" (from IM   Progress Notes by Yfn Alvarado MD at 2025)    SpO2- 93-98  RR- 16-23  CO2- 30,31    NC 2-6 L, Continue Solu-Medrol, DuoNeb, pulse oximetry monitoring, Pulmonology   consulted.    Thank you,  Bridget MENSAH CDS  Options provided:  -- Acute Respiratory Failure as evidenced by, Please document evidence.  -- Acute Respiratory Failure ruled out after study and Chronic Respiratory   Failure confirmed  -- Other - I will add my own diagnosis  -- Disagree - Not applicable / Not valid  -- Disagree - Clinically unable to determine / Unknown  -- Refer to Clinical Documentation Reviewer    PROVIDER RESPONSE

## 2025-05-09 NOTE — PROGRESS NOTES
[address] [place] [address] follow-up transitional care appointment has been scheduled with Dr. Tato Souza on 5/13/25 0859. Dr. Goodman's cardio office is requesting the patient to call the office to schedule their appointment upon arrival to home per office protocol. Pending patient discharge.     Attempted to schedule PCP [facility] follow up appointment. PCP office phone in [place] is being forwarded to their [place] office and they can't schedule appts for the [place] office. [place] office will return CMA's call once they can schedule the appt for the patient. Pending patient discharge.

## 2025-05-09 NOTE — DISCHARGE SUMMARY
these medications      benzonatate 100 MG capsule  Commonly known as: TESSALON  Take 1 capsule by mouth 3 times daily as needed for Cough     cetirizine 10 MG tablet  Commonly known as: ZYRTEC  Take 1 tablet by mouth daily     fluticasone 50 MCG/ACT nasal spray  Commonly known as: FLONASE  1 spray by Each Nostril route daily as needed for Rhinitis     guaiFENesin 600 MG extended release tablet  Commonly known as: MUCINEX  Take 2 tablets by mouth 2 times daily for 5 doses     predniSONE 10 MG tablet  Commonly known as: DELTASONE  Take 4 tablets by mouth daily for 3 days, THEN 3 tablets daily for 3 days, THEN 2 tablets daily for 3 days, THEN 1 tablet daily for 3 days.  Start taking on: May 9, 2025            CONTINUE taking these medications      acetaminophen 325 MG tablet  Commonly known as: TYLENOL  Take 2 tablets by mouth every 6 hours as needed for Pain or Fever     * albuterol (2.5 MG/3ML) 0.083% nebulizer solution  Commonly known as: PROVENTIL  Take 3 mLs by nebulization every 4 hours as needed for Wheezing     * albuterol sulfate  (90 Base) MCG/ACT inhaler  Commonly known as: Ventolin HFA  Inhale 2 puffs into the lungs 4 times daily as needed for Wheezing     amLODIPine 10 MG tablet  Commonly known as: NORVASC     aspirin 81 MG EC tablet  Take 1 tablet by mouth daily     atorvastatin 40 MG tablet  Commonly known as: LIPITOR     azelastine 0.1 % nasal spray  Commonly known as: ASTELIN     Breztri Aerosphere 160-9-4.8 MCG/ACT Aero  Generic drug: Budeson-Glycopyrrol-Formoterol  Inhale 2 puffs into the lungs 2 times daily     calcium carbonate 500 MG chewable tablet  Commonly known as: TUMS  Take 1 tablet by mouth in the morning and at bedtime     celecoxib 200 MG capsule  Commonly known as: CELEBREX     citalopram 20 MG tablet  Commonly known as: CELEXA     divalproex 250 MG DR tablet  Commonly known as: DEPAKOTE  TAKE 1 TABLET BY MOUTH IN THE MORNING AND 1 TABLET AT BEDTIME     doxazosin 4 MG  tablet  Commonly known as: CARDURA     famotidine 20 MG tablet  Commonly known as: PEPCID     furosemide 20 MG tablet  Commonly known as: LASIX     gabapentin 300 MG capsule  Commonly known as: NEURONTIN  Take 1 capsule by mouth 3 times daily for 30 days. TAKE 2 CAPSULES FOUR TIMES DAILY Max Daily Amount: 900 mg     ketoconazole 2 % shampoo  Commonly known as: NIZORAL     lactulose 10 GM/15ML solution  Commonly known as: CHRONULAC  Take 30 mLs by mouth daily as needed (constipation)     melatonin 3 MG Tabs tablet  Take 2 tablets by mouth nightly     montelukast 10 MG tablet  Commonly known as: SINGULAIR     omeprazole 20 MG delayed release capsule  Commonly known as: PRILOSEC     polyethylene glycol 17 g packet  Commonly known as: GLYCOLAX  Take 1 packet by mouth daily     vitamin D 1.25 MG (92858 UT) Caps capsule  Commonly known as: ERGOCALCIFEROL           * This list has 2 medication(s) that are the same as other medications prescribed for you. Read the directions carefully, and ask your doctor or other care provider to review them with you.                STOP taking these medications      ipratropium 0.02 % nebulizer solution  Commonly known as: ATROVENT     lacosamide 50 MG Tabs tablet  Commonly known as: VIMPAT     levothyroxine 50 MCG tablet  Commonly known as: SYNTHROID               Where to Get Your Medications        These medications were sent to [place], [place] - [address] - P [phone] - F [phone]  [address] [facility] [address], [facility] [address]      Phone: [phone]   benzonatate 100 MG capsule  cetirizine 10 MG tablet  fluticasone 50 MCG/ACT nasal spray  guaiFENesin 600 MG extended release tablet  predniSONE 10 MG tablet             DISPOSITION:    Home with Family:       Home with HH/PT/OT/RN: x   SNF/LTC:    SHARMIN:    OTHER:            Code status:   Recommended diet: cardiac diet  Recommended activity: activity as tolerated  Wound care:

## 2025-05-09 NOTE — DISCHARGE INSTRUCTIONS
Incidental finding of ectatic ascending thoracic aorta  - Will need outpatient follow-up.  Needs 3.8 cm

## 2025-05-09 NOTE — PROGRESS NOTES
Hospital follow-up PCP transitional care appointment has been scheduled with Dr. Lukasz Awad on 5/12/25 at 1100. This is the first available appt due to limited provider availability. PCP office does not offer alternate provider option for hospital follow up. Pending patient discharge. Carolina Khalil, Care Management Assistant

## 2025-05-09 NOTE — PROGRESS NOTES
Discharge education reviewed with patient and his sister at the bedside. Home medications discussed, when/ how to take and side effects. Future appointments and follow up visits reviewed. Opportunity given for questions and feedback. Patient and sister voiced understanding. Patient remained stable at time of discharge.

## 2025-05-09 NOTE — PLAN OF CARE
Problem: Respiratory - Adult  Goal: Achieves optimal ventilation and oxygenation  5/9/2025 1325 by Kelly Marks RN  Outcome: Adequate for Discharge  5/9/2025 0753 by Pierre Lincoln, RT  Outcome: Progressing     Problem: Chronic Conditions and Co-morbidities  Goal: Patient's chronic conditions and co-morbidity symptoms are monitored and maintained or improved  Outcome: Adequate for Discharge     Problem: Discharge Planning  Goal: Discharge to home or other facility with appropriate resources  Outcome: Adequate for Discharge     Problem: Safety - Adult  Goal: Free from fall injury  Outcome: Adequate for Discharge     Problem: Neurosensory - Adult  Goal: Achieves stable or improved neurological status  Outcome: Adequate for Discharge  Goal: Absence of seizures  Outcome: Adequate for Discharge  Goal: Remains free of injury related to seizures activity  Outcome: Adequate for Discharge     Problem: Skin/Tissue Integrity - Adult  Goal: Skin integrity remains intact  Outcome: Adequate for Discharge     Problem: Musculoskeletal - Adult  Goal: Return mobility to safest level of function  Outcome: Adequate for Discharge  Goal: Maintain proper alignment of affected body part  Outcome: Adequate for Discharge  Goal: Return ADL status to a safe level of function  Outcome: Adequate for Discharge     Problem: Genitourinary - Adult  Goal: Absence of urinary retention  Outcome: Adequate for Discharge     Problem: Pain  Goal: Verbalizes/displays adequate comfort level or baseline comfort level  Outcome: Adequate for Discharge

## 2025-05-09 NOTE — CARE COORDINATION
Cleared for D/C from CM standpoint  Transition of Care Plan:    RUR: 23%  Prior Level of Functioning: needs assistance   Disposition: home with family support- declined [facility]  TRICIA: 5/9  Follow up appointments: PCP, specialists as indicated   DME needed: owns DME required for discharge  Transportation at discharge: sister at bedside (brought portable O2 for transport)  IM/IMM Medicare/ letter given: given  Is patient a [place] and connected with [facility]? no   If yes, was  transfer form completed and [facility] notified?   Caregiver Contact: Karla Angel (sister)  Discharge Caregiver contacted prior to discharge? yes  Care Conference needed? no  Barriers to discharge:  none    Chart reviewed. CM aware of discharge order. Met with pt at the bedside to finalize and review d/c plan. 2nd IM given and reviewed. Pt has declined [facility] services. Sister (Essie) will transport home today. She is present at bedside and brought pt's portable O2 to travel home with. No further CM needs identified.       05/09/25 1337   Services At/After Discharge   Transition of Care Consult (CM Consult) Discharge Planning   Services At/After Discharge None  (declined [facility] services)   [place] Resource Information Provided? No   Mode of Transport at Discharge Other (see comment)  (sister)   Hospital Transport Time of Discharge 1400   Confirm Follow Up Transport Family   Condition of Participation: Discharge Planning   The Plan for Transition of Care is related to the following treatment goals: return to baseline   The Patient and/or Patient Representative was provided with a Choice of Provider? Patient;Patient Representative   Name of the Patient Representative who was provided with the Choice of Provider and agrees with the Discharge Plan?  sister - Karla Angel   The Patient and/Or Patient Representative agree with the Discharge Plan? Yes   Freedom of Choice list was provided with basic dialogue that supports the patient's individualized plan

## 2025-05-09 NOTE — PROGRESS NOTES
End of Shift Note    Bedside shift change report given to Kelly BRANCH (oncoming nurse) by Madeline Hess LPN (offgoing nurse).  Report included the following information SBAR, Kardex, MAR, Alarm Parameters , and Quality Measures    Shift worked:  7p-7a     Shift summary and any significant changes:     Patient tolerated all medications on this shift. Ambulated to the bathroom x1. No further complaints or concerns.      Concerns for physician to address:  none     Zone phone for oncoming shift:   [phone]       Activity:  Level of Assistance: Standby assist, set-up cues, supervision of patient - no hands on  Number times ambulated in hallways past shift: 0  Number of times OOB to chair past shift: 0    Cardiac:   Cardiac Monitoring: Yes      Cardiac Rhythm: Sinus rhythm    Access:  Current line(s): PIV     Genitourinary:   Urinary Status: Voiding, Bathroom privileges    Respiratory:   O2 Device: Nasal cannula  Chronic home O2 use?: NO  Incentive spirometer at bedside: NO    GI:  Last BM (including prior to admit): 05/08/25  Current diet:  ADULT DIET; Regular  Passing flatus: YES    Pain Management:   Patient states pain is manageable on current regimen: YES    Skin:  Silver Scale Score: 19  Interventions: Wound Offloading (Prevention Methods): Pillows, Repositioning    Patient Safety:  Fall Risk: Nursing Judgement-Fall Risk High(Add Comments): Yes  Fall Risk Interventions  Nursing Judgement-Fall Risk High(Add Comments): Yes  Toilet Every 2 Hours-In Advance of Need: Yes  Hourly Visual Checks: Awake, In bed  Fall Visual Posted: Fall sign posted, Socks  Room Door Open: Deferred to promote rest  Alarm On: Bed  Patient Moved Closer to Nursing Station: No    Active Consults:   IP CONSULT TO PULMONOLOGY    Length of Stay:  Expected LOS: 4  Actual LOS: 4    Madeline Hess LPN

## 2025-05-31 DIAGNOSIS — Z96.649 PERIPROSTHETIC FRACTURE OF SHAFT OF FEMUR: ICD-10-CM

## 2025-05-31 DIAGNOSIS — M97.8XXA PERIPROSTHETIC FRACTURE OF SHAFT OF FEMUR: ICD-10-CM

## 2025-06-04 RX ORDER — GABAPENTIN 300 MG/1
CAPSULE ORAL
Qty: 720 CAPSULE | Refills: 0 | OUTPATIENT
Start: 2025-06-04

## 2025-06-04 NOTE — TELEPHONE ENCOUNTER
Received message from Alannah asking for clarification on how patient takes medication.     Attempted to call patient twice but went straight to voicemail with no mailbox set up. Unable to leave voicemail. Will refuse medication for now.

## 2025-06-16 ENCOUNTER — OFFICE VISIT (OUTPATIENT)
Age: 70
End: 2025-06-16
Payer: MEDICARE

## 2025-06-16 VITALS
BODY MASS INDEX: 25.69 KG/M2 | SYSTOLIC BLOOD PRESSURE: 128 MMHG | DIASTOLIC BLOOD PRESSURE: 66 MMHG | OXYGEN SATURATION: 98 % | RESPIRATION RATE: 19 BRPM | HEART RATE: 92 BPM | TEMPERATURE: 98.3 F | WEIGHT: 193.8 LBS | HEIGHT: 73 IN

## 2025-06-16 DIAGNOSIS — R41.3 MEMORY LOSS: ICD-10-CM

## 2025-06-16 DIAGNOSIS — I63.312 CEREBRAL INFARCTION DUE TO THROMBOSIS OF LEFT MIDDLE CEREBRAL ARTERY (HCC): ICD-10-CM

## 2025-06-16 DIAGNOSIS — I63.239 CAROTID ARTERY STENOSIS WITH CEREBRAL INFARCTION (HCC): ICD-10-CM

## 2025-06-16 DIAGNOSIS — G40.209 LOCALIZATION-RELATED PARTIAL EPILEPSY WITH COMPLEX PARTIAL SEIZURES (HCC): Primary | ICD-10-CM

## 2025-06-16 DIAGNOSIS — G31.84 MCI (MILD COGNITIVE IMPAIRMENT): ICD-10-CM

## 2025-06-16 DIAGNOSIS — G40.209 COMPLEX PARTIAL SEIZURE EVOLVING TO GENERALIZED SEIZURE (HCC): ICD-10-CM

## 2025-06-16 DIAGNOSIS — Z96.649 PERIPROSTHETIC FRACTURE OF SHAFT OF FEMUR: ICD-10-CM

## 2025-06-16 DIAGNOSIS — R47.01 APHASIA: ICD-10-CM

## 2025-06-16 DIAGNOSIS — M97.8XXA PERIPROSTHETIC FRACTURE OF SHAFT OF FEMUR: ICD-10-CM

## 2025-06-16 PROCEDURE — 99214 OFFICE O/P EST MOD 30 MIN: CPT | Performed by: PSYCHIATRY & NEUROLOGY

## 2025-06-16 PROCEDURE — 1159F MED LIST DOCD IN RCRD: CPT | Performed by: PSYCHIATRY & NEUROLOGY

## 2025-06-16 PROCEDURE — 3078F DIAST BP <80 MM HG: CPT | Performed by: PSYCHIATRY & NEUROLOGY

## 2025-06-16 PROCEDURE — 1160F RVW MEDS BY RX/DR IN RCRD: CPT | Performed by: PSYCHIATRY & NEUROLOGY

## 2025-06-16 PROCEDURE — 1123F ACP DISCUSS/DSCN MKR DOCD: CPT | Performed by: PSYCHIATRY & NEUROLOGY

## 2025-06-16 PROCEDURE — 1036F TOBACCO NON-USER: CPT | Performed by: PSYCHIATRY & NEUROLOGY

## 2025-06-16 PROCEDURE — G8419 CALC BMI OUT NRM PARAM NOF/U: HCPCS | Performed by: PSYCHIATRY & NEUROLOGY

## 2025-06-16 PROCEDURE — 3017F COLORECTAL CA SCREEN DOC REV: CPT | Performed by: PSYCHIATRY & NEUROLOGY

## 2025-06-16 PROCEDURE — 3074F SYST BP LT 130 MM HG: CPT | Performed by: PSYCHIATRY & NEUROLOGY

## 2025-06-16 PROCEDURE — G8427 DOCREV CUR MEDS BY ELIG CLIN: HCPCS | Performed by: PSYCHIATRY & NEUROLOGY

## 2025-06-16 PROCEDURE — 1125F AMNT PAIN NOTED PAIN PRSNT: CPT | Performed by: PSYCHIATRY & NEUROLOGY

## 2025-06-16 RX ORDER — GABAPENTIN 600 MG/1
600 TABLET ORAL 3 TIMES DAILY
Qty: 270 TABLET | Refills: 1 | Status: SHIPPED | OUTPATIENT
Start: 2025-06-16 | End: 2025-12-18

## 2025-06-16 ASSESSMENT — PATIENT HEALTH QUESTIONNAIRE - PHQ9
1. LITTLE INTEREST OR PLEASURE IN DOING THINGS: NOT AT ALL
SUM OF ALL RESPONSES TO PHQ QUESTIONS 1-9: 0
2. FEELING DOWN, DEPRESSED OR HOPELESS: NOT AT ALL

## 2025-06-16 NOTE — PROGRESS NOTES
Consult    Subjective:     Rodolfo Delcid is a 68 y.o. Right-handed  male seen for urgent work in evaluation of seizures and stroke on referral from DOYLE Snyder, after the patient was admitted to the hospital on March 10, 2023 for some right-sided weakness and right facial droop and limited verbal response associated with some mild hypoxia with a PO2 of 72% on room air, which he was hospitalized, and his MRI was negative for stroke, and he was seen by Dr. Schwartz a neurologist and had an EEG that showed temporal slowing in the left hemisphere he had previous stroke with a large area of encephalomalacia it was thought that he might have had a seizure he is given Keppra 500 mg twice a day and been stable since that time.  His EEG did not show any seizure activity.  We will check his Keppra level, and asked him to obtain a 24-hour amatory EEG to make sure there is no further spells that could suggest seizures.  He is agreeable with the above.  He had a CTA of the head neck that showed no significant large vessel occlusion.  These records were all reviewed in great detail personally, and I agree with reports of no acute stroke on his MRI scan and his EEG report as dictated.  Patient last seen by us 1 year ago.  He is tolerating his medication of gabapentin 600 mg 4 times a day, taking 2 of the 300 mg pills each time.  He does not want to increase to the 600 mg.  Again he has had no side effects on the medication and is doing well.  He also has had a previous stroke many years ago, probably about 21 but is on aspirin therapy and high-dose statin with Lipitor 40 mg a day and had no recurrent strokes, his last Doppler done 2 years ago showed less than 50% disease bilaterally. He is to continue to stay mentally active physically active and eat a Mediterranean type diet and try to exercise some every day.  He has had no other new neurologic problems since last seen and no other new medical problems either he says.  
not on his list of medicines now.  He is allergic to Keppra.  He was admitted for possible seizures in September 2024 and was given Keppra then until he developed a rash.  Supposedly his cerIbel rapid EEG in September left hemispheric focus.  His previous EEGs have just shown slowing in the left posterior temporal area where he had his previous stroke.  He has bad COPD and is on chronic oxygen therapy and walks with a walker due to his generalized debility.  He has had no more spells since April.  He had a CT of the head that just showed the old infarct in April 2024 but no other testing done.  He had a stroke back in April 2023.  He has not had a Doppler in over 2 years.  We will repeat that also and check his EEG again to make sure his focus is well-controlled on his current medications and check his levels.  We discussed this with the patient and his wife and they agree with plans and therapy as above.      Past Medical History:   Diagnosis Date    Arthritis     Hips, Knees    CAD (coronary artery disease)     MI around 1990    COPD (chronic obstructive pulmonary disease) (HCC)     Hepatitis A     High cholesterol     Hypertension     Other ill-defined conditions(799.89)     Light sensitivity, causes seizures    Seizures (HCC)     Last seizure 12/2008/work -up in 2012 -possible seizure    Stroke ([facility]) 2005    Thyroid disease     Hypothyroid      Past Surgical History:   Procedure Laterality Date    CARDIAC CATHETERIZATION  20 years ago    no stents    HERNIA REPAIR  10/8/14    left inguinal hernia- Pawan Hartman MD    ORTHOPEDIC SURGERY  2/2010    [facility]. Total Hip Replacement/Dr. Borrero    ORTHOPEDIC SURGERY      Left Knee Scope    ORTHOPEDIC SURGERY  6/9/14    R hip replacement     ORTHOPEDIC SURGERY Right     Shoulder fracture & surgical repair with hardware    REVISION TOTAL HIP ARTHROPLASTY Left 5/17/2023    LEFT HIP TOTAL ARTHROPLASTY REVISION POSTERIOR APPROACH performed by Austyn Medellin MD at [facility] MAIN OR

## 2025-06-24 DIAGNOSIS — Z96.649 PERIPROSTHETIC FRACTURE OF SHAFT OF FEMUR: ICD-10-CM

## 2025-06-24 DIAGNOSIS — G40.209 LOCALIZATION-RELATED PARTIAL EPILEPSY WITH COMPLEX PARTIAL SEIZURES (HCC): ICD-10-CM

## 2025-06-24 DIAGNOSIS — M97.8XXA PERIPROSTHETIC FRACTURE OF SHAFT OF FEMUR: ICD-10-CM

## 2025-06-24 LAB — VALPROATE SERPL-MCNC: 60 UG/ML (ref 50–100)

## 2025-06-25 LAB — GABAPENTIN SERPLBLD-MCNC: 17.9 UG/ML (ref 4–16)

## 2025-07-16 ENCOUNTER — TRANSCRIBE ORDERS (OUTPATIENT)
Facility: HOSPITAL | Age: 70
End: 2025-07-16

## 2025-07-16 ENCOUNTER — HOSPITAL ENCOUNTER (OUTPATIENT)
Facility: HOSPITAL | Age: 70
Discharge: HOME | End: 2025-07-19
Payer: MEDICARE

## 2025-07-16 DIAGNOSIS — S60.429A BLISTER OF FINGER WITH INFECTION, INITIAL ENCOUNTER: ICD-10-CM

## 2025-07-16 DIAGNOSIS — S60.429A BLISTER OF FINGER WITH INFECTION, INITIAL ENCOUNTER: Primary | ICD-10-CM

## 2025-07-16 DIAGNOSIS — L08.9 BLISTER OF FINGER WITH INFECTION, INITIAL ENCOUNTER: Primary | ICD-10-CM

## 2025-07-16 DIAGNOSIS — L08.9 BLISTER OF FINGER WITH INFECTION, INITIAL ENCOUNTER: ICD-10-CM

## 2025-07-16 PROCEDURE — 73140 X-RAY EXAM OF FINGER(S): CPT

## 2025-07-23 ENCOUNTER — OFFICE VISIT (OUTPATIENT)
Age: 70
End: 2025-07-23
Payer: MEDICARE

## 2025-07-23 VITALS
WEIGHT: 193.8 LBS | HEIGHT: 73 IN | HEART RATE: 89 BPM | SYSTOLIC BLOOD PRESSURE: 123 MMHG | TEMPERATURE: 97.5 F | OXYGEN SATURATION: 96 % | BODY MASS INDEX: 25.69 KG/M2 | DIASTOLIC BLOOD PRESSURE: 60 MMHG | RESPIRATION RATE: 17 BRPM

## 2025-07-23 DIAGNOSIS — K40.90 INGUINAL HERNIA OF RIGHT SIDE WITHOUT OBSTRUCTION OR GANGRENE: Primary | ICD-10-CM

## 2025-07-23 PROCEDURE — G8419 CALC BMI OUT NRM PARAM NOF/U: HCPCS | Performed by: SURGERY

## 2025-07-23 PROCEDURE — 1036F TOBACCO NON-USER: CPT | Performed by: SURGERY

## 2025-07-23 PROCEDURE — 3074F SYST BP LT 130 MM HG: CPT | Performed by: SURGERY

## 2025-07-23 PROCEDURE — 1159F MED LIST DOCD IN RCRD: CPT | Performed by: SURGERY

## 2025-07-23 PROCEDURE — 1125F AMNT PAIN NOTED PAIN PRSNT: CPT | Performed by: SURGERY

## 2025-07-23 PROCEDURE — 1123F ACP DISCUSS/DSCN MKR DOCD: CPT | Performed by: SURGERY

## 2025-07-23 PROCEDURE — G8427 DOCREV CUR MEDS BY ELIG CLIN: HCPCS | Performed by: SURGERY

## 2025-07-23 PROCEDURE — 3017F COLORECTAL CA SCREEN DOC REV: CPT | Performed by: SURGERY

## 2025-07-23 PROCEDURE — 1160F RVW MEDS BY RX/DR IN RCRD: CPT | Performed by: SURGERY

## 2025-07-23 PROCEDURE — 99204 OFFICE O/P NEW MOD 45 MIN: CPT | Performed by: SURGERY

## 2025-07-23 PROCEDURE — 3078F DIAST BP <80 MM HG: CPT | Performed by: SURGERY

## 2025-07-23 ASSESSMENT — PATIENT HEALTH QUESTIONNAIRE - PHQ9
SUM OF ALL RESPONSES TO PHQ QUESTIONS 1-9: 0
1. LITTLE INTEREST OR PLEASURE IN DOING THINGS: NOT AT ALL
SUM OF ALL RESPONSES TO PHQ QUESTIONS 1-9: 0
2. FEELING DOWN, DEPRESSED OR HOPELESS: NOT AT ALL
SUM OF ALL RESPONSES TO PHQ QUESTIONS 1-9: 0
SUM OF ALL RESPONSES TO PHQ QUESTIONS 1-9: 0

## 2025-07-23 NOTE — PROGRESS NOTES
Identified pt with two pt identifiers (name and ). Reviewed chart in preparation for visit and have obtained necessary documentation.    Rodolfo Delcid is a 70 y.o. male  Chief Complaint   Patient presents with    New Patient     Right inguinal hernia      /60 (BP Site: Left Upper Arm, Patient Position: Sitting, BP Cuff Size: Medium Adult)   Pulse 89   Temp 97.5 °F (36.4 °C) (Temporal)   Resp 17   Ht 1.854 m (6' 1\")   Wt 87.9 kg (193 lb 12.8 oz)   SpO2 96%   BMI 25.57 kg/m²     1. Have you been to the ER, urgent care clinic since your last visit?  Hospitalized since your last visit?no    2. Have you seen or consulted any other health care providers outside of the [facility] since your last visit?  Include any pap smears or colon screening. No    Patient is on 3 liters of oxygen

## 2025-07-23 NOTE — PROGRESS NOTES
Subjective:       Rodolfo Delcid is a 70 y.o. male who presents for evaluation of right inguinal hernia. He has had it for a month. It stays out but can push it back in. Tolerating a diet. Having bowel function but reports constipation. History of COPD on 2-3 L NC. Quit smoking about 6 months ago. He is known to Dr. Mireles. History of seizures - last one 1 year ago. History of CAD - no known cardiologist.       Past Medical History:   Diagnosis Date    Arthritis     Hips, Knees    CAD (coronary artery disease)     MI around     COPD (chronic obstructive pulmonary disease) (HCC)     Hepatitis A     High cholesterol     Hypertension     Other ill-defined conditions(799.89)     Light sensitivity, causes seizures    Seizures (HCC)     Last seizure 2008/work -up in  -possible seizure    Stroke (HCC)     Thyroid disease     Hypothyroid     Past Surgical History:   Procedure Laterality Date    CARDIAC CATHETERIZATION  20 years ago    no stents    HERNIA REPAIR  10/8/14    left inguinal hernia- Pawan Hartman MD    ORTHOPEDIC SURGERY  2010    [facility]. Total Hip Replacement/Dr. Borrero    ORTHOPEDIC SURGERY      Left Knee Scope    ORTHOPEDIC SURGERY  14    R hip replacement     ORTHOPEDIC SURGERY Right     Shoulder fracture & surgical repair with hardware    REVISION TOTAL HIP ARTHROPLASTY Left 2023    LEFT HIP TOTAL ARTHROPLASTY REVISION POSTERIOR APPROACH performed by Austyn Medellin MD at [facility] MAIN OR     Social History     Socioeconomic History    Marital status:    Tobacco Use    Smoking status: Former     Current packs/day: 0.00     Types: Cigarettes     Quit date: 3/1/2023     Years since quittin.3    Smokeless tobacco: Never   Vaping Use    Vaping status: Never Used   Substance and Sexual Activity    Alcohol use: No    Drug use: No     Social Drivers of Health     Food Insecurity: No Food Insecurity (2025)    Hunger Vital Sign     Worried About Running Out of Food in the Last Year:

## 2025-08-11 ENCOUNTER — TELEPHONE (OUTPATIENT)
Age: 70
End: 2025-08-11

## 2025-08-15 PROBLEM — K40.90 RIGHT INGUINAL HERNIA: Status: ACTIVE | Noted: 2025-08-15

## 2025-09-02 ENCOUNTER — HOSPITAL ENCOUNTER (OUTPATIENT)
Facility: HOSPITAL | Age: 70
Discharge: HOME | End: 2025-09-05
Attending: PSYCHIATRY & NEUROLOGY
Payer: MEDICARE

## 2025-09-02 DIAGNOSIS — G40.209 LOCALIZATION-RELATED PARTIAL EPILEPSY WITH COMPLEX PARTIAL SEIZURES (HCC): ICD-10-CM

## 2025-09-02 DIAGNOSIS — M97.8XXA PERIPROSTHETIC FRACTURE OF SHAFT OF FEMUR: ICD-10-CM

## 2025-09-02 DIAGNOSIS — Z96.649 PERIPROSTHETIC FRACTURE OF SHAFT OF FEMUR: ICD-10-CM

## 2025-09-02 PROCEDURE — 95700 EEG CONT REC W/VID EEG TECH: CPT

## 2025-09-02 PROCEDURE — 95719 EEG PHYS/QHP EA INCR W/O VID: CPT | Performed by: PSYCHIATRY & NEUROLOGY

## 2025-09-03 PROBLEM — R41.82 ACUTE ALTERATION IN MENTAL STATUS: Status: ACTIVE | Noted: 2025-09-03

## 2025-09-03 PROBLEM — M97.8XXA PERIPROSTHETIC FRACTURE OF SHAFT OF FEMUR: Status: ACTIVE | Noted: 2025-09-03

## 2025-09-03 PROBLEM — Z96.649 PERIPROSTHETIC FRACTURE OF SHAFT OF FEMUR: Status: ACTIVE | Noted: 2025-09-03

## 2025-09-03 PROCEDURE — 95957 EEG DIGITAL ANALYSIS: CPT

## 2025-09-03 PROCEDURE — 95708 EEG WO VID EA 12-26HR UNMNTR: CPT

## (undated) DEVICE — SYSTEM WND THER 14 DAY 150 CC CANSTR THER UNIT PREVENA + 125

## (undated) DEVICE — TRANSFER SET 3": Brand: MEDLINE INDUSTRIES, INC.

## (undated) DEVICE — APPLICATOR MEDICATED 26 CC SOLUTION HI LT ORNG CHLORAPREP

## (undated) DEVICE — YANKAUER,SMOOTH HANDLE,HIGH CAPACITY: Brand: MEDLINE INDUSTRIES, INC.

## (undated) DEVICE — SUTURE ETHBND EXCEL SZ 5 L30IN NONABSORBABLE GRN L40MM V-37 MB66G

## (undated) DEVICE — GLOVE ORTHO 8   MSG9480

## (undated) DEVICE — TOTAL JOINT-MRMC: Brand: MEDLINE INDUSTRIES, INC.

## (undated) DEVICE — PADDING CAST W6INXL4YD COT LO LINTING WYTEX

## (undated) DEVICE — DRESSING NEG PRSS L35CM PREVENA PEEL AND PLC

## (undated) DEVICE — GLOVE SURG SZ 85 L12IN FNGR THK79MIL GRN LTX FREE

## (undated) DEVICE — NEEDLE SPNL L3.5IN PNK HUB S STL REG WALL FIT STYL W/ QNCKE

## (undated) DEVICE — GOWN,SIRUS,NONRNF,SETINSLV,2XL,18/CS: Brand: MEDLINE

## (undated) DEVICE — SUTURE MCRYL SZ 2-0 L36IN ABSRB UD L36MM CT-1 1/2 CIR Y945H

## (undated) DEVICE — Device

## (undated) DEVICE — SUTURE ABSRB L30CM 2-0 VLT SPRL PDS + STRATAFIX SXPP1B410

## (undated) DEVICE — ALCOHOL  RUBBING  70% ISOPROPYL  4-OZ

## (undated) DEVICE — SUTURE STRATAFIX SYMMETRIC SZ 1 L18IN ABSRB VLT CT1 L36CM SXPP1A404

## (undated) DEVICE — 6619 2 PTNT ISO SYS INCISE AREA&LT;(&GT;&&LT;)&GT;P: Brand: STERI-DRAPE™ IOBAN™ 2

## (undated) DEVICE — SPONGE GZ W4XL4IN COT 12 PLY TYP VII WVN C FLD DSGN STERILE

## (undated) DEVICE — 3M™ IOBAN™ 2 ANTIMICROBIAL INCISE DRAPE 6651EZ: Brand: IOBAN™ 2

## (undated) DEVICE — SOLUTION IRRIG 1000ML STRL H2O USP PLAS POUR BTL

## (undated) DEVICE — SOLUTION IRRIG 1000ML 0.9% SOD CHL USP POUR PLAS BTL

## (undated) DEVICE — HOOD: Brand: FLYTE

## (undated) DEVICE — MARKER SURG SKIN UTIL REGULAR/FINE 2 TIP W/ RUL AND 9 LBL

## (undated) DEVICE — 450 ML BOTTLE OF 0.05% CHLORHEXIDINE GLUCONATE IN 99.95% STERILE WATER FOR IRRIGATION, USP AND APPLICATOR.: Brand: IRRISEPT ANTIMICROBIAL WOUND LAVAGE

## (undated) DEVICE — DRESSING WND ISLAND STD 4X10 IN MULT LAYR STRL SILVERLON